# Patient Record
Sex: MALE | Race: WHITE | NOT HISPANIC OR LATINO | Employment: OTHER | URBAN - METROPOLITAN AREA
[De-identification: names, ages, dates, MRNs, and addresses within clinical notes are randomized per-mention and may not be internally consistent; named-entity substitution may affect disease eponyms.]

---

## 2017-12-19 PROCEDURE — 99285 EMERGENCY DEPT VISIT HI MDM: CPT

## 2017-12-19 RX ORDER — LANCETS
EACH MISCELLANEOUS
COMMUNITY
Start: 2017-10-23 | End: 2018-06-05

## 2017-12-19 RX ORDER — BUMETANIDE 1 MG/1
1 TABLET ORAL DAILY
COMMUNITY
End: 2017-12-24 | Stop reason: HOSPADM

## 2017-12-20 ENCOUNTER — APPOINTMENT (OUTPATIENT)
Dept: CT IMAGING | Facility: HOSPITAL | Age: 57
End: 2017-12-20

## 2017-12-20 ENCOUNTER — APPOINTMENT (OUTPATIENT)
Dept: MRI IMAGING | Facility: HOSPITAL | Age: 57
End: 2017-12-20
Attending: HOSPITALIST

## 2017-12-20 ENCOUNTER — HOSPITAL ENCOUNTER (INPATIENT)
Facility: HOSPITAL | Age: 57
LOS: 4 days | Discharge: HOME | End: 2017-12-24
Attending: EMERGENCY MEDICINE | Admitting: HOSPITALIST

## 2017-12-20 ENCOUNTER — APPOINTMENT (OUTPATIENT)
Dept: CARDIOLOGY | Facility: HOSPITAL | Age: 57
End: 2017-12-20
Attending: HOSPITALIST

## 2017-12-20 DIAGNOSIS — I63.9 ACUTE CVA (CEREBROVASCULAR ACCIDENT) (HCC): Primary | ICD-10-CM

## 2017-12-20 LAB
ALBUMIN SERPL-MCNC: 3.6 G/DL (ref 3.5–5.2)
ALBUMIN/GLOB SERPL: 0.9 G/DL
ALP SERPL-CCNC: 81 U/L (ref 39–117)
ALT SERPL W P-5'-P-CCNC: 10 U/L (ref 1–41)
ANION GAP SERPL CALCULATED.3IONS-SCNC: 8.9 MMOL/L
AST SERPL-CCNC: 19 U/L (ref 1–40)
BASOPHILS # BLD AUTO: 0.03 10*3/MM3 (ref 0–0.2)
BASOPHILS NFR BLD AUTO: 0.5 % (ref 0–1.5)
BH CV XLRA MEAS LEFT CCA RATIO VEL: 97.4 CM/SEC
BH CV XLRA MEAS LEFT DIST CCA EDV: 18.9 CM/SEC
BH CV XLRA MEAS LEFT DIST CCA PSV: 97.4 CM/SEC
BH CV XLRA MEAS LEFT DIST ICA EDV: -19.6 CM/SEC
BH CV XLRA MEAS LEFT DIST ICA PSV: -39.3 CM/SEC
BH CV XLRA MEAS LEFT ICA RATIO VEL: 91.1 CM/SEC
BH CV XLRA MEAS LEFT ICA/CCA RATIO: 0.94
BH CV XLRA MEAS LEFT MID ICA EDV: -19.3 CM/SEC
BH CV XLRA MEAS LEFT MID ICA PSV: -56.3 CM/SEC
BH CV XLRA MEAS LEFT PROX CCA EDV: 11.1 CM/SEC
BH CV XLRA MEAS LEFT PROX CCA PSV: 80.9 CM/SEC
BH CV XLRA MEAS LEFT PROX ECA EDV: -3.9 CM/SEC
BH CV XLRA MEAS LEFT PROX ECA PSV: -42.4 CM/SEC
BH CV XLRA MEAS LEFT PROX ICA EDV: 29.1 CM/SEC
BH CV XLRA MEAS LEFT PROX ICA PSV: 91.1 CM/SEC
BH CV XLRA MEAS LEFT PROX SCLA PSV: 149 CM/SEC
BH CV XLRA MEAS LEFT VERTEBRAL A EDV: -9 CM/SEC
BH CV XLRA MEAS LEFT VERTEBRAL A PSV: -39.3 CM/SEC
BH CV XLRA MEAS RIGHT CCA RATIO VEL: 70.7 CM/SEC
BH CV XLRA MEAS RIGHT DIST CCA EDV: 13.4 CM/SEC
BH CV XLRA MEAS RIGHT DIST CCA PSV: 70.7 CM/SEC
BH CV XLRA MEAS RIGHT DIST ICA EDV: -14.5 CM/SEC
BH CV XLRA MEAS RIGHT DIST ICA PSV: -42.8 CM/SEC
BH CV XLRA MEAS RIGHT ICA RATIO VEL: -73.3 CM/SEC
BH CV XLRA MEAS RIGHT ICA/CCA RATIO: -1
BH CV XLRA MEAS RIGHT MID ICA EDV: 14.1 CM/SEC
BH CV XLRA MEAS RIGHT MID ICA PSV: 58.5 CM/SEC
BH CV XLRA MEAS RIGHT PROX CCA EDV: 10.2 CM/SEC
BH CV XLRA MEAS RIGHT PROX CCA PSV: 62.9 CM/SEC
BH CV XLRA MEAS RIGHT PROX ECA EDV: -3.5 CM/SEC
BH CV XLRA MEAS RIGHT PROX ECA PSV: -62.1 CM/SEC
BH CV XLRA MEAS RIGHT PROX ICA EDV: -13.5 CM/SEC
BH CV XLRA MEAS RIGHT PROX ICA PSV: -73.3 CM/SEC
BH CV XLRA MEAS RIGHT PROX SCLA PSV: 180 CM/SEC
BH CV XLRA MEAS RIGHT VERTEBRAL A EDV: 11.4 CM/SEC
BH CV XLRA MEAS RIGHT VERTEBRAL A PSV: 40.1 CM/SEC
BILIRUB SERPL-MCNC: 0.3 MG/DL (ref 0.1–1.2)
BUN BLD-MCNC: 23 MG/DL (ref 6–20)
BUN/CREAT SERPL: 16.2 (ref 7–25)
CALCIUM SPEC-SCNC: 9.1 MG/DL (ref 8.6–10.5)
CHLORIDE SERPL-SCNC: 100 MMOL/L (ref 98–107)
CO2 SERPL-SCNC: 32.1 MMOL/L (ref 22–29)
CREAT BLD-MCNC: 1.42 MG/DL (ref 0.76–1.27)
DEPRECATED RDW RBC AUTO: 42.6 FL (ref 37–54)
EOSINOPHIL # BLD AUTO: 0.04 10*3/MM3 (ref 0–0.7)
EOSINOPHIL NFR BLD AUTO: 0.7 % (ref 0.3–6.2)
ERYTHROCYTE [DISTWIDTH] IN BLOOD BY AUTOMATED COUNT: 13 % (ref 11.5–14.5)
GFR SERPL CREATININE-BSD FRML MDRD: 52 ML/MIN/1.73
GLOBULIN UR ELPH-MCNC: 3.9 GM/DL
GLUCOSE BLD-MCNC: 388 MG/DL (ref 65–99)
GLUCOSE BLDC GLUCOMTR-MCNC: 235 MG/DL (ref 70–130)
GLUCOSE BLDC GLUCOMTR-MCNC: 245 MG/DL (ref 70–130)
HCT VFR BLD AUTO: 34.5 % (ref 40.4–52.2)
HGB BLD-MCNC: 11.2 G/DL (ref 13.7–17.6)
IMM GRANULOCYTES # BLD: 0 10*3/MM3 (ref 0–0.03)
IMM GRANULOCYTES NFR BLD: 0 % (ref 0–0.5)
INR PPP: 1.09 (ref 0.9–1.1)
LEFT ARM BP: NORMAL MMHG
LYMPHOCYTES # BLD AUTO: 1.52 10*3/MM3 (ref 0.9–4.8)
LYMPHOCYTES NFR BLD AUTO: 25.2 % (ref 19.6–45.3)
MCH RBC QN AUTO: 29.2 PG (ref 27–32.7)
MCHC RBC AUTO-ENTMCNC: 32.5 G/DL (ref 32.6–36.4)
MCV RBC AUTO: 90.1 FL (ref 79.8–96.2)
MONOCYTES # BLD AUTO: 0.29 10*3/MM3 (ref 0.2–1.2)
MONOCYTES NFR BLD AUTO: 4.8 % (ref 5–12)
NEUTROPHILS # BLD AUTO: 4.16 10*3/MM3 (ref 1.9–8.1)
NEUTROPHILS NFR BLD AUTO: 68.8 % (ref 42.7–76)
NRBC BLD MANUAL-RTO: 0 /100 WBC (ref 0–0)
PLATELET # BLD AUTO: 138 10*3/MM3 (ref 140–500)
PMV BLD AUTO: 12.1 FL (ref 6–12)
POTASSIUM BLD-SCNC: 4.2 MMOL/L (ref 3.5–5.2)
PROT SERPL-MCNC: 7.5 G/DL (ref 6–8.5)
PROTHROMBIN TIME: 13.7 SECONDS (ref 11.7–14.2)
RBC # BLD AUTO: 3.83 10*6/MM3 (ref 4.6–6)
RIGHT ARM BP: NORMAL MMHG
SODIUM BLD-SCNC: 141 MMOL/L (ref 136–145)
VIT B12 BLD-MCNC: 634 PG/ML (ref 211–946)
WBC NRBC COR # BLD: 6.04 10*3/MM3 (ref 4.5–10.7)

## 2017-12-20 PROCEDURE — 70498 CT ANGIOGRAPHY NECK: CPT

## 2017-12-20 PROCEDURE — 70553 MRI BRAIN STEM W/O & W/DYE: CPT

## 2017-12-20 PROCEDURE — 70450 CT HEAD/BRAIN W/O DYE: CPT

## 2017-12-20 PROCEDURE — 63710000001 INSULIN ASPART PER 5 UNITS: Performed by: HOSPITALIST

## 2017-12-20 PROCEDURE — 99223 1ST HOSP IP/OBS HIGH 75: CPT | Performed by: PSYCHIATRY & NEUROLOGY

## 2017-12-20 PROCEDURE — 82962 GLUCOSE BLOOD TEST: CPT

## 2017-12-20 PROCEDURE — 0 GADOBENATE DIMEGLUMINE 529 MG/ML SOLUTION: Performed by: HOSPITALIST

## 2017-12-20 PROCEDURE — A9577 INJ MULTIHANCE: HCPCS | Performed by: HOSPITALIST

## 2017-12-20 PROCEDURE — 85025 COMPLETE CBC W/AUTO DIFF WBC: CPT | Performed by: EMERGENCY MEDICINE

## 2017-12-20 PROCEDURE — 93306 TTE W/DOPPLER COMPLETE: CPT

## 2017-12-20 PROCEDURE — 85610 PROTHROMBIN TIME: CPT | Performed by: EMERGENCY MEDICINE

## 2017-12-20 PROCEDURE — 80053 COMPREHEN METABOLIC PANEL: CPT | Performed by: EMERGENCY MEDICINE

## 2017-12-20 PROCEDURE — 93880 EXTRACRANIAL BILAT STUDY: CPT

## 2017-12-20 PROCEDURE — 63710000001 INSULIN DETEMER PER 5 UNITS: Performed by: HOSPITALIST

## 2017-12-20 PROCEDURE — 70496 CT ANGIOGRAPHY HEAD: CPT

## 2017-12-20 PROCEDURE — 0 IOPAMIDOL 61 % SOLUTION: Performed by: HOSPITALIST

## 2017-12-20 PROCEDURE — 82607 VITAMIN B-12: CPT | Performed by: PSYCHIATRY & NEUROLOGY

## 2017-12-20 RX ORDER — ATORVASTATIN CALCIUM 20 MG/1
40 TABLET, FILM COATED ORAL NIGHTLY
Status: DISCONTINUED | OUTPATIENT
Start: 2017-12-20 | End: 2017-12-20

## 2017-12-20 RX ORDER — GABAPENTIN 300 MG/1
300 CAPSULE ORAL 3 TIMES DAILY
Status: DISCONTINUED | OUTPATIENT
Start: 2017-12-20 | End: 2017-12-24 | Stop reason: HOSPADM

## 2017-12-20 RX ORDER — OXYCODONE AND ACETAMINOPHEN 10; 325 MG/1; MG/1
1 TABLET ORAL EVERY 4 HOURS PRN
Status: DISCONTINUED | OUTPATIENT
Start: 2017-12-20 | End: 2017-12-20

## 2017-12-20 RX ORDER — DIPHENOXYLATE HYDROCHLORIDE AND ATROPINE SULFATE 2.5; .025 MG/1; MG/1
1 TABLET ORAL DAILY
Status: DISCONTINUED | OUTPATIENT
Start: 2017-12-20 | End: 2017-12-24 | Stop reason: HOSPADM

## 2017-12-20 RX ORDER — SODIUM CHLORIDE 0.9 % (FLUSH) 0.9 %
1-10 SYRINGE (ML) INJECTION AS NEEDED
Status: DISCONTINUED | OUTPATIENT
Start: 2017-12-20 | End: 2017-12-24 | Stop reason: HOSPADM

## 2017-12-20 RX ORDER — LISINOPRIL 20 MG/1
20 TABLET ORAL DAILY
Status: DISCONTINUED | OUTPATIENT
Start: 2017-12-20 | End: 2017-12-20

## 2017-12-20 RX ORDER — OXYCODONE AND ACETAMINOPHEN 10; 325 MG/1; MG/1
1 TABLET ORAL 4 TIMES DAILY
Status: DISCONTINUED | OUTPATIENT
Start: 2017-12-20 | End: 2017-12-20

## 2017-12-20 RX ORDER — ALPRAZOLAM 0.25 MG/1
1 TABLET ORAL 4 TIMES DAILY
Status: DISCONTINUED | OUTPATIENT
Start: 2017-12-20 | End: 2017-12-20

## 2017-12-20 RX ORDER — BUTALBITAL, ACETAMINOPHEN AND CAFFEINE 50; 325; 40 MG/1; MG/1; MG/1
2 TABLET ORAL EVERY 6 HOURS PRN
Status: DISCONTINUED | OUTPATIENT
Start: 2017-12-20 | End: 2017-12-24 | Stop reason: HOSPADM

## 2017-12-20 RX ORDER — ALPRAZOLAM 0.25 MG/1
0.25 TABLET ORAL 4 TIMES DAILY PRN
Status: DISCONTINUED | OUTPATIENT
Start: 2017-12-20 | End: 2017-12-24 | Stop reason: HOSPADM

## 2017-12-20 RX ORDER — TIZANIDINE 4 MG/1
4 TABLET ORAL EVERY 6 HOURS PRN
Status: DISCONTINUED | OUTPATIENT
Start: 2017-12-20 | End: 2017-12-24 | Stop reason: HOSPADM

## 2017-12-20 RX ORDER — ATORVASTATIN CALCIUM 80 MG/1
80 TABLET, FILM COATED ORAL NIGHTLY
Status: DISCONTINUED | OUTPATIENT
Start: 2017-12-20 | End: 2017-12-24 | Stop reason: HOSPADM

## 2017-12-20 RX ORDER — ASPIRIN 300 MG/1
300 SUPPOSITORY RECTAL DAILY
Status: DISCONTINUED | OUTPATIENT
Start: 2017-12-20 | End: 2017-12-22

## 2017-12-20 RX ORDER — ASPIRIN 81 MG/1
324 TABLET, CHEWABLE ORAL DAILY
Status: DISCONTINUED | OUTPATIENT
Start: 2017-12-20 | End: 2017-12-20

## 2017-12-20 RX ORDER — TIZANIDINE 4 MG/1
4 TABLET ORAL 4 TIMES DAILY
Status: DISCONTINUED | OUTPATIENT
Start: 2017-12-20 | End: 2017-12-20

## 2017-12-20 RX ORDER — SODIUM CHLORIDE 9 MG/ML
75 INJECTION, SOLUTION INTRAVENOUS CONTINUOUS
Status: DISCONTINUED | OUTPATIENT
Start: 2017-12-20 | End: 2017-12-22

## 2017-12-20 RX ORDER — ASPIRIN 81 MG/1
81 TABLET, CHEWABLE ORAL DAILY
Status: DISCONTINUED | OUTPATIENT
Start: 2017-12-20 | End: 2017-12-22

## 2017-12-20 RX ORDER — PANTOPRAZOLE SODIUM 40 MG/1
40 TABLET, DELAYED RELEASE ORAL EVERY MORNING
Status: DISCONTINUED | OUTPATIENT
Start: 2017-12-20 | End: 2017-12-21

## 2017-12-20 RX ORDER — LISINOPRIL 20 MG/1
20 TABLET ORAL EVERY 12 HOURS SCHEDULED
Status: DISCONTINUED | OUTPATIENT
Start: 2017-12-20 | End: 2017-12-24 | Stop reason: HOSPADM

## 2017-12-20 RX ADMIN — LISINOPRIL 20 MG: 20 TABLET ORAL at 23:07

## 2017-12-20 RX ADMIN — BUTALBITAL, ACETAMINOPHEN, AND CAFFEINE 2 TABLET: 50; 325; 40 TABLET ORAL at 16:12

## 2017-12-20 RX ADMIN — GABAPENTIN 300 MG: 300 CAPSULE ORAL at 23:06

## 2017-12-20 RX ADMIN — ASPIRIN 81 MG: 81 TABLET, CHEWABLE ORAL at 17:32

## 2017-12-20 RX ADMIN — BUTALBITAL, ACETAMINOPHEN, AND CAFFEINE 2 TABLET: 50; 325; 40 TABLET ORAL at 23:20

## 2017-12-20 RX ADMIN — INSULIN DETEMIR 10 UNITS: 100 INJECTION, SOLUTION SUBCUTANEOUS at 23:08

## 2017-12-20 RX ADMIN — SODIUM CHLORIDE 75 ML/HR: 9 INJECTION, SOLUTION INTRAVENOUS at 14:58

## 2017-12-20 RX ADMIN — ATORVASTATIN CALCIUM 80 MG: 80 TABLET, FILM COATED ORAL at 23:07

## 2017-12-20 RX ADMIN — GADOBENATE DIMEGLUMINE 16 ML: 529 INJECTION, SOLUTION INTRAVENOUS at 10:40

## 2017-12-20 RX ADMIN — GABAPENTIN 300 MG: 300 CAPSULE ORAL at 16:12

## 2017-12-20 RX ADMIN — INSULIN ASPART 3 UNITS: 100 INJECTION, SOLUTION INTRAVENOUS; SUBCUTANEOUS at 23:08

## 2017-12-20 RX ADMIN — INSULIN ASPART 5 UNITS: 100 INJECTION, SOLUTION INTRAVENOUS; SUBCUTANEOUS at 17:32

## 2017-12-20 RX ADMIN — IOPAMIDOL 95 ML: 612 INJECTION, SOLUTION INTRAVENOUS at 17:30

## 2017-12-21 LAB
ALBUMIN SERPL-MCNC: 3.1 G/DL (ref 3.5–5.2)
ALBUMIN/GLOB SERPL: 0.9 G/DL
ALP SERPL-CCNC: 69 U/L (ref 39–117)
ALT SERPL W P-5'-P-CCNC: 13 U/L (ref 1–41)
ANION GAP SERPL CALCULATED.3IONS-SCNC: 12 MMOL/L
AST SERPL-CCNC: 24 U/L (ref 1–40)
BASOPHILS # BLD AUTO: 0.02 10*3/MM3 (ref 0–0.2)
BASOPHILS NFR BLD AUTO: 0.4 % (ref 0–1.5)
BH CV ECHO MEAS - ACS: 1.9 CM
BH CV ECHO MEAS - AO MEAN PG (FULL): 1 MMHG
BH CV ECHO MEAS - AO MEAN PG: 3 MMHG
BH CV ECHO MEAS - AO ROOT AREA (BSA CORRECTED): 1.7
BH CV ECHO MEAS - AO ROOT AREA: 8.6 CM^2
BH CV ECHO MEAS - AO ROOT DIAM: 3.3 CM
BH CV ECHO MEAS - AO V2 MEAN: 80 CM/SEC
BH CV ECHO MEAS - AO V2 VTI: 23.7 CM
BH CV ECHO MEAS - AVA(I,A): 4.6 CM^2
BH CV ECHO MEAS - AVA(I,D): 4.6 CM^2
BH CV ECHO MEAS - BSA(HAYCOCK): 1.9 M^2
BH CV ECHO MEAS - BSA: 1.9 M^2
BH CV ECHO MEAS - BZI_BMI: 25.1 KILOGRAMS/M^2
BH CV ECHO MEAS - BZI_METRIC_HEIGHT: 175.3 CM
BH CV ECHO MEAS - BZI_METRIC_WEIGHT: 77.1 KG
BH CV ECHO MEAS - CONTRAST EF (2CH): 69.6 ML/M^2
BH CV ECHO MEAS - CONTRAST EF 4CH: 70.1 ML/M^2
BH CV ECHO MEAS - EDV(CUBED): 68.9 ML
BH CV ECHO MEAS - EDV(MOD-SP2): 69 ML
BH CV ECHO MEAS - EDV(MOD-SP4): 67 ML
BH CV ECHO MEAS - EDV(TEICH): 74.2 ML
BH CV ECHO MEAS - EF(CUBED): 84.6 %
BH CV ECHO MEAS - EF(MOD-SP2): 69.6 %
BH CV ECHO MEAS - EF(MOD-SP4): 70.1 %
BH CV ECHO MEAS - EF(TEICH): 78.2 %
BH CV ECHO MEAS - ESV(CUBED): 10.6 ML
BH CV ECHO MEAS - ESV(MOD-SP2): 21 ML
BH CV ECHO MEAS - ESV(MOD-SP4): 20 ML
BH CV ECHO MEAS - ESV(TEICH): 16.2 ML
BH CV ECHO MEAS - FS: 46.3 %
BH CV ECHO MEAS - IVS/LVPW: 1.1
BH CV ECHO MEAS - IVSD: 1.4 CM
BH CV ECHO MEAS - LA DIMENSION: 2.9 CM
BH CV ECHO MEAS - LA/AO: 0.88
BH CV ECHO MEAS - LAT PEAK E' VEL: 7.8 CM/SEC
BH CV ECHO MEAS - LV DIASTOLIC VOL/BSA (35-75): 34.8 ML/M^2
BH CV ECHO MEAS - LV MASS(C)D: 204.9 GRAMS
BH CV ECHO MEAS - LV MASS(C)DI: 106.3 GRAMS/M^2
BH CV ECHO MEAS - LV MEAN PG: 2 MMHG
BH CV ECHO MEAS - LV SYSTOLIC VOL/BSA (12-30): 10.4 ML/M^2
BH CV ECHO MEAS - LV V1 MEAN: 71.1 CM/SEC
BH CV ECHO MEAS - LV V1 VTI: 24 CM
BH CV ECHO MEAS - LVIDD: 4.1 CM
BH CV ECHO MEAS - LVIDS: 2.2 CM
BH CV ECHO MEAS - LVLD AP2: 7.6 CM
BH CV ECHO MEAS - LVLD AP4: 6.3 CM
BH CV ECHO MEAS - LVLS AP2: 5.6 CM
BH CV ECHO MEAS - LVLS AP4: 5.5 CM
BH CV ECHO MEAS - LVOT AREA (M): 4.5 CM^2
BH CV ECHO MEAS - LVOT AREA: 4.5 CM^2
BH CV ECHO MEAS - LVOT DIAM: 2.4 CM
BH CV ECHO MEAS - LVPWD: 1.3 CM
BH CV ECHO MEAS - MED PEAK E' VEL: 4.5 CM/SEC
BH CV ECHO MEAS - MR MAX PG: 83.5 MMHG
BH CV ECHO MEAS - MR MAX VEL: 457 CM/SEC
BH CV ECHO MEAS - MV A DUR: 0.15 SEC
BH CV ECHO MEAS - MV A MAX VEL: 91.3 CM/SEC
BH CV ECHO MEAS - MV DEC SLOPE: 420 CM/SEC^2
BH CV ECHO MEAS - MV DEC TIME: 0.23 SEC
BH CV ECHO MEAS - MV E MAX VEL: 83.9 CM/SEC
BH CV ECHO MEAS - MV E/A: 0.92
BH CV ECHO MEAS - MV MEAN PG: 2 MMHG
BH CV ECHO MEAS - MV P1/2T MAX VEL: 110 CM/SEC
BH CV ECHO MEAS - MV P1/2T: 76.7 MSEC
BH CV ECHO MEAS - MV V2 MEAN: 63.8 CM/SEC
BH CV ECHO MEAS - MV V2 VTI: 36.1 CM
BH CV ECHO MEAS - MVA P1/2T LCG: 2 CM^2
BH CV ECHO MEAS - MVA(P1/2T): 2.9 CM^2
BH CV ECHO MEAS - MVA(VTI): 3 CM^2
BH CV ECHO MEAS - PA ACC SLOPE: 1124 CM/SEC^2
BH CV ECHO MEAS - PA ACC TIME: 0.09 SEC
BH CV ECHO MEAS - PA MAX PG (FULL): 1.6 MMHG
BH CV ECHO MEAS - PA MAX PG: 3.9 MMHG
BH CV ECHO MEAS - PA PR(ACCEL): 40.8 MMHG
BH CV ECHO MEAS - PA V2 MAX: 99.1 CM/SEC
BH CV ECHO MEAS - PULM A REVS DUR: 0.16 SEC
BH CV ECHO MEAS - PULM A REVS VEL: 27.6 CM/SEC
BH CV ECHO MEAS - PULM DIAS VEL: 43.4 CM/SEC
BH CV ECHO MEAS - PULM S/D: 1.3
BH CV ECHO MEAS - PULM SYS VEL: 54.8 CM/SEC
BH CV ECHO MEAS - PVA(V,A): 2.7 CM^2
BH CV ECHO MEAS - PVA(V,D): 2.7 CM^2
BH CV ECHO MEAS - QP/QS: 0.51
BH CV ECHO MEAS - RAP SYSTOLE: 3 MMHG
BH CV ECHO MEAS - RV MAX PG: 2.3 MMHG
BH CV ECHO MEAS - RV MEAN PG: 1 MMHG
BH CV ECHO MEAS - RV V1 MAX: 75.9 CM/SEC
BH CV ECHO MEAS - RV V1 MEAN: 51.9 CM/SEC
BH CV ECHO MEAS - RV V1 VTI: 16.1 CM
BH CV ECHO MEAS - RVOT AREA: 3.5 CM^2
BH CV ECHO MEAS - RVOT DIAM: 2.1 CM
BH CV ECHO MEAS - RVSP: 22 MMHG
BH CV ECHO MEAS - SI(AO): 105.1 ML/M^2
BH CV ECHO MEAS - SI(CUBED): 30.2 ML/M^2
BH CV ECHO MEAS - SI(LVOT): 56.3 ML/M^2
BH CV ECHO MEAS - SI(MOD-SP2): 24.9 ML/M^2
BH CV ECHO MEAS - SI(MOD-SP4): 24.4 ML/M^2
BH CV ECHO MEAS - SI(TEICH): 30.1 ML/M^2
BH CV ECHO MEAS - SV(AO): 202.7 ML
BH CV ECHO MEAS - SV(CUBED): 58.3 ML
BH CV ECHO MEAS - SV(LVOT): 108.6 ML
BH CV ECHO MEAS - SV(MOD-SP2): 48 ML
BH CV ECHO MEAS - SV(MOD-SP4): 47 ML
BH CV ECHO MEAS - SV(RVOT): 55.8 ML
BH CV ECHO MEAS - SV(TEICH): 58 ML
BH CV ECHO MEAS - TAPSE (>1.6): 1.3 CM2
BH CV ECHO MEAS - TR MAX VEL: 217 CM/SEC
BH CV XLRA - RV BASE: 3.2 CM
BH CV XLRA - RV LENGTH: 5.6 CM
BH CV XLRA - RV MID: 2.1 CM
BH CV XLRA - TDI S': 9.1 CM/SEC
BILIRUB SERPL-MCNC: 0.2 MG/DL (ref 0.1–1.2)
BUN BLD-MCNC: 19 MG/DL (ref 6–20)
BUN/CREAT SERPL: 16.4 (ref 7–25)
CALCIUM SPEC-SCNC: 8.9 MG/DL (ref 8.6–10.5)
CHLORIDE SERPL-SCNC: 104 MMOL/L (ref 98–107)
CHOLEST SERPL-MCNC: 138 MG/DL (ref 0–200)
CO2 SERPL-SCNC: 26 MMOL/L (ref 22–29)
CREAT BLD-MCNC: 1.16 MG/DL (ref 0.76–1.27)
DEPRECATED RDW RBC AUTO: 42.6 FL (ref 37–54)
E/E' RATIO: 14.8
EOSINOPHIL # BLD AUTO: 0.11 10*3/MM3 (ref 0–0.7)
EOSINOPHIL NFR BLD AUTO: 2.1 % (ref 0.3–6.2)
ERYTHROCYTE [DISTWIDTH] IN BLOOD BY AUTOMATED COUNT: 13 % (ref 11.5–14.5)
GFR SERPL CREATININE-BSD FRML MDRD: 65 ML/MIN/1.73
GLOBULIN UR ELPH-MCNC: 3.4 GM/DL
GLUCOSE BLD-MCNC: 311 MG/DL (ref 65–99)
GLUCOSE BLDC GLUCOMTR-MCNC: 216 MG/DL (ref 70–130)
GLUCOSE BLDC GLUCOMTR-MCNC: 229 MG/DL (ref 70–130)
GLUCOSE BLDC GLUCOMTR-MCNC: 290 MG/DL (ref 70–130)
HBA1C MFR BLD: 10.8 % (ref 4.8–5.6)
HCT VFR BLD AUTO: 33.4 % (ref 40.4–52.2)
HDLC SERPL-MCNC: 36 MG/DL (ref 40–60)
HGB BLD-MCNC: 10.9 G/DL (ref 13.7–17.6)
IMM GRANULOCYTES # BLD: 0 10*3/MM3 (ref 0–0.03)
IMM GRANULOCYTES NFR BLD: 0 % (ref 0–0.5)
LDLC SERPL CALC-MCNC: 75 MG/DL (ref 0–100)
LDLC/HDLC SERPL: 2.09 {RATIO}
LEFT ATRIUM VOLUME INDEX: 27 ML/M2
LYMPHOCYTES # BLD AUTO: 1.78 10*3/MM3 (ref 0.9–4.8)
LYMPHOCYTES NFR BLD AUTO: 33.3 % (ref 19.6–45.3)
MAXIMAL PREDICTED HEART RATE: 165 BPM
MCH RBC QN AUTO: 29.5 PG (ref 27–32.7)
MCHC RBC AUTO-ENTMCNC: 32.6 G/DL (ref 32.6–36.4)
MCV RBC AUTO: 90.3 FL (ref 79.8–96.2)
MONOCYTES # BLD AUTO: 0.38 10*3/MM3 (ref 0.2–1.2)
MONOCYTES NFR BLD AUTO: 7.1 % (ref 5–12)
NEUTROPHILS # BLD AUTO: 3.06 10*3/MM3 (ref 1.9–8.1)
NEUTROPHILS NFR BLD AUTO: 57.1 % (ref 42.7–76)
NT-PROBNP SERPL-MCNC: 1863 PG/ML (ref 0–900)
PLATELET # BLD AUTO: 129 10*3/MM3 (ref 140–500)
PMV BLD AUTO: 11.5 FL (ref 6–12)
POTASSIUM BLD-SCNC: 4 MMOL/L (ref 3.5–5.2)
PROT SERPL-MCNC: 6.5 G/DL (ref 6–8.5)
RBC # BLD AUTO: 3.7 10*6/MM3 (ref 4.6–6)
SODIUM BLD-SCNC: 142 MMOL/L (ref 136–145)
STRESS TARGET HR: 140 BPM
TRIGL SERPL-MCNC: 133 MG/DL (ref 0–150)
TSH SERPL DL<=0.05 MIU/L-ACNC: 0.19 MIU/ML (ref 0.27–4.2)
VLDLC SERPL-MCNC: 26.6 MG/DL (ref 5–40)
WBC NRBC COR # BLD: 5.35 10*3/MM3 (ref 4.5–10.7)

## 2017-12-21 PROCEDURE — 84443 ASSAY THYROID STIM HORMONE: CPT | Performed by: HOSPITALIST

## 2017-12-21 PROCEDURE — G8980 MOBILITY D/C STATUS: HCPCS

## 2017-12-21 PROCEDURE — G8978 MOBILITY CURRENT STATUS: HCPCS

## 2017-12-21 PROCEDURE — 97165 OT EVAL LOW COMPLEX 30 MIN: CPT

## 2017-12-21 PROCEDURE — 80061 LIPID PANEL: CPT | Performed by: HOSPITALIST

## 2017-12-21 PROCEDURE — 99232 SBSQ HOSP IP/OBS MODERATE 35: CPT | Performed by: NURSE PRACTITIONER

## 2017-12-21 PROCEDURE — 82962 GLUCOSE BLOOD TEST: CPT

## 2017-12-21 PROCEDURE — 83036 HEMOGLOBIN GLYCOSYLATED A1C: CPT | Performed by: HOSPITALIST

## 2017-12-21 PROCEDURE — 97161 PT EVAL LOW COMPLEX 20 MIN: CPT

## 2017-12-21 PROCEDURE — G8979 MOBILITY GOAL STATUS: HCPCS

## 2017-12-21 PROCEDURE — 63710000001 INSULIN ASPART PER 5 UNITS: Performed by: HOSPITALIST

## 2017-12-21 PROCEDURE — 80053 COMPREHEN METABOLIC PANEL: CPT | Performed by: HOSPITALIST

## 2017-12-21 PROCEDURE — 83880 ASSAY OF NATRIURETIC PEPTIDE: CPT | Performed by: HOSPITALIST

## 2017-12-21 PROCEDURE — 92610 EVALUATE SWALLOWING FUNCTION: CPT

## 2017-12-21 PROCEDURE — 85025 COMPLETE CBC W/AUTO DIFF WBC: CPT | Performed by: HOSPITALIST

## 2017-12-21 PROCEDURE — 63710000001 INSULIN DETEMER PER 5 UNITS: Performed by: HOSPITALIST

## 2017-12-21 RX ORDER — OXYCODONE HYDROCHLORIDE AND ACETAMINOPHEN 5; 325 MG/1; MG/1
2 TABLET ORAL EVERY 6 HOURS PRN
Status: DISCONTINUED | OUTPATIENT
Start: 2017-12-21 | End: 2017-12-24 | Stop reason: HOSPADM

## 2017-12-21 RX ORDER — HYDRALAZINE HYDROCHLORIDE 10 MG/1
10 TABLET, FILM COATED ORAL EVERY 6 HOURS PRN
Status: DISCONTINUED | OUTPATIENT
Start: 2017-12-21 | End: 2017-12-24 | Stop reason: HOSPADM

## 2017-12-21 RX ORDER — FAMOTIDINE 20 MG/1
20 TABLET, FILM COATED ORAL 2 TIMES DAILY
Status: DISCONTINUED | OUTPATIENT
Start: 2017-12-21 | End: 2017-12-24 | Stop reason: HOSPADM

## 2017-12-21 RX ORDER — HYDRALAZINE HYDROCHLORIDE 20 MG/ML
10 INJECTION INTRAMUSCULAR; INTRAVENOUS EVERY 6 HOURS PRN
Status: DISCONTINUED | OUTPATIENT
Start: 2017-12-21 | End: 2017-12-24 | Stop reason: HOSPADM

## 2017-12-21 RX ADMIN — Medication 1 TABLET: at 08:49

## 2017-12-21 RX ADMIN — FAMOTIDINE 20 MG: 20 TABLET, FILM COATED ORAL at 20:56

## 2017-12-21 RX ADMIN — ASPIRIN 81 MG: 81 TABLET, CHEWABLE ORAL at 08:49

## 2017-12-21 RX ADMIN — GABAPENTIN 300 MG: 300 CAPSULE ORAL at 15:31

## 2017-12-21 RX ADMIN — INSULIN ASPART 3 UNITS: 100 INJECTION, SOLUTION INTRAVENOUS; SUBCUTANEOUS at 22:55

## 2017-12-21 RX ADMIN — GABAPENTIN 300 MG: 300 CAPSULE ORAL at 20:56

## 2017-12-21 RX ADMIN — INSULIN ASPART 3 UNITS: 100 INJECTION, SOLUTION INTRAVENOUS; SUBCUTANEOUS at 17:35

## 2017-12-21 RX ADMIN — OXYCODONE HYDROCHLORIDE AND ACETAMINOPHEN 2 TABLET: 5; 325 TABLET ORAL at 17:34

## 2017-12-21 RX ADMIN — ALPRAZOLAM 0.25 MG: 0.25 TABLET ORAL at 17:34

## 2017-12-21 RX ADMIN — ATORVASTATIN CALCIUM 80 MG: 80 TABLET, FILM COATED ORAL at 20:56

## 2017-12-21 RX ADMIN — GABAPENTIN 300 MG: 300 CAPSULE ORAL at 08:49

## 2017-12-21 RX ADMIN — ALPRAZOLAM 0.25 MG: 0.25 TABLET ORAL at 10:43

## 2017-12-21 RX ADMIN — LISINOPRIL 20 MG: 20 TABLET ORAL at 08:49

## 2017-12-21 RX ADMIN — INSULIN ASPART 5 UNITS: 100 INJECTION, SOLUTION INTRAVENOUS; SUBCUTANEOUS at 17:35

## 2017-12-21 RX ADMIN — INSULIN DETEMIR 10 UNITS: 100 INJECTION, SOLUTION SUBCUTANEOUS at 22:55

## 2017-12-21 RX ADMIN — PANTOPRAZOLE SODIUM 40 MG: 40 TABLET, DELAYED RELEASE ORAL at 08:49

## 2017-12-21 RX ADMIN — OXYCODONE HYDROCHLORIDE AND ACETAMINOPHEN 2 TABLET: 5; 325 TABLET ORAL at 22:55

## 2017-12-21 RX ADMIN — INSULIN ASPART 5 UNITS: 100 INJECTION, SOLUTION INTRAVENOUS; SUBCUTANEOUS at 08:50

## 2017-12-21 RX ADMIN — INSULIN ASPART 4 UNITS: 100 INJECTION, SOLUTION INTRAVENOUS; SUBCUTANEOUS at 08:49

## 2017-12-21 RX ADMIN — LISINOPRIL 20 MG: 20 TABLET ORAL at 20:56

## 2017-12-21 RX ADMIN — INSULIN ASPART 3 UNITS: 100 INJECTION, SOLUTION INTRAVENOUS; SUBCUTANEOUS at 12:10

## 2017-12-21 RX ADMIN — INSULIN ASPART 5 UNITS: 100 INJECTION, SOLUTION INTRAVENOUS; SUBCUTANEOUS at 12:09

## 2017-12-21 RX ADMIN — FAMOTIDINE 20 MG: 20 TABLET, FILM COATED ORAL at 12:09

## 2017-12-21 RX ADMIN — OXYCODONE HYDROCHLORIDE AND ACETAMINOPHEN 2 TABLET: 5; 325 TABLET ORAL at 10:43

## 2017-12-21 RX ADMIN — ALPRAZOLAM 0.25 MG: 0.25 TABLET ORAL at 04:25

## 2017-12-22 LAB
ANION GAP SERPL CALCULATED.3IONS-SCNC: 8.7 MMOL/L
BUN BLD-MCNC: 16 MG/DL (ref 6–20)
BUN/CREAT SERPL: 12.8 (ref 7–25)
CALCIUM SPEC-SCNC: 9.3 MG/DL (ref 8.6–10.5)
CHLORIDE SERPL-SCNC: 106 MMOL/L (ref 98–107)
CO2 SERPL-SCNC: 25.3 MMOL/L (ref 22–29)
CREAT BLD-MCNC: 1.25 MG/DL (ref 0.76–1.27)
DEPRECATED RDW RBC AUTO: 43.1 FL (ref 37–54)
ERYTHROCYTE [DISTWIDTH] IN BLOOD BY AUTOMATED COUNT: 13.2 % (ref 11.5–14.5)
GFR SERPL CREATININE-BSD FRML MDRD: 60 ML/MIN/1.73
GLUCOSE BLD-MCNC: 239 MG/DL (ref 65–99)
GLUCOSE BLDC GLUCOMTR-MCNC: 212 MG/DL (ref 70–130)
GLUCOSE BLDC GLUCOMTR-MCNC: 228 MG/DL (ref 70–130)
GLUCOSE BLDC GLUCOMTR-MCNC: 240 MG/DL (ref 70–130)
GLUCOSE BLDC GLUCOMTR-MCNC: 300 MG/DL (ref 70–130)
HCT VFR BLD AUTO: 35 % (ref 40.4–52.2)
HGB BLD-MCNC: 11.4 G/DL (ref 13.7–17.6)
MCH RBC QN AUTO: 29.3 PG (ref 27–32.7)
MCHC RBC AUTO-ENTMCNC: 32.6 G/DL (ref 32.6–36.4)
MCV RBC AUTO: 90 FL (ref 79.8–96.2)
PLATELET # BLD AUTO: 153 10*3/MM3 (ref 140–500)
PMV BLD AUTO: 11.7 FL (ref 6–12)
POTASSIUM BLD-SCNC: 4.4 MMOL/L (ref 3.5–5.2)
RBC # BLD AUTO: 3.89 10*6/MM3 (ref 4.6–6)
SODIUM BLD-SCNC: 140 MMOL/L (ref 136–145)
WBC NRBC COR # BLD: 7 10*3/MM3 (ref 4.5–10.7)

## 2017-12-22 PROCEDURE — 25010000002 HYDRALAZINE PER 20 MG: Performed by: INTERNAL MEDICINE

## 2017-12-22 PROCEDURE — 80048 BASIC METABOLIC PNL TOTAL CA: CPT | Performed by: INTERNAL MEDICINE

## 2017-12-22 PROCEDURE — 63710000001 INSULIN ASPART PER 5 UNITS: Performed by: HOSPITALIST

## 2017-12-22 PROCEDURE — 99231 SBSQ HOSP IP/OBS SF/LOW 25: CPT | Performed by: PSYCHIATRY & NEUROLOGY

## 2017-12-22 PROCEDURE — 63710000001 INSULIN DETEMER PER 5 UNITS: Performed by: INTERNAL MEDICINE

## 2017-12-22 PROCEDURE — 82962 GLUCOSE BLOOD TEST: CPT

## 2017-12-22 PROCEDURE — 85027 COMPLETE CBC AUTOMATED: CPT | Performed by: INTERNAL MEDICINE

## 2017-12-22 PROCEDURE — 63710000001 INSULIN ASPART PER 5 UNITS: Performed by: INTERNAL MEDICINE

## 2017-12-22 PROCEDURE — 97535 SELF CARE MNGMENT TRAINING: CPT

## 2017-12-22 RX ORDER — CARVEDILOL 12.5 MG/1
12.5 TABLET ORAL EVERY 12 HOURS SCHEDULED
Status: DISCONTINUED | OUTPATIENT
Start: 2017-12-22 | End: 2017-12-24 | Stop reason: HOSPADM

## 2017-12-22 RX ORDER — ONDANSETRON 2 MG/ML
4 INJECTION INTRAMUSCULAR; INTRAVENOUS EVERY 4 HOURS PRN
Status: DISCONTINUED | OUTPATIENT
Start: 2017-12-22 | End: 2017-12-24 | Stop reason: HOSPADM

## 2017-12-22 RX ORDER — ASPIRIN 325 MG
325 TABLET, DELAYED RELEASE (ENTERIC COATED) ORAL DAILY
Status: DISCONTINUED | OUTPATIENT
Start: 2017-12-22 | End: 2017-12-24 | Stop reason: HOSPADM

## 2017-12-22 RX ORDER — AMLODIPINE BESYLATE 5 MG/1
5 TABLET ORAL
Status: DISCONTINUED | OUTPATIENT
Start: 2017-12-22 | End: 2017-12-22

## 2017-12-22 RX ORDER — AMLODIPINE BESYLATE 10 MG/1
10 TABLET ORAL
Status: DISCONTINUED | OUTPATIENT
Start: 2017-12-23 | End: 2017-12-24 | Stop reason: HOSPADM

## 2017-12-22 RX ORDER — AMLODIPINE BESYLATE 5 MG/1
5 TABLET ORAL ONCE
Status: COMPLETED | OUTPATIENT
Start: 2017-12-22 | End: 2017-12-22

## 2017-12-22 RX ADMIN — CARVEDILOL 12.5 MG: 12.5 TABLET, FILM COATED ORAL at 22:38

## 2017-12-22 RX ADMIN — LISINOPRIL 20 MG: 20 TABLET ORAL at 08:55

## 2017-12-22 RX ADMIN — INSULIN ASPART 7 UNITS: 100 INJECTION, SOLUTION INTRAVENOUS; SUBCUTANEOUS at 17:24

## 2017-12-22 RX ADMIN — AMLODIPINE BESYLATE 5 MG: 5 TABLET ORAL at 15:16

## 2017-12-22 RX ADMIN — GABAPENTIN 300 MG: 300 CAPSULE ORAL at 15:16

## 2017-12-22 RX ADMIN — SODIUM CHLORIDE 75 ML/HR: 9 INJECTION, SOLUTION INTRAVENOUS at 07:56

## 2017-12-22 RX ADMIN — AMLODIPINE BESYLATE 5 MG: 5 TABLET ORAL at 08:55

## 2017-12-22 RX ADMIN — FAMOTIDINE 20 MG: 20 TABLET, FILM COATED ORAL at 22:39

## 2017-12-22 RX ADMIN — Medication 1 TABLET: at 08:56

## 2017-12-22 RX ADMIN — INSULIN ASPART 5 UNITS: 100 INJECTION, SOLUTION INTRAVENOUS; SUBCUTANEOUS at 12:13

## 2017-12-22 RX ADMIN — ATORVASTATIN CALCIUM 80 MG: 80 TABLET, FILM COATED ORAL at 22:39

## 2017-12-22 RX ADMIN — INSULIN DETEMIR 15 UNITS: 100 INJECTION, SOLUTION SUBCUTANEOUS at 22:39

## 2017-12-22 RX ADMIN — INSULIN ASPART 3 UNITS: 100 INJECTION, SOLUTION INTRAVENOUS; SUBCUTANEOUS at 22:46

## 2017-12-22 RX ADMIN — OXYCODONE HYDROCHLORIDE AND ACETAMINOPHEN 2 TABLET: 5; 325 TABLET ORAL at 18:11

## 2017-12-22 RX ADMIN — GABAPENTIN 300 MG: 300 CAPSULE ORAL at 08:56

## 2017-12-22 RX ADMIN — INSULIN ASPART 5 UNITS: 100 INJECTION, SOLUTION INTRAVENOUS; SUBCUTANEOUS at 08:57

## 2017-12-22 RX ADMIN — ALPRAZOLAM 0.25 MG: 0.25 TABLET ORAL at 08:56

## 2017-12-22 RX ADMIN — HYDRALAZINE HYDROCHLORIDE 10 MG: 20 INJECTION INTRAMUSCULAR; INTRAVENOUS at 08:04

## 2017-12-22 RX ADMIN — OXYCODONE HYDROCHLORIDE AND ACETAMINOPHEN 2 TABLET: 5; 325 TABLET ORAL at 08:56

## 2017-12-22 RX ADMIN — FAMOTIDINE 20 MG: 20 TABLET, FILM COATED ORAL at 08:56

## 2017-12-22 RX ADMIN — GABAPENTIN 300 MG: 300 CAPSULE ORAL at 22:48

## 2017-12-22 RX ADMIN — ASPIRIN 81 MG: 81 TABLET, CHEWABLE ORAL at 08:56

## 2017-12-22 RX ADMIN — LISINOPRIL 20 MG: 20 TABLET ORAL at 22:38

## 2017-12-22 RX ADMIN — INSULIN ASPART 3 UNITS: 100 INJECTION, SOLUTION INTRAVENOUS; SUBCUTANEOUS at 17:24

## 2017-12-23 ENCOUNTER — APPOINTMENT (OUTPATIENT)
Dept: ULTRASOUND IMAGING | Facility: HOSPITAL | Age: 57
End: 2017-12-23

## 2017-12-23 LAB
ANION GAP SERPL CALCULATED.3IONS-SCNC: 9.5 MMOL/L
BACTERIA UR QL AUTO: NORMAL /HPF
BILIRUB UR QL STRIP: NEGATIVE
BUN BLD-MCNC: 21 MG/DL (ref 6–20)
BUN/CREAT SERPL: 15.3 (ref 7–25)
CALCIUM SPEC-SCNC: 8.7 MG/DL (ref 8.6–10.5)
CHLORIDE SERPL-SCNC: 106 MMOL/L (ref 98–107)
CHLORIDE UR-SCNC: 30 MMOL/L
CK SERPL-CCNC: 59 U/L (ref 20–200)
CLARITY UR: CLEAR
CO2 SERPL-SCNC: 24.5 MMOL/L (ref 22–29)
COLOR UR: YELLOW
CREAT BLD-MCNC: 1.37 MG/DL (ref 0.76–1.27)
CREAT UR-MCNC: 16.3 MG/DL
DEPRECATED RDW RBC AUTO: 42.6 FL (ref 37–54)
ERYTHROCYTE [DISTWIDTH] IN BLOOD BY AUTOMATED COUNT: 12.9 % (ref 11.5–14.5)
GFR SERPL CREATININE-BSD FRML MDRD: 54 ML/MIN/1.73
GLUCOSE BLD-MCNC: 315 MG/DL (ref 65–99)
GLUCOSE BLDC GLUCOMTR-MCNC: 120 MG/DL (ref 70–130)
GLUCOSE BLDC GLUCOMTR-MCNC: 193 MG/DL (ref 70–130)
GLUCOSE BLDC GLUCOMTR-MCNC: 277 MG/DL (ref 70–130)
GLUCOSE BLDC GLUCOMTR-MCNC: 302 MG/DL (ref 70–130)
GLUCOSE UR STRIP-MCNC: ABNORMAL MG/DL
HCT VFR BLD AUTO: 29.7 % (ref 40.4–52.2)
HGB BLD-MCNC: 9.6 G/DL (ref 13.7–17.6)
HGB UR QL STRIP.AUTO: ABNORMAL
HYALINE CASTS UR QL AUTO: NORMAL /LPF
KETONES UR QL STRIP: NEGATIVE
LEUKOCYTE ESTERASE UR QL STRIP.AUTO: NEGATIVE
MCH RBC QN AUTO: 29.2 PG (ref 27–32.7)
MCHC RBC AUTO-ENTMCNC: 32.3 G/DL (ref 32.6–36.4)
MCV RBC AUTO: 90.3 FL (ref 79.8–96.2)
NITRITE UR QL STRIP: NEGATIVE
PH UR STRIP.AUTO: 7 [PH] (ref 5–8)
PLATELET # BLD AUTO: 137 10*3/MM3 (ref 140–500)
PMV BLD AUTO: 11.9 FL (ref 6–12)
POTASSIUM BLD-SCNC: 4.8 MMOL/L (ref 3.5–5.2)
PROT UR QL STRIP: ABNORMAL
PROT UR-MCNC: 110 MG/DL
RBC # BLD AUTO: 3.29 10*6/MM3 (ref 4.6–6)
RBC # UR: NORMAL /HPF
REF LAB TEST METHOD: NORMAL
SODIUM BLD-SCNC: 140 MMOL/L (ref 136–145)
SODIUM UR-SCNC: 39 MMOL/L
SP GR UR STRIP: <1.005 (ref 1–1.03)
SQUAMOUS #/AREA URNS HPF: NORMAL /HPF
UROBILINOGEN UR QL STRIP: ABNORMAL
WBC NRBC COR # BLD: 5.86 10*3/MM3 (ref 4.5–10.7)
WBC UR QL AUTO: NORMAL /HPF

## 2017-12-23 PROCEDURE — 84300 ASSAY OF URINE SODIUM: CPT | Performed by: INTERNAL MEDICINE

## 2017-12-23 PROCEDURE — 82962 GLUCOSE BLOOD TEST: CPT

## 2017-12-23 PROCEDURE — 81001 URINALYSIS AUTO W/SCOPE: CPT | Performed by: INTERNAL MEDICINE

## 2017-12-23 PROCEDURE — 85027 COMPLETE CBC AUTOMATED: CPT | Performed by: INTERNAL MEDICINE

## 2017-12-23 PROCEDURE — 76775 US EXAM ABDO BACK WALL LIM: CPT

## 2017-12-23 PROCEDURE — 63710000001 INSULIN ASPART PER 5 UNITS: Performed by: HOSPITALIST

## 2017-12-23 PROCEDURE — 63710000001 INSULIN ASPART PER 5 UNITS: Performed by: INTERNAL MEDICINE

## 2017-12-23 PROCEDURE — 82550 ASSAY OF CK (CPK): CPT | Performed by: INTERNAL MEDICINE

## 2017-12-23 PROCEDURE — 82570 ASSAY OF URINE CREATININE: CPT | Performed by: INTERNAL MEDICINE

## 2017-12-23 PROCEDURE — 80048 BASIC METABOLIC PNL TOTAL CA: CPT | Performed by: INTERNAL MEDICINE

## 2017-12-23 PROCEDURE — 82436 ASSAY OF URINE CHLORIDE: CPT | Performed by: INTERNAL MEDICINE

## 2017-12-23 PROCEDURE — 84156 ASSAY OF PROTEIN URINE: CPT | Performed by: INTERNAL MEDICINE

## 2017-12-23 RX ORDER — HYDRALAZINE HYDROCHLORIDE 25 MG/1
25 TABLET, FILM COATED ORAL EVERY 8 HOURS SCHEDULED
Status: DISCONTINUED | OUTPATIENT
Start: 2017-12-23 | End: 2017-12-24

## 2017-12-23 RX ADMIN — GABAPENTIN 300 MG: 300 CAPSULE ORAL at 12:01

## 2017-12-23 RX ADMIN — INSULIN ASPART 5 UNITS: 100 INJECTION, SOLUTION INTRAVENOUS; SUBCUTANEOUS at 09:50

## 2017-12-23 RX ADMIN — INSULIN DETEMIR 15 UNITS: 100 INJECTION, SOLUTION SUBCUTANEOUS at 21:15

## 2017-12-23 RX ADMIN — HYDRALAZINE HYDROCHLORIDE 25 MG: 25 TABLET, FILM COATED ORAL at 17:19

## 2017-12-23 RX ADMIN — GABAPENTIN 300 MG: 300 CAPSULE ORAL at 21:15

## 2017-12-23 RX ADMIN — INSULIN ASPART 4 UNITS: 100 INJECTION, SOLUTION INTRAVENOUS; SUBCUTANEOUS at 12:00

## 2017-12-23 RX ADMIN — LISINOPRIL 20 MG: 20 TABLET ORAL at 08:57

## 2017-12-23 RX ADMIN — FAMOTIDINE 20 MG: 20 TABLET, FILM COATED ORAL at 08:57

## 2017-12-23 RX ADMIN — OXYCODONE HYDROCHLORIDE AND ACETAMINOPHEN 2 TABLET: 5; 325 TABLET ORAL at 13:15

## 2017-12-23 RX ADMIN — CARVEDILOL 12.5 MG: 12.5 TABLET, FILM COATED ORAL at 21:15

## 2017-12-23 RX ADMIN — HYDRALAZINE HYDROCHLORIDE 25 MG: 25 TABLET, FILM COATED ORAL at 23:50

## 2017-12-23 RX ADMIN — ASPIRIN 325 MG: 325 TABLET, COATED ORAL at 08:57

## 2017-12-23 RX ADMIN — ATORVASTATIN CALCIUM 80 MG: 80 TABLET, FILM COATED ORAL at 21:15

## 2017-12-23 RX ADMIN — INSULIN ASPART 7 UNITS: 100 INJECTION, SOLUTION INTRAVENOUS; SUBCUTANEOUS at 17:17

## 2017-12-23 RX ADMIN — AMLODIPINE BESYLATE 10 MG: 10 TABLET ORAL at 08:57

## 2017-12-23 RX ADMIN — FAMOTIDINE 20 MG: 20 TABLET, FILM COATED ORAL at 21:15

## 2017-12-23 RX ADMIN — Medication 1 TABLET: at 08:57

## 2017-12-23 RX ADMIN — INSULIN ASPART 2 UNITS: 100 INJECTION, SOLUTION INTRAVENOUS; SUBCUTANEOUS at 21:23

## 2017-12-23 RX ADMIN — OXYCODONE HYDROCHLORIDE AND ACETAMINOPHEN 2 TABLET: 5; 325 TABLET ORAL at 21:15

## 2017-12-23 RX ADMIN — GABAPENTIN 300 MG: 300 CAPSULE ORAL at 17:20

## 2017-12-23 RX ADMIN — LISINOPRIL 20 MG: 20 TABLET ORAL at 21:15

## 2017-12-23 RX ADMIN — OXYCODONE HYDROCHLORIDE AND ACETAMINOPHEN 2 TABLET: 5; 325 TABLET ORAL at 00:09

## 2017-12-23 RX ADMIN — INSULIN ASPART 7 UNITS: 100 INJECTION, SOLUTION INTRAVENOUS; SUBCUTANEOUS at 09:50

## 2017-12-23 RX ADMIN — INSULIN ASPART 7 UNITS: 100 INJECTION, SOLUTION INTRAVENOUS; SUBCUTANEOUS at 12:00

## 2017-12-23 RX ADMIN — CARVEDILOL 12.5 MG: 12.5 TABLET, FILM COATED ORAL at 08:57

## 2017-12-24 ENCOUNTER — APPOINTMENT (OUTPATIENT)
Dept: CARDIOLOGY | Facility: HOSPITAL | Age: 57
End: 2017-12-24

## 2017-12-24 VITALS
RESPIRATION RATE: 18 BRPM | BODY MASS INDEX: 25.43 KG/M2 | WEIGHT: 167.77 LBS | HEART RATE: 65 BPM | HEIGHT: 68 IN | SYSTOLIC BLOOD PRESSURE: 141 MMHG | TEMPERATURE: 98.9 F | OXYGEN SATURATION: 98 % | DIASTOLIC BLOOD PRESSURE: 88 MMHG

## 2017-12-24 PROBLEM — R80.9 PROTEINURIA: Status: ACTIVE | Noted: 2017-12-24

## 2017-12-24 PROBLEM — N28.89 RENAL MASS: Status: ACTIVE | Noted: 2017-12-24

## 2017-12-24 LAB
ALBUMIN SERPL-MCNC: 3 G/DL (ref 3.5–5.2)
ANION GAP SERPL CALCULATED.3IONS-SCNC: 7.6 MMOL/L
BUN BLD-MCNC: 22 MG/DL (ref 6–20)
BUN/CREAT SERPL: 16.8 (ref 7–25)
CALCIUM SPEC-SCNC: 9.2 MG/DL (ref 8.6–10.5)
CHLORIDE SERPL-SCNC: 104 MMOL/L (ref 98–107)
CO2 SERPL-SCNC: 28.4 MMOL/L (ref 22–29)
CREAT BLD-MCNC: 1.31 MG/DL (ref 0.76–1.27)
DEPRECATED RDW RBC AUTO: 43.4 FL (ref 37–54)
ERYTHROCYTE [DISTWIDTH] IN BLOOD BY AUTOMATED COUNT: 13.4 % (ref 11.5–14.5)
GFR SERPL CREATININE-BSD FRML MDRD: 57 ML/MIN/1.73
GLUCOSE BLD-MCNC: 296 MG/DL (ref 65–99)
GLUCOSE BLDC GLUCOMTR-MCNC: 273 MG/DL (ref 70–130)
GLUCOSE BLDC GLUCOMTR-MCNC: 330 MG/DL (ref 70–130)
GLUCOSE BLDC GLUCOMTR-MCNC: 348 MG/DL (ref 70–130)
HCT VFR BLD AUTO: 33 % (ref 40.4–52.2)
HGB BLD-MCNC: 11 G/DL (ref 13.7–17.6)
MAGNESIUM SERPL-MCNC: 2.3 MG/DL (ref 1.6–2.6)
MCH RBC QN AUTO: 29.7 PG (ref 27–32.7)
MCHC RBC AUTO-ENTMCNC: 33.3 G/DL (ref 32.6–36.4)
MCV RBC AUTO: 89.2 FL (ref 79.8–96.2)
PHOSPHATE SERPL-MCNC: 3.2 MG/DL (ref 2.5–4.5)
PLATELET # BLD AUTO: 156 10*3/MM3 (ref 140–500)
PMV BLD AUTO: 12 FL (ref 6–12)
POTASSIUM BLD-SCNC: 4.8 MMOL/L (ref 3.5–5.2)
RBC # BLD AUTO: 3.7 10*6/MM3 (ref 4.6–6)
SODIUM BLD-SCNC: 140 MMOL/L (ref 136–145)
URATE SERPL-MCNC: 5.8 MG/DL (ref 3.4–7)
WBC NRBC COR # BLD: 7.03 10*3/MM3 (ref 4.5–10.7)

## 2017-12-24 PROCEDURE — 63710000001 INSULIN ASPART PER 5 UNITS: Performed by: HOSPITALIST

## 2017-12-24 PROCEDURE — 84550 ASSAY OF BLOOD/URIC ACID: CPT | Performed by: INTERNAL MEDICINE

## 2017-12-24 PROCEDURE — 83735 ASSAY OF MAGNESIUM: CPT | Performed by: INTERNAL MEDICINE

## 2017-12-24 PROCEDURE — 80069 RENAL FUNCTION PANEL: CPT | Performed by: INTERNAL MEDICINE

## 2017-12-24 PROCEDURE — 82962 GLUCOSE BLOOD TEST: CPT

## 2017-12-24 PROCEDURE — 85027 COMPLETE CBC AUTOMATED: CPT | Performed by: INTERNAL MEDICINE

## 2017-12-24 PROCEDURE — 63710000001 INSULIN ASPART PER 5 UNITS: Performed by: INTERNAL MEDICINE

## 2017-12-24 RX ORDER — CARVEDILOL 12.5 MG/1
12.5 TABLET ORAL EVERY 12 HOURS SCHEDULED
Qty: 60 TABLET | Refills: 0 | Status: SHIPPED | OUTPATIENT
Start: 2017-12-24 | End: 2018-06-08 | Stop reason: HOSPADM

## 2017-12-24 RX ORDER — HYDRALAZINE HYDROCHLORIDE 50 MG/1
50 TABLET, FILM COATED ORAL EVERY 8 HOURS SCHEDULED
Status: DISCONTINUED | OUTPATIENT
Start: 2017-12-24 | End: 2017-12-24 | Stop reason: HOSPADM

## 2017-12-24 RX ORDER — HYDRALAZINE HYDROCHLORIDE 50 MG/1
50 TABLET, FILM COATED ORAL EVERY 8 HOURS SCHEDULED
Qty: 90 TABLET | Refills: 0 | Status: SHIPPED | OUTPATIENT
Start: 2017-12-24 | End: 2018-06-08 | Stop reason: HOSPADM

## 2017-12-24 RX ORDER — LISINOPRIL 40 MG/1
20 TABLET ORAL 2 TIMES DAILY
Qty: 60 TABLET | Refills: 0 | Status: SHIPPED | OUTPATIENT
Start: 2017-12-24 | End: 2018-06-08 | Stop reason: HOSPADM

## 2017-12-24 RX ORDER — AMLODIPINE BESYLATE 10 MG/1
10 TABLET ORAL
Qty: 30 TABLET | Refills: 0 | Status: SHIPPED | OUTPATIENT
Start: 2017-12-25 | End: 2018-06-08 | Stop reason: HOSPADM

## 2017-12-24 RX ORDER — ATORVASTATIN CALCIUM 80 MG/1
80 TABLET, FILM COATED ORAL NIGHTLY
Qty: 30 TABLET | Refills: 0 | Status: SHIPPED | OUTPATIENT
Start: 2017-12-24 | End: 2018-03-05

## 2017-12-24 RX ADMIN — CARVEDILOL 12.5 MG: 12.5 TABLET, FILM COATED ORAL at 08:02

## 2017-12-24 RX ADMIN — INSULIN ASPART 7 UNITS: 100 INJECTION, SOLUTION INTRAVENOUS; SUBCUTANEOUS at 11:39

## 2017-12-24 RX ADMIN — HYDRALAZINE HYDROCHLORIDE 50 MG: 50 TABLET, FILM COATED ORAL at 14:01

## 2017-12-24 RX ADMIN — INSULIN ASPART 4 UNITS: 100 INJECTION, SOLUTION INTRAVENOUS; SUBCUTANEOUS at 11:39

## 2017-12-24 RX ADMIN — INSULIN ASPART 7 UNITS: 100 INJECTION, SOLUTION INTRAVENOUS; SUBCUTANEOUS at 07:59

## 2017-12-24 RX ADMIN — HYDRALAZINE HYDROCHLORIDE 25 MG: 25 TABLET, FILM COATED ORAL at 07:58

## 2017-12-24 RX ADMIN — INSULIN ASPART 5 UNITS: 100 INJECTION, SOLUTION INTRAVENOUS; SUBCUTANEOUS at 07:58

## 2017-12-24 RX ADMIN — ASPIRIN 325 MG: 325 TABLET, COATED ORAL at 08:02

## 2017-12-24 RX ADMIN — FAMOTIDINE 20 MG: 20 TABLET, FILM COATED ORAL at 08:02

## 2017-12-24 RX ADMIN — GABAPENTIN 300 MG: 300 CAPSULE ORAL at 08:02

## 2017-12-24 RX ADMIN — AMLODIPINE BESYLATE 10 MG: 10 TABLET ORAL at 08:02

## 2017-12-24 RX ADMIN — Medication 1 TABLET: at 08:02

## 2017-12-24 RX ADMIN — OXYCODONE HYDROCHLORIDE AND ACETAMINOPHEN 2 TABLET: 5; 325 TABLET ORAL at 08:02

## 2017-12-24 RX ADMIN — LISINOPRIL 20 MG: 20 TABLET ORAL at 08:02

## 2018-01-25 ENCOUNTER — TRANSCRIBE ORDERS (OUTPATIENT)
Dept: ADMINISTRATIVE | Facility: HOSPITAL | Age: 58
End: 2018-01-25

## 2018-01-25 DIAGNOSIS — N28.89 RENAL MASS: Primary | ICD-10-CM

## 2018-03-05 ENCOUNTER — CONSULT (OUTPATIENT)
Dept: ONCOLOGY | Facility: CLINIC | Age: 58
End: 2018-03-05

## 2018-03-05 ENCOUNTER — LAB (OUTPATIENT)
Dept: LAB | Facility: HOSPITAL | Age: 58
End: 2018-03-05

## 2018-03-05 VITALS
BODY MASS INDEX: 28.94 KG/M2 | DIASTOLIC BLOOD PRESSURE: 70 MMHG | HEART RATE: 64 BPM | TEMPERATURE: 97.8 F | RESPIRATION RATE: 16 BRPM | SYSTOLIC BLOOD PRESSURE: 120 MMHG | HEIGHT: 67 IN | WEIGHT: 184.4 LBS

## 2018-03-05 DIAGNOSIS — R94.6 ABNORMAL RESULTS OF THYROID FUNCTION STUDIES: ICD-10-CM

## 2018-03-05 DIAGNOSIS — N28.89 RENAL MASS: ICD-10-CM

## 2018-03-05 DIAGNOSIS — D63.1 ANEMIA IN STAGE 3 CHRONIC KIDNEY DISEASE (HCC): Primary | ICD-10-CM

## 2018-03-05 DIAGNOSIS — D69.6 THROMBOCYTOPENIA (HCC): ICD-10-CM

## 2018-03-05 DIAGNOSIS — N28.89 RENAL MASS: Primary | ICD-10-CM

## 2018-03-05 DIAGNOSIS — D61.818 PANCYTOPENIA, ACQUIRED (HCC): ICD-10-CM

## 2018-03-05 DIAGNOSIS — Z86.19 HISTORY OF HEPATITIS C VIRUS INFECTION: ICD-10-CM

## 2018-03-05 DIAGNOSIS — D70.8 OTHER NEUTROPENIA (HCC): ICD-10-CM

## 2018-03-05 DIAGNOSIS — R80.8 OTHER PROTEINURIA: ICD-10-CM

## 2018-03-05 DIAGNOSIS — N18.30 ANEMIA IN STAGE 3 CHRONIC KIDNEY DISEASE (HCC): Primary | ICD-10-CM

## 2018-03-05 PROBLEM — D72.819 LEUKOPENIA: Status: ACTIVE | Noted: 2018-03-05

## 2018-03-05 PROBLEM — N18.9 ANEMIA IN CHRONIC KIDNEY DISEASE: Status: ACTIVE | Noted: 2018-03-05

## 2018-03-05 LAB
BASOPHILS # BLD AUTO: 0.03 10*3/MM3 (ref 0–0.1)
BASOPHILS NFR BLD AUTO: 0.6 % (ref 0–1.1)
CRP SERPL-MCNC: 3.12 MG/DL (ref 0–0.5)
DEPRECATED RDW RBC AUTO: 48.1 FL (ref 37–49)
EOSINOPHIL # BLD AUTO: 0.21 10*3/MM3 (ref 0–0.36)
EOSINOPHIL NFR BLD AUTO: 4.5 % (ref 1–5)
ERYTHROCYTE [DISTWIDTH] IN BLOOD BY AUTOMATED COUNT: 14 % (ref 11.7–14.5)
ERYTHROCYTE [SEDIMENTATION RATE] IN BLOOD: 64 MM/HR (ref 0–20)
FOLATE SERPL-MCNC: 15.97 NG/ML (ref 4.78–24.2)
HCT VFR BLD AUTO: 25.9 % (ref 40–49)
HGB BLD-MCNC: 8.3 G/DL (ref 13.5–16.5)
HGB RETIC QN: 31 PG (ref 29.8–36.1)
IMM GRANULOCYTES # BLD: 0.02 10*3/MM3 (ref 0–0.03)
IMM GRANULOCYTES NFR BLD: 0.4 % (ref 0–0.5)
IMM RETICS NFR: 8.9 % (ref 3–15.8)
LYMPHOCYTES # BLD AUTO: 1.37 10*3/MM3 (ref 1–3.5)
LYMPHOCYTES NFR BLD AUTO: 29.5 % (ref 20–49)
MCH RBC QN AUTO: 29.9 PG (ref 27–33)
MCHC RBC AUTO-ENTMCNC: 32 G/DL (ref 32–35)
MCV RBC AUTO: 93.2 FL (ref 83–97)
MONOCYTES # BLD AUTO: 0.36 10*3/MM3 (ref 0.25–0.8)
MONOCYTES NFR BLD AUTO: 7.8 % (ref 4–12)
NEUTROPHILS # BLD AUTO: 2.65 10*3/MM3 (ref 1.5–7)
NEUTROPHILS NFR BLD AUTO: 57.2 % (ref 39–75)
NRBC BLD MANUAL-RTO: 0 /100 WBC (ref 0–0)
PLATELET # BLD AUTO: 80 10*3/MM3 (ref 150–375)
PLATELETS.RETICULATED NFR BLD AUTO: 10.6 % (ref 1.1–6.1)
PMV BLD AUTO: 12.9 FL (ref 8.9–12.1)
RBC # BLD AUTO: 2.78 10*6/MM3 (ref 4.3–5.5)
RETICS/RBC NFR AUTO: 1.82 % (ref 0.6–2)
T4 FREE SERPL-MCNC: 1.12 NG/DL (ref 0.93–1.7)
TSH SERPL DL<=0.05 MIU/L-ACNC: 4.15 MIU/ML (ref 0.27–4.2)
VIT B12 BLD-MCNC: 307 PG/ML (ref 211–946)
WBC NRBC COR # BLD: 4.64 10*3/MM3 (ref 4–10)

## 2018-03-05 PROCEDURE — 82746 ASSAY OF FOLIC ACID SERUM: CPT | Performed by: INTERNAL MEDICINE

## 2018-03-05 PROCEDURE — 82728 ASSAY OF FERRITIN: CPT | Performed by: INTERNAL MEDICINE

## 2018-03-05 PROCEDURE — 85025 COMPLETE CBC W/AUTO DIFF WBC: CPT | Performed by: INTERNAL MEDICINE

## 2018-03-05 PROCEDURE — 85652 RBC SED RATE AUTOMATED: CPT | Performed by: INTERNAL MEDICINE

## 2018-03-05 PROCEDURE — 85046 RETICYTE/HGB CONCENTRATE: CPT | Performed by: INTERNAL MEDICINE

## 2018-03-05 PROCEDURE — 83540 ASSAY OF IRON: CPT | Performed by: INTERNAL MEDICINE

## 2018-03-05 PROCEDURE — 85055 RETICULATED PLATELET ASSAY: CPT | Performed by: INTERNAL MEDICINE

## 2018-03-05 PROCEDURE — 84466 ASSAY OF TRANSFERRIN: CPT | Performed by: INTERNAL MEDICINE

## 2018-03-05 PROCEDURE — 86140 C-REACTIVE PROTEIN: CPT | Performed by: INTERNAL MEDICINE

## 2018-03-05 PROCEDURE — 99205 OFFICE O/P NEW HI 60 MIN: CPT | Performed by: INTERNAL MEDICINE

## 2018-03-05 PROCEDURE — 84443 ASSAY THYROID STIM HORMONE: CPT | Performed by: INTERNAL MEDICINE

## 2018-03-05 PROCEDURE — 82607 VITAMIN B-12: CPT | Performed by: INTERNAL MEDICINE

## 2018-03-05 PROCEDURE — 84439 ASSAY OF FREE THYROXINE: CPT | Performed by: INTERNAL MEDICINE

## 2018-03-05 PROCEDURE — 80053 COMPREHEN METABOLIC PANEL: CPT | Performed by: INTERNAL MEDICINE

## 2018-03-05 PROCEDURE — 36415 COLL VENOUS BLD VENIPUNCTURE: CPT | Performed by: INTERNAL MEDICINE

## 2018-03-05 NOTE — PROGRESS NOTES
Subjective     REASON FOR CONSULTATION: PANCYTOPENIA, CHRONIC KIDNEY DISEASE STAGE III, HISTORY OF TREATED HEPATITIS C.   Provide an opinion on any further workup or treatment                             REQUESTING PHYSICIAN:  DR LOGAN PEÑA III MD.    RECORDS OBTAINED:  Records of the patients history including those obtained from the referring provider were reviewed and summarized in detail.    HISTORY OF PRESENT ILLNESS:  The patient is a 57 y.o. year old male who is here for an opinion about the above issue.    History of Present Illness This patient is a 57-year-old white male who has been referred to us today because it has been noticed that in laboratory assessment recently done the patient has leukopenia, anemia and thrombocytopenia. The question was raised about the nature of this in the background of previous history of hepatitis C infection, status post definitive 3-month antiviral therapy by hepatologist. The patient completed this treatment in 10/2017. The family, the wife, son-in-law and daughter are with the patient today stating that since the stroke in 12/2017, the patient has lost ground very substantially. They find him disoriented in the house with tremendous degree of somnolence and significant decrease in appetite. He has not had any fevers, chills or bleeding but he has terrible situation with his vision with marked decrease in the vision, unable to read too much and barely able to watch the television. His appetite has been acceptable. The wife has been trying to help him to control diabetes in the best way possible. The patient also has difficulty swallowing with no obvious reason for this besides trauma that he had a long time ago in the esophagus. He has had occasional regurgitation. No heartburn and things choke on him time to time. This became worse since the stroke. He has not had any hematemesis. He has not had any obvious aspiration as far as they can tell. The patient denies  any cough or sputum production. He has not had any chest pain or palpitation. He had proper bowel activity and no obvious passage of blood in the stool. Urination is ongoing with no frequency, urgency or hematuria. No incontinence. He has profound pain in the lower extremities associated with peripheral neuropathy associated with diabetes. He has difficulty with his balance and he is walking with a cane. He has not had any falls. He has not had any episodes of hypoglycemia. He has tremendous degree of somnolence given the medications that he is receiving at this time and he remains like a sleepyhead most of the time in the house. He has not had any abnormal movements or seizure activity. He has had significant issues in regard to his balance as stated above since the stroke and significant decrease in his vision as well.     Still with all these issues, the patient tries to participate in the life of the family in the best way possible. The patient has been disabled since 2000.      Past Medical History:   Diagnosis Date   • Anxiety    • CAD (coronary artery disease)    • CHF (congestive heart failure)    • Chronic back pain    • COPD (chronic obstructive pulmonary disease)    • Coronary artery disease    • Depression    • Diabetes mellitus    • GERD (gastroesophageal reflux disease)    • Headache    • Hepatitis B 2013    from transfusion for CABG   • Hypertension    • Myocardial infarction    • Seizures    • Stroke         Past Surgical History:   Procedure Laterality Date   • BRAIN HEMATOMA EVACUATION     • CARDIAC SURGERY     • COLONOSCOPY     • CORONARY ARTERY BYPASS GRAFT  2013   • ROTATOR CUFF REPAIR Left    • VASECTOMY     • WOUND DEBRIDEMENT Right 06/10/2011    Excisional debridement of necrotizing fasciitis of the right groin extending along the right hemiscrotum, 5 x 2 x 2 cm in one wound and 7.5 x 2.5 x 2.5 cm of a second wound. This was sharp excisional debridement carried out to the muscle-Dr. Clark  [place]         Current Outpatient Prescriptions on File Prior to Visit   Medication Sig Dispense Refill   • ACCU-CHEK SOFTCLIX LANCETS lancets Test before each meal and at bedtime     • ALPRAZolam (XANAX) 1 MG tablet Take 1 mg by mouth 4 (four) times a day.     • amLODIPine (NORVASC) 10 MG tablet Take 1 tablet by mouth Daily. 30 tablet 0   • aspirin  MG EC tablet Take 1 tablet by mouth Daily. 30 tablet 0   • carvedilol (COREG) 12.5 MG tablet Take 1 tablet by mouth Every 12 (Twelve) Hours. 60 tablet 0   • cetirizine (ZyrTEC) 10 MG tablet Take 10 mg by mouth daily.     • glucose blood (ACCU-CHEK VIN PLUS) test strip TEST FOUR TIMES DAILY BEFORE MEALS AND AT NIGHT     • hydrALAZINE (APRESOLINE) 50 MG tablet Take 1 tablet by mouth Every 8 (Eight) Hours. 90 tablet 0   • Insulin Pen Needle 32G X 5 MM misc Inject 1 each under the skin.     • lisinopril (PRINIVIL,ZESTRIL) 40 MG tablet Take 0.5 tablets by mouth 2 (Two) Times a Day. 60 tablet 0   • MULTIPLE VITAMIN PO Take  by mouth.     • Omega-3 Fatty Acids (FISH OIL) 1000 MG capsule capsule Take  by mouth daily with breakfast.     • oxyCODONE-acetaminophen (PERCOCET)  MG per tablet Take 1 tablet by mouth 4 (four) times a day.     • tiZANidine (ZANAFLEX) 4 MG tablet Take 4 mg by mouth 4 (four) times a day.     • [DISCONTINUED] atorvastatin (LIPITOR) 80 MG tablet Take 1 tablet by mouth Every Night. 30 tablet 0   • [DISCONTINUED] insulin aspart (NovoLOG) 100 UNIT/ML injection Inject  under the skin 2 (two) times a day.     • [DISCONTINUED] insulin glargine (LANTUS) 100 UNIT/ML injection Inject 15 Units under the skin 2 (two) times a day. Combine with novolog.       No current facility-administered medications on file prior to visit.         ALLERGIES:    Allergies   Allergen Reactions   • Morphine And Related    • Fentanyl Other (See Comments)     ER said he was allergy   • Ibuprofen Nausea Only   • Latex Rash   • Penicillins Hives        Social History      Social History   • Marital status:      Spouse name: Samantha     Social History Main Topics   • Smoking status: Never Smoker   • Alcohol use No   • Drug use: No   • Sexual activity: Defer        Family History   Problem Relation Age of Onset   • Diabetes Mother    • Heart failure Mother    • Kidney failure Mother    • Heart failure Father    • Coronary artery disease Father    • Heart disease Father    • Diabetes Sister         Review of Systems   General: no fever, chills,PROFOUND fatigue,AND weight changes, AND lack of appetite.  Eyes: no epiphora, xerophthalmia,conjunctivitis, pain, glaucoma, blurred vision,OBVIOUSLY LEGALLY blind,NO secretion, photophobia, proptosis, diplopia.  Ears: no otorrhea, tinnitus, otorrhagia, deafness, pain, vertigo.  Nose: no rhinorrhea, epistaxis, alteration in perception of odors, sinuses pressure.  Mouth: SEVERE alteration in gums AND teeth, NO ulcers, no difficulty with mastication or deglut ion, no odynophagia.  Neck: no masses or pain, no thyroid alterations, no pain in muscles or arteries, no carotid odynia, no crepitation.  Respiratory: no cough, sputum production, dyspnea, trepopnea, pleuritic pain, hemoptysis.  Heart: no syncope, irregularity, palpitations, angina, orthopnea, paroxysmal nocturnal dyspnea.  Vascular Venous: no tenderness,edema, palpable cords, postphlebitic syndrome, skin changes or ulcerations.  Vascular Arterial: no distal ischemia, claudication, gangrene, neuropathic ischemic pain, skin ulcers, paleness or cyanosis.  GI: STATED dysphagia,NO odynophagia, SOME regurgitation, no heartburn,no indigestion,no nausea,no vomiting,no hematemesis ,no melena,no jaundice,no distention, no obstipation,no enterorrhagia,no proctalgia,no anal  lesions, nochanges in bowel habits.  : no frequency, hesitancy, hematuria, discharge, pain.  Musculoskeletal: no muscle or tendon pain or inflammation, joint pain,BILATERAL LE edema, SEVERE functional limitation,NO  "fasciculations, mass.  Neurologic: no headache, seizures, alterations on Craneal nerves, STATED motor AND senssory deficit, POOR coordination.  Skin: no rashes, pruritus or localized lesions.PROGRESSIVE PALENESS  Psychiatric: no anxiety, depression, agitation, delusions, proper insight.    Objective     Vitals:    03/05/18 1604   BP: 120/70   Pulse: 64   Resp: 16   Temp: 97.8 °F (36.6 °C)   Weight: 83.6 kg (184 lb 6.4 oz)   Height: 170 cm (66.93\")   PainSc: 8  Comment: lower back and left side kidney area     Current Status 3/5/2018   ECOG score 0   ECOG 2 PER DR RIBEIRO PT REQUIRING CARE BY WIFE AND FAMILY    Physical Exam    GENERAL:  Well-developed, well-nourished  Patient CHRONICALLY ILL  SKIN:  Warm, dry without rashes, purpura or petechiae.PALE  HEENT:  Pupils were equal and reactive to light and accomodation, conjunctivas non injected, no pterigion, normal extraocular movements, MARKED DECREASE BILATERAL visual acuity.   Mouth mucosa was moist, no exudates in oropharynx, ATROPY gum line, TERRIBLE PERIODONTAL DISEASE, MULTIPLE DECAY IN TEETH ROOTS, normal roof of the mouth and pillars, POOR papillations of the tongue.Ear canals were normal, as well tympanic membranes, normal hearing acuity.No pain in mastoid area or erythema.  NECK:  Supple with good range of motion; no thyromegaly or masses, no JVD or bruits, no cervical adenopathies.No carotid arteries pain, no carotid abnormal pulsation or arterial dance.  LYMPHATICS:  No cervical, supraclavicular, axillary, epitrochlear or inguinal adenopathy.  CHEST:  Normal excursion of both zaki thoraces, normal voice fremitus, no subcutaneous emphysema, normal axillas, no rashes or acanthosis nigricans. Lungs clear to percussion and auscultation, normal breath sounds bilaterally, no wheezing, crackles or ronchi, no stridor, no rubs.  CARDIAC AND VASCULAR: PMI not displaced,no thrills, normal rate and regular rhythm, without murmurs, rubs or S3 or S4 right or left sided " gallops. Normal femoral, popliteal, pedis, brachial and carotid pulses.SURGICAL SCAR HEALED  ABDOMEN:  Soft, nontender with no organomegaly or masses, no ascites, no collateral circulation,no distention,no Honorio sign, no abdominal pain, no inguinal hernias,REDUCTIBLE 3 CM umbilical hernia, no abdominal bruits. Normal bowel sounds.MAYBE ENLARGED SPLEEN BLCM  GENITAL: Not  Performed.  EXTREMITIES  AND SPINE:  No clubbing, cyanosis , 3 BILATERAL LE edema, no deformities or pain .No kyphosis, scoliosis, deformities or pain in spine, ribs or pelvic bone.  NEUROLOGICAL:  Patient was awake, alert, oriented to time, person and place,POOR gait and coordination,SENSORY NEUROPATHY IN BOTH FEET NO VIBRATORY SENSATION      RECENT LABS:  Hematology WBC   Date Value Ref Range Status   12/24/2017 7.03 4.50 - 10.70 10*3/mm3 Final     RBC   Date Value Ref Range Status   12/24/2017 3.70 (L) 4.60 - 6.00 10*6/mm3 Final     Hemoglobin   Date Value Ref Range Status   12/24/2017 11.0 (L) 13.7 - 17.6 g/dL Final     Hematocrit   Date Value Ref Range Status   12/24/2017 33.0 (L) 40.4 - 52.2 % Final     Platelets   Date Value Ref Range Status   12/24/2017 156 140 - 500 10*3/mm3 Final        Component      Latest Ref Rng & Units 12/22/2017 12/23/2017 12/24/2017 3/5/2018           6:26 AM  5:35 AM  6:22 AM  4:47 PM   WBC      4.00 - 10.00 10*3/mm3 7.00 5.86 7.03 4.64   RBC      4.30 - 5.50 10*6/mm3 3.89 (L) 3.29 (L) 3.70 (L) 2.78 (L)   Hemoglobin      13.5 - 16.5 g/dL 11.4 (L) 9.6 (L) 11.0 (L) 8.3 (L)   Hematocrit      40.0 - 49.0 % 35.0 (L) 29.7 (L) 33.0 (L) 25.9 (L)   MCV      83.0 - 97.0 fL 90.0 90.3 89.2 93.2   MCH      27.0 - 33.0 pg 29.3 29.2 29.7 29.9   MCHC      32.0 - 35.0 g/dL 32.6 32.3 (L) 33.3 32.0   RDW      11.7 - 14.5 % 13.2 12.9 13.4 14.0   RDW-SD      37.0 - 49.0 fl 43.1 42.6 43.4 48.1   MPV      8.9 - 12.1 fL 11.7 11.9 12.0 12.9 (H)   Platelets      150 - 375 10*3/mm3 153 137 (L) 156 80 (L)   Renal Function Panel   Order:  [id number]   Status:  Final result   Visible to patient:  No (Not Released)      Newer results are available. Click to view them now.            Ref Range & Units 2mo ago     Glucose 65 - 99 mg/dL 296 (H)   BUN 6 - 20 mg/dL 22 (H)   Creatinine 0.76 - 1.27 mg/dL 1.31 (H)   Sodium 136 - 145 mmol/L 140   Potassium 3.5 - 5.2 mmol/L 4.8   Chloride 98 - 107 mmol/L 104   CO2 22.0 - 29.0 mmol/L 28.4   Calcium 8.6 - 10.5 mg/dL 9.2   Albumin 3.50 - 5.20 g/dL 3.00 (L)   Phosphorus 2.5 - 4.5 mg/dL 3.2   Anion Gap mmol/L 7.6   BUN/Creatinine Ratio 7.0 - 25.0 16.8           BILATERAL RENAL SONOGRAPHY      HISTORY: 57-year-old male with a history of acute renal failure.      FINDINGS: Sonographic evaluation of the kidneys was performed. No prior  renal sonogram is available for comparison. The right kidney measures  11.7 x 7.3 x 5.2 cm and the left kidney measures 12.4 x 5.9 x 4.1 cm. At  the superior pole of the right kidney there is a 2.9 x 2.1 x 2.6 cm  echogenic lesion without certain detectable internal vascularity. The  mass is circumscribed. It is similar in echotexture to the adjacent  right renal cortex.      A 2.3 cm left renal cyst is noted. Bilateral ureteral jets are seen  within a normal-appearing urinary bladder.      IMPRESSION:  1. 2.9 cm lesion at the superior pole of the right kidney which may  represent a solid mass. Renal cell carcinoma cannot be completely  excluded. Correlation with a CT of the abdomen without and with contrast  is recommended.  2. 2.3 cm left renal cyst.      This report was finalized on 12/23/2017 7:46 PM by Dr. Sharath Abad MD.  STAT MRI OF THE BRAIN WITH AND WITHOUT CONTRAST 12/20/2017      CLINICAL HISTORY: Acute right posterior cerebral artery territory  infarct. The patient had severe headaches last night. History of  frequent falls.      TECHNIQUE: Axial T1, FLAIR, fat-suppressed T2, axial diffusion and  gradient echo T2, sagittal T1 and postcontrast axial fat-suppressed  T1  sagittal and coronal T1, thin cut 1.5 mm thick axial postcontrast  spoiled gradient echo T1-weighted images were obtained of the entire  head.      This is correlated to prior noncontrast head CT earlier this morning  12/20/2017 at 12:30 AM in the morning as well as prior noncontrast head  CT 1-1/2 year ago on 07/08/2016.      FINDINGS: There is a large complex area of signal abnormality most  compatible with hemorrhagic acute stroke that involves the mid and  posterior medial right temporal lobe, nearly the entire right occipital  lobe and extends into the posterior inferior right parietal lobe,  involves the entire right posterior cerebral artery territory, measures  up to 9.4 x 4.3 x 7.1 cm in maximal anterior posterior, mediolateral and  craniocaudal dimension. There is extensive precontrast T1 hyperintensity  compatible with methemoglobin deposition throughout the medial right  temporal occipital component and portion of the posterior inferior  medial right parietal component to the infarct. There are some  enhancement in portions of the infarct as well. This is felt to be an  acute to subacute right posterior cerebral artery territory infarct with  diffuse petechial hemorrhage throughout the infarct. There is mild mass  effect in the posterior body and occipital horn and trigone of the right  lateral ventricle. There is no midline shift. The remainder of the brain  parenchyma is normal in signal intensity. The remainder of the  ventricular system is normal in size. No extra-axial fluid collections  are identified. No additional diffusion-weighted abnormality is seen or  acute infarct identified. The paranasal sinuses, mastoid air cells and  middle ear cavities are clear. There is an absent flow void within the  right posterior cerebral artery, otherwise good flow voids are seen in  the cerebral vessels and in the dural venous sinuses.      IMPRESSION:      The findings are identical to head CT earlier this  morning 12/20/2017 at  12:30 AM. There is a large area of acute to subacute infarction  involving the mid and posterior medial right temporal lobe, nearly the  entire right occipital lobe extending into the posterior inferior right  parietal lobe measuring up to 9.4 x 4.3 x 7.1 cm in anterior posterior,  medial lateral and craniocaudal dimension throughout the entire right  posterior cerebral artery territory with extensive areas of  methemoglobin deposition, some hemosiderin deposition and enhancement  throughout the infarct compatible with extensive petechial hemorrhage.  There is mild mass effect on the posterior body, trigone and occipital  horn and posterior temporal horn of the right lateral ventricle and no  midline shift. The remainder of MRI of the head is normal. The results  were communicated to  as well as Dr. Karl Swift by telephone  12/20/2017 at 11:30 AM.      This report was finalized on 12/20/2017 3:34 PM by Dr. Lamin Pavon MD.      Assessment/Plan  1. This patient has developed significant leukopenia, anemia, thrombocytopenia, making this clinical picture to look like pancytopenia. Obviously, in the background of previous hepatitis C and maybe minimal palpable splenomegaly raises the question if the spleen is the nature of the pancytopenia. On the other hand, in this patient with so many comorbidities, I wonder if he has some sort of primary bone marrow disorder and this will obligate extensive assessment today. For that reason, I have advised the patient and the family to proceed with a comprehensive metabolic profile, LDH, sedimentation rate, serum protein electrophoresis and immunofixation, B12, folic acid level, ferritin. Also we will proceed with an erythropoietin level given the fact that the patient has stage 3 chronic kidney disease.     Obviously the other problems that the patient has is very severe complications of diabetes mellitus including retinal disease, blindness, recent  massive stroke as stated above in the MRI that has produced very significant disbalance in the patient, decrease of visual acuity and modification in his ability to function and personality changes. The other phenomenon that is very worrisome is the previous history of hepatitis C. He was treated with medication by Dr. Eden for this. The medicine was completed after 3 months in 10/2017 and obviously nobody knows what is the status of this condition at this time.     The patient is extremely edematous today and I do not know if this is a representation of chronic kidney disease or if this could be a representation of thyroid disease. The paleness that he has, the alopecia of the eyebrows and many symptoms that the family mentions to me could be reminiscent of thyroid disease and for this reason we need to do thyroid testing as well.     I am not going to proceed with any radiological assessment on this patient until we get to review the laboratory parameters when they return back in a week.     I mentioned all these facts to the patient’s daughter and patient’s wife in private because the patient was having his blood drawn at the time that this dictation took place and the conversation took place. It is very worrisome that things are finally catching up with the patient in regard to so many comorbidities, most of them related to diabetes mellitus. I would not be surprised if when he returns back in a week we need to pursue bone marrow testing.     I would pursue laboratory assessment as well in a week when he returns to follow up on his CBC.     It took me 1-1/2 hours to go through the details of the examination today, the clinical assessment, the laboratory assessment and the recompilation and summary of the data presented above. Most of the data has been obtained from [employer] and from the office of the patient’s nephrologist and primary physician.     The family and patient were willing to proceed.

## 2018-03-06 LAB
ALBUMIN SERPL-MCNC: 3.4 G/DL (ref 2.9–4.4)
ALBUMIN SERPL-MCNC: 3.5 G/DL (ref 3.5–5.2)
ALBUMIN/GLOB SERPL: 1.2 G/DL (ref 1.1–2.4)
ALBUMIN/GLOB SERPL: 1.3 {RATIO} (ref 0.7–1.7)
ALP SERPL-CCNC: 69 U/L (ref 38–116)
ALPHA1 GLOB FLD ELPH-MCNC: 0.2 G/DL (ref 0–0.4)
ALPHA2 GLOB SERPL ELPH-MCNC: 0.9 G/DL (ref 0.4–1)
ALT SERPL W P-5'-P-CCNC: 15 U/L (ref 0–41)
ANION GAP SERPL CALCULATED.3IONS-SCNC: 13.5 MMOL/L
AST SERPL-CCNC: 21 U/L (ref 0–40)
B-GLOBULIN SERPL ELPH-MCNC: 0.8 G/DL (ref 0.7–1.3)
BILIRUB SERPL-MCNC: 0.2 MG/DL (ref 0.1–1.2)
BUN BLD-MCNC: 46 MG/DL (ref 6–20)
BUN/CREAT SERPL: 21.6 (ref 7.3–30)
CALCIUM SPEC-SCNC: 9.3 MG/DL (ref 8.5–10.2)
CHLORIDE SERPL-SCNC: 107 MMOL/L (ref 98–107)
CO2 SERPL-SCNC: 26.5 MMOL/L (ref 22–29)
CREAT BLD-MCNC: 2.13 MG/DL (ref 0.7–1.3)
ETHNIC BACKGROUND STATED: 15.1 MIU/ML (ref 2.6–18.5)
FERRITIN SERPL-MCNC: 120.7 NG/ML (ref 30–400)
GAMMA GLOB SERPL ELPH-MCNC: 0.8 G/DL (ref 0.4–1.8)
GFR SERPL CREATININE-BSD FRML MDRD: 32 ML/MIN/1.73
GLOBULIN SER CALC-MCNC: 2.7 G/DL (ref 2.2–3.9)
GLOBULIN UR ELPH-MCNC: 2.9 GM/DL (ref 1.8–3.5)
GLUCOSE BLD-MCNC: 70 MG/DL (ref 74–124)
IGA SERPL-MCNC: 176 MG/DL (ref 90–386)
IGG SERPL-MCNC: 843 MG/DL (ref 700–1600)
IGM SERPL-MCNC: 110 MG/DL (ref 20–172)
INTERPRETATION SERPL IEP-IMP: ABNORMAL
IRON 24H UR-MRATE: 23 MCG/DL (ref 59–158)
IRON SATN MFR SERPL: 8 % (ref 14–48)
KAPPA LC SERPL-MCNC: 62.2 MG/L (ref 3.3–19.4)
KAPPA LC/LAMBDA SER: 1.3 {RATIO} (ref 0.26–1.65)
LAMBDA LC FREE SERPL-MCNC: 47.7 MG/L (ref 5.7–26.3)
Lab: ABNORMAL
M-SPIKE: ABNORMAL G/DL
POTASSIUM BLD-SCNC: 4.9 MMOL/L (ref 3.5–4.7)
PROT SERPL-MCNC: 6.1 G/DL (ref 6–8.5)
PROT SERPL-MCNC: 6.4 G/DL (ref 6.3–8)
SODIUM BLD-SCNC: 147 MMOL/L (ref 134–145)
TIBC SERPL-MCNC: 302 MCG/DL (ref 249–505)
TRANSFERRIN SERPL-MCNC: 216 MG/DL (ref 200–360)

## 2018-03-07 LAB — COPPER SERPL-MCNC: 114 UG/DL (ref 72–166)

## 2018-03-12 ENCOUNTER — LAB (OUTPATIENT)
Dept: LAB | Facility: HOSPITAL | Age: 58
End: 2018-03-12
Attending: INTERNAL MEDICINE

## 2018-03-12 ENCOUNTER — LAB (OUTPATIENT)
Dept: LAB | Facility: HOSPITAL | Age: 58
End: 2018-03-12

## 2018-03-12 ENCOUNTER — TRANSCRIBE ORDERS (OUTPATIENT)
Dept: ADMINISTRATIVE | Facility: HOSPITAL | Age: 58
End: 2018-03-12

## 2018-03-12 ENCOUNTER — HOSPITAL ENCOUNTER (OUTPATIENT)
Dept: CT IMAGING | Facility: HOSPITAL | Age: 58
Discharge: HOME | End: 2018-03-12
Attending: INTERNAL MEDICINE | Admitting: INTERNAL MEDICINE

## 2018-03-12 ENCOUNTER — DOCUMENTATION (OUTPATIENT)
Dept: ONCOLOGY | Facility: CLINIC | Age: 58
End: 2018-03-12

## 2018-03-12 ENCOUNTER — OFFICE VISIT (OUTPATIENT)
Dept: ONCOLOGY | Facility: CLINIC | Age: 58
End: 2018-03-12

## 2018-03-12 VITALS
RESPIRATION RATE: 16 BRPM | DIASTOLIC BLOOD PRESSURE: 68 MMHG | OXYGEN SATURATION: 97 % | BODY MASS INDEX: 28.38 KG/M2 | HEIGHT: 67 IN | WEIGHT: 180.8 LBS | TEMPERATURE: 98.3 F | SYSTOLIC BLOOD PRESSURE: 154 MMHG | HEART RATE: 64 BPM

## 2018-03-12 DIAGNOSIS — N18.30 CHRONIC KIDNEY DISEASE, STAGE III (MODERATE) (HCC): Primary | ICD-10-CM

## 2018-03-12 DIAGNOSIS — E11.29 TYPE II DIABETES MELLITUS WITH RENAL MANIFESTATIONS NOT AT GOAL (HCC): ICD-10-CM

## 2018-03-12 DIAGNOSIS — N28.89 RENAL MASS: ICD-10-CM

## 2018-03-12 DIAGNOSIS — N18.30 ANEMIA IN STAGE 3 CHRONIC KIDNEY DISEASE (HCC): Primary | ICD-10-CM

## 2018-03-12 DIAGNOSIS — N18.30 CHRONIC KIDNEY DISEASE, STAGE III (MODERATE) (HCC): ICD-10-CM

## 2018-03-12 DIAGNOSIS — D69.6 THROMBOCYTOPENIA (HCC): ICD-10-CM

## 2018-03-12 DIAGNOSIS — I12.9 HYPERTENSIVE NEPHROPATHY: ICD-10-CM

## 2018-03-12 DIAGNOSIS — D63.1 ANEMIA IN END-STAGE RENAL DISEASE (HCC): ICD-10-CM

## 2018-03-12 DIAGNOSIS — D61.818 PANCYTOPENIA, ACQUIRED (HCC): ICD-10-CM

## 2018-03-12 DIAGNOSIS — N28.89 URETERAL FISTULA: ICD-10-CM

## 2018-03-12 DIAGNOSIS — N18.6 ANEMIA IN END-STAGE RENAL DISEASE (HCC): ICD-10-CM

## 2018-03-12 DIAGNOSIS — D63.1 ANEMIA IN STAGE 3 CHRONIC KIDNEY DISEASE (HCC): Primary | ICD-10-CM

## 2018-03-12 DIAGNOSIS — D70.8 OTHER NEUTROPENIA (HCC): ICD-10-CM

## 2018-03-12 LAB
ALBUMIN SERPL-MCNC: 4 G/DL (ref 3.5–5.2)
ALBUMIN/GLOB SERPL: 1.1 G/DL
ALP SERPL-CCNC: 89 U/L (ref 39–117)
ALT SERPL W P-5'-P-CCNC: 14 U/L (ref 1–41)
ANION GAP SERPL CALCULATED.3IONS-SCNC: 12.6 MMOL/L
AST SERPL-CCNC: 26 U/L (ref 1–40)
BACTERIA UR QL AUTO: NORMAL /HPF
BASOPHILS # BLD AUTO: 0.03 10*3/MM3 (ref 0–0.1)
BASOPHILS NFR BLD AUTO: 0.5 % (ref 0–1.1)
BILIRUB SERPL-MCNC: 0.2 MG/DL (ref 0.1–1.2)
BILIRUB UR QL STRIP: NEGATIVE
BUN BLD-MCNC: 41 MG/DL (ref 6–20)
BUN/CREAT SERPL: 22 (ref 7–25)
CALCIUM SPEC-SCNC: 9.7 MG/DL (ref 8.6–10.5)
CHLORIDE SERPL-SCNC: 104 MMOL/L (ref 98–107)
CLARITY UR: CLEAR
CO2 SERPL-SCNC: 27.4 MMOL/L (ref 22–29)
COLOR UR: YELLOW
CREAT BLD-MCNC: 1.86 MG/DL (ref 0.76–1.27)
CREAT BLDA-MCNC: 1.8 MG/DL (ref 0.6–1.3)
CREAT UR-MCNC: 19.6 MG/DL
DEPRECATED RDW RBC AUTO: 47 FL (ref 37–49)
DEPRECATED RDW RBC AUTO: 49 FL (ref 37–54)
EOSINOPHIL # BLD AUTO: 0.23 10*3/MM3 (ref 0–0.36)
EOSINOPHIL NFR BLD AUTO: 4 % (ref 1–5)
ERYTHROCYTE [DISTWIDTH] IN BLOOD BY AUTOMATED COUNT: 13.6 % (ref 11.7–14.5)
ERYTHROCYTE [DISTWIDTH] IN BLOOD BY AUTOMATED COUNT: 14.2 % (ref 11.5–14.5)
GFR SERPL CREATININE-BSD FRML MDRD: 38 ML/MIN/1.73
GLOBULIN UR ELPH-MCNC: 3.6 GM/DL
GLUCOSE BLD-MCNC: 126 MG/DL (ref 65–99)
GLUCOSE UR STRIP-MCNC: NEGATIVE MG/DL
HCT VFR BLD AUTO: 30.8 % (ref 40–49)
HCT VFR BLD AUTO: 32.1 % (ref 40.4–52.2)
HGB BLD-MCNC: 9.7 G/DL (ref 13.5–16.5)
HGB BLD-MCNC: 9.9 G/DL (ref 13.7–17.6)
HGB UR QL STRIP.AUTO: NEGATIVE
HYALINE CASTS UR QL AUTO: NORMAL /LPF
IMM GRANULOCYTES # BLD: 0.01 10*3/MM3 (ref 0–0.03)
IMM GRANULOCYTES NFR BLD: 0.2 % (ref 0–0.5)
IRON 24H UR-MRATE: 79 MCG/DL (ref 59–158)
KETONES UR QL STRIP: NEGATIVE
LDH SERPL-CCNC: 228 U/L (ref 99–259)
LEUKOCYTE ESTERASE UR QL STRIP.AUTO: NEGATIVE
LYMPHOCYTES # BLD AUTO: 1.33 10*3/MM3 (ref 1–3.5)
LYMPHOCYTES NFR BLD AUTO: 22.9 % (ref 20–49)
MCH RBC QN AUTO: 29 PG (ref 27–32.7)
MCH RBC QN AUTO: 29.5 PG (ref 27–33)
MCHC RBC AUTO-ENTMCNC: 30.8 G/DL (ref 32.6–36.4)
MCHC RBC AUTO-ENTMCNC: 31.5 G/DL (ref 32–35)
MCV RBC AUTO: 93.6 FL (ref 83–97)
MCV RBC AUTO: 94.1 FL (ref 79.8–96.2)
MONOCYTES # BLD AUTO: 0.43 10*3/MM3 (ref 0.25–0.8)
MONOCYTES NFR BLD AUTO: 7.4 % (ref 4–12)
NEUTROPHILS # BLD AUTO: 3.77 10*3/MM3 (ref 1.5–7)
NEUTROPHILS NFR BLD AUTO: 65 % (ref 39–75)
NITRITE UR QL STRIP: NEGATIVE
NRBC BLD MANUAL-RTO: 0 /100 WBC (ref 0–0)
PH UR STRIP.AUTO: 7 [PH] (ref 5–8)
PLATELET # BLD AUTO: 121 10*3/MM3 (ref 150–375)
PLATELET # BLD AUTO: 134 10*3/MM3 (ref 140–500)
PMV BLD AUTO: 11.6 FL (ref 8.9–12.1)
PMV BLD AUTO: 11.8 FL (ref 6–12)
POTASSIUM BLD-SCNC: 5.1 MMOL/L (ref 3.5–5.2)
PROT SERPL-MCNC: 7.6 G/DL (ref 6–8.5)
PROT UR QL STRIP: ABNORMAL
PROT UR-MCNC: 130 MG/DL
RBC # BLD AUTO: 3.29 10*6/MM3 (ref 4.3–5.5)
RBC # BLD AUTO: 3.41 10*6/MM3 (ref 4.6–6)
RBC # UR: NORMAL /HPF
REF LAB TEST METHOD: NORMAL
SODIUM BLD-SCNC: 144 MMOL/L (ref 136–145)
SP GR UR STRIP: 1.01 (ref 1–1.03)
SQUAMOUS #/AREA URNS HPF: NORMAL /HPF
UROBILINOGEN UR QL STRIP: ABNORMAL
WBC NRBC COR # BLD: 5.75 10*3/MM3 (ref 4.5–10.7)
WBC NRBC COR # BLD: 5.8 10*3/MM3 (ref 4–10)
WBC UR QL AUTO: NORMAL /HPF

## 2018-03-12 PROCEDURE — 83615 LACTATE (LD) (LDH) ENZYME: CPT | Performed by: INTERNAL MEDICINE

## 2018-03-12 PROCEDURE — 85027 COMPLETE CBC AUTOMATED: CPT

## 2018-03-12 PROCEDURE — 74178 CT ABD&PLV WO CNTR FLWD CNTR: CPT

## 2018-03-12 PROCEDURE — 85025 COMPLETE CBC W/AUTO DIFF WBC: CPT

## 2018-03-12 PROCEDURE — 99215 OFFICE O/P EST HI 40 MIN: CPT | Performed by: INTERNAL MEDICINE

## 2018-03-12 PROCEDURE — 80053 COMPREHEN METABOLIC PANEL: CPT

## 2018-03-12 PROCEDURE — 0 IOPAMIDOL 61 % SOLUTION: Performed by: INTERNAL MEDICINE

## 2018-03-12 PROCEDURE — 82565 ASSAY OF CREATININE: CPT

## 2018-03-12 PROCEDURE — 82570 ASSAY OF URINE CREATININE: CPT

## 2018-03-12 PROCEDURE — 36415 COLL VENOUS BLD VENIPUNCTURE: CPT

## 2018-03-12 PROCEDURE — 83540 ASSAY OF IRON: CPT

## 2018-03-12 PROCEDURE — 81001 URINALYSIS AUTO W/SCOPE: CPT

## 2018-03-12 PROCEDURE — 84156 ASSAY OF PROTEIN URINE: CPT

## 2018-03-12 RX ORDER — PROMETHAZINE HYDROCHLORIDE 12.5 MG/1
12.5 TABLET ORAL EVERY 6 HOURS PRN
Refills: 1 | COMMUNITY
Start: 2018-03-01 | End: 2018-06-08 | Stop reason: HOSPADM

## 2018-03-12 RX ORDER — SIMVASTATIN 40 MG
40 TABLET ORAL NIGHTLY
COMMUNITY
End: 2020-07-16 | Stop reason: HOSPADM

## 2018-03-12 RX ORDER — ERYTHROMYCIN 5 MG/G
1 OINTMENT OPHTHALMIC NIGHTLY
COMMUNITY
Start: 2018-03-08 | End: 2019-03-05

## 2018-03-12 RX ORDER — INSULIN GLARGINE 100 [IU]/ML
INJECTION, SOLUTION SUBCUTANEOUS
Refills: 1 | COMMUNITY
Start: 2018-01-29 | End: 2018-06-05

## 2018-03-12 RX ORDER — BUMETANIDE 1 MG/1
TABLET ORAL AS NEEDED
Refills: 3 | COMMUNITY
Start: 2018-02-28 | End: 2018-06-08 | Stop reason: HOSPADM

## 2018-03-12 RX ORDER — INSULIN ASPART 100 [IU]/ML
INJECTION, SOLUTION INTRAVENOUS; SUBCUTANEOUS
Refills: 1 | COMMUNITY
Start: 2018-03-04 | End: 2018-06-05

## 2018-03-12 RX ORDER — ASPIRIN 81 MG/1
81 TABLET ORAL EVERY MORNING
Refills: 2 | Status: ON HOLD | COMMUNITY
Start: 2018-03-04 | End: 2019-10-21

## 2018-03-12 RX ADMIN — IOPAMIDOL 85 ML: 612 INJECTION, SOLUTION INTRAVENOUS at 11:45

## 2018-03-12 NOTE — PROGRESS NOTES
Pt requested financial assistance, as he only has Medicare coverage. LCSW gave him and his family the [facility] financial assistance application. They plan to return it at his next visit.

## 2018-03-14 PROBLEM — D50.8 IRON DEFICIENCY ANEMIA SECONDARY TO INADEQUATE DIETARY IRON INTAKE: Status: ACTIVE | Noted: 2018-03-14

## 2018-03-14 PROBLEM — T45.4X5A IRON AND ITS COMPOUNDS CAUSING ADVERSE EFFECT IN THERAPEUTIC USE: Status: ACTIVE | Noted: 2018-03-14

## 2018-03-14 PROBLEM — E53.8 B12 DEFICIENCY: Status: ACTIVE | Noted: 2018-03-14

## 2018-03-14 RX ORDER — DIPHENHYDRAMINE HCL 25 MG
25 CAPSULE ORAL ONCE
Status: CANCELLED | OUTPATIENT
Start: 2018-03-22

## 2018-03-14 RX ORDER — SODIUM CHLORIDE 9 MG/ML
250 INJECTION, SOLUTION INTRAVENOUS ONCE
Status: CANCELLED | OUTPATIENT
Start: 2018-03-16

## 2018-03-14 RX ORDER — SODIUM CHLORIDE 9 MG/ML
250 INJECTION, SOLUTION INTRAVENOUS ONCE
Status: CANCELLED | OUTPATIENT
Start: 2018-03-22

## 2018-03-14 RX ORDER — DIPHENHYDRAMINE HCL 25 MG
25 CAPSULE ORAL ONCE
Status: CANCELLED | OUTPATIENT
Start: 2018-03-16

## 2018-03-14 RX ORDER — CYANOCOBALAMIN 1000 UG/ML
1000 INJECTION, SOLUTION INTRAMUSCULAR; SUBCUTANEOUS
Status: CANCELLED | OUTPATIENT
Start: 2018-03-16

## 2018-03-14 RX ORDER — FAMOTIDINE 10 MG/ML
20 INJECTION, SOLUTION INTRAVENOUS ONCE
Status: CANCELLED | OUTPATIENT
Start: 2018-03-16

## 2018-03-16 ENCOUNTER — LAB (OUTPATIENT)
Dept: LAB | Facility: HOSPITAL | Age: 58
End: 2018-03-16
Attending: INTERNAL MEDICINE

## 2018-03-16 ENCOUNTER — INFUSION (OUTPATIENT)
Dept: ONCOLOGY | Facility: HOSPITAL | Age: 58
End: 2018-03-16

## 2018-03-16 ENCOUNTER — TRANSCRIBE ORDERS (OUTPATIENT)
Dept: ADMINISTRATIVE | Facility: HOSPITAL | Age: 58
End: 2018-03-16

## 2018-03-16 VITALS
BODY MASS INDEX: 27.06 KG/M2 | WEIGHT: 172.4 LBS | DIASTOLIC BLOOD PRESSURE: 81 MMHG | HEART RATE: 66 BPM | SYSTOLIC BLOOD PRESSURE: 151 MMHG

## 2018-03-16 DIAGNOSIS — N18.30 CHRONIC KIDNEY DISEASE, STAGE III (MODERATE) (HCC): ICD-10-CM

## 2018-03-16 DIAGNOSIS — N18.30 CHRONIC KIDNEY DISEASE, STAGE III (MODERATE) (HCC): Primary | ICD-10-CM

## 2018-03-16 DIAGNOSIS — IMO0001 IRON AND ITS COMPOUNDS CAUSING ADVERSE EFFECT IN THERAPEUTIC USE, SUBSEQUENT ENCOUNTER: ICD-10-CM

## 2018-03-16 DIAGNOSIS — E53.8 B12 DEFICIENCY: ICD-10-CM

## 2018-03-16 DIAGNOSIS — D50.8 IRON DEFICIENCY ANEMIA SECONDARY TO INADEQUATE DIETARY IRON INTAKE: Primary | ICD-10-CM

## 2018-03-16 LAB
ANION GAP SERPL CALCULATED.3IONS-SCNC: 14.2 MMOL/L
BUN BLD-MCNC: 52 MG/DL (ref 6–20)
BUN/CREAT SERPL: 25.5 (ref 7–25)
CALCIUM SPEC-SCNC: 10 MG/DL (ref 8.6–10.5)
CHLORIDE SERPL-SCNC: 98 MMOL/L (ref 98–107)
CO2 SERPL-SCNC: 24.8 MMOL/L (ref 22–29)
CREAT BLD-MCNC: 2.04 MG/DL (ref 0.76–1.27)
GFR SERPL CREATININE-BSD FRML MDRD: 34 ML/MIN/1.73
GLUCOSE BLD-MCNC: 205 MG/DL (ref 65–99)
POTASSIUM BLD-SCNC: 5.6 MMOL/L (ref 3.5–5.2)
SODIUM BLD-SCNC: 137 MMOL/L (ref 136–145)

## 2018-03-16 PROCEDURE — 25010000002 CYANOCOBALAMIN PER 1000 MCG: Performed by: INTERNAL MEDICINE

## 2018-03-16 PROCEDURE — 96372 THER/PROPH/DIAG INJ SC/IM: CPT

## 2018-03-16 PROCEDURE — 25010000002 FERUMOXYTOL 510 MG/17ML SOLUTION 510 MG VIAL: Performed by: INTERNAL MEDICINE

## 2018-03-16 PROCEDURE — 63710000001 DIPHENHYDRAMINE PER 50 MG: Performed by: INTERNAL MEDICINE

## 2018-03-16 PROCEDURE — 96374 THER/PROPH/DIAG INJ IV PUSH: CPT

## 2018-03-16 PROCEDURE — 36415 COLL VENOUS BLD VENIPUNCTURE: CPT

## 2018-03-16 PROCEDURE — 80048 BASIC METABOLIC PNL TOTAL CA: CPT

## 2018-03-16 PROCEDURE — 96375 TX/PRO/DX INJ NEW DRUG ADDON: CPT

## 2018-03-16 RX ORDER — SODIUM CHLORIDE 9 MG/ML
250 INJECTION, SOLUTION INTRAVENOUS ONCE
Status: COMPLETED | OUTPATIENT
Start: 2018-03-16 | End: 2018-03-16

## 2018-03-16 RX ORDER — DIPHENHYDRAMINE HCL 25 MG
25 CAPSULE ORAL ONCE
Status: COMPLETED | OUTPATIENT
Start: 2018-03-16 | End: 2018-03-16

## 2018-03-16 RX ORDER — CYANOCOBALAMIN 1000 UG/ML
1000 INJECTION, SOLUTION INTRAMUSCULAR; SUBCUTANEOUS
Status: DISCONTINUED | OUTPATIENT
Start: 2018-03-16 | End: 2018-03-16 | Stop reason: HOSPADM

## 2018-03-16 RX ORDER — FAMOTIDINE 10 MG/ML
20 INJECTION, SOLUTION INTRAVENOUS ONCE
Status: COMPLETED | OUTPATIENT
Start: 2018-03-16 | End: 2018-03-16

## 2018-03-16 RX ADMIN — FERUMOXYTOL 510 MG: 510 INJECTION INTRAVENOUS at 15:15

## 2018-03-16 RX ADMIN — CYANOCOBALAMIN 1000 MCG: 1000 INJECTION, SOLUTION INTRAMUSCULAR at 15:34

## 2018-03-16 RX ADMIN — SODIUM CHLORIDE 250 ML: 9 INJECTION, SOLUTION INTRAVENOUS at 14:38

## 2018-03-16 RX ADMIN — DIPHENHYDRAMINE HYDROCHLORIDE 25 MG: 25 CAPSULE ORAL at 14:37

## 2018-03-16 RX ADMIN — FAMOTIDINE 20 MG: 10 INJECTION, SOLUTION INTRAVENOUS at 14:38

## 2018-03-22 ENCOUNTER — INFUSION (OUTPATIENT)
Dept: ONCOLOGY | Facility: HOSPITAL | Age: 58
End: 2018-03-22

## 2018-03-22 ENCOUNTER — DOCUMENTATION (OUTPATIENT)
Dept: NUTRITION | Facility: HOSPITAL | Age: 58
End: 2018-03-22

## 2018-03-22 VITALS
SYSTOLIC BLOOD PRESSURE: 164 MMHG | BODY MASS INDEX: 26.68 KG/M2 | HEART RATE: 72 BPM | TEMPERATURE: 98.5 F | WEIGHT: 170 LBS | DIASTOLIC BLOOD PRESSURE: 96 MMHG

## 2018-03-22 DIAGNOSIS — D50.8 IRON DEFICIENCY ANEMIA SECONDARY TO INADEQUATE DIETARY IRON INTAKE: Primary | ICD-10-CM

## 2018-03-22 DIAGNOSIS — IMO0001 IRON AND ITS COMPOUNDS CAUSING ADVERSE EFFECT IN THERAPEUTIC USE, SUBSEQUENT ENCOUNTER: ICD-10-CM

## 2018-03-22 PROCEDURE — 25010000002 FERUMOXYTOL 510 MG/17ML SOLUTION 510 MG VIAL: Performed by: INTERNAL MEDICINE

## 2018-03-22 PROCEDURE — 96374 THER/PROPH/DIAG INJ IV PUSH: CPT

## 2018-03-22 PROCEDURE — 63710000001 DIPHENHYDRAMINE PER 50 MG: Performed by: INTERNAL MEDICINE

## 2018-03-22 RX ORDER — DIPHENHYDRAMINE HCL 25 MG
25 CAPSULE ORAL ONCE
Status: COMPLETED | OUTPATIENT
Start: 2018-03-22 | End: 2018-03-22

## 2018-03-22 RX ORDER — SODIUM CHLORIDE 9 MG/ML
250 INJECTION, SOLUTION INTRAVENOUS ONCE
Status: COMPLETED | OUTPATIENT
Start: 2018-03-22 | End: 2018-03-22

## 2018-03-22 RX ADMIN — SODIUM CHLORIDE 250 ML: 900 INJECTION, SOLUTION INTRAVENOUS at 13:00

## 2018-03-22 RX ADMIN — DIPHENHYDRAMINE HYDROCHLORIDE 25 MG: 25 CAPSULE ORAL at 13:04

## 2018-03-22 RX ADMIN — FERUMOXYTOL 510 MG: 510 INJECTION INTRAVENOUS at 13:27

## 2018-03-22 NOTE — PROGRESS NOTES
Oncology Nutrition Screening    Patient Name:  Angelo Brown  YOB: 1960  MRN: [id number]  Date:  03/22/18  Physician: Stefan    Type of Cancer Treatment:   New kidney mass, unsure of treatment plans at this time    Patient Active Problem List   Diagnosis   • Acute CVA (cerebrovascular accident)   • Renal mass   • Proteinuria   • Leukopenia   • Anemia in chronic kidney disease   • Thrombocytopenia   • Pancytopenia, acquired   • History of hepatitis C virus infection   • B12 deficiency   • Iron deficiency anemia secondary to inadequate dietary iron intake   • Iron and its compounds causing adverse effect in therapeutic use       Current Outpatient Prescriptions   Medication Sig Dispense Refill   • ACCU-CHEK SOFTCLIX LANCETS lancets Test before each meal and at bedtime     • ALPRAZolam (XANAX) 1 MG tablet Take 1 mg by mouth 4 (four) times a day.     • amLODIPine (NORVASC) 10 MG tablet Take 1 tablet by mouth Daily. 30 tablet 0   • aspirin 81 MG EC tablet Take  by mouth Daily.  2   • bumetanide (BUMEX) 1 MG tablet Take  by mouth Daily.  3   • carvedilol (COREG) 12.5 MG tablet Take 1 tablet by mouth Every 12 (Twelve) Hours. 60 tablet 0   • cetirizine (ZyrTEC) 10 MG tablet Take 10 mg by mouth daily.     • erythromycin (ROMYCIN) 5 MG/GM ophthalmic ointment      • glucose blood (ACCU-CHEK VIN PLUS) test strip TEST FOUR TIMES DAILY BEFORE MEALS AND AT NIGHT     • hydrALAZINE (APRESOLINE) 50 MG tablet Take 1 tablet by mouth Every 8 (Eight) Hours. 90 tablet 0   • LANTUS SOLOSTAR 100 UNIT/ML injection pen ADM 12 TO 20 UNI SC Q NIGHT  1   • lisinopril (PRINIVIL,ZESTRIL) 40 MG tablet Take 0.5 tablets by mouth 2 (Two) Times a Day. 60 tablet 0   • MULTIPLE VITAMIN PO Take  by mouth.     • NOVOLOG FLEXPEN 100 UNIT/ML solution pen-injector sc pen ADM 2 TO 15 UNI SC TID B MEALS  1   • Omega-3 Fatty Acids (FISH OIL) 1000 MG capsule capsule Take  by mouth daily with breakfast.     • oxyCODONE-acetaminophen (PERCOCET)  " MG per tablet Take 1 tablet by mouth 4 (four) times a day.     • promethazine (PHENERGAN) 12.5 MG tablet As Needed.  1   • simvastatin (ZOCOR) 40 MG tablet Take 40 mg by mouth Every Night.     • tiZANidine (ZANAFLEX) 4 MG tablet Take 4 mg by mouth 4 (four) times a day.       No current facility-administered medications for this visit.        Glycemic Risk:   IDDM    Weight:   Height: 66\"   Weight:180 lbs.    BMI:26.7  Weight has been stable    Oral Food Intake:  Special Diet Restrictions: DM diet, low salt  Compared to normal intake, current food intake is more than normal      Nutrition Symptoms:   Altered Apetite    Activity:   S/p massive CVA 12/17.  Uses cane.       reports that he has never smoked. He does not have any smokeless tobacco history on file. He reports that he does not drink alcohol or use drugs.      Notes:  [facility].  The patient is s/p massive CVA in December, has h/o hep C, CKD, pancytopenia, ascites and cirrhosis per abd Ct.  The patient's wife is requesting help figuring out what to buy at the grocery and ideas for meals and snacks. The patient is apparently hungry all the time and is eating larger amounts then usual causing increased blood sugars.  Recently their daughter has been doing the grocery shopping for them which has helped.  Emphasized with patient the need to try to eat on a schedule each day which will help with blood sugar control. Gave ideas for meals and snacks. Suggested that they consider using a sugar free instant breakfast as a snack in between meals, gave other examples.  The patient used to be quite a good cook and it is hard for him relinquishing this to family. Gave copies of a healthy grocery list and copy of a [id number] calorie diabetic diet plus copies for their daughter.  Also reviewed consistent carbohydrate diet with wife and patient.  Emphasized low sodium, regularly scheduled meals, and " including a protein food at meals and snacks, limit refined sugars and fried foods.      Electronically signed by:  Cara Jones, CARLOS  3:52 PM

## 2018-04-16 ENCOUNTER — LAB (OUTPATIENT)
Dept: LAB | Facility: HOSPITAL | Age: 58
End: 2018-04-16

## 2018-04-16 ENCOUNTER — OFFICE VISIT (OUTPATIENT)
Dept: ONCOLOGY | Facility: CLINIC | Age: 58
End: 2018-04-16

## 2018-04-16 ENCOUNTER — INFUSION (OUTPATIENT)
Dept: ONCOLOGY | Facility: HOSPITAL | Age: 58
End: 2018-04-16

## 2018-04-16 VITALS
HEART RATE: 66 BPM | RESPIRATION RATE: 12 BRPM | DIASTOLIC BLOOD PRESSURE: 60 MMHG | BODY MASS INDEX: 25.62 KG/M2 | HEIGHT: 67 IN | OXYGEN SATURATION: 95 % | TEMPERATURE: 98.3 F | SYSTOLIC BLOOD PRESSURE: 112 MMHG | WEIGHT: 163.2 LBS

## 2018-04-16 DIAGNOSIS — N18.30 ANEMIA IN STAGE 3 CHRONIC KIDNEY DISEASE (HCC): Primary | ICD-10-CM

## 2018-04-16 DIAGNOSIS — E53.8 B12 DEFICIENCY: Primary | ICD-10-CM

## 2018-04-16 DIAGNOSIS — D69.6 THROMBOCYTOPENIA (HCC): ICD-10-CM

## 2018-04-16 DIAGNOSIS — D63.1 ANEMIA IN STAGE 3 CHRONIC KIDNEY DISEASE (HCC): ICD-10-CM

## 2018-04-16 DIAGNOSIS — D50.8 IRON DEFICIENCY ANEMIA SECONDARY TO INADEQUATE DIETARY IRON INTAKE: ICD-10-CM

## 2018-04-16 DIAGNOSIS — D70.8 OTHER NEUTROPENIA (HCC): ICD-10-CM

## 2018-04-16 DIAGNOSIS — N18.30 ANEMIA IN STAGE 3 CHRONIC KIDNEY DISEASE (HCC): ICD-10-CM

## 2018-04-16 DIAGNOSIS — D61.818 PANCYTOPENIA, ACQUIRED (HCC): ICD-10-CM

## 2018-04-16 DIAGNOSIS — E53.8 B12 DEFICIENCY: ICD-10-CM

## 2018-04-16 DIAGNOSIS — N28.89 RENAL MASS: ICD-10-CM

## 2018-04-16 DIAGNOSIS — D63.1 ANEMIA IN STAGE 3 CHRONIC KIDNEY DISEASE (HCC): Primary | ICD-10-CM

## 2018-04-16 LAB
BASOPHILS # BLD AUTO: 0.05 10*3/MM3 (ref 0–0.1)
BASOPHILS NFR BLD AUTO: 0.6 % (ref 0–1.1)
DEPRECATED RDW RBC AUTO: 48 FL (ref 37–49)
EOSINOPHIL # BLD AUTO: 0.37 10*3/MM3 (ref 0–0.36)
EOSINOPHIL NFR BLD AUTO: 4.2 % (ref 1–5)
ERYTHROCYTE [DISTWIDTH] IN BLOOD BY AUTOMATED COUNT: 14 % (ref 11.7–14.5)
FERRITIN SERPL-MCNC: 406.5 NG/ML (ref 30–400)
HCT VFR BLD AUTO: 39.2 % (ref 40–49)
HGB BLD-MCNC: 12.1 G/DL (ref 13.5–16.5)
HGB RETIC QN: 34.7 PG (ref 29.8–36.1)
IMM GRANULOCYTES # BLD: 0.03 10*3/MM3 (ref 0–0.03)
IMM GRANULOCYTES NFR BLD: 0.3 % (ref 0–0.5)
IMM RETICS NFR: 9.7 % (ref 3–15.8)
IRON 24H UR-MRATE: 140 MCG/DL (ref 59–158)
IRON SATN MFR SERPL: 46 % (ref 14–48)
LYMPHOCYTES # BLD AUTO: 2.08 10*3/MM3 (ref 1–3.5)
LYMPHOCYTES NFR BLD AUTO: 23.4 % (ref 20–49)
MCH RBC QN AUTO: 28.9 PG (ref 27–33)
MCHC RBC AUTO-ENTMCNC: 30.9 G/DL (ref 32–35)
MCV RBC AUTO: 93.8 FL (ref 83–97)
MONOCYTES # BLD AUTO: 0.6 10*3/MM3 (ref 0.25–0.8)
MONOCYTES NFR BLD AUTO: 6.8 % (ref 4–12)
NEUTROPHILS # BLD AUTO: 5.75 10*3/MM3 (ref 1.5–7)
NEUTROPHILS NFR BLD AUTO: 64.7 % (ref 39–75)
NRBC BLD MANUAL-RTO: 0 /100 WBC (ref 0–0)
PLATELET # BLD AUTO: 129 10*3/MM3 (ref 150–375)
PMV BLD AUTO: 11.6 FL (ref 8.9–12.1)
RBC # BLD AUTO: 4.18 10*6/MM3 (ref 4.3–5.5)
RETICS/RBC NFR AUTO: 2.27 % (ref 0.6–2)
TIBC SERPL-MCNC: 304 MCG/DL (ref 249–505)
TRANSFERRIN SERPL-MCNC: 217 MG/DL (ref 200–360)
VIT B12 BLD-MCNC: 1535 PG/ML (ref 211–946)
WBC NRBC COR # BLD: 8.88 10*3/MM3 (ref 4–10)

## 2018-04-16 PROCEDURE — 96372 THER/PROPH/DIAG INJ SC/IM: CPT | Performed by: INTERNAL MEDICINE

## 2018-04-16 PROCEDURE — 82728 ASSAY OF FERRITIN: CPT

## 2018-04-16 PROCEDURE — 82607 VITAMIN B-12: CPT | Performed by: INTERNAL MEDICINE

## 2018-04-16 PROCEDURE — 99214 OFFICE O/P EST MOD 30 MIN: CPT | Performed by: INTERNAL MEDICINE

## 2018-04-16 PROCEDURE — 84466 ASSAY OF TRANSFERRIN: CPT

## 2018-04-16 PROCEDURE — 83540 ASSAY OF IRON: CPT

## 2018-04-16 PROCEDURE — 25010000002 CYANOCOBALAMIN PER 1000 MCG: Performed by: INTERNAL MEDICINE

## 2018-04-16 PROCEDURE — 85046 RETICYTE/HGB CONCENTRATE: CPT | Performed by: INTERNAL MEDICINE

## 2018-04-16 PROCEDURE — 85025 COMPLETE CBC W/AUTO DIFF WBC: CPT | Performed by: INTERNAL MEDICINE

## 2018-04-16 PROCEDURE — 36415 COLL VENOUS BLD VENIPUNCTURE: CPT | Performed by: INTERNAL MEDICINE

## 2018-04-16 RX ORDER — CYANOCOBALAMIN 1000 UG/ML
1000 INJECTION, SOLUTION INTRAMUSCULAR; SUBCUTANEOUS
Status: DISCONTINUED | OUTPATIENT
Start: 2018-04-16 | End: 2018-04-16 | Stop reason: HOSPADM

## 2018-04-16 RX ADMIN — CYANOCOBALAMIN 1000 MCG: 1000 INJECTION, SOLUTION INTRAMUSCULAR at 14:50

## 2018-04-16 NOTE — PROGRESS NOTES
Subjective     REASON FOR FOLLOW UP: PANCYTOPENIA, CHRONIC KIDNEY DISEASE STAGE III, HISTORY OF TREATED HEPATITIS C.     History of Present Illness This patient is a 57-year-old white male who has been referred to us today because it has been noticed that in laboratory assessment recently done the patient has leukopenia, anemia and thrombocytopenia. The question was raised about the nature of this in the background of previous history of hepatitis C infection, status post definitive 3-month antiviral therapy by hepatologist. The patient completed this treatment in 10/2017. The family, the wife, son-in-law and daughter are with the patient today stating that since the stroke in 12/2017, the patient has lost ground very substantially. They find him disoriented in the house with tremendous degree of somnolence and significant decrease in appetite. He has not had any fevers, chills or bleeding but he has terrible situation with his vision with marked decrease in the vision, unable to read too much and barely able to watch the television. His appetite has been acceptable. The wife has been trying to help him to control diabetes in the best way possible. The patient also has difficulty swallowing with no obvious reason for this besides trauma that he had a long time ago in the esophagus. He has had occasional regurgitation. No heartburn and things choke on him time to time. This became worse since the stroke. He has not had any hematemesis. He has not had any obvious aspiration as far as they can tell. The patient denies any cough or sputum production. He has not had any chest pain or palpitation. He had proper bowel activity and no obvious passage of blood in the stool. Urination is ongoing with no frequency, urgency or hematuria. No incontinence. He has profound pain in the lower extremities associated with peripheral neuropathy associated with diabetes. He has difficulty with his balance and he is walking with a  cane. He has not had any falls. He has not had any episodes of hypoglycemia. He has tremendous degree of somnolence given the medications that he is receiving at this time and he remains like a sleepyhead most of the time in the house. He has not had any abnormal movements or seizure activity. He has had significant issues in regard to his balance as stated above since the stroke and significant decrease in his vision as well.     Still with all these issues, the patient tries to participate in the life of the family in the best way possible. The patient has been disabled since 2000.  The patient was brought back to the office in company of family members in order to review issues about his hematological workup. On this occasion, the patient was more participative. He was less sunk down by the pain medication that he was taking the day of the 1st visit. This day the need for this patient to have formal education and teaching about diabetes care and management given the fact that he eats excessively and his wife is not able to keep up calorie production all day long and insulin control all day long was discussed also.        Past Medical History:   Diagnosis Date   • Anemia    • Anxiety    • CAD (coronary artery disease)    • CHF (congestive heart failure)    • Chronic back pain    • Chronic kidney disease    • COPD (chronic obstructive pulmonary disease)    • Coronary artery disease    • Depression    • Diabetes mellitus    • GERD (gastroesophageal reflux disease)    • Headache    • Hepatitis B 2013    from transfusion for CABG   • Hypertension    • Jaundice    • Myocardial infarction    • Seizures    • Stroke 12/2017        Past Surgical History:   Procedure Laterality Date   • BRAIN HEMATOMA EVACUATION     • CARDIAC SURGERY     • COLONOSCOPY     • CORONARY ARTERY BYPASS GRAFT  2013    Triple   • ROTATOR CUFF REPAIR Left    • VASECTOMY  1985   • WOUND DEBRIDEMENT Right 06/10/2011    Excisional debridement of  necrotizing fasciitis of the right groin extending along the right hemiscrotum, 5 x 2 x 2 cm in one wound and 7.5 x 2.5 x 2.5 cm of a second wound. This was sharp excisional debridement carried out to the muscle-Dr. Eduardo Cross         Current Outpatient Prescriptions on File Prior to Visit   Medication Sig Dispense Refill   • ACCU-CHEK SOFTCLIX LANCETS lancets Test before each meal and at bedtime     • ALPRAZolam (XANAX) 1 MG tablet Take 1 mg by mouth 4 (four) times a day.     • amLODIPine (NORVASC) 10 MG tablet Take 1 tablet by mouth Daily. 30 tablet 0   • aspirin 81 MG EC tablet Take  by mouth Daily.  2   • bumetanide (BUMEX) 1 MG tablet Take  by mouth Daily.  3   • carvedilol (COREG) 12.5 MG tablet Take 1 tablet by mouth Every 12 (Twelve) Hours. 60 tablet 0   • cetirizine (ZyrTEC) 10 MG tablet Take 10 mg by mouth daily.     • erythromycin (ROMYCIN) 5 MG/GM ophthalmic ointment      • glucose blood (ACCU-CHEK VIN PLUS) test strip TEST FOUR TIMES DAILY BEFORE MEALS AND AT NIGHT     • hydrALAZINE (APRESOLINE) 50 MG tablet Take 1 tablet by mouth Every 8 (Eight) Hours. 90 tablet 0   • LANTUS SOLOSTAR 100 UNIT/ML injection pen ADM 12 TO 20 UNI SC Q NIGHT  1   • lisinopril (PRINIVIL,ZESTRIL) 40 MG tablet Take 0.5 tablets by mouth 2 (Two) Times a Day. (Patient taking differently: Take 20 mg by mouth Daily.) 60 tablet 0   • MULTIPLE VITAMIN PO Take  by mouth.     • NOVOLOG FLEXPEN 100 UNIT/ML solution pen-injector sc pen ADM 2 TO 15 UNI SC TID B MEALS  1   • Omega-3 Fatty Acids (FISH OIL) 1000 MG capsule capsule Take  by mouth daily with breakfast.     • oxyCODONE-acetaminophen (PERCOCET)  MG per tablet Take 1 tablet by mouth 4 (four) times a day.     • promethazine (PHENERGAN) 12.5 MG tablet As Needed.  1   • simvastatin (ZOCOR) 40 MG tablet Take 40 mg by mouth Every Night.     • tiZANidine (ZANAFLEX) 4 MG tablet Take 4 mg by mouth 4 (four) times a day.       No current facility-administered medications on file  prior to visit.         ALLERGIES:    Allergies   Allergen Reactions   • Morphine And Related    • Fentanyl Other (See Comments)     ER said he was allergy   • Ibuprofen Nausea Only   • Latex Rash   • Penicillins Hives        Social History     Social History   • Marital status:      Spouse name: Samantha   • Years of education: High school     Occupational History   •  Unemployed     Social History Main Topics   • Smoking status: Never Smoker   • Alcohol use No   • Drug use: No   • Sexual activity: Defer     Other Topics Concern   • Not on file        Family History   Problem Relation Age of Onset   • Diabetes Mother    • Heart failure Mother    • Kidney failure Mother    • Anemia Mother    • Heart disease Mother    • Hypertension Mother    • Heart failure Father    • Coronary artery disease Father    • Heart disease Father    • Hypertension Father    • Diabetes Father    • Diabetes Sister    • Cervical cancer Sister    • Leukemia Sister    • Diabetes Brother    • Cervical cancer Daughter    • Ovarian cancer Sister         Review of Systems   General: no fever, chills,LESSER fatigue,NO  weight changes, or lack of appetite.  Eyes: no epiphora, xerophthalmia,conjunctivitis, pain, glaucoma, blurred vision, ALMOST LEGALLYblind, NO secretion, photophobia, proptosis, diplopia.  Ears: no otorrhea, tinnitus, otorrhagia, deafness, pain, vertigo.  Nose: no rhinorrhea, epistaxis, alteration in perception of odors, sinuses pressure.  Mouth: no alteration in gums or teeth,  ulcers, no difficulty with mastication or deglut ion, no odynophagia.  Neck: no masses or pain, no thyroid alterations, no pain in muscles or arteries, no carotid odynia, no crepitation.  Respiratory: no cough, sputum production, dyspnea, trepopnea, pleuritic pain, hemoptysis.  Heart: no syncope, irregularity, palpitations, angina, orthopnea, paroxysmal nocturnal dyspnea.  Vascular Venous: no tenderness,edema, palpable cords, postphlebitic syndrome, skin  "changes or ulcerations.  Vascular Arterial: no distal ischemia, claudication, gangrene, neuropathic ischemic pain, skin ulcers, paleness or cyanosis.  GI: no dysphagia, odynophagia, no regurgitation, no heartburn,no indigestion,no nausea,no vomiting,no hematemesis ,no melena,no jaundice,no distention, no obstipation,no enterorrhagia,no proctalgia,no anal  lesions, nochanges in bowel habits.  : no frequency, hesitancy, hematuria, discharge, pain.  Musculoskeletal: no muscle or tendon pain or inflammation, joint pain, edema, functional limitation, fasciculations, mass.  Neurologic: no headache, seizures, alterations on Craneal nerves, UNCHANGED motor or senssory deficit, POOR coordination, no alteration in memory, orientation, calculation,writting, verbal or written language.  Skin: no rashes, pruritus or localized lesions.  Psychiatric: no anxiety, depression, agitation, delusions, proper insight.  Objective     Vitals:    04/16/18 1359   BP: 112/60   Pulse: 66   Resp: 12   Temp: 98.3 °F (36.8 °C)   SpO2: 95%  Comment: at rest   Weight: 74 kg (163 lb 3.2 oz)   Height: 170 cm (66.93\")   PainSc: 0-No pain     Current Status 4/16/2018   ECOG score 0     Physical Exam    GENERAL:  Well-developed, well-nourished  Patient  in no acute distress.   SKIN:  Warm, dry without rashes, purpura or petechiae.  HEENT:  Pupils were equal and reactive to light and accomodation, conjunctivas non injected, no pterigion, normal extraocular movements, normal visual acuity.   Mouth mucosa was moist, no exudates in oropharynx, normal gum line, normal roof of the mouth and pillars, normal papillations of the tongue.Ear canals were normal, as well tympanic membranes, normal hearing acuity.No pain in mastoid area or erythema.  NECK:  Supple with good range of motion; no thyromegaly or masses, no JVD or bruits, no cervical adenopathies.No carotid arteries pain, no carotid abnormal pulsation or arterial dance.  LYMPHATICS:  No cervical, " supraclavicular, axillary, epitrochlear or inguinal adenopathy.  CHEST:  Normal excursion of both zaki thoraces, normal voice fremitus, no subcutaneous emphysema, normal axillas, no rashes or acanthosis nigricans. Lungs clear to percussion and auscultation, normal breath sounds bilaterally, no wheezing, crackles or ronchi, no stridor, no rubs.  CARDIAC AND VASCULAR: PMI not displaced,no thrills, normal rate and regular rhythm, without murmurs, rubs or S3 or S4 right or left sided gallops. Normal femoral, popliteal, pedis, brachial and carotid pulses.  ABDOMEN:  Soft, nontender with no organomegaly or masses, no ascites, no collateral circulation,no distention,no Wedron sign, no abdominal pain, no inguinal hernias,no umbilical hernias, no abdominal bruits. Normal bowel sounds.  GENITAL: Not  Performed.  EXTREMITIES  AND SPINE:  No clubbing, cyanosis or edema, no deformities or pain .No kyphosis, scoliosis, deformities or pain in spine, ribs or pelvic bone.  NEUROLOGICAL:  Patient was awake, alert, oriented to time, person and place      RECENT LABS:CT ABDOMEN PELVIS W WO CONTRAST-     CLINICAL HISTORY: Follow-up renal mass seen on ultrasound.     TECHNIQUE: Spiral CT images were obtained through the kidneys prior to  injection of IV contrast and were reconstructed in 2 mm thick slices.  Subsequently, spiral CT images were obtained through the abdomen and  pelvis with oral and IV contrast.     Radiation dose reduction techniques were utilized, including automated  exposure control and exposure modulation based on body size.     COMPARISON: Renal ultrasound dated 12/23/2017.     FINDINGS: The images confirm the presence of an approximately 2.4 x 2.0  cm diameter solid heterogeneously enhancing mass within the upper pole  of the right kidney. A small renal cell carcinoma is suspected. No solid  masses are identified. There is lobulation of both kidneys which is a  variant of normal. A 2.4 cm diameter exophytic simple  renal cyst is  present in the lower pole of the left kidney. No radiopaque calculi are  present within either kidney or proximal ureter. There is no  hydronephrosis or hydroureter. There is lobulation of the liver borders  consistent with cirrhosis. No focal liver masses are identified. There  is minimal ascites adjacent to the liver. The spleen is borderline  enlarged. Measures 13.5 cm in greatest transverse diameter. The pancreas  and adrenal glands appear normal. A tiny gallstone is noted in the lumen  of the gallbladder. There is no bile duct dilatation. The stomach and  small and large bowel are unremarkable. There is a small periumbilical  fluid collection measuring approximately 3.5 x 3.7 cm in diameter that  is likely a tiny chronic hernia. The stomach and small and large bowel  are unremarkable. At L4-L5 there is moderate posterior disc bulging  that, along with facet joint arthropathy produces moderate narrowing of  the dural sac.     IMPRESSION:  1. Approximately 2.4 x 2.0 cm diameter solid enhancing mass in the upper  pole of the right kidney consistent with a small renal cell carcinoma. A  similar sized small simple cyst is present in the lower pole of the left  kidney.     2. Cirrhotic liver morphology and borderline splenomegaly.     3. Cholelithiasis.     4. Minimal ascites adjacent to the liver.     5. Small periumbilical fluid collection likely due to a chronic ventral  hernia.     6. Moderate posterior disc bulging and facet joint arthropathy at L4-L5  producing moderate narrowing of the spinal canal.     This report was finalized on 3/13/2018 1:40 PM by Dr. Franco Grimm MD.       Hematology WBC   Date Value Ref Range Status   04/16/2018 8.88 4.00 - 10.00 10*3/mm3 Final     RBC   Date Value Ref Range Status   04/16/2018 4.18 (L) 4.30 - 5.50 10*6/mm3 Final     Hemoglobin   Date Value Ref Range Status   04/16/2018 12.1 (L) 13.5 - 16.5 g/dL Final     Hematocrit   Date Value Ref Range Status    04/16/2018 39.2 (L) 40.0 - 49.0 % Final     Platelets   Date Value Ref Range Status   04/16/2018 129 (L) 150 - 375 10*3/mm3 Final              Assessment/Plan  1. 1. This patient has had leukopenia and thrombocytopenia. Most likely this is related to splenomegaly and this is on the basis of cirrhotic liver morphology on the CT scan documented above that I personally reviewed in the system at [facility]. I agree with the radiologist’s interpretation. Very likely the fatty liver infiltration is the cause of this erratic morphology in the background of chronic diabetes mellitus. It is very likely that he has MUIR associated with chronic liver disease. He also has hepatitis C and this is another conducive phenomenon to chronic liver disease.   2. The patient has had anemia that is related to iron deficiency and B12 deficiency. The patient was given supplements of this in the form of IM B12 and IV iron and since then the hemoglobin has had a dramatic rebound. Today the hemoglobin is 12.1. That is very different to how it was 6 weeks ago when it was 9.3. Obviously, the patient has great color, has great level of energy and he feels actually extremely well.   3. The patient has cirrhosis of the liver very likely again through hepatitis C and fatty liver infiltration. Eventually this will require assessment by Gastroenterology.   4. The patient has a right kidney tumor that eventually will require to be addressed in the future. My advice for them is to wait for another 4-6 weeks to allow him further recovery of all his other issues and hopefully and eventually, he will be referred back to the urologist to do a nephron-sparing surgery to remove that part of the right kidney.   5. The rest of comorbidities include diabetes and end-organ damage complications remain about the same.   6. The patient is now learning that he needs to modify his diet in a positive way. He is eating less than 4000 calories a day  like he was eating before that he was asking his wife just to cook for him 24 hours a day.     I am glad to see all these positive changes on him but in my opinion, he is not ready to jump into the ring of proceeding with right kidney surgery. Obviously, before that we will need to do reassessment of coagulation proteins, PT, PTT, and so forth and be sure that he will be able to handle surgery from the point of view of chronic liver disease.     I discussed all these facts with the patient and his wife. They were ready to proceed. I suggested for them to remain on the sublingual B12 supplementation and his multivitamin.     I will review him back in 6 weeks with a CBC, ferritin, iron, TIBC.

## 2018-05-21 ENCOUNTER — TRANSCRIBE ORDERS (OUTPATIENT)
Dept: ADMINISTRATIVE | Facility: HOSPITAL | Age: 58
End: 2018-05-21

## 2018-05-21 ENCOUNTER — LAB (OUTPATIENT)
Dept: LAB | Facility: HOSPITAL | Age: 58
End: 2018-05-21
Attending: INTERNAL MEDICINE

## 2018-05-21 DIAGNOSIS — N18.30 CHRONIC KIDNEY DISEASE, STAGE III (MODERATE) (HCC): Primary | ICD-10-CM

## 2018-05-21 DIAGNOSIS — N18.30 CHRONIC KIDNEY DISEASE, STAGE III (MODERATE) (HCC): ICD-10-CM

## 2018-05-21 LAB
ANION GAP SERPL CALCULATED.3IONS-SCNC: 11.4 MMOL/L
BUN BLD-MCNC: 39 MG/DL (ref 6–20)
BUN/CREAT SERPL: 26.7 (ref 7–25)
CALCIUM SPEC-SCNC: 9.2 MG/DL (ref 8.6–10.5)
CHLORIDE SERPL-SCNC: 109 MMOL/L (ref 98–107)
CO2 SERPL-SCNC: 19.6 MMOL/L (ref 22–29)
CREAT BLD-MCNC: 1.46 MG/DL (ref 0.76–1.27)
GFR SERPL CREATININE-BSD FRML MDRD: 50 ML/MIN/1.73
GLUCOSE BLD-MCNC: 146 MG/DL (ref 65–99)
POTASSIUM BLD-SCNC: 4.4 MMOL/L (ref 3.5–5.2)
SODIUM BLD-SCNC: 140 MMOL/L (ref 136–145)

## 2018-05-21 PROCEDURE — 80048 BASIC METABOLIC PNL TOTAL CA: CPT

## 2018-05-21 PROCEDURE — 36415 COLL VENOUS BLD VENIPUNCTURE: CPT

## 2018-05-25 ENCOUNTER — APPOINTMENT (OUTPATIENT)
Dept: LAB | Facility: HOSPITAL | Age: 58
End: 2018-05-25

## 2018-05-30 ENCOUNTER — OFFICE VISIT (OUTPATIENT)
Dept: ONCOLOGY | Facility: CLINIC | Age: 58
End: 2018-05-30

## 2018-05-30 ENCOUNTER — LAB (OUTPATIENT)
Dept: LAB | Facility: HOSPITAL | Age: 58
End: 2018-05-30

## 2018-05-30 VITALS
BODY MASS INDEX: 28.72 KG/M2 | OXYGEN SATURATION: 98 % | TEMPERATURE: 98.9 F | WEIGHT: 183 LBS | HEIGHT: 67 IN | DIASTOLIC BLOOD PRESSURE: 78 MMHG | HEART RATE: 62 BPM | SYSTOLIC BLOOD PRESSURE: 142 MMHG

## 2018-05-30 DIAGNOSIS — D50.8 IRON DEFICIENCY ANEMIA SECONDARY TO INADEQUATE DIETARY IRON INTAKE: ICD-10-CM

## 2018-05-30 DIAGNOSIS — D61.818 PANCYTOPENIA, ACQUIRED (HCC): ICD-10-CM

## 2018-05-30 DIAGNOSIS — D70.8 OTHER NEUTROPENIA (HCC): ICD-10-CM

## 2018-05-30 DIAGNOSIS — D63.1 ANEMIA IN STAGE 3 CHRONIC KIDNEY DISEASE (HCC): ICD-10-CM

## 2018-05-30 DIAGNOSIS — Z86.19 HISTORY OF HEPATITIS C VIRUS INFECTION: ICD-10-CM

## 2018-05-30 DIAGNOSIS — E53.8 B12 DEFICIENCY: ICD-10-CM

## 2018-05-30 DIAGNOSIS — D69.6 THROMBOCYTOPENIA (HCC): ICD-10-CM

## 2018-05-30 DIAGNOSIS — D69.6 THROMBOCYTOPENIA (HCC): Primary | ICD-10-CM

## 2018-05-30 DIAGNOSIS — N18.30 ANEMIA IN STAGE 3 CHRONIC KIDNEY DISEASE (HCC): ICD-10-CM

## 2018-05-30 DIAGNOSIS — N28.89 RENAL MASS: ICD-10-CM

## 2018-05-30 LAB
BASOPHILS # BLD AUTO: 0.03 10*3/MM3 (ref 0–0.1)
BASOPHILS NFR BLD AUTO: 0.5 % (ref 0–1.1)
DEPRECATED RDW RBC AUTO: 47.9 FL (ref 37–49)
EOSINOPHIL # BLD AUTO: 0.26 10*3/MM3 (ref 0–0.36)
EOSINOPHIL NFR BLD AUTO: 4.2 % (ref 1–5)
ERYTHROCYTE [DISTWIDTH] IN BLOOD BY AUTOMATED COUNT: 14.5 % (ref 11.7–14.5)
HCT VFR BLD AUTO: 31.3 % (ref 40–49)
HGB BLD-MCNC: 10.4 G/DL (ref 13.5–16.5)
HGB RETIC QN: 33.4 PG (ref 29.8–36.1)
IMM GRANULOCYTES # BLD: 0.02 10*3/MM3 (ref 0–0.03)
IMM GRANULOCYTES NFR BLD: 0.3 % (ref 0–0.5)
IMM RETICS NFR: 10.6 % (ref 3–15.8)
LYMPHOCYTES # BLD AUTO: 1.3 10*3/MM3 (ref 1–3.5)
LYMPHOCYTES NFR BLD AUTO: 20.9 % (ref 20–49)
MCH RBC QN AUTO: 30.1 PG (ref 27–33)
MCHC RBC AUTO-ENTMCNC: 33.2 G/DL (ref 32–35)
MCV RBC AUTO: 90.5 FL (ref 83–97)
MONOCYTES # BLD AUTO: 0.41 10*3/MM3 (ref 0.25–0.8)
MONOCYTES NFR BLD AUTO: 6.6 % (ref 4–12)
NEUTROPHILS # BLD AUTO: 4.21 10*3/MM3 (ref 1.5–7)
NEUTROPHILS NFR BLD AUTO: 67.5 % (ref 39–75)
NRBC BLD MANUAL-RTO: 0 /100 WBC (ref 0–0)
PLATELET # BLD AUTO: 101 10*3/MM3 (ref 150–375)
PMV BLD AUTO: 11.7 FL (ref 8.9–12.1)
RBC # BLD AUTO: 3.46 10*6/MM3 (ref 4.3–5.5)
RETICS/RBC NFR AUTO: 2.85 % (ref 0.6–2)
VIT B12 BLD-MCNC: 1170 PG/ML (ref 211–946)
WBC NRBC COR # BLD: 6.23 10*3/MM3 (ref 4–10)

## 2018-05-30 PROCEDURE — 82607 VITAMIN B-12: CPT | Performed by: INTERNAL MEDICINE

## 2018-05-30 PROCEDURE — 84466 ASSAY OF TRANSFERRIN: CPT | Performed by: INTERNAL MEDICINE

## 2018-05-30 PROCEDURE — 36415 COLL VENOUS BLD VENIPUNCTURE: CPT | Performed by: INTERNAL MEDICINE

## 2018-05-30 PROCEDURE — 83540 ASSAY OF IRON: CPT | Performed by: INTERNAL MEDICINE

## 2018-05-30 PROCEDURE — 99214 OFFICE O/P EST MOD 30 MIN: CPT | Performed by: INTERNAL MEDICINE

## 2018-05-30 PROCEDURE — 82728 ASSAY OF FERRITIN: CPT | Performed by: INTERNAL MEDICINE

## 2018-05-30 PROCEDURE — 85046 RETICYTE/HGB CONCENTRATE: CPT | Performed by: INTERNAL MEDICINE

## 2018-05-30 PROCEDURE — 85025 COMPLETE CBC W/AUTO DIFF WBC: CPT

## 2018-05-30 RX ORDER — GABAPENTIN 300 MG/1
300 CAPSULE ORAL 3 TIMES DAILY
COMMUNITY
End: 2020-02-17

## 2018-05-30 NOTE — PROGRESS NOTES
Subjective     REASON FOR FOLLOW UP: PANCYTOPENIA, CHRONIC KIDNEY DISEASE STAGE III, HISTORY OF TREATED HEPATITIS C.     History of Present Illness This patient is a 57-year-old white male who has been referred to us today because it has been noticed that in laboratory assessment recently done the patient has leukopenia, anemia and thrombocytopenia. The question was raised about the nature of this in the background of previous history of hepatitis C infection, status post definitive 3-month antiviral therapy by hepatologist. The patient completed this treatment in 10/2017. The family, the wife, son-in-law and daughter are with the patient today stating that since the stroke in 12/2017, the patient has lost ground very substantially. They find him disoriented in the house with tremendous degree of somnolence and significant decrease in appetite. He has not had any fevers, chills or bleeding but he has terrible situation with his vision with marked decrease in the vision, unable to read too much and barely able to watch the television. His appetite has been acceptable. The wife has been trying to help him to control diabetes in the best way possible. The patient also has difficulty swallowing with no obvious reason for this besides trauma that he had a long time ago in the esophagus. He has had occasional regurgitation. No heartburn and things choke on him time to time. This became worse since the stroke. He has not had any hematemesis. He has not had any obvious aspiration as far as they can tell. The patient denies any cough or sputum production. He has not had any chest pain or palpitation. He had proper bowel activity and no obvious passage of blood in the stool. Urination is ongoing with no frequency, urgency or hematuria. No incontinence. He has profound pain in the lower extremities associated with peripheral neuropathy associated with diabetes. He has difficulty with his balance and he is walking with a  cane. He has not had any falls. He has not had any episodes of hypoglycemia. He has tremendous degree of somnolence given the medications that he is receiving at this time and he remains like a sleepyhead most of the time in the house. He has not had any abnormal movements or seizure activity. He has had significant issues in regard to his balance as stated above since the stroke and significant decrease in his vision as well.     Still with all these issues, the patient tries to participate in the life of the family in the best way possible. The patient has been disabled since 2000.  The patient was brought back to the office in company of family members in order to review issues about his hematological workup. On this occasion, the patient was more participative. He was less sunk down by the pain medication that he was taking the day of the 1st visit. This day the need for this patient to have formal education and teaching about diabetes care and management given the fact that he eats excessively and his wife is not able to keep up calorie production all day long and insulin control all day long was discussed also.    The patient returned to the office on 05/30/2018 along with his wife stating that he continues working and controlling his diabetes even though he is extremely hardheaded in regard to the timing of his insulin dosing. His wife discussed these facts with him. The patient denies any significant in crescendo fatigue. His appetite is terrific. He is still eating junk food abundantly and plenty of calories. Most of the sugar controls are in the 250 category when he checks them. He has not had any recent infections. Hernán Charlton MD will proceed eventually with the removal of the upper pole of the right kidney where there is a located tumor that is probably a representation of a kidney cancer.     The patient denies any bowel alteration at this time. No heartburn, no indigestion, no nausea, no  vomiting. He has no difficulty with urination.        Past Medical History:   Diagnosis Date   • Anemia    • Anxiety    • CAD (coronary artery disease)    • CHF (congestive heart failure)    • Chronic back pain    • Chronic kidney disease    • COPD (chronic obstructive pulmonary disease)    • Coronary artery disease    • Depression    • Diabetes mellitus    • GERD (gastroesophageal reflux disease)    • Headache    • Hepatitis B 2013    from transfusion for CABG   • Hypertension    • Jaundice    • Myocardial infarction    • Seizures    • Stroke 12/2017        Past Surgical History:   Procedure Laterality Date   • BRAIN HEMATOMA EVACUATION     • CARDIAC SURGERY     • COLONOSCOPY     • CORONARY ARTERY BYPASS GRAFT  2013    Triple   • ROTATOR CUFF REPAIR Left    • VASECTOMY  1985   • WOUND DEBRIDEMENT Right 06/10/2011    Excisional debridement of necrotizing fasciitis of the right groin extending along the right hemiscrotum, 5 x 2 x 2 cm in one wound and 7.5 x 2.5 x 2.5 cm of a second wound. This was sharp excisional debridement carried out to the muscle-Dr. Eduardo Cross         Current Outpatient Prescriptions on File Prior to Visit   Medication Sig Dispense Refill   • ACCU-CHEK SOFTCLIX LANCETS lancets Test before each meal and at bedtime     • ALPRAZolam (XANAX) 1 MG tablet Take 1 mg by mouth 4 (four) times a day.     • amLODIPine (NORVASC) 10 MG tablet Take 1 tablet by mouth Daily. 30 tablet 0   • aspirin 81 MG EC tablet Take  by mouth Daily.  2   • bumetanide (BUMEX) 1 MG tablet Take  by mouth As Needed.  3   • carvedilol (COREG) 12.5 MG tablet Take 1 tablet by mouth Every 12 (Twelve) Hours. 60 tablet 0   • cetirizine (ZyrTEC) 10 MG tablet Take 10 mg by mouth daily.     • erythromycin (ROMYCIN) 5 MG/GM ophthalmic ointment      • glucose blood (ACCU-CHEK VIN PLUS) test strip TEST FOUR TIMES DAILY BEFORE MEALS AND AT NIGHT     • hydrALAZINE (APRESOLINE) 50 MG tablet Take 1 tablet by mouth Every 8 (Eight) Hours. 90  tablet 0   • LANTUS SOLOSTAR 100 UNIT/ML injection pen ADM 12 TO 20 UNI SC Q NIGHT  1   • lisinopril (PRINIVIL,ZESTRIL) 40 MG tablet Take 0.5 tablets by mouth 2 (Two) Times a Day. (Patient taking differently: Take 20 mg by mouth Daily.) 60 tablet 0   • MULTIPLE VITAMIN PO Take  by mouth.     • NOVOLOG FLEXPEN 100 UNIT/ML solution pen-injector sc pen ADM 2 TO 15 UNI SC TID B MEALS  1   • Omega-3 Fatty Acids (FISH OIL) 1000 MG capsule capsule Take  by mouth daily with breakfast.     • oxyCODONE-acetaminophen (PERCOCET)  MG per tablet Take 1 tablet by mouth 4 (four) times a day.     • promethazine (PHENERGAN) 12.5 MG tablet As Needed.  1   • simvastatin (ZOCOR) 40 MG tablet Take 40 mg by mouth Every Night.     • tiZANidine (ZANAFLEX) 4 MG tablet Take 4 mg by mouth 4 (four) times a day.       No current facility-administered medications on file prior to visit.         ALLERGIES:    Allergies   Allergen Reactions   • Morphine And Related    • Fentanyl Other (See Comments)     ER said he was allergy   • Ibuprofen Nausea Only   • Latex Rash   • Penicillins Hives        Social History     Social History   • Marital status:      Spouse name: Samantha   • Years of education: High school     Occupational History   •  Unemployed     Social History Main Topics   • Smoking status: Never Smoker   • Alcohol use No   • Drug use: No   • Sexual activity: Defer     Other Topics Concern   • Not on file        Family History   Problem Relation Age of Onset   • Diabetes Mother    • Heart failure Mother    • Kidney failure Mother    • Anemia Mother    • Heart disease Mother    • Hypertension Mother    • Heart failure Father    • Coronary artery disease Father    • Heart disease Father    • Hypertension Father    • Diabetes Father    • Diabetes Sister    • Cervical cancer Sister    • Leukemia Sister    • Diabetes Brother    • Cervical cancer Daughter    • Ovarian cancer Sister         Review of Systems   General: no fever, chills,  "fatigue, weight changes, or lack of appetite.  Eyes: no epiphora, xerophthalmia,conjunctivitis, pain, glaucoma, blurred vision, blindness, secretion, photophobia, proptosis, diplopia.  Ears: no otorrhea, tinnitus, otorrhagia, deafness, pain, vertigo.  Nose: no rhinorrhea, epistaxis, alteration in perception of odors, sinuses pressure.  Mouth: no alteration in gums or teeth,  ulcers, no difficulty with mastication or deglut ion, no odynophagia.  Neck: no masses or pain, no thyroid alterations, no pain in muscles or arteries, no carotid odynia, no crepitation.  Respiratory: no cough, sputum production, dyspnea, trepopnea, pleuritic pain, hemoptysis.  Heart: no syncope, irregularity, palpitations, angina, orthopnea, paroxysmal nocturnal dyspnea.  Vascular Venous: no tenderness,edema, palpable cords, postphlebitic syndrome, skin changes or ulcerations.  Vascular Arterial: no distal ischemia, claudication, gangrene, neuropathic ischemic pain, skin ulcers, paleness or cyanosis.  GI: no dysphagia, odynophagia, no regurgitation, no heartburn,no indigestion,no nausea,no vomiting,no hematemesis ,no melena,no jaundice,no distention, no obstipation,no enterorrhagia,no proctalgia,no anal  lesions, nochanges in bowel habits.  : no frequency, hesitancy, hematuria, discharge, pain.  Musculoskeletal: no muscle or tendon pain or inflammation, joint pain, edema, functional limitation, fasciculations, mass.  Neurologic: no headache, seizures, alterations on Craneal nerves, no motor deficit, normal coordination, no alteration in memory, orientation, calculation,writting, verbal or written language.  Skin: no rashes, pruritus or localized lesions.  Psychiatric: no anxiety, depression, agitation, delusions, proper insight.  Objective     Vitals:    05/30/18 1621   BP: 142/78   Pulse: 62   Temp: 98.9 °F (37.2 °C)   SpO2: 98%   Weight: 83 kg (183 lb)   Height: 170 cm (66.93\")   PainSc: 6  Comment: back pain     Current Status 5/30/2018 "   ECOG score 0     Physical Exam    GENERAL:  Well-developed, well-nourished  Patient  in no acute distress.   SKIN:  Warm, dry without rashes, purpura or petechiae.  HEENT:  Pupils were equal and reactive to light and accomodation, conjunctivas non injected, no pterigion, normal extraocular movements, normal visual acuity.   Mouth mucosa was moist, no exudates in oropharynx, advance periodontal disease in gum line, normal roof of the mouth and pillars, normal papillations of the tongue  NECK:  Supple with good range of motion; no thyromegaly or masses, no JVD or bruits, no cervical adenopathies.No carotid arteries pain, no carotid abnormal pulsation or arterial dance.  LYMPHATICS:  No cervical, supraclavicular, axillary, epitrochlear or inguinal adenopathy.  CHEST:  Normal excursion of both zaki thoraces, normal voice fremitus, no subcutaneous emphysema, normal axillas, no rashes or acanthosis nigricans. Lungs clear to percussion and auscultation, normal breath sounds bilaterally, no wheezing, crackles or ronchi, no stridor, no rubs.  CARDIAC AND VASCULAR normal rate and regular rhythm, without murmurs, rubs or S3 or S4 right or left sided gallops. Normal femoral, popliteal, pedis, brachial and carotid pulses.  ABDOMEN:  Soft, nontender with no organomegaly or masses, no ascites, no collateral circulation,no distention,no Honorio sign, no abdominal pain, no inguinal hernias,no changes umbilical hernia, no abdominal bruits. Normal bowel sounds.  GENITAL: Not  Performed.  EXTREMITIES  AND SPINE:  No clubbing, cyanosis or edema, no deformities or pain .No kyphosis, scoliosis, deformities or pain in spine, ribs or pelvic bone.  NEUROLOGICAL:  Patient was awake, alert, oriented to time, person and place neuropathy in feet      RECENT LABS:  Component      Latest Ref Rng & Units 4/16/2018 5/30/2018           1:18 PM  3:46 PM   WBC      4.00 - 10.00 10*3/mm3 8.88 6.23   RBC      4.30 - 5.50 10*6/mm3 4.18 (L) 3.46 (L)    Hemoglobin      13.5 - 16.5 g/dL 12.1 (L) 10.4 (L)   Hematocrit      40.0 - 49.0 % 39.2 (L) 31.3 (L)   MCV      83.0 - 97.0 fL 93.8 90.5   MCH      27.0 - 33.0 pg 28.9 30.1   MCHC      32.0 - 35.0 g/dL 30.9 (L) 33.2   RDW      11.7 - 14.5 % 14.0 14.5   RDW-SD      37.0 - 49.0 fl 48.0 47.9   MPV      8.9 - 12.1 fL 11.6 11.7   Platelets      150 - 375 10*3/mm3 129 (L) 101 (L)   Neutrophil %      39.0 - 75.0 % 64.7 67.5   Lymphocyte %      20.0 - 49.0 % 23.4 20.9   Monocyte %      4.0 - 12.0 % 6.8 6.6   Eosinophil %      1.0 - 5.0 % 4.2 4.2   Basophil %      0.0 - 1.1 % 0.6 0.5             Assessment/Plan  1. This patient has had multifactorial anemia. This corresponds to iron deficiency and B12 deficiency that have been corrected in the past. On the other hand, his hemoglobin is starting to drift down and raises the question if the patient is running out of iron again or running out of B12 vitamin. Also could be indicator of progressive kidney disease associated with chronic kidney disease related to diabetic nephropathy. For this reason, I have asked the patient to go back to the lab to repeat a B12 level, ferritin, iron, TIBC and reticulated hemoglobin as well as CMP.   2. 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 Charlton.   3. The patient has very significant periodontal disease. Associated with this, very erratic control of his diabetes, eating a lot of junk food, and wrong timing for the insulin shots, a major argument that he had between him and his wife today in this regard. I do not believe that this will be controllable. The patient's wife states that he is extremely hardheaded. Things are done in his way and no other way around.     This last situation is difficult to control from my point of view.     RECOMMENDATIONS:  I have sent him back to the lab to do a CMP, ferritin, iron, TIBC, B12 level. This will be discussed with the patient  and wife on the telephone once the report becomes available. Other than that no other intervention. I made a tentative appointment for him to come back in 3 months and review all the numbers.     Otherwise, the patient will be called at home with the report of his labs as stated above.

## 2018-05-31 LAB
FERRITIN SERPL-MCNC: 263.7 NG/ML (ref 30–400)
IRON 24H UR-MRATE: 83 MCG/DL (ref 59–158)
IRON SATN MFR SERPL: 29 % (ref 14–48)
TIBC SERPL-MCNC: 283 MCG/DL (ref 249–505)
TRANSFERRIN SERPL-MCNC: 202 MG/DL (ref 200–360)

## 2018-06-01 ENCOUNTER — TELEPHONE (OUTPATIENT)
Dept: ONCOLOGY | Facility: HOSPITAL | Age: 58
End: 2018-06-01

## 2018-06-05 ENCOUNTER — HOSPITAL ENCOUNTER (INPATIENT)
Facility: HOSPITAL | Age: 58
LOS: 2 days | Discharge: HOME | End: 2018-06-08
Attending: EMERGENCY MEDICINE | Admitting: HOSPITALIST

## 2018-06-05 DIAGNOSIS — E87.5 HYPERKALEMIA: Primary | ICD-10-CM

## 2018-06-05 DIAGNOSIS — N28.9 RENAL INSUFFICIENCY: ICD-10-CM

## 2018-06-05 LAB
ANION GAP SERPL CALCULATED.3IONS-SCNC: 11.8 MMOL/L
BUN BLD-MCNC: 44 MG/DL (ref 6–20)
BUN/CREAT SERPL: 19 (ref 7–25)
CALCIUM SPEC-SCNC: 9.2 MG/DL (ref 8.6–10.5)
CHLORIDE SERPL-SCNC: 109 MMOL/L (ref 98–107)
CO2 SERPL-SCNC: 23.2 MMOL/L (ref 22–29)
CREAT BLD-MCNC: 2.31 MG/DL (ref 0.76–1.27)
GFR SERPL CREATININE-BSD FRML MDRD: 30 ML/MIN/1.73
GLUCOSE BLD-MCNC: 136 MG/DL (ref 65–99)
POTASSIUM BLD-SCNC: 6.2 MMOL/L (ref 3.5–5.2)
SODIUM BLD-SCNC: 144 MMOL/L (ref 136–145)

## 2018-06-05 PROCEDURE — 36415 COLL VENOUS BLD VENIPUNCTURE: CPT

## 2018-06-05 PROCEDURE — 63710000001 INSULIN REGULAR HUMAN PER 5 UNITS: Performed by: EMERGENCY MEDICINE

## 2018-06-05 PROCEDURE — G0378 HOSPITAL OBSERVATION PER HR: HCPCS

## 2018-06-05 PROCEDURE — 93010 ELECTROCARDIOGRAM REPORT: CPT | Performed by: INTERNAL MEDICINE

## 2018-06-05 PROCEDURE — 93005 ELECTROCARDIOGRAM TRACING: CPT

## 2018-06-05 PROCEDURE — 80048 BASIC METABOLIC PNL TOTAL CA: CPT | Performed by: EMERGENCY MEDICINE

## 2018-06-05 PROCEDURE — 99284 EMERGENCY DEPT VISIT MOD MDM: CPT

## 2018-06-05 PROCEDURE — 93005 ELECTROCARDIOGRAM TRACING: CPT | Performed by: EMERGENCY MEDICINE

## 2018-06-05 RX ORDER — NITROGLYCERIN 0.4 MG/1
0.4 TABLET SUBLINGUAL
Status: DISCONTINUED | OUTPATIENT
Start: 2018-06-05 | End: 2018-06-06

## 2018-06-05 RX ORDER — PROMETHAZINE HYDROCHLORIDE 12.5 MG/1
12.5 TABLET ORAL
Status: DISCONTINUED | OUTPATIENT
Start: 2018-06-05 | End: 2018-06-08

## 2018-06-05 RX ORDER — NICOTINE POLACRILEX 4 MG
15 LOZENGE BUCCAL
Status: DISCONTINUED | OUTPATIENT
Start: 2018-06-05 | End: 2018-06-06

## 2018-06-05 RX ORDER — CARVEDILOL 12.5 MG/1
12.5 TABLET ORAL EVERY 12 HOURS SCHEDULED
Status: DISCONTINUED | OUTPATIENT
Start: 2018-06-06 | End: 2018-06-06

## 2018-06-05 RX ORDER — CHOLECALCIFEROL (VITAMIN D3) 125 MCG
5000 CAPSULE ORAL EVERY MORNING
COMMUNITY
End: 2019-03-27 | Stop reason: HOSPADM

## 2018-06-05 RX ORDER — ALPRAZOLAM 0.5 MG/1
1 TABLET ORAL 4 TIMES DAILY PRN
Status: DISCONTINUED | OUTPATIENT
Start: 2018-06-05 | End: 2018-06-08

## 2018-06-05 RX ORDER — ATORVASTATIN CALCIUM 20 MG/1
20 TABLET, FILM COATED ORAL DAILY
Status: DISCONTINUED | OUTPATIENT
Start: 2018-06-06 | End: 2018-06-06

## 2018-06-05 RX ORDER — DEXTROSE MONOHYDRATE 25 G/50ML
25 INJECTION, SOLUTION INTRAVENOUS
Status: DISCONTINUED | OUTPATIENT
Start: 2018-06-05 | End: 2018-06-06

## 2018-06-05 RX ORDER — GABAPENTIN 300 MG/1
300 CAPSULE ORAL 3 TIMES DAILY
Status: DISCONTINUED | OUTPATIENT
Start: 2018-06-06 | End: 2018-06-08 | Stop reason: HOSPADM

## 2018-06-05 RX ORDER — OXYCODONE AND ACETAMINOPHEN 10; 325 MG/1; MG/1
1 TABLET ORAL 4 TIMES DAILY PRN
Status: DISCONTINUED | OUTPATIENT
Start: 2018-06-05 | End: 2018-06-08

## 2018-06-05 RX ORDER — DEXTROSE MONOHYDRATE 25 G/50ML
25 INJECTION, SOLUTION INTRAVENOUS ONCE
Status: COMPLETED | OUTPATIENT
Start: 2018-06-05 | End: 2018-06-05

## 2018-06-05 RX ORDER — ERYTHROMYCIN 5 MG/G
OINTMENT OPHTHALMIC NIGHTLY
Status: DISCONTINUED | OUTPATIENT
Start: 2018-06-06 | End: 2018-06-08 | Stop reason: HOSPADM

## 2018-06-05 RX ORDER — SODIUM CHLORIDE 450 MG/100ML
125 INJECTION, SOLUTION INTRAVENOUS CONTINUOUS
Status: DISCONTINUED | OUTPATIENT
Start: 2018-06-06 | End: 2018-06-07

## 2018-06-05 RX ORDER — HYDRALAZINE HYDROCHLORIDE 50 MG/1
50 TABLET, FILM COATED ORAL EVERY 8 HOURS SCHEDULED
Status: DISCONTINUED | OUTPATIENT
Start: 2018-06-06 | End: 2018-06-06

## 2018-06-05 RX ORDER — TIZANIDINE 4 MG/1
4 TABLET ORAL EVERY 8 HOURS PRN
Status: DISCONTINUED | OUTPATIENT
Start: 2018-06-05 | End: 2018-06-08

## 2018-06-05 RX ADMIN — DEXTROSE MONOHYDRATE 25 G: 25 INJECTION, SOLUTION INTRAVENOUS at 21:38

## 2018-06-05 RX ADMIN — SODIUM CHLORIDE 1000 ML: 9 INJECTION, SOLUTION INTRAVENOUS at 20:35

## 2018-06-05 RX ADMIN — HUMAN INSULIN 5 UNITS: 100 INJECTION, SOLUTION SUBCUTANEOUS at 21:38

## 2018-06-05 RX ADMIN — SODIUM CHLORIDE 125 ML/HR: 4.5 INJECTION, SOLUTION INTRAVENOUS at 23:45

## 2018-06-05 NOTE — ED PROVIDER NOTES
" EMERGENCY DEPARTMENT ENCOUNTER    Room Number:  12/12  Date seen:  6/5/2018  Time seen: 7:18 PM  PCP: Yadiel Baxter III, MD   GI: Dr. Ta   Historian: Patient, Family      HPI:  Chief Complaint: Abnormal Blood Work  Context: Angelo Brown is a 57 y.o. male with h/o hepatitis for which pt is followed by a GI. Pt states that he was in his GI's office yesterday for a routine f/u. While there, pt had blood work drawn. Earlier today, pt received a phone call from his GI's office informing him that pt's potassium level was elevated at about 10. Therefore, pt was referred to the ER for further evaluation. Pt reports that he is asymptomatic with this - pt denies vomiting, diarrhea, abdominal pain, chest pain, dyspnea, and palpitations. There are no other complaints at this time.     Location: N/A  Radiation: N/A  Quality: \"elevated potassium level\"  Intensity/Severity: N/A  Duration: Unknown  Onset quality: Unknown  Timing: Unknown  Progression: Unknown  Aggravating Factors: Nothing  Alleviating Factors: Nothing  Previous Episodes: None  Treatment before arrival: None  Associated Symptoms: None        MEDICAL RECORD REVIEW    Pt has h/o pancytopenia for which pt is followed by Dr. Aviles, hematologist. Pt's creatinine was 1.46 and K+ was 4.4 about 2 weeks ago.            PAST MEDICAL HISTORY  Active Ambulatory Problems     Diagnosis Date Noted   • Acute CVA (cerebrovascular accident) 12/20/2017   • Renal mass 12/24/2017   • Proteinuria 12/24/2017   • Leukopenia 03/05/2018   • Anemia in chronic kidney disease 03/05/2018   • Thrombocytopenia 03/05/2018   • Pancytopenia, acquired 03/05/2018   • History of hepatitis C virus infection 03/05/2018   • B12 deficiency 03/14/2018   • Iron deficiency anemia secondary to inadequate dietary iron intake 03/14/2018   • Iron and its compounds causing adverse effect in therapeutic use 03/14/2018     Resolved Ambulatory Problems     Diagnosis Date Noted   • No Resolved " Ambulatory Problems     Past Medical History:   Diagnosis Date   • Anemia    • Anxiety    • CAD (coronary artery disease)    • CHF (congestive heart failure)    • Chronic back pain    • Chronic kidney disease    • COPD (chronic obstructive pulmonary disease)    • Coronary artery disease    • Depression    • Diabetes mellitus    • GERD (gastroesophageal reflux disease)    • Headache    • Hepatitis B 2013   • Hypertension    • Jaundice    • Myocardial infarction    • Seizures    • Stroke 12/2017         PAST SURGICAL HISTORY  Past Surgical History:   Procedure Laterality Date   • BRAIN HEMATOMA EVACUATION     • CARDIAC SURGERY     • CATARACT EXTRACTION, BILATERAL     • COLONOSCOPY     • CORONARY ARTERY BYPASS GRAFT  2013    Triple   • ROTATOR CUFF REPAIR Left    • VASECTOMY  1985   • WOUND DEBRIDEMENT Right 06/10/2011    Excisional debridement of necrotizing fasciitis of the right groin extending along the right hemiscrotum, 5 x 2 x 2 cm in one wound and 7.5 x 2.5 x 2.5 cm of a second wound. This was sharp excisional debridement carried out to the muscle-Dr. Eduardo Cross          FAMILY HISTORY  Family History   Problem Relation Age of Onset   • Diabetes Mother    • Heart failure Mother    • Kidney failure Mother    • Anemia Mother    • Heart disease Mother    • Hypertension Mother    • Heart failure Father    • Coronary artery disease Father    • Heart disease Father    • Hypertension Father    • Diabetes Father    • Diabetes Sister    • Cervical cancer Sister    • Leukemia Sister    • Diabetes Brother    • Cervical cancer Daughter    • Ovarian cancer Sister          SOCIAL HISTORY  Social History     Social History   • Marital status:      Spouse name: Samantha   • Number of children: N/A   • Years of education: High school     Occupational History   •  Unemployed     Social History Main Topics   • Smoking status: Never Smoker   • Smokeless tobacco: Not on file   • Alcohol use No   • Drug use: No   • Sexual  activity: Defer     Other Topics Concern   • Not on file     Social History Narrative   • No narrative on file         ALLERGIES  Morphine and related; Fentanyl; Ibuprofen; Latex; and Penicillins        REVIEW OF SYSTEMS  Review of Systems   Constitutional: Negative for chills.   HENT: Negative for congestion, rhinorrhea and sore throat.    Eyes: Negative for pain.   Respiratory: Negative for cough and shortness of breath.    Cardiovascular: Negative for chest pain, palpitations and leg swelling.   Gastrointestinal: Negative for abdominal pain, diarrhea and vomiting.   Genitourinary: Negative for difficulty urinating, dysuria, flank pain and frequency.   Musculoskeletal: Negative for myalgias, neck pain and neck stiffness.   Skin: Negative for rash.   Neurological: Negative for dizziness, speech difficulty, weakness, light-headedness, numbness and headaches.   Psychiatric/Behavioral: Negative.    All other systems reviewed and are negative.           PHYSICAL EXAM  ED Triage Vitals   Temp Heart Rate Resp BP SpO2   06/05/18 1851 06/05/18 1851 06/05/18 1851 06/05/18 1920 06/05/18 1851   97.5 °F (36.4 °C) 62 18 151/80 97 %      Temp src Heart Rate Source Patient Position BP Location FiO2 (%)   06/05/18 1851 06/05/18 1851 06/05/18 2222 06/05/18 2222 --   Tympanic Monitor Lying Left arm        Physical Exam   Constitutional: He is oriented to person, place, and time. No distress.   HENT:   Head: Normocephalic.   Mouth/Throat: Mucous membranes are normal.   Eyes: EOM are normal. Pupils are equal, round, and reactive to light.   Neck: Normal range of motion. Neck supple.   Cardiovascular: Normal rate, regular rhythm and normal heart sounds.    Pulmonary/Chest: Effort normal and breath sounds normal. No respiratory distress. He has no decreased breath sounds. He has no wheezes. He has no rhonchi. He has no rales.   Abdominal: Soft. There is no tenderness. There is no rebound and no guarding.   Musculoskeletal: Normal range  of motion.   Neurological: He is alert and oriented to person, place, and time. He has normal sensation.   Skin: Skin is warm and dry.   Psychiatric: Mood and affect normal.   Nursing note and vitals reviewed.          LAB RESULTS  Recent Results (from the past 24 hour(s))   Basic Metabolic Panel    Collection Time: 06/05/18  7:32 PM   Result Value Ref Range    Glucose 136 (H) 65 - 99 mg/dL    BUN 44 (H) 6 - 20 mg/dL    Creatinine 2.31 (H) 0.76 - 1.27 mg/dL    Sodium 144 136 - 145 mmol/L    Potassium 6.2 (C) 3.5 - 5.2 mmol/L    Chloride 109 (H) 98 - 107 mmol/L    CO2 23.2 22.0 - 29.0 mmol/L    Calcium 9.2 8.6 - 10.5 mg/dL    eGFR Non African Amer 30 (L) >60 mL/min/1.73    BUN/Creatinine Ratio 19.0 7.0 - 25.0    Anion Gap 11.8 mmol/L       Ordered the above labs and reviewed the results.            PROCEDURES  Procedures        EKG:           EKG time: 20:03  Rhythm/Rate: Sinus bradycardia rate 59  P waves and MI: Normal P waves  QRS, axis: Normal QRS   ST and T waves: T wave inversions in III, aVF     Interpreted Contemporaneously by me, independently viewed  No old EKG is available for comparison           PROGRESS AND CONSULTS  ED Course as of Jun 05 2258 Tue Jun 05, 2018 2108 9:08 PM  Patient with hyperkalemia and worsening renal insufficiency.  Discussed with Dr. Murrell and will admit.  Given fluids and insulin and glucose.  Discussed with Dr. Swift who will admit.  [SL]      ED Course User Index  [SL] Jm Martinez MD       7:23 PM:  Blood work and EKG ordered for further evaluation.    8:20 PM:  Rechecked pt. Informed pt that his K+ is 6.2 in the ER. Will discuss further course of care with the nephrologist. Pt agrees with plan.     8:23 PM:  Placed call to the nephrologist to discuss further course of care. Will order IV fluids to treat for hyperkalemia.     8:32 PM:  Discussed the case with Dr. Murrell, nephrologist. He would like pt admitted to the hospitalist for further management of pt's  hyperkalemia -> pt will be informed regarding his need for admission. Decision time to admit: Now.     8:36 PM:  Placed call to [facility] for admission.     8:40 PM:  Discussed case with Dr. Swift, hospitalist. He will admit pt to a telemetry bed.     9:08 PM:  Ordered D50W and insulin to treat for hyperkalemia.          MEDICAL DECISION MAKING      MDM  Number of Diagnoses or Management Options  Hyperkalemia:   Renal insufficiency:      Amount and/or Complexity of Data Reviewed  Clinical lab tests: ordered and reviewed (K+ is 6.2. Creatinine is 2.31.)  Decide to obtain previous medical records or to obtain history from someone other than the patient: yes  Discuss the patient with other providers: yes (Case d/w Dr. Swift, hospitalist, who will admit pt to a telemetry bed. Case d/w Dr. Murrell, nephrologist.)    Patient Progress  Patient progress: stable             DIAGNOSIS  Final diagnoses:   Hyperkalemia   Renal insufficiency         DISPOSITION  Pt admitted to telemetry.    ADMISSION    Discussed treatment plan and reason for admission with pt/family and admitting physician.  Pt/family voiced understanding of the plan for admission for further testing/treatment as needed.               Latest Documented Vital Signs:  As of 9:10 PM  BP- 138/81 HR- 60 Temp- 97.5 °F (36.4 °C) (Tympanic) O2 sat- 97%        --  Documentation assistance provided by bernadine Mccloud for Dr. Juan MD.  Information recorded by the scrhal was done at my direction and has been verified and validated by me.             Damon Mccloud  06/05/18 1842       Jm Martinez MD  06/05/18 [id number]

## 2018-06-06 LAB
ANION GAP SERPL CALCULATED.3IONS-SCNC: 12.1 MMOL/L
BACTERIA UR QL AUTO: NORMAL /HPF
BASOPHILS # BLD AUTO: 0.02 10*3/MM3 (ref 0–0.2)
BASOPHILS NFR BLD AUTO: 0.5 % (ref 0–1.5)
BILIRUB UR QL STRIP: NEGATIVE
BUN BLD-MCNC: 43 MG/DL (ref 6–20)
BUN/CREAT SERPL: 22.6 (ref 7–25)
CALCIUM SPEC-SCNC: 8.5 MG/DL (ref 8.6–10.5)
CHLORIDE SERPL-SCNC: 110 MMOL/L (ref 98–107)
CHLORIDE UR-SCNC: 81 MMOL/L
CLARITY UR: CLEAR
CO2 SERPL-SCNC: 20.9 MMOL/L (ref 22–29)
COLOR UR: YELLOW
CREAT BLD-MCNC: 1.9 MG/DL (ref 0.76–1.27)
CREAT UR-MCNC: 79.7 MG/DL
DEPRECATED RDW RBC AUTO: 49.8 FL (ref 37–54)
EOSINOPHIL # BLD AUTO: 0.23 10*3/MM3 (ref 0–0.7)
EOSINOPHIL NFR BLD AUTO: 5.3 % (ref 0.3–6.2)
EOSINOPHIL SPEC QL MICRO: 0 % EOS/100 CELLS (ref 0–0)
ERYTHROCYTE [DISTWIDTH] IN BLOOD BY AUTOMATED COUNT: 14.4 % (ref 11.5–14.5)
GFR SERPL CREATININE-BSD FRML MDRD: 37 ML/MIN/1.73
GLUCOSE BLD-MCNC: 218 MG/DL (ref 65–99)
GLUCOSE BLDC GLUCOMTR-MCNC: 137 MG/DL (ref 70–130)
GLUCOSE BLDC GLUCOMTR-MCNC: 147 MG/DL (ref 70–130)
GLUCOSE BLDC GLUCOMTR-MCNC: 162 MG/DL (ref 70–130)
GLUCOSE BLDC GLUCOMTR-MCNC: 216 MG/DL (ref 70–130)
GLUCOSE BLDC GLUCOMTR-MCNC: 238 MG/DL (ref 70–130)
GLUCOSE BLDC GLUCOMTR-MCNC: 276 MG/DL (ref 70–130)
GLUCOSE UR STRIP-MCNC: ABNORMAL MG/DL
HCT VFR BLD AUTO: 30 % (ref 40.4–52.2)
HGB BLD-MCNC: 9.5 G/DL (ref 13.7–17.6)
HGB UR QL STRIP.AUTO: NEGATIVE
HYALINE CASTS UR QL AUTO: NORMAL /LPF
IMM GRANULOCYTES # BLD: 0 10*3/MM3 (ref 0–0.03)
IMM GRANULOCYTES NFR BLD: 0 % (ref 0–0.5)
KETONES UR QL STRIP: NEGATIVE
LEUKOCYTE ESTERASE UR QL STRIP.AUTO: NEGATIVE
LYMPHOCYTES # BLD AUTO: 1.35 10*3/MM3 (ref 0.9–4.8)
LYMPHOCYTES NFR BLD AUTO: 31 % (ref 19.6–45.3)
MCH RBC QN AUTO: 30.2 PG (ref 27–32.7)
MCHC RBC AUTO-ENTMCNC: 31.7 G/DL (ref 32.6–36.4)
MCV RBC AUTO: 95.2 FL (ref 79.8–96.2)
MONOCYTES # BLD AUTO: 0.44 10*3/MM3 (ref 0.2–1.2)
MONOCYTES NFR BLD AUTO: 10.1 % (ref 5–12)
NEUTROPHILS # BLD AUTO: 2.31 10*3/MM3 (ref 1.9–8.1)
NEUTROPHILS NFR BLD AUTO: 53.1 % (ref 42.7–76)
NITRITE UR QL STRIP: NEGATIVE
PH UR STRIP.AUTO: <=5 [PH] (ref 5–8)
PLATELET # BLD AUTO: 80 10*3/MM3 (ref 140–500)
PMV BLD AUTO: 11.7 FL (ref 6–12)
POTASSIUM BLD-SCNC: 5.4 MMOL/L (ref 3.5–5.2)
POTASSIUM BLD-SCNC: 5.6 MMOL/L (ref 3.5–5.2)
PROT UR QL STRIP: ABNORMAL
PROT UR-MCNC: 306 MG/DL
RBC # BLD AUTO: 3.15 10*6/MM3 (ref 4.6–6)
RBC # UR: NORMAL /HPF
REF LAB TEST METHOD: NORMAL
SODIUM BLD-SCNC: 143 MMOL/L (ref 136–145)
SODIUM UR-SCNC: 104 MMOL/L
SP GR UR STRIP: 1.02 (ref 1–1.03)
SQUAMOUS #/AREA URNS HPF: NORMAL /HPF
UROBILINOGEN UR QL STRIP: ABNORMAL
WBC NRBC COR # BLD: 4.35 10*3/MM3 (ref 4.5–10.7)
WBC UR QL AUTO: NORMAL /HPF

## 2018-06-06 PROCEDURE — 82436 ASSAY OF URINE CHLORIDE: CPT | Performed by: INTERNAL MEDICINE

## 2018-06-06 PROCEDURE — 81001 URINALYSIS AUTO W/SCOPE: CPT | Performed by: INTERNAL MEDICINE

## 2018-06-06 PROCEDURE — 63710000001 INSULIN ASPART PER 5 UNITS: Performed by: HOSPITALIST

## 2018-06-06 PROCEDURE — 84156 ASSAY OF PROTEIN URINE: CPT | Performed by: INTERNAL MEDICINE

## 2018-06-06 PROCEDURE — 82570 ASSAY OF URINE CREATININE: CPT | Performed by: INTERNAL MEDICINE

## 2018-06-06 PROCEDURE — 82962 GLUCOSE BLOOD TEST: CPT

## 2018-06-06 PROCEDURE — 85025 COMPLETE CBC W/AUTO DIFF WBC: CPT | Performed by: HOSPITALIST

## 2018-06-06 PROCEDURE — 87205 SMEAR GRAM STAIN: CPT | Performed by: INTERNAL MEDICINE

## 2018-06-06 PROCEDURE — G0378 HOSPITAL OBSERVATION PER HR: HCPCS

## 2018-06-06 PROCEDURE — 80048 BASIC METABOLIC PNL TOTAL CA: CPT | Performed by: HOSPITALIST

## 2018-06-06 PROCEDURE — 84300 ASSAY OF URINE SODIUM: CPT | Performed by: INTERNAL MEDICINE

## 2018-06-06 PROCEDURE — 94760 N-INVAS EAR/PLS OXIMETRY 1: CPT

## 2018-06-06 PROCEDURE — 84132 ASSAY OF SERUM POTASSIUM: CPT | Performed by: INTERNAL MEDICINE

## 2018-06-06 PROCEDURE — 63710000001 INSULIN DETEMER PER 5 UNITS: Performed by: HOSPITALIST

## 2018-06-06 RX ORDER — ATORVASTATIN CALCIUM 10 MG/1
10 TABLET, FILM COATED ORAL DAILY
Status: DISCONTINUED | OUTPATIENT
Start: 2018-06-07 | End: 2018-06-08 | Stop reason: HOSPADM

## 2018-06-06 RX ORDER — PANTOPRAZOLE SODIUM 40 MG/1
40 TABLET, DELAYED RELEASE ORAL
Status: DISCONTINUED | OUTPATIENT
Start: 2018-06-07 | End: 2018-06-08 | Stop reason: HOSPADM

## 2018-06-06 RX ORDER — CARVEDILOL 6.25 MG/1
6.25 TABLET ORAL EVERY 12 HOURS SCHEDULED
Status: DISCONTINUED | OUTPATIENT
Start: 2018-06-06 | End: 2018-06-07

## 2018-06-06 RX ORDER — ASPIRIN 81 MG/1
81 TABLET, CHEWABLE ORAL DAILY
Status: DISCONTINUED | OUTPATIENT
Start: 2018-06-06 | End: 2018-06-08 | Stop reason: HOSPADM

## 2018-06-06 RX ORDER — DEXTROSE MONOHYDRATE 25 G/50ML
25 INJECTION, SOLUTION INTRAVENOUS ONCE
Status: DISCONTINUED | OUTPATIENT
Start: 2018-06-06 | End: 2018-06-06

## 2018-06-06 RX ADMIN — INSULIN ASPART 3 UNITS: 100 INJECTION, SOLUTION INTRAVENOUS; SUBCUTANEOUS at 17:39

## 2018-06-06 RX ADMIN — HYDRALAZINE HYDROCHLORIDE 50 MG: 50 TABLET, FILM COATED ORAL at 01:51

## 2018-06-06 RX ADMIN — CARVEDILOL 12.5 MG: 12.5 TABLET, FILM COATED ORAL at 08:35

## 2018-06-06 RX ADMIN — INSULIN DETEMIR 15 UNITS: 100 INJECTION, SOLUTION SUBCUTANEOUS at 23:10

## 2018-06-06 RX ADMIN — HYDRALAZINE HYDROCHLORIDE 75 MG: 50 TABLET, FILM COATED ORAL at 23:08

## 2018-06-06 RX ADMIN — INSULIN ASPART 3 UNITS: 100 INJECTION, SOLUTION INTRAVENOUS; SUBCUTANEOUS at 12:13

## 2018-06-06 RX ADMIN — GABAPENTIN 300 MG: 300 CAPSULE ORAL at 01:51

## 2018-06-06 RX ADMIN — HYDRALAZINE HYDROCHLORIDE 75 MG: 50 TABLET, FILM COATED ORAL at 16:05

## 2018-06-06 RX ADMIN — GABAPENTIN 300 MG: 300 CAPSULE ORAL at 23:10

## 2018-06-06 RX ADMIN — GABAPENTIN 300 MG: 300 CAPSULE ORAL at 15:58

## 2018-06-06 RX ADMIN — CARVEDILOL 6.25 MG: 6.25 TABLET, FILM COATED ORAL at 23:08

## 2018-06-06 RX ADMIN — OXYCODONE HYDROCHLORIDE AND ACETAMINOPHEN 1 TABLET: 10; 325 TABLET ORAL at 08:35

## 2018-06-06 RX ADMIN — SODIUM CHLORIDE 75 ML/HR: 4.5 INJECTION, SOLUTION INTRAVENOUS at 08:23

## 2018-06-06 RX ADMIN — CARVEDILOL 12.5 MG: 12.5 TABLET, FILM COATED ORAL at 01:51

## 2018-06-06 RX ADMIN — HYDRALAZINE HYDROCHLORIDE 50 MG: 50 TABLET, FILM COATED ORAL at 12:18

## 2018-06-06 RX ADMIN — ASPIRIN 81 MG: 81 TABLET, CHEWABLE ORAL at 15:58

## 2018-06-06 RX ADMIN — TIZANIDINE 4 MG: 4 TABLET ORAL at 01:51

## 2018-06-06 RX ADMIN — GABAPENTIN 300 MG: 300 CAPSULE ORAL at 08:35

## 2018-06-06 RX ADMIN — SODIUM CHLORIDE 125 ML/HR: 4.5 INJECTION, SOLUTION INTRAVENOUS at 20:19

## 2018-06-06 RX ADMIN — ERYTHROMYCIN: 5 OINTMENT OPHTHALMIC at 23:13

## 2018-06-06 RX ADMIN — ATORVASTATIN CALCIUM 20 MG: 20 TABLET, FILM COATED ORAL at 08:35

## 2018-06-06 RX ADMIN — INSULIN DETEMIR 10 UNITS: 100 INJECTION, SOLUTION SUBCUTANEOUS at 01:54

## 2018-06-06 RX ADMIN — OXYCODONE HYDROCHLORIDE AND ACETAMINOPHEN 1 TABLET: 10; 325 TABLET ORAL at 01:51

## 2018-06-06 RX ADMIN — ALPRAZOLAM 1 MG: 0.5 TABLET ORAL at 01:54

## 2018-06-06 NOTE — ED NOTES
Attempted to call report; RN unavailable.  Informed ED staff will call back in five minutes.     Dori Baptiste RN  06/05/18 2524

## 2018-06-06 NOTE — PLAN OF CARE
Problem: Patient Care Overview  Goal: Plan of Care Review  Outcome: Ongoing (interventions implemented as appropriate)   06/06/18 0452   Coping/Psychosocial   Plan of Care Reviewed With patient   Plan of Care Review   Progress no change   OTHER   Outcome Summary Pt was admitted for hyperkalemia. Admission orders recieved. VSS. Will continue to monitor       Problem: Fall Risk (Adult)  Goal: Identify Related Risk Factors and Signs and Symptoms  Outcome: Ongoing (interventions implemented as appropriate)   06/06/18 0452   Fall Risk (Adult)   Related Risk Factors (Fall Risk) environment unfamiliar;slippery/uneven surfaces;homeostatic imbalance   Signs and Symptoms (Fall Risk) presence of risk factors     Goal: Absence of Fall  Outcome: Ongoing (interventions implemented as appropriate)   06/06/18 0452   Fall Risk (Adult)   Absence of Fall making progress toward outcome

## 2018-06-06 NOTE — PROGRESS NOTES
Discharge Planning Assessment  [facility]     Patient Name: Angelo Brown  MRN: [id number]  Today's Date: 6/6/2018    Admit Date: 6/5/2018          Discharge Needs Assessment     Row Name 06/06/18 1040       Living Environment    Lives With spouse    Current Living Arrangements home/apartment/condo    Primary Care Provided by self;spouse/significant other    Provides Primary Care For no one    Family Caregiver if Needed spouse    Family Caregiver Names Wife Samantha declines Home Health  She cares for her     Able to Return to Prior Arrangements yes       Resource/Environmental Concerns    Resource/Environmental Concerns none       Transition Planning    Patient/Family Anticipates Transition to home with family    Transportation Anticipated family or friend will provide       Discharge Needs Assessment    Concerns to be Addressed no discharge needs identified    Equipment Currently Used at Home cane, straight;glucometer    Anticipated Changes Related to Illness none    Equipment Needed After Discharge none            Discharge Plan     Row Name 06/06/18 1049       Plan    Plan Comments IMM 06/06   CCP met with patient and daughter at bedside. CCP role explained and discharge planning discussed.  Face sheet verified.  Pt's wife Samantha [phone] is his emergency contact.  Patient's PCP is Dr Yadiel Baxter III.  Pt lives in a house with his wife.    He uses a straight cane to ambulate.  He is independent with ADL's.   Pt has no history of home health.  Patient has not had any SNF days.  Patient uses [facility] pharmacy for narcotics and benzodiazepines, and [facility] on [address] for his other medications.  Patient has transportation home.  Plan is Home with no needs.  Pt and wife declines Home Health  CCP following.    Row Name 06/06/18 1042       Plan    Plan Home     Patient/Family in Agreement with Plan yes        Destination     No service coordination in this encounter.      Durable Medical Equipment      No service coordination in this encounter.      Dialysis/Infusion     No service coordination in this encounter.      Home Medical Care     No service coordination in this encounter.      Social Care     No service coordination in this encounter.                Demographic Summary    No documentation.           Functional Status    No documentation.           Psychosocial    No documentation.           Abuse/Neglect    No documentation.           Legal    No documentation.           Substance Abuse    No documentation.           Patient Forms    No documentation.         Sonya Galeas RN

## 2018-06-06 NOTE — CONSULTS
[facility]                                                                                             Nephrology Initial Consult Note    Patient Identification:  Name: Angelo Brown MRN: [id number]  Age: 57 y.o. : 1960  Sex: male  Date:2018    Requesting Physician: As per consult order.  Reason for Consultation: Acute renal failure, hyperkalemia  Information from:patient/ family/ chart      History of Present Illness: This is a 57 y.o. year old male  who is well-known to me from the office setting for stage III chronic kidney disease , recent baseline creatinine around 1.8 though it was as low as 1.46 on May 21, he was recently seen by me in the office was doing well and no changes were made at that time.  He had some routine outpatient labs drawn yesterday and showed hyperkalemia and he was sent to the emergency department for further evaluation.  Potassium was initially reported to be greater than 10 but 6.2 in the ER, 5.6 today and he was started on IV fluids overnight.  He denies any new medications and states he's been eating and drinking well, no change in his diet, no recent foods that are high in potassium.  He does take lisinopril for hypertension.  He also takes Bumex as needed and averages several pills per week.  No recent IV contrast, hypotension.  He denies any fevers or chills, cough or congestion, shortness of breath or chest pain.  He denies any physical complaints this morning.     The following medical history and medications personally reviewed by me:    Problem List:   Patient Active Problem List    Diagnosis   • Hyperkalemia [E87.5]   • B12 deficiency [E53.8]   • Iron deficiency anemia secondary to inadequate dietary iron intake [D50.8]   • Iron and its compounds causing adverse effect in therapeutic use [T45.4X5A]   • Leukopenia [D72.819]   • Anemia in chronic kidney  disease [N18.9, D63.1]   • Thrombocytopenia [D69.6]   • Pancytopenia, acquired [D61.818]   • History of hepatitis C virus infection [Z86.19]   • Renal mass [N28.89]   • Proteinuria [R80.9]   • Acute CVA (cerebrovascular accident) [I63.9]       Past Medical History:  Past Medical History:   Diagnosis Date   • Anemia    • Anxiety    • CAD (coronary artery disease)    • CHF (congestive heart failure)    • Chronic back pain    • Chronic kidney disease    • COPD (chronic obstructive pulmonary disease)    • Coronary artery disease    • Depression    • Diabetes mellitus    • GERD (gastroesophageal reflux disease)    • Headache    • Hepatitis B 2013    from transfusion for CABG   • Hypertension    • Jaundice    • Myocardial infarction     5-6 years ago per pt   • Seizures     PT REPORTS NO SEIZURES   • Stroke 12/2017       Past Surgical History:  Past Surgical History:   Procedure Laterality Date   • BRAIN HEMATOMA EVACUATION     • CARDIAC SURGERY     • CATARACT EXTRACTION, BILATERAL     • COLONOSCOPY     • CORONARY ARTERY BYPASS GRAFT  2013    Triple   • ROTATOR CUFF REPAIR Left    • VASECTOMY  1985   • WOUND DEBRIDEMENT Right 06/10/2011    Excisional debridement of necrotizing fasciitis of the right groin extending along the right hemiscrotum, 5 x 2 x 2 cm in one wound and 7.5 x 2.5 x 2.5 cm of a second wound. This was sharp excisional debridement carried out to the muscle-Dr. dEuardo Cross         Home Meds:   Prescriptions Prior to Admission   Medication Sig Dispense Refill Last Dose   • ALPRAZolam (XANAX) 1 MG tablet Take 1 mg by mouth 4 (Four) Times a Day As Needed.   Taking   • amLODIPine (NORVASC) 10 MG tablet Take 1 tablet by mouth Daily. 30 tablet 0 Taking   • carvedilol (COREG) 12.5 MG tablet Take 1 tablet by mouth Every 12 (Twelve) Hours. 60 tablet 0 Taking   • cetirizine (ZyrTEC) 10 MG tablet Take 10 mg by mouth daily.   Taking   • erythromycin (ROMYCIN) 5 MG/GM ophthalmic ointment Administer 1 application to  both eyes Every Night.   Taking   • gabapentin (NEURONTIN) 300 MG capsule Take 300 mg by mouth 3 (Three) Times a Day As Needed.      • hydrALAZINE (APRESOLINE) 50 MG tablet Take 1 tablet by mouth Every 8 (Eight) Hours. (Patient taking differently: Take 50 mg by mouth 2 (Two) Times a Day.) 90 tablet 0 Taking   • insulin aspart (novoLOG FLEXPEN) 100 UNIT/ML solution pen-injector sc pen Inject 2-15 Units under the skin 3 (Three) Times a Day With Meals. Per sliding scale      • Insulin Glargine (LANTUS SOLOSTAR) 100 UNIT/ML injection pen Inject 10 Units under the skin Daily.      • lisinopril (PRINIVIL,ZESTRIL) 40 MG tablet Take 0.5 tablets by mouth 2 (Two) Times a Day. (Patient taking differently: Take 20 mg by mouth Daily.) 60 tablet 0 Taking   • Omega-3 Fatty Acids (FISH OIL) 1000 MG capsule capsule Take 1,000 mg by mouth Daily With Breakfast.   Taking   • oxyCODONE-acetaminophen (PERCOCET)  MG per tablet Take 1 tablet by mouth 4 (Four) Times a Day As Needed.   Taking   • promethazine (PHENERGAN) 12.5 MG tablet Take 12.5 mg by mouth Every 6 (Six) Hours As Needed.  1 Taking   • simvastatin (ZOCOR) 40 MG tablet Take 40 mg by mouth Every Night.   Taking   • tiZANidine (ZANAFLEX) 4 MG tablet Take 4 mg by mouth 3 (Three) Times a Day.   Taking   • vitamin B-12 (CYANOCOBALAMIN) 500 MCG tablet Take 500 mcg by mouth Daily.      • aspirin 81 MG EC tablet Take 81 mg by mouth Daily. Stopped for surgery  2 Taking   • bumetanide (BUMEX) 1 MG tablet Take  by mouth As Needed.  3 Taking       Current Meds:   Current Facility-Administered Medications   Medication Dose Route Frequency Provider Last Rate Last Dose   • ALPRAZolam (XANAX) tablet 1 mg  1 mg Oral 4x Daily PRN Karl Swift MD   1 mg at 06/06/18 0154   • atorvastatin (LIPITOR) tablet 20 mg  20 mg Oral Daily Karl Swift MD       • carvedilol (COREG) tablet 12.5 mg  12.5 mg Oral Q12H Karl Swift MD   12.5 mg at 06/06/18 0151   • dextrose (D50W) solution 25 g  25 g  Intravenous Q15 Min PRN Karl Swift MD       • dextrose (GLUTOSE) oral gel 15 g  15 g Oral Q15 Min PRN Karl Swift MD       • erythromycin (ROMYCIN) ophthalmic ointment   Both Eyes Nightly Karl Swift MD       • gabapentin (NEURONTIN) capsule 300 mg  300 mg Oral TID Karl Swift MD   300 mg at 06/06/18 0151   • glucagon (human recombinant) (GLUCAGEN DIAGNOSTIC) injection 1 mg  1 mg Subcutaneous PRN Karl Swift MD       • hydrALAZINE (APRESOLINE) tablet 50 mg  50 mg Oral Q8H Karl Swift MD   50 mg at 06/06/18 0151   • insulin aspart (novoLOG) injection 0-7 Units  0-7 Units Subcutaneous 4x Daily With Meals & Nightly Karl Swift MD       • insulin detemir (LEVEMIR) injection 10 Units  10 Units Subcutaneous Nightly Karl Swift MD   10 Units at 06/06/18 0154   • nitroglycerin (NITROSTAT) SL tablet 0.4 mg  0.4 mg Sublingual Q5 Min PRN Karl Swift MD       • oxyCODONE-acetaminophen (PERCOCET)  MG per tablet 1 tablet  1 tablet Oral 4x Daily PRN Karl Swift MD   1 tablet at 06/06/18 0151   • promethazine (PHENERGAN) tablet 12.5 mg  12.5 mg Oral 5x Daily PRN Karl Swift MD       • sodium chloride 0.45 % infusion  125 mL/hr Intravenous Continuous Karl Swift  mL/hr at 06/06/18 0652 125 mL/hr at 06/06/18 0652   • tiZANidine (ZANAFLEX) tablet 4 mg  4 mg Oral Q8H PRN Karl Swift MD   4 mg at 06/06/18 0151       Allergies:  Allergies   Allergen Reactions   • Morphine And Related Delirium   • Fentanyl Other (See Comments)     ER said he was allergy   • Ibuprofen Nausea Only   • Latex Rash   • Penicillins Hives       Social History:   Social History     Social History   • Marital status:      Spouse name: Samantha   • Years of education: High school     Occupational History   •  Unemployed     Social History Main Topics   • Smoking status: Never Smoker   • Alcohol use No   • Drug use: No   • Sexual activity: Defer     Other Topics Concern   • Not on file        Family History:  Family History   Problem  "Relation Age of Onset   • Diabetes Mother    • Heart failure Mother    • Kidney failure Mother    • Anemia Mother    • Heart disease Mother    • Hypertension Mother    • Heart failure Father    • Coronary artery disease Father    • Heart disease Father    • Hypertension Father    • Diabetes Father    • Diabetes Sister    • Cervical cancer Sister    • Leukemia Sister    • Diabetes Brother    • Cervical cancer Daughter    • Ovarian cancer Sister         Review of Systems: as per HPI, in addition:    General:       no weakness / fatigue,                       No fevers / chills                       no weight loss  HEENT:       no dysphagia / odynophagia  Neck:           normal range of motion, no swelling  Respiratory: no cough / congestion                      No shortness of air                       No wheezing  CV:              No chest pain                       No palpitations  Abdomen/GI: no nausea / vomiting                      No diarrhea / constipation                      No abdominal pain  :             no dysuria / urinary frequency                       No urgency, normal output  Endocrine:   no polyuria / polydipsia,                      No heat or cold intolerance  Skin:           no rashes or skin breakdown   Vascular:   No edema                     No claudication  Psych:        no depression/ anxiety  Neuro:        no focal weakness, no seizures  Musculoskeletal: no joint pain or deformities      Physical Exam:  Vitals:   Temp (24hrs), Av.6 °F (36.4 °C), Min:97.4 °F (36.3 °C), Max:98 °F (36.7 °C)    /77 (BP Location: Left arm, Patient Position: Lying)   Pulse 61   Temp 97.5 °F (36.4 °C) (Oral)   Resp 18   Ht 175.3 cm (69\")   Wt 82.9 kg (182 lb 11.2 oz)   SpO2 97%   BMI 26.98 kg/m²   Intake/Output:     Intake/Output Summary (Last 24 hours) at 18 0743  Last data filed at 18 0739   Gross per 24 hour   Intake              240 ml   Output              725 ml   Net          "    -485 ml        Wt Readings from Last 1 Encounters:   06/06/18 0535 82.9 kg (182 lb 11.2 oz)   06/05/18 2222 83.3 kg (183 lb 11.2 oz)   06/05/18 1851 81.6 kg (180 lb)       Exam:    General Appearance:  Awake, alert, oriented x3, no acute distress  Well-appearing   Head and Face:  Normocephalic, atraumatic, mucus membranes moist, oropharynx clear   Eyes:  No icterus, pupils equal round and reactive to light, extraocular movements intact    ENMT: Moist mucosa, tongue symmetric    Neck: Supple  no jugular venous distention  no thyromegaly   Pulmonary:  Respiratory effort: Normal  Auscultation of lungs: Clear bilaterally  No wheezes  No rhonchi  Good air movement, good expansion   Chest wall:  No tenderness or deformity   Cardiovascular:  Auscultation of the heart: Normal rhythm, no murmurs  No edema of extremities    Abdomen:  Abdomen: soft, non-tender, normal bowel sounds all four quadrants, no masses   Liver and spleen: no hepatosplenomegaly   Musculoskeletal: Digits and nails: normal  Normal range of motion  No joint swelling or gross deformities    Skin: Skin inspection: color normal, no visible rashes or lesions  Skin palpation: texture, turgor normal, no palpable lesions   Lymphatic:  no cervical lymphadenopathy    Psychiatric: Judgement and insight: normal  Orientation to person place and time: normal  Mood and affect: normal       DATA:  Radiology and Labs:  The following labs independently reviewed by me  Old records independently reviewed showing Stage III chronic kidney disease, recent baseline creatinine 1.5-1.8  The following radiologic studies independently viewed by me, findings his last renal ultrasound was in 2017, showed a solid mass which is currently being worked up by urology  Interval notes, chart personally reviewed by me.   New problems include acute renal failure, hyperkalemia  Discussed with patient and his family at bedside  Platelet count 80,000, previously 100 120    Risk/ complexity of  medical care/ medical decision making  High given his hyperkalemia, need for the hospitalization and the close monitoring          Labs:   Recent Results (from the past 24 hour(s))   Basic Metabolic Panel    Collection Time: 06/05/18  7:32 PM   Result Value Ref Range    Glucose 136 (H) 65 - 99 mg/dL    BUN 44 (H) 6 - 20 mg/dL    Creatinine 2.31 (H) 0.76 - 1.27 mg/dL    Sodium 144 136 - 145 mmol/L    Potassium 6.2 (C) 3.5 - 5.2 mmol/L    Chloride 109 (H) 98 - 107 mmol/L    CO2 23.2 22.0 - 29.0 mmol/L    Calcium 9.2 8.6 - 10.5 mg/dL    eGFR Non African Amer 30 (L) >60 mL/min/1.73    BUN/Creatinine Ratio 19.0 7.0 - 25.0    Anion Gap 11.8 mmol/L   POC Glucose Once    Collection Time: 06/06/18 12:51 AM   Result Value Ref Range    Glucose 276 (H) 70 - 130 mg/dL   Basic Metabolic Panel    Collection Time: 06/06/18  3:47 AM   Result Value Ref Range    Glucose 218 (H) 65 - 99 mg/dL    BUN 43 (H) 6 - 20 mg/dL    Creatinine 1.90 (H) 0.76 - 1.27 mg/dL    Sodium 143 136 - 145 mmol/L    Potassium 5.6 (H) 3.5 - 5.2 mmol/L    Chloride 110 (H) 98 - 107 mmol/L    CO2 20.9 (L) 22.0 - 29.0 mmol/L    Calcium 8.5 (L) 8.6 - 10.5 mg/dL    eGFR Non African Amer 37 (L) >60 mL/min/1.73    BUN/Creatinine Ratio 22.6 7.0 - 25.0    Anion Gap 12.1 mmol/L   CBC Auto Differential    Collection Time: 06/06/18  3:47 AM   Result Value Ref Range    WBC 4.35 (L) 4.50 - 10.70 10*3/mm3    RBC 3.15 (L) 4.60 - 6.00 10*6/mm3    Hemoglobin 9.5 (L) 13.7 - 17.6 g/dL    Hematocrit 30.0 (L) 40.4 - 52.2 %    MCV 95.2 79.8 - 96.2 fL    MCH 30.2 27.0 - 32.7 pg    MCHC 31.7 (L) 32.6 - 36.4 g/dL    RDW 14.4 11.5 - 14.5 %    RDW-SD 49.8 37.0 - 54.0 fl    MPV 11.7 6.0 - 12.0 fL    Platelets 80 (L) 140 - 500 10*3/mm3    Neutrophil % 53.1 42.7 - 76.0 %    Lymphocyte % 31.0 19.6 - 45.3 %    Monocyte % 10.1 5.0 - 12.0 %    Eosinophil % 5.3 0.3 - 6.2 %    Basophil % 0.5 0.0 - 1.5 %    Immature Grans % 0.0 0.0 - 0.5 %    Neutrophils, Absolute 2.31 1.90 - 8.10 10*3/mm3     Lymphocytes, Absolute 1.35 0.90 - 4.80 10*3/mm3    Monocytes, Absolute 0.44 0.20 - 1.20 10*3/mm3    Eosinophils, Absolute 0.23 0.00 - 0.70 10*3/mm3    Basophils, Absolute 0.02 0.00 - 0.20 10*3/mm3    Immature Grans, Absolute 0.00 0.00 - 0.03 10*3/mm3       Radiology:  Imaging Results (last 24 hours)     ** No results found for the last 24 hours. 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  Chronic thrombocytopenia  Chronic diastolic congestive heart failure        PLAN:   Continue gentle IV fluids  Decrease IV fluid rate given his underlying CHF  Hold Bumex and lisinopril  Low potassium diet  Recheck potassium this afternoon  Outpatient urology workup underway, eventually needs nephrectomy for a renal mass suspicious for renal cell carcinoma  Check urine electrolytes  No need for renal ultrasound as renal function is improving with conservative measures    Continue to monitor electrolytes and volume closely, avoid IV contrast and nephrotoxic medications   Medications on hold: Lisinopril and Bumex      I appreciate the opportunity to participate in this patient's care.  Please call with any questions or concerns.       Yohan Murrell M.D  [facility]  Office phone number: [phone]  Answering service phone number: [phone]        6/6/2018        Dictation via Dragon dictation software

## 2018-06-06 NOTE — PROGRESS NOTES
Clinical Pharmacy Services: Medication History    Angelo Brown is a 57 y.o. male presenting to [facility] for   Chief Complaint   Patient presents with   • Abnormal Lab     per Dr. Ta's office pt has lab work drawn yesterday. Got a call from office telling pt his K was 10.0 and to come to ED for further eval.        He  has a past medical history of Anemia; Anxiety; CAD (coronary artery disease); CHF (congestive heart failure); Chronic back pain; Chronic kidney disease; COPD (chronic obstructive pulmonary disease); Coronary artery disease; Depression; Diabetes mellitus; GERD (gastroesophageal reflux disease); Headache; Hepatitis B (2013); Hypertension; Jaundice; Myocardial infarction; Seizures; and Stroke (12/2017).    Allergies as of 06/05/2018 - Reviewed 06/05/2018   Allergen Reaction Noted   • Morphine and related Delirium 07/08/2016   • Fentanyl Other (See Comments) 06/29/2015   • Ibuprofen Nausea Only 06/29/2015   • Latex Rash 03/09/2017   • Penicillins Hives 06/29/2015       Medication information was obtained from: Family member  Pharmacy and Phone Number: [facility] [phone]    Prior to Admission Medications     Prescriptions Last Dose Informant Patient Reported? Taking?    ALPRAZolam (XANAX) 1 MG tablet  Self Yes Yes    Take 1 mg by mouth 4 (Four) Times a Day As Needed.    amLODIPine (NORVASC) 10 MG tablet  Self No Yes    Take 1 tablet by mouth Daily.    carvedilol (COREG) 12.5 MG tablet  Self No Yes    Take 1 tablet by mouth Every 12 (Twelve) Hours.    cetirizine (ZyrTEC) 10 MG tablet  Self Yes Yes    Take 10 mg by mouth daily.    erythromycin (ROMYCIN) 5 MG/GM ophthalmic ointment  Self Yes Yes    Administer 1 application to both eyes Every Night.    gabapentin (NEURONTIN) 300 MG capsule  Self Yes Yes    Take 300 mg by mouth 3 (Three) Times a Day As Needed.    hydrALAZINE (APRESOLINE) 50 MG tablet  Self No Yes    Take 1 tablet by mouth Every 8 (Eight) Hours.    Patient taking  differently:  Take 50 mg by mouth 2 (Two) Times a Day.    insulin aspart (novoLOG FLEXPEN) 100 UNIT/ML solution pen-injector sc pen  Self Yes Yes    Inject 2-15 Units under the skin 3 (Three) Times a Day With Meals. Per sliding scale    Insulin Glargine (LANTUS SOLOSTAR) 100 UNIT/ML injection pen  Self Yes Yes    Inject 10 Units under the skin Daily.    lisinopril (PRINIVIL,ZESTRIL) 40 MG tablet  Self No Yes    Take 0.5 tablets by mouth 2 (Two) Times a Day.    Patient taking differently:  Take 20 mg by mouth Daily.    Omega-3 Fatty Acids (FISH OIL) 1000 MG capsule capsule  Self Yes Yes    Take 1,000 mg by mouth Daily With Breakfast.    oxyCODONE-acetaminophen (PERCOCET)  MG per tablet  Self Yes Yes    Take 1 tablet by mouth 4 (Four) Times a Day As Needed.    promethazine (PHENERGAN) 12.5 MG tablet  Self Yes Yes    Take 12.5 mg by mouth Every 6 (Six) Hours As Needed.    simvastatin (ZOCOR) 40 MG tablet  Self Yes Yes    Take 40 mg by mouth Every Night.    tiZANidine (ZANAFLEX) 4 MG tablet  Self Yes Yes    Take 4 mg by mouth 3 (Three) Times a Day.    vitamin B-12 (CYANOCOBALAMIN) 500 MCG tablet  Self Yes Yes    Take 500 mcg by mouth Daily.    aspirin 81 MG EC tablet  Self Yes No    Take 81 mg by mouth Daily. Stopped for surgery    bumetanide (BUMEX) 1 MG tablet  Self Yes No    Take  by mouth As Needed.            Medication notes: Aspirin on hold for surgery. Patient stopped Bumex, hasn't had swelling.    This medication list is complete to the best of my knowledge as of 6/5/2018    Please call if questions.    Juana Bennett, Medication History Technician  6/5/2018 9:32 PM

## 2018-06-06 NOTE — H&P
History and physical    Primary care physician  Dr. GÓMEZ    Chief complaint  Abnormal labs  Generalized weakness  Nausea    History of present illness  57-year-old white male with history of COPD coronary artery disease congestive heart failure diabetes hypertension hyperlipidemia hepatitis B anxiety depression presented to [facility] emergency room with abnormal labs.  Patient found to have severe hyperkalemia admitted for management.  Patient stated that he has been eating a lot of bananas.  Patient complain of nausea and generalized weakness denies any headache dizziness chest pain shortness of breath abdominal pain vomiting diarrhea.  Patient treated for hypokalemia and admitted for management    PAST MEDICAL HISTORY    • Anemia     • Anxiety     • CAD (coronary artery disease)     • CHF (congestive heart failure)     • Chronic back pain     • Chronic kidney disease     • COPD (chronic obstructive pulmonary disease)     • Coronary artery disease     • Depression     • Diabetes mellitus     • GERD (gastroesophageal reflux disease)     • Headache     • Hepatitis B 2013   • Hypertension     • Jaundice     • Myocardial infarction     • Seizures     • Stroke 12/2017      PAST SURGICAL HISTORY  Surgical History         Past Surgical History:   Procedure Laterality Date   • BRAIN HEMATOMA EVACUATION       • CARDIAC SURGERY       • CATARACT EXTRACTION, BILATERAL       • COLONOSCOPY       • CORONARY ARTERY BYPASS GRAFT   2013     Triple   • ROTATOR CUFF REPAIR Left     • VASECTOMY   1985   • WOUND DEBRIDEMENT Right 06/10/2011     Excisional debridement of necrotizing fasciitis of the right groin extending along the right hemiscrotum, 5 x 2 x 2 cm in one wound and 7.5 x 2.5 x 2.5 cm of a second wound. This was sharp excisional debridement carried out to the muscle-Dr. Eduardo Cross          FAMILY HISTORY        Family History   Problem Relation Age of Onset   • Diabetes Mother     • Heart failure Mother     •  "Kidney failure Mother     • Anemia Mother     • Heart disease Mother     • Hypertension Mother     • Heart failure Father     • Coronary artery disease Father     • Heart disease Father     • Hypertension Father     • Diabetes Father     • Diabetes Sister     • Cervical cancer Sister     • Leukemia Sister     • Diabetes Brother     • Cervical cancer Daughter     • Ovarian cancer Sister        SOCIAL HISTORY  Social History   Social History            Social History   • Marital status:        Spouse name: Samantha   • Number of children: N/A   • Years of education: High school           Occupational History   •   Unemployed           Social History Main Topics   • Smoking status: Never Smoker   • Smokeless tobacco: Not on file   • Alcohol use No   • Drug use: No   • Sexual activity: Defer           Other Topics Concern   • Not on file          Social History Narrative   • No narrative on file         ALLERGIES  Morphine and related; Fentanyl; Ibuprofen; Latex; and Penicillins    Home medications reviewed     REVIEW OF SYSTEMS  Review of Systems   Constitutional: Negative for chills.   HENT: Negative for congestion, rhinorrhea and sore throat.    Eyes: Negative for pain.   Respiratory: Negative for cough and shortness of breath.    Cardiovascular: Negative for chest pain, palpitations and leg swelling.   Gastrointestinal: Negative for abdominal pain, diarrhea and vomiting.   Genitourinary: Negative for difficulty urinating, dysuria, flank pain and frequency.   Musculoskeletal: Negative for myalgias, neck pain and neck stiffness.   Skin: Negative for rash.   Neurological: Negative for dizziness, speech difficulty, weakness, light-headedness, numbness and headaches.   Psychiatric/Behavioral: Negative.    All other systems reviewed and are negative.     PHYSICAL EXAM  Blood pressure 171/93, pulse 53, temperature 97.5 °F (36.4 °C), temperature source Oral, resp. rate 18, height 175.3 cm (69\"), weight 82.9 kg (182 lb " 11.2 oz), SpO2 97 %.    Constitutional: He is oriented to person, place, and time. No distress.   Head: Normocephalic.   Mouth/Throat: Mucous membranes are normal.   Eyes: EOM are normal. Pupils are equal, round, and reactive to light.   Neck: Normal range of motion. Neck supple.   Cardiovascular: Normal rate, regular rhythm and normal heart sounds.    Pulmonary/Chest: Effort normal and breath sounds normal. No respiratory distress. He has no decreased breath sounds. He has no wheezes. He has no rhonchi. He has no rales.   Abdominal: Soft. There is no tenderness. There is no rebound and no guarding.   Musculoskeletal: Normal range of motion.   Neurological: He is alert and oriented to person, place, and time. He has normal sensation.   Skin: Skin is warm and dry.   Psychiatric: Mood and affect normal.      LAB RESULTS  Lab Results (last 24 hours)     Procedure Component Value Units Date/Time    Protein, Urine, Random - [[id number]] Collected:  06/06/18 1109    Specimen:  Urine Updated:  06/06/18 1223     Total Protein, Urine 306.0 mg/dL     Narrative:       Reference intervals for random urine have not been established.  Clinical usage is dependent upon physician's interpretation in combination with other laboratory tests.     Chloride, Urine, Random - [[id number]] Collected:  06/06/18 1109    Specimen:  Urine Updated:  06/06/18 1219     Chloride, Urine 81 mmol/L     Narrative:       Reference intervals for random urine have not been established.  Clinical usage is dependent upon physician's interpretation in combination with other laboratory tests.     Creatinine, Urine, Random - [[id number]] Collected:  06/06/18 1109    Specimen:  Urine Updated:  06/06/18 1219     Creatinine, Urine 79.7 mg/dL     Narrative:       Reference intervals for random urine have not been established.  Clinical usage is dependent upon physician's interpretation in combination with other laboratory tests.     Sodium, Urine, Random -  [[id number]] Collected:  06/06/18 1109    Specimen:  Urine Updated:  06/06/18 1219     Sodium, Urine 104 mmol/L     Narrative:       Reference intervals for random urine have not been established.  Clinical usage is dependent upon physician's interpretation in combination with other laboratory tests.     Urinalysis, Microscopic Only - Urine, Clean Catch [[id number]] Collected:  06/06/18 1109    Specimen:  Urine from Urine, Clean Catch Updated:  06/06/18 1219     RBC, UA 0-2 /HPF      WBC, UA 0-2 /HPF      Bacteria, UA None Seen /HPF      Squamous Epithelial Cells, UA 0-2 /HPF      Hyaline Casts, UA 0-2 /LPF      Methodology Automated Microscopy    Urinalysis With / Microscopic If Indicated (No Culture) - [[id number]]  (Abnormal) Collected:  06/06/18 1109    Specimen:  Urine Updated:  06/06/18 1208     Color, UA Yellow     Appearance, UA Clear     pH, UA <=5.0     Specific Gravity, UA 1.019     Glucose,  mg/dL (1+) (A)     Ketones, UA Negative     Bilirubin, UA Negative     Blood, UA Negative     Protein, UA >=300 mg/dL (3+) (A)     Leuk Esterase, UA Negative     Nitrite, UA Negative     Urobilinogen, UA 0.2 E.U./dL    Eosinophil Smear - Urine, Urine, Clean Catch [[id number]] Collected:  06/06/18 1108    Specimen:  Urine from Urine, Clean Catch Updated:  06/06/18 1118    POC Glucose Once [[id number]]  (Abnormal) Collected:  06/06/18 1103    Specimen:  Blood Updated:  06/06/18 1104     Glucose 216 (H) mg/dL     Narrative:       Meter: [id number] : [id number] Malathi KIM    POC Glucose Once [[id number]]  (Abnormal) Collected:  06/06/18 0817    Specimen:  Blood Updated:  06/06/18 0819     Glucose 147 (H) mg/dL     Narrative:       Meter: [id number] : [id number] Earnest Rosales RN    Basic Metabolic Panel [[id number]]  (Abnormal) Collected:  06/06/18 0347    Specimen:  Blood Updated:  06/06/18 0446     Glucose 218 (H) mg/dL      BUN 43 (H) mg/dL      Creatinine 1.90 (H) mg/dL      Sodium 143 mmol/L       Potassium 5.6 (H) mmol/L      Chloride 110 (H) mmol/L      CO2 20.9 (L) mmol/L      Calcium 8.5 (L) mg/dL      eGFR Non African Amer 37 (L) mL/min/1.73      BUN/Creatinine Ratio 22.6     Anion Gap 12.1 mmol/L     Narrative:       GFR Normal >60  Chronic Kidney Disease <60  Kidney Failure <15    CBC & Differential [[id number]] Collected:  06/06/18 0347    Specimen:  Blood Updated:  06/06/18 0443    Narrative:       The following orders were created for panel order CBC & Differential.  Procedure                               Abnormality         Status                     ---------                               -----------         ------                     CBC Auto Differential[[id number]]        Abnormal            Final result                 Please view results for these tests on the individual orders.    CBC Auto Differential [[id number]]  (Abnormal) Collected:  06/06/18 0347    Specimen:  Blood Updated:  06/06/18 0443     WBC 4.35 (L) 10*3/mm3      RBC 3.15 (L) 10*6/mm3      Hemoglobin 9.5 (L) g/dL      Hematocrit 30.0 (L) %      MCV 95.2 fL      MCH 30.2 pg      MCHC 31.7 (L) g/dL      RDW 14.4 %      RDW-SD 49.8 fl      MPV 11.7 fL      Platelets 80 (L) 10*3/mm3      Neutrophil % 53.1 %      Lymphocyte % 31.0 %      Monocyte % 10.1 %      Eosinophil % 5.3 %      Basophil % 0.5 %      Immature Grans % 0.0 %      Neutrophils, Absolute 2.31 10*3/mm3      Lymphocytes, Absolute 1.35 10*3/mm3      Monocytes, Absolute 0.44 10*3/mm3      Eosinophils, Absolute 0.23 10*3/mm3      Basophils, Absolute 0.02 10*3/mm3      Immature Grans, Absolute 0.00 10*3/mm3     POC Glucose Once [[id number]]  (Abnormal) Collected:  06/06/18 0051    Specimen:  Blood Updated:  06/06/18 0055     Glucose 276 (H) mg/dL     Narrative:       Meter: [id number] : [id number] Alpa KIM    Basic Metabolic Panel [[id number]]  (Abnormal) Collected:  06/05/18 1932    Specimen:  Blood from Arm, Right Updated:  06/05/18 2008     Glucose 136 (H)  mg/dL      BUN 44 (H) mg/dL      Creatinine 2.31 (H) mg/dL      Sodium 144 mmol/L      Potassium 6.2 (C) mmol/L      Chloride 109 (H) mmol/L      CO2 23.2 mmol/L      Calcium 9.2 mg/dL      eGFR Non African Amer 30 (L) mL/min/1.73      BUN/Creatinine Ratio 19.0     Anion Gap 11.8 mmol/L     Narrative:       GFR Normal >60  Chronic Kidney Disease <60  Kidney Failure <15        Imaging Results (last 24 hours)     ** No results found for the last 24 hours. **        EKG:                             Rhythm/Rate: Sinus bradycardia rate 59  P waves and WA: Normal P waves  QRS, axis: Normal QRS   ST and T waves: T wave inversions in III, aVF       Current Facility-Administered Medications:   •  ALPRAZolam (XANAX) tablet 1 mg, 1 mg, Oral, 4x Daily PRN, Karl Swift MD, 1 mg at 06/06/18 0154  •  aspirin chewable tablet 81 mg, 81 mg, Oral, Daily, Karl Swift MD  •  [START ON 6/7/2018] atorvastatin (LIPITOR) tablet 10 mg, 10 mg, Oral, Daily, Karl Swift MD  •  carvedilol (COREG) tablet 6.25 mg, 6.25 mg, Oral, Q12H, Karl Swift MD  •  erythromycin (ROMYCIN) ophthalmic ointment, , Both Eyes, Nightly, Karl Swift MD  •  gabapentin (NEURONTIN) capsule 300 mg, 300 mg, Oral, TID, Karl Swift MD, 300 mg at 06/06/18 0835  •  hydrALAZINE (APRESOLINE) tablet 75 mg, 75 mg, Oral, Q8H, Karl Swift MD  •  insulin aspart (novoLOG) injection 0-7 Units, 0-7 Units, Subcutaneous, 4x Daily With Meals & Nightly, Karl Swift MD, 3 Units at 06/06/18 1213  •  insulin detemir (LEVEMIR) injection 10 Units, 10 Units, Subcutaneous, Nightly, Karl Swift MD, 10 Units at 06/06/18 0154  •  oxyCODONE-acetaminophen (PERCOCET)  MG per tablet 1 tablet, 1 tablet, Oral, 4x Daily PRN, Karl Swift MD, 1 tablet at 06/06/18 0835  •  [START ON 6/7/2018] pantoprazole (PROTONIX) EC tablet 40 mg, 40 mg, Oral, Q AM, Karl Swift MD  •  promethazine (PHENERGAN) tablet 12.5 mg, 12.5 mg, Oral, 5x Daily PRN, Karl Swift MD  •  sodium chloride 0.45 % infusion,  75 mL/hr, Intravenous, Continuous, Yohan Murrell MD, Last Rate: 75 mL/hr at 06/06/18 0823, 75 mL/hr at 06/06/18 0823  •  tiZANidine (ZANAFLEX) tablet 4 mg, 4 mg, Oral, Q8H PRN, Ariel Swift MD, 4 mg at 06/06/18 0151     ASSESSMENT  Severe hyperkalemia  Acute kidney injury  Chronic kidney disease stage III  Chronic anemia  Diabetes mellitus  Hypertension  Hyperlipidemia  Gastroesophageal reflux disease  Hepatitis B  Anxiety disorder  Depression    PLAN  Admit  Protocol for severe hyperkalemia  IV fluid  Stop all potassium supplements  Nephrology consult  Adjust home medications  Stress ulcer DVT prophylaxis  Accu-Chek with sliding scale insulin  Follow closely further recommendation according to hospital course    ARIEL SWIFT MD

## 2018-06-07 ENCOUNTER — APPOINTMENT (OUTPATIENT)
Dept: CARDIOLOGY | Facility: HOSPITAL | Age: 58
End: 2018-06-07
Attending: HOSPITALIST

## 2018-06-07 LAB
ALBUMIN SERPL-MCNC: 3.4 G/DL (ref 3.5–5.2)
ALBUMIN/GLOB SERPL: 1 G/DL
ALP SERPL-CCNC: 82 U/L (ref 39–117)
ALT SERPL W P-5'-P-CCNC: 24 U/L (ref 1–41)
ANION GAP SERPL CALCULATED.3IONS-SCNC: 11.8 MMOL/L
AORTIC ARCH: 2.6 CM
ASCENDING AORTA: 2.7 CM
AST SERPL-CCNC: 22 U/L (ref 1–40)
BASOPHILS # BLD AUTO: 0.02 10*3/MM3 (ref 0–0.2)
BASOPHILS NFR BLD AUTO: 0.4 % (ref 0–1.5)
BH CV ECHO MEAS - ACS: 1.7 CM
BH CV ECHO MEAS - AO MAX PG (FULL): 1.3 MMHG
BH CV ECHO MEAS - AO MAX PG: 6.4 MMHG
BH CV ECHO MEAS - AO MEAN PG (FULL): 2 MMHG
BH CV ECHO MEAS - AO MEAN PG: 4 MMHG
BH CV ECHO MEAS - AO ROOT AREA (BSA CORRECTED): 1.7
BH CV ECHO MEAS - AO ROOT AREA: 9.1 CM^2
BH CV ECHO MEAS - AO ROOT DIAM: 3.4 CM
BH CV ECHO MEAS - AO V2 MAX: 126 CM/SEC
BH CV ECHO MEAS - AO V2 MEAN: 91.4 CM/SEC
BH CV ECHO MEAS - AO V2 VTI: 32 CM
BH CV ECHO MEAS - ASC AORTA: 2.7 CM
BH CV ECHO MEAS - AVA(I,A): 2.5 CM^2
BH CV ECHO MEAS - AVA(I,D): 2.5 CM^2
BH CV ECHO MEAS - AVA(V,A): 3.1 CM^2
BH CV ECHO MEAS - AVA(V,D): 3.1 CM^2
BH CV ECHO MEAS - BSA(HAYCOCK): 2 M^2
BH CV ECHO MEAS - BSA: 2 M^2
BH CV ECHO MEAS - BZI_BMI: 26.9 KILOGRAMS/M^2
BH CV ECHO MEAS - BZI_METRIC_HEIGHT: 175.3 CM
BH CV ECHO MEAS - BZI_METRIC_WEIGHT: 82.6 KG
BH CV ECHO MEAS - CONTRAST EF (2CH): 61.4 ML/M^2
BH CV ECHO MEAS - CONTRAST EF 4CH: 60.2 ML/M^2
BH CV ECHO MEAS - EDV(CUBED): 131.1 ML
BH CV ECHO MEAS - EDV(MOD-SP2): 88 ML
BH CV ECHO MEAS - EDV(MOD-SP4): 83 ML
BH CV ECHO MEAS - EDV(TEICH): 122.7 ML
BH CV ECHO MEAS - EF(CUBED): 74.8 %
BH CV ECHO MEAS - EF(MOD-BP): 60 %
BH CV ECHO MEAS - EF(MOD-SP2): 61.4 %
BH CV ECHO MEAS - EF(MOD-SP4): 60.2 %
BH CV ECHO MEAS - EF(TEICH): 66.4 %
BH CV ECHO MEAS - ESV(CUBED): 33.1 ML
BH CV ECHO MEAS - ESV(MOD-SP2): 34 ML
BH CV ECHO MEAS - ESV(MOD-SP4): 33 ML
BH CV ECHO MEAS - ESV(TEICH): 41.3 ML
BH CV ECHO MEAS - FS: 36.8 %
BH CV ECHO MEAS - IVS/LVPW: 0.87
BH CV ECHO MEAS - IVSD: 1.1 CM
BH CV ECHO MEAS - LAT PEAK E' VEL: 9 CM/SEC
BH CV ECHO MEAS - LV DIASTOLIC VOL/BSA (35-75): 41.8 ML/M^2
BH CV ECHO MEAS - LV MASS(C)D: 243.9 GRAMS
BH CV ECHO MEAS - LV MASS(C)DI: 122.9 GRAMS/M^2
BH CV ECHO MEAS - LV MAX PG: 5 MMHG
BH CV ECHO MEAS - LV MEAN PG: 2 MMHG
BH CV ECHO MEAS - LV SYSTOLIC VOL/BSA (12-30): 16.6 ML/M^2
BH CV ECHO MEAS - LV V1 MAX: 112 CM/SEC
BH CV ECHO MEAS - LV V1 MEAN: 62.4 CM/SEC
BH CV ECHO MEAS - LV V1 VTI: 23.3 CM
BH CV ECHO MEAS - LVIDD: 5.1 CM
BH CV ECHO MEAS - LVIDS: 3.2 CM
BH CV ECHO MEAS - LVLD AP2: 6.9 CM
BH CV ECHO MEAS - LVLD AP4: 6.7 CM
BH CV ECHO MEAS - LVLS AP2: 5.9 CM
BH CV ECHO MEAS - LVLS AP4: 6 CM
BH CV ECHO MEAS - LVOT AREA (M): 3.5 CM^2
BH CV ECHO MEAS - LVOT AREA: 3.5 CM^2
BH CV ECHO MEAS - LVOT DIAM: 2.1 CM
BH CV ECHO MEAS - LVPWD: 1.3 CM
BH CV ECHO MEAS - MED PEAK E' VEL: 6 CM/SEC
BH CV ECHO MEAS - MV A DUR: 0.11 SEC
BH CV ECHO MEAS - MV A MAX VEL: 86.3 CM/SEC
BH CV ECHO MEAS - MV DEC SLOPE: 399 CM/SEC^2
BH CV ECHO MEAS - MV DEC TIME: 0.19 SEC
BH CV ECHO MEAS - MV E MAX VEL: 111 CM/SEC
BH CV ECHO MEAS - MV E/A: 1.3
BH CV ECHO MEAS - MV MAX PG: 6.9 MMHG
BH CV ECHO MEAS - MV MEAN PG: 3 MMHG
BH CV ECHO MEAS - MV P1/2T MAX VEL: 124 CM/SEC
BH CV ECHO MEAS - MV P1/2T: 91 MSEC
BH CV ECHO MEAS - MV V2 MAX: 131 CM/SEC
BH CV ECHO MEAS - MV V2 MEAN: 83.9 CM/SEC
BH CV ECHO MEAS - MV V2 VTI: 42.4 CM
BH CV ECHO MEAS - MVA P1/2T LCG: 1.8 CM^2
BH CV ECHO MEAS - MVA(P1/2T): 2.4 CM^2
BH CV ECHO MEAS - MVA(VTI): 1.9 CM^2
BH CV ECHO MEAS - PA ACC TIME: 0.11 SEC
BH CV ECHO MEAS - PA MAX PG (FULL): 2.7 MMHG
BH CV ECHO MEAS - PA MAX PG: 5.9 MMHG
BH CV ECHO MEAS - PA PR(ACCEL): 28.2 MMHG
BH CV ECHO MEAS - PA V2 MAX: 121 CM/SEC
BH CV ECHO MEAS - PULM A REVS DUR: 0.11 SEC
BH CV ECHO MEAS - PULM A REVS VEL: 35.8 CM/SEC
BH CV ECHO MEAS - PULM DIAS VEL: 52.4 CM/SEC
BH CV ECHO MEAS - PULM S/D: 1.5
BH CV ECHO MEAS - PULM SYS VEL: 76.1 CM/SEC
BH CV ECHO MEAS - PVA(V,A): 2.1 CM^2
BH CV ECHO MEAS - PVA(V,D): 2.1 CM^2
BH CV ECHO MEAS - QP/QS: 0.67
BH CV ECHO MEAS - RAP SYSTOLE: 3 MMHG
BH CV ECHO MEAS - RV MAX PG: 3.1 MMHG
BH CV ECHO MEAS - RV MEAN PG: 2 MMHG
BH CV ECHO MEAS - RV V1 MAX: 88.7 CM/SEC
BH CV ECHO MEAS - RV V1 MEAN: 59.5 CM/SEC
BH CV ECHO MEAS - RV V1 VTI: 19 CM
BH CV ECHO MEAS - RVOT AREA: 2.8 CM^2
BH CV ECHO MEAS - RVOT DIAM: 1.9 CM
BH CV ECHO MEAS - RVSP: 32 MMHG
BH CV ECHO MEAS - SI(AO): 146.4 ML/M^2
BH CV ECHO MEAS - SI(CUBED): 49.4 ML/M^2
BH CV ECHO MEAS - SI(LVOT): 40.7 ML/M^2
BH CV ECHO MEAS - SI(MOD-SP2): 27.2 ML/M^2
BH CV ECHO MEAS - SI(MOD-SP4): 25.2 ML/M^2
BH CV ECHO MEAS - SI(TEICH): 41 ML/M^2
BH CV ECHO MEAS - SUP REN AO DIAM: 2 CM
BH CV ECHO MEAS - SV(AO): 290.5 ML
BH CV ECHO MEAS - SV(CUBED): 98 ML
BH CV ECHO MEAS - SV(LVOT): 80.7 ML
BH CV ECHO MEAS - SV(MOD-SP2): 54 ML
BH CV ECHO MEAS - SV(MOD-SP4): 50 ML
BH CV ECHO MEAS - SV(RVOT): 53.9 ML
BH CV ECHO MEAS - SV(TEICH): 81.4 ML
BH CV ECHO MEAS - TAPSE (>1.6): 2.04 CM2
BH CV ECHO MEAS - TR MAX VEL: 267 CM/SEC
BH CV ECHO MEASUREMENTS AVERAGE E/E' RATIO: 14.8
BH CV VAS BP RIGHT ARM: NORMAL MMHG
BH CV XLRA - RV BASE: 3.4 CM
BH CV XLRA - TDI S': 14 CM/SEC
BILIRUB SERPL-MCNC: 0.2 MG/DL (ref 0.1–1.2)
BUN BLD-MCNC: 39 MG/DL (ref 6–20)
BUN/CREAT SERPL: 26.7 (ref 7–25)
CALCIUM SPEC-SCNC: 9.5 MG/DL (ref 8.6–10.5)
CHLORIDE SERPL-SCNC: 110 MMOL/L (ref 98–107)
CHOLEST SERPL-MCNC: 165 MG/DL (ref 0–200)
CO2 SERPL-SCNC: 20.2 MMOL/L (ref 22–29)
CREAT BLD-MCNC: 1.46 MG/DL (ref 0.76–1.27)
DEPRECATED RDW RBC AUTO: 48.9 FL (ref 37–54)
EOSINOPHIL # BLD AUTO: 0.22 10*3/MM3 (ref 0–0.7)
EOSINOPHIL NFR BLD AUTO: 4.7 % (ref 0.3–6.2)
ERYTHROCYTE [DISTWIDTH] IN BLOOD BY AUTOMATED COUNT: 14.3 % (ref 11.5–14.5)
GFR SERPL CREATININE-BSD FRML MDRD: 50 ML/MIN/1.73
GLOBULIN UR ELPH-MCNC: 3.5 GM/DL
GLUCOSE BLD-MCNC: 139 MG/DL (ref 65–99)
GLUCOSE BLDC GLUCOMTR-MCNC: 124 MG/DL (ref 70–130)
GLUCOSE BLDC GLUCOMTR-MCNC: 214 MG/DL (ref 70–130)
GLUCOSE BLDC GLUCOMTR-MCNC: 238 MG/DL (ref 70–130)
GLUCOSE BLDC GLUCOMTR-MCNC: 271 MG/DL (ref 70–130)
HBA1C MFR BLD: 6.6 % (ref 4.8–5.6)
HCT VFR BLD AUTO: 35.7 % (ref 40.4–52.2)
HDLC SERPL-MCNC: 61 MG/DL (ref 40–60)
HGB BLD-MCNC: 11.2 G/DL (ref 13.7–17.6)
IMM GRANULOCYTES # BLD: 0 10*3/MM3 (ref 0–0.03)
IMM GRANULOCYTES NFR BLD: 0 % (ref 0–0.5)
LDLC SERPL CALC-MCNC: 86 MG/DL (ref 0–100)
LDLC/HDLC SERPL: 1.42 {RATIO}
LEFT ATRIUM VOLUME INDEX: 25 ML/M2
LYMPHOCYTES # BLD AUTO: 1.27 10*3/MM3 (ref 0.9–4.8)
LYMPHOCYTES NFR BLD AUTO: 26.9 % (ref 19.6–45.3)
MAXIMAL PREDICTED HEART RATE: 165 BPM
MCH RBC QN AUTO: 29.6 PG (ref 27–32.7)
MCHC RBC AUTO-ENTMCNC: 31.4 G/DL (ref 32.6–36.4)
MCV RBC AUTO: 94.4 FL (ref 79.8–96.2)
MONOCYTES # BLD AUTO: 0.27 10*3/MM3 (ref 0.2–1.2)
MONOCYTES NFR BLD AUTO: 5.7 % (ref 5–12)
NEUTROPHILS # BLD AUTO: 2.94 10*3/MM3 (ref 1.9–8.1)
NEUTROPHILS NFR BLD AUTO: 62.3 % (ref 42.7–76)
NT-PROBNP SERPL-MCNC: 781.5 PG/ML (ref 5–900)
PHOSPHATE SERPL-MCNC: 3.9 MG/DL (ref 2.5–4.5)
PLATELET # BLD AUTO: 101 10*3/MM3 (ref 140–500)
PMV BLD AUTO: 11.9 FL (ref 6–12)
POTASSIUM BLD-SCNC: 5.1 MMOL/L (ref 3.5–5.2)
PROT SERPL-MCNC: 6.9 G/DL (ref 6–8.5)
RBC # BLD AUTO: 3.78 10*6/MM3 (ref 4.6–6)
SODIUM BLD-SCNC: 142 MMOL/L (ref 136–145)
STRESS TARGET HR: 140 BPM
TRIGL SERPL-MCNC: 88 MG/DL (ref 0–150)
TSH SERPL DL<=0.05 MIU/L-ACNC: 1.3 MIU/ML (ref 0.27–4.2)
VLDLC SERPL-MCNC: 17.6 MG/DL (ref 5–40)
WBC NRBC COR # BLD: 4.72 10*3/MM3 (ref 4.5–10.7)

## 2018-06-07 PROCEDURE — 84443 ASSAY THYROID STIM HORMONE: CPT | Performed by: HOSPITALIST

## 2018-06-07 PROCEDURE — 80061 LIPID PANEL: CPT | Performed by: HOSPITALIST

## 2018-06-07 PROCEDURE — 83880 ASSAY OF NATRIURETIC PEPTIDE: CPT | Performed by: HOSPITALIST

## 2018-06-07 PROCEDURE — 84100 ASSAY OF PHOSPHORUS: CPT | Performed by: INTERNAL MEDICINE

## 2018-06-07 PROCEDURE — 93306 TTE W/DOPPLER COMPLETE: CPT

## 2018-06-07 PROCEDURE — 82962 GLUCOSE BLOOD TEST: CPT

## 2018-06-07 PROCEDURE — 63710000001 INSULIN ASPART PER 5 UNITS: Performed by: HOSPITALIST

## 2018-06-07 PROCEDURE — 83036 HEMOGLOBIN GLYCOSYLATED A1C: CPT | Performed by: HOSPITALIST

## 2018-06-07 PROCEDURE — 85025 COMPLETE CBC W/AUTO DIFF WBC: CPT | Performed by: HOSPITALIST

## 2018-06-07 PROCEDURE — 80053 COMPREHEN METABOLIC PANEL: CPT | Performed by: HOSPITALIST

## 2018-06-07 RX ORDER — SODIUM CHLORIDE 9 MG/ML
125 INJECTION, SOLUTION INTRAVENOUS CONTINUOUS
Status: DISCONTINUED | OUTPATIENT
Start: 2018-06-07 | End: 2018-06-07

## 2018-06-07 RX ORDER — CARVEDILOL 3.12 MG/1
3.12 TABLET ORAL EVERY 12 HOURS SCHEDULED
Status: DISCONTINUED | OUTPATIENT
Start: 2018-06-07 | End: 2018-06-08 | Stop reason: HOSPADM

## 2018-06-07 RX ORDER — HYDRALAZINE HYDROCHLORIDE 50 MG/1
100 TABLET, FILM COATED ORAL EVERY 8 HOURS SCHEDULED
Status: DISCONTINUED | OUTPATIENT
Start: 2018-06-07 | End: 2018-06-08 | Stop reason: HOSPADM

## 2018-06-07 RX ADMIN — HYDRALAZINE HYDROCHLORIDE 100 MG: 50 TABLET, FILM COATED ORAL at 23:59

## 2018-06-07 RX ADMIN — ERYTHROMYCIN: 5 OINTMENT OPHTHALMIC at 20:38

## 2018-06-07 RX ADMIN — HYDRALAZINE HYDROCHLORIDE 100 MG: 50 TABLET, FILM COATED ORAL at 13:50

## 2018-06-07 RX ADMIN — GABAPENTIN 300 MG: 300 CAPSULE ORAL at 16:19

## 2018-06-07 RX ADMIN — ATORVASTATIN CALCIUM 10 MG: 10 TABLET, FILM COATED ORAL at 11:05

## 2018-06-07 RX ADMIN — PANTOPRAZOLE SODIUM 40 MG: 40 TABLET, DELAYED RELEASE ORAL at 06:17

## 2018-06-07 RX ADMIN — ASPIRIN 81 MG: 81 TABLET, CHEWABLE ORAL at 11:05

## 2018-06-07 RX ADMIN — TIZANIDINE 4 MG: 4 TABLET ORAL at 20:42

## 2018-06-07 RX ADMIN — SODIUM CHLORIDE 125 ML/HR: 4.5 INJECTION, SOLUTION INTRAVENOUS at 11:12

## 2018-06-07 RX ADMIN — ALPRAZOLAM 1 MG: 0.5 TABLET ORAL at 20:38

## 2018-06-07 RX ADMIN — HYDRALAZINE HYDROCHLORIDE 75 MG: 50 TABLET, FILM COATED ORAL at 06:17

## 2018-06-07 RX ADMIN — OXYCODONE HYDROCHLORIDE AND ACETAMINOPHEN 1 TABLET: 10; 325 TABLET ORAL at 20:42

## 2018-06-07 RX ADMIN — OXYCODONE HYDROCHLORIDE AND ACETAMINOPHEN 1 TABLET: 10; 325 TABLET ORAL at 23:47

## 2018-06-07 RX ADMIN — INSULIN ASPART 4 UNITS: 100 INJECTION, SOLUTION INTRAVENOUS; SUBCUTANEOUS at 13:50

## 2018-06-07 RX ADMIN — GABAPENTIN 300 MG: 300 CAPSULE ORAL at 11:05

## 2018-06-07 RX ADMIN — INSULIN ASPART 3 UNITS: 100 INJECTION, SOLUTION INTRAVENOUS; SUBCUTANEOUS at 23:52

## 2018-06-07 RX ADMIN — ALPRAZOLAM 1 MG: 0.5 TABLET ORAL at 11:53

## 2018-06-07 RX ADMIN — SODIUM CHLORIDE 125 ML/HR: 9 INJECTION, SOLUTION INTRAVENOUS at 14:35

## 2018-06-07 RX ADMIN — GABAPENTIN 300 MG: 300 CAPSULE ORAL at 20:38

## 2018-06-07 RX ADMIN — SODIUM CHLORIDE 125 ML/HR: 9 INJECTION, SOLUTION INTRAVENOUS at 15:40

## 2018-06-07 RX ADMIN — CARVEDILOL 6.25 MG: 6.25 TABLET, FILM COATED ORAL at 11:05

## 2018-06-07 RX ADMIN — CARVEDILOL 3.12 MG: 3.12 TABLET, FILM COATED ORAL at 20:38

## 2018-06-07 NOTE — PLAN OF CARE
Problem: Patient Care Overview  Goal: Plan of Care Review  Outcome: Ongoing (interventions implemented as appropriate)    Goal: Discharge Needs Assessment  Outcome: Ongoing (interventions implemented as appropriate)      Problem: Fall Risk (Adult)  Goal: Identify Related Risk Factors and Signs and Symptoms  Outcome: Ongoing (interventions implemented as appropriate)    Goal: Absence of Fall  Outcome: Ongoing (interventions implemented as appropriate)

## 2018-06-07 NOTE — PROGRESS NOTES
"Daily progress note    Chief complaint  Doing much better  No new complaints  Wants to go home    History of present illness  57-year-old white male with history of COPD coronary artery disease congestive heart failure diabetes hypertension hyperlipidemia hepatitis B anxiety depression presented to [facility] emergency room with abnormal labs.  Patient found to have severe hyperkalemia admitted for management.  Patient stated that he has been eating a lot of bananas.  Patient complain of nausea and generalized weakness denies any headache dizziness chest pain shortness of breath abdominal pain vomiting diarrhea.  Patient treated for hypokalemia and admitted for management     REVIEW OF SYSTEMS  Review of Systems   Constitutional: Negative for chills.   HENT: Negative for congestion, rhinorrhea and sore throat.    Eyes: Negative for pain.   Respiratory: Negative for cough and shortness of breath.    Cardiovascular: Negative for chest pain, palpitations and leg swelling.   Gastrointestinal: Negative for abdominal pain, diarrhea and vomiting.   Genitourinary: Negative for difficulty urinating, dysuria, flank pain and frequency.   Musculoskeletal: Negative for myalgias, neck pain and neck stiffness.   Skin: Negative for rash.   Neurological: Negative for dizziness, speech difficulty, weakness, light-headedness, numbness and headaches.   Psychiatric/Behavioral: Negative.    All other systems reviewed and are negative.     PHYSICAL EXAM  Blood pressure 166/72, pulse 64, temperature 97.7 °F (36.5 °C), temperature source Oral, resp. rate 20, height 175.3 cm (69\"), weight 82.6 kg (182 lb), SpO2 96 %.    Constitutional: He is oriented to person, place, and time. No distress.   Head: Normocephalic.   Mouth/Throat: Mucous membranes are normal.   Eyes: EOM are normal. Pupils are equal, round, and reactive to light.   Neck: Normal range of motion. Neck supple.   Cardiovascular: Normal rate, regular rhythm and normal " heart sounds.    Pulmonary/Chest: Effort normal and breath sounds normal. No respiratory distress. He has no decreased breath sounds. He has no wheezes. He has no rhonchi. He has no rales.   Abdominal: Soft. There is no tenderness. There is no rebound and no guarding.   Musculoskeletal: Normal range of motion.   Neurological: He is alert and oriented to person, place, and time. He has normal sensation.   Skin: Skin is warm and dry.   Psychiatric: Mood and affect normal.      LAB RESULTS  Lab Results (last 24 hours)     Procedure Component Value Units Date/Time    POC Glucose Once [[id number]]  (Abnormal) Collected:  06/07/18 1253    Specimen:  Blood Updated:  06/07/18 1254     Glucose 271 (H) mg/dL     Narrative:       Meter: [id number] : [id number] Blade Anastasia JULIO    BNP [[id number]]  (Normal) Collected:  06/07/18 0541    Specimen:  Blood Updated:  06/07/18 0714     proBNP 781.5 pg/mL     Narrative:       Among patients with dyspnea, NT-proBNP is highly sensitive for the detection of acute congestive heart failure. In addition NT-proBNP of <300 pg/ml effectively rules out acute congestive heart failure with 99% negative predictive value.    TSH [[id number]]  (Normal) Collected:  06/07/18 0541    Specimen:  Blood Updated:  06/07/18 0714     TSH 1.300 mIU/mL     Comprehensive Metabolic Panel [[id number]]  (Abnormal) Collected:  06/07/18 0541    Specimen:  Blood Updated:  06/07/18 0704     Glucose 139 (H) mg/dL      BUN 39 (H) mg/dL      Creatinine 1.46 (H) mg/dL      Sodium 142 mmol/L      Potassium 5.1 mmol/L      Chloride 110 (H) mmol/L      CO2 20.2 (L) mmol/L      Calcium 9.5 mg/dL      Total Protein 6.9 g/dL      Albumin 3.40 (L) g/dL      ALT (SGPT) 24 U/L      AST (SGOT) 22 U/L      Alkaline Phosphatase 82 U/L      Total Bilirubin 0.2 mg/dL      eGFR Non African Amer 50 (L) mL/min/1.73      Globulin 3.5 gm/dL      A/G Ratio 1.0 g/dL      BUN/Creatinine Ratio 26.7 (H)     Anion Gap 11.8 mmol/L     Lipid Panel  [[id number]]  (Abnormal) Collected:  06/07/18 0541    Specimen:  Blood Updated:  06/07/18 0704     Total Cholesterol 165 mg/dL      Triglycerides 88 mg/dL      HDL Cholesterol 61 (H) mg/dL      LDL Cholesterol  86 mg/dL      VLDL Cholesterol 17.6 mg/dL      LDL/HDL Ratio 1.42    Narrative:       Cholesterol Reference Ranges  (U.S. Department of Health and Human Services ATP III Classifications)    Desirable          <200 mg/dL  Borderline High    200-239 mg/dL  High Risk          >240 mg/dL      Triglyceride Reference Ranges  (U.S. Department of Health and Human Services ATP III Classifications)    Normal           <150 mg/dL  Borderline High  150-199 mg/dL  High             200-499 mg/dL  Very High        >500 mg/dL    HDL Reference Ranges  (U.S. Department of Health and Human Services ATP III Classifcations)    Low     <40 mg/dl (major risk factor for CHD)  High    >60 mg/dl ('negative' risk factor for CHD)        LDL Reference Ranges  (U.S. Department of Health and Human Services ATP III Classifcations)    Optimal          <100 mg/dL  Near Optimal     100-129 mg/dL  Borderline High  130-159 mg/dL  High             160-189 mg/dL  Very High        >189 mg/dL    Phosphorus [[id number]]  (Normal) Collected:  06/07/18 0541    Specimen:  Blood Updated:  06/07/18 0704     Phosphorus 3.9 mg/dL     CBC & Differential [[id number]] Collected:  06/07/18 0541    Specimen:  Blood Updated:  06/07/18 0650    Narrative:       The following orders were created for panel order CBC & Differential.  Procedure                               Abnormality         Status                     ---------                               -----------         ------                     CBC Auto Differential[[id number]]        Abnormal            Final result                 Please view results for these tests on the individual orders.    CBC Auto Differential [[id number]]  (Abnormal) Collected:  06/07/18 0541    Specimen:  Blood Updated:  06/07/18 0650      WBC 4.72 10*3/mm3      RBC 3.78 (L) 10*6/mm3      Hemoglobin 11.2 (L) g/dL      Hematocrit 35.7 (L) %      MCV 94.4 fL      MCH 29.6 pg      MCHC 31.4 (L) g/dL      RDW 14.3 %      RDW-SD 48.9 fl      MPV 11.9 fL      Platelets 101 (L) 10*3/mm3      Neutrophil % 62.3 %      Lymphocyte % 26.9 %      Monocyte % 5.7 %      Eosinophil % 4.7 %      Basophil % 0.4 %      Immature Grans % 0.0 %      Neutrophils, Absolute 2.94 10*3/mm3      Lymphocytes, Absolute 1.27 10*3/mm3      Monocytes, Absolute 0.27 10*3/mm3      Eosinophils, Absolute 0.22 10*3/mm3      Basophils, Absolute 0.02 10*3/mm3      Immature Grans, Absolute 0.00 10*3/mm3     Hemoglobin A1c [[id number]]  (Abnormal) Collected:  06/07/18 0541    Specimen:  Blood Updated:  06/07/18 0650     Hemoglobin A1C 6.60 (H) %     Narrative:       Hemoglobin A1C Ranges:    Increased Risk for Diabetes  5.7% to 6.4%  Diabetes                     >= 6.5%  Diabetic Goal                < 7.0%    POC Glucose Once [[id number]]  (Normal) Collected:  06/07/18 0623    Specimen:  Blood Updated:  06/07/18 0632     Glucose 124 mg/dL     Narrative:       Meter: [id number] : [id number] Umu Bello NA    POC Glucose Once [[id number]]  (Abnormal) Collected:  06/06/18 2255    Specimen:  Blood Updated:  06/06/18 2256     Glucose 137 (H) mg/dL     Narrative:       Meter: [id number] : [id number] [employer] NA    POC Glucose Once [[id number]]  (Abnormal) Collected:  06/06/18 2009    Specimen:  Blood Updated:  06/06/18 2010     Glucose 162 (H) mg/dL     Narrative:       Meter: [id number] : [id number] [employer] NA    POC Glucose Once [[id number]]  (Abnormal) Collected:  06/06/18 1612    Specimen:  Blood Updated:  06/06/18 1614     Glucose 238 (H) mg/dL     Narrative:       Meter: [id number] : [id number] Malathi KIM    Potassium [[id number]]  (Abnormal) Collected:  06/06/18 1519    Specimen:  Blood Updated:  06/06/18 1551     Potassium 5.4 (H) mmol/L          Imaging Results (last 24 hours)     ** No results found for the last 24 hours. **        EKG:                             Rhythm/Rate: Sinus bradycardia rate 59  P waves and NE: Normal P waves  QRS, axis: Normal QRS   ST and T waves: T wave inversions in III, aVF       Current Facility-Administered Medications:   •  ALPRAZolam (XANAX) tablet 1 mg, 1 mg, Oral, 4x Daily PRN, Karl Swift MD, 1 mg at 06/07/18 1153  •  aspirin chewable tablet 81 mg, 81 mg, Oral, Daily, Karl Swift MD, 81 mg at 06/07/18 1105  •  atorvastatin (LIPITOR) tablet 10 mg, 10 mg, Oral, Daily, Karl Swift MD, 10 mg at 06/07/18 1105  •  carvedilol (COREG) tablet 6.25 mg, 6.25 mg, Oral, Q12H, Karl Swift MD, 6.25 mg at 06/07/18 1105  •  erythromycin (ROMYCIN) ophthalmic ointment, , Both Eyes, Nightly, Karl Siwft MD  •  gabapentin (NEURONTIN) capsule 300 mg, 300 mg, Oral, TID, Karl Swift MD, 300 mg at 06/07/18 1105  •  hydrALAZINE (APRESOLINE) tablet 75 mg, 75 mg, Oral, Q8H, Karl Swift MD, 75 mg at 06/07/18 0617  •  insulin aspart (novoLOG) injection 0-7 Units, 0-7 Units, Subcutaneous, 4x Daily With Meals & Nightly, Karl Swift MD, 3 Units at 06/06/18 1739  •  insulin detemir (LEVEMIR) injection 15 Units, 15 Units, Subcutaneous, Nightly, Karl Swift MD, 15 Units at 06/06/18 2310  •  oxyCODONE-acetaminophen (PERCOCET)  MG per tablet 1 tablet, 1 tablet, Oral, 4x Daily PRN, Karl Swift MD, 1 tablet at 06/06/18 0835  •  pantoprazole (PROTONIX) EC tablet 40 mg, 40 mg, Oral, Q AM, Karl Swift MD, 40 mg at 06/07/18 0617  •  promethazine (PHENERGAN) tablet 12.5 mg, 12.5 mg, Oral, 5x Daily PRN, Karl Swift MD  •  sodium chloride 0.45 % infusion, 125 mL/hr, Intravenous, Continuous, Karl Swift MD, Last Rate: 125 mL/hr at 06/07/18 1112, 125 mL/hr at 06/07/18 1112  •  tiZANidine (ZANAFLEX) tablet 4 mg, 4 mg, Oral, Q8H PRN, Karl Swift MD, 4 mg at 06/06/18 0151     ASSESSMENT  Severe hyperkalemia  Acute kidney injury  Chronic kidney  disease stage III  Chronic anemia  Diabetes mellitus  Hypertension  Hyperlipidemia  Gastroesophageal reflux disease  Hepatitis B  Anxiety disorder  Depression    PLAN  CPM  IV fluid  Stop all potassium supplements  Nephrology consult appreciated  Adjust home medications  Check 2-D echo  Stress ulcer DVT prophylaxis  Accu-Chek with sliding scale insulin  Follow closely further recommendation according to hospital course    ARIEL MCGOVERN MD

## 2018-06-07 NOTE — PROGRESS NOTES
"   LOS: 1 day   Patient Care Team:  Yadiel Baxter III, MD as PCP - General (Family Medicine)  Jamie Aviles MD as Consulting Physician (Hematology and Oncology)    Chief Complaint/ Reason for encounter: Acute renal failure, hyperkalemia  Chief Complaint   Patient presents with   • Abnormal Lab     per Dr. Ta's office pt has lab work drawn yesterday. Got a call from office telling pt his K was 10.0 and to come to ED for further eval.          Subjective     Medical history reviewed:  History of Present Illness    Subjective:  Symptoms:  (No new complaints today  He feels well, good appetite).          History taken from: Patient and chart    Objective     Vital Signs  Temp:  [97.7 °F (36.5 °C)-98.3 °F (36.8 °C)] 97.7 °F (36.5 °C)  Heart Rate:  [56-64] 64  Resp:  [18-20] 18  BP: (157-166)/(72-80) 160/78       Wt Readings from Last 1 Encounters:   06/07/18 0751 82.6 kg (182 lb)   06/06/18 0535 82.9 kg (182 lb 11.2 oz)   06/05/18 2222 83.3 kg (183 lb 11.2 oz)   06/05/18 1851 81.6 kg (180 lb)       Objective:  General Appearance:  Comfortable, well-appearing, in no acute distress and not in pain.    Vital signs: (most recent): Blood pressure 160/78, pulse 64, temperature 97.7 °F (36.5 °C), temperature source Oral, resp. rate 18, height 175.3 cm (69\"), weight 82.6 kg (182 lb), SpO2 96 %.  Vital signs are normal.  No fever.    Output: Producing urine.    HEENT: Normal HEENT exam.    Lungs:  Normal effort and normal respiratory rate.  Breath sounds clear to auscultation.  He is not in respiratory distress.  No rales, wheezes or rhonchi.    Heart: Normal rate.  Regular rhythm.  S1 normal.  No murmur.   Abdomen: Abdomen is soft and non-distended.  Bowel sounds are normal.   There is no abdominal tenderness.  There is no epigastric area or suprapubic area tenderness.  There is no rebound tenderness.   There is no mass.   Extremities: Normal range of motion.  There is no deformity or dependent edema.    Pulses: " Distal pulses are intact.    Neurological: Patient is alert and oriented to person, place and time.    Skin:  Warm and dry.  No rash or cyanosis.             Results Review:    Intake/Output:     Intake/Output Summary (Last 24 hours) at 06/07/18 1726  Last data filed at 06/07/18 1540   Gross per 24 hour   Intake           2487.5 ml   Output             1675 ml   Net            812.5 ml         DATA:  Radiology and Labs:  The following labs independently reviewed by me. Additional labs ordered for tomorrow a.m.  Interval notes, chart personally reviewed by me.   Old records independently reviewed showing Chronic kidney disease, baseline creatinine around 1.4  The following radiologic studies independently viewed by me, findings previous CT abdomen pelvis showing an enhancing renal mass  New problems include hyperkalemia and renal failure  Discussed with the patient himself      Risk/ complexity of medical care/ medical decision making moderate      Labs:   Recent Results (from the past 24 hour(s))   POC Glucose Once    Collection Time: 06/06/18  8:09 PM   Result Value Ref Range    Glucose 162 (H) 70 - 130 mg/dL   POC Glucose Once    Collection Time: 06/06/18 10:55 PM   Result Value Ref Range    Glucose 137 (H) 70 - 130 mg/dL   Comprehensive Metabolic Panel    Collection Time: 06/07/18  5:41 AM   Result Value Ref Range    Glucose 139 (H) 65 - 99 mg/dL    BUN 39 (H) 6 - 20 mg/dL    Creatinine 1.46 (H) 0.76 - 1.27 mg/dL    Sodium 142 136 - 145 mmol/L    Potassium 5.1 3.5 - 5.2 mmol/L    Chloride 110 (H) 98 - 107 mmol/L    CO2 20.2 (L) 22.0 - 29.0 mmol/L    Calcium 9.5 8.6 - 10.5 mg/dL    Total Protein 6.9 6.0 - 8.5 g/dL    Albumin 3.40 (L) 3.50 - 5.20 g/dL    ALT (SGPT) 24 1 - 41 U/L    AST (SGOT) 22 1 - 40 U/L    Alkaline Phosphatase 82 39 - 117 U/L    Total Bilirubin 0.2 0.1 - 1.2 mg/dL    eGFR Non African Amer 50 (L) >60 mL/min/1.73    Globulin 3.5 gm/dL    A/G Ratio 1.0 g/dL    BUN/Creatinine Ratio 26.7 (H) 7.0 -  25.0    Anion Gap 11.8 mmol/L   BNP    Collection Time: 06/07/18  5:41 AM   Result Value Ref Range    proBNP 781.5 5.0 - 900.0 pg/mL   TSH    Collection Time: 06/07/18  5:41 AM   Result Value Ref Range    TSH 1.300 0.270 - 4.200 mIU/mL   Lipid Panel    Collection Time: 06/07/18  5:41 AM   Result Value Ref Range    Total Cholesterol 165 0 - 200 mg/dL    Triglycerides 88 0 - 150 mg/dL    HDL Cholesterol 61 (H) 40 - 60 mg/dL    LDL Cholesterol  86 0 - 100 mg/dL    VLDL Cholesterol 17.6 5 - 40 mg/dL    LDL/HDL Ratio 1.42    Hemoglobin A1c    Collection Time: 06/07/18  5:41 AM   Result Value Ref Range    Hemoglobin A1C 6.60 (H) 4.80 - 5.60 %   CBC Auto Differential    Collection Time: 06/07/18  5:41 AM   Result Value Ref Range    WBC 4.72 4.50 - 10.70 10*3/mm3    RBC 3.78 (L) 4.60 - 6.00 10*6/mm3    Hemoglobin 11.2 (L) 13.7 - 17.6 g/dL    Hematocrit 35.7 (L) 40.4 - 52.2 %    MCV 94.4 79.8 - 96.2 fL    MCH 29.6 27.0 - 32.7 pg    MCHC 31.4 (L) 32.6 - 36.4 g/dL    RDW 14.3 11.5 - 14.5 %    RDW-SD 48.9 37.0 - 54.0 fl    MPV 11.9 6.0 - 12.0 fL    Platelets 101 (L) 140 - 500 10*3/mm3    Neutrophil % 62.3 42.7 - 76.0 %    Lymphocyte % 26.9 19.6 - 45.3 %    Monocyte % 5.7 5.0 - 12.0 %    Eosinophil % 4.7 0.3 - 6.2 %    Basophil % 0.4 0.0 - 1.5 %    Immature Grans % 0.0 0.0 - 0.5 %    Neutrophils, Absolute 2.94 1.90 - 8.10 10*3/mm3    Lymphocytes, Absolute 1.27 0.90 - 4.80 10*3/mm3    Monocytes, Absolute 0.27 0.20 - 1.20 10*3/mm3    Eosinophils, Absolute 0.22 0.00 - 0.70 10*3/mm3    Basophils, Absolute 0.02 0.00 - 0.20 10*3/mm3    Immature Grans, Absolute 0.00 0.00 - 0.03 10*3/mm3   Phosphorus    Collection Time: 06/07/18  5:41 AM   Result Value Ref Range    Phosphorus 3.9 2.5 - 4.5 mg/dL   POC Glucose Once    Collection Time: 06/07/18  6:23 AM   Result Value Ref Range    Glucose 124 70 - 130 mg/dL   Adult Transthoracic Echo Complete W/ Cont if Necessary Per Protocol    Collection Time: 06/07/18  7:51 AM   Result Value Ref Range     BSA 2.0 m^2    IVSd 1.1 cm    LVIDd 5.1 cm    LVIDs 3.2 cm    LVPWd 1.3 cm    IVS/LVPW 0.87     FS 36.8 %    EDV(Teich) 122.7 ml    ESV(Teich) 41.3 ml    EF(Teich) 66.4 %    EDV(cubed) 131.1 ml    ESV(cubed) 33.1 ml    EF(cubed) 74.8 %    LV mass(C)d 243.9 grams    LV mass(C)dI 122.9 grams/m^2    SV(Teich) 81.4 ml    SI(Teich) 41.0 ml/m^2    SV(cubed) 98.0 ml    SI(cubed) 49.4 ml/m^2    Ao root diam 3.4 cm    Ao root area 9.1 cm^2    ACS 1.7 cm    asc Aorta Diam 2.7 cm    LVOT diam 2.1 cm    LVOT area 3.5 cm^2    LVOT area(traced) 3.5 cm^2    RVOT diam 1.9 cm    RVOT area 2.8 cm^2    LVLd ap4 6.7 cm    EDV(MOD-sp4) 83.0 ml    LVLs ap4 6.0 cm    ESV(MOD-sp4) 33.0 ml    EF(MOD-sp4) 60.2 %    LVLd ap2 6.9 cm    EDV(MOD-sp2) 88.0 ml    LVLs ap2 5.9 cm    ESV(MOD-sp2) 34.0 ml    EF(MOD-sp2) 61.4 %    SV(MOD-sp4) 50.0 ml    SI(MOD-sp4) 25.2 ml/m^2    SV(MOD-sp2) 54.0 ml    SI(MOD-sp2) 27.2 ml/m^2    Ao root area (BSA corrected) 1.7     Ao root area (BSA corrected) 61.4 ml/m^2    CONTRAST EF 4CH 60.2 ml/m^2    LV Diastolic corrected for BSA 41.8 ml/m^2    LV Systolic corrected for BSA 16.6 ml/m^2    MV A dur 0.11 sec    MV E max lázaro 111.0 cm/sec    MV A max lázaro 86.3 cm/sec    MV E/A 1.3     MV V2 max 131.0 cm/sec    MV max PG 6.9 mmHg    MV V2 mean 83.9 cm/sec    MV mean PG 3.0 mmHg    MV V2 VTI 42.4 cm    MVA(VTI) 1.9 cm^2    MV P1/2t max lázaro 124.0 cm/sec    MV P1/2t 91.0 msec    MVA(P1/2t) 2.4 cm^2    MV dec slope 399.0 cm/sec^2    MV dec time 0.19 sec    Ao pk lázaro 126.0 cm/sec    Ao max PG 6.4 mmHg    Ao max PG (full) 1.3 mmHg    Ao V2 mean 91.4 cm/sec    Ao mean PG 4.0 mmHg    Ao mean PG (full) 2.0 mmHg    Ao V2 VTI 32.0 cm    AMI(I,A) 2.5 cm^2    AMI(I,D) 2.5 cm^2    AMI(V,A) 3.1 cm^2    AMI(V,D) 3.1 cm^2    LV V1 max PG 5.0 mmHg    LV V1 mean PG 2.0 mmHg    LV V1 max 112.0 cm/sec    LV V1 mean 62.4 cm/sec    LV V1 VTI 23.3 cm    SV(Ao) 290.5 ml    SI(Ao) 146.4 ml/m^2    SV(LVOT) 80.7 ml    SV(RVOT) 53.9 ml     SI(LVOT) 40.7 ml/m^2    PA V2 max 121.0 cm/sec    PA max PG 5.9 mmHg    PA max PG (full) 2.7 mmHg     CV ECHO KRISTIN - PVA(V,A) 2.1 cm^2     CV ECHO KRISTIN - PVA(V,D) 2.1 cm^2    PA acc time 0.11 sec    RV V1 max PG 3.1 mmHg    RV V1 mean PG 2.0 mmHg    RV V1 max 88.7 cm/sec    RV V1 mean 59.5 cm/sec    RV V1 VTI 19.0 cm    TR max donato 267.0 cm/sec    PA pr(Accel) 28.2 mmHg    Pulm Sys Donato 76.1 cm/sec    Pulm Contreras Donato 52.4 cm/sec    Pulm S/D 1.5     Qp/Qs 0.67     Pulm A Revs Dur 0.11 sec    Pulm A Revs Donato 35.8 cm/sec    MVA P1/2T LCG 1.8 cm^2     CV ECHO KRISTIN - BZI_BMI 26.9 kilograms/m^2     CV ECHO KRISTIN - BSA(HAYCOCK) 2.0 m^2     CV ECHO KRISTIN - BZI_METRIC_WEIGHT 82.6 kg     CV ECHO KRISTIN - BZI_METRIC_HEIGHT 175.3 cm    Target HR (85%) 140 bpm    Max. Pred. HR (100%) 165 bpm     CV VAS BP RIGHT /72 mmHg    TDI S' 14.00 cm/sec    RV Base 3.40 cm    Ascending aorta 2.70 cm    Aortic arch 2.60 cm    LA Volume Index 25.0 mL/m2    Avg E/e' ratio 14.80     EF(MOD-bp) 60 %    Lat Peak E' Donato 9.0 cm/sec    Med Peak E' Donato 6.00 cm/sec    Abdo Ao Diam 2.00 cm    TAPSE (>1.6) 2.04 cm2    RVSP(TR) 32 mmHg    RAP systole 3 mmHg   POC Glucose Once    Collection Time: 06/07/18 12:53 PM   Result Value Ref Range    Glucose 271 (H) 70 - 130 mg/dL       Radiology:  Imaging Results (last 24 hours)     ** No results found for the last 24 hours. **             Medications have been reviewed:  Current Facility-Administered Medications   Medication Dose Route Frequency Provider Last Rate Last Dose   • ALPRAZolam (XANAX) tablet 1 mg  1 mg Oral 4x Daily PRN Karl Ahmed, MD   1 mg at 06/07/18 1153   • aspirin chewable tablet 81 mg  81 mg Oral Daily Karl Swift MD   81 mg at 06/07/18 1105   • atorvastatin (LIPITOR) tablet 10 mg  10 mg Oral Daily Karl Swift MD   10 mg at 06/07/18 1105   • carvedilol (COREG) tablet 3.125 mg  3.125 mg Oral Q12H Karl Swift MD       • erythromycin (ROMYCIN) ophthalmic ointment   Both Eyes  Nightly Karl Swift MD       • gabapentin (NEURONTIN) capsule 300 mg  300 mg Oral TID Karl Swift MD   300 mg at 06/07/18 1619   • hydrALAZINE (APRESOLINE) tablet 100 mg  100 mg Oral Q8H Karl Swift MD   100 mg at 06/07/18 1350   • insulin aspart (novoLOG) injection 0-7 Units  0-7 Units Subcutaneous 4x Daily With Meals & Nightly Karl Swift MD   4 Units at 06/07/18 1350   • insulin detemir (LEVEMIR) injection 20 Units  20 Units Subcutaneous Nightly Karl Swift MD       • oxyCODONE-acetaminophen (PERCOCET)  MG per tablet 1 tablet  1 tablet Oral 4x Daily PRN Karl Swift MD   1 tablet at 06/06/18 0835   • pantoprazole (PROTONIX) EC tablet 40 mg  40 mg Oral Q AM Karl Swift MD   40 mg at 06/07/18 0617   • promethazine (PHENERGAN) tablet 12.5 mg  12.5 mg Oral 5x Daily PRN Karl Swift MD       • tiZANidine (ZANAFLEX) tablet 4 mg  4 mg Oral Q8H PRN Karl Swift MD   4 mg at 06/06/18 0151       ASSESSMENT:  acute renal failure on chronic kidney disease, improving  Stage III chronic kidney disease  Renal mass, eventual need for nephrectomy, follows with urology  Anemia of chronic kidney disease  Hyperkalemia, improved  Cirrhosis from hepatitis C  Chronic thrombocytopenia  Chronic diastolic congestive heart failure   Proteinuria, from diabetic renal disease        PLAN:   Renal function improved, nearing baseline  Discontinue IV fluids  Continue to hold lisinopril at discharge  Continue low Potassium diet  Could potentially restart Bumex at discharge, will reassess in the morning  Eventual nephrectomy for likely renal cell carcinoma, per urology later this month  Urine studies unremarkable other than proteinuria (from diabetic renal disease)  Possible discharge tomorrow if labs continue to improve       Yohan Murrell MD   [facility]   Office phone number: [phone]  Answering service phone number: [phone]    06/07/18  5:26 PM      Dictation performed using Dragon dictation  software

## 2018-06-07 NOTE — PLAN OF CARE
Problem: Patient Care Overview  Goal: Plan of Care Review  Outcome: Ongoing (interventions implemented as appropriate)   06/06/18 2033   Coping/Psychosocial   Plan of Care Reviewed With patient;spouse   Plan of Care Review   Progress improving       Problem: Fall Risk (Adult)  Goal: Absence of Fall  Outcome: Ongoing (interventions implemented as appropriate)

## 2018-06-07 NOTE — CONSULTS
"Adult Nutrition  Assessment/PES    Patient Name:  Angelo Brown  YOB: 1960  MRN: [id number]  Admit Date:  6/5/2018    Assessment Date:  6/7/2018    Nutrition education completed. Provided nutrition therapy regarding low potassium diet education.  Gave education material for reference.           Reason for Assessment     Row Name 06/07/18 1124          Reason for Assessment    Reason For Assessment nurse/nurse practitioner consult     Diagnosis --   Primary Problem:  Hyperkalemia     Identified At Risk by Screening Criteria need for education               Anthropometrics     Row Name 06/07/18 1124 06/07/18 0751       Anthropometrics    Height  -- 175.3 cm (69\")    Weight  -- 82.6 kg (182 lb)       Ideal Body Weight (IBW)    Ideal Body Weight (IBW) (kg)  -- 73.69    % Ideal Body Weight  -- 112.04       Body Mass Index (BMI)    BMI (kg/m2)  -- 26.93    BMI Assessment BMI 25-29.9: overweight  --       IBW Adjustment, Para/Tetraplegia    5% Adjustment, Para (IBW)  -- 70.01    10% Adjustment, Para (IBW)  -- 66.32    10% Adjustment, Tetra (IBW)  -- 66.32    15% Adjustment, Tetra (IBW)  -- 62.64            Labs/Tests/Procedures/Meds     Row Name 06/07/18 1124          Labs/Procedures/Meds    Lab Results Reviewed reviewed, pertinent        Diagnostic Tests/Procedures    Diagnostic Test/Procedure Reviewed reviewed, pertinent        Medications    Pertinent Medications Reviewed reviewed, pertinent     Pertinent Medications Comments insulin, PPI, NaCl             Physical Findings     Row Name 06/07/18 1124          Physical Findings    Overall Physical Appearance --   B=20             Estimated/Assessed Needs     Row Name 06/07/18 0751          Calculation Measurements    Height 175.3 cm (69\")             Nutrition Prescription Ordered     Row Name 06/07/18 1125          Nutrition Prescription PO    Common Modifiers Cardiac;Low Potassium             Evaluation of Received Nutrient/Fluid Intake     Row Name " "06/07/18 0751          Calculation Measurements    Height 175.3 cm (69\")             Evaluation of Prescribed Nutrient/Fluid Intake     Row Name 06/07/18 0751          Calculation Measurements    Height 175.3 cm (69\")           Problem/Interventions:        Problem 1     Row Name 06/07/18 1125          Nutrition Diagnoses Problem 1    Problem 1 Limited Adherence to Diet Modifications     Etiology (related to) MNT for Treatment/Condition     Signs/Symptoms (evidenced by) Demonstrated Information Deficit;Reported  Information Deficit;Biochemical     Specific Labs Noted K+                     Intervention Goal     Row Name 06/07/18 1125          Intervention Goal    General Maintain nutrition;Meet nutritional needs for age/condition     PO Tolerate PO     Weight Maintain weight             Nutrition Intervention     Row Name 06/07/18 1125          Nutrition Intervention    RD/Tech Action Interview for preference;Follow Tx progress;Care plan reviewd               Education/Evaluation     Row Name 06/07/18 1125          Education    Education Provided education regarding     Education Topics Potassium        Monitor/Evaluation    Monitor Per protocol     Education Follow-up Reinforce PRN         Electronically signed by:  Jeannie Pope RD  06/07/18 11:25 AM  "

## 2018-06-08 VITALS
RESPIRATION RATE: 16 BRPM | HEART RATE: 59 BPM | OXYGEN SATURATION: 96 % | HEIGHT: 69 IN | BODY MASS INDEX: 26.66 KG/M2 | WEIGHT: 180 LBS | TEMPERATURE: 98.5 F | SYSTOLIC BLOOD PRESSURE: 150 MMHG | DIASTOLIC BLOOD PRESSURE: 74 MMHG

## 2018-06-08 LAB
ALBUMIN SERPL-MCNC: 3.5 G/DL (ref 3.5–5.2)
ANION GAP SERPL CALCULATED.3IONS-SCNC: 12 MMOL/L
BUN BLD-MCNC: 37 MG/DL (ref 6–20)
BUN/CREAT SERPL: 20.7 (ref 7–25)
CALCIUM SPEC-SCNC: 9.4 MG/DL (ref 8.6–10.5)
CHLORIDE SERPL-SCNC: 111 MMOL/L (ref 98–107)
CO2 SERPL-SCNC: 21 MMOL/L (ref 22–29)
CREAT BLD-MCNC: 1.79 MG/DL (ref 0.76–1.27)
GFR SERPL CREATININE-BSD FRML MDRD: 40 ML/MIN/1.73
GLUCOSE BLD-MCNC: 121 MG/DL (ref 65–99)
GLUCOSE BLDC GLUCOMTR-MCNC: 120 MG/DL (ref 70–130)
GLUCOSE BLDC GLUCOMTR-MCNC: 201 MG/DL (ref 70–130)
NT-PROBNP SERPL-MCNC: 1154 PG/ML (ref 5–900)
PHOSPHATE SERPL-MCNC: 5.1 MG/DL (ref 2.5–4.5)
POTASSIUM BLD-SCNC: 4.6 MMOL/L (ref 3.5–5.2)
SODIUM BLD-SCNC: 144 MMOL/L (ref 136–145)

## 2018-06-08 PROCEDURE — 63710000001 PROMETHAZINE PER 12.5 MG: Performed by: HOSPITALIST

## 2018-06-08 PROCEDURE — 83880 ASSAY OF NATRIURETIC PEPTIDE: CPT | Performed by: HOSPITALIST

## 2018-06-08 PROCEDURE — 82962 GLUCOSE BLOOD TEST: CPT

## 2018-06-08 PROCEDURE — 80069 RENAL FUNCTION PANEL: CPT | Performed by: INTERNAL MEDICINE

## 2018-06-08 RX ORDER — CARVEDILOL 3.12 MG/1
3.12 TABLET ORAL EVERY 12 HOURS SCHEDULED
Qty: 60 TABLET | Refills: 0 | Status: SHIPPED | OUTPATIENT
Start: 2018-06-08 | End: 2022-12-26

## 2018-06-08 RX ORDER — AMLODIPINE BESYLATE 5 MG/1
5 TABLET ORAL
Status: DISCONTINUED | OUTPATIENT
Start: 2018-06-08 | End: 2018-06-08 | Stop reason: HOSPADM

## 2018-06-08 RX ORDER — AMLODIPINE BESYLATE 5 MG/1
5 TABLET ORAL
Qty: 30 TABLET | Refills: 0 | Status: SHIPPED | OUTPATIENT
Start: 2018-06-08 | End: 2018-07-08

## 2018-06-08 RX ORDER — HYDRALAZINE HYDROCHLORIDE 100 MG/1
100 TABLET, FILM COATED ORAL EVERY 8 HOURS SCHEDULED
Qty: 90 TABLET | Refills: 0 | OUTPATIENT
Start: 2018-06-08 | End: 2022-08-21 | Stop reason: HOSPADM

## 2018-06-08 RX ORDER — PANTOPRAZOLE SODIUM 40 MG/1
40 TABLET, DELAYED RELEASE ORAL DAILY
Qty: 30 TABLET | Refills: 0 | Status: SHIPPED | OUTPATIENT
Start: 2018-06-08 | End: 2018-07-08

## 2018-06-08 RX ADMIN — PROMETHAZINE HYDROCHLORIDE 12.5 MG: 12.5 TABLET ORAL at 08:42

## 2018-06-08 RX ADMIN — CARVEDILOL 3.12 MG: 3.12 TABLET, FILM COATED ORAL at 08:42

## 2018-06-08 RX ADMIN — HYDRALAZINE HYDROCHLORIDE 100 MG: 50 TABLET, FILM COATED ORAL at 06:11

## 2018-06-08 RX ADMIN — AMLODIPINE BESYLATE 5 MG: 5 TABLET ORAL at 13:09

## 2018-06-08 RX ADMIN — ASPIRIN 81 MG: 81 TABLET, CHEWABLE ORAL at 08:42

## 2018-06-08 RX ADMIN — HYDRALAZINE HYDROCHLORIDE 100 MG: 50 TABLET, FILM COATED ORAL at 13:09

## 2018-06-08 RX ADMIN — GABAPENTIN 300 MG: 300 CAPSULE ORAL at 08:42

## 2018-06-08 RX ADMIN — OXYCODONE HYDROCHLORIDE AND ACETAMINOPHEN 1 TABLET: 10; 325 TABLET ORAL at 06:12

## 2018-06-08 RX ADMIN — PANTOPRAZOLE SODIUM 40 MG: 40 TABLET, DELAYED RELEASE ORAL at 06:12

## 2018-06-08 NOTE — PROGRESS NOTES
"   LOS: 2 days   Patient Care Team:  Yadiel Baxter III, MD as PCP - General (Family Medicine)  Jamie Aviles MD as Consulting Physician (Hematology and Oncology)    Chief Complaint/ Reason for encounter: Acute renal failure, hyperkalemia  Chief Complaint   Patient presents with   • Abnormal Lab     per Dr. Ta's office pt has lab work drawn yesterday. Got a call from office telling pt his K was 10.0 and to come to ED for further eval.          Subjective     Medical history reviewed:  Abnormal Lab   Pertinent negatives include no chest pain, fever or weakness.       Subjective:  Symptoms:  Improved.  No shortness of breath, chest pain or weakness.  (No new complaints today  He feels well, good appetite).    Diet:  Adequate intake.    Activity level: Returning to normal.    Pain:  He reports no pain.          History taken from: Patient and chart    Objective     Vital Signs  Temp:  [98.5 °F (36.9 °C)] 98.5 °F (36.9 °C)  Heart Rate:  [59-64] 59  Resp:  [16-18] 16  BP: (145-160)/(78) 148/78       Wt Readings from Last 1 Encounters:   06/08/18 0648 81.6 kg (180 lb)   06/07/18 0751 82.6 kg (182 lb)   06/06/18 0535 82.9 kg (182 lb 11.2 oz)   06/05/18 2222 83.3 kg (183 lb 11.2 oz)   06/05/18 1851 81.6 kg (180 lb)       Objective:  General Appearance:  Comfortable, well-appearing, in no acute distress and not in pain.    Vital signs: (most recent): Blood pressure 148/78, pulse 59, temperature 98.5 °F (36.9 °C), temperature source Oral, resp. rate 16, height 175.3 cm (69\"), weight 81.6 kg (180 lb), SpO2 96 %.  Vital signs are normal.  No fever.    Output: Producing urine.    HEENT: Normal HEENT exam.    Lungs:  Normal effort and normal respiratory rate.  Breath sounds clear to auscultation.  He is not in respiratory distress.  No rales, wheezes or rhonchi.    Heart: Normal rate.  Regular rhythm.  S1 normal.  No murmur.   Abdomen: Abdomen is soft and non-distended.  Bowel sounds are normal.   There is no " abdominal tenderness.  There is no epigastric area or suprapubic area tenderness.  There is no rebound tenderness.   There is no mass.   Extremities: Normal range of motion.  There is dependent edema.  There is no deformity.  (Trace bilateral lower extremity edema)  Pulses: Distal pulses are intact.    Neurological: Patient is alert and oriented to person, place and time.    Skin:  Warm and dry.  No rash or cyanosis.             Results Review:    Intake/Output:     Intake/Output Summary (Last 24 hours) at 06/08/18 1146  Last data filed at 06/07/18 1540   Gross per 24 hour   Intake           1487.5 ml   Output                0 ml   Net           1487.5 ml         DATA:  Radiology and Labs:  The following labs independently reviewed by me. Additional labs ordered for tomorrow a.m.  Interval notes, chart personally reviewed by me.   Old records independently reviewed showing Chronic kidney disease, baseline creatinine around 1.4  The following radiologic studies independently viewed by me, findings previous CT abdomen pelvis showing an enhancing renal mass  New problems include Uncontrolled hypertension  Discussed with the patient himself      Risk/ complexity of medical care/ medical decision making moderate      Labs:   Recent Results (from the past 24 hour(s))   POC Glucose Once    Collection Time: 06/07/18 12:53 PM   Result Value Ref Range    Glucose 271 (H) 70 - 130 mg/dL   POC Glucose Once    Collection Time: 06/07/18  6:21 PM   Result Value Ref Range    Glucose 214 (H) 70 - 130 mg/dL   POC Glucose Once    Collection Time: 06/07/18  8:30 PM   Result Value Ref Range    Glucose 238 (H) 70 - 130 mg/dL   BNP    Collection Time: 06/08/18  5:39 AM   Result Value Ref Range    proBNP 1,154.0 (H) 5.0 - 900.0 pg/mL   POC Glucose Once    Collection Time: 06/08/18  6:20 AM   Result Value Ref Range    Glucose 120 70 - 130 mg/dL   Renal Function Panel    Collection Time: 06/08/18  8:45 AM   Result Value Ref Range    Glucose  121 (H) 65 - 99 mg/dL    BUN 37 (H) 6 - 20 mg/dL    Creatinine 1.79 (H) 0.76 - 1.27 mg/dL    Sodium 144 136 - 145 mmol/L    Potassium 4.6 3.5 - 5.2 mmol/L    Chloride 111 (H) 98 - 107 mmol/L    CO2 21.0 (L) 22.0 - 29.0 mmol/L    Calcium 9.4 8.6 - 10.5 mg/dL    Albumin 3.50 3.50 - 5.20 g/dL    Phosphorus 5.1 (H) 2.5 - 4.5 mg/dL    Anion Gap 12.0 mmol/L    BUN/Creatinine Ratio 20.7 7.0 - 25.0    eGFR Non African Amer 40 (L) >60 mL/min/1.73   POC Glucose Once    Collection Time: 06/08/18 11:17 AM   Result Value Ref Range    Glucose 201 (H) 70 - 130 mg/dL       Radiology:  Imaging Results (last 24 hours)     ** No results found for the last 24 hours. **             Medications have been reviewed:  Current Facility-Administered Medications   Medication Dose Route Frequency Provider Last Rate Last Dose   • ALPRAZolam (XANAX) tablet 1 mg  1 mg Oral 4x Daily PRN Karl Swift MD   1 mg at 06/07/18 2038   • amLODIPine (NORVASC) tablet 5 mg  5 mg Oral Q24H Yohan Murrell MD       • aspirin chewable tablet 81 mg  81 mg Oral Daily Karl Swift MD   81 mg at 06/08/18 0842   • atorvastatin (LIPITOR) tablet 10 mg  10 mg Oral Daily Karl Swift MD   10 mg at 06/07/18 1105   • carvedilol (COREG) tablet 3.125 mg  3.125 mg Oral Q12H Karl Swift MD   3.125 mg at 06/08/18 0842   • erythromycin (ROMYCIN) ophthalmic ointment   Both Eyes Nightly Karl Swift MD       • gabapentin (NEURONTIN) capsule 300 mg  300 mg Oral TID Karl Swift MD   300 mg at 06/08/18 0842   • hydrALAZINE (APRESOLINE) tablet 100 mg  100 mg Oral Q8H Karl Swift MD   100 mg at 06/08/18 0611   • insulin aspart (novoLOG) injection 0-7 Units  0-7 Units Subcutaneous 4x Daily With Meals & Nightly Karl Swift MD   3 Units at 06/07/18 6542   • insulin detemir (LEVEMIR) injection 20 Units  20 Units Subcutaneous Nightly Karl Swift MD   20 Units at 06/07/18 5746   • oxyCODONE-acetaminophen (PERCOCET)  MG per tablet 1 tablet  1 tablet Oral 4x Daily PRN Karl  MD Jeniffer   1 tablet at 06/08/18 0612   • pantoprazole (PROTONIX) EC tablet 40 mg  40 mg Oral Q AM Karl Swift MD   40 mg at 06/08/18 0612   • promethazine (PHENERGAN) tablet 12.5 mg  12.5 mg Oral 5x Daily PRN Karl Swift MD   12.5 mg at 06/08/18 0842   • tiZANidine (ZANAFLEX) tablet 4 mg  4 mg Oral Q8H PRN Karl Swift MD   4 mg at 06/07/18 2042       ASSESSMENT:  acute renal failure on chronic kidney disease, Was at baseline yesterday a bit worse today  Stage III chronic kidney disease  Renal mass, eventual need for nephrectomy, follows with urology, scheduled for next month  Anemia of chronic kidney disease  Hyperkalemia, improved  Cirrhosis from hepatitis C  Chronic thrombocytopenia  Chronic diastolic congestive heart failure, euvolemic on exam   Proteinuria, from diabetic renal disease   New, uncontrolled hypertension     PLAN:   Renal function a touch worse today  Hyperkalemia has improved  Continue to hold lisinopril at discharge  Continue low Potassium diet  Hold diuretics for now since his renal function is a bit worse today    Eventual partial nephrectomy for likely renal cell carcinoma, per urology next month  Urine studies unremarkable other than proteinuria (from diabetic renal disease)  Stable for discharge today from a renal standpoint  Recheck a BMP next week  Follow-up with me after his nephrectomy next month       Yohan Murrell MD   [facility]   Office phone number: [phone]  Answering service phone number: [phone]    06/08/18  11:46 AM      Dictation performed using Dragon dictation software

## 2018-06-08 NOTE — PROGRESS NOTES
Continued Stay Note  [facility]     Patient Name: Angelo Brown  MRN: [id number]  Today's Date: 6/8/2018    Admit Date: 6/5/2018          Discharge Plan     Row Name 06/08/18 0940       Plan    Plan Home     Patient/Family in Agreement with Plan yes    Plan Comments CCP met with patient at bedside. Patient confirmed plan is to return home. Patient does not feel like home health is needed. Patient has been ambulating around the halls and room. Patient has a cane at home he uses as needed. Patient has transportaiton home. CCP will follow for any needs to arise. Desire [facility]               Discharge Codes    No documentation.           [facility] [place]

## 2018-06-08 NOTE — DISCHARGE SUMMARY
Discharge summary    Date of admission 6/5/2018  Date of discharge 6/8/2018    Final diagnosis  Severe hyperkalemia  Acute kidney injury  Chronic kidney disease stage III  Chronic anemia  Diabetes mellitus  Hypertension  Hyperlipidemia  Gastroesophageal reflux disease  Hepatitis B  Anxiety disorder  Depression    Discharge medications    Current Facility-Administered Medications:   •  amLODIPine (NORVASC) tablet 5 mg, 5 mg, Oral, Q24H, Yohan Murrell MD  •  aspirin chewable tablet 81 mg, 81 mg, Oral, Daily, Karl Swift MD, 81 mg at 06/08/18 0842  •  atorvastatin (LIPITOR) tablet 10 mg, 10 mg, Oral, Daily, Karl Swift MD, 10 mg at 06/07/18 1105  •  carvedilol (COREG) tablet 3.125 mg, 3.125 mg, Oral, Q12H, Karl Swift MD, 3.125 mg at 06/08/18 0842  •  erythromycin (ROMYCIN) ophthalmic ointment, , Both Eyes, Nightly, Karl Swift MD  •  gabapentin (NEURONTIN) capsule 300 mg, 300 mg, Oral, TID, Karl Swift MD, 300 mg at 06/08/18 0842  •  hydrALAZINE (APRESOLINE) tablet 100 mg, 100 mg, Oral, Q8H, Karl Swift MD, 100 mg at 06/08/18 0611  •  insulin detemir (LEVEMIR) injection 20 Units, 20 Units, Subcutaneous, Nightly, Karl Swift MD, 20 Units at 06/07/18 2353  •  pantoprazole (PROTONIX) EC tablet 40 mg, 40 mg, Oral, Q AM, Karl Swift MD, 40 mg at 06/08/18 0612     Consult obtained  Nephrology  Nutrition    Procedures  None    Hospital course  57-year-old white male with history of COPD coronary artery disease congestive heart failure hypertension hyperlipidemia hepatitis B anxiety depression also have partial nephrectomy in the past probably renal cell carcinoma admitted through emergency room with nausea and generalized weakness and abnormal labs.  Patient workup revealed severe hyperkalemia and acute kidney injury admitted for management.  Patient admitted his hyperkalemia corrected his acute kidney injury resolved he is followed by nephrology his kidney function is at the baseline his medication adjusted  he was taking ACE inhibitor which is stopped his blood pressure stabilized with hydralazine and Norvasc.  Time of discharge his potassium 4.6 BUN 37 creatinine 1.79.  His blood sugar is 120.  Patient wants to go home.    Discharge diet regular    Activity as tolerated    Medication as above    Follow-up with primary doctor in 1 week and follow with nephrology per the instruction and take medication as directed    ARIEL MCGOVERN MD

## 2018-06-08 NOTE — PROGRESS NOTES
"Daily progress note    Chief complaint  Doing better  No new complaints    History of present illness  57-year-old white male with history of COPD coronary artery disease congestive heart failure diabetes hypertension hyperlipidemia hepatitis B anxiety depression presented to [facility], [place], operated by [facility] emergency room with abnormal labs.  Patient found to have severe hyperkalemia admitted for management.  Patient stated that he has been eating a lot of bananas.  Patient complain of nausea and generalized weakness denies any headache dizziness chest pain shortness of breath abdominal pain vomiting diarrhea.  Patient treated for hypokalemia and admitted for management     REVIEW OF SYSTEMS  Review of Systems   Constitutional: Negative for chills.   HENT: Negative for congestion, rhinorrhea and sore throat.    Eyes: Negative for pain.   Respiratory: Negative for cough and shortness of breath.    Cardiovascular: Negative for chest pain, palpitations and leg swelling.   Gastrointestinal: Negative for abdominal pain, diarrhea and vomiting.   Genitourinary: Negative for difficulty urinating, dysuria, flank pain and frequency.   Musculoskeletal: Negative for myalgias, neck pain and neck stiffness.   Skin: Negative for rash.   Neurological: Negative for dizziness, speech difficulty, weakness, light-headedness, numbness and headaches.   Psychiatric/Behavioral: Negative.    All other systems reviewed and are negative.     PHYSICAL EXAM  Blood pressure 148/78, pulse 59, temperature 98.5 °F (36.9 °C), temperature source Oral, resp. rate 16, height 175.3 cm (69\"), weight 81.6 kg (180 lb), SpO2 96 %.    Constitutional: He is oriented to person, place, and time. No distress.   Head: Normocephalic.   Mouth/Throat: Mucous membranes are normal.   Eyes: EOM are normal. Pupils are equal, round, and reactive to light.   Neck: Normal range of motion. Neck supple.   Cardiovascular: Normal rate, regular rhythm and normal heart sounds.  "   Pulmonary/Chest: Effort normal and breath sounds normal. No respiratory distress. He has no decreased breath sounds. He has no wheezes. He has no rhonchi. He has no rales.   Abdominal: Soft. There is no tenderness. There is no rebound and no guarding.   Musculoskeletal: Normal range of motion.   Neurological: He is alert and oriented to person, place, and time. He has normal sensation.   Skin: Skin is warm and dry.   Psychiatric: Mood and affect normal.      LAB RESULTS  Lab Results (last 24 hours)     Procedure Component Value Units Date/Time    POC Glucose Once [[id number]]  (Abnormal) Collected:  06/08/18 1117    Specimen:  Blood Updated:  06/08/18 1119     Glucose 201 (H) mg/dL     Narrative:       Meter: [id number] : [id number] Michael KIM    Renal Function Panel [[id number]]  (Abnormal) Collected:  06/08/18 0845    Specimen:  Blood Updated:  06/08/18 1008     Glucose 121 (H) mg/dL      BUN 37 (H) mg/dL      Creatinine 1.79 (H) mg/dL      Sodium 144 mmol/L      Potassium 4.6 mmol/L      Chloride 111 (H) mmol/L      CO2 21.0 (L) mmol/L      Calcium 9.4 mg/dL      Albumin 3.50 g/dL      Phosphorus 5.1 (H) mg/dL      Anion Gap 12.0 mmol/L      BUN/Creatinine Ratio 20.7     eGFR Non African Amer 40 (L) mL/min/1.73     BNP [[id number]]  (Abnormal) Collected:  06/08/18 0539    Specimen:  Blood Updated:  06/08/18 0721     proBNP 1,154.0 (H) pg/mL     Narrative:       Among patients with dyspnea, NT-proBNP is highly sensitive for the detection of acute congestive heart failure. In addition NT-proBNP of <300 pg/ml effectively rules out acute congestive heart failure with 99% negative predictive value.    POC Glucose Once [[id number]]  (Normal) Collected:  06/08/18 0620    Specimen:  Blood Updated:  06/08/18 0622     Glucose 120 mg/dL     Narrative:       Meter: [id number] : [id number] Princess KIM    POC Glucose Once [[id number]]  (Abnormal) Collected:  06/07/18 2030    Specimen:  Blood Updated:  06/07/18  2032     Glucose 238 (H) mg/dL     Narrative:       Meter: [id number] : [id number] Princess Gale JULIO    POC Glucose Once [[id number]]  (Abnormal) Collected:  06/07/18 1821    Specimen:  Blood Updated:  06/07/18 1823     Glucose 214 (H) mg/dL     Narrative:       Meter: [id number] : [id number] Gregory HERNANDEZ RN    POC Glucose Once [[id number]]  (Abnormal) Collected:  06/07/18 1253    Specimen:  Blood Updated:  06/07/18 1254     Glucose 271 (H) mg/dL     Narrative:       Meter: [id number] : [id number] Blade KIM        Imaging Results (last 24 hours)     ** No results found for the last 24 hours. **        EKG:                             Rhythm/Rate: Sinus bradycardia rate 59  P waves and AR: Normal P waves  QRS, axis: Normal QRS   ST and T waves: T wave inversions in III, aVF       Current Facility-Administered Medications:   •  ALPRAZolam (XANAX) tablet 1 mg, 1 mg, Oral, 4x Daily PRN, Karl Swift MD, 1 mg at 06/07/18 2038  •  amLODIPine (NORVASC) tablet 5 mg, 5 mg, Oral, Q24H, Yohan Murrell MD  •  aspirin chewable tablet 81 mg, 81 mg, Oral, Daily, Karl Swift MD, 81 mg at 06/08/18 0842  •  atorvastatin (LIPITOR) tablet 10 mg, 10 mg, Oral, Daily, Karl Swift MD, 10 mg at 06/07/18 1105  •  carvedilol (COREG) tablet 3.125 mg, 3.125 mg, Oral, Q12H, Karl Swift MD, 3.125 mg at 06/08/18 0842  •  erythromycin (ROMYCIN) ophthalmic ointment, , Both Eyes, Nightly, Karl Swift MD  •  gabapentin (NEURONTIN) capsule 300 mg, 300 mg, Oral, TID, Karl Swift MD, 300 mg at 06/08/18 0842  •  hydrALAZINE (APRESOLINE) tablet 100 mg, 100 mg, Oral, Q8H, Karl Swift MD, 100 mg at 06/08/18 0611  •  insulin aspart (novoLOG) injection 0-7 Units, 0-7 Units, Subcutaneous, 4x Daily With Meals & Nightly, Karl Swift MD, 3 Units at 06/07/18 2352  •  insulin detemir (LEVEMIR) injection 20 Units, 20 Units, Subcutaneous, Nightly, Karl Swift MD, 20 Units at 06/07/18 2359  •  oxyCODONE-acetaminophen (PERCOCET)  MG  per tablet 1 tablet, 1 tablet, Oral, 4x Daily PRN, Ariel Swift MD, 1 tablet at 06/08/18 0612  •  pantoprazole (PROTONIX) EC tablet 40 mg, 40 mg, Oral, Q AM, Ariel Swift MD, 40 mg at 06/08/18 0612  •  promethazine (PHENERGAN) tablet 12.5 mg, 12.5 mg, Oral, 5x Daily PRN, Ariel Swift MD, 12.5 mg at 06/08/18 0842  •  tiZANidine (ZANAFLEX) tablet 4 mg, 4 mg, Oral, Q8H PRN, Ariel Swift MD, 4 mg at 06/07/18 2042     ASSESSMENT  Severe hyperkalemia  Acute kidney injury  Chronic kidney disease stage III  Chronic anemia  Diabetes mellitus  Hypertension  Hyperlipidemia  Gastroesophageal reflux disease  Hepatitis B  Anxiety disorder  Depression    PLAN  Discharge home  Discharge summary dictated    ARIEL SWIFT MD

## 2018-06-08 NOTE — PLAN OF CARE
Problem: Patient Care Overview  Goal: Plan of Care Review  Outcome: Ongoing (interventions implemented as appropriate)   06/08/18 0526   Coping/Psychosocial   Plan of Care Reviewed With patient   Plan of Care Review   Progress no change   OTHER   Outcome Summary Pt doing a lot better tonight. Not as irritated or uncooperative. B/P elevated. Po medications given. No other issues currently. Will continue to monitor       Problem: Fall Risk (Adult)  Goal: Identify Related Risk Factors and Signs and Symptoms  Outcome: Ongoing (interventions implemented as appropriate)   06/08/18 0526   Fall Risk (Adult)   Related Risk Factors (Fall Risk) environment unfamiliar;gait/mobility problems;history of falls   Signs and Symptoms (Fall Risk) presence of risk factors     Goal: Absence of Fall  Outcome: Ongoing (interventions implemented as appropriate)   06/08/18 0526   Fall Risk (Adult)   Absence of Fall making progress toward outcome       Problem: Pain, Chronic (Adult)  Goal: Identify Related Risk Factors and Signs and Symptoms  Outcome: Ongoing (interventions implemented as appropriate)   06/08/18 0526   Pain, Chronic (Adult)   Related Risk Factors (Chronic Pain) physical disability;disease process   Signs and Symptoms (Chronic Pain) verbalization of pain descriptors     Goal: Acceptable Pain/Comfort Level and Functional Ability  Outcome: Ongoing (interventions implemented as appropriate)   06/08/18 0526   Pain, Chronic (Adult)   Acceptable Pain/Comfort Level and Functional Ability making progress toward outcome

## 2018-06-08 NOTE — PLAN OF CARE
Problem: Patient Care Overview  Goal: Plan of Care Review  Outcome: Ongoing (interventions implemented as appropriate)   06/07/18 2050   Coping/Psychosocial   Plan of Care Reviewed With patient   Plan of Care Review   Progress improving   OTHER   Outcome Summary VSS, no complaints. Fluids stopped. Safety maintained, continue to monitor.        Problem: Fall Risk (Adult)  Goal: Absence of Fall  Outcome: Ongoing (interventions implemented as appropriate)

## 2018-07-31 ENCOUNTER — LAB (OUTPATIENT)
Dept: LAB | Facility: HOSPITAL | Age: 58
End: 2018-07-31

## 2018-07-31 ENCOUNTER — OFFICE VISIT (OUTPATIENT)
Dept: ONCOLOGY | Facility: CLINIC | Age: 58
End: 2018-07-31

## 2018-07-31 VITALS
OXYGEN SATURATION: 97 % | TEMPERATURE: 98.1 F | HEIGHT: 67 IN | WEIGHT: 187.8 LBS | RESPIRATION RATE: 16 BRPM | BODY MASS INDEX: 29.47 KG/M2 | SYSTOLIC BLOOD PRESSURE: 140 MMHG | HEART RATE: 64 BPM | DIASTOLIC BLOOD PRESSURE: 74 MMHG

## 2018-07-31 DIAGNOSIS — E53.8 B12 DEFICIENCY: ICD-10-CM

## 2018-07-31 DIAGNOSIS — C64.1 CANCER OF KIDNEY PARENCHYMA, RIGHT (HCC): ICD-10-CM

## 2018-07-31 DIAGNOSIS — IMO0001 IRON AND ITS COMPOUNDS CAUSING ADVERSE EFFECT IN THERAPEUTIC USE, SUBSEQUENT ENCOUNTER: ICD-10-CM

## 2018-07-31 DIAGNOSIS — D70.8 OTHER NEUTROPENIA (HCC): ICD-10-CM

## 2018-07-31 DIAGNOSIS — D50.8 IRON DEFICIENCY ANEMIA SECONDARY TO INADEQUATE DIETARY IRON INTAKE: ICD-10-CM

## 2018-07-31 DIAGNOSIS — N18.30 ANEMIA IN STAGE 3 CHRONIC KIDNEY DISEASE (HCC): Primary | ICD-10-CM

## 2018-07-31 DIAGNOSIS — D63.1 ANEMIA IN STAGE 3 CHRONIC KIDNEY DISEASE (HCC): ICD-10-CM

## 2018-07-31 DIAGNOSIS — D61.818 PANCYTOPENIA, ACQUIRED (HCC): ICD-10-CM

## 2018-07-31 DIAGNOSIS — D63.1 ANEMIA IN STAGE 3 CHRONIC KIDNEY DISEASE (HCC): Primary | ICD-10-CM

## 2018-07-31 DIAGNOSIS — Z86.19 HISTORY OF HEPATITIS C VIRUS INFECTION: ICD-10-CM

## 2018-07-31 DIAGNOSIS — D69.6 THROMBOCYTOPENIA (HCC): ICD-10-CM

## 2018-07-31 DIAGNOSIS — N18.30 ANEMIA IN STAGE 3 CHRONIC KIDNEY DISEASE (HCC): ICD-10-CM

## 2018-07-31 LAB
BASOPHILS # BLD AUTO: 0.04 10*3/MM3 (ref 0–0.1)
BASOPHILS NFR BLD AUTO: 0.8 % (ref 0–1.1)
DEPRECATED RDW RBC AUTO: 48.5 FL (ref 37–49)
EOSINOPHIL # BLD AUTO: 0.25 10*3/MM3 (ref 0–0.36)
EOSINOPHIL NFR BLD AUTO: 4.9 % (ref 1–5)
ERYTHROCYTE [DISTWIDTH] IN BLOOD BY AUTOMATED COUNT: 13.6 % (ref 11.7–14.5)
FERRITIN SERPL-MCNC: 194.4 NG/ML (ref 30–400)
HCT VFR BLD AUTO: 31.9 % (ref 40–49)
HGB BLD-MCNC: 10 G/DL (ref 13.5–16.5)
HGB RETIC QN: 33.4 PG (ref 29.8–36.1)
IMM GRANULOCYTES # BLD: 0.02 10*3/MM3 (ref 0–0.03)
IMM GRANULOCYTES NFR BLD: 0.4 % (ref 0–0.5)
IMM RETICS NFR: 13.4 % (ref 3–15.8)
IRON 24H UR-MRATE: 51 MCG/DL (ref 59–158)
IRON SATN MFR SERPL: 18 % (ref 14–48)
LYMPHOCYTES # BLD AUTO: 1.31 10*3/MM3 (ref 1–3.5)
LYMPHOCYTES NFR BLD AUTO: 25.7 % (ref 20–49)
MCH RBC QN AUTO: 30.4 PG (ref 27–33)
MCHC RBC AUTO-ENTMCNC: 31.3 G/DL (ref 32–35)
MCV RBC AUTO: 97 FL (ref 83–97)
MONOCYTES # BLD AUTO: 0.35 10*3/MM3 (ref 0.25–0.8)
MONOCYTES NFR BLD AUTO: 6.9 % (ref 4–12)
NEUTROPHILS # BLD AUTO: 3.12 10*3/MM3 (ref 1.5–7)
NEUTROPHILS NFR BLD AUTO: 61.3 % (ref 39–75)
NRBC BLD MANUAL-RTO: 0 /100 WBC (ref 0–0)
PLATELET # BLD AUTO: 115 10*3/MM3 (ref 150–375)
PMV BLD AUTO: 11.2 FL (ref 8.9–12.1)
RBC # BLD AUTO: 3.29 10*6/MM3 (ref 4.3–5.5)
RETICS/RBC NFR AUTO: 3.33 % (ref 0.6–2)
TIBC SERPL-MCNC: 288 MCG/DL (ref 249–505)
TRANSFERRIN SERPL-MCNC: 206 MG/DL (ref 200–360)
WBC NRBC COR # BLD: 5.09 10*3/MM3 (ref 4–10)

## 2018-07-31 PROCEDURE — 99214 OFFICE O/P EST MOD 30 MIN: CPT | Performed by: INTERNAL MEDICINE

## 2018-07-31 PROCEDURE — 85025 COMPLETE CBC W/AUTO DIFF WBC: CPT | Performed by: INTERNAL MEDICINE

## 2018-07-31 PROCEDURE — 82728 ASSAY OF FERRITIN: CPT | Performed by: INTERNAL MEDICINE

## 2018-07-31 PROCEDURE — 85046 RETICYTE/HGB CONCENTRATE: CPT | Performed by: INTERNAL MEDICINE

## 2018-07-31 PROCEDURE — 36415 COLL VENOUS BLD VENIPUNCTURE: CPT | Performed by: INTERNAL MEDICINE

## 2018-07-31 PROCEDURE — 84466 ASSAY OF TRANSFERRIN: CPT | Performed by: INTERNAL MEDICINE

## 2018-07-31 PROCEDURE — 83540 ASSAY OF IRON: CPT | Performed by: INTERNAL MEDICINE

## 2018-07-31 RX ORDER — PANTOPRAZOLE SODIUM 40 MG/1
40 GRANULE, DELAYED RELEASE ORAL
COMMUNITY
End: 2019-03-27 | Stop reason: HOSPADM

## 2018-07-31 RX ORDER — BUMETANIDE 1 MG/1
TABLET ORAL
COMMUNITY
Start: 2018-03-29 | End: 2019-03-27 | Stop reason: HOSPADM

## 2018-07-31 RX ORDER — LANCETS 28 GAUGE
EACH MISCELLANEOUS
COMMUNITY
Start: 2018-07-02 | End: 2019-07-26

## 2018-07-31 RX ORDER — CARVEDILOL 3.12 MG/1
3.12 TABLET ORAL 2 TIMES DAILY
COMMUNITY
End: 2022-12-29 | Stop reason: HOSPADM

## 2018-07-31 RX ORDER — HYDRALAZINE HYDROCHLORIDE 100 MG/1
100 TABLET, FILM COATED ORAL 2 TIMES DAILY
Status: ON HOLD | COMMUNITY
End: 2022-08-19 | Stop reason: ALTCHOICE

## 2018-07-31 RX ORDER — AMLODIPINE BESYLATE 5 MG/1
5 TABLET ORAL
COMMUNITY
Start: 2018-07-08 | End: 2018-08-07

## 2018-08-01 ENCOUNTER — TELEPHONE (OUTPATIENT)
Dept: ONCOLOGY | Facility: HOSPITAL | Age: 58
End: 2018-08-01

## 2018-08-01 NOTE — TELEPHONE ENCOUNTER
----- Message from Jamie Aviles MD sent at 8/1/2018  1:25 PM EDT -----  Call him or wife iron level is fine no need for infusions

## 2018-10-23 ENCOUNTER — LAB (OUTPATIENT)
Dept: LAB | Facility: HOSPITAL | Age: 58
End: 2018-10-23

## 2018-10-23 ENCOUNTER — OFFICE VISIT (OUTPATIENT)
Dept: ONCOLOGY | Facility: CLINIC | Age: 58
End: 2018-10-23

## 2018-10-23 ENCOUNTER — DOCUMENTATION (OUTPATIENT)
Dept: ONCOLOGY | Facility: CLINIC | Age: 58
End: 2018-10-23

## 2018-10-23 VITALS
BODY MASS INDEX: 28.79 KG/M2 | RESPIRATION RATE: 12 BRPM | WEIGHT: 183.4 LBS | TEMPERATURE: 98 F | HEIGHT: 67 IN | OXYGEN SATURATION: 98 % | DIASTOLIC BLOOD PRESSURE: 86 MMHG | HEART RATE: 67 BPM | SYSTOLIC BLOOD PRESSURE: 156 MMHG

## 2018-10-23 DIAGNOSIS — D61.818 PANCYTOPENIA, ACQUIRED (HCC): ICD-10-CM

## 2018-10-23 DIAGNOSIS — D63.1 ANEMIA IN STAGE 3 CHRONIC KIDNEY DISEASE (HCC): ICD-10-CM

## 2018-10-23 DIAGNOSIS — D70.8 OTHER NEUTROPENIA (HCC): ICD-10-CM

## 2018-10-23 DIAGNOSIS — D69.6 THROMBOCYTOPENIA (HCC): ICD-10-CM

## 2018-10-23 DIAGNOSIS — E53.8 B12 DEFICIENCY: ICD-10-CM

## 2018-10-23 DIAGNOSIS — D63.1 ANEMIA IN STAGE 3 CHRONIC KIDNEY DISEASE (HCC): Primary | ICD-10-CM

## 2018-10-23 DIAGNOSIS — C64.1 CANCER OF KIDNEY PARENCHYMA, RIGHT (HCC): ICD-10-CM

## 2018-10-23 DIAGNOSIS — D50.8 IRON DEFICIENCY ANEMIA SECONDARY TO INADEQUATE DIETARY IRON INTAKE: ICD-10-CM

## 2018-10-23 DIAGNOSIS — N18.30 ANEMIA IN STAGE 3 CHRONIC KIDNEY DISEASE (HCC): ICD-10-CM

## 2018-10-23 DIAGNOSIS — N18.30 ANEMIA IN STAGE 3 CHRONIC KIDNEY DISEASE (HCC): Primary | ICD-10-CM

## 2018-10-23 PROBLEM — D72.819 LEUKOPENIA: Status: RESOLVED | Noted: 2018-03-05 | Resolved: 2018-10-23

## 2018-10-23 LAB
ALBUMIN SERPL-MCNC: 3.6 G/DL (ref 3.5–5.2)
ALBUMIN/GLOB SERPL: 1.4 G/DL (ref 1.1–2.4)
ALP SERPL-CCNC: 68 U/L (ref 38–116)
ALT SERPL W P-5'-P-CCNC: 10 U/L (ref 0–41)
ANION GAP SERPL CALCULATED.3IONS-SCNC: 11.9 MMOL/L
AST SERPL-CCNC: 16 U/L (ref 0–40)
BASOPHILS # BLD AUTO: 0.03 10*3/MM3 (ref 0–0.1)
BASOPHILS NFR BLD AUTO: 0.5 % (ref 0–1.1)
BILIRUB SERPL-MCNC: 0.3 MG/DL (ref 0.1–1.2)
BUN BLD-MCNC: 32 MG/DL (ref 6–20)
BUN/CREAT SERPL: 16 (ref 7.3–30)
CALCIUM SPEC-SCNC: 9 MG/DL (ref 8.5–10.2)
CHLORIDE SERPL-SCNC: 103 MMOL/L (ref 98–107)
CO2 SERPL-SCNC: 26.1 MMOL/L (ref 22–29)
CREAT BLD-MCNC: 2 MG/DL (ref 0.7–1.3)
DEPRECATED RDW RBC AUTO: 46.1 FL (ref 37–49)
EOSINOPHIL # BLD AUTO: 0.32 10*3/MM3 (ref 0–0.36)
EOSINOPHIL NFR BLD AUTO: 5.8 % (ref 1–5)
ERYTHROCYTE [DISTWIDTH] IN BLOOD BY AUTOMATED COUNT: 13.7 % (ref 11.7–14.5)
FERRITIN SERPL-MCNC: 159.1 NG/ML (ref 30–400)
GFR SERPL CREATININE-BSD FRML MDRD: 35 ML/MIN/1.73
GLOBULIN UR ELPH-MCNC: 2.6 GM/DL (ref 1.8–3.5)
GLUCOSE BLD-MCNC: 310 MG/DL (ref 74–124)
HCT VFR BLD AUTO: 33.7 % (ref 40–49)
HGB BLD-MCNC: 11 G/DL (ref 13.5–16.5)
HGB RETIC QN: 33.6 PG (ref 29.8–36.1)
IMM GRANULOCYTES # BLD: 0.01 10*3/MM3 (ref 0–0.03)
IMM GRANULOCYTES NFR BLD: 0.2 % (ref 0–0.5)
IMM RETICS NFR: 5.7 % (ref 3–15.8)
IRON 24H UR-MRATE: 61 MCG/DL (ref 59–158)
IRON SATN MFR SERPL: 21 % (ref 14–48)
LYMPHOCYTES # BLD AUTO: 1.28 10*3/MM3 (ref 1–3.5)
LYMPHOCYTES NFR BLD AUTO: 23 % (ref 20–49)
MCH RBC QN AUTO: 29.9 PG (ref 27–33)
MCHC RBC AUTO-ENTMCNC: 32.6 G/DL (ref 32–35)
MCV RBC AUTO: 91.6 FL (ref 83–97)
MONOCYTES # BLD AUTO: 0.42 10*3/MM3 (ref 0.25–0.8)
MONOCYTES NFR BLD AUTO: 7.6 % (ref 4–12)
NEUTROPHILS # BLD AUTO: 3.5 10*3/MM3 (ref 1.5–7)
NEUTROPHILS NFR BLD AUTO: 62.9 % (ref 39–75)
NRBC BLD MANUAL-RTO: 0 /100 WBC (ref 0–0)
PLATELET # BLD AUTO: 103 10*3/MM3 (ref 150–375)
PMV BLD AUTO: 11.4 FL (ref 8.9–12.1)
POTASSIUM BLD-SCNC: 4.6 MMOL/L (ref 3.5–4.7)
PROT SERPL-MCNC: 6.2 G/DL (ref 6.3–8)
RBC # BLD AUTO: 3.68 10*6/MM3 (ref 4.3–5.5)
RETICS/RBC NFR AUTO: 2.31 % (ref 0.6–2)
SODIUM BLD-SCNC: 141 MMOL/L (ref 134–145)
TIBC SERPL-MCNC: 287 MCG/DL (ref 249–505)
TRANSFERRIN SERPL-MCNC: 205 MG/DL (ref 200–360)
VIT B12 BLD-MCNC: 577 PG/ML (ref 211–946)
WBC NRBC COR # BLD: 5.56 10*3/MM3 (ref 4–10)

## 2018-10-23 PROCEDURE — 82728 ASSAY OF FERRITIN: CPT | Performed by: INTERNAL MEDICINE

## 2018-10-23 PROCEDURE — 36415 COLL VENOUS BLD VENIPUNCTURE: CPT | Performed by: INTERNAL MEDICINE

## 2018-10-23 PROCEDURE — 85025 COMPLETE CBC W/AUTO DIFF WBC: CPT | Performed by: INTERNAL MEDICINE

## 2018-10-23 PROCEDURE — 84466 ASSAY OF TRANSFERRIN: CPT | Performed by: INTERNAL MEDICINE

## 2018-10-23 PROCEDURE — 85046 RETICYTE/HGB CONCENTRATE: CPT | Performed by: INTERNAL MEDICINE

## 2018-10-23 PROCEDURE — 83540 ASSAY OF IRON: CPT | Performed by: INTERNAL MEDICINE

## 2018-10-23 PROCEDURE — 80053 COMPREHEN METABOLIC PANEL: CPT | Performed by: INTERNAL MEDICINE

## 2018-10-23 PROCEDURE — 99214 OFFICE O/P EST MOD 30 MIN: CPT | Performed by: INTERNAL MEDICINE

## 2018-10-23 PROCEDURE — 82607 VITAMIN B-12: CPT | Performed by: INTERNAL MEDICINE

## 2018-10-23 RX ORDER — LISINOPRIL 10 MG/1
TABLET ORAL
COMMUNITY
End: 2019-03-27 | Stop reason: HOSPADM

## 2018-10-23 RX ORDER — AMLODIPINE BESYLATE 5 MG/1
5 TABLET ORAL EVERY MORNING
COMMUNITY
Start: 2018-07-08 | End: 2019-10-21

## 2018-10-23 RX ORDER — INSULIN GLARGINE 100 [IU]/ML
10 INJECTION, SOLUTION SUBCUTANEOUS NIGHTLY
COMMUNITY
Start: 2018-09-06 | End: 2019-03-27 | Stop reason: HOSPADM

## 2018-10-23 NOTE — PROGRESS NOTES
[facility] financial assistance application given to the patient and his wife. They are very overwhelmed with medical bills, and both live on disability. They have bills from the hospital and our office. LCSW reviewed how to complete the form, and what documentation they need to provide. Pt's wife was tearful, and said she has been so worried.

## 2018-10-23 NOTE — PROGRESS NOTES
Subjective     REASON FOR FOLLOW UP: PANCYTOPENIA, CHRONIC KIDNEY DISEASE STAGE III, HISTORY OF TREATED HEPATITIS C.     History of Present Illness This patient is a 57-year-old white male who has been referred to us today because it has been noticed that in laboratory assessment recently done the patient has leukopenia, anemia and thrombocytopenia. The question was raised about the nature of this in the background of previous history of hepatitis C infection, status post definitive 3-month antiviral therapy by hepatologist. The patient completed this treatment in 10/2017. The family, the wife, son-in-law and daughter are with the patient today stating that since the stroke in 12/2017, the patient has lost ground very substantially. They find him disoriented in the house with tremendous degree of somnolence and significant decrease in appetite. He has not had any fevers, chills or bleeding but he has terrible situation with his vision with marked decrease in the vision, unable to read too much and barely able to watch the television. His appetite has been acceptable. The wife has been trying to help him to control diabetes in the best way possible. The patient also has difficulty swallowing with no obvious reason for this besides trauma that he had a long time ago in the esophagus. He has had occasional regurgitation. No heartburn and things choke on him time to time. This became worse since the stroke. He has not had any hematemesis. He has not had any obvious aspiration as far as they can tell. The patient denies any cough or sputum production. He has not had any chest pain or palpitation. He had proper bowel activity and no obvious passage of blood in the stool. Urination is ongoing with no frequency, urgency or hematuria. No incontinence. He has profound pain in the lower extremities associated with peripheral neuropathy associated with diabetes. He has difficulty with his balance and he is walking with a  cane. He has not had any falls. He has not had any episodes of hypoglycemia. He has tremendous degree of somnolence given the medications that he is receiving at this time and he remains like a sleepyhead most of the time in the house. He has not had any abnormal movements or seizure activity. He has had significant issues in regard to his balance as stated above since the stroke and significant decrease in his vision as well.     Still with all these issues, the patient tries to participate in the life of the family in the best way possible. The patient has been disabled since 2000.  The patient was brought back to the office in company of family members in order to review issues about his hematological workup. On this occasion, the patient was more participative. He was less sunk down by the pain medication that he was taking the day of the 1st visit. This day the need for this patient to have formal education and teaching about diabetes care and management given the fact that he eats excessively and his wife is not able to keep up calorie production all day long and insulin control all day long was discussed also.    The patient returned to the office on 05/30/2018 along with his wife stating that he continues working and controlling his diabetes even though he is extremely hardheaded in regard to the timing of his insulin dosing. His wife discussed these facts with him. The patient denies any significant in crescendo fatigue. His appetite is terrific. He is still eating junk food abundantly and plenty of calories. Most of the sugar controls are in the 250 category when he checks them. He has not had any recent infections. Hernán Charlton MD will proceed eventually with the removal of the upper pole of the right kidney where there is a located tumor that is probably a representation of a kidney cancer.     The patient denies any bowel alteration at this time. No heartburn, no indigestion, no nausea, no  vomiting. He has no difficulty with urination.    The patient returned to the office on 07/31/2018 in company of his wife stating the he has gone through his partial nephrectomy on the right side at [facility] several weeks ago and he had no complications from this. He has not heard anything from the surgeon in regard to the pathological diagnosis. He has no hematuria. His urination is ongoing and he has no swelling in his lower extremities. As usual he continues eating anything that he wants with no discrimination in regard to foods. His glucose control as usual is crazy and they do not even want to talk about it. He has a tremendous appetite, normal bowel activity, normal urination. No new infections. No bleeding or any other new issues.         Past Medical History:   Diagnosis Date   • Anemia    • Anxiety    • CAD (coronary artery disease)    • CHF (congestive heart failure) (CMS/HCC)    • Chronic back pain    • Chronic kidney disease    • COPD (chronic obstructive pulmonary disease) (CMS/HCC)    • Coronary artery disease    • Depression    • Diabetes mellitus (CMS/HCC)    • GERD (gastroesophageal reflux disease)    • Headache    • Hepatitis B 2013    from transfusion for CABG   • Hypertension    • Jaundice    • Myocardial infarction (CMS/HCC)     5-6 years ago per pt   • Seizures (CMS/HCC)     PT REPORTS NO SEIZURES   • Stroke (CMS/HCC) 12/2017        Past Surgical History:   Procedure Laterality Date   • BRAIN HEMATOMA EVACUATION     • CARDIAC SURGERY     • CATARACT EXTRACTION, BILATERAL     • COLONOSCOPY     • CORONARY ARTERY BYPASS GRAFT  2013    Triple   • ROTATOR CUFF REPAIR Left    • VASECTOMY  1985   • WOUND DEBRIDEMENT Right 06/10/2011    Excisional debridement of necrotizing fasciitis of the right groin extending along the right hemiscrotum, 5 x 2 x 2 cm in one wound and 7.5 x 2.5 x 2.5 cm of a second wound. This was sharp excisional debridement carried out to the muscle-  Eduardo Cross         Current Outpatient Prescriptions on File Prior to Visit   Medication Sig Dispense Refill   • ALPRAZolam (XANAX) 1 MG tablet Take 1 mg by mouth 4 (Four) Times a Day As Needed.     • bumetanide (BUMEX) 1 MG tablet as needed     • carvedilol (COREG) 3.125 MG tablet Take 3.125 mg by mouth.     • erythromycin (ROMYCIN) 5 MG/GM ophthalmic ointment Administer 1 application to both eyes Every Night.     • gabapentin (NEURONTIN) 300 MG capsule Take 300 mg by mouth 3 (Three) Times a Day As Needed.     • hydrALAZINE (APRESOLINE) 100 MG tablet Take 100 mg by mouth.     • insulin aspart (novoLOG FLEXPEN) 100 UNIT/ML solution pen-injector sc pen Inject 2-15 Units under the skin 3 (Three) Times a Day With Meals. Per sliding scale     • Insulin Glargine (LANTUS SOLOSTAR) 100 UNIT/ML injection pen Inject 10 Units under the skin Daily.     • Lancets (FREESTYLE) lancets Test 4 times per day     • Omega-3 Fatty Acids (FISH OIL) 1000 MG capsule capsule Take 1,000 mg by mouth Daily With Breakfast.     • oxyCODONE-acetaminophen (PERCOCET)  MG per tablet Take 1 tablet by mouth 4 (Four) Times a Day As Needed.     • pantoprazole (PROTONIX) 40 MG pack packet Take 40 mg by mouth.     • simvastatin (ZOCOR) 40 MG tablet Take 40 mg by mouth Every Night.     • tiZANidine (ZANAFLEX) 4 MG tablet Take 4 mg by mouth 3 (Three) Times a Day.     • vitamin B-12 (CYANOCOBALAMIN) 500 MCG tablet Take 500 mcg by mouth Daily.     • aspirin 81 MG EC tablet Take 81 mg by mouth Daily. Stopped for surgery  2     No current facility-administered medications on file prior to visit.         ALLERGIES:    Allergies   Allergen Reactions   • Morphine And Related Delirium   • Fentanyl Other (See Comments)     ER said he was allergy   • Ibuprofen Nausea Only   • Latex Rash   • Penicillins Hives        Social History     Social History   • Marital status:      Spouse name: Samantha   • Years of education: High school     Occupational History    •  Unemployed     Social History Main Topics   • Smoking status: Never Smoker   • Alcohol use No   • Drug use: No   • Sexual activity: Defer     Other Topics Concern   • Not on file        Family History   Problem Relation Age of Onset   • Diabetes Mother    • Heart failure Mother    • Kidney failure Mother    • Anemia Mother    • Heart disease Mother    • Hypertension Mother    • Heart failure Father    • Coronary artery disease Father    • Heart disease Father    • Hypertension Father    • Diabetes Father    • Diabetes Sister    • Cervical cancer Sister    • Leukemia Sister    • Diabetes Brother    • Cervical cancer Daughter    • Ovarian cancer Sister         Review of Systems     General: no fever, no chills, no fatigue,no weight changes, no lack of appetite.  Eyes: no epiphora, xerophthalmia,conjunctivitis, pain, glaucoma, blurred vision, blindness, secretion, photophobia, proptosis, diplopia.  Ears: no otorrhea, tinnitus, otorrhagia, deafness, pain, vertigo.  Nose: no rhinorrhea, no epistaxis, no alteration in perception of odors, no sinuses pressure.  Mouth: no alteration in gums or teeth,  No ulcers, no difficulty with mastication or deglut ion, no odynophagia.  Neck: no masses or pain, no thyroid alterations, no pain in muscles or arteries, no carotid odynia, no crepitation.  Respiratory: no cough, no sputum production,no dyspnea,no trepopnea, no pleuritic pain,no hemoptysis.  Heart: no syncope, no irregularity, no palpitations, no angina,no orthopnea,no paroxysmal nocturnal dyspnea.  Vascular Venous: no tenderness,no edema,no palpable cords,no postphlebitic syndrome, no skin changes no ulcerations.  Vascular Arterial: no distal ischemia, noclaudication, no gangrene, no neuropathic ischemic pain, no skin ulcers, no paleness no cyanosis.  GI: no dysphagia, no odynophagia, no regurgitation, no heartburn,no indigestion,no nausea,no vomiting,no hematemesis ,no melena,no jaundice,no distention, no  "obstipation,no enterorrhagia,no proctalgia,no anal  lesions, no changes in bowel habits.  : no frequency, no hesitancy, no hematuria, no discharge,no  pain.  Musculoskeletal: no muscle or tendon pain or inflammation,no  joint pain, no edema, no functional limitation,no fasciculations, no mass.  Neurologic: no headache, no seizures, noalterations on Craneal nerves, no motor deficit, no sensory deficit, normal coordination, no alteration in memory,normal orientation, calculation,normal writting, verbal and written language.  Skin: no rashes,no pruritus no localized lesions.  Psychiatric: no anxiety, no depression,no agitation, no delusions, proper insight.      Objective     Vitals:    10/23/18 1253   BP: 156/86   Pulse: 67   Resp: 12   Temp: 98 °F (36.7 °C)   TempSrc: Oral   SpO2: 98%   Weight: 83.2 kg (183 lb 6.4 oz)   Height: 170 cm (66.93\")   PainSc: 0-No pain     Current Status 10/23/2018   ECOG score 0     Physical Exam    GENERAL:  Well-developed, well-nourished  Patient  in no acute distress.   SKIN:  Warm, dry without rashes, purpura or petechiae.  HEENT:  Pupils were equal and reactive to light and accomodation, conjunctivas non injected, no pterigion, normal extraocular movements, normal visual acuity.   Mouth mucosa was moist, no exudates in oropharynx, normal gum line, normal roof of the mouth and pillars, normal papillations of the tongue.  NECK:  Supple with good range of motion; no thyromegaly or masses, no JVD or bruits, no cervical adenopathies.No carotid arteries pain, no carotid abnormal pulsation or arterial dance.  LYMPHATICS:  No cervical, supraclavicular, axillary, epitrochlear or inguinal adenopathy.  CHEST:  Normal excursion of both zaki thoraces, normal voice fremitus, no subcutaneous emphysema, normal axillas, no rashes or acanthosis nigricans. Lungs clear to percussion and auscultation, normal breath sounds bilaterally, no wheezing, crackles or ronchi, no stridor, no rubs.  CARDIAC AND " VASCULAR: normal rate and regular rhythm, without murmurs, rubs or S3 or S4 right or left sided gallops. Normal femoral, popliteal, pedis, brachial and carotid pulses.  ABDOMEN:  Soft, nontender with no organomegaly or masses, no ascites, no collateral circulation,no distention,no Honorio sign, no abdominal pain, no inguinal hernias,no umbilical hernias, no abdominal bruits. Normal bowel sounds.SURGICAL SITE LAPAROSCOPIC R PARTIAL NEPHRECTOMY NORMAL  GENITAL: Not  Performed.  EXTREMITIES  AND SPINE:  No clubbing, cyanosis or edema, no deformities or pain .No kyphosis, scoliosis, deformities or pain in spine, ribs or pelvic bone.  NEUROLOGICAL:  Patient was awake, alert, oriented to time, person and place          RECENT LABS:  Component      Latest Ref Rng & Units 5/30/2018 6/6/2018 6/7/2018 7/31/2018           3:46 PM  3:47 AM  5:41 AM 11:35 AM   WBC      4.00 - 10.00 10*3/mm3 6.23 4.35 (L) 4.72 5.09   RBC      4.30 - 5.50 10*6/mm3 3.46 (L) 3.15 (L) 3.78 (L) 3.29 (L)   Hemoglobin      13.5 - 16.5 g/dL 10.4 (L) 9.5 (L) 11.2 (L) 10.0 (L)   Hematocrit      40.0 - 49.0 % 31.3 (L) 30.0 (L) 35.7 (L) 31.9 (L)   MCV      83.0 - 97.0 fL 90.5 95.2 94.4 97.0   MCH      27.0 - 33.0 pg 30.1 30.2 29.6 30.4   MCHC      32.0 - 35.0 g/dL 33.2 31.7 (L) 31.4 (L) 31.3 (L)   RDW      11.7 - 14.5 % 14.5 14.4 14.3 13.6   RDW-SD      37.0 - 49.0 fl 47.9 49.8 48.9 48.5   MPV      8.9 - 12.1 fL 11.7 11.7 11.9 11.2   Platelets      150 - 375 10*3/mm3 101 (L) 80 (L) 101 (L) 115 (L)   Neutrophil %      39.0 - 75.0 % 67.5 53.1 62.3 61.3   Lymphocyte %      20.0 - 49.0 % 20.9 31.0 26.9 25.7   Monocyte %      4.0 - 12.0 % 6.6 10.1 5.7 6.9   Eosinophil %      1.0 - 5.0 % 4.2 5.3 4.7 4.9       Assessment/Plan  1. This patient has had multifactorial anemia. This corresponds to iron deficiency and B12 deficiency that have been corrected in the past. 1. Part of the component of his anemia is also related to chronic kidney disease stage III. Today his  hemoglobin is stable. We are pending to review the other laboratory parameters to see if the patient needs to have further infusion with IV iron or B12 supplementation. He continues on B12 supplementation 3 times a week sublingually.  2. I have gone through the records from [facility] in regard to the pathological diagnosis of his kidney cancer. The tumor was 2.2 cm in diameter, was stage I disease and no lymph nodes were removed. He had a partial nephrectomy done with negative margins. Obviously under these circumstances he probably has stage I clear cell carcinoma with clear margins that does not require any other intervention besides followup periodically. I am sure that the urologist will be taking care of this.   3. As usual the patient has tremendous indiscipline to take care of himself and especially care of his diabetes. I doubt that this will ever be changing knowing that he has had such a hard time taking care of diabetes for so long.     RECOMMENDATIONS: From my point of view of his anemia, he will be called at home with the report of the laboratory testing done today. Otherwise I want to see him back in 3 months with a CBC, CMP, B12, ferritin, iron, TIBC in 3 months from now.     Other than that I have no other requirements and am sure that his urologist will followup on him periodically.

## 2018-10-23 NOTE — PROGRESS NOTES
Subjective     REASON FOR FOLLOW UP: PANCYTOPENIA, CHRONIC KIDNEY DISEASE STAGE III, HISTORY OF TREATED HEPATITIS C.     History of Present Illness This patient returns today to the office in company of his wife stating that he has not been good in regard to control of diabetes with sugars all over the place.  This is not surprising. He is not following any specific diet at this time.  He does not have any recent infections and continues having no issues in regard to his weight. His bowel function and urination remain ongoing and he has no fluid accumulation in his lower extremities. He does not have any new cardiovascular or respiratory issues.  He has not had any clinical bleeding.    ·       Past Medical History:   Diagnosis Date   • Anemia    • Anxiety    • CAD (coronary artery disease)    • CHF (congestive heart failure) (CMS/HCC)    • Chronic back pain    • Chronic kidney disease    • COPD (chronic obstructive pulmonary disease) (CMS/HCC)    • Coronary artery disease    • Depression    • Diabetes mellitus (CMS/HCC)    • GERD (gastroesophageal reflux disease)    • Headache    • Hepatitis B 2013    from transfusion for CABG   • Hypertension    • Jaundice    • Myocardial infarction (CMS/HCC)     5-6 years ago per pt   • Seizures (CMS/HCC)     PT REPORTS NO SEIZURES   • Stroke (CMS/HCC) 12/2017        Past Surgical History:   Procedure Laterality Date   • BRAIN HEMATOMA EVACUATION     • CARDIAC SURGERY     • CATARACT EXTRACTION, BILATERAL     • COLONOSCOPY     • CORONARY ARTERY BYPASS GRAFT  2013    Triple   • ROTATOR CUFF REPAIR Left    • VASECTOMY  1985   • WOUND DEBRIDEMENT Right 06/10/2011    Excisional debridement of necrotizing fasciitis of the right groin extending along the right hemiscrotum, 5 x 2 x 2 cm in one wound and 7.5 x 2.5 x 2.5 cm of a second wound. This was sharp excisional debridement carried out to the muscle-Dr. Eduardo Cross         Current Outpatient Prescriptions on File Prior to Visit    Medication Sig Dispense Refill   • ALPRAZolam (XANAX) 1 MG tablet Take 1 mg by mouth 4 (Four) Times a Day As Needed.     • bumetanide (BUMEX) 1 MG tablet as needed     • carvedilol (COREG) 3.125 MG tablet Take 3.125 mg by mouth.     • erythromycin (ROMYCIN) 5 MG/GM ophthalmic ointment Administer 1 application to both eyes Every Night.     • gabapentin (NEURONTIN) 300 MG capsule Take 300 mg by mouth 3 (Three) Times a Day As Needed.     • hydrALAZINE (APRESOLINE) 100 MG tablet Take 100 mg by mouth.     • insulin aspart (novoLOG FLEXPEN) 100 UNIT/ML solution pen-injector sc pen Inject 2-15 Units under the skin 3 (Three) Times a Day With Meals. Per sliding scale     • Insulin Glargine (LANTUS SOLOSTAR) 100 UNIT/ML injection pen Inject 10 Units under the skin Daily.     • Lancets (FREESTYLE) lancets Test 4 times per day     • Omega-3 Fatty Acids (FISH OIL) 1000 MG capsule capsule Take 1,000 mg by mouth Daily With Breakfast.     • oxyCODONE-acetaminophen (PERCOCET)  MG per tablet Take 1 tablet by mouth 4 (Four) Times a Day As Needed.     • pantoprazole (PROTONIX) 40 MG pack packet Take 40 mg by mouth.     • simvastatin (ZOCOR) 40 MG tablet Take 40 mg by mouth Every Night.     • tiZANidine (ZANAFLEX) 4 MG tablet Take 4 mg by mouth 3 (Three) Times a Day.     • vitamin B-12 (CYANOCOBALAMIN) 500 MCG tablet Take 500 mcg by mouth Daily.     • aspirin 81 MG EC tablet Take 81 mg by mouth Daily. Stopped for surgery  2     No current facility-administered medications on file prior to visit.         ALLERGIES:    Allergies   Allergen Reactions   • Morphine And Related Delirium   • Fentanyl Other (See Comments)     ER said he was allergy   • Ibuprofen Nausea Only   • Latex Rash   • Penicillins Hives        Social History     Social History   • Marital status:      Spouse name: Samantha   • Years of education: High school     Occupational History   •  Unemployed     Social History Main Topics   • Smoking status: Never  Smoker   • Alcohol use No   • Drug use: No   • Sexual activity: Defer     Other Topics Concern   • Not on file        Family History   Problem Relation Age of Onset   • Diabetes Mother    • Heart failure Mother    • Kidney failure Mother    • Anemia Mother    • Heart disease Mother    • Hypertension Mother    • Heart failure Father    • Coronary artery disease Father    • Heart disease Father    • Hypertension Father    • Diabetes Father    • Diabetes Sister    • Cervical cancer Sister    • Leukemia Sister    • Diabetes Brother    • Cervical cancer Daughter    • Ovarian cancer Sister         Review of Systems   General: no fever, chills, fatigue, weight changes, or lack of appetite.  Eyes: no epiphora, xerophthalmia,conjunctivitis, pain, glaucoma, blurred vision, blindness, secretion, photophobia, proptosis, diplopia.  Ears: no otorrhea, tinnitus, otorrhagia, deafness, pain, vertigo.  Nose: no rhinorrhea, epistaxis, alteration in perception of odors, sinuses pressure.  Mouth: no alteration in gums or teeth,  ulcers, no difficulty with mastication or deglut ion, no odynophagia.  Neck: no masses or pain, no thyroid alterations, no pain in muscles or arteries, no carotid odynia, no crepitation.  Respiratory: no cough, sputum production, dyspnea, trepopnea, pleuritic pain, hemoptysis.  Heart: no syncope, irregularity, palpitations, angina, orthopnea, paroxysmal nocturnal dyspnea.  Vascular Venous: no tenderness,edema, palpable cords, postphlebitic syndrome, skin changes or ulcerations.  Vascular Arterial: no distal ischemia, claudication, gangrene, neuropathic ischemic pain, skin ulcers, paleness or cyanosis.  GI: no dysphagia, odynophagia, no regurgitation, no heartburn,no indigestion,no nausea,no vomiting,no hematemesis ,no melena,no jaundice,no distention, no obstipation,no enterorrhagia,no proctalgia,no anal  lesions, nochanges in bowel habits.  : no frequency, hesitancy, hematuria, discharge,  "pain.  Musculoskeletal: no muscle or tendon pain or inflammation, joint pain, edema, functional limitation, fasciculations, mass.  Neurologic: no headache, seizures, alterations on Craneal nerves, no motor or senssory deficit, normal coordination, no alteration in memory, orientation, calculation,writting, verbal or written language.  Skin: no rashes, pruritus or localized lesions.  Psychiatric: no anxiety, depression, agitation, delusions, proper insight.    Objective     Vitals:    10/23/18 1253   BP: 156/86   Pulse: 67   Resp: 12   Temp: 98 °F (36.7 °C)   TempSrc: Oral   SpO2: 98%   Weight: 83.2 kg (183 lb 6.4 oz)   Height: 170 cm (66.93\")   PainSc: 0-No pain     Current Status 10/23/2018   ECOG score 0     Physical Exam        GENERAL:  Well-developed, well-nourished  Patient  in no acute distress.   SKIN:  Warm, dry ,NO rashes,NO purpura ,NO petechiae.  HEENT:  Pupils were equal and reactive to light and accomodation, conjunctivas non injected, no pterigion, normal extraocular movements, normal visual acuity.   Mouth mucosa was moist, no exudates in oropharynx, decay in gum line, normal roof of the mouth and pillars, normal papillations of the tongue.  NECK:  Supple with good range of motion; no thyromegaly or masses, no JVD or bruits, no cervical adenopathies.No carotid arteries pain, no carotid abnormal pulsation , NO arterial dance.  LYMPHATICS:  No cervical, NO supraclavicular, NO axillary,NO epitrochlear , NO inguinal adenopathy.  CHEST:  Normal excursion of both zaki thoraces, normal voice fremitus, no subcutaneous emphysema, normal axillas, no rashes or acanthosis nigricans. Lungs clear to percussion and auscultation, normal breath sounds bilaterally, no wheezing,NO crackles NO ronchi, NO stridor, NO rubs.  CARDIAC AND VASCULAR:  normal rate and regular rhythm, without murmurs,NO rubs NO S3 NO S4 right or left . Normal femoral, popliteal, pedis, brachial and carotid pulses.  ABDOMEN:  Soft, nontender " with no organomegaly or masses, no ascites, no collateral circulation,no distention,no Honorio sign, no abdominal pain, no inguinal hernias,3 cm umbilical hernia, no abdominal bruits. Normal bowel sounds.  GENITAL: Not  Performed.  EXTREMITIES  AND SPINE:  No clubbing, cyanosis or edema, no deformities or pain .No kyphosis, scoliosis, deformities or pain in spine, ribs or pelvic bone.  NEUROLOGICAL:  Patient was awake, alert, oriented to time, person and place.          RECENT LABS:  Component      Latest Ref Rng & Units 7/31/2018 10/23/2018          11:35 AM 12:30 PM   WBC      4.00 - 10.00 10*3/mm3 5.09 5.56   RBC      4.30 - 5.50 10*6/mm3 3.29 (L) 3.68 (L)   Hemoglobin      13.5 - 16.5 g/dL 10.0 (L) 11.0 (L)   Hematocrit      40.0 - 49.0 % 31.9 (L) 33.7 (L)   MCV      83.0 - 97.0 fL 97.0 91.6   MCH      27.0 - 33.0 pg 30.4 29.9   MCHC      32.0 - 35.0 g/dL 31.3 (L) 32.6   RDW      11.7 - 14.5 % 13.6 13.7   RDW-SD      37.0 - 49.0 fl 48.5 46.1   MPV      8.9 - 12.1 fL 11.2 11.4   Platelets      150 - 375 10*3/mm3 115 (L) 103 (L)   Neutrophil %      39.0 - 75.0 % 61.3 62.9   Lymphocyte %      20.0 - 49.0 % 25.7 23.0   Monocyte %      4.0 - 12.0 % 6.9 7.6   Eosinophil %      1.0 - 5.0 % 4.9 5.8 (H)   Basophil %      0.0 - 1.1 % 0.8 0.5   Immature Grans %      0.0 - 0.5 % 0.4 0.2   Neutrophils, Absolute      1.50 - 7.00 10*3/mm3 3.12 3.50   Lymphocytes, Absolute      1.00 - 3.50 10*3/mm3 1.31 1.28   Monocytes, Absolute      0.25 - 0.80 10*3/mm3 0.35 0.42           Assessment/Plan  1. This patient has had multifactorial anemia. This corresponds to iron deficiency and B12 deficiency that have been corrected in the past. 1. Part of the component of his anemia is also related to chronic kidney disease stage III. Today his hemoglobin is stable. We are pending to review the other laboratory parameters to see if the patient needs to have further infusion with IV iron or B12 supplementation. He continues on B12  supplementation 3 times a week sublingually.  2. I have gone through the records from [facility] in regard to the pathological diagnosis of his kidney cancer. The tumor was 2.2 cm in diameter, was stage I disease and no lymph nodes were removed. He had a partial nephrectomy done with negative margins. Obviously under these circumstances he probably has stage I clear cell carcinoma with clear margins that does not require any other intervention besides followup periodically. I am sure that the urologist will be taking care of this.   3. As usual the patient has tremendous indiscipline to take care of himself and especially care of his diabetes. I doubt that this will ever be changing knowing that he has had such a hard time taking care of diabetes for so long.     RECOMMENDATIONS:I advised the patient and wife today that from my point of view his hemoglobin of 11 is very acceptable.  His white count and platelet count remain stable and his white count differential remains stable.  He has not had any clinical bleeding or recent infections. He has total indiscipline to control diabetes.  His primary physician at [facility] has had a hard time chasing these numbers down because the patient is reluctant to tell anybody what the numbers are.  I think this patient would be the idea candidate to do FreeStyle Tr[facility] to check blood sugars and then make a proper decision about how to manage this under the present circumstances. I asked his wife to call his primary physician in this regard.  Other than that, I will review him back in 4 months with a CBC.  I doubt that the patient will require any iron infusions anytime soon.

## 2019-03-01 ENCOUNTER — LAB (OUTPATIENT)
Dept: LAB | Facility: HOSPITAL | Age: 59
End: 2019-03-01

## 2019-03-01 ENCOUNTER — OFFICE VISIT (OUTPATIENT)
Dept: ONCOLOGY | Facility: CLINIC | Age: 59
End: 2019-03-01

## 2019-03-01 VITALS
RESPIRATION RATE: 16 BRPM | BODY MASS INDEX: 29.11 KG/M2 | SYSTOLIC BLOOD PRESSURE: 146 MMHG | DIASTOLIC BLOOD PRESSURE: 66 MMHG | OXYGEN SATURATION: 96 % | HEART RATE: 64 BPM | TEMPERATURE: 98.2 F | HEIGHT: 67 IN | WEIGHT: 185.5 LBS

## 2019-03-01 DIAGNOSIS — D69.6 THROMBOCYTOPENIA (HCC): ICD-10-CM

## 2019-03-01 DIAGNOSIS — C64.1 CANCER OF KIDNEY PARENCHYMA, RIGHT (HCC): ICD-10-CM

## 2019-03-01 DIAGNOSIS — D50.8 IRON DEFICIENCY ANEMIA SECONDARY TO INADEQUATE DIETARY IRON INTAKE: ICD-10-CM

## 2019-03-01 DIAGNOSIS — K42.0 UMBILICAL HERNIA, INCARCERATED: ICD-10-CM

## 2019-03-01 DIAGNOSIS — D63.1 ANEMIA IN STAGE 3 CHRONIC KIDNEY DISEASE (HCC): ICD-10-CM

## 2019-03-01 DIAGNOSIS — E53.8 B12 DEFICIENCY: Primary | ICD-10-CM

## 2019-03-01 DIAGNOSIS — E53.8 B12 DEFICIENCY: ICD-10-CM

## 2019-03-01 DIAGNOSIS — D61.818 PANCYTOPENIA, ACQUIRED (HCC): ICD-10-CM

## 2019-03-01 DIAGNOSIS — N18.30 ANEMIA IN STAGE 3 CHRONIC KIDNEY DISEASE (HCC): ICD-10-CM

## 2019-03-01 LAB
ALBUMIN SERPL-MCNC: 3.2 G/DL (ref 3.5–5.2)
ALBUMIN/GLOB SERPL: 1.1 G/DL (ref 1.1–2.4)
ALP SERPL-CCNC: 72 U/L (ref 38–116)
ALT SERPL W P-5'-P-CCNC: 8 U/L (ref 0–41)
ANION GAP SERPL CALCULATED.3IONS-SCNC: 12.3 MMOL/L
AST SERPL-CCNC: 16 U/L (ref 0–40)
BASOPHILS # BLD AUTO: 0.04 10*3/MM3 (ref 0–0.2)
BASOPHILS NFR BLD AUTO: 0.6 % (ref 0–1.5)
BILIRUB SERPL-MCNC: <0.2 MG/DL (ref 0.1–1.2)
BUN BLD-MCNC: 29 MG/DL (ref 6–20)
BUN/CREAT SERPL: 14.9 (ref 7.3–30)
CALCIUM SPEC-SCNC: 8.8 MG/DL (ref 8.5–10.2)
CHLORIDE SERPL-SCNC: 108 MMOL/L (ref 98–107)
CO2 SERPL-SCNC: 25.7 MMOL/L (ref 22–29)
CREAT BLD-MCNC: 1.95 MG/DL (ref 0.7–1.3)
DEPRECATED RDW RBC AUTO: 46 FL (ref 37–54)
EOSINOPHIL # BLD AUTO: 0.22 10*3/MM3 (ref 0–0.4)
EOSINOPHIL NFR BLD AUTO: 3.3 % (ref 0.3–6.2)
ERYTHROCYTE [DISTWIDTH] IN BLOOD BY AUTOMATED COUNT: 13.5 % (ref 12.3–15.4)
FERRITIN SERPL-MCNC: 210.6 NG/ML (ref 30–400)
FOLATE SERPL-MCNC: 5.38 NG/ML (ref 4.78–24.2)
GFR SERPL CREATININE-BSD FRML MDRD: 36 ML/MIN/1.73
GLOBULIN UR ELPH-MCNC: 2.9 GM/DL (ref 1.8–3.5)
GLUCOSE BLD-MCNC: 197 MG/DL (ref 74–124)
HCT VFR BLD AUTO: 30.5 % (ref 37.5–51)
HGB BLD-MCNC: 9.8 G/DL (ref 13–17.7)
HGB RETIC QN AUTO: 34.5 PG (ref 29.8–36.1)
IMM GRANULOCYTES # BLD AUTO: 0.02 10*3/MM3 (ref 0–0.05)
IMM GRANULOCYTES NFR BLD AUTO: 0.3 % (ref 0–0.5)
IMM RETICS NFR: 10.5 % (ref 3–15.8)
IRON 24H UR-MRATE: 71 MCG/DL (ref 59–158)
IRON SATN MFR SERPL: 28 % (ref 14–48)
LDH SERPL-CCNC: 200 U/L (ref 99–259)
LYMPHOCYTES # BLD AUTO: 1.31 10*3/MM3 (ref 0.7–3.1)
LYMPHOCYTES NFR BLD AUTO: 19.6 % (ref 19.6–45.3)
MCH RBC QN AUTO: 29.8 PG (ref 26.6–33)
MCHC RBC AUTO-ENTMCNC: 32.1 G/DL (ref 31.5–35.7)
MCV RBC AUTO: 92.7 FL (ref 79–97)
MONOCYTES # BLD AUTO: 0.39 10*3/MM3 (ref 0.1–0.9)
MONOCYTES NFR BLD AUTO: 5.8 % (ref 5–12)
NEUTROPHILS # BLD AUTO: 4.72 10*3/MM3 (ref 1.4–7)
NEUTROPHILS NFR BLD AUTO: 70.4 % (ref 42.7–76)
NRBC BLD AUTO-RTO: 0.3 /100 WBC (ref 0–0)
PLATELET # BLD AUTO: 120 10*3/MM3 (ref 140–450)
PMV BLD AUTO: 12.3 FL (ref 6–12)
POTASSIUM BLD-SCNC: 4.3 MMOL/L (ref 3.5–4.7)
PROT SERPL-MCNC: 6.1 G/DL (ref 6.3–8)
RBC # BLD AUTO: 3.29 10*6/MM3 (ref 4.14–5.8)
RETICS/RBC NFR AUTO: 2.35 % (ref 0.5–1.5)
SODIUM BLD-SCNC: 146 MMOL/L (ref 134–145)
TIBC SERPL-MCNC: 253 MCG/DL (ref 249–505)
TRANSFERRIN SERPL-MCNC: 181 MG/DL (ref 200–360)
VIT B12 BLD-MCNC: 472 PG/ML (ref 211–946)
WBC NRBC COR # BLD: 6.7 10*3/MM3 (ref 3.4–10.8)

## 2019-03-01 PROCEDURE — 85025 COMPLETE CBC W/AUTO DIFF WBC: CPT | Performed by: INTERNAL MEDICINE

## 2019-03-01 PROCEDURE — 85046 RETICYTE/HGB CONCENTRATE: CPT | Performed by: INTERNAL MEDICINE

## 2019-03-01 PROCEDURE — 99214 OFFICE O/P EST MOD 30 MIN: CPT | Performed by: INTERNAL MEDICINE

## 2019-03-01 PROCEDURE — 80053 COMPREHEN METABOLIC PANEL: CPT | Performed by: INTERNAL MEDICINE

## 2019-03-01 PROCEDURE — 84466 ASSAY OF TRANSFERRIN: CPT | Performed by: INTERNAL MEDICINE

## 2019-03-01 PROCEDURE — 83540 ASSAY OF IRON: CPT | Performed by: INTERNAL MEDICINE

## 2019-03-01 PROCEDURE — 83615 LACTATE (LD) (LDH) ENZYME: CPT | Performed by: INTERNAL MEDICINE

## 2019-03-01 PROCEDURE — 36415 COLL VENOUS BLD VENIPUNCTURE: CPT | Performed by: INTERNAL MEDICINE

## 2019-03-01 PROCEDURE — 82746 ASSAY OF FOLIC ACID SERUM: CPT | Performed by: INTERNAL MEDICINE

## 2019-03-01 PROCEDURE — 36416 COLLJ CAPILLARY BLOOD SPEC: CPT | Performed by: INTERNAL MEDICINE

## 2019-03-01 PROCEDURE — 82728 ASSAY OF FERRITIN: CPT | Performed by: INTERNAL MEDICINE

## 2019-03-01 PROCEDURE — 82607 VITAMIN B-12: CPT | Performed by: INTERNAL MEDICINE

## 2019-03-01 NOTE — PROGRESS NOTES
Subjective     REASON FOR FOLLOW UP: PANCYTOPENIA, CHRONIC KIDNEY DISEASE STAGE III, HISTORY OF TREATED HEPATITIS C.     History of Present Illness This patient returns today to the office for followup in company of his wife stating that for the past 2-3 weeks he has been having persistent pain in the umbilicus where he has a hernia.  The area has become somewhat purple and comes and goes intermittently with protrusion and extrusion.  He has some abdominal pain, but no nausea, vomiting, diarrhea or constipation. He has not had any fevers or chills.  He is eating very well.  As usual, diabetes control is unknown.  He is not doing glucometer readings.  He has proper urination.  He missed his appointment to be seen by his nephrologist yesterday.  He has a significant degree of fatigue.  He has not had any recent infections.            Past Medical History:   Diagnosis Date   • Anemia    • Anxiety    • CAD (coronary artery disease)    • CHF (congestive heart failure) (CMS/HCC)    • Chronic back pain    • Chronic kidney disease    • COPD (chronic obstructive pulmonary disease) (CMS/HCC)    • Coronary artery disease    • Depression    • Diabetes mellitus (CMS/HCC)    • GERD (gastroesophageal reflux disease)    • Headache    • Hepatitis B 2013    from transfusion for CABG   • Hypertension    • Jaundice    • Myocardial infarction (CMS/HCC)     5-6 years ago per pt   • Seizures (CMS/HCC)     PT REPORTS NO SEIZURES   • Stroke (CMS/HCC) 12/2017        Past Surgical History:   Procedure Laterality Date   • BRAIN HEMATOMA EVACUATION     • CARDIAC SURGERY     • CATARACT EXTRACTION, BILATERAL     • COLONOSCOPY     • CORONARY ARTERY BYPASS GRAFT  2013    Triple   • ROTATOR CUFF REPAIR Left    • VASECTOMY  1985   • WOUND DEBRIDEMENT Right 06/10/2011    Excisional debridement of necrotizing fasciitis of the right groin extending along the right hemiscrotum, 5 x 2 x 2 cm in one wound and 7.5 x 2.5 x 2.5 cm of a second wound. This  was sharp excisional debridement carried out to the muscle-Dr. Eduardo Cross         Current Outpatient Medications on File Prior to Visit   Medication Sig Dispense Refill   • ALPRAZolam (XANAX) 1 MG tablet Take 1 mg by mouth 4 (Four) Times a Day As Needed.     • amLODIPine (NORVASC) 5 MG tablet Take 5 mg by mouth.     • carvedilol (COREG) 3.125 MG tablet Take 3.125 mg by mouth.     • erythromycin (ROMYCIN) 5 MG/GM ophthalmic ointment Administer 1 application to both eyes Every Night.     • gabapentin (NEURONTIN) 300 MG capsule Take 300 mg by mouth As Needed.     • hydrALAZINE (APRESOLINE) 100 MG tablet Take 100 mg by mouth.     • insulin aspart (novoLOG FLEXPEN) 100 UNIT/ML solution pen-injector sc pen Inject 2-15 Units under the skin 3 (Three) Times a Day With Meals. Per sliding scale     • Insulin Glargine (LANTUS SOLOSTAR) 100 UNIT/ML injection pen Inject 10 Units under the skin Daily.     • Lancets (FREESTYLE) lancets Test 4 times per day     • lisinopril (PRINIVIL,ZESTRIL) 10 MG tablet lisinopril 10 mg tablet   Take 1 tablet every day by oral route.     • Omega-3 Fatty Acids (FISH OIL) 1000 MG capsule capsule Take 1,000 mg by mouth Daily With Breakfast.     • oxyCODONE-acetaminophen (PERCOCET)  MG per tablet Take 1 tablet by mouth 4 (Four) Times a Day As Needed.     • pantoprazole (PROTONIX) 40 MG pack packet Take 40 mg by mouth.     • simvastatin (ZOCOR) 40 MG tablet Take 40 mg by mouth Every Night.     • tiZANidine (ZANAFLEX) 4 MG tablet Take 4 mg by mouth 3 (Three) Times a Day.     • vitamin B-12 (CYANOCOBALAMIN) 500 MCG tablet Take 500 mcg by mouth Daily.     • aspirin 81 MG EC tablet Take 81 mg by mouth Daily. Stopped for surgery  2   • bumetanide (BUMEX) 1 MG tablet as needed     • insulin aspart (NOVOLOG FLEXPEN) 100 UNIT/ML solution pen-injector sc pen Inject 10-15 Units under the skin into the appropriate area as directed.     • insulin glargine (LANTUS) 100 UNIT/ML injection Inject 10 Units under  the skin into the appropriate area as directed.       No current facility-administered medications on file prior to visit.         ALLERGIES:    Allergies   Allergen Reactions   • Morphine And Related Delirium   • Fentanyl Other (See Comments)     ER said he was allergy   • Ibuprofen Nausea Only   • Latex Rash   • Penicillins Hives        Social History     Socioeconomic History   • Marital status:      Spouse name: Samantha   • Number of children: Not on file   • Years of education: High school   • Highest education level: Not on file   Occupational History     Employer: UNEMPLOYED   Tobacco Use   • Smoking status: Never Smoker   Substance and Sexual Activity   • Alcohol use: No   • Drug use: No   • Sexual activity: Defer        Family History   Problem Relation Age of Onset   • Diabetes Mother    • Heart failure Mother    • Kidney failure Mother    • Anemia Mother    • Heart disease Mother    • Hypertension Mother    • Heart failure Father    • Coronary artery disease Father    • Heart disease Father    • Hypertension Father    • Diabetes Father    • Diabetes Sister    • Cervical cancer Sister    • Leukemia Sister    • Diabetes Brother    • Cervical cancer Daughter    • Ovarian cancer Sister         Review of Systems       General: no fever, no chills, stated fatigue,no weight changes, no lack of appetite.  Eyes: no epiphora, xerophthalmia,conjunctivitis, pain, glaucoma, blurred vision, blindness, secretion, photophobia, proptosis, diplopia.  Ears: no otorrhea, tinnitus, otorrhagia, deafness, pain, vertigo.  Nose: no rhinorrhea, no epistaxis, no alteration in perception of odors, no sinuses pressure.  Mouth: no alteration in gums or teeth,  No ulcers, no difficulty with mastication or deglut ion, no odynophagia.  Neck: no masses or pain, no thyroid alterations, no pain in muscles or arteries, no carotid odynia, no crepitation.  Respiratory: no cough, no sputum production,no dyspnea,no trepopnea, no pleuritic  "pain,no hemoptysis.  Heart: no syncope, no irregularity, no palpitations, no angina,no orthopnea,no paroxysmal nocturnal dyspnea.  Vascular Venous: no tenderness,no edema,no palpable cords,no postphlebitic syndrome, no skin changes no ulcerations.  Vascular Arterial: no distal ischemia, noclaudication, no gangrene, no neuropathic ischemic pain, no skin ulcers, no paleness no cyanosis.  GI: no dysphagia, no odynophagia, no regurgitation, no heartburn,no indigestion,no nausea,no vomiting,no hematemesis ,no melena,no jaundice,no distention, no obstipation,no enterorrhagia,no proctalgia,no anal  lesions, no changes in bowel habits.  : no frequency, no hesitancy, no hematuria, no discharge,no  pain.  Musculoskeletal: no muscle or tendon pain or inflammation,no  joint pain, no edema, no functional limitation,no fasciculations, no mass.  Neurologic: no headache, no seizures, noalterations on Craneal nerves, no motor deficit, no sensory deficit, normal coordination, no alteration in memory,normal orientation, calculation,normal writting, verbal and written language.  Skin: no rashes,no pruritus no localized lesions.  Psychiatric: no anxiety, no depression,no agitation, no delusions, proper insight.    Objective     Vitals:    03/01/19 1306   BP: 146/66   Pulse: 64   Resp: 16   Temp: 98.2 °F (36.8 °C)   SpO2: 96%   Weight: 84.1 kg (185 lb 8 oz)   Height: 170 cm (66.93\")   PainSc:   7   PainLoc: Back  Comment: back and abdomen     Current Status 3/1/2019   ECOG score 0     Physical Exam            GENERAL:  Well-developed, well-nourished  Patient  in no acute distress.   SKIN:  Warm, dry ,NO rashes,NO purpura ,NO petechiae.  HEENT:  Pupils were equal and reactive to light and accomodation, conjunctivas non injected, no pterigion, normal extraocular movements, normal visual acuity.   Mouth mucosa was moist, no exudates in oropharynx, normal gum line, normal roof of the mouth and pillars, normal papillations of the " tongue.  NECK:  Supple with good range of motion; no thyromegaly or masses, no JVD or bruits, no cervical adenopathies.No carotid arteries pain, no carotid abnormal pulsation , NO arterial dance.  LYMPHATICS:  No cervical, NO supraclavicular, NO axillary,NO epitrochlear , NO inguinal adenopathy.  CHEST:  Normal excursion of both zaki thoraces, normal voice fremitus, no subcutaneous emphysema, normal axillas, no rashes or acanthosis nigricans. Lungs clear to percussion and auscultation, normal breath sounds bilaterally, no wheezing,NO crackles NO ronchi, NO stridor, NO rubs.  CARDIAC AND VASCULAR:  normal rate and regular rhythm, without murmurs,NO rubs NO S3 NO S4 right or left . Normal femoral, popliteal, pedis, brachial and carotid pulses.  ABDOMEN:  Soft, nontender with no organomegaly or masses, no ascites, no collateral circulation,no distention,no Charlotte sign, no abdominal pain, no inguinal hernias,purple color  umbilical hernia reductible minimal pain no rebound, no abdominal bruits. Normal bowel sounds.  GENITAL: Not  Performed.  EXTREMITIES  AND SPINE:  No clubbing, cyanosis or edema, no deformities or pain .No kyphosis, scoliosis, deformities or pain in spine, ribs or pelvic bone.  NEUROLOGICAL:  Patient was awake, alert, oriented to time, person and place.        RECENT LABS:CBC Auto Differential   Order: [id number] - Part of Panel Order [id number]   Status:  Final result   Visible to patient:  No (Not Released)   Dx:  Iron deficiency anemia secondary to i...    Ref Range & Units 12:46   WBC 3.40 - 10.80 10*3/mm3 6.70    RBC 4.14 - 5.80 10*6/mm3 3.29 Abnormally low     Hemoglobin 13.0 - 17.7 g/dL 9.8 Abnormally low     Hematocrit 37.5 - 51.0 % 30.5 Abnormally low     MCV 79.0 - 97.0 fL 92.7    MCH 26.6 - 33.0 pg 29.8    MCHC 31.5 - 35.7 g/dL 32.1    RDW 12.3 - 15.4 % 13.5    RDW-SD 37.0 - 54.0 fl 46.0    MPV 6.0 - 12.0 fL 12.3 Abnormally high     Platelets 140 - 450 10*3/mm3 120 Abnormally low      Neutrophil % 42.7 - 76.0 % 70.4    Lymphocyte % 19.6 - 45.3 % 19.6    Monocyte % 5.0 - 12.0 % 5.8    Eosinophil % 0.3 - 6.2 % 3.3    Basophil % 0.0 - 1.5 % 0.6    Immature Grans % 0.0 - 0.5 % 0.3    Neutrophils, Absolute 1.40 - 7.00 10*3/mm3 4.72                    Assessment/Plan  1. This patient has history of multifactorial anemia, constellation of B12 deficiency that has been corrected, iron deficiency that has been corrected and anemia of chronic kidney disease. He never has received Procrit. Today the hemoglobin is dropping. The patient is having fatigue. He has no obvious gastrointestinal blood loss or recent infections and this needs to be further investigated with taking another B12, folate, ferritin, iron, TIBC and chemistry profile today.  2. The patient has had chronic thrombocytopenia probably related to multiorgan dysfunction associated with diabetes, component of hepatitis C in the past with splenomegaly. He has not had any significant bleeding. His platelet count remains stable at this time.  3. The patient has an incarcerated umbilical hernia. He needs to be seen as soon as possible by general surgery inconsideration of definitive correction of this. The patient has been having almost on daily basis nocturnal problems with this with protrusion, extrusion, discomfort, discoloration, pain, cramping and so forth and I do not think it is going to be long until we end up with gangrene and some other complications that this patient does not need to have.  4. History of partial nephrectomy for stage I renal cell carcinoma with negative lymph nodes because there were none of them removed. Obviously the patient will require observation for this.  5. Diabetes care is very poor. The patient is not doing glucometer reading. His glucometer is not functional and obviously the patient and wife are bickering back and forth in regard the care of this with no definitive solution. This always has been the case.  Obviously he has missed his nephrologists appointment yesterday because of car trouble and this makes things more complicated.     RECOMMENDATIONS:   1. As stated above I sent him back to the lab, remeasured chemistry and so forth, informed him and his wife of the labs and decide what to do based on the numbers  2. Referral for general surgery assessment as soon as possible for his umbilical hernia.  3. Return to see us back in 6 weeks with a CBC and a CMP.

## 2019-03-05 ENCOUNTER — OFFICE VISIT (OUTPATIENT)
Dept: SURGERY | Facility: CLINIC | Age: 59
End: 2019-03-05

## 2019-03-05 ENCOUNTER — TELEPHONE (OUTPATIENT)
Dept: ONCOLOGY | Facility: CLINIC | Age: 59
End: 2019-03-05

## 2019-03-05 VITALS — WEIGHT: 190.4 LBS | HEIGHT: 69 IN | BODY MASS INDEX: 28.2 KG/M2 | OXYGEN SATURATION: 99 % | HEART RATE: 69 BPM

## 2019-03-05 DIAGNOSIS — K42.9 UMBILICAL HERNIA WITHOUT OBSTRUCTION AND WITHOUT GANGRENE: Primary | ICD-10-CM

## 2019-03-05 PROBLEM — N18.30 CHRONIC KIDNEY DISEASE, STAGE III (MODERATE) (HCC): Status: ACTIVE | Noted: 2019-03-05

## 2019-03-05 PROBLEM — N18.30 ANEMIA OF CHRONIC RENAL FAILURE, STAGE 3 (MODERATE) (HCC): Status: ACTIVE | Noted: 2019-03-05

## 2019-03-05 PROBLEM — D63.1 ANEMIA OF CHRONIC RENAL FAILURE, STAGE 3 (MODERATE) (HCC): Status: ACTIVE | Noted: 2019-03-05

## 2019-03-05 PROCEDURE — 99203 OFFICE O/P NEW LOW 30 MIN: CPT | Performed by: SURGERY

## 2019-03-05 NOTE — TELEPHONE ENCOUNTER
----- Message from Eugenia Joe RN sent at 3/5/2019 11:10 AM EST -----  See Dr Aviles note below  Thank you!    ----- Message -----  From: Jamie Aviles MD  Sent: 3/4/2019   5:00 PM  To: Eugenia Joe RN, Cora Johansen RN    YOHANA HIM TO START WEEKLY PROCRIT THIS WEEK, NP VISIT IN 4,8, WEEKS,WEEKLY CBC

## 2019-03-06 NOTE — PROGRESS NOTES
Subjective   Angelo Brown is a 58 y.o. male who presents to the office in surgical consultation from Yadiel Baxter III, MD and Jamie Aviles MD for umbilical hernia.    History of Present Illness     The patient has a long-standing umbilical hernia that has recently gotten larger and increasingly symptomatic.  He has a bulge with any activity that is strenuous or with coughing.  He has noticed discoloration on the skin at the umbilical hernia.  He has not had any change in his bowel or bladder function.    The patient has multiple medical problems that are reasonably well managed.  He has chronic thrombocytopenia felt to be related to multiorgan dysfunction including hepatitis C virus and splenomegaly.  He does not have problems with bleeding.  His platelet count is consistently over 100,000.    Review of Systems   Constitutional: Negative for activity change, appetite change, fatigue and fever.   HENT: Negative for trouble swallowing and voice change.    Respiratory: Negative for chest tightness and shortness of breath.    Cardiovascular: Negative for chest pain and palpitations.   Gastrointestinal: Negative for abdominal pain, blood in stool, constipation, diarrhea, nausea and vomiting.   Endocrine: Negative for cold intolerance and heat intolerance.   Genitourinary: Negative for dysuria and flank pain.   Neurological: Negative for dizziness and light-headedness.   Hematological: Negative for adenopathy. Does not bruise/bleed easily.   Psychiatric/Behavioral: Negative for agitation and confusion.     Past Medical History:   Diagnosis Date   • Anemia    • Anxiety    • CAD (coronary artery disease)    • CHF (congestive heart failure) (CMS/[facility])    • Chronic back pain    • Chronic kidney disease    • COPD (chronic obstructive pulmonary disease) (CMS/[facility])    • Coronary artery disease    • Depression    • Diabetes mellitus (CMS/[facility])    • GERD (gastroesophageal reflux disease)    • Headache    • Hepatitis  B 2013    from transfusion for CABG   • Hypertension    • Jaundice    • Myocardial infarction (CMS/HCC)     5-6 years ago per pt   • Seizures (CMS/HCC)     PT REPORTS NO SEIZURES   • Stroke (CMS/HCC) 12/2017     Past Surgical History:   Procedure Laterality Date   • BRAIN HEMATOMA EVACUATION     • CARDIAC SURGERY     • CATARACT EXTRACTION, BILATERAL     • COLONOSCOPY     • CORONARY ARTERY BYPASS GRAFT  2013    Triple   • ROTATOR CUFF REPAIR Left    • VASECTOMY  1985   • WOUND DEBRIDEMENT Right 06/10/2011    Excisional debridement of necrotizing fasciitis of the right groin extending along the right hemiscrotum, 5 x 2 x 2 cm in one wound and 7.5 x 2.5 x 2.5 cm of a second wound. This was sharp excisional debridement carried out to the muscle-Dr. Eduardo Cross      Family History   Problem Relation Age of Onset   • Diabetes Mother    • Heart failure Mother    • Kidney failure Mother    • Anemia Mother    • Heart disease Mother    • Hypertension Mother    • Heart failure Father    • Coronary artery disease Father    • Heart disease Father    • Hypertension Father    • Diabetes Father    • Diabetes Sister    • Cervical cancer Sister    • Leukemia Sister    • Diabetes Brother    • Cervical cancer Daughter    • Ovarian cancer Sister      Social History     Socioeconomic History   • Marital status:      Spouse name: Samantha   • Number of children: Not on file   • Years of education: High school   • Highest education level: Not on file   Social Needs   • Financial resource strain: Not on file   • Food insecurity - worry: Not on file   • Food insecurity - inability: Not on file   • Transportation needs - medical: Not on file   • Transportation needs - non-medical: Not on file   Occupational History   • Occupation: retired     Employer: UNEMPLOYED   Tobacco Use   • Smoking status: Never Smoker   Substance and Sexual Activity   • Alcohol use: No   • Drug use: No   • Sexual activity: Defer   Other Topics Concern   • Not on  file   Social History Narrative   • Not on file       Objective   Physical Exam   Constitutional: He is oriented to person, place, and time. He appears well-developed and well-nourished.  Non-toxic appearance.   HENT:   Head: Normocephalic and atraumatic.   Eyes: EOM are normal. No scleral icterus.   Neck: Normal range of motion. Neck supple.   Pulmonary/Chest: Effort normal. No respiratory distress.   Abdominal: Soft. Normal appearance. There is no tenderness. A hernia (There is a moderately sized reducible umbilical hernia with a defect large enough to permit small bowel) is present. Hernia confirmed positive in the ventral area.   Neurological: He is alert and oriented to person, place, and time.   Skin: Skin is warm and dry.   Psychiatric: He has a normal mood and affect. His behavior is normal. Judgment and thought content normal.       Assessment/Plan       The encounter diagnosis was Umbilical hernia without obstruction and without gangrene.    The patient has a symptomatic umbilical hernia and is at risk of developing incarcerated small bowel.  He has been scheduled for an umbilical hernia repair.  The patient understands the indications, alternatives, risks, and benefits of the procedure and wishes to proceed.

## 2019-03-08 ENCOUNTER — INFUSION (OUTPATIENT)
Dept: ONCOLOGY | Facility: HOSPITAL | Age: 59
End: 2019-03-08

## 2019-03-08 ENCOUNTER — LAB (OUTPATIENT)
Dept: LAB | Facility: HOSPITAL | Age: 59
End: 2019-03-08

## 2019-03-08 DIAGNOSIS — D61.818 PANCYTOPENIA, ACQUIRED (HCC): Primary | ICD-10-CM

## 2019-03-08 LAB
BASOPHILS # BLD AUTO: 0.08 10*3/MM3 (ref 0–0.2)
BASOPHILS NFR BLD AUTO: 1 % (ref 0–1.5)
DEPRECATED RDW RBC AUTO: 43.8 FL (ref 37–54)
EOSINOPHIL # BLD AUTO: 0.3 10*3/MM3 (ref 0–0.4)
EOSINOPHIL NFR BLD AUTO: 3.8 % (ref 0.3–6.2)
ERYTHROCYTE [DISTWIDTH] IN BLOOD BY AUTOMATED COUNT: 13.3 % (ref 12.3–15.4)
HCT VFR BLD AUTO: 29.8 % (ref 37.5–51)
HGB BLD-MCNC: 10 G/DL (ref 13–17.7)
IMM GRANULOCYTES # BLD AUTO: 0.08 10*3/MM3 (ref 0–0.05)
IMM GRANULOCYTES NFR BLD AUTO: 1 % (ref 0–0.5)
LYMPHOCYTES # BLD AUTO: 2.1 10*3/MM3 (ref 0.7–3.1)
LYMPHOCYTES NFR BLD AUTO: 26.9 % (ref 19.6–45.3)
MCH RBC QN AUTO: 30 PG (ref 26.6–33)
MCHC RBC AUTO-ENTMCNC: 33.6 G/DL (ref 31.5–35.7)
MCV RBC AUTO: 89.5 FL (ref 79–97)
MONOCYTES # BLD AUTO: 0.42 10*3/MM3 (ref 0.1–0.9)
MONOCYTES NFR BLD AUTO: 5.4 % (ref 5–12)
NEUTROPHILS # BLD AUTO: 4.82 10*3/MM3 (ref 1.4–7)
NEUTROPHILS NFR BLD AUTO: 61.9 % (ref 42.7–76)
NRBC BLD AUTO-RTO: 0.4 /100 WBC (ref 0–0)
PLATELET # BLD AUTO: 120 10*3/MM3 (ref 140–450)
PMV BLD AUTO: 12.7 FL (ref 6–12)
RBC # BLD AUTO: 3.33 10*6/MM3 (ref 4.14–5.8)
WBC NRBC COR # BLD: 7.8 10*3/MM3 (ref 3.4–10.8)

## 2019-03-08 PROCEDURE — 85025 COMPLETE CBC W/AUTO DIFF WBC: CPT | Performed by: INTERNAL MEDICINE

## 2019-03-08 PROCEDURE — 36415 COLL VENOUS BLD VENIPUNCTURE: CPT | Performed by: INTERNAL MEDICINE

## 2019-03-08 NOTE — PROGRESS NOTES
Hgb today is 10.0.  No procrit today per guidelines.   Patient without questions or concerns at this time.   Given copy of labs.

## 2019-03-15 ENCOUNTER — INFUSION (OUTPATIENT)
Dept: ONCOLOGY | Facility: HOSPITAL | Age: 59
End: 2019-03-15

## 2019-03-15 ENCOUNTER — APPOINTMENT (OUTPATIENT)
Dept: LAB | Facility: HOSPITAL | Age: 59
End: 2019-03-15

## 2019-03-15 DIAGNOSIS — D61.818 PANCYTOPENIA, ACQUIRED (HCC): Primary | ICD-10-CM

## 2019-03-15 LAB
BASOPHILS # BLD AUTO: 0.04 10*3/MM3 (ref 0–0.2)
BASOPHILS NFR BLD AUTO: 0.6 % (ref 0–1.5)
DEPRECATED RDW RBC AUTO: 45.1 FL (ref 37–54)
EOSINOPHIL # BLD AUTO: 0.35 10*3/MM3 (ref 0–0.4)
EOSINOPHIL NFR BLD AUTO: 5.4 % (ref 0.3–6.2)
ERYTHROCYTE [DISTWIDTH] IN BLOOD BY AUTOMATED COUNT: 13.6 % (ref 12.3–15.4)
HCT VFR BLD AUTO: 30.5 % (ref 37.5–51)
HGB BLD-MCNC: 10.1 G/DL (ref 13–17.7)
IMM GRANULOCYTES # BLD AUTO: 0.05 10*3/MM3 (ref 0–0.05)
IMM GRANULOCYTES NFR BLD AUTO: 0.8 % (ref 0–0.5)
LYMPHOCYTES # BLD AUTO: 1.38 10*3/MM3 (ref 0.7–3.1)
LYMPHOCYTES NFR BLD AUTO: 21.3 % (ref 19.6–45.3)
MCH RBC QN AUTO: 30 PG (ref 26.6–33)
MCHC RBC AUTO-ENTMCNC: 33.1 G/DL (ref 31.5–35.7)
MCV RBC AUTO: 90.5 FL (ref 79–97)
MONOCYTES # BLD AUTO: 0.4 10*3/MM3 (ref 0.1–0.9)
MONOCYTES NFR BLD AUTO: 6.2 % (ref 5–12)
NEUTROPHILS # BLD AUTO: 4.25 10*3/MM3 (ref 1.4–7)
NEUTROPHILS NFR BLD AUTO: 65.7 % (ref 42.7–76)
NRBC BLD AUTO-RTO: 0 /100 WBC (ref 0–0)
PLATELET # BLD AUTO: 117 10*3/MM3 (ref 140–450)
PMV BLD AUTO: 12.1 FL (ref 6–12)
RBC # BLD AUTO: 3.37 10*6/MM3 (ref 4.14–5.8)
WBC NRBC COR # BLD: 6.47 10*3/MM3 (ref 3.4–10.8)

## 2019-03-15 PROCEDURE — 85025 COMPLETE CBC W/AUTO DIFF WBC: CPT | Performed by: INTERNAL MEDICINE

## 2019-03-15 PROCEDURE — 36416 COLLJ CAPILLARY BLOOD SPEC: CPT | Performed by: INTERNAL MEDICINE

## 2019-03-19 ENCOUNTER — APPOINTMENT (OUTPATIENT)
Dept: PREADMISSION TESTING | Facility: HOSPITAL | Age: 59
End: 2019-03-19

## 2019-03-19 VITALS
SYSTOLIC BLOOD PRESSURE: 168 MMHG | HEIGHT: 69 IN | RESPIRATION RATE: 20 BRPM | WEIGHT: 176 LBS | OXYGEN SATURATION: 97 % | HEART RATE: 61 BPM | TEMPERATURE: 97.2 F | BODY MASS INDEX: 26.07 KG/M2 | DIASTOLIC BLOOD PRESSURE: 89 MMHG

## 2019-03-19 PROCEDURE — 93005 ELECTROCARDIOGRAM TRACING: CPT

## 2019-03-19 PROCEDURE — 93010 ELECTROCARDIOGRAM REPORT: CPT | Performed by: INTERNAL MEDICINE

## 2019-03-19 RX ORDER — BUMETANIDE 1 MG/1
1 TABLET ORAL AS NEEDED
COMMUNITY
End: 2020-07-16 | Stop reason: HOSPADM

## 2019-03-19 RX ORDER — ERGOCALCIFEROL 1.25 MG/1
50000 CAPSULE ORAL WEEKLY
COMMUNITY
End: 2020-07-14

## 2019-03-19 RX ORDER — LISINOPRIL 10 MG/1
10 TABLET ORAL EVERY MORNING
COMMUNITY
End: 2020-07-16 | Stop reason: HOSPADM

## 2019-03-19 NOTE — DISCHARGE INSTRUCTIONS
Take the following medications the morning of surgery with a small sip of water:  AMLODIPINE, CARVEDILOL, PROTONIX    BE HERE AT 8:00 DAY OF SURGERY    General Instructions:  • Do not eat solid food after midnight the night before surgery.  • You may drink clear liquids day of surgery but must stop at least one hour before your hospital arrival time.  • It is beneficial for you to have a clear drink that contains carbohydrates the day of surgery.  We suggest a 12 to 20 ounce bottle of Gatorade or Powerade for non-diabetic patients or a 12 to 20 ounce bottle of G2 or Powerade Zero for diabetic patients. (Pediatric patients, are not advised to drink a 12 to 20 ounce carbohydrate drink)    Clear liquids are liquids you can see through.  Nothing red in color.     Plain water                               Sports drinks  Sodas                                   Gelatin (Jell-O)  Fruit juices without pulp such as white grape juice and apple juice  Popsicles that contain no fruit or yogurt  Tea or coffee (no cream or milk added)  Gatorade / Powerade  G2 / Powerade Zero    • Infants may have breast milk up to four hours before surgery.  • Infants drinking formula may drink formula up to six hours before surgery.   • Patients who avoid smoking, chewing tobacco and alcohol for 4 weeks prior to surgery have a reduced risk of post-operative complications.  Quit smoking as many days before surgery as you can.  • Do not smoke, use chewing tobacco or drink alcohol the day of surgery.   • If applicable bring your C-PAP/ BI-PAP machine.  • Bring any papers given to you in the doctor’s office.  • Wear clean comfortable clothes and socks.  • Do not wear contact lenses or make-up.  Bring a case for your glasses.   • Bring crutches or walker if applicable.  • Remove all piercings.  Leave jewelry and any other valuables at home.  • Hair extensions with metal clips must be removed prior to surgery.  • The Pre-Admission Testing nurse will  instruct you to bring medications if unable to obtain an accurate list in Pre-Admission Testing.        If you were given a blood bank ID arm band remember to bring it with you the day of surgery.    Preventing a Surgical Site Infection:  • For 2 to 3 days before surgery, avoid shaving with a razor because the razor can irritate skin and make it easier to develop an infection.    • Any areas of open skin can increase the risk of a post-operative wound infection by allowing bacteria to enter and travel throughout the body.  Notify your surgeon if you have any skin wounds / rashes even if it is not near the expected surgical site.  The area will need assessed to determine if surgery should be delayed until it is healed.  • The night prior to surgery sleep in a clean bed with clean clothing.  Do not allow pets to sleep with you.  • Shower on the morning of surgery using a fresh bar of anti-bacterial soap (such as Dial) and clean washcloth.  Dry with a clean towel and dress in clean clothing.  • Ask your surgeon if you will be receiving antibiotics prior to surgery.  • Make sure you, your family, and all healthcare providers clean their hands with soap and water or an alcohol based hand  before caring for you or your wound.    Day of surgery:  Upon arrival, a Pre-op nurse and Anesthesiologist will review your health history, obtain vital signs, and answer questions you may have.  The only belongings needed at this time will be your home medications and if applicable your C-PAP/BI-PAP machine.  If you are staying overnight your family can leave the rest of your belongings in the car and bring them to your room later.  A Pre-op nurse will start an IV and you may receive medication in preparation for surgery, including something to help you relax.  Your family will be able to see you in the Pre-op area.  While you are in surgery your family should notify the waiting room  if they leave the waiting room  area and provide a contact phone number.    Please be aware that surgery does come with discomfort.  We want to make every effort to control your discomfort so please discuss any uncontrolled symptoms with your nurse.   Your doctor will most likely have prescribed pain medications.      If you are going home after surgery you will receive individualized written care instructions before being discharged.  A responsible adult must drive you to and from the hospital on the day of your surgery and stay with you for 24 hours.    If you are staying overnight following surgery, you will be transported to your hospital room following the recovery period.  [facility] has all private rooms.    You have received a list of surgical assistants for your reference.  If you have any questions please call Pre-Admission Testing at [phone].  Deductibles and co-payments are collected on the day of service. Please be prepared to pay the required co-pay, deductible or deposit on the day of service as defined by your plan.

## 2019-03-22 ENCOUNTER — INFUSION (OUTPATIENT)
Dept: ONCOLOGY | Facility: HOSPITAL | Age: 59
End: 2019-03-22

## 2019-03-22 ENCOUNTER — LAB (OUTPATIENT)
Dept: LAB | Facility: HOSPITAL | Age: 59
End: 2019-03-22

## 2019-03-22 DIAGNOSIS — C64.1 CANCER OF KIDNEY PARENCHYMA, RIGHT (HCC): Primary | ICD-10-CM

## 2019-03-22 LAB
BASOPHILS # BLD AUTO: 0.08 10*3/MM3 (ref 0–0.2)
BASOPHILS NFR BLD AUTO: 0.7 % (ref 0–1.5)
DEPRECATED RDW RBC AUTO: 45.3 FL (ref 37–54)
EOSINOPHIL # BLD AUTO: 0.39 10*3/MM3 (ref 0–0.4)
EOSINOPHIL NFR BLD AUTO: 3.6 % (ref 0.3–6.2)
ERYTHROCYTE [DISTWIDTH] IN BLOOD BY AUTOMATED COUNT: 13.6 % (ref 12.3–15.4)
HCT VFR BLD AUTO: 32.6 % (ref 37.5–51)
HGB BLD-MCNC: 10.7 G/DL (ref 13–17.7)
IMM GRANULOCYTES # BLD AUTO: 0.04 10*3/MM3 (ref 0–0.05)
IMM GRANULOCYTES NFR BLD AUTO: 0.4 % (ref 0–0.5)
LYMPHOCYTES # BLD AUTO: 2.85 10*3/MM3 (ref 0.7–3.1)
LYMPHOCYTES NFR BLD AUTO: 26 % (ref 19.6–45.3)
MCH RBC QN AUTO: 29.9 PG (ref 26.6–33)
MCHC RBC AUTO-ENTMCNC: 32.8 G/DL (ref 31.5–35.7)
MCV RBC AUTO: 91.1 FL (ref 79–97)
MONOCYTES # BLD AUTO: 0.72 10*3/MM3 (ref 0.1–0.9)
MONOCYTES NFR BLD AUTO: 6.6 % (ref 5–12)
NEUTROPHILS # BLD AUTO: 6.9 10*3/MM3 (ref 1.4–7)
NEUTROPHILS NFR BLD AUTO: 62.7 % (ref 42.7–76)
NRBC BLD AUTO-RTO: 0.2 /100 WBC (ref 0–0)
PLATELET # BLD AUTO: 139 10*3/MM3 (ref 140–450)
PMV BLD AUTO: 11.7 FL (ref 6–12)
RBC # BLD AUTO: 3.58 10*6/MM3 (ref 4.14–5.8)
WBC NRBC COR # BLD: 10.98 10*3/MM3 (ref 3.4–10.8)

## 2019-03-22 PROCEDURE — 85025 COMPLETE CBC W/AUTO DIFF WBC: CPT | Performed by: INTERNAL MEDICINE

## 2019-03-22 PROCEDURE — 36415 COLL VENOUS BLD VENIPUNCTURE: CPT | Performed by: INTERNAL MEDICINE

## 2019-03-26 ENCOUNTER — ANESTHESIA EVENT (OUTPATIENT)
Dept: PERIOP | Facility: HOSPITAL | Age: 59
End: 2019-03-26

## 2019-03-27 ENCOUNTER — HOSPITAL ENCOUNTER (OUTPATIENT)
Facility: HOSPITAL | Age: 59
Setting detail: HOSPITAL OUTPATIENT SURGERY
Discharge: HOME | End: 2019-03-27
Attending: SURGERY | Admitting: SURGERY

## 2019-03-27 ENCOUNTER — ANESTHESIA (OUTPATIENT)
Dept: PERIOP | Facility: HOSPITAL | Age: 59
End: 2019-03-27

## 2019-03-27 VITALS
SYSTOLIC BLOOD PRESSURE: 179 MMHG | WEIGHT: 184.38 LBS | OXYGEN SATURATION: 97 % | TEMPERATURE: 97.7 F | HEART RATE: 56 BPM | DIASTOLIC BLOOD PRESSURE: 91 MMHG | HEIGHT: 69 IN | RESPIRATION RATE: 18 BRPM | BODY MASS INDEX: 27.31 KG/M2

## 2019-03-27 LAB — GLUCOSE BLDC GLUCOMTR-MCNC: 184 MG/DL (ref 70–130)

## 2019-03-27 PROCEDURE — 49585 PR REPAIR UMBILICAL HERN,5+Y/O,REDUC: CPT | Performed by: SURGERY

## 2019-03-27 PROCEDURE — 25010000002 PROPOFOL 10 MG/ML EMULSION: Performed by: NURSE ANESTHETIST, CERTIFIED REGISTERED

## 2019-03-27 PROCEDURE — C1781 MESH (IMPLANTABLE): HCPCS | Performed by: SURGERY

## 2019-03-27 PROCEDURE — 25010000002 ONDANSETRON PER 1 MG: Performed by: NURSE ANESTHETIST, CERTIFIED REGISTERED

## 2019-03-27 PROCEDURE — 25010000002 MIDAZOLAM PER 1 MG: Performed by: ANESTHESIOLOGY

## 2019-03-27 PROCEDURE — 25010000002 NEOSTIGMINE PER 0.5 MG: Performed by: NURSE ANESTHETIST, CERTIFIED REGISTERED

## 2019-03-27 PROCEDURE — 82962 GLUCOSE BLOOD TEST: CPT

## 2019-03-27 DEVICE — VENTRALEX ST HERNIA PATCH
Type: IMPLANTABLE DEVICE | Site: UMBILICAL | Status: FUNCTIONAL
Brand: VENTRALEX ST HERNIA PATCH

## 2019-03-27 RX ORDER — DIPHENHYDRAMINE HYDROCHLORIDE 50 MG/ML
12.5 INJECTION INTRAMUSCULAR; INTRAVENOUS
Status: DISCONTINUED | OUTPATIENT
Start: 2019-03-27 | End: 2019-03-27 | Stop reason: HOSPADM

## 2019-03-27 RX ORDER — LIDOCAINE HYDROCHLORIDE 20 MG/ML
INJECTION, SOLUTION INFILTRATION; PERINEURAL AS NEEDED
Status: DISCONTINUED | OUTPATIENT
Start: 2019-03-27 | End: 2019-03-27 | Stop reason: SURG

## 2019-03-27 RX ORDER — HYDROCODONE BITARTRATE AND ACETAMINOPHEN 7.5; 325 MG/1; MG/1
1 TABLET ORAL ONCE AS NEEDED
Status: DISCONTINUED | OUTPATIENT
Start: 2019-03-27 | End: 2019-03-27 | Stop reason: HOSPADM

## 2019-03-27 RX ORDER — MIDAZOLAM HYDROCHLORIDE 1 MG/ML
1 INJECTION INTRAMUSCULAR; INTRAVENOUS
Status: DISCONTINUED | OUTPATIENT
Start: 2019-03-27 | End: 2019-03-27 | Stop reason: HOSPADM

## 2019-03-27 RX ORDER — PROMETHAZINE HYDROCHLORIDE 25 MG/ML
12.5 INJECTION, SOLUTION INTRAMUSCULAR; INTRAVENOUS ONCE AS NEEDED
Status: DISCONTINUED | OUTPATIENT
Start: 2019-03-27 | End: 2019-03-27 | Stop reason: HOSPADM

## 2019-03-27 RX ORDER — PROMETHAZINE HYDROCHLORIDE 25 MG/1
25 TABLET ORAL ONCE AS NEEDED
Status: DISCONTINUED | OUTPATIENT
Start: 2019-03-27 | End: 2019-03-27 | Stop reason: HOSPADM

## 2019-03-27 RX ORDER — ACETAMINOPHEN 325 MG/1
650 TABLET ORAL ONCE AS NEEDED
Status: DISCONTINUED | OUTPATIENT
Start: 2019-03-27 | End: 2019-03-27 | Stop reason: HOSPADM

## 2019-03-27 RX ORDER — FAMOTIDINE 10 MG/ML
20 INJECTION, SOLUTION INTRAVENOUS ONCE
Status: COMPLETED | OUTPATIENT
Start: 2019-03-27 | End: 2019-03-27

## 2019-03-27 RX ORDER — OXYCODONE AND ACETAMINOPHEN 7.5; 325 MG/1; MG/1
1 TABLET ORAL ONCE AS NEEDED
Status: COMPLETED | OUTPATIENT
Start: 2019-03-27 | End: 2019-03-27

## 2019-03-27 RX ORDER — HYDRALAZINE HYDROCHLORIDE 20 MG/ML
5 INJECTION INTRAMUSCULAR; INTRAVENOUS
Status: DISCONTINUED | OUTPATIENT
Start: 2019-03-27 | End: 2019-03-27 | Stop reason: HOSPADM

## 2019-03-27 RX ORDER — FLUMAZENIL 0.1 MG/ML
0.2 INJECTION INTRAVENOUS AS NEEDED
Status: DISCONTINUED | OUTPATIENT
Start: 2019-03-27 | End: 2019-03-27 | Stop reason: HOSPADM

## 2019-03-27 RX ORDER — LABETALOL HYDROCHLORIDE 5 MG/ML
5 INJECTION, SOLUTION INTRAVENOUS
Status: DISCONTINUED | OUTPATIENT
Start: 2019-03-27 | End: 2019-03-27 | Stop reason: HOSPADM

## 2019-03-27 RX ORDER — DIPHENHYDRAMINE HCL 25 MG
25 CAPSULE ORAL
Status: DISCONTINUED | OUTPATIENT
Start: 2019-03-27 | End: 2019-03-27 | Stop reason: HOSPADM

## 2019-03-27 RX ORDER — PANTOPRAZOLE SODIUM 40 MG/1
40 TABLET, DELAYED RELEASE ORAL DAILY
COMMUNITY

## 2019-03-27 RX ORDER — PROMETHAZINE HYDROCHLORIDE 25 MG/1
25 SUPPOSITORY RECTAL ONCE AS NEEDED
Status: DISCONTINUED | OUTPATIENT
Start: 2019-03-27 | End: 2019-03-27 | Stop reason: HOSPADM

## 2019-03-27 RX ORDER — LIDOCAINE HYDROCHLORIDE 10 MG/ML
0.5 INJECTION, SOLUTION EPIDURAL; INFILTRATION; INTRACAUDAL; PERINEURAL ONCE AS NEEDED
Status: DISCONTINUED | OUTPATIENT
Start: 2019-03-27 | End: 2019-03-27 | Stop reason: HOSPADM

## 2019-03-27 RX ORDER — EPHEDRINE SULFATE 50 MG/ML
5 INJECTION, SOLUTION INTRAVENOUS ONCE AS NEEDED
Status: DISCONTINUED | OUTPATIENT
Start: 2019-03-27 | End: 2019-03-27 | Stop reason: HOSPADM

## 2019-03-27 RX ORDER — SODIUM CHLORIDE, SODIUM LACTATE, POTASSIUM CHLORIDE, CALCIUM CHLORIDE 600; 310; 30; 20 MG/100ML; MG/100ML; MG/100ML; MG/100ML
9 INJECTION, SOLUTION INTRAVENOUS CONTINUOUS
Status: DISCONTINUED | OUTPATIENT
Start: 2019-03-27 | End: 2019-03-27 | Stop reason: HOSPADM

## 2019-03-27 RX ORDER — NALOXONE HCL 0.4 MG/ML
0.2 VIAL (ML) INJECTION AS NEEDED
Status: DISCONTINUED | OUTPATIENT
Start: 2019-03-27 | End: 2019-03-27 | Stop reason: HOSPADM

## 2019-03-27 RX ORDER — ROCURONIUM BROMIDE 10 MG/ML
INJECTION, SOLUTION INTRAVENOUS AS NEEDED
Status: DISCONTINUED | OUTPATIENT
Start: 2019-03-27 | End: 2019-03-27 | Stop reason: SURG

## 2019-03-27 RX ORDER — MIDAZOLAM HYDROCHLORIDE 1 MG/ML
2 INJECTION INTRAMUSCULAR; INTRAVENOUS
Status: DISCONTINUED | OUTPATIENT
Start: 2019-03-27 | End: 2019-03-27 | Stop reason: HOSPADM

## 2019-03-27 RX ORDER — ONDANSETRON 2 MG/ML
INJECTION INTRAMUSCULAR; INTRAVENOUS AS NEEDED
Status: DISCONTINUED | OUTPATIENT
Start: 2019-03-27 | End: 2019-03-27 | Stop reason: SURG

## 2019-03-27 RX ORDER — BUPIVACAINE HYDROCHLORIDE AND EPINEPHRINE 5; 5 MG/ML; UG/ML
INJECTION, SOLUTION PERINEURAL AS NEEDED
Status: DISCONTINUED | OUTPATIENT
Start: 2019-03-27 | End: 2019-03-27 | Stop reason: HOSPADM

## 2019-03-27 RX ORDER — SODIUM CHLORIDE 0.9 % (FLUSH) 0.9 %
1-10 SYRINGE (ML) INJECTION AS NEEDED
Status: DISCONTINUED | OUTPATIENT
Start: 2019-03-27 | End: 2019-03-27 | Stop reason: HOSPADM

## 2019-03-27 RX ORDER — PROPOFOL 10 MG/ML
VIAL (ML) INTRAVENOUS AS NEEDED
Status: DISCONTINUED | OUTPATIENT
Start: 2019-03-27 | End: 2019-03-27 | Stop reason: SURG

## 2019-03-27 RX ORDER — GLYCOPYRROLATE 0.2 MG/ML
INJECTION INTRAMUSCULAR; INTRAVENOUS AS NEEDED
Status: DISCONTINUED | OUTPATIENT
Start: 2019-03-27 | End: 2019-03-27 | Stop reason: SURG

## 2019-03-27 RX ORDER — ONDANSETRON 2 MG/ML
4 INJECTION INTRAMUSCULAR; INTRAVENOUS ONCE AS NEEDED
Status: DISCONTINUED | OUTPATIENT
Start: 2019-03-27 | End: 2019-03-27 | Stop reason: HOSPADM

## 2019-03-27 RX ADMIN — GLYCOPYRROLATE 0.2 MG: 0.2 INJECTION INTRAMUSCULAR; INTRAVENOUS at 12:24

## 2019-03-27 RX ADMIN — PROPOFOL 200 MG: 10 INJECTION, EMULSION INTRAVENOUS at 11:44

## 2019-03-27 RX ADMIN — LIDOCAINE HYDROCHLORIDE 100 MG: 20 INJECTION, SOLUTION INFILTRATION; PERINEURAL at 11:44

## 2019-03-27 RX ADMIN — OXYCODONE HYDROCHLORIDE AND ACETAMINOPHEN 1 TABLET: 7.5; 325 TABLET ORAL at 13:22

## 2019-03-27 RX ADMIN — NEOSTIGMINE METHYLSULFATE 1.5 MG: 1 INJECTION INTRAMUSCULAR; INTRAVENOUS; SUBCUTANEOUS at 12:24

## 2019-03-27 RX ADMIN — ROCURONIUM BROMIDE 30 MG: 10 INJECTION INTRAVENOUS at 11:44

## 2019-03-27 RX ADMIN — SODIUM CHLORIDE, POTASSIUM CHLORIDE, SODIUM LACTATE AND CALCIUM CHLORIDE 9 ML/HR: 600; 310; 30; 20 INJECTION, SOLUTION INTRAVENOUS at 11:26

## 2019-03-27 RX ADMIN — MIDAZOLAM 1 MG: 1 INJECTION INTRAMUSCULAR; INTRAVENOUS at 11:26

## 2019-03-27 RX ADMIN — FAMOTIDINE 20 MG: 10 INJECTION INTRAVENOUS at 11:26

## 2019-03-27 RX ADMIN — ONDANSETRON 4 MG: 2 INJECTION INTRAMUSCULAR; INTRAVENOUS at 12:23

## 2019-03-27 NOTE — ANESTHESIA POSTPROCEDURE EVALUATION
Patient: Angelo Brown    Procedure Summary     Date:  03/27/19 Room / Location:  [facility] OR 09 / [facility] MAIN OR    Anesthesia Start:  1134 Anesthesia Stop:  1245    Procedure:  UMBILICAL HERNIA REPAIR (N/A Abdomen) Diagnosis:       Umbilical hernia without obstruction and without gangrene      (Umbilical hernia without obstruction and without gangrene [K42.9])    Surgeon:  Harshad Cotto Jr., MD Provider:  Ama Mercado MD    Anesthesia Type:  general ASA Status:  4          Anesthesia Type: general  Last vitals  BP   (!) 190/94 (03/27/19 1351)   Temp   36.5 °C (97.7 °F) (03/27/19 1330)   Pulse   59 (03/27/19 1351)   Resp   16 (03/27/19 1351)     SpO2   97 % (03/27/19 1351)     Post Anesthesia Care and Evaluation    Patient location during evaluation: PACU  Patient participation: complete - patient participated  Level of consciousness: awake and alert  Pain management: adequate  Airway patency: patent  Anesthetic complications: No anesthetic complications    Cardiovascular status: acceptable  Respiratory status: acceptable  Hydration status: acceptable    Comments: --------------------            03/27/19               1351     --------------------   BP:     (!) 190/94   Pulse:      59       Resp:       16       Temp:                SpO2:      97%      --------------------       English

## 2019-03-27 NOTE — ANESTHESIA PREPROCEDURE EVALUATION
Anesthesia Evaluation     Patient summary reviewed and Nursing notes reviewed   NPO Solid Status: > 8 hours  NPO Liquid Status: > 2 hours           Airway   Mallampati: II  Dental - normal exam   (+) poor dentition    Comment: Extremely poor dentition with multiple broken and rotted teeth. Risk of dental injury discussed with patient    Pulmonary - normal exam   Cardiovascular - normal exam    ECG reviewed    (+) hypertension, past MI , CAD, CABG >6 Months, CHF,       Neuro/Psych  (+) CVA, headaches, psychiatric history,     GI/Hepatic/Renal/Endo    (+)  GERD,  hepatitis, liver disease, diabetes mellitus,     Musculoskeletal     Abdominal    Substance History      OB/GYN          Other   (+) arthritis   history of cancer                  Anesthesia Plan    ASA 4     general

## 2019-03-27 NOTE — ANESTHESIA PROCEDURE NOTES
Airway  Urgency: elective    Airway not difficult    General Information and Staff    Patient location during procedure: OR  Anesthesiologist: Ama Mercado MD  CRNA: Doug Don CRNA    Indications and Patient Condition  Indications for airway management: airway protection    Preoxygenated: yes  MILS maintained throughout  Mask difficulty assessment: 1 - vent by mask    Final Airway Details  Final airway type: endotracheal airway      Successful airway: ETT  Cuffed: yes   Successful intubation technique: direct laryngoscopy  Endotracheal tube insertion site: oral  Blade: Sal  Blade size: 3  ETT size (mm): 7.0  Cormack-Lehane Classification: grade I - full view of glottis  Placement verified by: chest auscultation and capnometry   Measured from: lips  ETT to lips (cm): 24  Number of attempts at approach: 1    Additional Comments  Pre O2, IV induction, easy mask, DVL x1, LTA used,  cords noted, EBBSH, +etCO2, secured in place, atraumatic, very poor dental hygiene, teeth intact as preop.

## 2019-03-29 ENCOUNTER — APPOINTMENT (OUTPATIENT)
Dept: ONCOLOGY | Facility: CLINIC | Age: 59
End: 2019-03-29

## 2019-03-29 ENCOUNTER — APPOINTMENT (OUTPATIENT)
Dept: ONCOLOGY | Facility: HOSPITAL | Age: 59
End: 2019-03-29

## 2019-03-29 ENCOUNTER — APPOINTMENT (OUTPATIENT)
Dept: LAB | Facility: HOSPITAL | Age: 59
End: 2019-03-29

## 2019-04-05 ENCOUNTER — INFUSION (OUTPATIENT)
Dept: ONCOLOGY | Facility: HOSPITAL | Age: 59
End: 2019-04-05

## 2019-04-05 ENCOUNTER — LAB (OUTPATIENT)
Dept: LAB | Facility: HOSPITAL | Age: 59
End: 2019-04-05

## 2019-04-05 VITALS — BODY MASS INDEX: 26.29 KG/M2 | WEIGHT: 178 LBS

## 2019-04-05 DIAGNOSIS — D50.8 IRON DEFICIENCY ANEMIA SECONDARY TO INADEQUATE DIETARY IRON INTAKE: ICD-10-CM

## 2019-04-05 DIAGNOSIS — D63.1 ANEMIA IN STAGE 3 CHRONIC KIDNEY DISEASE (HCC): Primary | ICD-10-CM

## 2019-04-05 DIAGNOSIS — IMO0001 IRON AND ITS COMPOUNDS CAUSING ADVERSE EFFECT IN THERAPEUTIC USE, SUBSEQUENT ENCOUNTER: ICD-10-CM

## 2019-04-05 DIAGNOSIS — N18.30 ANEMIA IN STAGE 3 CHRONIC KIDNEY DISEASE (HCC): Primary | ICD-10-CM

## 2019-04-05 DIAGNOSIS — N18.30 ANEMIA OF CHRONIC RENAL FAILURE, STAGE 3 (MODERATE) (HCC): Primary | ICD-10-CM

## 2019-04-05 DIAGNOSIS — N18.30 CHRONIC KIDNEY DISEASE, STAGE III (MODERATE) (HCC): ICD-10-CM

## 2019-04-05 DIAGNOSIS — D63.1 ANEMIA OF CHRONIC RENAL FAILURE, STAGE 3 (MODERATE) (HCC): Primary | ICD-10-CM

## 2019-04-05 LAB
BASOPHILS # BLD AUTO: 0.05 10*3/MM3 (ref 0–0.2)
BASOPHILS NFR BLD AUTO: 0.9 % (ref 0–1.5)
DEPRECATED RDW RBC AUTO: 43.8 FL (ref 37–54)
EOSINOPHIL # BLD AUTO: 0.23 10*3/MM3 (ref 0–0.4)
EOSINOPHIL NFR BLD AUTO: 4.3 % (ref 0.3–6.2)
ERYTHROCYTE [DISTWIDTH] IN BLOOD BY AUTOMATED COUNT: 13.1 % (ref 12.3–15.4)
HCT VFR BLD AUTO: 30.4 % (ref 37.5–51)
HGB BLD-MCNC: 9.9 G/DL (ref 13–17.7)
IMM GRANULOCYTES # BLD AUTO: 0.05 10*3/MM3 (ref 0–0.05)
IMM GRANULOCYTES NFR BLD AUTO: 0.9 % (ref 0–0.5)
LYMPHOCYTES # BLD AUTO: 1.45 10*3/MM3 (ref 0.7–3.1)
LYMPHOCYTES NFR BLD AUTO: 27.4 % (ref 19.6–45.3)
MCH RBC QN AUTO: 29.9 PG (ref 26.6–33)
MCHC RBC AUTO-ENTMCNC: 32.6 G/DL (ref 31.5–35.7)
MCV RBC AUTO: 91.8 FL (ref 79–97)
MONOCYTES # BLD AUTO: 0.35 10*3/MM3 (ref 0.1–0.9)
MONOCYTES NFR BLD AUTO: 6.6 % (ref 5–12)
NEUTROPHILS # BLD AUTO: 3.16 10*3/MM3 (ref 1.4–7)
NEUTROPHILS NFR BLD AUTO: 59.9 % (ref 42.7–76)
NRBC BLD AUTO-RTO: 0.4 /100 WBC (ref 0–0)
PLATELET # BLD AUTO: 119 10*3/MM3 (ref 140–450)
PMV BLD AUTO: 11 FL (ref 6–12)
RBC # BLD AUTO: 3.31 10*6/MM3 (ref 4.14–5.8)
WBC NRBC COR # BLD: 5.29 10*3/MM3 (ref 3.4–10.8)

## 2019-04-05 PROCEDURE — 85025 COMPLETE CBC W/AUTO DIFF WBC: CPT | Performed by: INTERNAL MEDICINE

## 2019-04-05 PROCEDURE — 36415 COLL VENOUS BLD VENIPUNCTURE: CPT | Performed by: INTERNAL MEDICINE

## 2019-04-05 PROCEDURE — 96372 THER/PROPH/DIAG INJ SC/IM: CPT

## 2019-04-05 PROCEDURE — 63510000001 EPOETIN ALFA PER 1000 UNITS: Performed by: INTERNAL MEDICINE

## 2019-04-05 RX ADMIN — ERYTHROPOIETIN 20000 UNITS: 20000 INJECTION, SOLUTION INTRAVENOUS; SUBCUTANEOUS at 15:06

## 2019-04-09 ENCOUNTER — OFFICE VISIT (OUTPATIENT)
Dept: SURGERY | Facility: CLINIC | Age: 59
End: 2019-04-09

## 2019-04-09 VITALS — OXYGEN SATURATION: 98 % | HEART RATE: 77 BPM | WEIGHT: 180.4 LBS | BODY MASS INDEX: 26.64 KG/M2

## 2019-04-09 DIAGNOSIS — Z48.89 POSTOPERATIVE VISIT: Primary | ICD-10-CM

## 2019-04-09 PROCEDURE — 99024 POSTOP FOLLOW-UP VISIT: CPT | Performed by: SURGERY

## 2019-04-09 NOTE — PROGRESS NOTES
Subjective   Angelo Brown is a 58 y.o. male who returns to the office after undergoing an umbilical hernia repair with mesh on 3/27/2019.     History of Present Illness     The patient is recovering well with no significant postop symptoms.  He is having no abdominal pain.  He has a good appetite and normal bowel function.  His energy level is good.      Review of Systems   Constitutional: Negative for activity change, appetite change, fatigue and fever.   Respiratory: Negative for chest tightness and shortness of breath.    Cardiovascular: Negative for chest pain and palpitations.   Gastrointestinal: Negative for abdominal pain, constipation, diarrhea and nausea.   Skin: Negative for rash and wound.   Psychiatric/Behavioral: Negative for agitation and confusion.       Objective   Physical Exam   Constitutional:  Non-toxic appearance. He does not appear ill. No distress.   Pulmonary/Chest: Effort normal. No respiratory distress.   Abdominal: Soft. Normal appearance. There is no tenderness.   Neurological: He is alert.   Skin:   Incision: intact with no evidence of infection.   Psychiatric: He has a normal mood and affect. His behavior is normal.       Assessment/Plan   The encounter diagnosis was Postoperative visit.    The patient is recovering well from his umbilical hernia repair with mesh.  He was advised to avoid heavy lifting for another 1 month.  He will follow-up on an as-needed basis.

## 2019-04-12 ENCOUNTER — LAB (OUTPATIENT)
Dept: LAB | Facility: HOSPITAL | Age: 59
End: 2019-04-12

## 2019-04-12 ENCOUNTER — OFFICE VISIT (OUTPATIENT)
Dept: ONCOLOGY | Facility: CLINIC | Age: 59
End: 2019-04-12

## 2019-04-12 ENCOUNTER — APPOINTMENT (OUTPATIENT)
Dept: ONCOLOGY | Facility: HOSPITAL | Age: 59
End: 2019-04-12

## 2019-04-12 VITALS
TEMPERATURE: 97.9 F | DIASTOLIC BLOOD PRESSURE: 77 MMHG | RESPIRATION RATE: 16 BRPM | HEART RATE: 65 BPM | BODY MASS INDEX: 28 KG/M2 | SYSTOLIC BLOOD PRESSURE: 172 MMHG | HEIGHT: 67 IN | WEIGHT: 178.4 LBS | OXYGEN SATURATION: 95 %

## 2019-04-12 DIAGNOSIS — D69.6 THROMBOCYTOPENIA (HCC): ICD-10-CM

## 2019-04-12 DIAGNOSIS — N18.30 ANEMIA IN STAGE 3 CHRONIC KIDNEY DISEASE (HCC): Primary | ICD-10-CM

## 2019-04-12 DIAGNOSIS — E53.8 B12 DEFICIENCY: ICD-10-CM

## 2019-04-12 DIAGNOSIS — C64.1 CANCER OF KIDNEY PARENCHYMA, RIGHT (HCC): ICD-10-CM

## 2019-04-12 DIAGNOSIS — D63.1 ANEMIA IN STAGE 3 CHRONIC KIDNEY DISEASE (HCC): Primary | ICD-10-CM

## 2019-04-12 DIAGNOSIS — D50.8 IRON DEFICIENCY ANEMIA SECONDARY TO INADEQUATE DIETARY IRON INTAKE: ICD-10-CM

## 2019-04-12 DIAGNOSIS — N18.30 CHRONIC KIDNEY DISEASE, STAGE III (MODERATE) (HCC): ICD-10-CM

## 2019-04-12 DIAGNOSIS — K42.9 UMBILICAL HERNIA WITHOUT OBSTRUCTION AND WITHOUT GANGRENE: ICD-10-CM

## 2019-04-12 PROBLEM — N28.89 RENAL MASS: Status: RESOLVED | Noted: 2017-12-24 | Resolved: 2019-04-12

## 2019-04-12 LAB
ALBUMIN SERPL-MCNC: 3.5 G/DL (ref 3.5–5.2)
ALBUMIN/GLOB SERPL: 1.2 G/DL (ref 1.1–2.4)
ALP SERPL-CCNC: 68 U/L (ref 38–116)
ALT SERPL W P-5'-P-CCNC: 8 U/L (ref 0–41)
ANION GAP SERPL CALCULATED.3IONS-SCNC: 12.6 MMOL/L
AST SERPL-CCNC: 22 U/L (ref 0–40)
BASOPHILS # BLD AUTO: 0.05 10*3/MM3 (ref 0–0.2)
BASOPHILS NFR BLD AUTO: 0.7 % (ref 0–1.5)
BILIRUB SERPL-MCNC: 0.3 MG/DL (ref 0.2–1.2)
BUN BLD-MCNC: 29 MG/DL (ref 6–20)
BUN/CREAT SERPL: 13.8 (ref 7.3–30)
CALCIUM SPEC-SCNC: 8.9 MG/DL (ref 8.5–10.2)
CHLORIDE SERPL-SCNC: 102 MMOL/L (ref 98–107)
CO2 SERPL-SCNC: 26.4 MMOL/L (ref 22–29)
CREAT BLD-MCNC: 2.1 MG/DL (ref 0.7–1.3)
DEPRECATED RDW RBC AUTO: 45.2 FL (ref 37–54)
EOSINOPHIL # BLD AUTO: 0.28 10*3/MM3 (ref 0–0.4)
EOSINOPHIL NFR BLD AUTO: 4 % (ref 0.3–6.2)
ERYTHROCYTE [DISTWIDTH] IN BLOOD BY AUTOMATED COUNT: 13.6 % (ref 12.3–15.4)
GFR SERPL CREATININE-BSD FRML MDRD: 33 ML/MIN/1.73
GLOBULIN UR ELPH-MCNC: 3 GM/DL (ref 1.8–3.5)
GLUCOSE BLD-MCNC: 239 MG/DL (ref 74–124)
HCT VFR BLD AUTO: 33 % (ref 37.5–51)
HGB BLD-MCNC: 10.8 G/DL (ref 13–17.7)
IMM GRANULOCYTES # BLD AUTO: 0.03 10*3/MM3 (ref 0–0.05)
IMM GRANULOCYTES NFR BLD AUTO: 0.4 % (ref 0–0.5)
LYMPHOCYTES # BLD AUTO: 1.53 10*3/MM3 (ref 0.7–3.1)
LYMPHOCYTES NFR BLD AUTO: 22 % (ref 19.6–45.3)
MCH RBC QN AUTO: 30.2 PG (ref 26.6–33)
MCHC RBC AUTO-ENTMCNC: 32.7 G/DL (ref 31.5–35.7)
MCV RBC AUTO: 92.2 FL (ref 79–97)
MONOCYTES # BLD AUTO: 0.45 10*3/MM3 (ref 0.1–0.9)
MONOCYTES NFR BLD AUTO: 6.5 % (ref 5–12)
NEUTROPHILS # BLD AUTO: 4.62 10*3/MM3 (ref 1.4–7)
NEUTROPHILS NFR BLD AUTO: 66.4 % (ref 42.7–76)
NRBC BLD AUTO-RTO: 0 /100 WBC (ref 0–0)
PLATELET # BLD AUTO: 157 10*3/MM3 (ref 140–450)
PMV BLD AUTO: 10.5 FL (ref 6–12)
POTASSIUM BLD-SCNC: 4 MMOL/L (ref 3.5–4.7)
PROT SERPL-MCNC: 6.5 G/DL (ref 6.3–8)
RBC # BLD AUTO: 3.58 10*6/MM3 (ref 4.14–5.8)
SODIUM BLD-SCNC: 141 MMOL/L (ref 134–145)
WBC NRBC COR # BLD: 6.96 10*3/MM3 (ref 3.4–10.8)

## 2019-04-12 PROCEDURE — 36415 COLL VENOUS BLD VENIPUNCTURE: CPT | Performed by: INTERNAL MEDICINE

## 2019-04-12 PROCEDURE — 99214 OFFICE O/P EST MOD 30 MIN: CPT | Performed by: INTERNAL MEDICINE

## 2019-04-12 PROCEDURE — 85025 COMPLETE CBC W/AUTO DIFF WBC: CPT | Performed by: INTERNAL MEDICINE

## 2019-04-12 PROCEDURE — 80053 COMPREHEN METABOLIC PANEL: CPT | Performed by: INTERNAL MEDICINE

## 2019-04-12 RX ORDER — INSULIN GLARGINE 100 [IU]/ML
10 INJECTION, SOLUTION SUBCUTANEOUS NIGHTLY
Refills: 0 | COMMUNITY
Start: 2019-03-18 | End: 2021-12-03 | Stop reason: HOSPADM

## 2019-04-12 NOTE — PROGRESS NOTES
Subjective     REASON FOR FOLLOW UP: PANCYTOPENIA, CHRONIC KIDNEY DISEASE STAGE III, HISTORY OF TREATED HEPATITIS C.     History of Present Illness   This patient returns today to the office in company of his wife after he has had his umbilical hernia corrected by General Surgery successfully. He is actually feeling fantastic from the point of view of pain that he was experiencing given the fact that this was incarcerated. The patient is back to normal bowel activity. He has no nausea or vomiting. As usual almost impossible to follow up on his cheating about his sugar control and diabetes and diet. He also is undergoing Procrit therapy for his anemia associated with chronic renal disease. His hemoglobin is up to 10.8 and he feels substantially better. He has normal bowel activity. Urine volume is appropriate. He has minimal if any swelling in his lower extremities. He has not had any infections.            Past Medical History:   Diagnosis Date   • Anemia    • Anxiety    • Arthritis     HANDS   • CAD (coronary artery disease)    • Cancer (CMS/HCC)     KIDNEY 7/2018 SX   • CHF (congestive heart failure) (CMS/HCC)    • Chronic back pain    • Chronic kidney disease    • Coronary artery disease    • Depression    • Diabetes mellitus (CMS/HCC)     TYPE 2   • Eyes sensitive to light, bilateral    • GERD (gastroesophageal reflux disease)    • Headache    • Hepatitis C     after blood tranfusion/treated   • History of MRSA infection    • History of transfusion     2013 CABG   • Hypertension    • Jaundice    • Myocardial infarction (CMS/HCC)     5-6 years ago per pt   • Stroke (CMS/HCC) 12/2017    left sided weakness/        Past Surgical History:   Procedure Laterality Date   • BRAIN HEMATOMA EVACUATION     • CARDIAC SURGERY     • CATARACT EXTRACTION, BILATERAL     • COLONOSCOPY     • CORONARY ARTERY BYPASS GRAFT  2013    Triple   • LAPAROSCOPIC PARTIAL NEPHRECTOMY Right    • ROTATOR CUFF REPAIR Left    • UMBILICAL HERNIA  REPAIR N/A 3/27/2019    Procedure: UMBILICAL HERNIA REPAIR;  Surgeon: Harshad Cotto Jr., MD;  Location: [facility] OR;  Service: General   • VASECTOMY  1985   • WOUND DEBRIDEMENT Right 06/10/2011    Excisional debridement of necrotizing fasciitis of the right groin extending along the right hemiscrotum, 5 x 2 x 2 cm in one wound and 7.5 x 2.5 x 2.5 cm of a second wound. This was sharp excisional debridement carried out to the muscle-Dr. Eduardo Cross         Current Outpatient Medications on File Prior to Visit   Medication Sig Dispense Refill   • ALPRAZolam (XANAX) 1 MG tablet Take 1 mg by mouth 4 (Four) Times a Day As Needed.     • amLODIPine (NORVASC) 5 MG tablet Take 5 mg by mouth Every Morning.     • bumetanide (BUMEX) 1 MG tablet Take 1 mg by mouth As Needed.     • carvedilol (COREG) 3.125 MG tablet Take 3.125 mg by mouth 2 (Two) Times a Day With Meals.     • Cyanocobalamin (VITAMIN B-12 PO) Take 5,000 mcg by mouth Daily.     • gabapentin (NEURONTIN) 300 MG capsule Take 300 mg by mouth As Needed.     • hydrALAZINE (APRESOLINE) 100 MG tablet Take 100 mg by mouth 3 (Three) Times a Day. Pt takes 2x daily at home-will talk with cardiologist in May per pt     • insulin aspart (novoLOG FLEXPEN) 100 UNIT/ML solution pen-injector sc pen Inject 2-15 Units under the skin 3 (Three) Times a Day With Meals. Per sliding scale     • Insulin Glargine (BASAGLAR KWIKPEN) 100 UNIT/ML injection pen INJECT 10 UNITS SC AT NIGHT  0   • Insulin Glargine (LANTUS SOLOSTAR) 100 UNIT/ML injection pen Inject 10 Units under the skin into the appropriate area as directed Every Night.     • lisinopril (PRINIVIL,ZESTRIL) 10 MG tablet Take 10 mg by mouth Every Morning.     • Omega-3 Fatty Acids (FISH OIL) 1000 MG capsule capsule Take 1,000 mg by mouth Daily With Breakfast. STOPPED 3/19/19     • oxyCODONE-acetaminophen (PERCOCET)  MG per tablet Take 1 tablet by mouth Every 6 (Six) Hours As Needed.     • pantoprazole (PROTONIX) 40 MG EC  tablet Take 40 mg by mouth Daily.     • simvastatin (ZOCOR) 40 MG tablet Take 40 mg by mouth Every Night.     • tiZANidine (ZANAFLEX) 4 MG tablet Take 4 mg by mouth Every 8 (Eight) Hours As Needed.     • vitamin D (ERGOCALCIFEROL) 43251 units capsule capsule Take 50,000 Units by mouth 1 (One) Time Per Week. SATURDAY     • aspirin 81 MG EC tablet Take 81 mg by mouth Every Morning. Stopped for surgery  2   • Lancets (FREESTYLE) lancets Test 4 times per day       No current facility-administered medications on file prior to visit.         ALLERGIES:    Allergies   Allergen Reactions   • Morphine And Related Delirium   • Fentanyl Other (See Comments)     ER said he was allergy   • Ibuprofen Nausea Only   • Latex Rash   • Penicillins Hives        Social History     Socioeconomic History   • Marital status:      Spouse name: Samantha   • Number of children: Not on file   • Years of education: High school   • Highest education level: Not on file   Occupational History   • Occupation: retired     Employer: UNEMPLOYED   Tobacco Use   • Smoking status: Never Smoker   • Smokeless tobacco: Never Used   Substance and Sexual Activity   • Alcohol use: No   • Drug use: No   • Sexual activity: Defer        Family History   Problem Relation Age of Onset   • Diabetes Mother    • Heart failure Mother    • Kidney failure Mother    • Anemia Mother    • Heart disease Mother    • Hypertension Mother    • Heart failure Father    • Coronary artery disease Father    • Heart disease Father    • Hypertension Father    • Diabetes Father    • Diabetes Sister    • Cervical cancer Sister    • Leukemia Sister    • Diabetes Brother    • Cervical cancer Daughter    • Ovarian cancer Sister    • Malig Hyperthermia Neg Hx         Review of Systems         General: no fever, no chills, no fatigue,no weight changes, no lack of appetite.  Eyes: no epiphora, xerophthalmia,conjunctivitis, pain, glaucoma, blurred vision, blindness, secretion, photophobia,  "proptosis, diplopia.  Ears: no otorrhea, tinnitus, otorrhagia, deafness, pain, vertigo.  Nose: no rhinorrhea, no epistaxis, no alteration in perception of odors, no sinuses pressure.  Mouth: no alteration in gums or teeth,  No ulcers, no difficulty with mastication or deglut ion, no odynophagia.  Neck: no masses or pain, no thyroid alterations, no pain in muscles or arteries, no carotid odynia, no crepitation.  Respiratory: no cough, no sputum production,no dyspnea,no trepopnea, no pleuritic pain,no hemoptysis.  Heart: no syncope, no irregularity, no palpitations, no angina,no orthopnea,no paroxysmal nocturnal dyspnea.  Vascular Venous: no tenderness,no edema,no palpable cords,no postphlebitic syndrome, no skin changes no ulcerations.  Vascular Arterial: no distal ischemia, noclaudication, no gangrene, no neuropathic ischemic pain, no skin ulcers, no paleness no cyanosis.  GI: no dysphagia, no odynophagia, no regurgitation, no heartburn,no indigestion,no nausea,no vomiting,no hematemesis ,no melena,no jaundice,no distention, no obstipation,no enterorrhagia,no proctalgia,no anal  lesions, no changes in bowel habits.  : no frequency, no hesitancy, no hematuria, no discharge,no  pain.  Musculoskeletal: no muscle or tendon pain or inflammation,no  joint pain, no edema, no functional limitation,no fasciculations, no mass.  Neurologic: no headache, no seizures, noalterations on Craneal nerves, no motor deficit, no sensory deficit, normal coordination, no alteration in memory,normal orientation, calculation,normal writting, verbal and written language.  Skin: no rashes,no pruritus no localized lesions.  Psychiatric: no anxiety, no depression,no agitation, no delusions, proper insight.  .    Objective     Vitals:    04/12/19 1544   BP: 172/77   Pulse: 65   Resp: 16   Temp: 97.9 °F (36.6 °C)   TempSrc: Oral   SpO2: 95%   Weight: 80.9 kg (178 lb 6.4 oz)   Height: 170 cm (66.93\")   PainSc: 7  Comment: lower back pain "     Current Status 4/12/2019   ECOG score 0     Physical Exam      GENERAL:  Well-developed, well-nourished  Patient  in no acute distress.   SKIN:  Warm, dry ,NO rashes,NO purpura ,NO petechiae.  HEENT:  Pupils were equal and reactive to light and accomodation, conjunctivas non injected, no pterigion, normal extraocular movements, normal visual acuity.   Mouth mucosa was moist, no exudates in oropharynx, abnormal gum line, normal roof of the mouth and pillars, normal papillations of the tongue.  NECK:  Supple with good range of motion; no thyromegaly or masses, no JVD or bruits, no cervical adenopathies.No carotid arteries pain, no carotid abnormal pulsation , NO arterial dance.  LYMPHATICS:  No cervical, NO supraclavicular, NO axillary,NO epitrochlear , NO inguinal adenopathy.  CHEST:  Normal excursion of both zaki thoraces, normal voice fremitus, no subcutaneous emphysema, normal axillas, no rashes or acanthosis nigricans. Lungs clear to percussion and auscultation, normal breath sounds bilaterally, no wheezing,NO crackles NO ronchi, NO stridor, NO rubs.  CARDIAC AND VASCULAR:  normal rate and regular rhythm, without murmurs,NO rubs NO S3 NO S4 right or left . Normal femoral, popliteal, pedis, brachial and carotid pulses.  ABDOMEN:  Soft, nontender with no organomegaly or masses, no ascites, no collateral circulation,no distention,no Honorio sign, no abdominal pain, no inguinal hernias,corrected umbilical hernia, no abdominal bruits. Normal bowel sounds.  GENITAL: Not  Performed.  EXTREMITIES  AND SPINE:  No clubbing, cyanosis or edema, no deformities or pain .No kyphosis, scoliosis, deformities or pain in spine, ribs or pelvic bone.  NEUROLOGICAL:  Patient was awake, alert, oriented to time, person and place.                  RECENT LABS:Differential   Order: [id number] - Part of Panel Order [id number]   Status:  Final result   Visible to patient:  No (Not Released)   Dx:  Iron deficiency anemia secondary to i...     Ref Range & Units 15:14   WBC 3.40 - 10.80 10*3/mm3 6.96    RBC 4.14 - 5.80 10*6/mm3 3.58 Abnormally low     Hemoglobin 13.0 - 17.7 g/dL 10.8 Abnormally low     Hematocrit 37.5 - 51.0 % 33.0 Abnormally low     MCV 79.0 - 97.0 fL 92.2    MCH 26.6 - 33.0 pg 30.2    MCHC 31.5 - 35.7 g/dL 32.7    RDW 12.3 - 15.4 % 13.6    RDW-SD 37.0 - 54.0 fl 45.2    MPV 6.0 - 12.0 fL 10.5    Platelets 140 - 450 10*3/mm3 157    Neutrophil % 42.7 - 76.0 % 66.4    Lymphocyte % 19.6 - 45.3 % 22.0    Monocyte % 5.0 - 12.0 % 6.5    Eosinophil % 0.3 - 6.2 % 4.0    Basophil % 0.0 - 1.5 % 0.7    Immature Grans % 0.0 - 0.5 % 0.4    Neutrophils, Absolute 1.40 - 7.00 10*3/mm3 4.62    Lymphocytes, Absolute 0.70 - 3.10 10*3/mm3 1.53    Monocytes, Absolute 0.10 - 0.90 10*3/mm3 0.45    Eosinophils, Absolute 0.00 - 0.40 10*3/mm3 0.28    Basophils, Absolute 0.00 - 0.20 10*3/mm3 0.05    Immature Grans, Absolute 0.00 - 0.05 10*3/mm3 0.03    nRBC 0.0 - 0.0 /100 WBC 0.0    Resulting Agency   [facility]         Specimen Collected: 04/12/19 15:14 Last Resulted: 04/12/19 15:23        Lab Flowsheet      Order Details      View Encounter      Lab and Collection Details      Routing      Result History                       Assessment/Plan  1. This patient has history of multifactorial anemia, constellation of B12 deficiency that has been corrected, iron deficiency that has been corrected and anemia of chronic kidney disease. He never has received Procrit.  The patient has continued his Procrit therapy and actually today, his hemoglobin has reached 10.8. Obviously, he will not receive any Procrit. Actually, he feels better in regard to his ability to function when the hemoglobin is higher.   2. Previous thrombocytopenia. The patient has had correction of this spontaneously. Why this is happening is not clear to me but I would like to see the number that we see today.   3. Corrected umbilical hernia. He looks terrific in this anatomical site. He has no  abdominal pain and actually surprising enough, he has healed well after surgery.   4. Poor dentition. No way to correct this problem. At this time, there is no ongoing infection.   5. History of kidney cancer status post nephrectomy. The patient eventually will require radiological assessment in this regard.     RECOMMENDATIONS:  I have advised him to return for his Procrit injections, CBC and nurse visit every 3 weeks. Otherwise, he will require a CBC, CMP in 3 months alone with ferritin, iron, TIBC at that point.     I encouraged him to try to control his diet in the best way. His wife blinks her eyes and rolls her eyes and tells me this is impossible. I understood this clearly. This has been the history of his life, impossible to control diabetes.     I am glad that his hernia got corrected in an easy and safe way and he is doing fine from this point of view.

## 2019-04-19 ENCOUNTER — APPOINTMENT (OUTPATIENT)
Dept: LAB | Facility: HOSPITAL | Age: 59
End: 2019-04-19

## 2019-04-19 ENCOUNTER — APPOINTMENT (OUTPATIENT)
Dept: ONCOLOGY | Facility: HOSPITAL | Age: 59
End: 2019-04-19

## 2019-05-02 ENCOUNTER — INFUSION (OUTPATIENT)
Dept: ONCOLOGY | Facility: HOSPITAL | Age: 59
End: 2019-05-02

## 2019-05-02 ENCOUNTER — LAB (OUTPATIENT)
Dept: LAB | Facility: HOSPITAL | Age: 59
End: 2019-05-02

## 2019-05-02 DIAGNOSIS — D69.6 THROMBOCYTOPENIA (HCC): ICD-10-CM

## 2019-05-02 DIAGNOSIS — C64.1 CANCER OF KIDNEY PARENCHYMA, RIGHT (HCC): ICD-10-CM

## 2019-05-02 DIAGNOSIS — K42.9 UMBILICAL HERNIA WITHOUT OBSTRUCTION AND WITHOUT GANGRENE: ICD-10-CM

## 2019-05-02 DIAGNOSIS — E53.8 B12 DEFICIENCY: ICD-10-CM

## 2019-05-02 DIAGNOSIS — D63.1 ANEMIA IN STAGE 3 CHRONIC KIDNEY DISEASE (HCC): ICD-10-CM

## 2019-05-02 DIAGNOSIS — N18.30 ANEMIA IN STAGE 3 CHRONIC KIDNEY DISEASE (HCC): ICD-10-CM

## 2019-05-02 DIAGNOSIS — N18.30 CHRONIC KIDNEY DISEASE, STAGE III (MODERATE) (HCC): ICD-10-CM

## 2019-05-02 LAB
BASOPHILS # BLD AUTO: 0.06 10*3/MM3 (ref 0–0.2)
BASOPHILS NFR BLD AUTO: 0.6 % (ref 0–1.5)
DEPRECATED RDW RBC AUTO: 44.1 FL (ref 37–54)
EOSINOPHIL # BLD AUTO: 0.21 10*3/MM3 (ref 0–0.4)
EOSINOPHIL NFR BLD AUTO: 2.2 % (ref 0.3–6.2)
ERYTHROCYTE [DISTWIDTH] IN BLOOD BY AUTOMATED COUNT: 13.7 % (ref 12.3–15.4)
HCT VFR BLD AUTO: 36.3 % (ref 37.5–51)
HGB BLD-MCNC: 12 G/DL (ref 13–17.7)
IMM GRANULOCYTES # BLD AUTO: 0.03 10*3/MM3 (ref 0–0.05)
IMM GRANULOCYTES NFR BLD AUTO: 0.3 % (ref 0–0.5)
LYMPHOCYTES # BLD AUTO: 2.13 10*3/MM3 (ref 0.7–3.1)
LYMPHOCYTES NFR BLD AUTO: 22.7 % (ref 19.6–45.3)
MCH RBC QN AUTO: 29.4 PG (ref 26.6–33)
MCHC RBC AUTO-ENTMCNC: 33.1 G/DL (ref 31.5–35.7)
MCV RBC AUTO: 89 FL (ref 79–97)
MONOCYTES # BLD AUTO: 0.66 10*3/MM3 (ref 0.1–0.9)
MONOCYTES NFR BLD AUTO: 7 % (ref 5–12)
NEUTROPHILS # BLD AUTO: 6.28 10*3/MM3 (ref 1.7–7)
NEUTROPHILS NFR BLD AUTO: 67.2 % (ref 42.7–76)
NRBC BLD AUTO-RTO: 0 /100 WBC (ref 0–0.2)
PLATELET # BLD AUTO: 166 10*3/MM3 (ref 140–450)
PMV BLD AUTO: 11.1 FL (ref 6–12)
RBC # BLD AUTO: 4.08 10*6/MM3 (ref 4.14–5.8)
WBC NRBC COR # BLD: 9.37 10*3/MM3 (ref 3.4–10.8)

## 2019-05-02 PROCEDURE — 85025 COMPLETE CBC W/AUTO DIFF WBC: CPT | Performed by: INTERNAL MEDICINE

## 2019-05-02 PROCEDURE — 36416 COLLJ CAPILLARY BLOOD SPEC: CPT | Performed by: INTERNAL MEDICINE

## 2019-05-02 NOTE — PROGRESS NOTES
Reviewed CBC with patient.  Counts are stable.   Hgb today is 12.0.  No procrit today per guidelines.   Patient without questions or concerns at this time.

## 2019-05-24 ENCOUNTER — APPOINTMENT (OUTPATIENT)
Dept: ONCOLOGY | Facility: HOSPITAL | Age: 59
End: 2019-05-24

## 2019-05-24 ENCOUNTER — APPOINTMENT (OUTPATIENT)
Dept: LAB | Facility: HOSPITAL | Age: 59
End: 2019-05-24

## 2019-06-14 ENCOUNTER — LAB (OUTPATIENT)
Dept: LAB | Facility: HOSPITAL | Age: 59
End: 2019-06-14

## 2019-06-14 ENCOUNTER — INFUSION (OUTPATIENT)
Dept: ONCOLOGY | Facility: HOSPITAL | Age: 59
End: 2019-06-14

## 2019-06-14 DIAGNOSIS — E53.8 B12 DEFICIENCY: ICD-10-CM

## 2019-06-14 DIAGNOSIS — D63.1 ANEMIA IN STAGE 3 CHRONIC KIDNEY DISEASE (HCC): ICD-10-CM

## 2019-06-14 DIAGNOSIS — D69.6 THROMBOCYTOPENIA (HCC): ICD-10-CM

## 2019-06-14 DIAGNOSIS — N18.30 CHRONIC KIDNEY DISEASE, STAGE III (MODERATE) (HCC): ICD-10-CM

## 2019-06-14 DIAGNOSIS — C64.1 CANCER OF KIDNEY PARENCHYMA, RIGHT (HCC): ICD-10-CM

## 2019-06-14 DIAGNOSIS — K42.9 UMBILICAL HERNIA WITHOUT OBSTRUCTION AND WITHOUT GANGRENE: ICD-10-CM

## 2019-06-14 DIAGNOSIS — N18.30 ANEMIA IN STAGE 3 CHRONIC KIDNEY DISEASE (HCC): ICD-10-CM

## 2019-06-14 DIAGNOSIS — E53.8 B12 DEFICIENCY: Primary | ICD-10-CM

## 2019-06-14 LAB
BASOPHILS # BLD AUTO: 0.03 10*3/MM3 (ref 0–0.2)
BASOPHILS NFR BLD AUTO: 0.6 % (ref 0–1.5)
DEPRECATED RDW RBC AUTO: 45 FL (ref 37–54)
EOSINOPHIL # BLD AUTO: 0.2 10*3/MM3 (ref 0–0.4)
EOSINOPHIL NFR BLD AUTO: 3.9 % (ref 0.3–6.2)
ERYTHROCYTE [DISTWIDTH] IN BLOOD BY AUTOMATED COUNT: 13.7 % (ref 12.3–15.4)
FERRITIN SERPL-MCNC: 169.6 NG/ML (ref 30–400)
HCT VFR BLD AUTO: 31.5 % (ref 37.5–51)
HGB BLD-MCNC: 10.2 G/DL (ref 13–17.7)
HGB RETIC QN AUTO: 34.3 PG (ref 29.8–36.1)
IMM GRANULOCYTES # BLD AUTO: 0.01 10*3/MM3 (ref 0–0.05)
IMM GRANULOCYTES NFR BLD AUTO: 0.2 % (ref 0–0.5)
IMM RETICS NFR: 10.7 % (ref 3–15.8)
IRON 24H UR-MRATE: 55 MCG/DL (ref 59–158)
IRON SATN MFR SERPL: 23 % (ref 14–48)
LYMPHOCYTES # BLD AUTO: 1.16 10*3/MM3 (ref 0.7–3.1)
LYMPHOCYTES NFR BLD AUTO: 22.5 % (ref 19.6–45.3)
MCH RBC QN AUTO: 29.2 PG (ref 26.6–33)
MCHC RBC AUTO-ENTMCNC: 32.4 G/DL (ref 31.5–35.7)
MCV RBC AUTO: 90.3 FL (ref 79–97)
MONOCYTES # BLD AUTO: 0.3 10*3/MM3 (ref 0.1–0.9)
MONOCYTES NFR BLD AUTO: 5.8 % (ref 5–12)
NEUTROPHILS # BLD AUTO: 3.46 10*3/MM3 (ref 1.7–7)
NEUTROPHILS NFR BLD AUTO: 67 % (ref 42.7–76)
NRBC BLD AUTO-RTO: 0 /100 WBC (ref 0–0.2)
PLATELET # BLD AUTO: 102 10*3/MM3 (ref 140–450)
PMV BLD AUTO: 11.6 FL (ref 6–12)
RBC # BLD AUTO: 3.49 10*6/MM3 (ref 4.14–5.8)
RETICS/RBC NFR AUTO: 1.56 % (ref 0.7–1.9)
TIBC SERPL-MCNC: 237 MCG/DL (ref 249–505)
TRANSFERRIN SERPL-MCNC: 169 MG/DL (ref 200–360)
WBC NRBC COR # BLD: 5.16 10*3/MM3 (ref 3.4–10.8)

## 2019-06-14 PROCEDURE — 96372 THER/PROPH/DIAG INJ SC/IM: CPT | Performed by: INTERNAL MEDICINE

## 2019-06-14 PROCEDURE — 83540 ASSAY OF IRON: CPT

## 2019-06-14 PROCEDURE — 36415 COLL VENOUS BLD VENIPUNCTURE: CPT

## 2019-06-14 PROCEDURE — 25010000002 CYANOCOBALAMIN PER 1000 MCG: Performed by: INTERNAL MEDICINE

## 2019-06-14 PROCEDURE — 84466 ASSAY OF TRANSFERRIN: CPT

## 2019-06-14 PROCEDURE — 85025 COMPLETE CBC W/AUTO DIFF WBC: CPT

## 2019-06-14 PROCEDURE — 82728 ASSAY OF FERRITIN: CPT

## 2019-06-14 PROCEDURE — 85046 RETICYTE/HGB CONCENTRATE: CPT

## 2019-06-14 RX ORDER — CYANOCOBALAMIN 1000 UG/ML
1000 INJECTION, SOLUTION INTRAMUSCULAR; SUBCUTANEOUS
Status: DISCONTINUED | OUTPATIENT
Start: 2019-06-14 | End: 2019-06-14 | Stop reason: HOSPADM

## 2019-06-14 RX ADMIN — CYANOCOBALAMIN 1000 MCG: 1000 INJECTION, SOLUTION INTRAMUSCULAR at 14:36

## 2019-06-14 NOTE — PROGRESS NOTES
Hgb today is 10.2.  No procrit today per guidelines.   Patient without questions or concerns at this time.   Given copy of labs.   Patient received B 12 injection today.

## 2019-07-05 ENCOUNTER — LAB (OUTPATIENT)
Dept: LAB | Facility: HOSPITAL | Age: 59
End: 2019-07-05

## 2019-07-05 ENCOUNTER — INFUSION (OUTPATIENT)
Dept: ONCOLOGY | Facility: HOSPITAL | Age: 59
End: 2019-07-05

## 2019-07-05 ENCOUNTER — TELEPHONE (OUTPATIENT)
Dept: ONCOLOGY | Facility: HOSPITAL | Age: 59
End: 2019-07-05

## 2019-07-05 DIAGNOSIS — D69.6 THROMBOCYTOPENIA (HCC): ICD-10-CM

## 2019-07-05 DIAGNOSIS — D63.1 ANEMIA IN STAGE 3 CHRONIC KIDNEY DISEASE (HCC): ICD-10-CM

## 2019-07-05 DIAGNOSIS — K42.9 UMBILICAL HERNIA WITHOUT OBSTRUCTION AND WITHOUT GANGRENE: ICD-10-CM

## 2019-07-05 DIAGNOSIS — E53.8 B12 DEFICIENCY: ICD-10-CM

## 2019-07-05 DIAGNOSIS — N18.30 ANEMIA IN STAGE 3 CHRONIC KIDNEY DISEASE (HCC): ICD-10-CM

## 2019-07-05 DIAGNOSIS — N18.30 CHRONIC KIDNEY DISEASE, STAGE III (MODERATE) (HCC): ICD-10-CM

## 2019-07-05 DIAGNOSIS — C64.1 CANCER OF KIDNEY PARENCHYMA, RIGHT (HCC): ICD-10-CM

## 2019-07-05 LAB
ALBUMIN SERPL-MCNC: 3.5 G/DL (ref 3.5–5.2)
ALBUMIN/GLOB SERPL: 1.2 G/DL (ref 1.1–2.4)
ALP SERPL-CCNC: 71 U/L (ref 38–116)
ALT SERPL W P-5'-P-CCNC: 12 U/L (ref 0–41)
ANION GAP SERPL CALCULATED.3IONS-SCNC: 11.6 MMOL/L (ref 5–15)
AST SERPL-CCNC: 18 U/L (ref 0–40)
BASOPHILS # BLD AUTO: 0.05 10*3/MM3 (ref 0–0.2)
BASOPHILS NFR BLD AUTO: 0.7 % (ref 0–1.5)
BILIRUB SERPL-MCNC: 0.2 MG/DL (ref 0.2–1.2)
BUN BLD-MCNC: 31 MG/DL (ref 6–20)
BUN/CREAT SERPL: 11.6 (ref 7.3–30)
CALCIUM SPEC-SCNC: 9.1 MG/DL (ref 8.5–10.2)
CHLORIDE SERPL-SCNC: 108 MMOL/L (ref 98–107)
CO2 SERPL-SCNC: 27.4 MMOL/L (ref 22–29)
CREAT BLD-MCNC: 2.67 MG/DL (ref 0.7–1.3)
DEPRECATED RDW RBC AUTO: 46.9 FL (ref 37–54)
EOSINOPHIL # BLD AUTO: 0.34 10*3/MM3 (ref 0–0.4)
EOSINOPHIL NFR BLD AUTO: 4.6 % (ref 0.3–6.2)
ERYTHROCYTE [DISTWIDTH] IN BLOOD BY AUTOMATED COUNT: 14.1 % (ref 12.3–15.4)
GFR SERPL CREATININE-BSD FRML MDRD: 25 ML/MIN/1.73
GLOBULIN UR ELPH-MCNC: 3 GM/DL (ref 1.8–3.5)
GLUCOSE BLD-MCNC: 74 MG/DL (ref 74–124)
HCT VFR BLD AUTO: 32.1 % (ref 37.5–51)
HGB BLD-MCNC: 10.4 G/DL (ref 13–17.7)
IMM GRANULOCYTES # BLD AUTO: 0.02 10*3/MM3 (ref 0–0.05)
IMM GRANULOCYTES NFR BLD AUTO: 0.3 % (ref 0–0.5)
LYMPHOCYTES # BLD AUTO: 1.48 10*3/MM3 (ref 0.7–3.1)
LYMPHOCYTES NFR BLD AUTO: 20 % (ref 19.6–45.3)
MCH RBC QN AUTO: 29.4 PG (ref 26.6–33)
MCHC RBC AUTO-ENTMCNC: 32.4 G/DL (ref 31.5–35.7)
MCV RBC AUTO: 90.7 FL (ref 79–97)
MONOCYTES # BLD AUTO: 0.58 10*3/MM3 (ref 0.1–0.9)
MONOCYTES NFR BLD AUTO: 7.8 % (ref 5–12)
NEUTROPHILS # BLD AUTO: 4.93 10*3/MM3 (ref 1.7–7)
NEUTROPHILS NFR BLD AUTO: 66.6 % (ref 42.7–76)
NRBC BLD AUTO-RTO: 0 /100 WBC (ref 0–0.2)
PLATELET # BLD AUTO: 104 10*3/MM3 (ref 140–450)
PMV BLD AUTO: 10.7 FL (ref 6–12)
POTASSIUM BLD-SCNC: 3.8 MMOL/L (ref 3.5–4.7)
PROT SERPL-MCNC: 6.5 G/DL (ref 6.3–8)
RBC # BLD AUTO: 3.54 10*6/MM3 (ref 4.14–5.8)
SODIUM BLD-SCNC: 147 MMOL/L (ref 134–145)
WBC NRBC COR # BLD: 7.4 10*3/MM3 (ref 3.4–10.8)

## 2019-07-05 PROCEDURE — 36415 COLL VENOUS BLD VENIPUNCTURE: CPT | Performed by: INTERNAL MEDICINE

## 2019-07-05 PROCEDURE — 85025 COMPLETE CBC W/AUTO DIFF WBC: CPT | Performed by: INTERNAL MEDICINE

## 2019-07-05 PROCEDURE — 80053 COMPREHEN METABOLIC PANEL: CPT | Performed by: INTERNAL MEDICINE

## 2019-07-05 NOTE — PROGRESS NOTES
Hgb today is 10.4.  No procrit today per guidelines.   Patient without questions or concerns at this time.   Given copy of labs.     Cr of 2.69 reviewed with Tamar SEWELL, and call Dr Murrell's office with results and see if there is anything we need to change.   Message sent to triage to place call to Dr Murrell's office with results.

## 2019-07-05 NOTE — TELEPHONE ENCOUNTER
----- Message from Saadia Canas RN sent at 7/5/2019  3:34 PM EDT -----  Please call Dr Murrell's office and let them know that patients cr today was 2.96 and advise.    Pt's creat 2.67 today. Called Dr. Murrell's office and spoke with Roamna. Informed her of the creat level as well routed the labs to his office.

## 2019-07-26 ENCOUNTER — OFFICE VISIT (OUTPATIENT)
Dept: ONCOLOGY | Facility: CLINIC | Age: 59
End: 2019-07-26

## 2019-07-26 ENCOUNTER — LAB (OUTPATIENT)
Dept: LAB | Facility: HOSPITAL | Age: 59
End: 2019-07-26

## 2019-07-26 ENCOUNTER — APPOINTMENT (OUTPATIENT)
Dept: ONCOLOGY | Facility: HOSPITAL | Age: 59
End: 2019-07-26

## 2019-07-26 VITALS
OXYGEN SATURATION: 97 % | DIASTOLIC BLOOD PRESSURE: 94 MMHG | BODY MASS INDEX: 28.58 KG/M2 | SYSTOLIC BLOOD PRESSURE: 220 MMHG | TEMPERATURE: 97.8 F | WEIGHT: 182.1 LBS | HEART RATE: 60 BPM | RESPIRATION RATE: 14 BRPM | HEIGHT: 67 IN

## 2019-07-26 DIAGNOSIS — C64.1 CANCER OF KIDNEY PARENCHYMA, RIGHT (HCC): ICD-10-CM

## 2019-07-26 DIAGNOSIS — N18.30 CHRONIC KIDNEY DISEASE, STAGE III (MODERATE) (HCC): ICD-10-CM

## 2019-07-26 DIAGNOSIS — N18.30 ANEMIA OF CHRONIC RENAL FAILURE, STAGE 3 (MODERATE) (HCC): ICD-10-CM

## 2019-07-26 DIAGNOSIS — D69.6 THROMBOCYTOPENIA (HCC): ICD-10-CM

## 2019-07-26 DIAGNOSIS — N18.30 ANEMIA IN STAGE 3 CHRONIC KIDNEY DISEASE (HCC): ICD-10-CM

## 2019-07-26 DIAGNOSIS — E53.8 B12 DEFICIENCY: ICD-10-CM

## 2019-07-26 DIAGNOSIS — K42.9 UMBILICAL HERNIA WITHOUT OBSTRUCTION AND WITHOUT GANGRENE: ICD-10-CM

## 2019-07-26 DIAGNOSIS — D63.1 ANEMIA IN STAGE 3 CHRONIC KIDNEY DISEASE (HCC): ICD-10-CM

## 2019-07-26 DIAGNOSIS — D50.8 IRON DEFICIENCY ANEMIA SECONDARY TO INADEQUATE DIETARY IRON INTAKE: Primary | ICD-10-CM

## 2019-07-26 DIAGNOSIS — D63.1 ANEMIA OF CHRONIC RENAL FAILURE, STAGE 3 (MODERATE) (HCC): ICD-10-CM

## 2019-07-26 DIAGNOSIS — D61.818 PANCYTOPENIA, ACQUIRED (HCC): ICD-10-CM

## 2019-07-26 LAB
BASOPHILS # BLD AUTO: 0.07 10*3/MM3 (ref 0–0.2)
BASOPHILS NFR BLD AUTO: 1 % (ref 0–1.5)
DEPRECATED RDW RBC AUTO: 50.8 FL (ref 37–54)
EOSINOPHIL # BLD AUTO: 0.28 10*3/MM3 (ref 0–0.4)
EOSINOPHIL NFR BLD AUTO: 4.1 % (ref 0.3–6.2)
ERYTHROCYTE [DISTWIDTH] IN BLOOD BY AUTOMATED COUNT: 14.6 % (ref 12.3–15.4)
HCT VFR BLD AUTO: 35 % (ref 37.5–51)
HGB BLD-MCNC: 10.8 G/DL (ref 13–17.7)
IMM GRANULOCYTES # BLD AUTO: 0.03 10*3/MM3 (ref 0–0.05)
IMM GRANULOCYTES NFR BLD AUTO: 0.4 % (ref 0–0.5)
LYMPHOCYTES # BLD AUTO: 1.7 10*3/MM3 (ref 0.7–3.1)
LYMPHOCYTES NFR BLD AUTO: 25.2 % (ref 19.6–45.3)
MCH RBC QN AUTO: 29.5 PG (ref 26.6–33)
MCHC RBC AUTO-ENTMCNC: 30.9 G/DL (ref 31.5–35.7)
MCV RBC AUTO: 95.6 FL (ref 79–97)
MONOCYTES # BLD AUTO: 0.47 10*3/MM3 (ref 0.1–0.9)
MONOCYTES NFR BLD AUTO: 7 % (ref 5–12)
NEUTROPHILS # BLD AUTO: 4.2 10*3/MM3 (ref 1.7–7)
NEUTROPHILS NFR BLD AUTO: 62.3 % (ref 42.7–76)
NRBC BLD AUTO-RTO: 0 /100 WBC (ref 0–0.2)
PLATELET # BLD AUTO: 103 10*3/MM3 (ref 140–450)
PMV BLD AUTO: 11.9 FL (ref 6–12)
RBC # BLD AUTO: 3.66 10*6/MM3 (ref 4.14–5.8)
WBC NRBC COR # BLD: 6.75 10*3/MM3 (ref 3.4–10.8)

## 2019-07-26 PROCEDURE — 85025 COMPLETE CBC W/AUTO DIFF WBC: CPT | Performed by: INTERNAL MEDICINE

## 2019-07-26 PROCEDURE — 99214 OFFICE O/P EST MOD 30 MIN: CPT | Performed by: INTERNAL MEDICINE

## 2019-07-26 PROCEDURE — 36415 COLL VENOUS BLD VENIPUNCTURE: CPT | Performed by: INTERNAL MEDICINE

## 2019-07-26 NOTE — PROGRESS NOTES
Subjective     REASON FOR FOLLOW UP: PANCYTOPENIA, CHRONIC KIDNEY DISEASE STAGE III, HISTORY OF TREATED HEPATITIS C.     History of Present Illness This patient returns today to the office for followup in company of his wife. They are today in a fight because the patient is no longer listening to the wife’s advice in regard to medical care. This has been the story of my assessment with them since the original consultation anyway. The patient continues eating in his own way. He no longer has a primary care physician. His creatinine has risen. The patient has not had any fluid accumulation. He has no obvious infections and he has had a stroke not too long ago. Nobody is looking into the overall picture and overall management. Even his Cardiologist has assumed part of the care because Primary Care is no longer available to him.                Past Medical History:   Diagnosis Date   • Anemia    • Anxiety    • Arthritis     HANDS   • CAD (coronary artery disease)    • Cancer (CMS/HCC)     KIDNEY 7/2018 SX   • CHF (congestive heart failure) (CMS/HCC)    • Chronic back pain    • Chronic kidney disease    • Coronary artery disease    • Depression    • Diabetes mellitus (CMS/HCC)     TYPE 2   • Eyes sensitive to light, bilateral    • GERD (gastroesophageal reflux disease)    • Headache    • Hepatitis C     after blood tranfusion/treated   • History of MRSA infection    • History of transfusion     2013 CABG   • Hypertension    • Jaundice    • Myocardial infarction (CMS/HCC)     5-6 years ago per pt   • Stroke (CMS/HCC) 12/2017    left sided weakness/        Past Surgical History:   Procedure Laterality Date   • BRAIN HEMATOMA EVACUATION     • CARDIAC SURGERY     • CATARACT EXTRACTION, BILATERAL     • COLONOSCOPY     • CORONARY ARTERY BYPASS GRAFT  2013    Triple   • LAPAROSCOPIC PARTIAL NEPHRECTOMY Right    • ROTATOR CUFF REPAIR Left    • UMBILICAL HERNIA REPAIR N/A 3/27/2019    Procedure: UMBILICAL HERNIA REPAIR;   Surgeon: Harshad Cotto Jr., MD;  Location: [facility] OR;  Service: General   • VASECTOMY  1985   • WOUND DEBRIDEMENT Right 06/10/2011    Excisional debridement of necrotizing fasciitis of the right groin extending along the right hemiscrotum, 5 x 2 x 2 cm in one wound and 7.5 x 2.5 x 2.5 cm of a second wound. This was sharp excisional debridement carried out to the muscle-Dr. Eduardo Cross         Current Outpatient Medications on File Prior to Visit   Medication Sig Dispense Refill   • ALPRAZolam (XANAX) 1 MG tablet Take 1 mg by mouth 4 (Four) Times a Day As Needed.     • bumetanide (BUMEX) 1 MG tablet Take 1 mg by mouth As Needed.     • carvedilol (COREG) 3.125 MG tablet Take 3.125 mg by mouth 2 (Two) Times a Day With Meals.     • Cyanocobalamin (VITAMIN B-12 PO) Take 5,000 mcg by mouth Daily.     • diclofenac (VOLTAREN) 1 % gel gel by Intratympanic route.     • gabapentin (NEURONTIN) 300 MG capsule Take 300 mg by mouth As Needed.     • hydrALAZINE (APRESOLINE) 100 MG tablet Take 100 mg by mouth 3 (Three) Times a Day. Pt takes 2x daily at home-will talk with cardiologist in May per pt     • insulin aspart (novoLOG FLEXPEN) 100 UNIT/ML solution pen-injector sc pen Inject 2-15 Units under the skin 3 (Three) Times a Day With Meals. Per sliding scale     • Insulin Glargine (BASAGLAR KWIKPEN) 100 UNIT/ML injection pen INJECT 10 UNITS SC AT NIGHT  0   • Insulin Glargine (LANTUS SOLOSTAR) 100 UNIT/ML injection pen Inject 15-50 Units under the skin into the appropriate area as directed Daily.     • lisinopril (PRINIVIL,ZESTRIL) 10 MG tablet Take 10 mg by mouth Every Morning.     • MELATONIN PO Take  by mouth.     • Omega-3 Fatty Acids (FISH OIL) 1000 MG capsule capsule Take 1,000 mg by mouth Daily With Breakfast. STOPPED 3/19/19     • oxyCODONE-acetaminophen (PERCOCET)  MG per tablet Take 1 tablet by mouth Every 6 (Six) Hours As Needed.     • pantoprazole (PROTONIX) 40 MG EC tablet Take 40 mg by mouth Daily.     •  simvastatin (ZOCOR) 40 MG tablet Take 40 mg by mouth Every Night.     • tiZANidine (ZANAFLEX) 4 MG tablet Take 4 mg by mouth Every 8 (Eight) Hours As Needed.     • vitamin D (ERGOCALCIFEROL) 91886 units capsule capsule Take 50,000 Units by mouth 1 (One) Time Per Week. SATURDAY     • amLODIPine (NORVASC) 5 MG tablet Take 5 mg by mouth Every Morning.     • aspirin 81 MG EC tablet Take 81 mg by mouth Every Morning. Stopped for surgery  2   • Insulin Glargine (LANTUS SOLOSTAR) 100 UNIT/ML injection pen Inject 10 Units under the skin into the appropriate area as directed Every Night.     • [DISCONTINUED] Lancets (FREESTYLE) lancets Test 4 times per day       No current facility-administered medications on file prior to visit.         ALLERGIES:    Allergies   Allergen Reactions   • Morphine And Related Delirium   • Fentanyl Other (See Comments)     ER said he was allergy   • Ibuprofen Nausea Only   • Latex Rash   • Penicillins Hives        Social History     Socioeconomic History   • Marital status:      Spouse name: Samantha   • Number of children: Not on file   • Years of education: High school   • Highest education level: Not on file   Occupational History   • Occupation: retired     Employer: UNEMPLOYED   Tobacco Use   • Smoking status: Never Smoker   • Smokeless tobacco: Never Used   Substance and Sexual Activity   • Alcohol use: No   • Drug use: No   • Sexual activity: Defer        Family History   Problem Relation Age of Onset   • Diabetes Mother    • Heart failure Mother    • Kidney failure Mother    • Anemia Mother    • Heart disease Mother    • Hypertension Mother    • Heart failure Father    • Coronary artery disease Father    • Heart disease Father    • Hypertension Father    • Diabetes Father    • Diabetes Sister    • Cervical cancer Sister    • Leukemia Sister    • Diabetes Brother    • Cervical cancer Daughter    • Ovarian cancer Sister    • Malig Hyperthermia Neg Hx         Review of Systems        General: no fever, no chills,  fatigue,no weight changes, no lack of appetite.  Eyes: no epiphora, xerophthalmia,conjunctivitis, pain, glaucoma, blurred vision, blindness, secretion, photophobia, proptosis, diplopia.  Ears: no otorrhea, tinnitus, otorrhagia, deafness, pain, vertigo.  Nose: no rhinorrhea, no epistaxis, no alteration in perception of odors, no sinuses pressure.  Mouth: no alteration in gums or teeth,  No ulcers, no difficulty with mastication or deglut ion, no odynophagia.  Neck: no masses or pain, no thyroid alterations, no pain in muscles or arteries, no carotid odynia, no crepitation.  Respiratory: no cough, no sputum production,no dyspnea,no trepopnea, no pleuritic pain,no hemoptysis.  Heart: no syncope, no irregularity, no palpitations, no angina,no orthopnea,no paroxysmal nocturnal dyspnea.  Vascular Venous: no tenderness,no edema,no palpable cords,no postphlebitic syndrome, no skin changes no ulcerations.  Vascular Arterial: no distal ischemia, noclaudication, no gangrene, no neuropathic ischemic pain, no skin ulcers, no paleness no cyanosis.  GI: no dysphagia, no odynophagia, no regurgitation, no heartburn,no indigestion,no nausea,no vomiting,no hematemesis ,no melena,no jaundice,no distention, no obstipation,no enterorrhagia,no proctalgia,no anal  lesions, no changes in bowel habits.  : no frequency, no hesitancy, no hematuria, no discharge,no  pain.  Musculoskeletal: no muscle or tendon pain or inflammation,no  joint pain, no edema, no functional limitation,no fasciculations, no mass.  Neurologic: no headache, no seizures, noalterations on Craneal nerves, no motor deficit, no sensory deficit, normal coordination, no alteration in memory,normal orientation, calculation,normal writting, verbal and written language.REPETITIOUS LENGUAJE  Skin: no rashes,no pruritus no localized lesions.  Psychiatric: no anxiety, no depression,no agitation, no delusions, proper insight.\      Objective  "    Vitals:    07/26/19 1507   BP: (!) 220/94   Pulse: 60   Resp: 14   Temp: 97.8 °F (36.6 °C)   TempSrc: Oral   SpO2: 97%   Weight: 82.6 kg (182 lb 1.6 oz)   Height: 170 cm (66.93\")     Current Status 7/26/2019   ECOG score 0     Physical Exam    GENERAL:  Well-developed, well-nourished  Patient  in no acute distress.   SKIN:  Warm, dry ,NO rashes,NO purpura ,NO petechiae.  HEENT:  Pupils were equal and reactive to light and accomodation, conjunctivas non injected, no pterigion, normal extraocular movements, ABnormal visual acuity.   Mouth mucosa was moist, no exudates in oropharynx, ABnormal gum line, normal roof of the mouth and pillars, normal papillations of the tongue.  NECK:  Supple with good range of motion; no thyromegaly or masses, no JVD or bruits, no cervical adenopathies.No carotid arteries pain, no carotid abnormal pulsation , NO arterial dance.  LYMPHATICS:  No cervical, NO supraclavicular, NO axillary,NO epitrochlear , NO inguinal adenopathy.  CHEST:  Normal excursion of both zaki thoraces, normal voice fremitus, no subcutaneous emphysema, normal axillas, no rashes or acanthosis nigricans. Lungs clear to percussion and auscultation, normal breath sounds bilaterally, no wheezing,NO crackles NO ronchi, NO stridor, NO rubs.  CARDIAC AND VASCULAR:  normal rate and regular rhythm, without murmurs,NO rubs NO S3 NO S4 right or left . Normal femoral, popliteal, pedis, brachial and carotid pulses.  ABDOMEN:  Soft, nontender with no organomegaly or masses, no ascites, no collateral circulation,no distention,no Villalba sign, no abdominal pain, no inguinal hernias,no umbilical hernia, no abdominal bruits. Normal bowel sounds.  GENITAL: Not  Performed.  EXTREMITIES  AND SPINE:  No clubbing, cyanosis or edema, no deformities or pain .No kyphosis, scoliosis, deformities or pain in spine, ribs or pelvic bone.  NEUROLOGICAL:  Patient was awake, alert, oriented to time, person and place.                          RECENT " LABS:  Component      Latest Ref Rng & Units 3/1/2019 3/18/2019 3/27/2019 4/12/2019                Glucose      74 - 124 mg/dL 197 (H) 216 (A) 184 (H) 239 (H)   BUN      6 - 20 mg/dL 29 (H)   29 (H)   Creatinine      0.70 - 1.30 mg/dL 1.95 (HH)   2.10 (HH)   Sodium      134 - 145 mmol/L 146 (H)   141   Potassium      3.5 - 4.7 mmol/L 4.3   4.0   Chloride      98 - 107 mmol/L 108 (H)   102   CO2      22.0 - 29.0 mmol/L 25.7   26.4   Calcium      8.5 - 10.2 mg/dL 8.8   8.9   Total Protein      6.3 - 8.0 g/dL 6.1 (L)   6.5   Albumin      3.50 - 5.20 g/dL 3.20 (L)   3.50   ALT (SGPT)      0 - 41 U/L 8   8   AST (SGOT)      0 - 40 U/L 16   22   Alkaline Phosphatase      38 - 116 U/L 72   68   Total Bilirubin      0.2 - 1.2 mg/dL <0.2   0.3   eGFR Non African Am      >60 mL/min/1.73 36 (L)   33 (L)   Globulin      1.8 - 3.5 gm/dL 2.9   3.0     Component      Latest Ref Rng & Units 7/5/2019             Glucose      74 - 124 mg/dL 74   BUN      6 - 20 mg/dL 31 (H)   Creatinine      0.70 - 1.30 mg/dL 2.67 (HH)   Sodium      134 - 145 mmol/L 147 (H)   Potassium      3.5 - 4.7 mmol/L 3.8   Chloride      98 - 107 mmol/L 108 (H)   CO2      22.0 - 29.0 mmol/L 27.4   Calcium      8.5 - 10.2 mg/dL 9.1   Total Protein      6.3 - 8.0 g/dL 6.5   Albumin      3.50 - 5.20 g/dL 3.50   ALT (SGPT)      0 - 41 U/L 12   AST (SGOT)      0 - 40 U/L 18   Alkaline Phosphatase      38 - 116 U/L 71   Total Bilirubin      0.2 - 1.2 mg/dL 0.2   eGFR Non African Am      >60 mL/min/1.73 25 (L)   Globulin      1.8 - 3.5 gm/dL 3.0       CBC Auto Differential   Order: [id number] - Part of Panel Order [id number]   Status:  Final result   Visible to patient:  No (Not Released)   Dx:  Chronic kidney disease, stage III (mo...    Ref Range & Units 14:30   WBC 3.40 - 10.80 10*3/mm3 6.75    RBC 4.14 - 5.80 10*6/mm3 3.66 Abnormally low     Hemoglobin 13.0 - 17.7 g/dL 10.8 Abnormally low     Hematocrit 37.5 - 51.0 % 35.0 Abnormally low     MCV 79.0 - 97.0 fL 95.6     MCH 26.6 - 33.0 pg 29.5    MCHC 31.5 - 35.7 g/dL 30.9 Abnormally low     RDW 12.3 - 15.4 % 14.6    RDW-SD 37.0 - 54.0 fl 50.8    MPV 6.0 - 12.0 fL 11.9    Platelets 140 - 450 10*3/mm3 103 Abnormally low     Neutrophil % 42.7 - 76.0 % 62.3    Lymphocyte % 19.6 - 45.3 % 25.2    Monocyte % 5.0 - 12.0 % 7.0    Eosinophil % 0.3 - 6.2 % 4.1    Basophil % 0.0 - 1.5 % 1.0    Immature Grans % 0.0 - 0.5 % 0.4    Neutrophils, Absolute 1.70 - 7.00 10*3/mm3 4.20    Lymphocytes, Absolute 0.70 - 3.10 10*3/mm3 1.70    Monocytes, Absolute 0.10 - 0.90 10*3/mm3                    Assessment/Plan  1. This patient has history of multifactorial anemia, constellation of B12 deficiency that has been corrected, iron deficiency that has been corrected and anemia of chronic kidney disease.   Today the hemoglobin of the patient is 10.8. His white count is normal. His platelet count is as usual on the low side. He has not had any clinical bleeding. Obviously today, he has no need for Procrit and he will return to the office every 3 weeks for Procrit administration if hemoglobin is less than 10. We are planning to repeat a ferritin, iron profile in 9 weeks.   2. The patient no longer has a primary care doctor. He has multiple comorbidities. We are going to ask [facility] to provide an Internal Medicine Physician for him.   3. The patient’s creatinine has risen. The more recent is 2.67. Yohan Murrell MD is his Nephrologist. We will make a referral for him to be seen.     I do not believe that the patient’s diabetes control is appropriate. The vascular event in the central nervous system, visual acuity in the right eye, progressive renal insufficiency, all these are manifestations of vascular complications associated with diabetes. It is not going to be long until another event is going to happen. I discussed this with the patient. He is more willing now to listen to his wife even though that has been a history of their life,  each one of them in the corner of the [place].

## 2019-08-16 ENCOUNTER — LAB (OUTPATIENT)
Dept: LAB | Facility: HOSPITAL | Age: 59
End: 2019-08-16

## 2019-08-16 ENCOUNTER — INFUSION (OUTPATIENT)
Dept: ONCOLOGY | Facility: HOSPITAL | Age: 59
End: 2019-08-16

## 2019-08-16 DIAGNOSIS — E53.8 B12 DEFICIENCY: Primary | ICD-10-CM

## 2019-08-16 DIAGNOSIS — E53.8 B12 DEFICIENCY: ICD-10-CM

## 2019-08-16 DIAGNOSIS — D50.8 IRON DEFICIENCY ANEMIA SECONDARY TO INADEQUATE DIETARY IRON INTAKE: ICD-10-CM

## 2019-08-16 DIAGNOSIS — N18.30 ANEMIA OF CHRONIC RENAL FAILURE, STAGE 3 (MODERATE) (HCC): ICD-10-CM

## 2019-08-16 DIAGNOSIS — D63.1 ANEMIA OF CHRONIC RENAL FAILURE, STAGE 3 (MODERATE) (HCC): ICD-10-CM

## 2019-08-16 DIAGNOSIS — D61.818 PANCYTOPENIA, ACQUIRED (HCC): ICD-10-CM

## 2019-08-16 DIAGNOSIS — D69.6 THROMBOCYTOPENIA (HCC): ICD-10-CM

## 2019-08-16 LAB
BASOPHILS # BLD AUTO: 0.05 10*3/MM3 (ref 0–0.2)
BASOPHILS NFR BLD AUTO: 0.6 % (ref 0–1.5)
DEPRECATED RDW RBC AUTO: 47.8 FL (ref 37–54)
EOSINOPHIL # BLD AUTO: 0.26 10*3/MM3 (ref 0–0.4)
EOSINOPHIL NFR BLD AUTO: 3.3 % (ref 0.3–6.2)
ERYTHROCYTE [DISTWIDTH] IN BLOOD BY AUTOMATED COUNT: 13.7 % (ref 12.3–15.4)
HCT VFR BLD AUTO: 34.8 % (ref 37.5–51)
HGB BLD-MCNC: 11 G/DL (ref 13–17.7)
IMM GRANULOCYTES # BLD AUTO: 0.02 10*3/MM3 (ref 0–0.05)
IMM GRANULOCYTES NFR BLD AUTO: 0.3 % (ref 0–0.5)
LYMPHOCYTES # BLD AUTO: 2.38 10*3/MM3 (ref 0.7–3.1)
LYMPHOCYTES NFR BLD AUTO: 30.4 % (ref 19.6–45.3)
MCH RBC QN AUTO: 30 PG (ref 26.6–33)
MCHC RBC AUTO-ENTMCNC: 31.6 G/DL (ref 31.5–35.7)
MCV RBC AUTO: 94.8 FL (ref 79–97)
MONOCYTES # BLD AUTO: 0.55 10*3/MM3 (ref 0.1–0.9)
MONOCYTES NFR BLD AUTO: 7 % (ref 5–12)
NEUTROPHILS # BLD AUTO: 4.56 10*3/MM3 (ref 1.7–7)
NEUTROPHILS NFR BLD AUTO: 58.4 % (ref 42.7–76)
NRBC BLD AUTO-RTO: 0 /100 WBC (ref 0–0.2)
PLATELET # BLD AUTO: 95 10*3/MM3 (ref 140–450)
PMV BLD AUTO: 11.4 FL (ref 6–12)
RBC # BLD AUTO: 3.67 10*6/MM3 (ref 4.14–5.8)
WBC NRBC COR # BLD: 7.82 10*3/MM3 (ref 3.4–10.8)

## 2019-08-16 PROCEDURE — 36415 COLL VENOUS BLD VENIPUNCTURE: CPT

## 2019-08-16 PROCEDURE — 25010000002 CYANOCOBALAMIN PER 1000 MCG: Performed by: INTERNAL MEDICINE

## 2019-08-16 PROCEDURE — 96372 THER/PROPH/DIAG INJ SC/IM: CPT

## 2019-08-16 PROCEDURE — 85025 COMPLETE CBC W/AUTO DIFF WBC: CPT

## 2019-08-16 RX ORDER — CYANOCOBALAMIN 1000 UG/ML
1000 INJECTION, SOLUTION INTRAMUSCULAR; SUBCUTANEOUS
Status: DISCONTINUED | OUTPATIENT
Start: 2019-08-16 | End: 2019-08-16 | Stop reason: HOSPADM

## 2019-08-16 RX ADMIN — CYANOCOBALAMIN 1000 MCG: 1000 INJECTION, SOLUTION INTRAMUSCULAR at 13:27

## 2019-09-06 ENCOUNTER — APPOINTMENT (OUTPATIENT)
Dept: ONCOLOGY | Facility: HOSPITAL | Age: 59
End: 2019-09-06

## 2019-09-06 ENCOUNTER — APPOINTMENT (OUTPATIENT)
Dept: LAB | Facility: HOSPITAL | Age: 59
End: 2019-09-06

## 2019-09-27 ENCOUNTER — APPOINTMENT (OUTPATIENT)
Dept: LAB | Facility: HOSPITAL | Age: 59
End: 2019-09-27

## 2019-09-27 ENCOUNTER — APPOINTMENT (OUTPATIENT)
Dept: ONCOLOGY | Facility: HOSPITAL | Age: 59
End: 2019-09-27

## 2019-10-18 ENCOUNTER — APPOINTMENT (OUTPATIENT)
Dept: ONCOLOGY | Facility: HOSPITAL | Age: 59
End: 2019-10-18

## 2019-10-18 ENCOUNTER — APPOINTMENT (OUTPATIENT)
Dept: LAB | Facility: HOSPITAL | Age: 59
End: 2019-10-18

## 2019-10-18 ENCOUNTER — APPOINTMENT (OUTPATIENT)
Dept: ONCOLOGY | Facility: CLINIC | Age: 59
End: 2019-10-18

## 2019-10-21 ENCOUNTER — OFFICE VISIT (OUTPATIENT)
Dept: ONCOLOGY | Facility: CLINIC | Age: 59
End: 2019-10-21

## 2019-10-21 ENCOUNTER — APPOINTMENT (OUTPATIENT)
Dept: ONCOLOGY | Facility: HOSPITAL | Age: 59
End: 2019-10-21

## 2019-10-21 ENCOUNTER — APPOINTMENT (OUTPATIENT)
Dept: CT IMAGING | Facility: HOSPITAL | Age: 59
End: 2019-10-21

## 2019-10-21 ENCOUNTER — LAB (OUTPATIENT)
Dept: LAB | Facility: HOSPITAL | Age: 59
End: 2019-10-21

## 2019-10-21 ENCOUNTER — HOSPITAL ENCOUNTER (INPATIENT)
Facility: HOSPITAL | Age: 59
LOS: 1 days | Discharge: LEFT BEFORE DISCHARGE | End: 2019-10-22
Attending: EMERGENCY MEDICINE | Admitting: INTERNAL MEDICINE

## 2019-10-21 VITALS
OXYGEN SATURATION: 90 % | HEART RATE: 59 BPM | BODY MASS INDEX: 28.58 KG/M2 | RESPIRATION RATE: 18 BRPM | TEMPERATURE: 98.7 F | SYSTOLIC BLOOD PRESSURE: 70 MMHG | DIASTOLIC BLOOD PRESSURE: 45 MMHG | HEIGHT: 67 IN

## 2019-10-21 DIAGNOSIS — N17.9 ACUTE RENAL FAILURE SUPERIMPOSED ON CHRONIC KIDNEY DISEASE, UNSPECIFIED CKD STAGE, UNSPECIFIED ACUTE RENAL FAILURE TYPE (HCC): Primary | ICD-10-CM

## 2019-10-21 DIAGNOSIS — N18.30 ANEMIA OF CHRONIC RENAL FAILURE, STAGE 3 (MODERATE) (HCC): ICD-10-CM

## 2019-10-21 DIAGNOSIS — D69.6 THROMBOCYTOPENIA (HCC): ICD-10-CM

## 2019-10-21 DIAGNOSIS — D50.8 IRON DEFICIENCY ANEMIA SECONDARY TO INADEQUATE DIETARY IRON INTAKE: Primary | ICD-10-CM

## 2019-10-21 DIAGNOSIS — D61.818 PANCYTOPENIA, ACQUIRED (HCC): ICD-10-CM

## 2019-10-21 DIAGNOSIS — D50.8 IRON DEFICIENCY ANEMIA SECONDARY TO INADEQUATE DIETARY IRON INTAKE: ICD-10-CM

## 2019-10-21 DIAGNOSIS — D63.1 ANEMIA OF CHRONIC RENAL FAILURE, STAGE 3 (MODERATE) (HCC): ICD-10-CM

## 2019-10-21 DIAGNOSIS — G92.8 TOXIC METABOLIC ENCEPHALOPATHY: ICD-10-CM

## 2019-10-21 DIAGNOSIS — E87.5 HYPERKALEMIA: Primary | ICD-10-CM

## 2019-10-21 DIAGNOSIS — N18.9 ACUTE RENAL FAILURE SUPERIMPOSED ON CHRONIC KIDNEY DISEASE, UNSPECIFIED CKD STAGE, UNSPECIFIED ACUTE RENAL FAILURE TYPE (HCC): Primary | ICD-10-CM

## 2019-10-21 DIAGNOSIS — E87.5 HYPERKALEMIA: ICD-10-CM

## 2019-10-21 DIAGNOSIS — E53.8 B12 DEFICIENCY: ICD-10-CM

## 2019-10-21 LAB
ALBUMIN SERPL-MCNC: 3.8 G/DL (ref 3.5–5.2)
ALBUMIN SERPL-MCNC: 3.9 G/DL (ref 3.5–5.2)
ALBUMIN/GLOB SERPL: 1.1 G/DL
ALBUMIN/GLOB SERPL: 1.2 G/DL (ref 1.1–2.4)
ALP SERPL-CCNC: 69 U/L (ref 38–116)
ALP SERPL-CCNC: 69 U/L (ref 39–117)
ALT SERPL W P-5'-P-CCNC: 27 U/L (ref 1–41)
ALT SERPL W P-5'-P-CCNC: 30 U/L (ref 0–41)
AMMONIA BLD-SCNC: 41 UMOL/L (ref 16–60)
AMPHET+METHAMPHET UR QL: NEGATIVE
ANION GAP SERPL CALCULATED.3IONS-SCNC: 10 MMOL/L (ref 5–15)
ANION GAP SERPL CALCULATED.3IONS-SCNC: 11.5 MMOL/L (ref 5–15)
ANION GAP SERPL CALCULATED.3IONS-SCNC: 15.7 MMOL/L (ref 5–15)
ARTERIAL PATENCY WRIST A: ABNORMAL
AST SERPL-CCNC: 71 U/L (ref 1–40)
AST SERPL-CCNC: 76 U/L (ref 0–40)
ATMOSPHERIC PRESS: 741.6 MMHG
BARBITURATES UR QL SCN: NEGATIVE
BASE EXCESS BLDA CALC-SCNC: -0.5 MMOL/L (ref 0–2)
BASOPHILS # BLD AUTO: 0.04 10*3/MM3 (ref 0–0.2)
BASOPHILS # BLD AUTO: 0.04 10*3/MM3 (ref 0–0.2)
BASOPHILS NFR BLD AUTO: 0.5 % (ref 0–1.5)
BASOPHILS NFR BLD AUTO: 0.5 % (ref 0–1.5)
BDY SITE: ABNORMAL
BENZODIAZ UR QL SCN: POSITIVE
BILIRUB SERPL-MCNC: 0.3 MG/DL (ref 0.2–1.2)
BILIRUB SERPL-MCNC: 0.3 MG/DL (ref 0.2–1.2)
BILIRUB UR QL STRIP: NEGATIVE
BUN BLD-MCNC: 56 MG/DL (ref 6–20)
BUN BLD-MCNC: 63 MG/DL (ref 6–20)
BUN BLD-MCNC: 64 MG/DL (ref 6–20)
BUN/CREAT SERPL: 12.9 (ref 7.3–30)
BUN/CREAT SERPL: 13 (ref 7–25)
BUN/CREAT SERPL: 13.8 (ref 7–25)
CALCIUM SPEC-SCNC: 8.8 MG/DL (ref 8.6–10.5)
CALCIUM SPEC-SCNC: 9 MG/DL (ref 8.6–10.5)
CALCIUM SPEC-SCNC: 9.3 MG/DL (ref 8.5–10.2)
CANNABINOIDS SERPL QL: POSITIVE
CHLORIDE SERPL-SCNC: 107 MMOL/L (ref 98–107)
CHLORIDE SERPL-SCNC: 108 MMOL/L (ref 98–107)
CHLORIDE SERPL-SCNC: 111 MMOL/L (ref 98–107)
CLARITY UR: CLEAR
CO2 SERPL-SCNC: 24 MMOL/L (ref 22–29)
CO2 SERPL-SCNC: 24.3 MMOL/L (ref 22–29)
CO2 SERPL-SCNC: 24.5 MMOL/L (ref 22–29)
COCAINE UR QL: NEGATIVE
COLOR UR: YELLOW
CREAT BLD-MCNC: 4.06 MG/DL (ref 0.76–1.27)
CREAT BLD-MCNC: 4.89 MG/DL (ref 0.7–1.3)
CREAT BLD-MCNC: 4.92 MG/DL (ref 0.76–1.27)
DEPRECATED RDW RBC AUTO: 45 FL (ref 37–54)
DEPRECATED RDW RBC AUTO: 48.2 FL (ref 37–54)
EOSINOPHIL # BLD AUTO: 0.23 10*3/MM3 (ref 0–0.4)
EOSINOPHIL # BLD AUTO: 0.29 10*3/MM3 (ref 0–0.4)
EOSINOPHIL NFR BLD AUTO: 3 % (ref 0.3–6.2)
EOSINOPHIL NFR BLD AUTO: 3.6 % (ref 0.3–6.2)
ERYTHROCYTE [DISTWIDTH] IN BLOOD BY AUTOMATED COUNT: 13.4 % (ref 12.3–15.4)
ERYTHROCYTE [DISTWIDTH] IN BLOOD BY AUTOMATED COUNT: 13.4 % (ref 12.3–15.4)
ETHANOL BLD-MCNC: <10 MG/DL (ref 0–10)
ETHANOL UR QL: <0.01 %
FERRITIN SERPL-MCNC: 205.9 NG/ML (ref 30–400)
GAS FLOW AIRWAY: 2 LPM
GFR SERPL CREATININE-BSD FRML MDRD: 12 ML/MIN/1.73
GFR SERPL CREATININE-BSD FRML MDRD: 12 ML/MIN/1.73
GFR SERPL CREATININE-BSD FRML MDRD: 15 ML/MIN/1.73
GFR SERPL CREATININE-BSD FRML MDRD: ABNORMAL ML/MIN/{1.73_M2}
GFR SERPL CREATININE-BSD FRML MDRD: ABNORMAL ML/MIN/{1.73_M2}
GLOBULIN UR ELPH-MCNC: 3.2 GM/DL (ref 1.8–3.5)
GLOBULIN UR ELPH-MCNC: 3.5 GM/DL
GLUCOSE BLD-MCNC: 101 MG/DL (ref 65–99)
GLUCOSE BLD-MCNC: 165 MG/DL (ref 74–124)
GLUCOSE BLD-MCNC: 188 MG/DL (ref 65–99)
GLUCOSE BLDC GLUCOMTR-MCNC: 105 MG/DL (ref 70–130)
GLUCOSE BLDC GLUCOMTR-MCNC: 113 MG/DL (ref 70–130)
GLUCOSE BLDC GLUCOMTR-MCNC: 56 MG/DL (ref 70–130)
GLUCOSE BLDC GLUCOMTR-MCNC: 64 MG/DL (ref 70–130)
GLUCOSE UR STRIP-MCNC: NEGATIVE MG/DL
HCO3 BLDA-SCNC: 25.3 MMOL/L (ref 22–28)
HCT VFR BLD AUTO: 30.9 % (ref 37.5–51)
HCT VFR BLD AUTO: 33.4 % (ref 37.5–51)
HGB BLD-MCNC: 10 G/DL (ref 13–17.7)
HGB BLD-MCNC: 10.1 G/DL (ref 13–17.7)
HGB UR QL STRIP.AUTO: ABNORMAL
IMM GRANULOCYTES # BLD AUTO: 0.03 10*3/MM3 (ref 0–0.05)
IMM GRANULOCYTES # BLD AUTO: 0.03 10*3/MM3 (ref 0–0.05)
IMM GRANULOCYTES NFR BLD AUTO: 0.4 % (ref 0–0.5)
IMM GRANULOCYTES NFR BLD AUTO: 0.4 % (ref 0–0.5)
IRON 24H UR-MRATE: 59 MCG/DL (ref 59–158)
IRON SATN MFR SERPL: 23 % (ref 14–48)
KETONES UR QL STRIP: NEGATIVE
LEUKOCYTE ESTERASE UR QL STRIP.AUTO: NEGATIVE
LYMPHOCYTES # BLD AUTO: 1.49 10*3/MM3 (ref 0.7–3.1)
LYMPHOCYTES # BLD AUTO: 1.6 10*3/MM3 (ref 0.7–3.1)
LYMPHOCYTES NFR BLD AUTO: 18.3 % (ref 19.6–45.3)
LYMPHOCYTES NFR BLD AUTO: 20.6 % (ref 19.6–45.3)
MCH RBC QN AUTO: 29.4 PG (ref 26.6–33)
MCH RBC QN AUTO: 29.9 PG (ref 26.6–33)
MCHC RBC AUTO-ENTMCNC: 30.2 G/DL (ref 31.5–35.7)
MCHC RBC AUTO-ENTMCNC: 32.4 G/DL (ref 31.5–35.7)
MCV RBC AUTO: 92.5 FL (ref 79–97)
MCV RBC AUTO: 97.4 FL (ref 79–97)
METHADONE UR QL SCN: NEGATIVE
MODALITY: ABNORMAL
MONOCYTES # BLD AUTO: 0.52 10*3/MM3 (ref 0.1–0.9)
MONOCYTES # BLD AUTO: 0.57 10*3/MM3 (ref 0.1–0.9)
MONOCYTES NFR BLD AUTO: 6.4 % (ref 5–12)
MONOCYTES NFR BLD AUTO: 7.3 % (ref 5–12)
NEUTROPHILS # BLD AUTO: 5.31 10*3/MM3 (ref 1.7–7)
NEUTROPHILS # BLD AUTO: 5.76 10*3/MM3 (ref 1.7–7)
NEUTROPHILS NFR BLD AUTO: 68.2 % (ref 42.7–76)
NEUTROPHILS NFR BLD AUTO: 70.8 % (ref 42.7–76)
NITRITE UR QL STRIP: NEGATIVE
NRBC BLD AUTO-RTO: 0 /100 WBC (ref 0–0.2)
NRBC BLD AUTO-RTO: 0 /100 WBC (ref 0–0.2)
OPIATES UR QL: NEGATIVE
OXYCODONE UR QL SCN: POSITIVE
PCO2 BLDA: 45.8 MM HG (ref 35–45)
PH BLDA: 7.35 PH UNITS (ref 7.35–7.45)
PH UR STRIP.AUTO: 6 [PH] (ref 5–8)
PHOSPHATE SERPL-MCNC: 5.9 MG/DL (ref 2.5–4.5)
PLATELET # BLD AUTO: 116 10*3/MM3 (ref 140–450)
PLATELET # BLD AUTO: 118 10*3/MM3 (ref 140–450)
PMV BLD AUTO: 11.2 FL (ref 6–12)
PMV BLD AUTO: 11.7 FL (ref 6–12)
PO2 BLDA: 102 MM HG (ref 80–100)
POTASSIUM BLD-SCNC: 4.7 MMOL/L (ref 3.5–5.2)
POTASSIUM BLD-SCNC: 5.8 MMOL/L (ref 3.5–4.7)
POTASSIUM BLD-SCNC: 6.6 MMOL/L (ref 3.5–5.2)
PROT SERPL-MCNC: 7.1 G/DL (ref 6.3–8)
PROT SERPL-MCNC: 7.3 G/DL (ref 6–8.5)
PROT UR QL STRIP: ABNORMAL
RBC # BLD AUTO: 3.34 10*6/MM3 (ref 4.14–5.8)
RBC # BLD AUTO: 3.43 10*6/MM3 (ref 4.14–5.8)
SAO2 % BLDCOA: 97.5 % (ref 92–99)
SET MECH RESP RATE: 26
SODIUM BLD-SCNC: 144 MMOL/L (ref 136–145)
SODIUM BLD-SCNC: 145 MMOL/L (ref 136–145)
SODIUM BLD-SCNC: 147 MMOL/L (ref 134–145)
SP GR UR STRIP: 1.01 (ref 1–1.03)
TIBC SERPL-MCNC: 255 MCG/DL (ref 249–505)
TRANSFERRIN SERPL-MCNC: 182 MG/DL (ref 200–360)
UROBILINOGEN UR QL STRIP: ABNORMAL
WBC NRBC COR # BLD: 7.78 10*3/MM3 (ref 3.4–10.8)
WBC NRBC COR # BLD: 8.13 10*3/MM3 (ref 3.4–10.8)

## 2019-10-21 PROCEDURE — 25010000002 HYDRALAZINE PER 20 MG: Performed by: INTERNAL MEDICINE

## 2019-10-21 PROCEDURE — 25010000002 HEPARIN (PORCINE) PER 1000 UNITS: Performed by: INTERNAL MEDICINE

## 2019-10-21 PROCEDURE — 85025 COMPLETE CBC W/AUTO DIFF WBC: CPT

## 2019-10-21 PROCEDURE — 80307 DRUG TEST PRSMV CHEM ANLYZR: CPT | Performed by: EMERGENCY MEDICINE

## 2019-10-21 PROCEDURE — 80053 COMPREHEN METABOLIC PANEL: CPT

## 2019-10-21 PROCEDURE — 25010000002 CALCIUM GLUCONATE PER 10 ML: Performed by: EMERGENCY MEDICINE

## 2019-10-21 PROCEDURE — 36600 WITHDRAWAL OF ARTERIAL BLOOD: CPT

## 2019-10-21 PROCEDURE — 82140 ASSAY OF AMMONIA: CPT | Performed by: EMERGENCY MEDICINE

## 2019-10-21 PROCEDURE — 82962 GLUCOSE BLOOD TEST: CPT

## 2019-10-21 PROCEDURE — 84100 ASSAY OF PHOSPHORUS: CPT | Performed by: EMERGENCY MEDICINE

## 2019-10-21 PROCEDURE — 93005 ELECTROCARDIOGRAM TRACING: CPT | Performed by: EMERGENCY MEDICINE

## 2019-10-21 PROCEDURE — 25010000002 NALOXONE PER 1 MG: Performed by: EMERGENCY MEDICINE

## 2019-10-21 PROCEDURE — 81003 URINALYSIS AUTO W/O SCOPE: CPT | Performed by: INTERNAL MEDICINE

## 2019-10-21 PROCEDURE — 80053 COMPREHEN METABOLIC PANEL: CPT | Performed by: EMERGENCY MEDICINE

## 2019-10-21 PROCEDURE — 70450 CT HEAD/BRAIN W/O DYE: CPT

## 2019-10-21 PROCEDURE — 36415 COLL VENOUS BLD VENIPUNCTURE: CPT

## 2019-10-21 PROCEDURE — G0463 HOSPITAL OUTPT CLINIC VISIT: HCPCS | Performed by: NURSE PRACTITIONER

## 2019-10-21 PROCEDURE — 83540 ASSAY OF IRON: CPT

## 2019-10-21 PROCEDURE — 93010 ELECTROCARDIOGRAM REPORT: CPT | Performed by: INTERNAL MEDICINE

## 2019-10-21 PROCEDURE — 82728 ASSAY OF FERRITIN: CPT

## 2019-10-21 PROCEDURE — 63710000001 INSULIN REGULAR HUMAN PER 5 UNITS: Performed by: EMERGENCY MEDICINE

## 2019-10-21 PROCEDURE — 84466 ASSAY OF TRANSFERRIN: CPT

## 2019-10-21 PROCEDURE — 99285 EMERGENCY DEPT VISIT HI MDM: CPT

## 2019-10-21 PROCEDURE — 85025 COMPLETE CBC W/AUTO DIFF WBC: CPT | Performed by: EMERGENCY MEDICINE

## 2019-10-21 PROCEDURE — 82803 BLOOD GASES ANY COMBINATION: CPT

## 2019-10-21 PROCEDURE — 99213 OFFICE O/P EST LOW 20 MIN: CPT | Performed by: NURSE PRACTITIONER

## 2019-10-21 RX ORDER — LABETALOL HYDROCHLORIDE 5 MG/ML
20 INJECTION, SOLUTION INTRAVENOUS
Status: DISCONTINUED | OUTPATIENT
Start: 2019-10-21 | End: 2019-10-22 | Stop reason: HOSPADM

## 2019-10-21 RX ORDER — DEXTROSE MONOHYDRATE 25 G/50ML
25 INJECTION, SOLUTION INTRAVENOUS
Status: DISCONTINUED | OUTPATIENT
Start: 2019-10-21 | End: 2019-10-22 | Stop reason: HOSPADM

## 2019-10-21 RX ORDER — SODIUM CHLORIDE 0.9 % (FLUSH) 0.9 %
10 SYRINGE (ML) INJECTION AS NEEDED
Status: DISCONTINUED | OUTPATIENT
Start: 2019-10-21 | End: 2019-10-22 | Stop reason: HOSPADM

## 2019-10-21 RX ORDER — IPRATROPIUM BROMIDE AND ALBUTEROL SULFATE 2.5; .5 MG/3ML; MG/3ML
3 SOLUTION RESPIRATORY (INHALATION) EVERY 6 HOURS PRN
Status: DISCONTINUED | OUTPATIENT
Start: 2019-10-21 | End: 2019-10-22 | Stop reason: HOSPADM

## 2019-10-21 RX ORDER — HEPARIN SODIUM 5000 [USP'U]/ML
5000 INJECTION, SOLUTION INTRAVENOUS; SUBCUTANEOUS EVERY 8 HOURS SCHEDULED
Status: DISCONTINUED | OUTPATIENT
Start: 2019-10-21 | End: 2019-10-22 | Stop reason: HOSPADM

## 2019-10-21 RX ORDER — SODIUM CHLORIDE 0.9 % (FLUSH) 0.9 %
10 SYRINGE (ML) INJECTION EVERY 12 HOURS SCHEDULED
Status: DISCONTINUED | OUTPATIENT
Start: 2019-10-21 | End: 2019-10-22 | Stop reason: HOSPADM

## 2019-10-21 RX ORDER — DEXTROSE MONOHYDRATE 25 G/50ML
25 INJECTION, SOLUTION INTRAVENOUS ONCE
Status: COMPLETED | OUTPATIENT
Start: 2019-10-21 | End: 2019-10-21

## 2019-10-21 RX ORDER — NALOXONE HCL 0.4 MG/ML
0.4 VIAL (ML) INJECTION ONCE
Status: COMPLETED | OUTPATIENT
Start: 2019-10-21 | End: 2019-10-21

## 2019-10-21 RX ORDER — SENNA AND DOCUSATE SODIUM 50; 8.6 MG/1; MG/1
2 TABLET, FILM COATED ORAL NIGHTLY PRN
Status: DISCONTINUED | OUTPATIENT
Start: 2019-10-21 | End: 2019-10-22 | Stop reason: HOSPADM

## 2019-10-21 RX ORDER — ONDANSETRON 2 MG/ML
4 INJECTION INTRAMUSCULAR; INTRAVENOUS EVERY 6 HOURS PRN
Status: DISCONTINUED | OUTPATIENT
Start: 2019-10-21 | End: 2019-10-22 | Stop reason: HOSPADM

## 2019-10-21 RX ORDER — SODIUM CHLORIDE 9 MG/ML
125 INJECTION, SOLUTION INTRAVENOUS CONTINUOUS
Status: DISCONTINUED | OUTPATIENT
Start: 2019-10-21 | End: 2019-10-22

## 2019-10-21 RX ORDER — HYDRALAZINE HYDROCHLORIDE 20 MG/ML
10 INJECTION INTRAMUSCULAR; INTRAVENOUS EVERY 4 HOURS PRN
Status: DISCONTINUED | OUTPATIENT
Start: 2019-10-21 | End: 2019-10-22 | Stop reason: HOSPADM

## 2019-10-21 RX ORDER — NICOTINE POLACRILEX 4 MG
15 LOZENGE BUCCAL
Status: DISCONTINUED | OUTPATIENT
Start: 2019-10-21 | End: 2019-10-22 | Stop reason: HOSPADM

## 2019-10-21 RX ORDER — SODIUM POLYSTYRENE SULFONATE 15 G/60ML
30 SUSPENSION ORAL; RECTAL ONCE
Status: COMPLETED | OUTPATIENT
Start: 2019-10-21 | End: 2019-10-21

## 2019-10-21 RX ADMIN — NALOXONE HYDROCHLORIDE 0.4 MG: 0.4 INJECTION, SOLUTION INTRAMUSCULAR; INTRAVENOUS; SUBCUTANEOUS at 16:27

## 2019-10-21 RX ADMIN — SODIUM CHLORIDE 125 ML/HR: 9 INJECTION, SOLUTION INTRAVENOUS at 17:15

## 2019-10-21 RX ADMIN — SODIUM CHLORIDE 125 ML/HR: 9 INJECTION, SOLUTION INTRAVENOUS at 19:12

## 2019-10-21 RX ADMIN — HYDRALAZINE HYDROCHLORIDE 10 MG: 20 INJECTION INTRAMUSCULAR; INTRAVENOUS at 22:18

## 2019-10-21 RX ADMIN — SODIUM POLYSTYRENE SULFONATE 30 G: 15 SUSPENSION ORAL; RECTAL at 18:37

## 2019-10-21 RX ADMIN — SODIUM CHLORIDE 500 ML: 9 INJECTION, SOLUTION INTRAVENOUS at 17:11

## 2019-10-21 RX ADMIN — NALOXONE HYDROCHLORIDE 0.4 MG: 0.4 INJECTION, SOLUTION INTRAMUSCULAR; INTRAVENOUS; SUBCUTANEOUS at 15:59

## 2019-10-21 RX ADMIN — INSULIN HUMAN 10 UNITS: 100 INJECTION, SOLUTION PARENTERAL at 17:11

## 2019-10-21 RX ADMIN — CALCIUM GLUCONATE 1 G: 98 INJECTION, SOLUTION INTRAVENOUS at 17:14

## 2019-10-21 RX ADMIN — HEPARIN SODIUM 5000 UNITS: 5000 INJECTION INTRAVENOUS; SUBCUTANEOUS at 22:17

## 2019-10-21 RX ADMIN — DEXTROSE MONOHYDRATE 25 G: 25 INJECTION, SOLUTION INTRAVENOUS at 17:07

## 2019-10-21 RX ADMIN — SODIUM CHLORIDE, PRESERVATIVE FREE 10 ML: 5 INJECTION INTRAVENOUS at 20:44

## 2019-10-21 NOTE — ED TRIAGE NOTES
"Pt to ED from CBC office. Call ahead. CBC reports pt is hypotensive 70/45, potassium high 5.8, and cr of 4. Recently treated for anemia. Pt appears lethargic at triage desk. Unable to verbalkize social for this RN. Wife with pt.    Wife reports yesterday pt was not lethargic. Wife reports \"he never really woke up today\". Pt to ED room.  "

## 2019-10-21 NOTE — ED PROVIDER NOTES
EMERGENCY DEPARTMENT ENCOUNTER    Room Number:  N327/1  Date of encounter:  10/21/2019  PCP: Yadiel Baxter III, MD  Historian: Pt and wife      HPI:  Chief Complaint: AMS  A complete HPI/ROS/PMH/PSH/SH/FH are unobtainable due to: pt condition    Context: Angelo Brown is a 59 y.o. male who presents to the ED c/o AMS since earlier today.  Per wife, pt was up all night last night and is very sleepy and lethargic today.  He was seen at [facility] office earlier today and sent to ED for low BP and high potassium.  Pt is on Gabapentin, Percocet, Bumex, Xanax, and Neurontin.  Wife does not think he has taken Xanax today and states that he recently had his Gabapentin increased.  Wife denies recent falls.       PAST MEDICAL HISTORY  Active Ambulatory Problems     Diagnosis Date Noted   • Acute CVA (cerebrovascular accident) (CMS/[facility]) 12/20/2017   • Proteinuria 12/24/2017   • Anemia in chronic kidney disease 03/05/2018   • Thrombocytopenia (CMS/[facility]) 03/05/2018   • Pancytopenia, acquired (CMS/[facility]) 03/05/2018   • History of hepatitis C virus infection 03/05/2018   • B12 deficiency 03/14/2018   • Iron deficiency anemia secondary to inadequate dietary iron intake 03/14/2018   • Iron and its compounds causing adverse effect in therapeutic use 03/14/2018   • Hyperkalemia 06/05/2018   • Cancer of kidney parenchyma, right (CMS/[facility]) 07/31/2018   • Umbilical hernia without obstruction and without gangrene 03/05/2019   • Anemia of chronic renal failure, stage 3 (moderate) (CMS/[facility]) 03/05/2019   • Chronic kidney disease, stage III (moderate) (CMS/[facility]) 03/05/2019     Resolved Ambulatory Problems     Diagnosis Date Noted   • Renal mass 12/24/2017   • Leukopenia 03/05/2018     Past Medical History:   Diagnosis Date   • Anemia    • Anxiety    • Arthritis    • CAD (coronary artery disease)    • Cancer (CMS/[facility])    • CHF (congestive heart failure) (CMS/[facility])    • Chronic back pain    • Chronic kidney disease    • Coronary artery disease     • Depression    • Diabetes mellitus (CMS/HCC)    • Eyes sensitive to light, bilateral    • GERD (gastroesophageal reflux disease)    • Headache    • Hepatitis C    • History of MRSA infection    • History of transfusion    • Hypertension    • Jaundice    • Myocardial infarction (CMS/HCC)    • Stroke (CMS/HCC) 12/2017         PAST SURGICAL HISTORY  Past Surgical History:   Procedure Laterality Date   • BRAIN HEMATOMA EVACUATION     • CARDIAC SURGERY     • CATARACT EXTRACTION, BILATERAL     • COLONOSCOPY     • CORONARY ARTERY BYPASS GRAFT  2013    Triple   • LAPAROSCOPIC PARTIAL NEPHRECTOMY Right    • ROTATOR CUFF REPAIR Left    • UMBILICAL HERNIA REPAIR N/A 3/27/2019    Procedure: UMBILICAL HERNIA REPAIR;  Surgeon: Harshad Cotto Jr., MD;  Location: [facility] OR;  Service: General   • VASECTOMY  1985   • WOUND DEBRIDEMENT Right 06/10/2011    Excisional debridement of necrotizing fasciitis of the right groin extending along the right hemiscrotum, 5 x 2 x 2 cm in one wound and 7.5 x 2.5 x 2.5 cm of a second wound. This was sharp excisional debridement carried out to the muscle-Dr. Eduardo Cross          FAMILY HISTORY  Family History   Problem Relation Age of Onset   • Diabetes Mother    • Heart failure Mother    • Kidney failure Mother    • Anemia Mother    • Heart disease Mother    • Hypertension Mother    • Heart failure Father    • Coronary artery disease Father    • Heart disease Father    • Hypertension Father    • Diabetes Father    • Diabetes Sister    • Cervical cancer Sister    • Leukemia Sister    • Diabetes Brother    • Cervical cancer Daughter    • Ovarian cancer Sister    • Malig Hyperthermia Neg Hx          SOCIAL HISTORY  Social History     Socioeconomic History   • Marital status:      Spouse name: Samantha   • Number of children: Not on file   • Years of education: High school   • Highest education level: Not on file   Occupational History   • Occupation: retired     Employer: UNEMPLOYED    Tobacco Use   • Smoking status: Never Smoker   • Smokeless tobacco: Never Used   Substance and Sexual Activity   • Alcohol use: No   • Drug use: No   • Sexual activity: Defer         ALLERGIES  Morphine and related; Fentanyl; Ibuprofen; Latex; and Penicillins        REVIEW OF SYSTEMS  Review of Systems   Reason unable to perform ROS: Pt condition.   Psychiatric/Behavioral: Positive for sleep disturbance.        Positive AMS      All systems reviewed and negative except for those discussed in HPI.       PHYSICAL EXAM    I have reviewed the triage vital signs and nursing notes.    ED Triage Vitals [10/21/19 1541]   Temp Heart Rate Resp BP SpO2   -- 59 18 -- 92 %      Temp src Heart Rate Source Patient Position BP Location FiO2 (%)   -- -- -- -- --       Physical Exam   Constitutional: Pt. is oriented to person, place, and time and well-developed, well-nourished, and in no distress. No distress.   HENT: Normocephalic and atraumatic. Pupils are pinpoint. Oropharynx moist/nonerythematous.  Neck: Normal range of motion. Neck supple. No JVD present. No tracheal deviation present. No thyromegaly present.   Cardiovascular: Bradycardic rate, regular rhythm and normal heart sounds. Exam reveals no gallop and no friction rub.   No murmur heard.  Pulmonary/Chest: Effort normal and breath sounds normal. No stridor. No respiratory distress. No wheezes, no rales.   Abdominal: Soft. Bowel sounds are normal. No distension. There is no tenderness. There is no rebound and no guarding.   Musculoskeletal: Normal range of motion. Mild pedal edema, tenderness or deformity.   Neurological:  Cranial nerves II through XII are grossly intact.  The pupils are pinpoint and minimally reactive.  He requires repeated stimulation to stay awake and participate.  His strength and sensation is intact bilaterally.  His speech is slurred.  Skin: Skin is warm and dry. No rash noted. Pt. is not diaphoretic. No erythema.   Psychiatric: Mood, affect and  judgment normal.   Nursing note and vitals reviewed.        LAB RESULTS  Recent Results (from the past 24 hour(s))   CBC Auto Differential    Collection Time: 10/21/19  2:08 PM   Result Value Ref Range    WBC 8.13 3.40 - 10.80 10*3/mm3    RBC 3.43 (L) 4.14 - 5.80 10*6/mm3    Hemoglobin 10.1 (L) 13.0 - 17.7 g/dL    Hematocrit 33.4 (L) 37.5 - 51.0 %    MCV 97.4 (H) 79.0 - 97.0 fL    MCH 29.4 26.6 - 33.0 pg    MCHC 30.2 (L) 31.5 - 35.7 g/dL    RDW 13.4 12.3 - 15.4 %    RDW-SD 48.2 37.0 - 54.0 fl    MPV 11.7 6.0 - 12.0 fL    Platelets 118 (L) 140 - 450 10*3/mm3    Neutrophil % 70.8 42.7 - 76.0 %    Lymphocyte % 18.3 (L) 19.6 - 45.3 %    Monocyte % 6.4 5.0 - 12.0 %    Eosinophil % 3.6 0.3 - 6.2 %    Basophil % 0.5 0.0 - 1.5 %    Immature Grans % 0.4 0.0 - 0.5 %    Neutrophils, Absolute 5.76 1.70 - 7.00 10*3/mm3    Lymphocytes, Absolute 1.49 0.70 - 3.10 10*3/mm3    Monocytes, Absolute 0.52 0.10 - 0.90 10*3/mm3    Eosinophils, Absolute 0.29 0.00 - 0.40 10*3/mm3    Basophils, Absolute 0.04 0.00 - 0.20 10*3/mm3    Immature Grans, Absolute 0.03 0.00 - 0.05 10*3/mm3    nRBC 0.0 0.0 - 0.2 /100 WBC   Comprehensive Metabolic Panel    Collection Time: 10/21/19  2:11 PM   Result Value Ref Range    Glucose 165 (H) 74 - 124 mg/dL    BUN 63 (H) 6 - 20 mg/dL    Creatinine 4.89 (C) 0.70 - 1.30 mg/dL    Sodium 147 (H) 134 - 145 mmol/L    Potassium 5.8 (C) 3.5 - 4.7 mmol/L    Chloride 107 98 - 107 mmol/L    CO2 24.3 22.0 - 29.0 mmol/L    Calcium 9.3 8.5 - 10.2 mg/dL    Total Protein 7.1 6.3 - 8.0 g/dL    Albumin 3.90 3.50 - 5.20 g/dL    ALT (SGPT) 30 0 - 41 U/L    AST (SGOT) 76 (C) 0 - 40 U/L    Alkaline Phosphatase 69 38 - 116 U/L    Total Bilirubin 0.3 0.2 - 1.2 mg/dL    eGFR Non African Amer 12 (L) >60 mL/min/1.73    eGFR  African Amer      Globulin 3.2 1.8 - 3.5 gm/dL    A/G Ratio 1.2 1.1 - 2.4 g/dL    BUN/Creatinine Ratio 12.9 7.3 - 30.0    Anion Gap 15.7 (H) 5.0 - 15.0 mmol/L   Ferritin    Collection Time: 10/21/19  2:11 PM   Result  Value Ref Range    Ferritin 205.90 30.00 - 400.00 ng/mL   Iron Profile    Collection Time: 10/21/19  2:11 PM   Result Value Ref Range    Iron 59 59 - 158 mcg/dL    Iron Saturation 23 14 - 48 %    Transferrin 182 (L) 200 - 360 mg/dL    TIBC 255 249 - 505 mcg/dL   Comprehensive Metabolic Panel    Collection Time: 10/21/19  3:56 PM   Result Value Ref Range    Glucose 188 (H) 65 - 99 mg/dL    BUN 64 (H) 6 - 20 mg/dL    Creatinine 4.92 (H) 0.76 - 1.27 mg/dL    Sodium 144 136 - 145 mmol/L    Potassium 6.6 (C) 3.5 - 5.2 mmol/L    Chloride 108 (H) 98 - 107 mmol/L    CO2 24.5 22.0 - 29.0 mmol/L    Calcium 9.0 8.6 - 10.5 mg/dL    Total Protein 7.3 6.0 - 8.5 g/dL    Albumin 3.80 3.50 - 5.20 g/dL    ALT (SGPT) 27 1 - 41 U/L    AST (SGOT) 71 (H) 1 - 40 U/L    Alkaline Phosphatase 69 39 - 117 U/L    Total Bilirubin 0.3 0.2 - 1.2 mg/dL    eGFR Non African Amer 12 (L) >60 mL/min/1.73    eGFR  African Amer      Globulin 3.5 gm/dL    A/G Ratio 1.1 g/dL    BUN/Creatinine Ratio 13.0 7.0 - 25.0    Anion Gap 11.5 5.0 - 15.0 mmol/L   Ethanol    Collection Time: 10/21/19  3:56 PM   Result Value Ref Range    Ethanol <10 0 - 10 mg/dL    Ethanol % <0.010 %   CBC Auto Differential    Collection Time: 10/21/19  3:56 PM   Result Value Ref Range    WBC 7.78 3.40 - 10.80 10*3/mm3    RBC 3.34 (L) 4.14 - 5.80 10*6/mm3    Hemoglobin 10.0 (L) 13.0 - 17.7 g/dL    Hematocrit 30.9 (L) 37.5 - 51.0 %    MCV 92.5 79.0 - 97.0 fL    MCH 29.9 26.6 - 33.0 pg    MCHC 32.4 31.5 - 35.7 g/dL    RDW 13.4 12.3 - 15.4 %    RDW-SD 45.0 37.0 - 54.0 fl    MPV 11.2 6.0 - 12.0 fL    Platelets 116 (L) 140 - 450 10*3/mm3    Neutrophil % 68.2 42.7 - 76.0 %    Lymphocyte % 20.6 19.6 - 45.3 %    Monocyte % 7.3 5.0 - 12.0 %    Eosinophil % 3.0 0.3 - 6.2 %    Basophil % 0.5 0.0 - 1.5 %    Immature Grans % 0.4 0.0 - 0.5 %    Neutrophils, Absolute 5.31 1.70 - 7.00 10*3/mm3    Lymphocytes, Absolute 1.60 0.70 - 3.10 10*3/mm3    Monocytes, Absolute 0.57 0.10 - 0.90 10*3/mm3     Eosinophils, Absolute 0.23 0.00 - 0.40 10*3/mm3    Basophils, Absolute 0.04 0.00 - 0.20 10*3/mm3    Immature Grans, Absolute 0.03 0.00 - 0.05 10*3/mm3    nRBC 0.0 0.0 - 0.2 /100 WBC   Phosphorus    Collection Time: 10/21/19  3:56 PM   Result Value Ref Range    Phosphorus 5.9 (H) 2.5 - 4.5 mg/dL   Ammonia    Collection Time: 10/21/19  4:28 PM   Result Value Ref Range    Ammonia 41 16 - 60 umol/L   Blood Gas, Arterial    Collection Time: 10/21/19  4:41 PM   Result Value Ref Range    Site Arterial: left brachial     Harvey's Test N/A     pH, Arterial 7.351 7.350 - 7.450 pH units    pCO2, Arterial 45.8 (H) 35.0 - 45.0 mm Hg    pO2, Arterial 102.0 (H) 80.0 - 100.0 mm Hg    HCO3, Arterial 25.3 22.0 - 28.0 mmol/L    Base Excess, Arterial -0.5 (L) 0.0 - 2.0 mmol/L    O2 Saturation Calculated 97.5 92.0 - 99.0 %    Barometric Pressure for Blood Gas 741.6 mmHg    Modality Cannula     Flow Rate 2 lpm    Set [facility] Resp Rate 26    POC Glucose Once    Collection Time: 10/21/19  6:48 PM   Result Value Ref Range    Glucose 56 (L) 70 - 130 mg/dL   POC Glucose Once    Collection Time: 10/21/19  7:11 PM   Result Value Ref Range    Glucose 64 (L) 70 - 130 mg/dL   POC Glucose Once    Collection Time: 10/21/19  7:41 PM   Result Value Ref Range    Glucose 105 70 - 130 mg/dL   Basic Metabolic Panel    Collection Time: 10/21/19  8:36 PM   Result Value Ref Range    Glucose 101 (H) 65 - 99 mg/dL    BUN 56 (H) 6 - 20 mg/dL    Creatinine 4.06 (H) 0.76 - 1.27 mg/dL    Sodium 145 136 - 145 mmol/L    Potassium 4.7 3.5 - 5.2 mmol/L    Chloride 111 (H) 98 - 107 mmol/L    CO2 24.0 22.0 - 29.0 mmol/L    Calcium 8.8 8.6 - 10.5 mg/dL    eGFR Non African Amer 15 (L) >60 mL/min/1.73    BUN/Creatinine Ratio 13.8 7.0 - 25.0    Anion Gap 10.0 5.0 - 15.0 mmol/L   POC Glucose Once    Collection Time: 10/21/19  8:42 PM   Result Value Ref Range    Glucose 113 70 - 130 mg/dL   Urine Drug Screen - Urine, Clean Catch    Collection Time: 10/21/19  9:08 PM   Result  Value Ref Range    Amphet/Methamphet, Screen Negative Negative    Barbiturates Screen, Urine Negative Negative    Benzodiazepine Screen, Urine Positive (A) Negative    Cocaine Screen, Urine Negative Negative    Opiate Screen Negative Negative    THC, Screen, Urine Positive (A) Negative    Methadone Screen, Urine Negative Negative    Oxycodone Screen, Urine Positive (A) Negative   Urinalysis without microscopic (no culture) -    Collection Time: 10/21/19  9:10 PM   Result Value Ref Range    Color, UA Yellow Yellow, Straw    Appearance, UA Clear Clear    pH, UA 6.0 5.0 - 8.0    Specific Gravity, UA 1.015 1.005 - 1.030    Glucose, UA Negative Negative    Ketones, UA Negative Negative    Bilirubin, UA Negative Negative    Blood, UA Trace (A) Negative    Protein, UA >=300 mg/dL (3+) (A) Negative    Leuk Esterase, UA Negative Negative    Nitrite, UA Negative Negative    Urobilinogen, UA 0.2 E.U./dL 0.2 - 1.0 E.U./dL       Ordered the above labs and independently reviewed the results.        RADIOLOGY  Ct Head Without Contrast    Result Date: 10/21/2019  CT HEAD WO CONTRAST-  INDICATIONS: Altered mental status  TECHNIQUE: Radiation dose reduction techniques were utilized, including automated exposure control and exposure modulation based on body size. Noncontrast head CT  COMPARISON: 12/20/2017  FINDINGS:    No acute intracranial hemorrhage, midline shift or mass effect. No acute territorial infarct is identified. Region of encephalomalacia/old infarct changes involving right occipital and temporal lobe.  Arterial calcifications are seen at the base of the brain.  Ex vacuo dilatation of the posterior horn of the right lateral ventricle. Ventricles, cisterns, cerebral sulci are otherwise unremarkable for patient age.  The visualized paranasal sinuses, orbits, mastoid air cells are unremarkable.           No acute intracranial hemorrhage or hydrocephalus. Chronic changes. If there is further clinical concern, MRI could be  considered for further evaluation.  This report was finalized on 10/21/2019 5:10 PM by Dr. Yadiel Espinal M.D.        I ordered the above noted radiological studies. Reviewed by me and discussed with radiologist.  See dictation for official radiology interpretation.      PROCEDURES    Critical Care  Performed by: Winston Cantor MD  Authorized by: Winston Cantor MD     Critical care provider statement:     Critical care time (minutes):  30    Critical care was necessary to treat or prevent imminent or life-threatening deterioration of the following conditions:  Cardiac failure, circulatory failure, CNS failure or compromise, dehydration, metabolic crisis and renal failure    Critical care was time spent personally by me on the following activities:  Discussions with consultants, evaluation of patient's response to treatment, examination of patient, obtaining history from patient or surrogate, ordering and performing treatments and interventions, ordering and review of laboratory studies, ordering and review of radiographic studies, pulse oximetry, re-evaluation of patient's condition and review of old charts          MEDICATIONS GIVEN IN ER    Medications   sodium chloride 0.9 % flush 10 mL (not administered)   sodium chloride 0.9 % infusion (125 mL/hr Intravenous New Bag 10/21/19 1912)   sodium chloride 0.9 % flush 10 mL (10 mL Intravenous Given 10/21/19 2044)   sodium chloride 0.9 % flush 10 mL (not administered)   labetalol (NORMODYNE,TRANDATE) injection 20 mg (not administered)   ipratropium-albuterol (DUO-NEB) nebulizer solution 3 mL (not administered)   sennosides-docusate sodium (SENOKOT-S) 8.6-50 MG tablet 2 tablet (not administered)   magnesium hydroxide (MILK OF MAGNESIA) suspension 2400 mg/10mL 10 mL (not administered)   ondansetron (ZOFRAN) injection 4 mg (not administered)   heparin (porcine) 5000 UNIT/ML injection 5,000 Units (not administered)   hydrALAZINE (APRESOLINE) injection 10 mg (not  administered)   dextrose (GLUTOSE) oral gel 15 g (not administered)   dextrose (D50W) 25 g/ 50mL Intravenous Solution 25 g (not administered)   glucagon (human recombinant) (GLUCAGEN DIAGNOSTIC) injection 1 mg (not administered)   naloxone (NARCAN) injection 0.4 mg (0.4 mg Intravenous Given 10/21/19 1559)   naloxone (NARCAN) injection 0.4 mg (0.4 mg Intravenous Given 10/21/19 1627)   sodium chloride 0.9 % bolus 500 mL (0 mL Intravenous Stopped 10/21/19 1741)   calcium gluconate 1 g/100 mL (10 mg/mL) NS IVPB (VTB) (0 g Intravenous Stopped 10/21/19 1814)   dextrose (D50W) 25 g/ 50mL Intravenous Solution 25 g (25 g Intravenous Given 10/21/19 1707)   insulin regular (humuLIN R,novoLIN R) injection 10 Units (10 Units Intravenous Given 10/21/19 1711)   sodium polystyrene (KAYEXALATE) 15 GM/60ML suspension 30 g (30 g Oral Given 10/21/19 1837)         PROGRESS, DATA ANALYSIS, CONSULTS, AND MEDICAL DECISION MAKING    Any/all labs have been independently reviewed by me.  Any/all radiology studies have been reviewed by me and discussed with radiologist dictating the report.   EKG's independently viewed and interpreted by me.  Discussion below represents my analysis of pertinent findings related to patient's condition, differential diagnosis, treatment plan and final disposition.    1718: Received call from Dr. Grant, (Pulmonary) and discussed pt's case.  Dr. Grant will admit the pt.        ED Course as of Oct 21 1804   Mon Oct 21, 2019   1552 Neuro: Cranial nerves II through XII are grossly intact.  The pupils are pinpoint and minimally reactive.  He requires repeated stimulation to stay awake and participate.  His strength and sensation is intact bilaterally.  His speech is slurred.    [WC]   1553 His neuro exam is non-focal, his pupils are pinpoint both of which point to overmedication.  We will try a small dose of Narcan and reassess.  He is not a TPA candidate at this point time his symptoms are likely not due to  stroke.  [WC]   1649 EKG performed at 1600 and interpreted by me shows normal sinus rhythm with a heart rate of 55.  The AL intervals, QRS and ST segments are all normal.  There is no significant change compared to 3/19/2019.  [WC]   1720 He is still rather lethargic, but he awakens to loud verbal stimuli.  He is hemodynamically stable.  He will be admitted to the ICU with renal consult.  [WC]   1802 D/W Dr. Tan (nephrology) - will see in consultation.  [WC]      ED Course User Index  [WC] Winston Cantor MD       AS OF 10:04 PM VITALS:    BP - 151/74  HR - 70  TEMP - 97.3 °F (36.3 °C) (Oral)  02 SATS - 97%        DIAGNOSIS  Final diagnoses:   Acute renal failure superimposed on chronic kidney disease, unspecified CKD stage, unspecified acute renal failure type (CMS/HCC)   Hyperkalemia   Toxic metabolic encephalopathy         DISPOSITION  ADMISSION    Discussed treatment plan and reason for admission with pt/family and admitting physician.  Pt/family voiced understanding of the plan for admission for further testing/treatment as needed.           --  Documentation assistance provided by bernadine Denise for Dr. Devaughn MD.  Information recorded by the scribe was done at my direction and has been verified and validated by me.     Ted Denise  10/21/19 1722       Ted Denise  10/21/19 1805       Winston Cantor MD  10/21/19 [id number]

## 2019-10-21 NOTE — ED NOTES
Administered .4mg narcan IV. Pt became more alert, stated he was at the hospital. Wife at bedside     Rock Foreman RN  10/21/19 [id number]

## 2019-10-21 NOTE — ED NOTES
Disregard Care handoff to Elen BERG, meant for another patient.     Angelo Rodriguez, RN  10/21/19 0011

## 2019-10-21 NOTE — PROGRESS NOTES
Subjective .     REASONS FOR FOLLOWUP:  Multifactorial Anemia    HISTORY OF PRESENT ILLNESS:  The patient is a 59 y.o. year old male who is here for follow-up with the above-mentioned history.    History of Present Illness   Patient is here today for routine follow up on his anemia. He is extremely lethargic and difficult to arouse.  Medical assistants reported that they were unable to have patient stand or obtain a blood pressure.     Patient is accompanied by his significant other who is helping with the history.  She reports that the patient will intermittently have episodes like this.  He was apparently up until 5:00 this morning installing a new floor.  Upon further questioning, she does state he recently had his gabapentin increased to 300 mg 3 times daily, which was done last week.  Since neurologist increase his gabapentin due to worsening neuropathy.  Otherwise, he had been doing fairly well.     Past Medical History:   Diagnosis Date   • Anemia    • Anxiety    • Arthritis     HANDS   • CAD (coronary artery disease)    • Cancer (CMS/HCC)     KIDNEY 7/2018 SX   • CHF (congestive heart failure) (CMS/HCC)    • Chronic back pain    • Chronic kidney disease    • Coronary artery disease    • Depression    • Diabetes mellitus (CMS/HCC)     TYPE 2   • Eyes sensitive to light, bilateral    • GERD (gastroesophageal reflux disease)    • Headache    • Hepatitis C     after blood tranfusion/treated   • History of MRSA infection    • History of transfusion     2013 CABG   • Hypertension    • Jaundice    • Myocardial infarction (CMS/HCC)     5-6 years ago per pt   • Stroke (CMS/HCC) 12/2017    left sided weakness/       ONCOLOGIC HISTORY:  (History from previous dates can be found in the separate document.)    Current Outpatient Medications on File Prior to Visit   Medication Sig Dispense Refill   • ALPRAZolam (XANAX) 1 MG tablet Take 1 mg by mouth 4 (Four) Times a Day As Needed.     • aspirin 81 MG EC tablet Take 81 mg  by mouth Every Morning. Stopped for surgery  2   • bumetanide (BUMEX) 1 MG tablet Take 1 mg by mouth As Needed.     • carvedilol (COREG) 3.125 MG tablet Take 3.125 mg by mouth 2 (Two) Times a Day With Meals.     • Cyanocobalamin (VITAMIN B-12 PO) Take 5,000 mcg by mouth Daily.     • diclofenac (VOLTAREN) 1 % gel gel by Intratympanic route.     • gabapentin (NEURONTIN) 300 MG capsule Take 300 mg by mouth 3 (Three) Times a Day.     • hydrALAZINE (APRESOLINE) 100 MG tablet Take 100 mg by mouth 3 (Three) Times a Day. Pt takes 2x daily at home-will talk with cardiologist in May per pt     • insulin aspart (novoLOG FLEXPEN) 100 UNIT/ML solution pen-injector sc pen Inject 2-15 Units under the skin 3 (Three) Times a Day With Meals. Per sliding scale     • Insulin Glargine (BASAGLAR KWIKPEN) 100 UNIT/ML injection pen INJECT 10 UNITS SC AT NIGHT  0   • Insulin Glargine (LANTUS SOLOSTAR) 100 UNIT/ML injection pen Inject 10 Units under the skin into the appropriate area as directed Every Night.     • lisinopril (PRINIVIL,ZESTRIL) 10 MG tablet Take 10 mg by mouth Every Morning.     • MELATONIN PO Take  by mouth.     • Omega-3 Fatty Acids (FISH OIL) 1000 MG capsule capsule Take 1,000 mg by mouth Daily With Breakfast. STOPPED 3/19/19     • oxyCODONE-acetaminophen (PERCOCET)  MG per tablet Take 1 tablet by mouth Every 6 (Six) Hours As Needed.     • pantoprazole (PROTONIX) 40 MG EC tablet Take 40 mg by mouth Daily.     • simvastatin (ZOCOR) 40 MG tablet Take 40 mg by mouth Every Night.     • tiZANidine (ZANAFLEX) 4 MG tablet Take 4 mg by mouth Every 8 (Eight) Hours As Needed.     • vitamin D (ERGOCALCIFEROL) 27344 units capsule capsule Take 50,000 Units by mouth 1 (One) Time Per Week. SATURDAY     • [DISCONTINUED] amLODIPine (NORVASC) 5 MG tablet Take 5 mg by mouth Every Morning.       No current facility-administered medications on file prior to visit.        ALLERGIES:     Allergies   Allergen Reactions   • Morphine And  "Related Delirium   • Fentanyl Other (See Comments)     ER said he was allergy   • Ibuprofen Nausea Only   • Latex Rash   • Penicillins Hives       Social History     Socioeconomic History   • Marital status:      Spouse name: Samantha   • Number of children: Not on file   • Years of education: High school   • Highest education level: Not on file   Occupational History   • Occupation: retired     Employer: UNEMPLOYED   Tobacco Use   • Smoking status: Never Smoker   • Smokeless tobacco: Never Used   Substance and Sexual Activity   • Alcohol use: No   • Drug use: No   • Sexual activity: Defer         Cancer-related family history includes Cervical cancer in his daughter and sister; Ovarian cancer in his sister.     Review of Systems   Unable to perform ROS: Mental status change       Objective      Vitals:    10/21/19 1500 10/21/19 1543   BP:  (!) 70/45   Pulse: 59    Resp: 18    Temp: 98.7 °F (37.1 °C)    SpO2: 90%    Weight: Comment: unable to get wt    Height: 170 cm (66.93\")      Current Status 10/21/2019   ECOG score 2       Physical Exam   Constitutional: He appears lethargic. No distress.   Seated in wheel chair   HENT:   Head: Normocephalic and atraumatic.   Eyes: Pupils are equal, round, and reactive to light.   Neck: Normal range of motion.   Cardiovascular: Normal rate, regular rhythm and normal heart sounds.   No murmur heard.  Pulmonary/Chest: Breath sounds normal. No respiratory distress. He has no wheezes. He has no rhonchi. He has no rales.   Abdominal: Normal appearance. There is no hepatosplenomegaly.   Musculoskeletal: Normal range of motion. He exhibits no edema.   Neurological: He appears lethargic.   obtunded   Skin: Skin is warm and dry. No rash noted.   Vitals reviewed.        RECENT LABS:  Hematology WBC   Date Value Ref Range Status   10/21/2019 8.13 3.40 - 10.80 10*3/mm3 Final   07/06/2018 5.92 4.5 - 11.0 10*3/uL Final     RBC   Date Value Ref Range Status   10/21/2019 3.43 (L) 4.14 - 5.80 " 10*6/mm3 Final   07/06/2018 4.05 (L) 4.5 - 5.9 10*6/uL Final     Hemoglobin   Date Value Ref Range Status   10/21/2019 10.1 (L) 13.0 - 17.7 g/dL Final   07/07/2018 9.8 (L) 13.5 - 17.5 g/dL Final     Hematocrit   Date Value Ref Range Status   10/21/2019 33.4 (L) 37.5 - 51.0 % Final   07/07/2018 30.2 (L) 41.0 - 53.0 % Final     Platelets   Date Value Ref Range Status   10/21/2019 118 (L) 140 - 450 10*3/mm3 Final   07/06/2018 117 (L) 140 - 440 10*3/uL Final        Assessment/Plan     1. Multifactorial anemia: anemia of chronic kidney disease, iron deficiency and B12 deficiency.  Patient has received Procrit in the past for his anemia due to chronic kidney disease.  Today hemoglobin 10.1, no Procrit is indicated.    2.  Chronic kidney disease: Patient's creatinine today is 4.89, BUN 63, and potassium level 5.84.  Patient is extremely lethargic and difficult to arouse and hypotensive.  I have discussed with Dr. Aviles.  We are sending to the patient to [facility] emergency room for further evaluation.    3.  Thrombocytopenia: Chronic.  Platelet count is 118,000 today.    4.  History of kidney cancer: Patient had laparoscopic right partial nephrectomy at [facility]        Patient will be transported to [facility] emergency room for further evaluation    Cc:  Yadiel Baxter*

## 2019-10-21 NOTE — H&P
[facility]    Patient Care Team:  Yadiel Baxter III, MD as PCP - General (Family Medicine)  Jamie Aviles MD as PCP - Claims Attributed  Jamie Aviles MD as Consulting Physician (Hematology and Oncology)  Yadiel Baxter III, MD as Referring Physician (Family Medicine)  Evelina Varela MD as Consulting Physician (Cardiology)    CC: Increased confusion, hypotension, worsening renal failure    HPI: Patient is a pleasant 59-year-old male with a past medical history significant for renal cell carcinoma status post right partial nephrectomy, chronic kidney disease who sees Dr. Quevedo as an outpatient and usually follows Dr. Fitzgerald for evaluation of anemia which is thought to be multifactorial and treated with Procrit who was evaluated for a routine appointment in the clinic and was noted to be increasingly drowsy and minimally awake.  He was also noted to be hypotensive.  Lab work done prior to the appointment showed worsening renal failure and hyperkalemia.  Patient was transferred to the ER.  Wife states that his Neurontin dose was recently increased due to concern for worsening neuropathy.  Patient was evaluated in the ER and was thought to be encephalopathic and Narcan was given with partial response.  Repeat labs confirmed hyperkalemia and worsening renal failure and hence we are asked to admit the patient to ICU given severe life-threatening hyperkalemia.  He was given medical management for it.  No concerning EKG changes reported.  Not much history available due to patient's encephalopathy.  No recent illnesses reported.    I have reviewed the last discharge summaries as well as the outpatient notes from the patients other consultants available in the [facility] system as well previous notes from our group and summarized above    Objective     I have discussed the case with ED physician     Records Reviewed  Old medical records.  Nursing notes.  Previous  radiology studies.    Review of Systems:  Unable to be obtained    Past Medical History:   Diagnosis Date   • Anemia    • Anxiety    • Arthritis     HANDS   • CAD (coronary artery disease)    • Cancer (CMS/HCC)     KIDNEY 7/2018 SX   • CHF (congestive heart failure) (CMS/HCC)    • Chronic back pain    • Chronic kidney disease    • Coronary artery disease    • Depression    • Diabetes mellitus (CMS/HCC)     TYPE 2   • Eyes sensitive to light, bilateral    • GERD (gastroesophageal reflux disease)    • Headache    • Hepatitis C     after blood tranfusion/treated   • History of MRSA infection    • History of transfusion     2013 CABG   • Hypertension    • Jaundice    • Myocardial infarction (CMS/HCC)     5-6 years ago per pt   • Stroke (CMS/HCC) 12/2017    left sided weakness/       Past Surgical History:   Procedure Laterality Date   • BRAIN HEMATOMA EVACUATION     • CARDIAC SURGERY     • CATARACT EXTRACTION, BILATERAL     • COLONOSCOPY     • CORONARY ARTERY BYPASS GRAFT  2013    Triple   • LAPAROSCOPIC PARTIAL NEPHRECTOMY Right    • ROTATOR CUFF REPAIR Left    • UMBILICAL HERNIA REPAIR N/A 3/27/2019    Procedure: UMBILICAL HERNIA REPAIR;  Surgeon: Harshad Cotto Jr., MD;  Location: [facility];  Service: General   • VASECTOMY  1985   • WOUND DEBRIDEMENT Right 06/10/2011    Excisional debridement of necrotizing fasciitis of the right groin extending along the right hemiscrotum, 5 x 2 x 2 cm in one wound and 7.5 x 2.5 x 2.5 cm of a second wound. This was sharp excisional debridement carried out to the muscle-Dr. Eduardo Cross        Prior to Admission Medications     Prescriptions Last Dose Informant Patient Reported? Taking?    ALPRAZolam (XANAX) 1 MG tablet  Self Yes No    Take 1 mg by mouth 4 (Four) Times a Day As Needed.    aspirin 81 MG EC tablet  Self Yes No    Take 81 mg by mouth Every Morning. Stopped for surgery    bumetanide (BUMEX) 1 MG tablet   Yes No    Take 1 mg by mouth As Needed.    carvedilol (COREG)  3.125 MG tablet   Yes No    Take 3.125 mg by mouth 2 (Two) Times a Day With Meals.    Cyanocobalamin (VITAMIN B-12 PO)   Yes No    Take 5,000 mcg by mouth Daily.    diclofenac (VOLTAREN) 1 % gel gel   Yes No    by Intratympanic route.    gabapentin (NEURONTIN) 300 MG capsule  Self Yes No    Take 300 mg by mouth 3 (Three) Times a Day.    hydrALAZINE (APRESOLINE) 100 MG tablet   Yes No    Take 100 mg by mouth 3 (Three) Times a Day. Pt takes 2x daily at home-will talk with cardiologist in May per pt    insulin aspart (novoLOG FLEXPEN) 100 UNIT/ML solution pen-injector sc pen  Self Yes No    Inject 2-15 Units under the skin 3 (Three) Times a Day With Meals. Per sliding scale    Insulin Glargine (BASAGLAR KWIKPEN) 100 UNIT/ML injection pen   Yes No    INJECT 10 UNITS SC AT NIGHT    Insulin Glargine (LANTUS SOLOSTAR) 100 UNIT/ML injection pen  Self Yes No    Inject 10 Units under the skin into the appropriate area as directed Every Night.    lisinopril (PRINIVIL,ZESTRIL) 10 MG tablet   Yes No    Take 10 mg by mouth Every Morning.    MELATONIN PO   Yes No    Take  by mouth.    Omega-3 Fatty Acids (FISH OIL) 1000 MG capsule capsule  Self Yes No    Take 1,000 mg by mouth Daily With Breakfast. STOPPED 3/19/19    oxyCODONE-acetaminophen (PERCOCET)  MG per tablet  Self Yes No    Take 1 tablet by mouth Every 6 (Six) Hours As Needed.    pantoprazole (PROTONIX) 40 MG EC tablet   Yes No    Take 40 mg by mouth Daily.    simvastatin (ZOCOR) 40 MG tablet  Self Yes No    Take 40 mg by mouth Every Night.    tiZANidine (ZANAFLEX) 4 MG tablet  Self Yes No    Take 4 mg by mouth Every 8 (Eight) Hours As Needed.    vitamin D (ERGOCALCIFEROL) 73621 units capsule capsule   Yes No    Take 50,000 Units by mouth 1 (One) Time Per Week. SATURDAY          Morphine and related; Fentanyl; Ibuprofen; Latex; and Penicillins    Family History   Problem Relation Age of Onset   • Diabetes Mother    • Heart failure Mother    • Kidney failure Mother     • Anemia Mother    • Heart disease Mother    • Hypertension Mother    • Heart failure Father    • Coronary artery disease Father    • Heart disease Father    • Hypertension Father    • Diabetes Father    • Diabetes Sister    • Cervical cancer Sister    • Leukemia Sister    • Diabetes Brother    • Cervical cancer Daughter    • Ovarian cancer Sister    • Malig Hyperthermia Neg Hx        Social History     Socioeconomic History   • Marital status:      Spouse name: Samantha   • Number of children: Not on file   • Years of education: High school   • Highest education level: Not on file   Occupational History   • Occupation: retired     Employer: UNEMPLOYED   Tobacco Use   • Smoking status: Never Smoker   • Smokeless tobacco: Never Used   Substance and Sexual Activity   • Alcohol use: No   • Drug use: No   • Sexual activity: Defer       Physical Exam    Temp:  [96.6 °F (35.9 °C)-98.7 °F (37.1 °C)] 96.6 °F (35.9 °C)  Heart Rate:  [53-60] 60  Resp:  [18] 18  BP: ()/(45-76) 138/76    CONSTITUTIONAL:   · Well developed  · Well nourished  · Awake but drowsy and answers questions intermittently.  No focal neurological deficits noted    EYES:    · CARLEEN                                                                        · Conjunctiva normal  · Sclera non-icteric.    HENT:   · Atraumatic, normocephalic  · External ears and nose normal  · Mallampati 4, Moist mucous membranes     NECK:  · Thyroid not enlarged  · Trachea midline    RESPIRATORY:    · + Accessory Muscle Use  · Normal breath sounds heard bilaterally  · No rales. No wheezing  · No dullness    CARDIOVASCULAR:    · RRR  · No murmur  · Lower extremity edema: Trace    GI:   · Abdomen soft , non-distended, non-tender  · No palpable hepatospleenomegaly    MUSCULOSKELETAL:  · Grossly normal muscle strength in all extremities  · No tenderness, no obvious deformities  · No clubbing or cyanosis     SKIN:    · No visible rashes  · No palpable  nodules    PSYCHIATRIC:  · Memory intact   · Mood is unable to obtain    LABS and IMAGING DATA:    Lab Results (last 24 hours)     Procedure Component Value Units Date/Time    Phosphorus [[id number]] Collected:  10/21/19 1556    Specimen:  Blood Updated:  10/21/19 1734    Ammonia [[id number]]  (Normal) Collected:  10/21/19 1628    Specimen:  Blood from Arm, Right Updated:  10/21/19 1705     Ammonia 41 umol/L     Comprehensive Metabolic Panel [[id number]]  (Abnormal) Collected:  10/21/19 1556    Specimen:  Blood Updated:  10/21/19 1653     Glucose 188 mg/dL      BUN 64 mg/dL      Creatinine 4.92 mg/dL      Sodium 144 mmol/L      Potassium 6.6 mmol/L      Chloride 108 mmol/L      CO2 24.5 mmol/L      Calcium 9.0 mg/dL      Total Protein 7.3 g/dL      Albumin 3.80 g/dL      ALT (SGPT) 27 U/L      AST (SGOT) 71 U/L      Alkaline Phosphatase 69 U/L      Total Bilirubin 0.3 mg/dL      eGFR Non African Amer 12 mL/min/1.73      Comment: <15 Indicative of kidney failure.        eGFR   Amer --     Comment: <15 Indicative of kidney failure.        Globulin 3.5 gm/dL      A/G Ratio 1.1 g/dL      BUN/Creatinine Ratio 13.0     Anion Gap 11.5 mmol/L     Narrative:       GFR Normal >60  Chronic Kidney Disease <60  Kidney Failure <15    Blood Gas, Arterial [[id number]]  (Abnormal) Collected:  10/21/19 1641    Specimen:  Arterial Blood Updated:  10/21/19 1645     Site Arterial: left brachial     Harvey's Test N/A     pH, Arterial 7.351 pH units      pCO2, Arterial 45.8 mm Hg      pO2, Arterial 102.0 mm Hg      HCO3, Arterial 25.3 mmol/L      Base Excess, Arterial -0.5 mmol/L      O2 Saturation Calculated 97.5 %      Barometric Pressure for Blood Gas 741.6 mmHg      Modality Cannula     Flow Rate 2 lpm      Set Mech Resp Rate 26    Ethanol [[id number]] Collected:  10/21/19 1556    Specimen:  Blood Updated:  10/21/19 1639     Ethanol <10 mg/dL      Ethanol % <0.010 %     CBC & Differential [[id number]] Collected:  10/21/19 1556     Specimen:  Blood Updated:  10/21/19 1623    Narrative:       The following orders were created for panel order CBC & Differential.  Procedure                               Abnormality         Status                     ---------                               -----------         ------                     CBC Auto Differential[[id number]]        Abnormal            Final result                 Please view results for these tests on the individual orders.    CBC Auto Differential [[id number]]  (Abnormal) Collected:  10/21/19 1556    Specimen:  Blood Updated:  10/21/19 1623     WBC 7.78 10*3/mm3      RBC 3.34 10*6/mm3      Hemoglobin 10.0 g/dL      Hematocrit 30.9 %      MCV 92.5 fL      MCH 29.9 pg      MCHC 32.4 g/dL      RDW 13.4 %      RDW-SD 45.0 fl      MPV 11.2 fL      Platelets 116 10*3/mm3      Neutrophil % 68.2 %      Lymphocyte % 20.6 %      Monocyte % 7.3 %      Eosinophil % 3.0 %      Basophil % 0.5 %      Immature Grans % 0.4 %      Neutrophils, Absolute 5.31 10*3/mm3      Lymphocytes, Absolute 1.60 10*3/mm3      Monocytes, Absolute 0.57 10*3/mm3      Eosinophils, Absolute 0.23 10*3/mm3      Basophils, Absolute 0.04 10*3/mm3      Immature Grans, Absolute 0.03 10*3/mm3      nRBC 0.0 /100 WBC         Lab Results   Component Value Date    CKTOTAL 59 12/23/2017                 Results from last 7 days   Lab Units 10/21/19  1641   PH, ARTERIAL pH units 7.351   PO2 ART mm Hg 102.0*   PCO2, ARTERIAL mm Hg 45.8*   HCO3 ART mmol/L 25.3        I have personally reviewed the chest imaging from the last 3 days, agree with assessment of the radiologist and summarized it below    Assessment     ASSESSMENT:  Acute toxic and metabolic encephalopathy  Suspected gabapentin toxicity  Acute life-threatening hyperkalemia  Acute hypotension  Acute on chronic kidney disease  Right renal cell carcinoma status post partial nephrectomy  CAD   History of CVA with left-sided weakness  Diastolic dysfunction  Chronic hepatitis  C  Chronic anemia    PLAN:  Patient with worsening encephalopathy in the setting of worsening renal failure and recently increased Neurontin in the setting of renal dysfunction.  Noted to be hypotensive as well.  Agree with aggressive volume resuscitation  Severe hyperkalemia corrected medically at this point.  We will consult nephrology given known kidney disease and prior nephrectomy status.  Supportive care for gabapentin toxicity  We will repeat BMP in a few hours.  No current concern for sepsis or infection but difficult to ascertain given lack of clear history from the patient.  We will admit the patient to ICU and closely monitor.  Low threshold to start hemodialysis and if indicated per nephrology.  Full Code   DVT ppx     CC - 40 mins     I have discussed my plan with the patient, available family and nursing staff.     Ru Grant MD  10/21/2019  5:40 PM

## 2019-10-21 NOTE — NURSING NOTE
Pt arrived to ccu, awake, alert, wife at bedside. Dr. ROBINS seeing patient. Orders obtained. Treating BS of 56...pt drinking apple juice. Will recheck

## 2019-10-22 VITALS
TEMPERATURE: 98.1 F | DIASTOLIC BLOOD PRESSURE: 73 MMHG | WEIGHT: 173.06 LBS | SYSTOLIC BLOOD PRESSURE: 171 MMHG | BODY MASS INDEX: 26.23 KG/M2 | RESPIRATION RATE: 16 BRPM | HEART RATE: 83 BPM | OXYGEN SATURATION: 95 % | HEIGHT: 68 IN

## 2019-10-22 PROBLEM — G92.8 TOXIC METABOLIC ENCEPHALOPATHY: Status: ACTIVE | Noted: 2019-10-22

## 2019-10-22 PROBLEM — IMO0002 SOLITARY KIDNEY: Status: ACTIVE | Noted: 2019-10-22

## 2019-10-22 LAB
ANION GAP SERPL CALCULATED.3IONS-SCNC: 13.3 MMOL/L (ref 5–15)
ANISOCYTOSIS BLD QL: ABNORMAL
BUN BLD-MCNC: 49 MG/DL (ref 6–20)
BUN/CREAT SERPL: 14.9 (ref 7–25)
CALCIUM SPEC-SCNC: 8.6 MG/DL (ref 8.6–10.5)
CHLORIDE SERPL-SCNC: 110 MMOL/L (ref 98–107)
CO2 SERPL-SCNC: 21.7 MMOL/L (ref 22–29)
CREAT BLD-MCNC: 3.29 MG/DL (ref 0.76–1.27)
DEPRECATED RDW RBC AUTO: 43.8 FL (ref 37–54)
ERYTHROCYTE [DISTWIDTH] IN BLOOD BY AUTOMATED COUNT: 13.1 % (ref 12.3–15.4)
GFR SERPL CREATININE-BSD FRML MDRD: 19 ML/MIN/1.73
GLUCOSE BLD-MCNC: 149 MG/DL (ref 65–99)
GLUCOSE BLDC GLUCOMTR-MCNC: 138 MG/DL (ref 70–130)
GLUCOSE BLDC GLUCOMTR-MCNC: 180 MG/DL (ref 70–130)
GLUCOSE BLDC GLUCOMTR-MCNC: 186 MG/DL (ref 70–130)
HCT VFR BLD AUTO: 29.5 % (ref 37.5–51)
HGB BLD-MCNC: 9.5 G/DL (ref 13–17.7)
INR PPP: 1.12 (ref 0.9–1.1)
LYMPHOCYTES # BLD MANUAL: 0.4 10*3/MM3 (ref 0.7–3.1)
LYMPHOCYTES NFR BLD MANUAL: 4 % (ref 19.6–45.3)
LYMPHOCYTES NFR BLD MANUAL: 4 % (ref 5–12)
MAGNESIUM SERPL-MCNC: 2.1 MG/DL (ref 1.6–2.6)
MCH RBC QN AUTO: 29.5 PG (ref 26.6–33)
MCHC RBC AUTO-ENTMCNC: 32.2 G/DL (ref 31.5–35.7)
MCV RBC AUTO: 91.6 FL (ref 79–97)
MONOCYTES # BLD AUTO: 0.4 10*3/MM3 (ref 0.1–0.9)
NEUTROPHILS # BLD AUTO: 9.24 10*3/MM3 (ref 1.7–7)
NEUTROPHILS NFR BLD MANUAL: 92 % (ref 42.7–76)
PHOSPHATE SERPL-MCNC: 3.8 MG/DL (ref 2.5–4.5)
PLAT MORPH BLD: NORMAL
PLATELET # BLD AUTO: 101 10*3/MM3 (ref 140–450)
PMV BLD AUTO: 11.6 FL (ref 6–12)
POTASSIUM BLD-SCNC: 4.8 MMOL/L (ref 3.5–5.2)
PROTHROMBIN TIME: 14.1 SECONDS (ref 11.7–14.2)
RBC # BLD AUTO: 3.22 10*6/MM3 (ref 4.14–5.8)
SODIUM BLD-SCNC: 145 MMOL/L (ref 136–145)
WBC MORPH BLD: NORMAL
WBC NRBC COR # BLD: 10.04 10*3/MM3 (ref 3.4–10.8)

## 2019-10-22 PROCEDURE — 85007 BL SMEAR W/DIFF WBC COUNT: CPT | Performed by: INTERNAL MEDICINE

## 2019-10-22 PROCEDURE — 25010000002 HEPARIN (PORCINE) PER 1000 UNITS: Performed by: INTERNAL MEDICINE

## 2019-10-22 PROCEDURE — 85610 PROTHROMBIN TIME: CPT | Performed by: INTERNAL MEDICINE

## 2019-10-22 PROCEDURE — 83735 ASSAY OF MAGNESIUM: CPT | Performed by: INTERNAL MEDICINE

## 2019-10-22 PROCEDURE — 84100 ASSAY OF PHOSPHORUS: CPT | Performed by: INTERNAL MEDICINE

## 2019-10-22 PROCEDURE — 82962 GLUCOSE BLOOD TEST: CPT

## 2019-10-22 PROCEDURE — 80048 BASIC METABOLIC PNL TOTAL CA: CPT | Performed by: INTERNAL MEDICINE

## 2019-10-22 PROCEDURE — 25010000002 HYDRALAZINE PER 20 MG: Performed by: INTERNAL MEDICINE

## 2019-10-22 PROCEDURE — 85025 COMPLETE CBC W/AUTO DIFF WBC: CPT | Performed by: INTERNAL MEDICINE

## 2019-10-22 RX ORDER — ALPRAZOLAM 0.5 MG/1
1 TABLET ORAL 4 TIMES DAILY PRN
Status: DISCONTINUED | OUTPATIENT
Start: 2019-10-22 | End: 2019-10-22 | Stop reason: HOSPADM

## 2019-10-22 RX ORDER — INSULIN GLARGINE 100 [IU]/ML
10 INJECTION, SOLUTION SUBCUTANEOUS NIGHTLY
Status: DISCONTINUED | OUTPATIENT
Start: 2019-10-22 | End: 2019-10-22 | Stop reason: HOSPADM

## 2019-10-22 RX ORDER — ATORVASTATIN CALCIUM 20 MG/1
20 TABLET, FILM COATED ORAL DAILY
Status: DISCONTINUED | OUTPATIENT
Start: 2019-10-22 | End: 2019-10-22 | Stop reason: HOSPADM

## 2019-10-22 RX ORDER — PANTOPRAZOLE SODIUM 40 MG/1
40 TABLET, DELAYED RELEASE ORAL DAILY
Status: DISCONTINUED | OUTPATIENT
Start: 2019-10-22 | End: 2019-10-22 | Stop reason: HOSPADM

## 2019-10-22 RX ORDER — HYDRALAZINE HYDROCHLORIDE 50 MG/1
100 TABLET, FILM COATED ORAL 3 TIMES DAILY
Status: DISCONTINUED | OUTPATIENT
Start: 2019-10-22 | End: 2019-10-22 | Stop reason: HOSPADM

## 2019-10-22 RX ORDER — CARVEDILOL 3.12 MG/1
3.12 TABLET ORAL 2 TIMES DAILY WITH MEALS
Status: DISCONTINUED | OUTPATIENT
Start: 2019-10-22 | End: 2019-10-22 | Stop reason: HOSPADM

## 2019-10-22 RX ADMIN — HEPARIN SODIUM 5000 UNITS: 5000 INJECTION INTRAVENOUS; SUBCUTANEOUS at 07:49

## 2019-10-22 RX ADMIN — HYDRALAZINE HYDROCHLORIDE 10 MG: 20 INJECTION INTRAMUSCULAR; INTRAVENOUS at 02:27

## 2019-10-22 RX ADMIN — SODIUM CHLORIDE 125 ML/HR: 9 INJECTION, SOLUTION INTRAVENOUS at 02:28

## 2019-10-22 NOTE — ACP (ADVANCE CARE PLANNING)
Visited Pt. Provided education on Advance directives. Pt completed the form. Notary will come to notarize it.      Advance directives.

## 2019-10-22 NOTE — NURSING NOTE
"RN entered room at 1120, was told by staff that patient had become verbally aggressive in room. Pt was standing out of bed, closest to bathroom, with IV tubing and monitor cords tightly pulling across bed. Pt's wife was laying in recliner closest to the door. Rosamaria Parekh RN and Abiodun Carlson standing beside patient. Patient is yelling for no one to touch him and that he must go to the bathroom. This RN attempted to deescalate pt and disconnected IV. Pt yelling \"I need to get to the bathroom\", and when I asked if he wanted to go he said \"You're not going to watch me\" and \"I don't have to go anymore\". Pt's wife intermittently hollering \"Abram calm down\" and then stands to opposite side of bed. Pt's wife and pt stating they're leaving the hospital, and RN advised they would be leaving against medical advise and educated on the risks of leaving. Dr. Zhang entered room and stated pt is able to leave AMA. Pt currently oriented to name, location, and situation, when asked date he stated, \"It's time for me to get the fuck out of here\" At this time, security is present outside of room. Pt appears to become more agitated with staff present, so nursing staff and wife stepped into hallway while patient got dressed. Upon this RN reentering room with wife, the pt was dressed sitting on the side of the bed and had ripped out his right wrist IV. Pt signed papers without any assistance and left via security in a wheelchair.     Message left with Dr. Murrell's office to notify of patient leaving.  "

## 2019-10-22 NOTE — PROGRESS NOTES
Case Management Discharge Note         Destination      No service has been selected for the patient.      Durable Medical Equipment      No service has been selected for the patient.      Dialysis/Infusion      No service has been selected for the patient.      Home Medical Care      No service has been selected for the patient.      Therapy      No service has been selected for the patient.      Community Resources      No service has been selected for the patient.             Final Discharge Disposition Code: 07 - left AMA

## 2019-10-22 NOTE — PLAN OF CARE
Problem: Fall Risk (Adult)  Goal: Identify Related Risk Factors and Signs and Symptoms  Outcome: Ongoing (interventions implemented as appropriate)    Goal: Absence of Fall  Outcome: Ongoing (interventions implemented as appropriate)      Problem: Patient Care Overview  Goal: Plan of Care Review  Outcome: Ongoing (interventions implemented as appropriate)   10/22/19 0040   Coping/Psychosocial   Plan of Care Reviewed With patient   Plan of Care Review   Progress improving   OTHER   Outcome Summary Pt K and SCr improved and trending down. Still extremely lethargic. Oriented, but  Hypoglycemic upon arrival to CCU. Treated and now hyperglycemic.  DM HX. Urine tox came back positive for opiates, benzo, and THC. VSS. HTN. PRN Hydralazine given. Afebrile. NSR w/ PACs at times. 2LO2.      Goal: Individualization and Mutuality  Outcome: Ongoing (interventions implemented as appropriate)    Goal: Discharge Needs Assessment  Outcome: Ongoing (interventions implemented as appropriate)    Goal: Interprofessional Rounds/Family Conf  Outcome: Ongoing (interventions implemented as appropriate)      Problem: Pain, Chronic (Adult)  Goal: Identify Related Risk Factors and Signs and Symptoms  Outcome: Ongoing (interventions implemented as appropriate)    Goal: Acceptable Pain/Comfort Level and Functional Ability  Outcome: Ongoing (interventions implemented as appropriate)

## 2019-10-22 NOTE — PROGRESS NOTES
[facility] INPATIENT PROGRESS NOTE         [facility] CORONARY CARE    10/22/2019      PATIENT IDENTIFICATION:  Name: Angelo Brown ADMIT: 10/21/2019   : 1960  PCP: Yadiel Baxter III, MD    MRN: [id number] LOS: 1 days   AGE/SEX: 59 y.o. male  ROOM: [facility]                     LOS 1    Reason for visit: Follow-up altered mental status with metabolic encephalopathy      SUBJECTIVE:      More alert this morning.  Denies productive cough, chest pain or nausea and vomiting.    Objective   OBJECTIVE:    Vital Sign Min/Max for last 24 hours  Temp  Min: 96.6 °F (35.9 °C)  Max: 98.7 °F (37.1 °C)   BP  Min: 70/45  Max: 194/88   Pulse  Min: 53  Max: 87   Resp  Min: 16  Max: 18   SpO2  Min: 90 %  Max: 99 %   No Data Recorded   Weight  Min: 78.5 kg (173 lb 1 oz)  Max: 79.9 kg (176 lb 1.6 oz)                         Body mass index is 26.31 kg/m².    Intake/Output Summary (Last 24 hours) at 10/22/2019 0936  Last data filed at 10/22/2019 0751  Gross per 24 hour   Intake 1712 ml   Output 1775 ml   Net -63 ml         Exam:  GEN:  No distress, appears stated age  EYES:   PERRL, anicteric sclera  ENT:    External ears/nose normal, OP clear  NECK:  No adenopathy, midline trachea  LUNGS: Normal chest on inspection, palpation and auscultation  CV:  Normal S1S2, without murmur  ABD:  Non tender, non distended, no hepatosplenomegaly, +BS  EXT:  No edema, cyanosis or clubbing    Scheduled meds:    heparin (porcine) 5,000 Units Subcutaneous Q8H   influenza vaccine 0.5 mL Intramuscular Once   sodium chloride 10 mL Intravenous Q12H     IV meds:                        sodium chloride 125 mL/hr Last Rate: 125 mL/hr (10/22/19 0228)     Data Review:  Results from last 7 days   Lab Units 10/22/19  0826 10/21/19  2036 10/21/19  1556 10/21/19  1411   SODIUM mmol/L 145 145 144 147*   POTASSIUM mmol/L 4.8 4.7 6.6* 5.8*   CHLORIDE mmol/L 110* 111* 108* 107   CO2 mmol/L 21.7* 24.0 24.5 24.3   BUN mg/dL 49* 56* 64* 63*    CREATININE mg/dL 3.29* 4.06* 4.92* 4.89*   GLUCOSE mg/dL 149* 101* 188* 165*   CALCIUM mg/dL 8.6 8.8 9.0 9.3         Estimated Creatinine Clearance: 27.5 mL/min (A) (by C-G formula based on SCr of 3.29 mg/dL (H)).  Results from last 7 days   Lab Units 10/22/19  0826 10/21/19  1556 10/21/19  1408   WBC 10*3/mm3 10.04 7.78 8.13   HEMOGLOBIN g/dL 9.5* 10.0* 10.1*   PLATELETS 10*3/mm3 101* 116* 118*     Results from last 7 days   Lab Units 10/22/19  0826   INR  1.12*     Results from last 7 days   Lab Units 10/21/19  1556 10/21/19  1411   ALT (SGPT) U/L 27 30   AST (SGOT) U/L 71* 76*     Results from last 7 days   Lab Units 10/21/19  1641   PH, ARTERIAL pH units 7.351   PO2 ART mm Hg 102.0*   PCO2, ARTERIAL mm Hg 45.8*   HCO3 ART mmol/L 25.3             CT head 10/21/2019 reviewed: No acute intracranial process.  Old infarction noted right occipital and temporal lobes with encephalomalacia.        )Assessment   ASSESSMENT:      Active Hospital Problems    Diagnosis  POA   • Acute renal failure superimposed on chronic kidney disease (CMS/HCC) [N17.9, N18.9]  Yes      Resolved Hospital Problems   No resolved problems to display.     Acute toxic and metabolic encephalopathy  gabapentin toxicity  Acute life-threatening hyperkalemia  Acute hypotension  Acute on chronic kidney disease  Right renal cell carcinoma status post partial nephrectomy  CAD   History of CVA with left-sided weakness  Diastolic dysfunction  Chronic hepatitis C  Chronic anemia  Uncontrolled hypertension    PLAN:  Monitoring closely in the intensive care unit.  Holding Neurontin and other sedating medications due to metabolic encephalopathy.  Continue IV fluid resuscitation.  Correction of electrolytes.  Control blood pressure.  Resume most home blood pressure medications.  Nephrology input pending.  DVT prophylaxis.      Discussed with multidisciplinary ICU team on rounds this morning.    CCT: 35 min    Isaias Zhang MD  Pulmonary and Critical Care  [facility], [facility]  10/22/2019    9:36 AM

## 2019-10-22 NOTE — CONSULTS
[facility]                                                                                             Nephrology Initial Consult Note    Patient Identification:  Name: Angelo Brown MRN: [id number]  Age: 59 y.o. : 1960  Sex: male  Date:10/22/2019    Requesting Physician: As per consult order.  Reason for Consultation: Chronic kidney disease/acute renal failure/hyperkalemia  Information from:patient/ family/ chart      History of Present Illness: This is a 59 y.o. year old male with stage IV chronic kidney disease, well-known to me from the outpatient setting.  Has stage IV chronic kidney disease secondary to diabetes, hypertension and prior nephrectomy for a renal mass.  He came to the ER last night with increasing somnolence, hypotension and lab work showing worsening renal failure with hyperkalemia, this was attributed to a recent increase in his Neurontin dose.  That medication has been held and his mental status is significantly better, back to baseline today.  He denies any fevers or chills, cough or congestion shortness of breath or chest pain.  Good oral intake with no nausea or vomiting.  No edema, good urine output, no dysuria.  He denies any acute complaints at this time      The following medical history and medications personally reviewed by me:    Problem List:   Patient Active Problem List    Diagnosis   • Acute renal failure superimposed on chronic kidney disease (CMS/HCC) [N17.9, N18.9]   • Umbilical hernia without obstruction and without gangrene [K42.9]   • Anemia of chronic renal failure, stage 3 (moderate) (CMS/HCC) [N18.3, D63.1]   • Chronic kidney disease, stage III (moderate) (CMS/HCC) [N18.3]   • Cancer of kidney parenchyma, right (CMS/HCC) [C64.1]   • Hyperkalemia [E87.5]   • B12 deficiency [E53.8]   • Iron deficiency anemia secondary to inadequate dietary iron intake  [D50.8]   • Iron and its compounds causing adverse effect in therapeutic use [T45.4X5A]   • Anemia in chronic kidney disease [N18.9, D63.1]   • Thrombocytopenia (CMS/HCC) [D69.6]   • Pancytopenia, acquired (CMS/HCC) [D61.818]   • History of hepatitis C virus infection [Z86.19]   • Proteinuria [R80.9]   • Acute CVA (cerebrovascular accident) (CMS/HCC) [I63.9]       Past Medical History:  Past Medical History:   Diagnosis Date   • Anemia    • Anxiety    • Arthritis     HANDS   • CAD (coronary artery disease)    • Cancer (CMS/HCC)     KIDNEY 7/2018 SX   • CHF (congestive heart failure) (CMS/HCC)    • Chronic back pain    • Chronic kidney disease    • Coronary artery disease    • Depression    • Diabetes mellitus (CMS/HCC)     TYPE 2   • Eyes sensitive to light, bilateral    • GERD (gastroesophageal reflux disease)    • Headache    • Hepatitis C     after blood tranfusion/treated   • History of MRSA infection    • History of transfusion     2013 CABG   • Hypertension    • Jaundice    • Myocardial infarction (CMS/HCC)     5-6 years ago per pt   • Stroke (CMS/HCC) 12/2017    left sided weakness/       Past Surgical History:  Past Surgical History:   Procedure Laterality Date   • BRAIN HEMATOMA EVACUATION     • CARDIAC SURGERY     • CATARACT EXTRACTION, BILATERAL     • COLONOSCOPY     • CORONARY ARTERY BYPASS GRAFT  2013    Triple   • LAPAROSCOPIC PARTIAL NEPHRECTOMY Right    • ROTATOR CUFF REPAIR Left    • UMBILICAL HERNIA REPAIR N/A 3/27/2019    Procedure: UMBILICAL HERNIA REPAIR;  Surgeon: Harshad Cotto Jr., MD;  Location: [facility];  Service: General   • VASECTOMY  1985   • WOUND DEBRIDEMENT Right 06/10/2011    Excisional debridement of necrotizing fasciitis of the right groin extending along the right hemiscrotum, 5 x 2 x 2 cm in one wound and 7.5 x 2.5 x 2.5 cm of a second wound. This was sharp excisional debridement carried out to the muscle-Dr. Eduardo Cross         Home Meds:   Medications Prior to Admission    Medication Sig Dispense Refill Last Dose   • ALPRAZolam (XANAX) 1 MG tablet Take 1 mg by mouth 4 (Four) Times a Day As Needed.   10/20/2019 at Unknown time   • bumetanide (BUMEX) 1 MG tablet Take 1 mg by mouth As Needed.   Past Week at Unknown time   • carvedilol (COREG) 3.125 MG tablet Take 3.125 mg by mouth 2 (Two) Times a Day With Meals.   10/20/2019 at Unknown time   • Cyanocobalamin (VITAMIN B-12 PO) Take 5,000 mcg by mouth Daily.   10/21/2019 at Unknown time   • diclofenac (VOLTAREN) 1 % gel gel by Intratympanic route.   10/20/2019 at Unknown time   • gabapentin (NEURONTIN) 300 MG capsule Take 300 mg by mouth 3 (Three) Times a Day.   10/21/2019 at Unknown time   • hydrALAZINE (APRESOLINE) 100 MG tablet Take 100 mg by mouth 3 (Three) Times a Day. Pt takes 2x daily at home-will talk with cardiologist in May per pt   10/20/2019 at Unknown time   • insulin aspart (novoLOG FLEXPEN) 100 UNIT/ML solution pen-injector sc pen Inject 2-15 Units under the skin 3 (Three) Times a Day With Meals. Per sliding scale   10/20/2019 at Unknown time   • Insulin Glargine (BASAGLAR KWIKPEN) 100 UNIT/ML injection pen INJECT 10 UNITS SC AT NIGHT  0 10/20/2019 at Unknown time   • lisinopril (PRINIVIL,ZESTRIL) 10 MG tablet Take 10 mg by mouth Every Morning.   10/20/2019 at Unknown time   • MELATONIN PO Take  by mouth.   10/20/2019 at Unknown time   • Omega-3 Fatty Acids (FISH OIL) 1000 MG capsule capsule Take 1,000 mg by mouth Daily With Breakfast. STOPPED 3/19/19   10/20/2019 at Unknown time   • oxyCODONE-acetaminophen (PERCOCET)  MG per tablet Take 1 tablet by mouth Every 6 (Six) Hours As Needed.   10/20/2019 at Unknown time   • pantoprazole (PROTONIX) 40 MG EC tablet Take 40 mg by mouth Daily.   10/20/2019 at Unknown time   • simvastatin (ZOCOR) 40 MG tablet Take 40 mg by mouth Every Night.   10/20/2019 at Unknown time   • tiZANidine (ZANAFLEX) 4 MG tablet Take 4 mg by mouth Every 8 (Eight) Hours As Needed.   10/20/2019 at  Unknown time   • vitamin D (ERGOCALCIFEROL) 60299 units capsule capsule Take 50,000 Units by mouth 1 (One) Time Per Week. SATURDAY   Past Week at Unknown time   • Insulin Glargine (LANTUS SOLOSTAR) 100 UNIT/ML injection pen Inject 10 Units under the skin into the appropriate area as directed Every Night.   Taking       Current Meds:   Current Facility-Administered Medications   Medication Dose Route Frequency Provider Last Rate Last Dose   • ALPRAZolam (XANAX) tablet 1 mg  1 mg Oral 4x Daily PRN Isaias Zhang MD       • atorvastatin (LIPITOR) tablet 20 mg  20 mg Oral Daily Isaias Zhang MD       • carvedilol (COREG) tablet 3.125 mg  3.125 mg Oral BID With Meals Isaias Zhang MD       • dextrose (D50W) 25 g/ 50mL Intravenous Solution 25 g  25 g Intravenous Q15 Min PRN Ru Grant MD       • dextrose (GLUTOSE) oral gel 15 g  15 g Oral Q15 Min PRN Ru Grant MD       • glucagon (human recombinant) (GLUCAGEN DIAGNOSTIC) injection 1 mg  1 mg Subcutaneous Q15 Min PRN Ru Grant MD       • heparin (porcine) 5000 UNIT/ML injection 5,000 Units  5,000 Units Subcutaneous Q8H Ru Grant MD   5,000 Units at 10/22/19 0749   • hydrALAZINE (APRESOLINE) injection 10 mg  10 mg Intravenous Q4H PRN Ru Grant MD   10 mg at 10/22/19 0227   • hydrALAZINE (APRESOLINE) tablet 100 mg  100 mg Oral TID Isaias Zhang MD       • influenza vac split quad (FLUZONE,FLUARIX,AFLURIA) injection 0.5 mL  0.5 mL Intramuscular Once Ru Grant MD       • insulin glargine (LANTUS) injection 10 Units  10 Units Subcutaneous Nightly Isaias Zhang MD       • ipratropium-albuterol (DUO-NEB) nebulizer solution 3 mL  3 mL Nebulization Q6H PRN uR Grant MD       • labetalol (NORMODYNE,TRANDATE) injection 20 mg  20 mg Intravenous Q10 Min PRN Ru Grant MD       • magnesium hydroxide (MILK OF MAGNESIA) suspension 2400  "mg/10mL 10 mL  10 mL Oral Daily PRN Ru Grant MD       • ondansetron (ZOFRAN) injection 4 mg  4 mg Intravenous Q6H PRN Ru Grant MD       • pantoprazole (PROTONIX) EC tablet 40 mg  40 mg Oral Daily Isaias Zhang MD       • sennosides-docusate sodium (SENOKOT-S) 8.6-50 MG tablet 2 tablet  2 tablet Oral Nightly PRN Ru Grant MD       • sodium chloride 0.9 % flush 10 mL  10 mL Intravenous PRN Winston Cantor MD       • sodium chloride 0.9 % flush 10 mL  10 mL Intravenous Q12H Ru Grant MD   10 mL at 10/21/19 2044   • sodium chloride 0.9 % flush 10 mL  10 mL Intravenous PRN Ru Grant MD       • sodium chloride 0.9 % infusion  125 mL/hr Intravenous Continuous Winston Cantor MD   Stopped at 10/22/19 1120       Allergies:  Allergies   Allergen Reactions   • Morphine And Related Delirium   • Fentanyl Other (See Comments)     ER said he was allergy   • Ibuprofen Nausea Only   • Latex Rash   • Penicillins Hives       Social History:   Social History     Socioeconomic History   • Marital status:      Spouse name: Samantha   • Number of children: Not on file   • Years of education: High school   • Highest education level: Not on file   Occupational History   • Occupation: retired     Employer: UNEMPLOYED   Tobacco Use   • Smoking status: Never Smoker   • Smokeless tobacco: Never Used   Substance and Sexual Activity   • Alcohol use: No     Comment: \"no more than a half a pint of coconut rum\"   • Drug use: No   • Sexual activity: Defer        Family History:  Family History   Problem Relation Age of Onset   • Diabetes Mother    • Heart failure Mother    • Kidney failure Mother    • Anemia Mother    • Heart disease Mother    • Hypertension Mother    • Heart failure Father    • Coronary artery disease Father    • Heart disease Father    • Hypertension Father    • Diabetes Father    • Diabetes Sister    • Cervical cancer Sister    • Leukemia " "Sister    • Diabetes Brother    • Cervical cancer Daughter    • Ovarian cancer Sister    • Malig Hyperthermia Neg Hx         Review of Systems: as per HPI, in addition:    General:      Improved weakness / fatigue,                       No fevers / chills                       no weight loss  HEENT:       no dysphagia / odynophagia  Neck:           normal range of motion, no swelling  Respiratory: no cough / congestion                      No shortness of air                       No wheezing  CV:              No chest pain                       No palpitations  Abdomen/GI: no nausea / vomiting                      No diarrhea / constipation                      No abdominal pain  :             no dysuria / urinary frequency                       No urgency, normal output  Endocrine:   no polyuria / polydipsia,                      No heat or cold intolerance  Skin:           no rashes or skin breakdown   Vascular:   No edema                     No claudication  Psych:        no depression/ anxiety  Neuro:        Resolving mental status changes, no focal deficits  Musculoskeletal: no joint pain or deformities      Physical Exam:  Vitals:   Temp (24hrs), Av.7 °F (36.5 °C), Min:96.6 °F (35.9 °C), Max:98.7 °F (37.1 °C)    /73   Pulse 83   Temp 98.1 °F (36.7 °C) (Oral)   Resp 16   Ht 172.7 cm (68\")   Wt 78.5 kg (173 lb 1 oz)   SpO2 95%   BMI 26.31 kg/m²   Intake/Output:     Intake/Output Summary (Last 24 hours) at 10/22/2019 1205  Last data filed at 10/22/2019 1025  Gross per 24 hour   Intake 1952 ml   Output 1775 ml   Net 177 ml        Wt Readings from Last 1 Encounters:   10/21/19 2100 78.5 kg (173 lb 1 oz)   10/21/19 1550 79.9 kg (176 lb 1.6 oz)       Exam:    General Appearance:  Awake, alert, oriented x3, no acute distress  Chronically ill-appearing   Head and Face:  Normocephalic, atraumatic, mucus membranes moist, oropharynx clear   Eyes:  No icterus, pupils equal round and reactive to light, " extraocular movements intact    ENMT: Moist mucosa, tongue symmetric    Neck: Supple  no jugular venous distention  no thyromegaly   Pulmonary:  Respiratory effort: Normal  Auscultation of lungs: Clear bilaterally  No wheezes  No rhonchi  Good air movement, good expansion   Chest wall:  No tenderness or deformity   Cardiovascular:  Auscultation of the heart: Normal rhythm, no murmurs  Trace edema of extremities    Abdomen:  Abdomen: soft, non-tender, normal bowel sounds all four quadrants, no masses   Liver and spleen: no hepatosplenomegaly   Musculoskeletal: Digits and nails: normal  Normal range of motion  No joint swelling or gross deformities    Skin: Skin inspection: color normal, no visible rashes or lesions  Skin palpation: texture, turgor normal, no palpable lesions   Lymphatic:  no cervical lymphadenopathy    Psychiatric: Judgement and insight: normal  Orientation to person place and time: normal  Mood and affect: normal       DATA:  Radiology and Labs:  The following labs independently reviewed by me, additional AM labs ordered  Old records independently reviewed showing stage IV chronic kidney disease, baseline creatinine mid twos  The following radiologic studies independently viewed by me, findings CT head was negative  Interval notes, chart personally reviewed by me.    I have reviewed and summation of old records   New problems include hyperkalemia    Risk/ complexity of medical care/ medical decision making:  High:  Chronic illness with severe exacerbation or progression, severe hyperkalemia requiring ICU monitoring      Labs:   Recent Results (from the past 24 hour(s))   CBC Auto Differential    Collection Time: 10/21/19  2:08 PM   Result Value Ref Range    WBC 8.13 3.40 - 10.80 10*3/mm3    RBC 3.43 (L) 4.14 - 5.80 10*6/mm3    Hemoglobin 10.1 (L) 13.0 - 17.7 g/dL    Hematocrit 33.4 (L) 37.5 - 51.0 %    MCV 97.4 (H) 79.0 - 97.0 fL    MCH 29.4 26.6 - 33.0 pg    MCHC 30.2 (L) 31.5 - 35.7 g/dL    RDW  13.4 12.3 - 15.4 %    RDW-SD 48.2 37.0 - 54.0 fl    MPV 11.7 6.0 - 12.0 fL    Platelets 118 (L) 140 - 450 10*3/mm3    Neutrophil % 70.8 42.7 - 76.0 %    Lymphocyte % 18.3 (L) 19.6 - 45.3 %    Monocyte % 6.4 5.0 - 12.0 %    Eosinophil % 3.6 0.3 - 6.2 %    Basophil % 0.5 0.0 - 1.5 %    Immature Grans % 0.4 0.0 - 0.5 %    Neutrophils, Absolute 5.76 1.70 - 7.00 10*3/mm3    Lymphocytes, Absolute 1.49 0.70 - 3.10 10*3/mm3    Monocytes, Absolute 0.52 0.10 - 0.90 10*3/mm3    Eosinophils, Absolute 0.29 0.00 - 0.40 10*3/mm3    Basophils, Absolute 0.04 0.00 - 0.20 10*3/mm3    Immature Grans, Absolute 0.03 0.00 - 0.05 10*3/mm3    nRBC 0.0 0.0 - 0.2 /100 WBC   Comprehensive Metabolic Panel    Collection Time: 10/21/19  2:11 PM   Result Value Ref Range    Glucose 165 (H) 74 - 124 mg/dL    BUN 63 (H) 6 - 20 mg/dL    Creatinine 4.89 (C) 0.70 - 1.30 mg/dL    Sodium 147 (H) 134 - 145 mmol/L    Potassium 5.8 (C) 3.5 - 4.7 mmol/L    Chloride 107 98 - 107 mmol/L    CO2 24.3 22.0 - 29.0 mmol/L    Calcium 9.3 8.5 - 10.2 mg/dL    Total Protein 7.1 6.3 - 8.0 g/dL    Albumin 3.90 3.50 - 5.20 g/dL    ALT (SGPT) 30 0 - 41 U/L    AST (SGOT) 76 (C) 0 - 40 U/L    Alkaline Phosphatase 69 38 - 116 U/L    Total Bilirubin 0.3 0.2 - 1.2 mg/dL    eGFR Non African Amer 12 (L) >60 mL/min/1.73    eGFR  African Amer      Globulin 3.2 1.8 - 3.5 gm/dL    A/G Ratio 1.2 1.1 - 2.4 g/dL    BUN/Creatinine Ratio 12.9 7.3 - 30.0    Anion Gap 15.7 (H) 5.0 - 15.0 mmol/L   Ferritin    Collection Time: 10/21/19  2:11 PM   Result Value Ref Range    Ferritin 205.90 30.00 - 400.00 ng/mL   Iron Profile    Collection Time: 10/21/19  2:11 PM   Result Value Ref Range    Iron 59 59 - 158 mcg/dL    Iron Saturation 23 14 - 48 %    Transferrin 182 (L) 200 - 360 mg/dL    TIBC 255 249 - 505 mcg/dL   Comprehensive Metabolic Panel    Collection Time: 10/21/19  3:56 PM   Result Value Ref Range    Glucose 188 (H) 65 - 99 mg/dL    BUN 64 (H) 6 - 20 mg/dL    Creatinine 4.92 (H) 0.76 -  1.27 mg/dL    Sodium 144 136 - 145 mmol/L    Potassium 6.6 (C) 3.5 - 5.2 mmol/L    Chloride 108 (H) 98 - 107 mmol/L    CO2 24.5 22.0 - 29.0 mmol/L    Calcium 9.0 8.6 - 10.5 mg/dL    Total Protein 7.3 6.0 - 8.5 g/dL    Albumin 3.80 3.50 - 5.20 g/dL    ALT (SGPT) 27 1 - 41 U/L    AST (SGOT) 71 (H) 1 - 40 U/L    Alkaline Phosphatase 69 39 - 117 U/L    Total Bilirubin 0.3 0.2 - 1.2 mg/dL    eGFR Non African Amer 12 (L) >60 mL/min/1.73    eGFR  African Amer      Globulin 3.5 gm/dL    A/G Ratio 1.1 g/dL    BUN/Creatinine Ratio 13.0 7.0 - 25.0    Anion Gap 11.5 5.0 - 15.0 mmol/L   Ethanol    Collection Time: 10/21/19  3:56 PM   Result Value Ref Range    Ethanol <10 0 - 10 mg/dL    Ethanol % <0.010 %   CBC Auto Differential    Collection Time: 10/21/19  3:56 PM   Result Value Ref Range    WBC 7.78 3.40 - 10.80 10*3/mm3    RBC 3.34 (L) 4.14 - 5.80 10*6/mm3    Hemoglobin 10.0 (L) 13.0 - 17.7 g/dL    Hematocrit 30.9 (L) 37.5 - 51.0 %    MCV 92.5 79.0 - 97.0 fL    MCH 29.9 26.6 - 33.0 pg    MCHC 32.4 31.5 - 35.7 g/dL    RDW 13.4 12.3 - 15.4 %    RDW-SD 45.0 37.0 - 54.0 fl    MPV 11.2 6.0 - 12.0 fL    Platelets 116 (L) 140 - 450 10*3/mm3    Neutrophil % 68.2 42.7 - 76.0 %    Lymphocyte % 20.6 19.6 - 45.3 %    Monocyte % 7.3 5.0 - 12.0 %    Eosinophil % 3.0 0.3 - 6.2 %    Basophil % 0.5 0.0 - 1.5 %    Immature Grans % 0.4 0.0 - 0.5 %    Neutrophils, Absolute 5.31 1.70 - 7.00 10*3/mm3    Lymphocytes, Absolute 1.60 0.70 - 3.10 10*3/mm3    Monocytes, Absolute 0.57 0.10 - 0.90 10*3/mm3    Eosinophils, Absolute 0.23 0.00 - 0.40 10*3/mm3    Basophils, Absolute 0.04 0.00 - 0.20 10*3/mm3    Immature Grans, Absolute 0.03 0.00 - 0.05 10*3/mm3    nRBC 0.0 0.0 - 0.2 /100 WBC   Phosphorus    Collection Time: 10/21/19  3:56 PM   Result Value Ref Range    Phosphorus 5.9 (H) 2.5 - 4.5 mg/dL   Ammonia    Collection Time: 10/21/19  4:28 PM   Result Value Ref Range    Ammonia 41 16 - 60 umol/L   Blood Gas, Arterial    Collection Time: 10/21/19   4:41 PM   Result Value Ref Range    Site Arterial: left brachial     Harvey's Test N/A     pH, Arterial 7.351 7.350 - 7.450 pH units    pCO2, Arterial 45.8 (H) 35.0 - 45.0 mm Hg    pO2, Arterial 102.0 (H) 80.0 - 100.0 mm Hg    HCO3, Arterial 25.3 22.0 - 28.0 mmol/L    Base Excess, Arterial -0.5 (L) 0.0 - 2.0 mmol/L    O2 Saturation Calculated 97.5 92.0 - 99.0 %    Barometric Pressure for Blood Gas 741.6 mmHg    Modality Cannula     Flow Rate 2 lpm    Set [facility] Resp Rate 26    POC Glucose Once    Collection Time: 10/21/19  6:48 PM   Result Value Ref Range    Glucose 56 (L) 70 - 130 mg/dL   POC Glucose Once    Collection Time: 10/21/19  7:11 PM   Result Value Ref Range    Glucose 64 (L) 70 - 130 mg/dL   POC Glucose Once    Collection Time: 10/21/19  7:41 PM   Result Value Ref Range    Glucose 105 70 - 130 mg/dL   Basic Metabolic Panel    Collection Time: 10/21/19  8:36 PM   Result Value Ref Range    Glucose 101 (H) 65 - 99 mg/dL    BUN 56 (H) 6 - 20 mg/dL    Creatinine 4.06 (H) 0.76 - 1.27 mg/dL    Sodium 145 136 - 145 mmol/L    Potassium 4.7 3.5 - 5.2 mmol/L    Chloride 111 (H) 98 - 107 mmol/L    CO2 24.0 22.0 - 29.0 mmol/L    Calcium 8.8 8.6 - 10.5 mg/dL    eGFR Non African Amer 15 (L) >60 mL/min/1.73    BUN/Creatinine Ratio 13.8 7.0 - 25.0    Anion Gap 10.0 5.0 - 15.0 mmol/L   POC Glucose Once    Collection Time: 10/21/19  8:42 PM   Result Value Ref Range    Glucose 113 70 - 130 mg/dL   Urine Drug Screen - Urine, Clean Catch    Collection Time: 10/21/19  9:08 PM   Result Value Ref Range    Amphet/Methamphet, Screen Negative Negative    Barbiturates Screen, Urine Negative Negative    Benzodiazepine Screen, Urine Positive (A) Negative    Cocaine Screen, Urine Negative Negative    Opiate Screen Negative Negative    THC, Screen, Urine Positive (A) Negative    Methadone Screen, Urine Negative Negative    Oxycodone Screen, Urine Positive (A) Negative   Urinalysis without microscopic (no culture) -    Collection Time:  10/21/19  9:10 PM   Result Value Ref Range    Color, UA Yellow Yellow, Straw    Appearance, UA Clear Clear    pH, UA 6.0 5.0 - 8.0    Specific Gravity, UA 1.015 1.005 - 1.030    Glucose, UA Negative Negative    Ketones, UA Negative Negative    Bilirubin, UA Negative Negative    Blood, UA Trace (A) Negative    Protein, UA >=300 mg/dL (3+) (A) Negative    Leuk Esterase, UA Negative Negative    Nitrite, UA Negative Negative    Urobilinogen, UA 0.2 E.U./dL 0.2 - 1.0 E.U./dL   POC Glucose Once    Collection Time: 10/22/19 12:23 AM   Result Value Ref Range    Glucose 180 (H) 70 - 130 mg/dL   POC Glucose Once    Collection Time: 10/22/19  4:03 AM   Result Value Ref Range    Glucose 186 (H) 70 - 130 mg/dL   POC Glucose Once    Collection Time: 10/22/19  7:57 AM   Result Value Ref Range    Glucose 138 (H) 70 - 130 mg/dL   Basic Metabolic Panel    Collection Time: 10/22/19  8:26 AM   Result Value Ref Range    Glucose 149 (H) 65 - 99 mg/dL    BUN 49 (H) 6 - 20 mg/dL    Creatinine 3.29 (H) 0.76 - 1.27 mg/dL    Sodium 145 136 - 145 mmol/L    Potassium 4.8 3.5 - 5.2 mmol/L    Chloride 110 (H) 98 - 107 mmol/L    CO2 21.7 (L) 22.0 - 29.0 mmol/L    Calcium 8.6 8.6 - 10.5 mg/dL    eGFR Non African Amer 19 (L) >60 mL/min/1.73    BUN/Creatinine Ratio 14.9 7.0 - 25.0    Anion Gap 13.3 5.0 - 15.0 mmol/L   Magnesium    Collection Time: 10/22/19  8:26 AM   Result Value Ref Range    Magnesium 2.1 1.6 - 2.6 mg/dL   Phosphorus    Collection Time: 10/22/19  8:26 AM   Result Value Ref Range    Phosphorus 3.8 2.5 - 4.5 mg/dL   Protime-INR    Collection Time: 10/22/19  8:26 AM   Result Value Ref Range    Protime 14.1 11.7 - 14.2 Seconds    INR 1.12 (H) 0.90 - 1.10   CBC Auto Differential    Collection Time: 10/22/19  8:26 AM   Result Value Ref Range    WBC 10.04 3.40 - 10.80 10*3/mm3    RBC 3.22 (L) 4.14 - 5.80 10*6/mm3    Hemoglobin 9.5 (L) 13.0 - 17.7 g/dL    Hematocrit 29.5 (L) 37.5 - 51.0 %    MCV 91.6 79.0 - 97.0 fL    MCH 29.5 26.6 - 33.0  pg    MCHC 32.2 31.5 - 35.7 g/dL    RDW 13.1 12.3 - 15.4 %    RDW-SD 43.8 37.0 - 54.0 fl    MPV 11.6 6.0 - 12.0 fL    Platelets 101 (L) 140 - 450 10*3/mm3   Manual Differential    Collection Time: 10/22/19  8:26 AM   Result Value Ref Range    Neutrophil % 92.0 (H) 42.7 - 76.0 %    Lymphocyte % 4.0 (L) 19.6 - 45.3 %    Monocyte % 4.0 (L) 5.0 - 12.0 %    Neutrophils Absolute 9.24 (H) 1.70 - 7.00 10*3/mm3    Lymphocytes Absolute 0.40 (L) 0.70 - 3.10 10*3/mm3    Monocytes Absolute 0.40 0.10 - 0.90 10*3/mm3    Anisocytosis Mod/2+ None Seen    WBC Morphology Normal Normal    Platelet Morphology Normal Normal       Radiology:  Imaging Results (last 24 hours)     Procedure Component Value Units Date/Time    CT Head Without Contrast [[id number]] Collected:  10/21/19 1708     Updated:  10/21/19 1713    Narrative:       CT HEAD WO CONTRAST-     INDICATIONS: Altered mental status     TECHNIQUE: Radiation dose reduction techniques were utilized, including  automated exposure control and exposure modulation based on body size.  Noncontrast head CT     COMPARISON: 12/20/2017     FINDINGS:           No acute intracranial hemorrhage, midline shift or mass effect. No acute  territorial infarct is identified. Region of encephalomalacia/old  infarct changes involving right occipital and temporal lobe.     Arterial calcifications are seen at the base of the brain.     Ex vacuo dilatation of the posterior horn of the right lateral  ventricle. Ventricles, cisterns, cerebral sulci are otherwise  unremarkable for patient age.     The visualized paranasal sinuses, orbits, mastoid air cells are  unremarkable.                   Impression:          No acute intracranial hemorrhage or hydrocephalus. Chronic changes. If  there is further clinical concern, MRI could be considered for further  evaluation.     This report was finalized on 10/21/2019 5:10 PM by Dr. Yadiel Espinal M.D.                  ASSESSMENT:   Acute renal failure, secondary  to hypotension and prerenal, improved  Stage IV chronic kidney disease  Solitary kidney  Toxic metabolic encephalopathy from Neurontin  Severe hyperkalemia, improved with hydration  Hypotension  Hepatitis C  Anemia of chronic kidney disease  Renal cell carcinoma status post nephrectomy        PLAN:   Renal function continues to improve, nearing baseline  Hypotension resolved, now hypertensive  Will discontinue IV fluids and encourage oral fluid intake  Potassium improved, recheck in a.m.  Continue current antihypertensive regimen    Continue to monitor electrolytes and volume closely, avoid IV contrast and nephrotoxic medications   I appreciate the opportunity to participate in this patient's care.  Please call with any questions or concerns.       Yohan Murrell M.D  [facility]  Office phone number: [phone]  Answering service phone number: [phone]        10/22/2019        Dictation via Dragon dictation software

## 2019-10-22 NOTE — PROGRESS NOTES
Discharge Planning Assessment  [facility]     Patient Name: Angelo Brown  MRN: [id number]  Today's Date: 10/22/2019    Admit Date: 10/21/2019    Discharge Needs Assessment     Row Name 10/22/19 1249       Living Environment    Lives With  spouse    Current Living Arrangements  home/apartment/condo    Primary Care Provided by  self    Provides Primary Care For  no one    Family Caregiver if Needed  spouse    Able to Return to Prior Arrangements  yes       Resource/Environmental Concerns    Resource/Environmental Concerns  none       Transition Planning    Patient/Family Anticipates Transition to  home with family    Patient/Family Anticipated Services at Transition      Transportation Anticipated  family or friend will provide       Discharge Needs Assessment    Concerns to be Addressed  no discharge needs identified    Equipment Currently Used at Home  cane, straight;glucometer    Anticipated Changes Related to Illness  none    Equipment Needed After Discharge  none        Discharge Plan     Row Name 10/22/19 7677       Plan    Plan  Home    Plan Comments  IMM noted.  CCP spoke to patient at bedside to discuss discharge planning.  CCP role explained.  Face sheet verified.  His primary care provider is Dr. Yadiel Baxter.  Pt lives in a house with his wife.  His wife Samantha , [phone]  is his emergency contact.  He uses a cane  to ambulate.   He is independent with ADL's.  Pt has no history of HH. He has not been to  rehab.   He obtains his medications from the [facility] on on [address].  Plan is Home with no needs . CCP following    Final Discharge Disposition Code  07 - left AMA        Destination      No service coordination in this encounter.      Durable Medical Equipment      No service coordination in this encounter.      Dialysis/Infusion      No service coordination in this encounter.      Home Medical Care      No service coordination in this encounter.      Therapy      No  service coordination in this encounter.      Community Resources      No service coordination in this encounter.          Demographic Summary    No documentation.       Functional Status    No documentation.       Psychosocial    No documentation.       Abuse/Neglect    No documentation.       Legal    No documentation.       Substance Abuse    No documentation.       Patient Forms    No documentation.           Sonya Galeas RN

## 2019-11-14 ENCOUNTER — TELEPHONE (OUTPATIENT)
Dept: ONCOLOGY | Facility: HOSPITAL | Age: 59
End: 2019-11-14

## 2019-11-14 NOTE — TELEPHONE ENCOUNTER
PT'S WIFE CALLING. PT LEFT HOSP AMA 3 WKS AGO. HASN'T BEEN SEEN BY ANYONE SINCE. WIFE CALLING NOW TO GET PT SET UP W/ APPTS. PER DR. RIBEIRO PT CAN SEE NP NEXT WK SOMETIME W/ CBC AND POSS EPO. SEE MD IN FEB. APPT DESK MESSAGED TO SET UP. WIFE V/U.

## 2019-11-14 NOTE — TELEPHONE ENCOUNTER
----- Message from Saadia Cross sent at 11/14/2019  2:49 PM EST -----  Contact: [phone]  Pt's wife says he needs to get an appointment

## 2019-11-19 ENCOUNTER — APPOINTMENT (OUTPATIENT)
Dept: ONCOLOGY | Facility: HOSPITAL | Age: 59
End: 2019-11-19

## 2019-11-19 ENCOUNTER — OFFICE VISIT (OUTPATIENT)
Dept: ONCOLOGY | Facility: CLINIC | Age: 59
End: 2019-11-19

## 2019-11-19 ENCOUNTER — LAB (OUTPATIENT)
Dept: LAB | Facility: HOSPITAL | Age: 59
End: 2019-11-19

## 2019-11-19 VITALS
WEIGHT: 181.2 LBS | TEMPERATURE: 97.6 F | OXYGEN SATURATION: 97 % | HEIGHT: 68 IN | RESPIRATION RATE: 18 BRPM | DIASTOLIC BLOOD PRESSURE: 83 MMHG | SYSTOLIC BLOOD PRESSURE: 174 MMHG | HEART RATE: 61 BPM | BODY MASS INDEX: 27.46 KG/M2

## 2019-11-19 DIAGNOSIS — N18.30 ANEMIA OF CHRONIC RENAL FAILURE, STAGE 3 (MODERATE) (HCC): Primary | ICD-10-CM

## 2019-11-19 DIAGNOSIS — D63.1 ANEMIA OF CHRONIC RENAL FAILURE, STAGE 3 (MODERATE) (HCC): Primary | ICD-10-CM

## 2019-11-19 DIAGNOSIS — D69.6 THROMBOCYTOPENIA (HCC): ICD-10-CM

## 2019-11-19 DIAGNOSIS — N18.30 ANEMIA IN STAGE 3 CHRONIC KIDNEY DISEASE (HCC): Primary | ICD-10-CM

## 2019-11-19 DIAGNOSIS — D63.1 ANEMIA IN STAGE 3 CHRONIC KIDNEY DISEASE (HCC): Primary | ICD-10-CM

## 2019-11-19 LAB
BASOPHILS # BLD AUTO: 0.04 10*3/MM3 (ref 0–0.2)
BASOPHILS NFR BLD AUTO: 0.6 % (ref 0–1.5)
DEPRECATED RDW RBC AUTO: 45.7 FL (ref 37–54)
EOSINOPHIL # BLD AUTO: 0.31 10*3/MM3 (ref 0–0.4)
EOSINOPHIL NFR BLD AUTO: 4.8 % (ref 0.3–6.2)
ERYTHROCYTE [DISTWIDTH] IN BLOOD BY AUTOMATED COUNT: 13.2 % (ref 12.3–15.4)
HCT VFR BLD AUTO: 33.4 % (ref 37.5–51)
HGB BLD-MCNC: 10.7 G/DL (ref 13–17.7)
IMM GRANULOCYTES # BLD AUTO: 0.04 10*3/MM3 (ref 0–0.05)
IMM GRANULOCYTES NFR BLD AUTO: 0.6 % (ref 0–0.5)
LYMPHOCYTES # BLD AUTO: 1.39 10*3/MM3 (ref 0.7–3.1)
LYMPHOCYTES NFR BLD AUTO: 21.7 % (ref 19.6–45.3)
MCH RBC QN AUTO: 29.9 PG (ref 26.6–33)
MCHC RBC AUTO-ENTMCNC: 32 G/DL (ref 31.5–35.7)
MCV RBC AUTO: 93.3 FL (ref 79–97)
MONOCYTES # BLD AUTO: 0.39 10*3/MM3 (ref 0.1–0.9)
MONOCYTES NFR BLD AUTO: 6.1 % (ref 5–12)
NEUTROPHILS # BLD AUTO: 4.24 10*3/MM3 (ref 1.7–7)
NEUTROPHILS NFR BLD AUTO: 66.2 % (ref 42.7–76)
NRBC BLD AUTO-RTO: 0.3 /100 WBC (ref 0–0.2)
PLATELET # BLD AUTO: 98 10*3/MM3 (ref 140–450)
PMV BLD AUTO: 11.7 FL (ref 6–12)
RBC # BLD AUTO: 3.58 10*6/MM3 (ref 4.14–5.8)
WBC NRBC COR # BLD: 6.41 10*3/MM3 (ref 3.4–10.8)

## 2019-11-19 PROCEDURE — 85025 COMPLETE CBC W/AUTO DIFF WBC: CPT

## 2019-11-19 PROCEDURE — 36415 COLL VENOUS BLD VENIPUNCTURE: CPT

## 2019-11-19 PROCEDURE — G0463 HOSPITAL OUTPT CLINIC VISIT: HCPCS | Performed by: NURSE PRACTITIONER

## 2019-11-19 PROCEDURE — 99213 OFFICE O/P EST LOW 20 MIN: CPT | Performed by: NURSE PRACTITIONER

## 2019-11-19 RX ORDER — PROMETHAZINE HYDROCHLORIDE 12.5 MG/1
TABLET ORAL
COMMUNITY
Start: 2018-05-24 | End: 2020-07-16 | Stop reason: HOSPADM

## 2019-11-19 NOTE — PROGRESS NOTES
Subjective .     REASONS FOR FOLLOWUP:  Multifactorial Anemia    HISTORY OF PRESENT ILLNESS:  The patient is a 59 y.o. year old male who is here for follow-up with the above-mentioned history.    History of Present Illness   Mr. Brown returns today for follow-up accompanied by his wife.  When he was seen 10/21/2019 the patient was extremely lethargic and difficult to arouse.  So much so that the MAs could not even get a set of vital signs on him.  He was transferred to the emergency department where he was admitted and found to have gabapentin toxicity.  Blood sugar also upon admission was 52 reportedly.    Unfortunately the patient left AMA on 10/22/2019 which is documented extensively in the EMR.    In talking with him now, he states he is completely off gabapentin.  He has a call into Dr. Quevedo, his nephrologist with plans to see him hopefully in the next month.    We reviewed from our standpoint that his hemoglobin is very stable, 10.7 today.  He has not required Procrit since April of this year.  Similarly his platelet count remained stable at 98,000.  The patient denies any bleeding difficulty.    They deny other concerns at this time.    Past Medical History:   Diagnosis Date   • Anemia    • Anxiety    • Arthritis     HANDS   • CAD (coronary artery disease)    • Cancer (CMS/HCC)     KIDNEY 7/2018 SX   • CHF (congestive heart failure) (CMS/HCC)    • Chronic back pain    • Chronic kidney disease    • Coronary artery disease    • Depression    • Diabetes mellitus (CMS/HCC)     TYPE 2   • Eyes sensitive to light, bilateral    • GERD (gastroesophageal reflux disease)    • Headache    • Hepatitis C     after blood tranfusion/treated   • History of MRSA infection    • History of transfusion     2013 CABG   • Hypertension    • Jaundice    • Myocardial infarction (CMS/HCC)     5-6 years ago per pt   • Stroke (CMS/HCC) 12/2017    left sided weakness/       ONCOLOGIC HISTORY:  (History from previous dates can be found  in the separate document.)    Current Outpatient Medications on File Prior to Visit   Medication Sig Dispense Refill   • ALPRAZolam (XANAX) 1 MG tablet Take 1 mg by mouth 4 (Four) Times a Day As Needed.     • bumetanide (BUMEX) 1 MG tablet Take 1 mg by mouth As Needed.     • carvedilol (COREG) 3.125 MG tablet Take 3.125 mg by mouth 2 (Two) Times a Day With Meals.     • Cyanocobalamin (VITAMIN B-12 PO) Take 5,000 mcg by mouth Daily.     • diclofenac (VOLTAREN) 1 % gel gel by Intratympanic route.     • gabapentin (NEURONTIN) 300 MG capsule Take 300 mg by mouth 3 (Three) Times a Day.     • hydrALAZINE (APRESOLINE) 100 MG tablet Take 100 mg by mouth 3 (Three) Times a Day. Pt takes 2x daily at home-will talk with cardiologist in May per pt     • insulin aspart (novoLOG FLEXPEN) 100 UNIT/ML solution pen-injector sc pen Inject 2-15 Units under the skin 3 (Three) Times a Day With Meals. Per sliding scale     • Insulin Glargine (BASAGLAR KWIKPEN) 100 UNIT/ML injection pen INJECT 10 UNITS SC AT NIGHT  0   • Insulin Glargine (LANTUS SOLOSTAR) 100 UNIT/ML injection pen Inject 10 Units under the skin into the appropriate area as directed Every Night.     • lisinopril (PRINIVIL,ZESTRIL) 10 MG tablet Take 10 mg by mouth Every Morning.     • MELATONIN PO Take  by mouth.     • Omega-3 Fatty Acids (FISH OIL) 1000 MG capsule capsule Take 1,000 mg by mouth Daily With Breakfast. STOPPED 3/19/19     • oxyCODONE-acetaminophen (PERCOCET)  MG per tablet Take 1 tablet by mouth Every 6 (Six) Hours As Needed.     • pantoprazole (PROTONIX) 40 MG EC tablet Take 40 mg by mouth Daily.     • promethazine (PHENERGAN) 12.5 MG tablet Take  by mouth.     • simvastatin (ZOCOR) 40 MG tablet Take 40 mg by mouth Every Night.     • tiZANidine (ZANAFLEX) 4 MG tablet Take 4 mg by mouth Every 8 (Eight) Hours As Needed.     • vitamin D (ERGOCALCIFEROL) 84947 units capsule capsule Take 50,000 Units by mouth 1 (One) Time Per Week. SATURDAY       No current  "facility-administered medications on file prior to visit.        ALLERGIES:     Allergies   Allergen Reactions   • Morphine And Related Delirium   • Fentanyl Other (See Comments)     ER said he was allergy   • Ibuprofen Nausea Only   • Latex Rash   • Penicillins Hives       Social History     Socioeconomic History   • Marital status:      Spouse name: Samantha   • Number of children: Not on file   • Years of education: High school   • Highest education level: Not on file   Occupational History   • Occupation: retired     Employer: UNEMPLOYED   Tobacco Use   • Smoking status: Never Smoker   • Smokeless tobacco: Never Used   Substance and Sexual Activity   • Alcohol use: No     Comment: \"no more than a half a pint of coconut rum\"   • Drug use: No   • Sexual activity: Defer         Cancer-related family history includes Cervical cancer in his daughter and sister; Ovarian cancer in his sister.     Review of Systems   Constitutional: Positive for fatigue.   HENT: Negative.    Eyes: Negative.    Respiratory: Negative.    Cardiovascular: Negative.    Gastrointestinal: Negative.    Genitourinary: Negative.    Musculoskeletal: Negative.    Skin: Negative.    Neurological: Negative.    Hematological: Negative.    Psychiatric/Behavioral: Negative.        Objective      Vitals:    11/19/19 1459   BP: 174/83   Pulse: 61   Resp: 18   Temp: 97.6 °F (36.4 °C)   TempSrc: Oral   SpO2: 97%   Weight: 82.2 kg (181 lb 3.2 oz)   Height: 172.7 cm (67.99\")   PainSc:   8   PainLoc: Back     Current Status 11/19/2019   ECOG score 0       Physical Exam   Constitutional: He is oriented to person, place, and time. Vital signs are normal. He appears well-developed and well-nourished. No distress.   HENT:   Head: Normocephalic and atraumatic.   Mouth/Throat: Abnormal dentition.   Eyes: Pupils are equal, round, and reactive to light.   Neck: Normal range of motion.   Cardiovascular: Normal rate, regular rhythm and normal heart sounds.   No " murmur heard.  Pulmonary/Chest: Breath sounds normal. No respiratory distress. He has no wheezes. He has no rhonchi. He has no rales.   Abdominal: Normal appearance. There is no hepatosplenomegaly.   Musculoskeletal: Normal range of motion. He exhibits no edema.   Neurological: He is alert and oriented to person, place, and time.   Skin: Skin is warm and dry. No rash noted.   Vitals reviewed.        RECENT LABS:  Results from last 7 days   Lab Units 11/19/19  1441   WBC 10*3/mm3 6.41   NEUTROS ABS 10*3/mm3 4.24   HEMOGLOBIN g/dL 10.7*   HEMATOCRIT % 33.4*   PLATELETS 10*3/mm3 98*               Assessment/Plan     1. Multifactorial anemia: anemia of chronic kidney disease, iron deficiency and B12 deficiency.  Patient has received Procrit in the past for his anemia due to chronic kidney disease.  However his hemoglobin has been overall very stable for the last several months.  He has not required Procrit since April of this year.  Today's hemoglobin is 10.7 he will not need Procrit.  As we are approaching the holidays I feel it is reasonable to see him back in the new year without any need for monthly visits in between.      2.  Chronic kidney disease: Patient follows with Dr. Quevedo, nephrology.  He has a call to Dr. Quevedo to get an appointment in the next month.      3.  Thrombocytopenia: Chronic.  Platelet count is 98,000 today.  Denies any bleeding.    4.  History of kidney cancer: Patient had laparoscopic right partial nephrectomy at [facility].    PLAN:  1.  No Procrit today.  2.  She is scheduled to return early 2020 for follow-up with Dr. Aviles and Stephen if hemoglobin less than 10 g/dL.  3.  Patient will follow-up with nephrology as noted.  4.  Wife encouraged to call should he have any bleeding difficulty or worsening fatigue or short of breath.        Cc:  Yadiel Baxter*

## 2020-02-17 ENCOUNTER — APPOINTMENT (OUTPATIENT)
Dept: ONCOLOGY | Facility: HOSPITAL | Age: 60
End: 2020-02-17

## 2020-02-17 ENCOUNTER — LAB (OUTPATIENT)
Dept: LAB | Facility: HOSPITAL | Age: 60
End: 2020-02-17

## 2020-02-17 ENCOUNTER — OFFICE VISIT (OUTPATIENT)
Dept: ONCOLOGY | Facility: CLINIC | Age: 60
End: 2020-02-17

## 2020-02-17 VITALS
TEMPERATURE: 98.3 F | WEIGHT: 178.1 LBS | OXYGEN SATURATION: 96 % | HEIGHT: 67 IN | HEART RATE: 61 BPM | RESPIRATION RATE: 16 BRPM | BODY MASS INDEX: 27.95 KG/M2 | SYSTOLIC BLOOD PRESSURE: 219 MMHG | DIASTOLIC BLOOD PRESSURE: 96 MMHG

## 2020-02-17 DIAGNOSIS — D50.8 IRON DEFICIENCY ANEMIA SECONDARY TO INADEQUATE DIETARY IRON INTAKE: ICD-10-CM

## 2020-02-17 DIAGNOSIS — D69.6 THROMBOCYTOPENIA (HCC): ICD-10-CM

## 2020-02-17 DIAGNOSIS — N18.30 ANEMIA OF CHRONIC RENAL FAILURE, STAGE 3 (MODERATE) (HCC): ICD-10-CM

## 2020-02-17 DIAGNOSIS — D63.1 ANEMIA OF CHRONIC RENAL FAILURE, STAGE 3 (MODERATE) (HCC): ICD-10-CM

## 2020-02-17 DIAGNOSIS — D63.1 ANEMIA OF CHRONIC RENAL FAILURE, STAGE 3 (MODERATE) (HCC): Primary | ICD-10-CM

## 2020-02-17 DIAGNOSIS — N18.30 CHRONIC KIDNEY DISEASE, STAGE III (MODERATE) (HCC): ICD-10-CM

## 2020-02-17 DIAGNOSIS — N18.30 ANEMIA OF CHRONIC RENAL FAILURE, STAGE 3 (MODERATE) (HCC): Primary | ICD-10-CM

## 2020-02-17 DIAGNOSIS — C64.1 CANCER OF KIDNEY PARENCHYMA, RIGHT (HCC): Primary | ICD-10-CM

## 2020-02-17 LAB
BASOPHILS # BLD AUTO: 0.03 10*3/MM3 (ref 0–0.2)
BASOPHILS NFR BLD AUTO: 0.5 % (ref 0–1.5)
DEPRECATED RDW RBC AUTO: 44.6 FL (ref 37–54)
EOSINOPHIL # BLD AUTO: 0.32 10*3/MM3 (ref 0–0.4)
EOSINOPHIL NFR BLD AUTO: 5.5 % (ref 0.3–6.2)
ERYTHROCYTE [DISTWIDTH] IN BLOOD BY AUTOMATED COUNT: 13.5 % (ref 12.3–15.4)
HCT VFR BLD AUTO: 31 % (ref 37.5–51)
HGB BLD-MCNC: 10.2 G/DL (ref 13–17.7)
IMM GRANULOCYTES # BLD AUTO: 0.03 10*3/MM3 (ref 0–0.05)
IMM GRANULOCYTES NFR BLD AUTO: 0.5 % (ref 0–0.5)
LYMPHOCYTES # BLD AUTO: 1.44 10*3/MM3 (ref 0.7–3.1)
LYMPHOCYTES NFR BLD AUTO: 24.6 % (ref 19.6–45.3)
MCH RBC QN AUTO: 29.8 PG (ref 26.6–33)
MCHC RBC AUTO-ENTMCNC: 32.9 G/DL (ref 31.5–35.7)
MCV RBC AUTO: 90.6 FL (ref 79–97)
MONOCYTES # BLD AUTO: 0.45 10*3/MM3 (ref 0.1–0.9)
MONOCYTES NFR BLD AUTO: 7.7 % (ref 5–12)
NEUTROPHILS # BLD AUTO: 3.59 10*3/MM3 (ref 1.7–7)
NEUTROPHILS NFR BLD AUTO: 61.2 % (ref 42.7–76)
NRBC BLD AUTO-RTO: 0 /100 WBC (ref 0–0.2)
PLATELET # BLD AUTO: 111 10*3/MM3 (ref 140–450)
PMV BLD AUTO: 11.9 FL (ref 6–12)
RBC # BLD AUTO: 3.42 10*6/MM3 (ref 4.14–5.8)
WBC NRBC COR # BLD: 5.86 10*3/MM3 (ref 3.4–10.8)

## 2020-02-17 PROCEDURE — 36415 COLL VENOUS BLD VENIPUNCTURE: CPT

## 2020-02-17 PROCEDURE — 99214 OFFICE O/P EST MOD 30 MIN: CPT | Performed by: INTERNAL MEDICINE

## 2020-02-17 PROCEDURE — 85025 COMPLETE CBC W/AUTO DIFF WBC: CPT

## 2020-02-17 RX ORDER — NITROGLYCERIN 0.4 MG/1
TABLET SUBLINGUAL
COMMUNITY
End: 2020-07-16 | Stop reason: HOSPADM

## 2020-02-17 RX ORDER — NALOXONE HYDROCHLORIDE 4 MG/.1ML
SPRAY NASAL
COMMUNITY
End: 2020-07-16 | Stop reason: HOSPADM

## 2020-02-17 RX ORDER — ERYTHROMYCIN 5 MG/G
OINTMENT OPHTHALMIC
COMMUNITY
End: 2020-07-16 | Stop reason: HOSPADM

## 2020-02-17 NOTE — PROGRESS NOTES
Subjective .     REASONS FOR FOLLOWUP:  Multifactorial Anemia    HISTORY OF PRESENT ILLNESS:  The patient is a 59 y.o. year old male who is here for follow-up with the above-mentioned history.      This patient returns today to the office for followup in company of his wife. He states that he has been very reluctant to take hydralazine 3 times a day as instructed by Dr. Murrell not because the pills make him sick, it is because he does not care. His wife insists into this and they went into a big process of fighting about this statement. In any event the patient feels relatively well, he has a very good appetite, he ate a meatloaf of almost 7 pounds of meat last week along with chicken and dumplings. He has no control of diabetes whatsoever. His weight remains relatively stable. Bowel function is appropriate, urination is appropriate, no clinical bleeding, no recent infections. He remains functional.                 Past Medical History:   Diagnosis Date   • Anemia    • Anxiety    • Arthritis     HANDS   • CAD (coronary artery disease)    • Cancer (CMS/HCC)     KIDNEY 7/2018 SX   • CHF (congestive heart failure) (CMS/HCC)    • Chronic back pain    • Chronic kidney disease    • Coronary artery disease    • Depression    • Diabetes mellitus (CMS/HCC)     TYPE 2   • Eyes sensitive to light, bilateral    • GERD (gastroesophageal reflux disease)    • Headache    • Hepatitis C     after blood tranfusion/treated   • History of MRSA infection    • History of transfusion     2013 CABG   • Hypertension    • Jaundice    • Myocardial infarction (CMS/HCC)     5-6 years ago per pt   • Stroke (CMS/HCC) 12/2017    left sided weakness/       ONCOLOGIC HISTORY:  (History from previous dates can be found in the separate document.)    Current Outpatient Medications on File Prior to Visit   Medication Sig Dispense Refill   • ALPRAZolam (XANAX) 1 MG tablet Take 1 mg by mouth 4 (Four) Times a Day As Needed.     • bumetanide (BUMEX) 1 MG  tablet Take 1 mg by mouth As Needed.     • carvedilol (COREG) 3.125 MG tablet Take 3.125 mg by mouth 2 (Two) Times a Day With Meals.     • Cyanocobalamin (VITAMIN B-12 PO) Take 5,000 mcg by mouth Daily.     • diclofenac (VOLTAREN) 1 % gel gel by Intratympanic route.     • erythromycin (ROMYCIN) 5 MG/GM ophthalmic ointment erythromycin 5 mg/gram (0.5 %) eye ointment     • hydrALAZINE (APRESOLINE) 100 MG tablet Take 100 mg by mouth 3 (Three) Times a Day. Pt takes 2x daily at home-will talk with cardiologist in May per pt     • insulin aspart (novoLOG FLEXPEN) 100 UNIT/ML solution pen-injector sc pen Inject 2-15 Units under the skin 3 (Three) Times a Day With Meals. Per sliding scale     • Insulin Glargine (BASAGLAR KWIKPEN) 100 UNIT/ML injection pen INJECT 10 UNITS SC AT NIGHT  0   • Insulin Glargine (LANTUS SOLOSTAR) 100 UNIT/ML injection pen Inject 10 Units under the skin into the appropriate area as directed Every Night.     • lisinopril (PRINIVIL,ZESTRIL) 10 MG tablet Take 10 mg by mouth Every Morning.     • MELATONIN PO Take  by mouth.     • naloxone (NARCAN) 4 MG/0.1ML nasal spray Narcan 4 mg/actuation nasal spray     • nitroglycerin (NITROSTAT) 0.4 MG SL tablet nitroglycerin 0.4 mg sublingual tablet     • Omega-3 Fatty Acids (FISH OIL) 1000 MG capsule capsule Take 1,000 mg by mouth Daily With Breakfast. STOPPED 3/19/19     • oxyCODONE-acetaminophen (PERCOCET)  MG per tablet Take 1 tablet by mouth Every 6 (Six) Hours As Needed.     • pantoprazole (PROTONIX) 40 MG EC tablet Take 40 mg by mouth Daily.     • promethazine (PHENERGAN) 12.5 MG tablet Take  by mouth.     • simvastatin (ZOCOR) 40 MG tablet Take 40 mg by mouth Every Night.     • tiZANidine (ZANAFLEX) 4 MG tablet Take 4 mg by mouth Every 8 (Eight) Hours As Needed.     • vitamin D (ERGOCALCIFEROL) 84672 units capsule capsule Take 50,000 Units by mouth 1 (One) Time Per Week. SATURDAY     • silver sulfadiazine (SSD) 1 % cream SSD 1 % topical cream    "  • [DISCONTINUED] gabapentin (NEURONTIN) 300 MG capsule Take 300 mg by mouth 3 (Three) Times a Day.       No current facility-administered medications on file prior to visit.        ALLERGIES:     Allergies   Allergen Reactions   • Gabapentin Unknown - High Severity     Pt unresponsive   • Morphine And Related Delirium   • Fentanyl Unknown - High Severity     ER said he was allergy   • Ibuprofen Nausea Only   • Latex Rash   • Penicillins Hives       Social History     Socioeconomic History   • Marital status:      Spouse name: Samantha   • Number of children: Not on file   • Years of education: High school   • Highest education level: Not on file   Occupational History   • Occupation: retired     Employer: UNEMPLOYED   Tobacco Use   • Smoking status: Never Smoker   • Smokeless tobacco: Never Used   Substance and Sexual Activity   • Alcohol use: No     Comment: \"no more than a half a pint of coconut rum\"   • Drug use: No   • Sexual activity: Defer       Review of Systems:      General: no fever, no chills,  fatigue,no weight changes, no lack of appetite.  Eyes: no epiphora, xerophthalmia,conjunctivitis, pain, glaucoma, blurred vision, blindness, secretion, photophobia, proptosis, diplopia.  Ears: no otorrhea, tinnitus, otorrhagia, deafness, pain, vertigo.  Nose: no rhinorrhea, no epistaxis, no alteration in perception of odors, no sinuses pressure.  Mouth: no alteration in gums or teeth,  No ulcers, no difficulty with mastication or deglut ion, no odynophagia.  Neck: no masses or pain, no thyroid alterations, no pain in muscles or arteries, no carotid odynia, no crepitation.  Respiratory: no cough, no sputum production,no dyspnea,no trepopnea, no pleuritic pain,no hemoptysis.  Heart: no syncope, no irregularity, no palpitations, no angina,no orthopnea,no paroxysmal nocturnal dyspnea.  Vascular Venous: no tenderness,no edema,no palpable cords,no postphlebitic syndrome, no skin changes no ulcerations.  Vascular " "Arterial: no distal ischemia, noclaudication, no gangrene, no neuropathic ischemic pain, no skin ulcers, no paleness no cyanosis.  GI: no dysphagia, no odynophagia, no regurgitation, no heartburn,no indigestion,no nausea,no vomiting,no hematemesis ,no melena,no jaundice,no distention, no obstipation,no enterorrhagia,no proctalgia,no anal  lesions, no changes in bowel habits.  : no frequency, no hesitancy, no hematuria, no discharge,no  pain.  Musculoskeletal: no muscle or tendon pain or inflammation,no  joint pain, no edema, no functional limitation,no fasciculations, no mass.  Neurologic: no headache, no seizures, noalterations on Craneal nerves, no motor deficit, no sensory deficit, normal coordination, no alteration in memory,normal orientation, calculation,normal writting, verbal and written language.  Skin: no rashes,no pruritus no localized lesions.  Psychiatric: no anxiety, no depression,no agitation, no delusions, proper insight.        Cancer-related family history includes Cervical cancer in his daughter and sister; Ovarian cancer in his sister.       Objective      Vitals:    02/17/20 1352   BP: (!) 219/96   Pulse: 61   Resp: 16   Temp: 98.3 °F (36.8 °C)   TempSrc: Oral   SpO2: 96%   Weight: 80.8 kg (178 lb 1.6 oz)   Height: 170.5 cm (67.13\")  Comment: new height   PainSc: 0-No pain     Current Status 2/17/2020   ECOG score 0         Physical Exam:  GENERAL:  Well-developed, well-nourished  Patient  in no acute distress.   SKIN:  Warm, dry ,NO rashes,NO purpura ,NO petechiae.  HEENT:  Pupils were equal and reactive to light and accomodation, conjunctivas non injected, no pterigion, normal extraocular movements, normal visual acuity.   Mouth mucosa was moist, no exudates in oropharynx, abnormal gum line decay teeth, normal roof of the mouth and pillars, normal papillations of the tongue.  NECK:  Supple with good range of motion; no thyromegaly or masses, no JVD or bruits, no cervical adenopathies.No " carotid arteries pain, no carotid abnormal pulsation , NO arterial dance.  LYMPHATICS:  No cervical, NO supraclavicular, NO axillary,NO epitrochlear , NO inguinal adenopathy.  CHEST:  Normal excursion of both zaki thoraces, normal voice fremitus, no subcutaneous emphysema, normal axillas, no rashes or acanthosis nigricans. Lungs clear to percussion and auscultation, normal breath sounds bilaterally, no wheezing,NO crackles NO ronchi, NO stridor, NO rubs.  CARDIAC AND VASCULAR:  normal rate and regular rhythm, without murmurs,NO rubs NO S3 NO S4 right or left . Normal femoral, popliteal, pedis, brachial and carotid pulses.  ABDOMEN:  Soft, nontender with no organomegaly or masses, no ascites, no collateral circulation,no distention,no [place] sign, no abdominal pain, no inguinal hernias,no umbilical hernia, no abdominal bruits. Normal bowel sounds.  GENITAL: Not  Performed.  EXTREMITIES  AND SPINE:  No clubbing, cyanosis or edema, no deformities or pain .No kyphosis, scoliosis, deformities or pain in spine, ribs or pelvic bone.  NEUROLOGICAL:  Patient was awake, alert, oriented to time, person and place.                    RECENT LABS:  Results from last 7 days   Lab Units 02/17/20  1259   WBC 10*3/mm3 5.86   NEUTROS ABS 10*3/mm3 3.59   HEMOGLOBIN g/dL 10.2*   HEMATOCRIT % 31.0*   PLATELETS 10*3/mm3 111*               Assessment/Plan     Multifactorial anemia: anemia of chronic kidney disease, iron deficiency and B12 deficiency.  Patient has received Procrit in the past for his anemia due to chronic kidney disease.  However his hemoglobin has been overall very stable for the last several months.  He has not required Procrit since April of this year.  Today's hemoglobin is 10.2 he will not need Procrit.       The patient was further reviewed on 02/17/2020. From the point of view of his anemia and thrombocytopenia the numbers do look relatively stable and there is no need for any Procrit at this time. How the patient  has sustained these numbers is hard for me to believe, I have no answer. In any event he is relatively stable from this point of view.    My concern is now directed into his high blood pressure. His systolic is too high, the patient has been reluctant to take his 3rd dose of hydralazine as per documented by Yohan Murrell MD. I insisted into this need and I insisted he needs to make an appointment to followup with Dr. Murrell at least in the next couple of weeks. The patient states that from now on he will take his hydralazine 3 times a day.    Obviously eating the food that he is eating and having no control of diabetes is another story that is not correctable and that has been the history of his life. That is a lost cause from my point of view.     I discussed all of these facts with the patient and wife.     I will review him back in 6 months, he will come every 6 weeks with a CBC, Procrit injection and nurse check.

## 2020-05-07 ENCOUNTER — TELEPHONE (OUTPATIENT)
Dept: ONCOLOGY | Facility: CLINIC | Age: 60
End: 2020-05-07

## 2020-05-07 DIAGNOSIS — I63.9 CEREBROVASCULAR ACCIDENT (CVA), UNSPECIFIED MECHANISM (HCC): Primary | ICD-10-CM

## 2020-05-07 NOTE — TELEPHONE ENCOUNTER
Samantha Ivonbehzad(spouse) wants to speak to someone on our clinical team about the blockage on the MRI Brain done at [facility]). Her callback is [phone]

## 2020-05-08 NOTE — TELEPHONE ENCOUNTER
"Pt's wife calling to see about an order for a neurology consult. Pt had an MRI of the brain last month ordered by his cardiologist. Pt and wife were told pt has an \"active stroke\" and cardiologist told them pt needed to see his neurologist. [place] neurologist [place] out of the country and pt isn't able to be seen in that office until October. Wife called to see if Dr. Aviles would make a referral to a Confucianist neurologist. D/w Dr. Aviles, okay given to place referral. Referral placed, message sent to scheduling and pt's wife aware and v/u.  "

## 2020-05-11 ENCOUNTER — INFUSION (OUTPATIENT)
Dept: ONCOLOGY | Facility: HOSPITAL | Age: 60
End: 2020-05-11

## 2020-05-11 ENCOUNTER — LAB (OUTPATIENT)
Dept: LAB | Facility: HOSPITAL | Age: 60
End: 2020-05-11

## 2020-05-11 DIAGNOSIS — D50.8 IRON DEFICIENCY ANEMIA SECONDARY TO INADEQUATE DIETARY IRON INTAKE: ICD-10-CM

## 2020-05-11 DIAGNOSIS — N18.30 CHRONIC KIDNEY DISEASE, STAGE III (MODERATE) (HCC): ICD-10-CM

## 2020-05-11 DIAGNOSIS — D69.6 THROMBOCYTOPENIA (HCC): ICD-10-CM

## 2020-05-11 DIAGNOSIS — C64.1 CANCER OF KIDNEY PARENCHYMA, RIGHT (HCC): ICD-10-CM

## 2020-05-11 DIAGNOSIS — N18.30 ANEMIA OF CHRONIC RENAL FAILURE, STAGE 3 (MODERATE) (HCC): ICD-10-CM

## 2020-05-11 DIAGNOSIS — D63.1 ANEMIA OF CHRONIC RENAL FAILURE, STAGE 3 (MODERATE) (HCC): ICD-10-CM

## 2020-05-11 LAB
BASOPHILS # BLD AUTO: 0.07 10*3/MM3 (ref 0–0.2)
BASOPHILS NFR BLD AUTO: 0.8 % (ref 0–1.5)
DEPRECATED RDW RBC AUTO: 44.9 FL (ref 37–54)
EOSINOPHIL # BLD AUTO: 0.38 10*3/MM3 (ref 0–0.4)
EOSINOPHIL NFR BLD AUTO: 4.2 % (ref 0.3–6.2)
ERYTHROCYTE [DISTWIDTH] IN BLOOD BY AUTOMATED COUNT: 13.6 % (ref 12.3–15.4)
HCT VFR BLD AUTO: 33.7 % (ref 37.5–51)
HGB BLD-MCNC: 11.3 G/DL (ref 13–17.7)
IMM GRANULOCYTES # BLD AUTO: 0.06 10*3/MM3 (ref 0–0.05)
IMM GRANULOCYTES NFR BLD AUTO: 0.7 % (ref 0–0.5)
LYMPHOCYTES # BLD AUTO: 1.94 10*3/MM3 (ref 0.7–3.1)
LYMPHOCYTES NFR BLD AUTO: 21.5 % (ref 19.6–45.3)
MCH RBC QN AUTO: 30.2 PG (ref 26.6–33)
MCHC RBC AUTO-ENTMCNC: 33.5 G/DL (ref 31.5–35.7)
MCV RBC AUTO: 90.1 FL (ref 79–97)
MONOCYTES # BLD AUTO: 0.52 10*3/MM3 (ref 0.1–0.9)
MONOCYTES NFR BLD AUTO: 5.8 % (ref 5–12)
NEUTROPHILS # BLD AUTO: 6.04 10*3/MM3 (ref 1.7–7)
NEUTROPHILS NFR BLD AUTO: 67 % (ref 42.7–76)
NRBC BLD AUTO-RTO: 0 /100 WBC (ref 0–0.2)
PLATELET # BLD AUTO: 105 10*3/MM3 (ref 140–450)
PMV BLD AUTO: 12.1 FL (ref 6–12)
RBC # BLD AUTO: 3.74 10*6/MM3 (ref 4.14–5.8)
WBC NRBC COR # BLD: 9.01 10*3/MM3 (ref 3.4–10.8)

## 2020-05-11 PROCEDURE — 36415 COLL VENOUS BLD VENIPUNCTURE: CPT

## 2020-05-11 PROCEDURE — 85025 COMPLETE CBC W/AUTO DIFF WBC: CPT

## 2020-05-11 PROCEDURE — G0463 HOSPITAL OUTPT CLINIC VISIT: HCPCS

## 2020-05-11 NOTE — PROGRESS NOTES
Hgb today is 11.3.  No procrit today per guidelines.   Patient without questions or concerns at this time.  Spoke to his S.O. About his labs.   Given copy of labs.

## 2020-06-22 ENCOUNTER — LAB (OUTPATIENT)
Dept: LAB | Facility: HOSPITAL | Age: 60
End: 2020-06-22

## 2020-06-22 ENCOUNTER — APPOINTMENT (OUTPATIENT)
Dept: ONCOLOGY | Facility: HOSPITAL | Age: 60
End: 2020-06-22

## 2020-06-22 DIAGNOSIS — D63.1 ANEMIA OF CHRONIC RENAL FAILURE, STAGE 3 (MODERATE) (HCC): ICD-10-CM

## 2020-06-22 DIAGNOSIS — N18.30 ANEMIA OF CHRONIC RENAL FAILURE, STAGE 3 (MODERATE) (HCC): ICD-10-CM

## 2020-06-22 DIAGNOSIS — N18.30 ANEMIA IN STAGE 3 CHRONIC KIDNEY DISEASE (HCC): Primary | ICD-10-CM

## 2020-06-22 DIAGNOSIS — D63.1 ANEMIA IN STAGE 3 CHRONIC KIDNEY DISEASE (HCC): Primary | ICD-10-CM

## 2020-06-22 DIAGNOSIS — D50.8 IRON DEFICIENCY ANEMIA SECONDARY TO INADEQUATE DIETARY IRON INTAKE: ICD-10-CM

## 2020-06-22 DIAGNOSIS — D69.6 THROMBOCYTOPENIA (HCC): ICD-10-CM

## 2020-06-22 DIAGNOSIS — C64.1 CANCER OF KIDNEY PARENCHYMA, RIGHT (HCC): ICD-10-CM

## 2020-06-22 DIAGNOSIS — D50.8 IRON DEFICIENCY ANEMIA SECONDARY TO INADEQUATE DIETARY IRON INTAKE: Primary | ICD-10-CM

## 2020-06-22 DIAGNOSIS — N18.30 CHRONIC KIDNEY DISEASE, STAGE III (MODERATE) (HCC): ICD-10-CM

## 2020-06-22 LAB
BASOPHILS # BLD AUTO: 0.04 10*3/MM3 (ref 0–0.2)
BASOPHILS NFR BLD AUTO: 0.7 % (ref 0–1.5)
DEPRECATED RDW RBC AUTO: 45.6 FL (ref 37–54)
EOSINOPHIL # BLD AUTO: 0.26 10*3/MM3 (ref 0–0.4)
EOSINOPHIL NFR BLD AUTO: 4.5 % (ref 0.3–6.2)
ERYTHROCYTE [DISTWIDTH] IN BLOOD BY AUTOMATED COUNT: 13 % (ref 12.3–15.4)
FERRITIN SERPL-MCNC: 172.9 NG/ML (ref 30–400)
HCT VFR BLD AUTO: 33.4 % (ref 37.5–51)
HGB BLD-MCNC: 10.6 G/DL (ref 13–17.7)
IMM GRANULOCYTES # BLD AUTO: 0.01 10*3/MM3 (ref 0–0.05)
IMM GRANULOCYTES NFR BLD AUTO: 0.2 % (ref 0–0.5)
IRON 24H UR-MRATE: 54 MCG/DL (ref 59–158)
IRON SATN MFR SERPL: 21 % (ref 14–48)
LYMPHOCYTES # BLD AUTO: 1.2 10*3/MM3 (ref 0.7–3.1)
LYMPHOCYTES NFR BLD AUTO: 20.7 % (ref 19.6–45.3)
MCH RBC QN AUTO: 30.5 PG (ref 26.6–33)
MCHC RBC AUTO-ENTMCNC: 31.7 G/DL (ref 31.5–35.7)
MCV RBC AUTO: 96 FL (ref 79–97)
MONOCYTES # BLD AUTO: 0.37 10*3/MM3 (ref 0.1–0.9)
MONOCYTES NFR BLD AUTO: 6.4 % (ref 5–12)
NEUTROPHILS # BLD AUTO: 3.92 10*3/MM3 (ref 1.7–7)
NEUTROPHILS NFR BLD AUTO: 67.5 % (ref 42.7–76)
NRBC BLD AUTO-RTO: 0 /100 WBC (ref 0–0.2)
PLATELET # BLD AUTO: 100 10*3/MM3 (ref 140–450)
PMV BLD AUTO: 10.9 FL (ref 6–12)
RBC # BLD AUTO: 3.48 10*6/MM3 (ref 4.14–5.8)
TIBC SERPL-MCNC: 260 MCG/DL (ref 249–505)
TRANSFERRIN SERPL-MCNC: 186 MG/DL (ref 200–360)
WBC NRBC COR # BLD: 5.8 10*3/MM3 (ref 3.4–10.8)

## 2020-06-22 PROCEDURE — 83540 ASSAY OF IRON: CPT

## 2020-06-22 PROCEDURE — 82728 ASSAY OF FERRITIN: CPT

## 2020-06-22 PROCEDURE — 85025 COMPLETE CBC W/AUTO DIFF WBC: CPT

## 2020-06-22 PROCEDURE — 84466 ASSAY OF TRANSFERRIN: CPT

## 2020-06-22 PROCEDURE — 36415 COLL VENOUS BLD VENIPUNCTURE: CPT

## 2020-06-22 NOTE — PROGRESS NOTES
Hgb today is 10.6.  Patient did not wait for results, attempted to call number listed, no answer.  Procrit not needed today per guidelines.

## 2020-07-14 ENCOUNTER — APPOINTMENT (OUTPATIENT)
Dept: CT IMAGING | Facility: HOSPITAL | Age: 60
End: 2020-07-14

## 2020-07-14 ENCOUNTER — HOSPITAL ENCOUNTER (OUTPATIENT)
Facility: HOSPITAL | Age: 60
Setting detail: OBSERVATION
Discharge: HOME | End: 2020-07-16
Attending: EMERGENCY MEDICINE | Admitting: HOSPITALIST

## 2020-07-14 DIAGNOSIS — G93.41 ACUTE METABOLIC ENCEPHALOPATHY: Primary | ICD-10-CM

## 2020-07-14 DIAGNOSIS — D64.9 CHRONIC ANEMIA: ICD-10-CM

## 2020-07-14 DIAGNOSIS — R73.9 HYPERGLYCEMIA: ICD-10-CM

## 2020-07-14 DIAGNOSIS — Z79.899 POLYPHARMACY: ICD-10-CM

## 2020-07-14 DIAGNOSIS — N18.9 CHRONIC RENAL IMPAIRMENT, UNSPECIFIED CKD STAGE: ICD-10-CM

## 2020-07-14 LAB
ALBUMIN SERPL-MCNC: 3.9 G/DL (ref 3.5–5.2)
ALBUMIN/GLOB SERPL: 1.3 G/DL
ALP SERPL-CCNC: 52 U/L (ref 39–117)
ALT SERPL W P-5'-P-CCNC: 10 U/L (ref 1–41)
AMMONIA BLD-SCNC: 27 UMOL/L (ref 16–60)
AMPHET+METHAMPHET UR QL: NEGATIVE
ANION GAP SERPL CALCULATED.3IONS-SCNC: 10.5 MMOL/L (ref 5–15)
APAP SERPL-MCNC: <5 MCG/ML (ref 10–30)
AST SERPL-CCNC: 13 U/L (ref 1–40)
BACTERIA UR QL AUTO: ABNORMAL /HPF
BARBITURATES UR QL SCN: NEGATIVE
BASOPHILS # BLD AUTO: 0.03 10*3/MM3 (ref 0–0.2)
BASOPHILS NFR BLD AUTO: 0.6 % (ref 0–1.5)
BENZODIAZ UR QL SCN: POSITIVE
BILIRUB SERPL-MCNC: 0.4 MG/DL (ref 0–1.2)
BILIRUB UR QL STRIP: NEGATIVE
BUN SERPL-MCNC: 40 MG/DL (ref 6–20)
BUN/CREAT SERPL: 10.8 (ref 7–25)
CALCIUM SPEC-SCNC: 9.2 MG/DL (ref 8.6–10.5)
CANNABINOIDS SERPL QL: POSITIVE
CHLORIDE SERPL-SCNC: 106 MMOL/L (ref 98–107)
CLARITY UR: ABNORMAL
CO2 SERPL-SCNC: 23.5 MMOL/L (ref 22–29)
COCAINE UR QL: NEGATIVE
COLOR UR: YELLOW
CREAT SERPL-MCNC: 3.71 MG/DL (ref 0.76–1.27)
DEPRECATED RDW RBC AUTO: 42.1 FL (ref 37–54)
EOSINOPHIL # BLD AUTO: 0.21 10*3/MM3 (ref 0–0.4)
EOSINOPHIL NFR BLD AUTO: 4.5 % (ref 0.3–6.2)
ERYTHROCYTE [DISTWIDTH] IN BLOOD BY AUTOMATED COUNT: 12.8 % (ref 12.3–15.4)
ETHANOL BLD-MCNC: <10 MG/DL (ref 0–10)
ETHANOL UR QL: <0.01 %
GFR SERPL CREATININE-BSD FRML MDRD: 17 ML/MIN/1.73
GLOBULIN UR ELPH-MCNC: 3.1 GM/DL
GLUCOSE BLDC GLUCOMTR-MCNC: 240 MG/DL (ref 70–130)
GLUCOSE SERPL-MCNC: 239 MG/DL (ref 65–99)
GLUCOSE UR STRIP-MCNC: ABNORMAL MG/DL
HCT VFR BLD AUTO: 32.8 % (ref 37.5–51)
HGB BLD-MCNC: 10.8 G/DL (ref 13–17.7)
HGB UR QL STRIP.AUTO: NEGATIVE
HYALINE CASTS UR QL AUTO: ABNORMAL /LPF
IMM GRANULOCYTES # BLD AUTO: 0.01 10*3/MM3 (ref 0–0.05)
IMM GRANULOCYTES NFR BLD AUTO: 0.2 % (ref 0–0.5)
KETONES UR QL STRIP: NEGATIVE
LEUKOCYTE ESTERASE UR QL STRIP.AUTO: NEGATIVE
LIPASE SERPL-CCNC: 30 U/L (ref 13–60)
LYMPHOCYTES # BLD AUTO: 0.78 10*3/MM3 (ref 0.7–3.1)
LYMPHOCYTES NFR BLD AUTO: 16.8 % (ref 19.6–45.3)
MCH RBC QN AUTO: 30 PG (ref 26.6–33)
MCHC RBC AUTO-ENTMCNC: 32.9 G/DL (ref 31.5–35.7)
MCV RBC AUTO: 91.1 FL (ref 79–97)
METHADONE UR QL SCN: NEGATIVE
MONOCYTES # BLD AUTO: 0.3 10*3/MM3 (ref 0.1–0.9)
MONOCYTES NFR BLD AUTO: 6.5 % (ref 5–12)
NEUTROPHILS NFR BLD AUTO: 3.3 10*3/MM3 (ref 1.7–7)
NEUTROPHILS NFR BLD AUTO: 71.4 % (ref 42.7–76)
NITRITE UR QL STRIP: NEGATIVE
NRBC BLD AUTO-RTO: 0 /100 WBC (ref 0–0.2)
OPIATES UR QL: NEGATIVE
OXYCODONE UR QL SCN: POSITIVE
PH UR STRIP.AUTO: <=5 [PH] (ref 5–8)
PLATELET # BLD AUTO: 84 10*3/MM3 (ref 140–450)
PMV BLD AUTO: 11.9 FL (ref 6–12)
POTASSIUM SERPL-SCNC: 4.5 MMOL/L (ref 3.5–5.2)
PROT SERPL-MCNC: 7 G/DL (ref 6–8.5)
PROT UR QL STRIP: ABNORMAL
RBC # BLD AUTO: 3.6 10*6/MM3 (ref 4.14–5.8)
RBC # UR: ABNORMAL /HPF
REF LAB TEST METHOD: ABNORMAL
SALICYLATES SERPL-MCNC: <0.3 MG/DL
SODIUM SERPL-SCNC: 140 MMOL/L (ref 136–145)
SP GR UR STRIP: 1.02 (ref 1–1.03)
SQUAMOUS #/AREA URNS HPF: ABNORMAL /HPF
TROPONIN T SERPL-MCNC: 0.02 NG/ML (ref 0–0.03)
UROBILINOGEN UR QL STRIP: ABNORMAL
WBC # BLD AUTO: 4.63 10*3/MM3 (ref 3.4–10.8)
WBC UR QL AUTO: ABNORMAL /HPF

## 2020-07-14 PROCEDURE — 80307 DRUG TEST PRSMV CHEM ANLYZR: CPT | Performed by: EMERGENCY MEDICINE

## 2020-07-14 PROCEDURE — 83690 ASSAY OF LIPASE: CPT | Performed by: EMERGENCY MEDICINE

## 2020-07-14 PROCEDURE — 81001 URINALYSIS AUTO W/SCOPE: CPT | Performed by: EMERGENCY MEDICINE

## 2020-07-14 PROCEDURE — G0378 HOSPITAL OBSERVATION PER HR: HCPCS

## 2020-07-14 PROCEDURE — 93010 ELECTROCARDIOGRAM REPORT: CPT | Performed by: INTERNAL MEDICINE

## 2020-07-14 PROCEDURE — 70450 CT HEAD/BRAIN W/O DYE: CPT

## 2020-07-14 PROCEDURE — 82962 GLUCOSE BLOOD TEST: CPT

## 2020-07-14 PROCEDURE — 93005 ELECTROCARDIOGRAM TRACING: CPT | Performed by: EMERGENCY MEDICINE

## 2020-07-14 PROCEDURE — 84484 ASSAY OF TROPONIN QUANT: CPT | Performed by: EMERGENCY MEDICINE

## 2020-07-14 PROCEDURE — 85025 COMPLETE CBC W/AUTO DIFF WBC: CPT | Performed by: EMERGENCY MEDICINE

## 2020-07-14 PROCEDURE — 82140 ASSAY OF AMMONIA: CPT | Performed by: EMERGENCY MEDICINE

## 2020-07-14 PROCEDURE — 80053 COMPREHEN METABOLIC PANEL: CPT | Performed by: EMERGENCY MEDICINE

## 2020-07-14 PROCEDURE — 99285 EMERGENCY DEPT VISIT HI MDM: CPT

## 2020-07-14 RX ORDER — ASPIRIN 81 MG/1
324 TABLET, CHEWABLE ORAL ONCE
Status: COMPLETED | OUTPATIENT
Start: 2020-07-14 | End: 2020-07-14

## 2020-07-14 RX ORDER — SODIUM CHLORIDE 0.9 % (FLUSH) 0.9 %
10 SYRINGE (ML) INJECTION AS NEEDED
Status: DISCONTINUED | OUTPATIENT
Start: 2020-07-14 | End: 2020-07-16 | Stop reason: HOSPADM

## 2020-07-14 RX ADMIN — ASPIRIN 324 MG: 81 TABLET, CHEWABLE ORAL at 22:59

## 2020-07-14 NOTE — ED TRIAGE NOTES
Pt to ER via [facility] from home. Pt's wife states pt has had confusion, slurred speech, and right sided facial droop that started at 1300 today. Pt's symptoms have resolved now. Pt is A&Ox4 at triage. Pt's only complaint is lower right side back pain that is chronic     Pt hx of HTN    Patient in mask. This RN in appropriate PPE - including mask, goggles, and gloves during all of patient care.

## 2020-07-15 LAB
GLUCOSE BLDC GLUCOMTR-MCNC: 189 MG/DL (ref 70–130)
GLUCOSE BLDC GLUCOMTR-MCNC: 210 MG/DL (ref 70–130)
GLUCOSE BLDC GLUCOMTR-MCNC: 324 MG/DL (ref 70–130)
GLUCOSE BLDC GLUCOMTR-MCNC: 354 MG/DL (ref 70–130)

## 2020-07-15 PROCEDURE — 99204 OFFICE O/P NEW MOD 45 MIN: CPT | Performed by: PSYCHIATRY & NEUROLOGY

## 2020-07-15 PROCEDURE — 82962 GLUCOSE BLOOD TEST: CPT

## 2020-07-15 PROCEDURE — 63710000001 INSULIN LISPRO (HUMAN) PER 5 UNITS: Performed by: HOSPITALIST

## 2020-07-15 PROCEDURE — G0378 HOSPITAL OBSERVATION PER HR: HCPCS

## 2020-07-15 PROCEDURE — 63710000001 INSULIN GLARGINE PER 5 UNITS: Performed by: HOSPITALIST

## 2020-07-15 RX ORDER — TIZANIDINE 4 MG/1
4 TABLET ORAL EVERY 8 HOURS PRN
Status: DISCONTINUED | OUTPATIENT
Start: 2020-07-15 | End: 2020-07-16

## 2020-07-15 RX ORDER — ASPIRIN 81 MG/1
324 TABLET, CHEWABLE ORAL DAILY
Status: DISCONTINUED | OUTPATIENT
Start: 2020-07-15 | End: 2020-07-16 | Stop reason: HOSPADM

## 2020-07-15 RX ORDER — HYDRALAZINE HYDROCHLORIDE 50 MG/1
100 TABLET, FILM COATED ORAL 3 TIMES DAILY
Status: DISCONTINUED | OUTPATIENT
Start: 2020-07-15 | End: 2020-07-16 | Stop reason: HOSPADM

## 2020-07-15 RX ORDER — ALPRAZOLAM 0.5 MG/1
1 TABLET ORAL 4 TIMES DAILY PRN
Status: DISCONTINUED | OUTPATIENT
Start: 2020-07-15 | End: 2020-07-16

## 2020-07-15 RX ORDER — UREA 10 %
3 LOTION (ML) TOPICAL NIGHTLY PRN
Status: DISCONTINUED | OUTPATIENT
Start: 2020-07-15 | End: 2020-07-16

## 2020-07-15 RX ORDER — LISINOPRIL 10 MG/1
10 TABLET ORAL EVERY MORNING
Status: DISCONTINUED | OUTPATIENT
Start: 2020-07-16 | End: 2020-07-15

## 2020-07-15 RX ORDER — PANTOPRAZOLE SODIUM 40 MG/1
40 TABLET, DELAYED RELEASE ORAL DAILY
Status: DISCONTINUED | OUTPATIENT
Start: 2020-07-15 | End: 2020-07-15

## 2020-07-15 RX ORDER — CHOLECALCIFEROL (VITAMIN D3) 125 MCG
1000 CAPSULE ORAL DAILY
Status: DISCONTINUED | OUTPATIENT
Start: 2020-07-15 | End: 2020-07-16

## 2020-07-15 RX ORDER — FAMOTIDINE 20 MG/1
20 TABLET, FILM COATED ORAL 2 TIMES DAILY
Status: DISCONTINUED | OUTPATIENT
Start: 2020-07-15 | End: 2020-07-16

## 2020-07-15 RX ORDER — INSULIN GLARGINE 100 [IU]/ML
15 INJECTION, SOLUTION SUBCUTANEOUS NIGHTLY
Status: DISCONTINUED | OUTPATIENT
Start: 2020-07-15 | End: 2020-07-16 | Stop reason: HOSPADM

## 2020-07-15 RX ORDER — LISINOPRIL 10 MG/1
10 TABLET ORAL EVERY 12 HOURS SCHEDULED
Status: DISCONTINUED | OUTPATIENT
Start: 2020-07-15 | End: 2020-07-16

## 2020-07-15 RX ORDER — PANTOPRAZOLE SODIUM 40 MG/1
40 TABLET, DELAYED RELEASE ORAL
Status: DISCONTINUED | OUTPATIENT
Start: 2020-07-15 | End: 2020-07-15

## 2020-07-15 RX ORDER — ATORVASTATIN CALCIUM 80 MG/1
80 TABLET, FILM COATED ORAL NIGHTLY
Status: DISCONTINUED | OUTPATIENT
Start: 2020-07-15 | End: 2020-07-16

## 2020-07-15 RX ORDER — CARVEDILOL 3.12 MG/1
3.12 TABLET ORAL 2 TIMES DAILY WITH MEALS
Status: DISCONTINUED | OUTPATIENT
Start: 2020-07-15 | End: 2020-07-16 | Stop reason: HOSPADM

## 2020-07-15 RX ORDER — ATORVASTATIN CALCIUM 20 MG/1
10 TABLET, FILM COATED ORAL DAILY
Status: DISCONTINUED | OUTPATIENT
Start: 2020-07-15 | End: 2020-07-15

## 2020-07-15 RX ADMIN — Medication 3 MG: at 21:13

## 2020-07-15 RX ADMIN — ALPRAZOLAM 1 MG: 0.5 TABLET ORAL at 21:13

## 2020-07-15 RX ADMIN — FAMOTIDINE 20 MG: 20 TABLET, FILM COATED ORAL at 21:13

## 2020-07-15 RX ADMIN — CARVEDILOL 3.12 MG: 3.12 TABLET, FILM COATED ORAL at 17:46

## 2020-07-15 RX ADMIN — ATORVASTATIN CALCIUM 80 MG: 80 TABLET, FILM COATED ORAL at 21:13

## 2020-07-15 RX ADMIN — INSULIN LISPRO 5 UNITS: 100 INJECTION, SOLUTION INTRAVENOUS; SUBCUTANEOUS at 17:47

## 2020-07-15 RX ADMIN — HYDRALAZINE HYDROCHLORIDE 100 MG: 50 TABLET, FILM COATED ORAL at 16:30

## 2020-07-15 RX ADMIN — INSULIN LISPRO 6 UNITS: 100 INJECTION, SOLUTION INTRAVENOUS; SUBCUTANEOUS at 17:47

## 2020-07-15 RX ADMIN — INSULIN GLARGINE 15 UNITS: 100 INJECTION, SOLUTION SUBCUTANEOUS at 21:13

## 2020-07-15 RX ADMIN — ASPIRIN 324 MG: 81 TABLET, CHEWABLE ORAL at 15:34

## 2020-07-15 RX ADMIN — Medication 1000 MCG: at 16:30

## 2020-07-15 RX ADMIN — PANTOPRAZOLE SODIUM 40 MG: 40 TABLET, DELAYED RELEASE ORAL at 09:27

## 2020-07-15 RX ADMIN — TIZANIDINE 4 MG: 4 TABLET ORAL at 17:47

## 2020-07-15 RX ADMIN — LISINOPRIL 10 MG: 10 TABLET ORAL at 21:13

## 2020-07-15 RX ADMIN — HYDRALAZINE HYDROCHLORIDE 100 MG: 50 TABLET, FILM COATED ORAL at 21:13

## 2020-07-15 NOTE — ED NOTES
Attempted to call report x 1, nurse unavailable. Will call back in 10 minutes.     Leticia Steinberg RN  07/14/20 2251

## 2020-07-15 NOTE — PROGRESS NOTES
Case Management Discharge Note           Provided Post Acute Provider List?: N/A  N/A Provider List Comment: No needs identified.     Destination      No service has been selected for the patient.      Durable Medical Equipment      No service has been selected for the patient.      Dialysis/Infusion      No service has been selected for the patient.      Home Medical Care      No service has been selected for the patient.      Therapy      No service has been selected for the patient.      Community Resources      No service has been selected for the patient.

## 2020-07-15 NOTE — CONSULTS
Patient Identification:  NAME:  Angelo Brown  Age:  59 y.o.   Sex:  male   :  1960   MRN:  [id number]       Chief complaint: He does not have one/ reason for consult confusion episode    History of present illness: This patient is a 59-year-old right-handed white male with a history of chronic back pain chronic anxiety diabetes headaches who has been on Xanax 1 mg 4 times daily Percocets 10 mg 3 times daily or 4 times daily along with Zanaflex and other medicines and according to his wife the pharmacy change the color and size of the pills and he got some of the pills mixed up subsequent to this he had some lethargy and confusion a little slurred speech location was not pertinent he did not have any true paresthesias paralysis but since the slurred speech reminded them of when he had a stroke in the past that is why she came to the hospital modifying factors we have held some of these medicines and overnight he is dramatically better in fact his wife states he is at his baseline now.  Locations not pertinent duration is been less than 24 hours context the patient who does take a lot of different medicines for chronic anxiety and pain  I performed an independent eyeball review the CAT scan and it is normal    Past medical history:  Past Medical History:   Diagnosis Date   • Anemia    • Anxiety    • Arthritis     HANDS   • CAD (coronary artery disease)    • Cancer (CMS/HCC)     KIDNEY 2018 SX   • CHF (congestive heart failure) (CMS/HCC)    • Chronic back pain    • Chronic kidney disease    • Coronary artery disease    • Depression    • Diabetes mellitus (CMS/HCC)     TYPE 2   • Eyes sensitive to light, bilateral    • GERD (gastroesophageal reflux disease)    • Headache    • Hepatitis C     after blood tranfusion/treated   • History of MRSA infection    • History of transfusion     2013 CABG   • Hypertension    • Jaundice    • Myocardial infarction (CMS/HCC)     5-6 years ago per pt   • Stroke (CMS/HCC)  12/2017    left sided weakness/       Past surgical history:  Past Surgical History:   Procedure Laterality Date   • BRAIN HEMATOMA EVACUATION     • CARDIAC SURGERY     • CATARACT EXTRACTION, BILATERAL     • COLONOSCOPY     • CORONARY ARTERY BYPASS GRAFT  2013    Triple   • LAPAROSCOPIC PARTIAL NEPHRECTOMY Right    • ROTATOR CUFF REPAIR Left    • UMBILICAL HERNIA REPAIR N/A 3/27/2019    Procedure: UMBILICAL HERNIA REPAIR;  Surgeon: Harshad Cotto Jr., MD;  Location: [facility];  Service: General   • VASECTOMY  1985   • WOUND DEBRIDEMENT Right 06/10/2011    Excisional debridement of necrotizing fasciitis of the right groin extending along the right hemiscrotum, 5 x 2 x 2 cm in one wound and 7.5 x 2.5 x 2.5 cm of a second wound. This was sharp excisional debridement carried out to the muscle-Dr. Eduardo Cross        Allergies:  Gabapentin; Morphine and related; Fentanyl; Ibuprofen; Latex; and Penicillins    Home medications:  Medications Prior to Admission   Medication Sig Dispense Refill Last Dose   • ALPRAZolam (XANAX) 1 MG tablet Take 1 mg by mouth 4 (Four) Times a Day As Needed.   7/14/2020 at Unknown time   • carvedilol (COREG) 3.125 MG tablet Take 3.125 mg by mouth 2 (Two) Times a Day With Meals.   7/14/2020 at Unknown time   • Cyanocobalamin (VITAMIN B-12 PO) Take 5,000 mcg by mouth Daily.   7/14/2020 at Unknown time   • diclofenac (VOLTAREN) 1 % gel gel by Intratympanic route.   7/14/2020 at Unknown time   • hydrALAZINE (APRESOLINE) 100 MG tablet Take 100 mg by mouth 3 (Three) Times a Day. Pt takes 2x daily at home-will talk with cardiologist in May per pt   Past Week at Unknown time   • insulin aspart (novoLOG FLEXPEN) 100 UNIT/ML solution pen-injector sc pen Inject 2-15 Units under the skin 3 (Three) Times a Day With Meals. Per sliding scale   Past Week at Unknown time   • Insulin Glargine (BASAGLAR KWIKPEN) 100 UNIT/ML injection pen INJECT 10 UNITS SC AT NIGHT  0 Past Week at Unknown time   • Insulin  Glargine (LANTUS SOLOSTAR) 100 UNIT/ML injection pen Inject 10 Units under the skin into the appropriate area as directed Every Night.   Past Week at Unknown time   • lisinopril (PRINIVIL,ZESTRIL) 10 MG tablet Take 10 mg by mouth Every Morning.   7/14/2020 at Unknown time   • MELATONIN PO Take  by mouth.   7/13/2020 at Unknown time   • oxyCODONE-acetaminophen (PERCOCET)  MG per tablet Take 1 tablet by mouth Every 6 (Six) Hours As Needed.   7/14/2020 at Unknown time   • pantoprazole (PROTONIX) 40 MG EC tablet Take 40 mg by mouth Daily.   7/14/2020 at Unknown time   • simvastatin (ZOCOR) 40 MG tablet Take 40 mg by mouth Every Night.   7/13/2020 at Unknown time   • tiZANidine (ZANAFLEX) 4 MG tablet Take 4 mg by mouth Every 8 (Eight) Hours As Needed.   7/14/2020 at Unknown time   • bumetanide (BUMEX) 1 MG tablet Take 1 mg by mouth As Needed.   More than a month at Unknown time   • erythromycin (ROMYCIN) 5 MG/GM ophthalmic ointment erythromycin 5 mg/gram (0.5 %) eye ointment   Taking   • naloxone (NARCAN) 4 MG/0.1ML nasal spray Narcan 4 mg/actuation nasal spray   Unknown at Unknown time   • nitroglycerin (NITROSTAT) 0.4 MG SL tablet nitroglycerin 0.4 mg sublingual tablet   Unknown at Unknown time   • Omega-3 Fatty Acids (FISH OIL) 1000 MG capsule capsule Take 1,000 mg by mouth Daily With Breakfast. STOPPED 3/19/19   Unknown at Unknown time   • promethazine (PHENERGAN) 12.5 MG tablet Take  by mouth.   Unknown at Unknown time        Hospital medications:    aspirin 324 mg Oral Daily   atorvastatin 80 mg Oral Nightly   carvedilol 3.125 mg Oral BID With Meals   famotidine 20 mg Oral BID   hydrALAZINE 100 mg Oral TID   Insulin Glargine 15 Units Subcutaneous Nightly   insulin lispro 0-7 Units Subcutaneous TID AC   insulin lispro 5 Units Subcutaneous TID With Meals   lisinopril 10 mg Oral Q12H   vitamin B-12 1,000 mcg Oral Daily        •  ALPRAZolam  •  melatonin  •  [COMPLETED] Insert peripheral IV **AND** sodium  "chloride  •  [COMPLETED] Insert peripheral IV **AND** sodium chloride  •  tiZANidine    Family history:  Family History   Problem Relation Age of Onset   • Diabetes Mother    • Heart failure Mother    • Kidney failure Mother    • Anemia Mother    • Heart disease Mother    • Hypertension Mother    • Heart failure Father    • Coronary artery disease Father    • Heart disease Father    • Hypertension Father    • Diabetes Father    • Diabetes Sister    • Cervical cancer Sister    • Leukemia Sister    • Diabetes Brother    • Cervical cancer Daughter    • Ovarian cancer Sister    • Malig Hyperthermia Neg Hx        Social history:  Social History     Tobacco Use   • Smoking status: Never Smoker   • Smokeless tobacco: Never Used   Substance Use Topics   • Alcohol use: No     Comment: \"no more than a half a pint of coconut rum\"   • Drug use: No       Review of systems:    His wife notes that he gets anxious he has had some intermittent confusion and memory problems for a while she thinks that is been since the stroke.  Otherwise she is confident he is here now because he got his medicines mixed up or because the pharmacy changed the size and color of the pills.  She cannot give any further review of systems he cannot but I have reviewed the chart fully he has had a history of stroke but is never had seizures    Objective:  Vitals Ranges:   Temp:  [97.4 °F (36.3 °C)-101.7 °F (38.7 °C)] 99 °F (37.2 °C)  Heart Rate:  [58-69] 61  Resp:  [16-18] 16  BP: (115-191)/(69-90) 190/70      Physical Exam: Patient is awake alert oriented x3 he is edentulous and looks older than his stated age fund of knowledge poor attention span concentration fair recent remote memory good language function normal elderly in no distress pupils 2 constricting to one 1/2 unable to visualize disc retinas visual fields full extraocular movements full without nystagmus nasolabial folds palate tongue symmetrical normal hearing facial sensation head turning " shoulder shrug motor not the greatest effort 5- out of 5 x4 no atrophy fasciculations rigidity or bradykinesia reflexes absent toes downgoing bilaterally sensation normal light touch face both arms and both legs coordination normal in the upper extremities he is able to ambulate without ataxia heart is regular without murmur neck supple without bruits extremities no clubbing cyanosis edema visual acuity normal at 3 feet    Results review:   I reviewed the patient's new clinical results.    Data review:  Lab Results (last 24 hours)     Procedure Component Value Units Date/Time    POC Glucose Once [[id number]]  (Abnormal) Collected:  07/15/20 1105    Specimen:  Blood Updated:  07/15/20 1111     Glucose 324 mg/dL     POC Glucose Once [[id number]]  (Abnormal) Collected:  07/15/20 0744    Specimen:  Blood Updated:  07/15/20 0745     Glucose 189 mg/dL     Urine Drug Screen - Urine, Clean Catch [[id number]]  (Abnormal) Collected:  07/14/20 2109    Specimen:  Urine, Clean Catch Updated:  07/14/20 2203     Amphet/Methamphet, Screen Negative     Barbiturates Screen, Urine Negative     Benzodiazepine Screen, Urine Positive     Cocaine Screen, Urine Negative     Opiate Screen Negative     THC, Screen, Urine Positive     Methadone Screen, Urine Negative     Oxycodone Screen, Urine Positive    Narrative:       Negative Thresholds For Drugs Screened:     Amphetamines               500 ng/ml   Barbiturates               200 ng/ml   Benzodiazepines            100 ng/ml   Cocaine                    300 ng/ml   Methadone                  300 ng/ml   Opiates                    300 ng/ml   Oxycodone                  100 ng/ml   THC                        50 ng/ml    The Normal Value for all drugs tested is negative. This report includes final unconfirmed screening results to be used for medical treatment purposes only. Unconfirmed results must not be used for non-medical purposes such as employment or legal testing. Clinical consideration  should be applied to any drug of abuse test, particulary when unconfirmed results are used.    Urinalysis, Microscopic Only - Urine, Clean Catch [[id number]]  (Abnormal) Collected:  07/14/20 2109    Specimen:  Urine, Clean Catch Updated:  07/14/20 2149     RBC, UA 3-5 /HPF      WBC, UA 13-20 /HPF      Bacteria, UA None Seen /HPF      Squamous Epithelial Cells, UA 3-6 /HPF      Hyaline Casts, UA 31-50 /LPF      Methodology Manual Light Microscopy    Urinalysis With Microscopic If Indicated (No Culture) - Urine, Clean Catch [[id number]]  (Abnormal) Collected:  07/14/20 2109    Specimen:  Urine, Clean Catch Updated:  07/14/20 2133     Color, UA Yellow     Appearance, UA Cloudy     pH, UA <=5.0     Specific Gravity, UA 1.025     Glucose,  mg/dL (1+)     Ketones, UA Negative     Bilirubin, UA Negative     Blood, UA Negative     Protein, UA >=300 mg/dL (3+)     Leuk Esterase, UA Negative     Nitrite, UA Negative     Urobilinogen, UA 1.0 E.U./dL    Comprehensive Metabolic Panel [[id number]]  (Abnormal) Collected:  07/14/20 2028    Specimen:  Blood Updated:  07/14/20 2104     Glucose 239 mg/dL      BUN 40 mg/dL      Creatinine 3.71 mg/dL      Sodium 140 mmol/L      Potassium 4.5 mmol/L      Chloride 106 mmol/L      CO2 23.5 mmol/L      Calcium 9.2 mg/dL      Total Protein 7.0 g/dL      Albumin 3.90 g/dL      ALT (SGPT) 10 U/L      AST (SGOT) 13 U/L      Alkaline Phosphatase 52 U/L      Total Bilirubin 0.4 mg/dL      eGFR Non African Amer 17 mL/min/1.73      Globulin 3.1 gm/dL      A/G Ratio 1.3 g/dL      BUN/Creatinine Ratio 10.8     Anion Gap 10.5 mmol/L     Narrative:       GFR Normal >60  Chronic Kidney Disease <60  Kidney Failure <15      Lipase [[id number]]  (Normal) Collected:  07/14/20 2028    Specimen:  Blood Updated:  07/14/20 2104     Lipase 30 U/L     Ethanol [[id number]] Collected:  07/14/20 2028    Specimen:  Blood Updated:  07/14/20 2104     Ethanol <10 mg/dL      Ethanol % <0.010 %     Acetaminophen Level  [[id number]]  (Abnormal) Collected:  07/14/20 2028    Specimen:  Blood Updated:  07/14/20 2104     Acetaminophen <5.0 mcg/mL     Salicylate Level [[id number]]  (Normal) Collected:  07/14/20 2028    Specimen:  Blood Updated:  07/14/20 2104     Salicylate <0.3 mg/dL     Narrative:       Therapeutic range for Salicylates:  3.0 - 10.0 mg/dL for antipyretic/analgesic conditions  15.0 - 30.0 mg/dL for anti-inflammatory conditions    Troponin [[id number]]  (Normal) Collected:  07/14/20 2028    Specimen:  Blood Updated:  07/14/20 2101     Troponin T 0.020 ng/mL     Narrative:       Troponin T Reference Range:  <= 0.03 ng/mL-   Negative for AMI  >0.03 ng/mL-     Abnormal for myocardial necrosis.  Clinicians would have to utilize clinical acumen, EKG, Troponin and serial changes to determine if it is an Acute Myocardial Infarction or myocardial injury due to an underlying chronic condition.       Results may be falsely decreased if patient taking Biotin.      Ammonia [[id number]]  (Normal) Collected:  07/14/20 2028    Specimen:  Blood Updated:  07/14/20 2101     Ammonia 27 umol/L      Comment: Specimen hemolyzed.  Results may be affected.       CBC & Differential [[id number]] Collected:  07/14/20 2028    Specimen:  Blood Updated:  07/14/20 2049    Narrative:       The following orders were created for panel order CBC & Differential.  Procedure                               Abnormality         Status                     ---------                               -----------         ------                     CBC Auto Differential[[id number]]        Abnormal            Final result                 Please view results for these tests on the individual orders.    CBC Auto Differential [[id number]]  (Abnormal) Collected:  07/14/20 2028    Specimen:  Blood Updated:  07/14/20 2049     WBC 4.63 10*3/mm3      RBC 3.60 10*6/mm3      Hemoglobin 10.8 g/dL      Hematocrit 32.8 %      MCV 91.1 fL      MCH 30.0 pg      MCHC 32.9 g/dL      RDW 12.8 %       RDW-SD 42.1 fl      MPV 11.9 fL      Platelets 84 10*3/mm3      Neutrophil % 71.4 %      Lymphocyte % 16.8 %      Monocyte % 6.5 %      Eosinophil % 4.5 %      Basophil % 0.6 %      Immature Grans % 0.2 %      Neutrophils, Absolute 3.30 10*3/mm3      Lymphocytes, Absolute 0.78 10*3/mm3      Monocytes, Absolute 0.30 10*3/mm3      Eosinophils, Absolute 0.21 10*3/mm3      Basophils, Absolute 0.03 10*3/mm3      Immature Grans, Absolute 0.01 10*3/mm3      nRBC 0.0 /100 WBC     POC Glucose Once [[id number]]  (Abnormal) Collected:  07/14/20 2014    Specimen:  Blood Updated:  07/14/20 2032     Glucose 240 mg/dL            Imaging:  Imaging Results (Last 24 Hours)     Procedure Component Value Units Date/Time    CT Head Without Contrast [[id number]] Collected:  07/14/20 2047     Updated:  07/14/20 2053    Narrative:       CT HEAD WO CONTRAST-     INDICATIONS: Altered mental status     TECHNIQUE: Radiation dose reduction techniques were utilized, including  automated exposure control and exposure modulation based on body size.  Noncontrast head CT     COMPARISON: 10/21/2019     FINDINGS:           No acute intracranial hemorrhage, midline shift or mass effect. No acute  territorial infarct is identified. Chronic encephalomalacia/old infarct  change at the right occipital and temporal lobes.     Mild periventricular hypodensities suggest chronic small vessel ischemic  change in a patient this age.     Arterial calcifications are seen at the base of the brain.     Ventricles, cisterns, cerebral sulci are unremarkable for patient age.     The visualized paranasal sinuses, orbits, mastoid air cells are  unremarkable.                   Impression:          No acute intracranial hemorrhage or hydrocephalus. Chronic changes of  the brain. If there is further clinical concern, MRI could be considered  for further evaluation.     This report was finalized on 7/14/2020 8:50 PM by Dr. Yadiel Espinal M.D.                Assessment  and Plan:       Acute metabolic encephalopathy  I am confident that this metabolic encephalopathy which has now resolved, with secondary to his medications.  According to his wife she thinks he got a few of them mixed up because the pharmacy change the color and size of the pills etc..  I looked at the CT it shows no acute stroke and his wife thinks he is absolutely back to normal at this time I do not see evidence that he had a seizure stroke or episode of transient global amnesia.  Metabolic encephalopathy is better and I will follow-up PRN reconsult thanks      Rios Severino MD  07/15/20  14:36

## 2020-07-15 NOTE — PROGRESS NOTES
"Adult Nutrition  Assessment/PES    Patient Name:  Angelo Brown  YOB: 1960  MRN: [id number]  Admit Date:  7/14/2020    Assessment Date:  7/15/2020    Comments:  Nutri. Screen: MST 3, wt index notes 178# on 2/17 w/CBW of 166#. Currently on regular diet. Wife reports pt now at baseline. No recommendations at present, will continue to follow.     Reason for Assessment     Row Name 07/15/20 1519          Reason for Assessment    Reason For Assessment  identified at risk by screening criteria     Diagnosis  metabolic state;neurologic conditions Acute Metabolic Encephalpathy secondary to polypharmacy with HTN, HLD, DM2, anxiety/depression, CKD3, GERD, Hep C, and CVA.     Identified At Risk by Screening Criteria  MST SCORE 2+;unintentional loss of 10 lbs or more in the past 2 mos;reduced oral intake over the last month         Nutrition/Diet History     Row Name 07/15/20 1526          Nutrition/Diet History    Typical Food/Fluid Intake  MST 3, wt index notes 178# on 2/17 w/CBW of 166#. Currently on regular diet d/t dx. Wife reports pt now at baseline.      Factors Affecting Nutritional Intake  impaired cognitive status/motor control;pain         Anthropometrics     Row Name 07/15/20 1535          Anthropometrics    Height  175.3 cm (69\")        Ideal Body Weight (IBW)    Ideal Body Weight (IBW) (kg)  73.69         Labs/Tests/Procedures/Meds     Row Name 07/15/20 1532          Labs/Procedures/Meds    Lab Results Reviewed  reviewed     Lab Results Comments  Glu 189-324, BUN 40, Cr 3.71, GFR 17, Hgb 10.8/Hct 32.8, PLT 84        Diagnostic Tests/Procedures    Diagnostic Test/Procedure Reviewed  reviewed        Medications    Pertinent Medications Reviewed  reviewed, pertinent     Pertinent Medications Comments  Pepcid, lantus, humalog, Vit b12         Physical Findings     Row Name 07/15/20 1534          Physical Findings    Skin  other (see comments) +1 BLE edema, B=17         Estimated/Assessed Needs     " "Row Name 07/15/20 1535          Calculation Measurements    Weight Used For Calculations  75.4 kg (166 lb 3.6 oz)     Height  175.3 cm (69\")        Estimated/Assessed Needs    Additional Documentation  Fluid Requirements (Group);Protein Requirements (Group);KCAL/KG (Group)        KCAL/KG    KCAL/KG  25 Kcal/Kg (kcal);30 Kcal/Kg (kcal)     25 Kcal/Kg (kcal)  1885     30 Kcal/Kg (kcal)  2262        Protein Requirements    Weight Used For Protein Calculations  75.4 kg (166 lb 3.6 oz)     Est Protein Requirement Amount (gms/kg)  1.3 gm protein     Estimated Protein Requirements (gms/day)  98.02        Fluid Requirements    Estimated Fluid Requirements (mL/day)  1885     Estimated Fluid Requirement Method  RDA Method     RDA Method (mL)  1885         Nutrition Prescription Ordered     Row Name 07/15/20 1536          Nutrition Prescription PO    Current PO Diet  Regular     Fluid Consistency  Thin         Problem/Interventions:  Problem 1     Row Name 07/15/20 1536          Nutrition Diagnoses Problem 1    Problem 1  Inadequate Intake/Infusion     Inadequate Intake Type  Oral     Etiology (related to)  Medical Diagnosis     Neurological  Metabolic encephalopathy;CVA     Signs/Symptoms (evidenced by)  Report of Mnimal PO Intake;Unintended Weight Change     Unintended Weight Change  Loss     Number of Pounds Lost  12#     Weight loss time period  5-6 months           Intervention Goal     Row Name 07/15/20 1534          Intervention Goal    General  Maintain nutrition;Improved nutrition related lab(s);Meet nutritional needs for age/condition;Disease management/therapy     PO  Increase intake;Meet estimated needs     Weight  Maintain weight         Nutrition Intervention     Row Name 07/15/20 1530          Nutrition Intervention    RD/Tech Action  Care plan reviewd;Follow Tx progress;Encourage intake           Education/Evaluation     Row Name 07/15/20 1530          Education    Education  Will Instruct as appropriate        " Monitor/Evaluation    Monitor  Per protocol;PO intake;Pertinent labs;Weight;Symptoms     Education Follow-up  Reinforce PRN           Electronically signed by:  Mila Puentes MS,RD,LD  07/15/20 15:40

## 2020-07-15 NOTE — ED PROVIDER NOTES
EMERGENCY DEPARTMENT ENCOUNTER    Room Number:  33/33  Date of encounter:  7/14/2020  PCP: Yadiel Baxter III, MD  Historian: Patient/spouse      HPI:  Chief Complaint: Altered mental status, slurred speech  A complete HPI/ROS/PMH/PSH/SH/FH are unobtainable due to: Patient has difficulty completing sentences and thoughts  Context: Angelo Brown is a 59 y.o. male with remote history of alcohol abuse, CVA, chronic renal insufficiency, and hepatic disease who presents to the ED for further evaluation of ongoing confusion and intermittent slurred speech.  Patient noted he was in his usual state of health when he went to bed at midnight last night.  She laid sleep until 1300 today because he complained of some dental discomfort yesterday.  She reports that since waking he has had confusion but been able to walk around the house though with somewhat unstable gait.  He intermittently slurs his speech.  She has not identified one side of his body weaker than the other or any facial droop.  Patient denies any recent alcohol use.  Patient is prescribed and does intermittently use alprazolam and oxycodone as well as other muscle relaxers for chronic pain.  Of note, the patient did vomit several times when he first awakened.  He denies any nausea at this time as well as headache, chest pain, shortness breath, abdominal pain.      MEDICAL HISTORY REVIEW  Admit date: 12/20/2017  Discharge date and time:12/24/17     Discharged Condition: good     Discharge Diagnoses:Active Problems:    Acute CVA (cerebrovascular accident)    Renal mass    Proteinuria    PAST MEDICAL HISTORY  Active Ambulatory Problems     Diagnosis Date Noted   • Acute CVA (cerebrovascular accident) (CMS/[facility]) 12/20/2017   • Proteinuria 12/24/2017   • Anemia in chronic kidney disease 03/05/2018   • Thrombocytopenia (CMS/HCC) 03/05/2018   • Pancytopenia, acquired (CMS/[facility]) 03/05/2018   • History of hepatitis C virus infection 03/05/2018   • B12  deficiency 03/14/2018   • Iron deficiency anemia secondary to inadequate dietary iron intake 03/14/2018   • Iron and its compounds causing adverse effect in therapeutic use 03/14/2018   • Hyperkalemia 06/05/2018   • Cancer of kidney parenchyma, right (CMS/HCC) 07/31/2018   • Umbilical hernia without obstruction and without gangrene 03/05/2019   • Anemia of chronic renal failure, stage 3 (moderate) (CMS/HCC) 03/05/2019   • Chronic kidney disease, stage III (moderate) (CMS/HCC) 03/05/2019   • Acute renal failure superimposed on chronic kidney disease (CMS/HCC) 10/21/2019   • Toxic metabolic encephalopathy 10/22/2019   • Solitary kidney 10/22/2019     Resolved Ambulatory Problems     Diagnosis Date Noted   • Renal mass 12/24/2017   • Leukopenia 03/05/2018     Past Medical History:   Diagnosis Date   • Anemia    • Anxiety    • Arthritis    • CAD (coronary artery disease)    • Cancer (CMS/HCC)    • CHF (congestive heart failure) (CMS/HCC)    • Chronic back pain    • Chronic kidney disease    • Coronary artery disease    • Depression    • Diabetes mellitus (CMS/HCC)    • Eyes sensitive to light, bilateral    • GERD (gastroesophageal reflux disease)    • Headache    • Hepatitis C    • History of MRSA infection    • History of transfusion    • Hypertension    • Jaundice    • Myocardial infarction (CMS/HCC)    • Stroke (CMS/HCC) 12/2017         PAST SURGICAL HISTORY  Past Surgical History:   Procedure Laterality Date   • BRAIN HEMATOMA EVACUATION     • CARDIAC SURGERY     • CATARACT EXTRACTION, BILATERAL     • COLONOSCOPY     • CORONARY ARTERY BYPASS GRAFT  2013    Triple   • LAPAROSCOPIC PARTIAL NEPHRECTOMY Right    • ROTATOR CUFF REPAIR Left    • UMBILICAL HERNIA REPAIR N/A 3/27/2019    Procedure: UMBILICAL HERNIA REPAIR;  Surgeon: Harshad Cotto Jr., MD;  Location: [facility] OR;  Service: General   • VASECTOMY  1985   • WOUND DEBRIDEMENT Right 06/10/2011    Excisional debridement of necrotizing fasciitis of the right  "groin extending along the right hemiscrotum, 5 x 2 x 2 cm in one wound and 7.5 x 2.5 x 2.5 cm of a second wound. This was sharp excisional debridement carried out to the muscle-Dr. Eduardo Cross          FAMILY HISTORY  Family History   Problem Relation Age of Onset   • Diabetes Mother    • Heart failure Mother    • Kidney failure Mother    • Anemia Mother    • Heart disease Mother    • Hypertension Mother    • Heart failure Father    • Coronary artery disease Father    • Heart disease Father    • Hypertension Father    • Diabetes Father    • Diabetes Sister    • Cervical cancer Sister    • Leukemia Sister    • Diabetes Brother    • Cervical cancer Daughter    • Ovarian cancer Sister    • Malig Hyperthermia Neg Hx          SOCIAL HISTORY  Social History     Socioeconomic History   • Marital status:      Spouse name: Samantha   • Number of children: Not on file   • Years of education: High school   • Highest education level: Not on file   Occupational History   • Occupation: retired     Employer: UNEMPLOYED   Tobacco Use   • Smoking status: Never Smoker   • Smokeless tobacco: Never Used   Substance and Sexual Activity   • Alcohol use: No     Comment: \"no more than a half a pint of coconut rum\"   • Drug use: No   • Sexual activity: Defer         ALLERGIES  Gabapentin; Morphine and related; Fentanyl; Ibuprofen; Latex; and Penicillins        REVIEW OF SYSTEMS  Review of Systems     All systems reviewed and negative except for those discussed in HPI.       PHYSICAL EXAM    I have reviewed the triage vital signs and nursing notes.    ED Triage Vitals [07/14/20 1947]   Temp Heart Rate Resp BP SpO2   98.2 °F (36.8 °C) 60 18 -- 95 %      Temp src Heart Rate Source Patient Position BP Location FiO2 (%)   Tympanic -- -- -- --       Physical Exam    Physical Exam   Constitutional: Disheveled but no distress.  Not overtly toxic.  HENT:  Head: Normocephalic and atraumatic.   Oropharynx: Mucous membranes are moist.   Eyes: No " scleral icterus. No conjunctival pallor.  Pupils 3 mm bilaterally  Neck: Painless range of motion noted. Neck supple.   Cardiovascular: Normal rate, regular rhythm and intact distal pulses.  Pulmonary/Chest: No respiratory distress. There are no wheezes, no rhonchi, and no rales.   Abdominal: Soft. There is no tenderness. There is no rebound and no guarding.   Musculoskeletal: Moves all extremities equally. There is no pedal edema or calf tenderness.   Neurological: Alert and oriented to self and location.  NIH stroke scale of 2 for slurred speech a little bit of difficulty with finger-nose on the left.  It is unclear whether this is related to sedation, history of CVA or chronic disease related to prior history of heavy alcohol use.  Skin: Skin is pink, warm, and dry. No pallor.   Psychiatric: Mood and affect normal.   Nursing note and vitals reviewed.    LAB RESULTS  Recent Results (from the past 24 hour(s))   POC Glucose Once    Collection Time: 07/14/20  8:14 PM   Result Value Ref Range    Glucose 240 (H) 70 - 130 mg/dL   Ammonia    Collection Time: 07/14/20  8:28 PM   Result Value Ref Range    Ammonia 27 16 - 60 umol/L   Comprehensive Metabolic Panel    Collection Time: 07/14/20  8:28 PM   Result Value Ref Range    Glucose 239 (H) 65 - 99 mg/dL    BUN 40 (H) 6 - 20 mg/dL    Creatinine 3.71 (H) 0.76 - 1.27 mg/dL    Sodium 140 136 - 145 mmol/L    Potassium 4.5 3.5 - 5.2 mmol/L    Chloride 106 98 - 107 mmol/L    CO2 23.5 22.0 - 29.0 mmol/L    Calcium 9.2 8.6 - 10.5 mg/dL    Total Protein 7.0 6.0 - 8.5 g/dL    Albumin 3.90 3.50 - 5.20 g/dL    ALT (SGPT) 10 1 - 41 U/L    AST (SGOT) 13 1 - 40 U/L    Alkaline Phosphatase 52 39 - 117 U/L    Total Bilirubin 0.4 0.0 - 1.2 mg/dL    eGFR Non African Amer 17 (L) >60 mL/min/1.73    Globulin 3.1 gm/dL    A/G Ratio 1.3 g/dL    BUN/Creatinine Ratio 10.8 7.0 - 25.0    Anion Gap 10.5 5.0 - 15.0 mmol/L   Lipase    Collection Time: 07/14/20  8:28 PM   Result Value Ref Range     Lipase 30 13 - 60 U/L   Troponin    Collection Time: 07/14/20  8:28 PM   Result Value Ref Range    Troponin T 0.020 0.000 - 0.030 ng/mL   CBC Auto Differential    Collection Time: 07/14/20  8:28 PM   Result Value Ref Range    WBC 4.63 3.40 - 10.80 10*3/mm3    RBC 3.60 (L) 4.14 - 5.80 10*6/mm3    Hemoglobin 10.8 (L) 13.0 - 17.7 g/dL    Hematocrit 32.8 (L) 37.5 - 51.0 %    MCV 91.1 79.0 - 97.0 fL    MCH 30.0 26.6 - 33.0 pg    MCHC 32.9 31.5 - 35.7 g/dL    RDW 12.8 12.3 - 15.4 %    RDW-SD 42.1 37.0 - 54.0 fl    MPV 11.9 6.0 - 12.0 fL    Platelets 84 (L) 140 - 450 10*3/mm3    Neutrophil % 71.4 42.7 - 76.0 %    Lymphocyte % 16.8 (L) 19.6 - 45.3 %    Monocyte % 6.5 5.0 - 12.0 %    Eosinophil % 4.5 0.3 - 6.2 %    Basophil % 0.6 0.0 - 1.5 %    Immature Grans % 0.2 0.0 - 0.5 %    Neutrophils, Absolute 3.30 1.70 - 7.00 10*3/mm3    Lymphocytes, Absolute 0.78 0.70 - 3.10 10*3/mm3    Monocytes, Absolute 0.30 0.10 - 0.90 10*3/mm3    Eosinophils, Absolute 0.21 0.00 - 0.40 10*3/mm3    Basophils, Absolute 0.03 0.00 - 0.20 10*3/mm3    Immature Grans, Absolute 0.01 0.00 - 0.05 10*3/mm3    nRBC 0.0 0.0 - 0.2 /100 WBC   Acetaminophen Level    Collection Time: 07/14/20  8:28 PM   Result Value Ref Range    Acetaminophen <5.0 (L) 10.0 - 30.0 mcg/mL   Ethanol    Collection Time: 07/14/20  8:28 PM   Result Value Ref Range    Ethanol <10 0 - 10 mg/dL    Ethanol % <0.010 %   Salicylate Level    Collection Time: 07/14/20  8:28 PM   Result Value Ref Range    Salicylate <0.3 <=30.0 mg/dL   Urinalysis With Microscopic If Indicated (No Culture) - Urine, Clean Catch    Collection Time: 07/14/20  9:09 PM   Result Value Ref Range    Color, UA Yellow Yellow, Straw    Appearance, UA Cloudy (A) Clear    pH, UA <=5.0 5.0 - 8.0    Specific Gravity, UA 1.025 1.005 - 1.030    Glucose,  mg/dL (1+) (A) Negative    Ketones, UA Negative Negative    Bilirubin, UA Negative Negative    Blood, UA Negative Negative    Protein, UA >=300 mg/dL (3+) (A) Negative     Leuk Esterase, UA Negative Negative    Nitrite, UA Negative Negative    Urobilinogen, UA 1.0 E.U./dL 0.2 - 1.0 E.U./dL   Urine Drug Screen - Urine, Clean Catch    Collection Time: 07/14/20  9:09 PM   Result Value Ref Range    Amphet/Methamphet, Screen Negative Negative    Barbiturates Screen, Urine Negative Negative    Benzodiazepine Screen, Urine Positive (A) Negative    Cocaine Screen, Urine Negative Negative    Opiate Screen Negative Negative    THC, Screen, Urine Positive (A) Negative    Methadone Screen, Urine Negative Negative    Oxycodone Screen, Urine Positive (A) Negative   Urinalysis, Microscopic Only - Urine, Clean Catch    Collection Time: 07/14/20  9:09 PM   Result Value Ref Range    RBC, UA 3-5 (A) None Seen, 0-2 /HPF    WBC, UA 13-20 (A) None Seen, 0-2 /HPF    Bacteria, UA None Seen None Seen /HPF    Squamous Epithelial Cells, UA 3-6 (A) None Seen, 0-2 /HPF    Hyaline Casts, UA 31-50 None Seen /LPF    Methodology Manual Light Microscopy        Ordered the above labs and independently reviewed the results.        RADIOLOGY  Ct Head Without Contrast    Result Date: 7/14/2020  CT HEAD WO CONTRAST-  INDICATIONS: Altered mental status  TECHNIQUE: Radiation dose reduction techniques were utilized, including automated exposure control and exposure modulation based on body size. Noncontrast head CT  COMPARISON: 10/21/2019  FINDINGS:    No acute intracranial hemorrhage, midline shift or mass effect. No acute territorial infarct is identified. Chronic encephalomalacia/old infarct change at the right occipital and temporal lobes.  Mild periventricular hypodensities suggest chronic small vessel ischemic change in a patient this age.  Arterial calcifications are seen at the base of the brain.  Ventricles, cisterns, cerebral sulci are unremarkable for patient age.  The visualized paranasal sinuses, orbits, mastoid air cells are unremarkable.           No acute intracranial hemorrhage or hydrocephalus. Chronic  changes of the brain. If there is further clinical concern, MRI could be considered for further evaluation.  This report was finalized on 7/14/2020 8:50 PM by Dr. Yadiel Espinal M.D.        I ordered the above noted radiological studies. Reviewed by me and discussed with radiologist.  See dictation for official radiology interpretation.      PROCEDURES    Procedures        MEDICATIONS GIVEN IN ER    Medications   sodium chloride 0.9 % flush 10 mL (has no administration in time range)   sodium chloride 0.9 % flush 10 mL (has no administration in time range)   aspirin chewable tablet 324 mg (has no administration in time range)         PROGRESS, DATA ANALYSIS, CONSULTS, AND MEDICAL DECISION MAKING    My differential diagnosis for altered mental status includes but is not limited to:  Hypoglycemia, hyperglycemia, DKA, overdose, ethanol intoxication, thiamine deficiency, niacin deficiency, hypothymia, hyperviscosity, Shashank’s disease, hyponatremia, hypernatremia, liver failure, kidney failure, hyper or hypothyroid, no insufficiency, hypoxia, hypercarbia, carbon monoxide poisoning, postanoxic encephalopathy, ischemic stroke, intracranial bleed, subarachnoid hemorrhage, brain tumor, closed head injury, epidural hematoma, epidural hematoma, seizure activity, postictal state, syncopal episode, disseminated encephalomyelitis, central pontine myelinolysis, post cardiac arrest, bacterial meningitis, viral meningitis, fungal meningitis, encephalitis, brain abscess, subdural empyema, hysteria, catatonic state, malingering, hypertensive encephalopathy, vasculitis, TTP, DIC      All labs have been independently reviewed by me.  All radiology studies have been reviewed by me and discussed with radiologist dictating the report.   EKG's independently viewed and interpreted by me.  Discussion below represents my analysis of pertinent findings related to patient's condition, differential diagnosis, treatment plan and final  disposition.      ED Course as of Jul 14 2232   Tue Jul 14, 2020 2210 Given the lack of leukocyte esterase, nitrates and bacteria, I am not convinced that this represents acute UTI.   WBC, UA(!): 13-20 [RS]   2210 Certainly based on the patient's urine drug screen, polypharmacy is leading differential for this patient's acute metabolic encephalopathy.  Lower on the list but still a possibility would be CVA.  Thus, aspirin is ordered.  Patient is agreeable with plan for admission.    [RS]   2231 Case reviewed with ALEXANDREA Kamara, who is agreeable to accept the patient for admission    [RS]      ED Course User Index  [RS] Carmine Charlton MD       AS OF 22:32 VITALS:    BP - 115/69  HR - 58  TEMP - 98.2 °F (36.8 °C) (Tympanic)  O2 SATS - 98%        DIAGNOSIS  Final diagnoses:   Acute metabolic encephalopathy   Polypharmacy   Chronic renal impairment, unspecified CKD stage   Hyperglycemia   Chronic anemia         DISPOSITION  ADMISSION    Discussed treatment plan and reason for admission with pt/family and admitting physician.  Pt/family voiced understanding of the plan for admission for further testing/treatment as needed.          Carmine Charlton MD  07/14/20 2232

## 2020-07-15 NOTE — H&P
History and physical    Primary care physician  Dr. GÓMEZ    Chief complaint  Altered mental status    History of present illness  59-year white male with history of diabetes mellitus hypertension hyperlipidemia chronic kidney disease stage III hepatitis B anxiety disorder depression and gastroesophageal disease who is well-known to our service.back to the emergency room with altered mental status.  Patient family stated that he has increased confusion with intermittent slurred speech.  Patient has no vision problem focal weakness headache nausea.  Patient evaluated in ER and work-up was initially negative but admit for management.  Patient fully alert oriented wants to eat and no other complaints.    PAST MEDICAL HISTORY  • Anemia     • Anxiety     • Arthritis     • CAD (coronary artery disease)     • Cancer (CMS/HCC)     • CHF (congestive heart failure) (CMS/HCC)     • Chronic back pain     • Chronic kidney disease     • Coronary artery disease     • Depression     • Diabetes mellitus (CMS/HCC)     • Eyes sensitive to light, bilateral     • GERD (gastroesophageal reflux disease)     • Headache     • Hepatitis C     • History of MRSA infection     • History of transfusion     • Hypertension     • Jaundice     • Myocardial infarction (CMS/HCC)     • Stroke (CMS/HCC) 12/2017      PAST SURGICAL HISTORY  Surgical History         Past Surgical History:   Procedure Laterality Date   • BRAIN HEMATOMA EVACUATION       • CARDIAC SURGERY       • CATARACT EXTRACTION, BILATERAL       • COLONOSCOPY       • CORONARY ARTERY BYPASS GRAFT   2013     Triple   • LAPAROSCOPIC PARTIAL NEPHRECTOMY Right     • ROTATOR CUFF REPAIR Left     • UMBILICAL HERNIA REPAIR N/A 3/27/2019     Procedure: UMBILICAL HERNIA REPAIR;  Surgeon: Harshad Cotto Jr., MD;  Location: [facility];  Service: General   • VASECTOMY   1985   • WOUND DEBRIDEMENT Right 06/10/2011     Excisional debridement of necrotizing fasciitis of the right groin extending  "along the right hemiscrotum, 5 x 2 x 2 cm in one wound and 7.5 x 2.5 x 2.5 cm of a second wound. This was sharp excisional debridement carried out to the muscle-Dr. Eduardo Cross          FAMILY HISTORY        Family History   Problem Relation Age of Onset   • Diabetes Mother     • Heart failure Mother     • Kidney failure Mother     • Anemia Mother     • Heart disease Mother     • Hypertension Mother     • Heart failure Father     • Coronary artery disease Father     • Heart disease Father     • Hypertension Father     • Diabetes Father     • Diabetes Sister     • Cervical cancer Sister     • Leukemia Sister     • Diabetes Brother     • Cervical cancer Daughter     • Ovarian cancer Sister     • Malig Hyperthermia Neg Hx        SOCIAL HISTORY  Social History   Social History            Socioeconomic History   • Marital status:        Spouse name: Samantha   • Number of children: Not on file   • Years of education: High school   • Highest education level: Not on file   Occupational History   • Occupation: retired       Employer: UNEMPLOYED   Tobacco Use   • Smoking status: Never Smoker   • Smokeless tobacco: Never Used   Substance and Sexual Activity   • Alcohol use: No       Comment: \"no more than a half a pint of coconut rum\"   • Drug use: No   • Sexual activity: Defer         ALLERGIES  Gabapentin; Morphine and related; Fentanyl; Ibuprofen; Latex; and Penicillins  Home medications reviewed    REVIEW OF SYSTEMS  All systems reviewed and negative except for those discussed in HPI.      PHYSICAL EXAM  Blood pressure (!) 190/70, pulse 61, temperature 99 °F (37.2 °C), temperature source Oral, resp. rate 16, height 175.3 cm (69\"), weight 75.4 kg (166 lb 4.8 oz), SpO2 97 %.    Constitutional: Disheveled but no distress.  Not overtly toxic.  Head: Normocephalic and atraumatic.   Oropharynx: Mucous membranes are moist.   Eyes: No scleral icterus. No conjunctival pallor.  Pupils 3 mm bilaterally  Neck: Painless range of " motion noted. Neck supple.   Cardiovascular: Normal rate, regular rhythm and intact distal pulses.  Pulmonary/Chest: No respiratory distress. There are no wheezes, no rhonchi, and no rales.   Abdominal: Soft. There is no tenderness. There is no rebound and no guarding.   Musculoskeletal: Moves all extremities equally. There is no pedal edema or calf tenderness.   Neurological: Alert and oriented to self and location.  NIH stroke scale of 2 for slurred speech a little bit of difficulty with finger-nose on the left.  It is unclear whether this is related to sedation, history of CVA or chronic disease related to prior history of heavy alcohol use.  Skin: Skin is pink, warm, and dry. No pallor.   Psychiatric: Mood and affect normal.     LAB RESULTS  Lab Results (last 24 hours)     Procedure Component Value Units Date/Time    POC Glucose Once [[id number]]  (Abnormal) Collected:  07/15/20 1105    Specimen:  Blood Updated:  07/15/20 1111     Glucose 324 mg/dL     POC Glucose Once [[id number]]  (Abnormal) Collected:  07/15/20 0744    Specimen:  Blood Updated:  07/15/20 0745     Glucose 189 mg/dL     Urine Drug Screen - Urine, Clean Catch [[id number]]  (Abnormal) Collected:  07/14/20 2109    Specimen:  Urine, Clean Catch Updated:  07/14/20 2203     Amphet/Methamphet, Screen Negative     Barbiturates Screen, Urine Negative     Benzodiazepine Screen, Urine Positive     Cocaine Screen, Urine Negative     Opiate Screen Negative     THC, Screen, Urine Positive     Methadone Screen, Urine Negative     Oxycodone Screen, Urine Positive    Narrative:       Negative Thresholds For Drugs Screened:     Amphetamines               500 ng/ml   Barbiturates               200 ng/ml   Benzodiazepines            100 ng/ml   Cocaine                    300 ng/ml   Methadone                  300 ng/ml   Opiates                    300 ng/ml   Oxycodone                  100 ng/ml   THC                        50 ng/ml    The Normal Value for all drugs  tested is negative. This report includes final unconfirmed screening results to be used for medical treatment purposes only. Unconfirmed results must not be used for non-medical purposes such as employment or legal testing. Clinical consideration should be applied to any drug of abuse test, particulary when unconfirmed results are used.    Urinalysis, Microscopic Only - Urine, Clean Catch [[id number]]  (Abnormal) Collected:  07/14/20 2109    Specimen:  Urine, Clean Catch Updated:  07/14/20 2149     RBC, UA 3-5 /HPF      WBC, UA 13-20 /HPF      Bacteria, UA None Seen /HPF      Squamous Epithelial Cells, UA 3-6 /HPF      Hyaline Casts, UA 31-50 /LPF      Methodology Manual Light Microscopy    Urinalysis With Microscopic If Indicated (No Culture) - Urine, Clean Catch [[id number]]  (Abnormal) Collected:  07/14/20 2109    Specimen:  Urine, Clean Catch Updated:  07/14/20 2133     Color, UA Yellow     Appearance, UA Cloudy     pH, UA <=5.0     Specific Gravity, UA 1.025     Glucose,  mg/dL (1+)     Ketones, UA Negative     Bilirubin, UA Negative     Blood, UA Negative     Protein, UA >=300 mg/dL (3+)     Leuk Esterase, UA Negative     Nitrite, UA Negative     Urobilinogen, UA 1.0 E.U./dL    Comprehensive Metabolic Panel [[id number]]  (Abnormal) Collected:  07/14/20 2028    Specimen:  Blood Updated:  07/14/20 2104     Glucose 239 mg/dL      BUN 40 mg/dL      Creatinine 3.71 mg/dL      Sodium 140 mmol/L      Potassium 4.5 mmol/L      Chloride 106 mmol/L      CO2 23.5 mmol/L      Calcium 9.2 mg/dL      Total Protein 7.0 g/dL      Albumin 3.90 g/dL      ALT (SGPT) 10 U/L      AST (SGOT) 13 U/L      Alkaline Phosphatase 52 U/L      Total Bilirubin 0.4 mg/dL      eGFR Non African Amer 17 mL/min/1.73      Globulin 3.1 gm/dL      A/G Ratio 1.3 g/dL      BUN/Creatinine Ratio 10.8     Anion Gap 10.5 mmol/L     Narrative:       GFR Normal >60  Chronic Kidney Disease <60  Kidney Failure <15      Lipase [[id number]]  (Normal)  Collected:  07/14/20 2028    Specimen:  Blood Updated:  07/14/20 2104     Lipase 30 U/L     Ethanol [[id number]] Collected:  07/14/20 2028    Specimen:  Blood Updated:  07/14/20 2104     Ethanol <10 mg/dL      Ethanol % <0.010 %     Acetaminophen Level [[id number]]  (Abnormal) Collected:  07/14/20 2028    Specimen:  Blood Updated:  07/14/20 2104     Acetaminophen <5.0 mcg/mL     Salicylate Level [[id number]]  (Normal) Collected:  07/14/20 2028    Specimen:  Blood Updated:  07/14/20 2104     Salicylate <0.3 mg/dL     Narrative:       Therapeutic range for Salicylates:  3.0 - 10.0 mg/dL for antipyretic/analgesic conditions  15.0 - 30.0 mg/dL for anti-inflammatory conditions    Troponin [[id number]]  (Normal) Collected:  07/14/20 2028    Specimen:  Blood Updated:  07/14/20 2101     Troponin T 0.020 ng/mL     Narrative:       Troponin T Reference Range:  <= 0.03 ng/mL-   Negative for AMI  >0.03 ng/mL-     Abnormal for myocardial necrosis.  Clinicians would have to utilize clinical acumen, EKG, Troponin and serial changes to determine if it is an Acute Myocardial Infarction or myocardial injury due to an underlying chronic condition.       Results may be falsely decreased if patient taking Biotin.      Ammonia [[id number]]  (Normal) Collected:  07/14/20 2028    Specimen:  Blood Updated:  07/14/20 2101     Ammonia 27 umol/L      Comment: Specimen hemolyzed.  Results may be affected.       CBC & Differential [[id number]] Collected:  07/14/20 2028    Specimen:  Blood Updated:  07/14/20 2049    Narrative:       The following orders were created for panel order CBC & Differential.  Procedure                               Abnormality         Status                     ---------                               -----------         ------                     CBC Auto Differential[[id number]]        Abnormal            Final result                 Please view results for these tests on the individual orders.    CBC Auto Differential  [[id number]]  (Abnormal) Collected:  07/14/20 2028    Specimen:  Blood Updated:  07/14/20 2049     WBC 4.63 10*3/mm3      RBC 3.60 10*6/mm3      Hemoglobin 10.8 g/dL      Hematocrit 32.8 %      MCV 91.1 fL      MCH 30.0 pg      MCHC 32.9 g/dL      RDW 12.8 %      RDW-SD 42.1 fl      MPV 11.9 fL      Platelets 84 10*3/mm3      Neutrophil % 71.4 %      Lymphocyte % 16.8 %      Monocyte % 6.5 %      Eosinophil % 4.5 %      Basophil % 0.6 %      Immature Grans % 0.2 %      Neutrophils, Absolute 3.30 10*3/mm3      Lymphocytes, Absolute 0.78 10*3/mm3      Monocytes, Absolute 0.30 10*3/mm3      Eosinophils, Absolute 0.21 10*3/mm3      Basophils, Absolute 0.03 10*3/mm3      Immature Grans, Absolute 0.01 10*3/mm3      nRBC 0.0 /100 WBC     POC Glucose Once [[id number]]  (Abnormal) Collected:  07/14/20 2014    Specimen:  Blood Updated:  07/14/20 2032     Glucose 240 mg/dL         Imaging Results (Last 24 Hours)     Procedure Component Value Units Date/Time    CT Head Without Contrast [[id number]] Collected:  07/14/20 2047     Updated:  07/14/20 2053    Narrative:       CT HEAD WO CONTRAST-     INDICATIONS: Altered mental status     TECHNIQUE: Radiation dose reduction techniques were utilized, including  automated exposure control and exposure modulation based on body size.  Noncontrast head CT     COMPARISON: 10/21/2019     FINDINGS:           No acute intracranial hemorrhage, midline shift or mass effect. No acute  territorial infarct is identified. Chronic encephalomalacia/old infarct  change at the right occipital and temporal lobes.     Mild periventricular hypodensities suggest chronic small vessel ischemic  change in a patient this age.     Arterial calcifications are seen at the base of the brain.     Ventricles, cisterns, cerebral sulci are unremarkable for patient age.     The visualized paranasal sinuses, orbits, mastoid air cells are  unremarkable.                   Impression:          No acute intracranial  hemorrhage or hydrocephalus. Chronic changes of  the brain. If there is further clinical concern, MRI could be considered  for further evaluation.     This report was finalized on 7/14/2020 8:50 PM by Dr. Yadiel Espinal M.D.           ECG 12 Lead        HEART RATE= 62  bpm  RR Interval= 960  ms  NC Interval= 166  ms  P Horizontal Axis= -13  deg  P Front Axis= 51  deg  QRSD Interval= 101  ms  QT Interval= 451  ms  QRS Axis= 58  deg  T Wave Axis= -13  deg  - NORMAL ECG -  Sinus rhythm             Current Facility-Administered Medications:   •  ALPRAZolam (XANAX) tablet 1 mg, 1 mg, Oral, 4x Daily PRN, Karl Swift MD  •  atorvastatin (LIPITOR) tablet 10 mg, 10 mg, Oral, Daily, Karl Swift MD  •  carvedilol (COREG) tablet 3.125 mg, 3.125 mg, Oral, BID With Meals, Karl Swift MD  •  hydrALAZINE (APRESOLINE) tablet 100 mg, 100 mg, Oral, TID, Karl Swift MD  •  Insulin Glargine (LANTUS SOLOSTAR) injection pen solution pen-injector 10 Units, 10 Units, Subcutaneous, Nightly, Karl Swift MD  •  insulin lispro (humaLOG) injection 5 Units, 5 Units, Subcutaneous, TID With Meals, Karl Swift MD  •  [START ON 7/16/2020] lisinopril (PRINIVIL,ZESTRIL) tablet 10 mg, 10 mg, Oral, QAM, Karl Swift MD  •  melatonin tablet 3 mg, 3 mg, Oral, Nightly PRN, Karl Swift MD  •  pantoprazole (PROTONIX) EC tablet 40 mg, 40 mg, Oral, Q AM, Karl Swift MD, 40 mg at 07/15/20 0927  •  [COMPLETED] Insert peripheral IV, , , Once **AND** sodium chloride 0.9 % flush 10 mL, 10 mL, Intravenous, PRN, Carmine Charlton MD  •  [COMPLETED] Insert peripheral IV, , , Once **AND** sodium chloride 0.9 % flush 10 mL, 10 mL, Intravenous, PRN, Carmine Charlton MD  •  tiZANidine (ZANAFLEX) tablet 4 mg, 4 mg, Oral, Q8H PRN, Karl Swift MD  •  vitamin B-12 (CYANOCOBALAMIN) tablet 1,000 mcg, 1,000 mcg, Oral, Daily, Karl Swift MD     ASSESSMENT  Acute metabolic encephalopathy secondary to polypharmacy  Hypertension  Hyperlipidemia  Diabetes  mellitus  Anxiety disorder  Depression  Chronic kidney disease stage III  Gastroesophageal reflux disease     PLAN  Admit  Neurochecks every 4 hours  Neurology consult  Adjust home medications  Stress ulcer DVT prophylaxis  Supportive care  Discussed with family and nursing staff  Patient is full code  Follow closely further recommendation current hospital course    ARIEL MCGOVERN MD

## 2020-07-15 NOTE — PLAN OF CARE
Problem: Patient Care Overview  Goal: Plan of Care Review  Flowsheets  Taken 7/15/2020 0723  Progress: no change  Taken 7/15/2020 0026  Plan of Care Reviewed With: patient  Note:   Pt admitted from Er. Pt presented to ER with wife and stated he had went to bed midnight night before, slept until 1pm the following day waking up confused, slurred speech and unsteady gait. PT on arrival is slow to respond, confused when having general discussions but answers orientation questions appropriately. PT up to bs uses urinal well without difficulty. Dr gutiérrez paged for orders. No ss of distress noted, pt calm and cooperative but seems depressed when discussing his past medical history and current situation. PT wife to return in am. No new orders at this time and will cont to monitor. Pt passes swallow study but no diet orders received. Bed alarm in place and will cont to maude

## 2020-07-16 VITALS
BODY MASS INDEX: 24.63 KG/M2 | TEMPERATURE: 97.6 F | OXYGEN SATURATION: 98 % | SYSTOLIC BLOOD PRESSURE: 194 MMHG | RESPIRATION RATE: 18 BRPM | WEIGHT: 166.3 LBS | HEART RATE: 65 BPM | DIASTOLIC BLOOD PRESSURE: 96 MMHG | HEIGHT: 69 IN

## 2020-07-16 LAB
ALBUMIN SERPL-MCNC: 3.3 G/DL (ref 3.5–5.2)
ALBUMIN/GLOB SERPL: 1.3 G/DL
ALP SERPL-CCNC: 50 U/L (ref 39–117)
ALT SERPL W P-5'-P-CCNC: 11 U/L (ref 1–41)
ANION GAP SERPL CALCULATED.3IONS-SCNC: 11.3 MMOL/L (ref 5–15)
AST SERPL-CCNC: 13 U/L (ref 1–40)
BASOPHILS # BLD AUTO: 0.03 10*3/MM3 (ref 0–0.2)
BASOPHILS NFR BLD AUTO: 0.8 % (ref 0–1.5)
BILIRUB SERPL-MCNC: 0.2 MG/DL (ref 0–1.2)
BUN SERPL-MCNC: 48 MG/DL (ref 6–20)
BUN/CREAT SERPL: 13.7 (ref 7–25)
CALCIUM SPEC-SCNC: 8.4 MG/DL (ref 8.6–10.5)
CHLORIDE SERPL-SCNC: 107 MMOL/L (ref 98–107)
CHOLEST SERPL-MCNC: 128 MG/DL (ref 0–200)
CO2 SERPL-SCNC: 22.7 MMOL/L (ref 22–29)
CREAT SERPL-MCNC: 3.5 MG/DL (ref 0.76–1.27)
DEPRECATED RDW RBC AUTO: 42.5 FL (ref 37–54)
EOSINOPHIL # BLD AUTO: 0.23 10*3/MM3 (ref 0–0.4)
EOSINOPHIL NFR BLD AUTO: 6.2 % (ref 0.3–6.2)
ERYTHROCYTE [DISTWIDTH] IN BLOOD BY AUTOMATED COUNT: 12.7 % (ref 12.3–15.4)
GFR SERPL CREATININE-BSD FRML MDRD: 18 ML/MIN/1.73
GLOBULIN UR ELPH-MCNC: 2.5 GM/DL
GLUCOSE BLDC GLUCOMTR-MCNC: 182 MG/DL (ref 70–130)
GLUCOSE BLDC GLUCOMTR-MCNC: 190 MG/DL (ref 70–130)
GLUCOSE SERPL-MCNC: 176 MG/DL (ref 65–99)
HBA1C MFR BLD: 8.74 % (ref 4.8–5.6)
HCT VFR BLD AUTO: 27.3 % (ref 37.5–51)
HDLC SERPL-MCNC: 32 MG/DL (ref 40–60)
HGB BLD-MCNC: 9.1 G/DL (ref 13–17.7)
IMM GRANULOCYTES # BLD AUTO: 0.01 10*3/MM3 (ref 0–0.05)
IMM GRANULOCYTES NFR BLD AUTO: 0.3 % (ref 0–0.5)
LDLC SERPL CALC-MCNC: 75 MG/DL (ref 0–100)
LDLC/HDLC SERPL: 2.36 {RATIO}
LYMPHOCYTES # BLD AUTO: 1.35 10*3/MM3 (ref 0.7–3.1)
LYMPHOCYTES NFR BLD AUTO: 36.2 % (ref 19.6–45.3)
MCH RBC QN AUTO: 30.1 PG (ref 26.6–33)
MCHC RBC AUTO-ENTMCNC: 33.3 G/DL (ref 31.5–35.7)
MCV RBC AUTO: 90.4 FL (ref 79–97)
MONOCYTES # BLD AUTO: 0.36 10*3/MM3 (ref 0.1–0.9)
MONOCYTES NFR BLD AUTO: 9.7 % (ref 5–12)
NEUTROPHILS NFR BLD AUTO: 1.75 10*3/MM3 (ref 1.7–7)
NEUTROPHILS NFR BLD AUTO: 46.8 % (ref 42.7–76)
NRBC BLD AUTO-RTO: 0 /100 WBC (ref 0–0.2)
NT-PROBNP SERPL-MCNC: 1286 PG/ML (ref 0–900)
PLATELET # BLD AUTO: 73 10*3/MM3 (ref 140–450)
PMV BLD AUTO: 12 FL (ref 6–12)
POTASSIUM SERPL-SCNC: 4.3 MMOL/L (ref 3.5–5.2)
PROT SERPL-MCNC: 5.8 G/DL (ref 6–8.5)
RBC # BLD AUTO: 3.02 10*6/MM3 (ref 4.14–5.8)
SODIUM SERPL-SCNC: 141 MMOL/L (ref 136–145)
TRIGL SERPL-MCNC: 103 MG/DL (ref 0–150)
TSH SERPL DL<=0.05 MIU/L-ACNC: 2.04 UIU/ML (ref 0.27–4.2)
VIT B12 BLD-MCNC: >2000 PG/ML (ref 211–946)
VLDLC SERPL-MCNC: 20.6 MG/DL (ref 5–40)
WBC # BLD AUTO: 3.73 10*3/MM3 (ref 3.4–10.8)

## 2020-07-16 PROCEDURE — 83880 ASSAY OF NATRIURETIC PEPTIDE: CPT | Performed by: HOSPITALIST

## 2020-07-16 PROCEDURE — 84443 ASSAY THYROID STIM HORMONE: CPT | Performed by: HOSPITALIST

## 2020-07-16 PROCEDURE — 82962 GLUCOSE BLOOD TEST: CPT

## 2020-07-16 PROCEDURE — 83036 HEMOGLOBIN GLYCOSYLATED A1C: CPT | Performed by: HOSPITALIST

## 2020-07-16 PROCEDURE — 85025 COMPLETE CBC W/AUTO DIFF WBC: CPT | Performed by: HOSPITALIST

## 2020-07-16 PROCEDURE — 80061 LIPID PANEL: CPT | Performed by: HOSPITALIST

## 2020-07-16 PROCEDURE — G0378 HOSPITAL OBSERVATION PER HR: HCPCS

## 2020-07-16 PROCEDURE — 82607 VITAMIN B-12: CPT | Performed by: HOSPITALIST

## 2020-07-16 PROCEDURE — 80053 COMPREHEN METABOLIC PANEL: CPT | Performed by: HOSPITALIST

## 2020-07-16 PROCEDURE — 63710000001 INSULIN LISPRO (HUMAN) PER 5 UNITS: Performed by: HOSPITALIST

## 2020-07-16 RX ORDER — ATORVASTATIN CALCIUM 10 MG/1
10 TABLET, FILM COATED ORAL NIGHTLY
Qty: 30 TABLET | Refills: 0 | Status: SHIPPED | OUTPATIENT
Start: 2020-07-16 | End: 2020-08-15

## 2020-07-16 RX ORDER — ATORVASTATIN CALCIUM 20 MG/1
10 TABLET, FILM COATED ORAL NIGHTLY
Status: DISCONTINUED | OUTPATIENT
Start: 2020-07-16 | End: 2020-07-16 | Stop reason: HOSPADM

## 2020-07-16 RX ORDER — LISINOPRIL 20 MG/1
20 TABLET ORAL EVERY 12 HOURS SCHEDULED
Qty: 60 TABLET | Refills: 0 | OUTPATIENT
Start: 2020-07-16 | End: 2021-04-12 | Stop reason: HOSPADM

## 2020-07-16 RX ORDER — LISINOPRIL 20 MG/1
20 TABLET ORAL EVERY 12 HOURS SCHEDULED
Status: DISCONTINUED | OUTPATIENT
Start: 2020-07-16 | End: 2020-07-16 | Stop reason: HOSPADM

## 2020-07-16 RX ORDER — ASPIRIN 81 MG/1
324 TABLET, CHEWABLE ORAL DAILY
Qty: 120 TABLET | Refills: 0 | Status: SHIPPED | OUTPATIENT
Start: 2020-07-17 | End: 2020-08-16

## 2020-07-16 RX ORDER — FAMOTIDINE 20 MG/1
20 TABLET, FILM COATED ORAL DAILY
Status: DISCONTINUED | OUTPATIENT
Start: 2020-07-17 | End: 2020-07-16 | Stop reason: HOSPADM

## 2020-07-16 RX ADMIN — FAMOTIDINE 20 MG: 20 TABLET, FILM COATED ORAL at 08:40

## 2020-07-16 RX ADMIN — ASPIRIN 324 MG: 81 TABLET, CHEWABLE ORAL at 08:40

## 2020-07-16 RX ADMIN — LISINOPRIL 10 MG: 10 TABLET ORAL at 08:40

## 2020-07-16 RX ADMIN — INSULIN LISPRO 5 UNITS: 100 INJECTION, SOLUTION INTRAVENOUS; SUBCUTANEOUS at 12:31

## 2020-07-16 RX ADMIN — Medication 1000 MCG: at 08:40

## 2020-07-16 RX ADMIN — INSULIN LISPRO 5 UNITS: 100 INJECTION, SOLUTION INTRAVENOUS; SUBCUTANEOUS at 08:41

## 2020-07-16 RX ADMIN — HYDRALAZINE HYDROCHLORIDE 100 MG: 50 TABLET, FILM COATED ORAL at 08:40

## 2020-07-16 RX ADMIN — INSULIN LISPRO 2 UNITS: 100 INJECTION, SOLUTION INTRAVENOUS; SUBCUTANEOUS at 12:31

## 2020-07-16 RX ADMIN — INSULIN LISPRO 2 UNITS: 100 INJECTION, SOLUTION INTRAVENOUS; SUBCUTANEOUS at 08:40

## 2020-07-16 RX ADMIN — CARVEDILOL 3.12 MG: 3.12 TABLET, FILM COATED ORAL at 08:40

## 2020-07-16 NOTE — PROGRESS NOTES
"Pharmacy Patient Counseling - Discharge    Mr. Brown is a pleasant 60 y/o M who came in w/ AMS. I spoke with Mr. Brown and his spouse about the following medications:    Aspirin 81 m tablets by mouth daily for 30 days  Atorvastatin 10 m tablet by mouth QHS for 30 days    I spoke with the patient about changing the simvastatin to atorvastatin. The patient's spouse seemed concerned and said the he had possibly developed \"puffy eyes\" when taking atorvastatin, which is why he was switched to simvastatin. The patient's spouse said she would contact their cardiologist for more information to see if he could take atorvastatin. When discussing Aspirin, I told the patient and his spouse that they may want to consider getting the medication over the counter if it is not covered by insurance.     The patient's spouse ask why Dr. Swift hadn't restarted the patient's home pain medications and I told her this was likely due to the patient's AMS at presentation and he wanted to be safe. I assured her that Dr. Swift did not want the patient to stop taking the medication after leaving because he left this on the discharge medrec. The patient's spouse then confessed she had given the patient a pain pill the previous night. I told her that we need to make sure we know everything the patient is getting and instructed her to ask the nurse anytime she feels the patient is in legitimate pain. I also told the nurse that this had happened. The patient nor the spouse had any further questions.     Adan Ch, PharmD, BCPS  Clinical Staff Pharmacist  Ext. [phone]    "

## 2020-07-16 NOTE — PROGRESS NOTES
Case Management Discharge Note      Final Note: Home; no needs identified.  [facility]    Provided Post Acute Provider List?: N/A  N/A Provider List Comment: No needs identified.     Destination      No service has been selected for the patient.      Durable Medical Equipment      No service has been selected for the patient.      Dialysis/Infusion      No service has been selected for the patient.      Home Medical Care      No service has been selected for the patient.      Therapy      No service has been selected for the patient.      Community Resources      No service has been selected for the patient.        Transportation Services  Private: Car    Final Discharge Disposition Code: 01 - home or self-care

## 2020-07-16 NOTE — PLAN OF CARE
Problem: Patient Care Overview  Goal: Plan of Care Review  Outcome: Ongoing (interventions implemented as appropriate)  Flowsheets (Taken 7/16/2020 0256)  Progress: no change  Plan of Care Reviewed With: patient  Outcome Summary: admit 7/14 with altered mental status, dx: acute metabolic encephalopathy. VSS, no s/s resp distress. rested very well this shift. will continue to monitor.  Goal: Individualization and Mutuality  Outcome: Ongoing (interventions implemented as appropriate)  Goal: Discharge Needs Assessment  Outcome: Ongoing (interventions implemented as appropriate)  Goal: Interprofessional Rounds/Family Conf  Outcome: Ongoing (interventions implemented as appropriate)     Problem: Pain, Chronic (Adult)  Goal: Acceptable Pain/Comfort Level and Functional Ability  Outcome: Ongoing (interventions implemented as appropriate)  Flowsheets (Taken 7/16/2020 0256)  Acceptable Pain/Comfort Level and Functional Ability: making progress toward outcome     Problem: Skin Injury Risk (Adult)  Goal: Skin Health and Integrity  Outcome: Ongoing (interventions implemented as appropriate)  Flowsheets (Taken 7/16/2020 0256)  Skin Health and Integrity: making progress toward outcome

## 2020-07-16 NOTE — PROGRESS NOTES
"Daily progress note    Chief complaint  Doing much better  No new complaints  Back to baseline per family  Wants to go home    History of present illness  59-year white male with history of diabetes mellitus hypertension hyperlipidemia chronic kidney disease stage III hepatitis B anxiety disorder depression and gastroesophageal disease who is well-known to our service.back to the emergency room with altered mental status.  Patient family stated that he has increased confusion with intermittent slurred speech.  Patient has no vision problem focal weakness headache nausea.  Patient evaluated in ER and work-up was initially negative but admit for management.  Patient fully alert oriented wants to eat and no other complaints.    REVIEW OF SYSTEMS  All systems reviewed and negative except for those discussed in HPI.      PHYSICAL EXAM  Blood pressure 180/100, pulse 55, temperature 97.7 °F (36.5 °C), temperature source Oral, resp. rate 18, height 175.3 cm (69\"), weight 75.4 kg (166 lb 4.8 oz), SpO2 98 %.    Constitutional: Disheveled but no distress.  Not overtly toxic.  Head: Normocephalic and atraumatic.   Oropharynx: Mucous membranes are moist.   Eyes: No scleral icterus. No conjunctival pallor.  Pupils 3 mm bilaterally  Neck: Painless range of motion noted. Neck supple.   Cardiovascular: Normal rate, regular rhythm and intact distal pulses.  Pulmonary/Chest: No respiratory distress. There are no wheezes, no rhonchi, and no rales.   Abdominal: Soft. There is no tenderness. There is no rebound and no guarding.   Musculoskeletal: Moves all extremities equally. There is no pedal edema or calf tenderness.   Neurological: Alert and oriented to self and location.  NIH stroke scale of 2 for slurred speech a little bit of difficulty with finger-nose on the left.  It is unclear whether this is related to sedation, history of CVA or chronic disease related to prior history of heavy alcohol use.  Skin: Skin is pink, warm, and " dry. No pallor.   Psychiatric: Mood and affect normal.     LAB RESULTS  Lab Results (last 24 hours)     Procedure Component Value Units Date/Time    POC Glucose Once [[id number]]  (Abnormal) Collected:  07/16/20 1151    Specimen:  Blood Updated:  07/16/20 1203     Glucose 182 mg/dL     POC Glucose Once [[id number]]  (Abnormal) Collected:  07/16/20 0719    Specimen:  Blood Updated:  07/16/20 0724     Glucose 190 mg/dL     Vitamin B12 [[id number]]  (Abnormal) Collected:  07/16/20 0434    Specimen:  Blood Updated:  07/16/20 0648     Vitamin B-12 >2,000 pg/mL     Narrative:       Results may be falsely increased if patient taking Biotin.      TSH [[id number]]  (Normal) Collected:  07/16/20 0434    Specimen:  Blood Updated:  07/16/20 0638     TSH 2.040 uIU/mL     BNP [[id number]]  (Abnormal) Collected:  07/16/20 0434    Specimen:  Blood Updated:  07/16/20 0638     proBNP 1,286.0 pg/mL     Narrative:       Among patients with dyspnea, NT-proBNP is highly sensitive for the detection of acute congestive heart failure. In addition NT-proBNP of <300 pg/ml effectively rules out acute congestive heart failure with 99% negative predictive value.    Results may be falsely decreased if patient taking Biotin.      Comprehensive Metabolic Panel [[id number]]  (Abnormal) Collected:  07/16/20 0434    Specimen:  Blood Updated:  07/16/20 0626     Glucose 176 mg/dL      BUN 48 mg/dL      Creatinine 3.50 mg/dL      Sodium 141 mmol/L      Potassium 4.3 mmol/L      Chloride 107 mmol/L      CO2 22.7 mmol/L      Calcium 8.4 mg/dL      Total Protein 5.8 g/dL      Albumin 3.30 g/dL      ALT (SGPT) 11 U/L      AST (SGOT) 13 U/L      Alkaline Phosphatase 50 U/L      Total Bilirubin 0.2 mg/dL      eGFR Non African Amer 18 mL/min/1.73      Globulin 2.5 gm/dL      A/G Ratio 1.3 g/dL      BUN/Creatinine Ratio 13.7     Anion Gap 11.3 mmol/L     Narrative:       GFR Normal >60  Chronic Kidney Disease <60  Kidney Failure <15      Lipid Panel [[id number]]   (Abnormal) Collected:  07/16/20 0434    Specimen:  Blood Updated:  07/16/20 0626     Total Cholesterol 128 mg/dL      Triglycerides 103 mg/dL      HDL Cholesterol 32 mg/dL      LDL Cholesterol  75 mg/dL      VLDL Cholesterol 20.6 mg/dL      LDL/HDL Ratio 2.36    Narrative:       Cholesterol Reference Ranges  (U.S. Department of Health and Human Services ATP III Classifications)    Desirable          <200 mg/dL  Borderline High    200-239 mg/dL  High Risk          >240 mg/dL      Triglyceride Reference Ranges  (U.S. Department of Health and Human Services ATP III Classifications)    Normal           <150 mg/dL  Borderline High  150-199 mg/dL  High             200-499 mg/dL  Very High        >500 mg/dL    HDL Reference Ranges  (U.S. Department of Health and Human Services ATP III Classifcations)    Low     <40 mg/dl (major risk factor for CHD)  High    >60 mg/dl ('negative' risk factor for CHD)        LDL Reference Ranges  (U.S. Department of Health and Human Services ATP III Classifcations)    Optimal          <100 mg/dL  Near Optimal     100-129 mg/dL  Borderline High  130-159 mg/dL  High             160-189 mg/dL  Very High        >189 mg/dL    Hemoglobin A1c [[id number]]  (Abnormal) Collected:  07/16/20 0434    Specimen:  Blood Updated:  07/16/20 0615     Hemoglobin A1C 8.74 %     Narrative:       Hemoglobin A1C Ranges:    Increased Risk for Diabetes  5.7% to 6.4%  Diabetes                     >= 6.5%  Diabetic Goal                < 7.0%    CBC & Differential [[id number]] Collected:  07/16/20 0434    Specimen:  Blood Updated:  07/16/20 0611    Narrative:       The following orders were created for panel order CBC & Differential.  Procedure                               Abnormality         Status                     ---------                               -----------         ------                     CBC Auto Differential[[id number]]        Abnormal            Final result                 Please view results for  these tests on the individual orders.    CBC Auto Differential [[id number]]  (Abnormal) Collected:  07/16/20 0434    Specimen:  Blood Updated:  07/16/20 0611     WBC 3.73 10*3/mm3      RBC 3.02 10*6/mm3      Hemoglobin 9.1 g/dL      Hematocrit 27.3 %      MCV 90.4 fL      MCH 30.1 pg      MCHC 33.3 g/dL      RDW 12.7 %      RDW-SD 42.5 fl      MPV 12.0 fL      Platelets 73 10*3/mm3      Neutrophil % 46.8 %      Lymphocyte % 36.2 %      Monocyte % 9.7 %      Eosinophil % 6.2 %      Basophil % 0.8 %      Immature Grans % 0.3 %      Neutrophils, Absolute 1.75 10*3/mm3      Lymphocytes, Absolute 1.35 10*3/mm3      Monocytes, Absolute 0.36 10*3/mm3      Eosinophils, Absolute 0.23 10*3/mm3      Basophils, Absolute 0.03 10*3/mm3      Immature Grans, Absolute 0.01 10*3/mm3      nRBC 0.0 /100 WBC     POC Glucose Once [[id number]]  (Abnormal) Collected:  07/15/20 2042    Specimen:  Blood Updated:  07/15/20 2044     Glucose 210 mg/dL     POC Glucose Once [[id number]]  (Abnormal) Collected:  07/15/20 1652    Specimen:  Blood Updated:  07/15/20 1654     Glucose 354 mg/dL         Imaging Results (Last 24 Hours)     ** No results found for the last 24 hours. **        ECG 12 Lead        HEART RATE= 62  bpm  RR Interval= 960  ms  MO Interval= 166  ms  P Horizontal Axis= -13  deg  P Front Axis= 51  deg  QRSD Interval= 101  ms  QT Interval= 451  ms  QRS Axis= 58  deg  T Wave Axis= -13  deg  - NORMAL ECG -  Sinus rhythm             Current Facility-Administered Medications:   •  ALPRAZolam (XANAX) tablet 1 mg, 1 mg, Oral, 4x Daily PRN, Karl Swift MD, 1 mg at 07/15/20 2113  •  aspirin chewable tablet 324 mg, 324 mg, Oral, Daily, Karl Swift MD, 324 mg at 07/16/20 0840  •  atorvastatin (LIPITOR) tablet 80 mg, 80 mg, Oral, Nightly, Karl Swift MD, 80 mg at 07/15/20 2113  •  carvedilol (COREG) tablet 3.125 mg, 3.125 mg, Oral, BID With Meals, Karl Swift MD, 3.125 mg at 07/16/20 0840  •  [START ON 7/17/2020] famotidine (PEPCID)  tablet 20 mg, 20 mg, Oral, Daily, Ariel Swift MD  •  hydrALAZINE (APRESOLINE) tablet 100 mg, 100 mg, Oral, TID, Ariel Swift MD, 100 mg at 07/16/20 0840  •  insulin glargine (LANTUS) injection 15 Units, 15 Units, Subcutaneous, Nightly, Ariel Swift MD, 15 Units at 07/15/20 2113  •  insulin lispro (humaLOG) injection 0-7 Units, 0-7 Units, Subcutaneous, TID AC, Ariel Swift MD, 2 Units at 07/16/20 1231  •  insulin lispro (humaLOG) injection 5 Units, 5 Units, Subcutaneous, TID With Meals, Ariel Swift MD, 5 Units at 07/16/20 1231  •  lisinopril (PRINIVIL,ZESTRIL) tablet 10 mg, 10 mg, Oral, Q12H, Ariel Swift MD, 10 mg at 07/16/20 0840  •  melatonin tablet 3 mg, 3 mg, Oral, Nightly PRN, Ariel Swift MD, 3 mg at 07/15/20 2113  •  [COMPLETED] Insert peripheral IV, , , Once **AND** sodium chloride 0.9 % flush 10 mL, 10 mL, Intravenous, PRN, Carmine Charlton MD  •  [COMPLETED] Insert peripheral IV, , , Once **AND** sodium chloride 0.9 % flush 10 mL, 10 mL, Intravenous, PRN, Carmine Charlton MD  •  tiZANidine (ZANAFLEX) tablet 4 mg, 4 mg, Oral, Q8H PRN, Ariel Swift MD, 4 mg at 07/15/20 1747  •  vitamin B-12 (CYANOCOBALAMIN) tablet 1,000 mcg, 1,000 mcg, Oral, Daily, Ariel Swift MD, 1,000 mcg at 07/16/20 0840     ASSESSMENT  Acute metabolic encephalopathy secondary to polypharmacy  Hypertension  Hyperlipidemia  Diabetes mellitus  Anxiety disorder  Depression  Chronic kidney disease stage III  Gastroesophageal reflux disease     PLAN  Discharge home if okay with neurology  Discharge summary dictated    ARIEL SWIFT MD

## 2020-07-16 NOTE — DISCHARGE SUMMARY
Discharge summary    Date of admission 7/14/2020  Date of discharge 7/16/2020    Final diagnosis  Acute metabolic encephalopathy secondary to polypharmacy resolved  Hypertension hypertension with elevated BP medications adjusted  Hyperlipidemia  Diabetes mellitus  Anxiety disorder  Depression  Chronic kidney disease stage III  Gastroesophageal reflux disease     Discharge medications    Current Facility-Administered Medications:   •  aspirin chewable tablet 324 mg, 324 mg, Oral, Daily, Karl Swift MD, 324 mg at 07/16/20 0840  •  atorvastatin (LIPITOR) tablet 10 mg, 10 mg, Oral, Nightly, Karl Swift MD  •  carvedilol (COREG) tablet 3.125 mg, 3.125 mg, Oral, BID With Meals, Karl Swift MD, 3.125 mg at 07/16/20 0840  •  [START ON 7/17/2020] famotidine (PEPCID) tablet 20 mg, 20 mg, Oral, Daily, Karl Swift MD  •  hydrALAZINE (APRESOLINE) tablet 100 mg, 100 mg, Oral, TID, Karl Swift MD, 100 mg at 07/16/20 0840  •  insulin glargine (LANTUS) injection 15 Units, 15 Units, Subcutaneous, Nightly, Karl Swift MD, 15 Units at 07/15/20 2113  •  insulin lispro (humaLOG) injection 0-7 Units, 0-7 Units, Subcutaneous, TID AC, Karl Swift MD, 2 Units at 07/16/20 1231  •  insulin lispro (humaLOG) injection 5 Units, 5 Units, Subcutaneous, TID With Meals, Karl Swift MD, 5 Units at 07/16/20 1231  •  lisinopril (PRINIVIL,ZESTRIL) tablet 20 mg, 20 mg, Oral, Q12H, Karl Swift MD  •  [COMPLETED] Insert peripheral IV, , , Once **AND** sodium chloride 0.9 % flush 10 mL, 10 mL, Intravenous, PRN, Carmine Charlton MD  •  [COMPLETED] Insert peripheral IV, , , Once **AND** sodium chloride 0.9 % flush 10 mL, 10 mL, Intravenous, PRN, Carmine Charlton MD     Consults obtained  Neurology    Procedures  None    Hospital course  59-year white male with history of diabetes hypertension hyperlipidemia and chronic kidney disease admitted to emergency room with altered mental status.  Patient evaluated in ER found to have acute metabolic  encephalopathy secondary to polypharmacy admit for observation.  Patient admitted and observe and his medication adjusted and is back to baseline.  Patient also evaluated by neurology and recommend no further work-up.  Patient be discharged home on home medications and he was taking a lot of unnecessary medication which are discontinued.  Patient blood pressure medication also adjusted during this hospitalization.    Discharge diet regular    Activity as tolerated    Medication as above    Follow with primary doctor in 1 week and follow with neurology per their instruction and take medication as directed    ARIEL MCGOVERN MD

## 2020-08-04 ENCOUNTER — APPOINTMENT (OUTPATIENT)
Dept: ONCOLOGY | Facility: HOSPITAL | Age: 60
End: 2020-08-04

## 2020-08-04 ENCOUNTER — LAB (OUTPATIENT)
Dept: LAB | Facility: HOSPITAL | Age: 60
End: 2020-08-04

## 2020-08-04 ENCOUNTER — OFFICE VISIT (OUTPATIENT)
Dept: ONCOLOGY | Facility: CLINIC | Age: 60
End: 2020-08-04

## 2020-08-04 VITALS
OXYGEN SATURATION: 97 % | WEIGHT: 173.2 LBS | DIASTOLIC BLOOD PRESSURE: 78 MMHG | TEMPERATURE: 97.7 F | BODY MASS INDEX: 27.18 KG/M2 | SYSTOLIC BLOOD PRESSURE: 173 MMHG | RESPIRATION RATE: 18 BRPM | HEART RATE: 59 BPM | HEIGHT: 67 IN

## 2020-08-04 DIAGNOSIS — N18.30 CHRONIC KIDNEY DISEASE, STAGE III (MODERATE) (HCC): ICD-10-CM

## 2020-08-04 DIAGNOSIS — D63.1 ANEMIA OF CHRONIC RENAL FAILURE, STAGE 3 (MODERATE) (HCC): ICD-10-CM

## 2020-08-04 DIAGNOSIS — D61.818 PANCYTOPENIA, ACQUIRED (HCC): Primary | ICD-10-CM

## 2020-08-04 DIAGNOSIS — N18.30 ANEMIA OF CHRONIC RENAL FAILURE, STAGE 3 (MODERATE) (HCC): ICD-10-CM

## 2020-08-04 DIAGNOSIS — D50.8 IRON DEFICIENCY ANEMIA SECONDARY TO INADEQUATE DIETARY IRON INTAKE: ICD-10-CM

## 2020-08-04 DIAGNOSIS — E53.8 B12 DEFICIENCY: ICD-10-CM

## 2020-08-04 LAB
BASOPHILS # BLD AUTO: 0.04 10*3/MM3 (ref 0–0.2)
BASOPHILS NFR BLD AUTO: 0.6 % (ref 0–1.5)
DEPRECATED RDW RBC AUTO: 45.2 FL (ref 37–54)
EOSINOPHIL # BLD AUTO: 0.45 10*3/MM3 (ref 0–0.4)
EOSINOPHIL NFR BLD AUTO: 6.9 % (ref 0.3–6.2)
ERYTHROCYTE [DISTWIDTH] IN BLOOD BY AUTOMATED COUNT: 13.3 % (ref 12.3–15.4)
HCT VFR BLD AUTO: 31.2 % (ref 37.5–51)
HGB BLD-MCNC: 10 G/DL (ref 13–17.7)
IMM GRANULOCYTES # BLD AUTO: 0.14 10*3/MM3 (ref 0–0.05)
IMM GRANULOCYTES NFR BLD AUTO: 2.2 % (ref 0–0.5)
LYMPHOCYTES # BLD AUTO: 1.08 10*3/MM3 (ref 0.7–3.1)
LYMPHOCYTES NFR BLD AUTO: 16.7 % (ref 19.6–45.3)
MCH RBC QN AUTO: 29.9 PG (ref 26.6–33)
MCHC RBC AUTO-ENTMCNC: 32.1 G/DL (ref 31.5–35.7)
MCV RBC AUTO: 93.4 FL (ref 79–97)
MONOCYTES # BLD AUTO: 0.47 10*3/MM3 (ref 0.1–0.9)
MONOCYTES NFR BLD AUTO: 7.3 % (ref 5–12)
NEUTROPHILS NFR BLD AUTO: 4.3 10*3/MM3 (ref 1.7–7)
NEUTROPHILS NFR BLD AUTO: 66.3 % (ref 42.7–76)
NRBC BLD AUTO-RTO: 0 /100 WBC (ref 0–0.2)
PLATELET # BLD AUTO: 90 10*3/MM3 (ref 140–450)
PMV BLD AUTO: 11.7 FL (ref 6–12)
RBC # BLD AUTO: 3.34 10*6/MM3 (ref 4.14–5.8)
WBC # BLD AUTO: 6.48 10*3/MM3 (ref 3.4–10.8)

## 2020-08-04 PROCEDURE — 99213 OFFICE O/P EST LOW 20 MIN: CPT | Performed by: INTERNAL MEDICINE

## 2020-08-04 PROCEDURE — 85025 COMPLETE CBC W/AUTO DIFF WBC: CPT

## 2020-08-04 PROCEDURE — 36415 COLL VENOUS BLD VENIPUNCTURE: CPT

## 2020-08-04 NOTE — PROGRESS NOTES
Subjective .     REASONS FOR FOLLOWUP:  Multifactorial Anemia: ANEMIA IN CKD STAGE 3 ON PROCRIT, IRON DEFICIENCY CORRECTED, B12 CORRECTED, CHRONIC DISEASE DIABETES WITH END ORGAN DAMAGE    HISTORY OF PRESENT ILLNESS:  The patient is a 59 y.o. year old male who is here for follow-up with the above-mentioned history.        PATIENT WAS CALLED THE DAY BEFORE BY THE OFFICE TO ASK FOR SYMPTOMS THAT COULD BE CONSISTENT WITH CORONAVIRUS INFECTION, AND BEING NEGATIVE WAS SCHEDULED TO BE SEEN IN THE OFFICE TODAY. SIMILAR QUESTIONING TODAY INCLUDING, CHILLS, FEVER, NEW COUGH, SHORTNESS OF BREATH, DIARRHEA,DIFFUSE BODY ACHES  AND CHANGES IN SMELL OR TASTE WERE NEGATIVE.DURING THE VISIT WITH THE PATIENT TODAY , PATIENT HAD FACE MASK, I HAD PROPPER PROTECTIVE EQUIPMENT, AND I DID HAND HYGIENE WITH SOAP AND WATER BEFORE AND AFTER THE VISIT.    This patient returns today to the office in company of his wife. He is losing memory due to vascular complications from diabetes and his vision is deteriorating related to retinal disease associated with diabetes. He requires company all of the time. The patient today has been given a new system to try to check and monitor his blood sugar to see if he ever accomplishes any improvement. They are pending to start the methodology. The patient's appetite is good as always, he has stable weight, no nausea, no vomiting, normal bowel activity, normal urination, no fluid accumulation in his legs in spite of a creatinine between 3 and 3.5 related to diabetes nephropathy. The patient denies any fever or infection. He has not taken a lot of precautions to be away from people with the potentiality for coronavirus infection. His wife has been on top of things trying to protect him as much as she can.           Past Medical History:   Diagnosis Date   • Anemia    • Anxiety    • Arthritis     HANDS   • CAD (coronary artery disease)    • Cancer (CMS/[facility])     KIDNEY 7/2018 SX   • CHF (congestive heart  failure) (CMS/[facility])    • Chronic back pain    • Chronic kidney disease    • Coronary artery disease    • Depression    • Diabetes mellitus (CMS/[facility])     TYPE 2   • Eyes sensitive to light, bilateral    • GERD (gastroesophageal reflux disease)    • Headache    • Hepatitis C     after blood tranfusion/treated   • History of MRSA infection    • History of transfusion     2013 CABG   • Hypertension    • Jaundice    • Myocardial infarction (CMS/[facility])     5-6 years ago per pt   • Stroke (CMS/[facility]) 12/2017    left sided weakness/       ONCOLOGIC HISTORY:  (History from previous dates can be found in the separate document.)    Current Outpatient Medications on File Prior to Visit   Medication Sig Dispense Refill   • ALPRAZolam (XANAX) 1 MG tablet Take 1 mg by mouth 4 (Four) Times a Day As Needed.     • aspirin 81 MG chewable tablet Chew 4 tablets Daily for 30 days. 120 tablet 0   • atorvastatin (LIPITOR) 10 MG tablet Take 1 tablet by mouth Every Night for 30 days. 30 tablet 0   • carvedilol (COREG) 3.125 MG tablet Take 3.125 mg by mouth 2 (Two) Times a Day With Meals.     • hydrALAZINE (APRESOLINE) 100 MG tablet Take 100 mg by mouth 3 (Three) Times a Day. Pt takes 2x daily at home-will talk with cardiologist in May per pt     • insulin aspart (novoLOG FLEXPEN) 100 UNIT/ML solution pen-injector sc pen Inject 2-15 Units under the skin 3 (Three) Times a Day With Meals. Per sliding scale     • Insulin Glargine (BASAGLAR KWIKPEN) 100 UNIT/ML injection pen INJECT 10 UNITS SC AT NIGHT  0   • Insulin Glargine (LANTUS SOLOSTAR) 100 UNIT/ML injection pen Inject 10 Units under the skin into the appropriate area as directed Every Night.     • lisinopril (PRINIVIL,ZESTRIL) 20 MG tablet Take 1 tablet by mouth Every 12 (Twelve) Hours for 30 days. 60 tablet 0   • MELATONIN PO Take  by mouth.     • oxyCODONE-acetaminophen (PERCOCET)  MG per tablet Take 1 tablet by mouth Every 6 (Six) Hours As Needed.     • pantoprazole (PROTONIX) 40 MG  "EC tablet Take 40 mg by mouth Daily.       No current facility-administered medications on file prior to visit.        ALLERGIES:     Allergies   Allergen Reactions   • Gabapentin Unknown - High Severity     Pt unresponsive   • Morphine And Related Delirium   • Fentanyl Unknown - High Severity     ER said he was allergy   • Ibuprofen Nausea Only   • Latex Rash   • Penicillins Hives       Social History     Socioeconomic History   • Marital status:      Spouse name: Samantha   • Number of children: Not on file   • Years of education: High school   • Highest education level: Not on file   Occupational History   • Occupation: retired     Employer: UNEMPLOYED   Tobacco Use   • Smoking status: Never Smoker   • Smokeless tobacco: Never Used   Substance and Sexual Activity   • Alcohol use: No     Comment: \"no more than a half a pint of coconut rum\"   • Drug use: No   • Sexual activity: Defer       Review of Systems:          General: no fever, no chills, no fatigue,no weight changes, no lack of appetite.  Eyes: no epiphora, xerophthalmia,conjunctivitis, pain, glaucoma, STATED blurred vision, blindness, secretion, photophobia, proptosis, diplopia.  Ears: no otorrhea, tinnitus, otorrhagia, deafness, pain, vertigo.  Nose: no rhinorrhea, no epistaxis, no alteration in perception of odors, no sinuses pressure.  Mouth: no alteration in gums or teeth,  No ulcers, no difficulty with mastication or deglut ion, no odynophagia.  Neck: no masses or pain, no thyroid alterations, no pain in muscles or arteries, no carotid odynia, no crepitation.  Respiratory: no cough, no sputum production,no dyspnea,no trepopnea, no pleuritic pain,no hemoptysis.  Heart: no syncope, no irregularity, no palpitations, no angina,no orthopnea,no paroxysmal nocturnal dyspnea.  Vascular Venous: no tenderness,no edema,no palpable cords,no postphlebitic syndrome, no skin changes no ulcerations.  Vascular Arterial: no distal ischemia, noclaudication, no " "gangrene, no neuropathic ischemic pain, no skin ulcers, no paleness no cyanosis.  GI: no dysphagia, no odynophagia, no regurgitation, no heartburn,no indigestion,no nausea,no vomiting,no hematemesis ,no melena,no jaundice,no distention, no obstipation,no enterorrhagia,no proctalgia,no anal  lesions, no changes in bowel habits.  : no frequency, no hesitancy, no hematuria, no discharge,no  pain.  Musculoskeletal: no muscle or tendon pain or inflammation,no  joint pain, no edema, no functional limitation,no fasciculations, no mass.  Neurologic: no headache, no seizures, noalterations on Craneal nerves, no motor deficit, no sensory deficit, normal coordination, STATED alteration in memory,normal orientation, calculation,normal writting, verbal and written language.  Skin: no rashes,no pruritus no localized lesions.  Psychiatric: no anxiety, no depression,no agitation, no delusions, proper insight.        Cancer-related family history includes Cervical cancer in his daughter and sister; Ovarian cancer in his sister.       Objective      Vitals:    08/04/20 1347   BP: 173/78   Pulse: 59   Resp: 18   Temp: 97.7 °F (36.5 °C)   TempSrc: Temporal   SpO2: 97%   Weight: 78.6 kg (173 lb 3.2 oz)   Height: 170.5 cm (67.13\")   PainSc: 0-No pain     Current Status 8/4/2020   ECOG score 0         Physical Exam:          GENERAL ASPECT: IN POOR HEALTH WALKING THROUGH THE ROOM NO DIFFICULTIES, NO PAIN.PROPER NUTRITION.WELL KEPT PHYSICALLY.  EYES : NOT JAUNDICED, PUPILS WERE EQUAL.  NECK: NO CERVICAL ADENOPATHIES OR MASSES OR THYROID ENLARGEMENT.  HEART: REGULAR ,NO MURMURS , NO GALLOPS, NO RUBS.  ABDOMEN: NOT DISTENDED, NO PAIN, NO LIVER OR SPLEEN ENLARGEMENT, NO ASCITES, NO COLLATERAL CIRCULATION.  EXTREMITIES: NO EDEMA, NO PAIN, NO CLUBBING, NO DEFORMITIES.  NEUROLOGIC: NORMAL CONVERSATION, MEMORY , SPEECH AND GAIT.  SKIN: NO LESIONS.                RECENT LABS:  Results from last 7 days   Lab Units 08/04/20  1337   WBC 10*3/mm3 " 6.48   NEUTROS ABS 10*3/mm3 4.30   HEMOGLOBIN g/dL 10.0*   HEMATOCRIT % 31.2*   PLATELETS 10*3/mm3 90*               Assessment/Plan     1. This patient has history of multifactorial anemia. This is consistent with B12 deficiency that has been corrected, most recent B12 level more than 2000, iron deficiency, that has been corrected, and anemia of chronic kidney disease that has treated with Procrit. Today the patient's hemoglobin is 10. The white count is normal. Therefore he will not require any Procrit today. He will remain on monthly CBC and RN check and Procrit administration depending on needs and schedule.  2. The patient has had chronic thrombocytopenia, this number never has changed. His IPF has been variable in the past. He had no improvement with B12 supplementation. He has no splenomegaly and this will be monitored at this time. No attempts for bone marrow will be done under the present circumstances with the stability of this number.   3. This patient has diabetes with end organ damage including retinopathy, microvascular changes in his brain with poor memory and poor control of diabetes overall. He also has diabetic nephropathy with very strong creatinine between 3 and 3.5. The patient has decreased vision, he has poor memory and his wife needs to be with him in all of the appointments. Under the present COVID circumstances  This will be generated to be sure that he is safe and sound and everybody knows what is the status of his condition. For this reason I have advised the patient's wife to be seen along with the patient in the office for future visits. He will require a CBC on monthly basis and RN and Procrit administration according to needs, CMP in 3 months and doctor visit in 4 months.       I DISCUSSED WITH PATIENT IN DETAIL FORMS TO DECREASE CHANCES OF CORONAVIRUS INFECTION INCLUDING ISOLATION, PROPER HAND HIGIENE, AVOID PUBLIC PLACES  WITH CROWDS, FOLLOW  CDC RECOMENDATIONS, AND KEEP PERSONAL  AND SOCIAL RESPONSIBILITY, WARE A MASK IN PUBLIC PLACES.  PATIENT IS AWARE THIS INFECTION COULD HAVE SEVERE CONSEQUENCES TO PERSONAL HEALTH AND FAMILY RAMIFICATIONS OF THIS.

## 2020-09-01 ENCOUNTER — LAB (OUTPATIENT)
Dept: LAB | Facility: HOSPITAL | Age: 60
End: 2020-09-01

## 2020-09-01 ENCOUNTER — CLINICAL SUPPORT (OUTPATIENT)
Dept: ONCOLOGY | Facility: HOSPITAL | Age: 60
End: 2020-09-01

## 2020-09-01 DIAGNOSIS — D63.1 ANEMIA OF CHRONIC RENAL FAILURE, STAGE 3 (MODERATE) (HCC): Primary | ICD-10-CM

## 2020-09-01 DIAGNOSIS — E53.8 B12 DEFICIENCY: ICD-10-CM

## 2020-09-01 DIAGNOSIS — D61.818 PANCYTOPENIA, ACQUIRED (HCC): ICD-10-CM

## 2020-09-01 DIAGNOSIS — N18.30 CHRONIC KIDNEY DISEASE, STAGE III (MODERATE) (HCC): ICD-10-CM

## 2020-09-01 DIAGNOSIS — D63.1 ANEMIA OF CHRONIC RENAL FAILURE, STAGE 3 (MODERATE) (HCC): ICD-10-CM

## 2020-09-01 DIAGNOSIS — N18.30 ANEMIA OF CHRONIC RENAL FAILURE, STAGE 3 (MODERATE) (HCC): ICD-10-CM

## 2020-09-01 DIAGNOSIS — D50.8 IRON DEFICIENCY ANEMIA SECONDARY TO INADEQUATE DIETARY IRON INTAKE: ICD-10-CM

## 2020-09-01 DIAGNOSIS — N18.30 ANEMIA OF CHRONIC RENAL FAILURE, STAGE 3 (MODERATE) (HCC): Primary | ICD-10-CM

## 2020-09-01 LAB
BASOPHILS # BLD AUTO: 0.05 10*3/MM3 (ref 0–0.2)
BASOPHILS NFR BLD AUTO: 0.7 % (ref 0–1.5)
DEPRECATED RDW RBC AUTO: 47.1 FL (ref 37–54)
EOSINOPHIL # BLD AUTO: 0.38 10*3/MM3 (ref 0–0.4)
EOSINOPHIL NFR BLD AUTO: 5.6 % (ref 0.3–6.2)
ERYTHROCYTE [DISTWIDTH] IN BLOOD BY AUTOMATED COUNT: 13.9 % (ref 12.3–15.4)
HCT VFR BLD AUTO: 31.6 % (ref 37.5–51)
HGB BLD-MCNC: 10.1 G/DL (ref 13–17.7)
IMM GRANULOCYTES # BLD AUTO: 0.06 10*3/MM3 (ref 0–0.05)
IMM GRANULOCYTES NFR BLD AUTO: 0.9 % (ref 0–0.5)
LYMPHOCYTES # BLD AUTO: 1.93 10*3/MM3 (ref 0.7–3.1)
LYMPHOCYTES NFR BLD AUTO: 28.3 % (ref 19.6–45.3)
MCH RBC QN AUTO: 29.7 PG (ref 26.6–33)
MCHC RBC AUTO-ENTMCNC: 32 G/DL (ref 31.5–35.7)
MCV RBC AUTO: 92.9 FL (ref 79–97)
MONOCYTES # BLD AUTO: 0.41 10*3/MM3 (ref 0.1–0.9)
MONOCYTES NFR BLD AUTO: 6 % (ref 5–12)
NEUTROPHILS NFR BLD AUTO: 3.98 10*3/MM3 (ref 1.7–7)
NEUTROPHILS NFR BLD AUTO: 58.5 % (ref 42.7–76)
NRBC BLD AUTO-RTO: 0 /100 WBC (ref 0–0.2)
PLATELET # BLD AUTO: 106 10*3/MM3 (ref 140–450)
PMV BLD AUTO: 11.8 FL (ref 6–12)
RBC # BLD AUTO: 3.4 10*6/MM3 (ref 4.14–5.8)
WBC # BLD AUTO: 6.81 10*3/MM3 (ref 3.4–10.8)

## 2020-09-01 PROCEDURE — G0463 HOSPITAL OUTPT CLINIC VISIT: HCPCS

## 2020-09-01 PROCEDURE — 36415 COLL VENOUS BLD VENIPUNCTURE: CPT

## 2020-09-01 PROCEDURE — 85025 COMPLETE CBC W/AUTO DIFF WBC: CPT

## 2020-09-01 NOTE — PROGRESS NOTES
CBC reviewed with pt. Hgb 10.1 today, Plts 106K. Pt denies any complaints. States he is feeling well. Per Dr. Aviles last note, we will give procrit as needed. D/W Shelly ALCALA RN in pre-certs. She states since pt's insurance has changed, he will need to be re-certified. This can happen once his Hgb <10. Informed pt of this and he v/u. Orders placed for ferritin and iron panel at next visit as he will need these results if he were to need procrit next time.     Lab Results   Component Value Date    WBC 6.81 09/01/2020    HGB 10.1 (L) 09/01/2020    HCT 31.6 (L) 09/01/2020    MCV 92.9 09/01/2020     (L) 09/01/2020

## 2020-09-16 ENCOUNTER — TELEPHONE (OUTPATIENT)
Dept: NEUROLOGY | Facility: CLINIC | Age: 60
End: 2020-09-16

## 2020-09-16 NOTE — TELEPHONE ENCOUNTER
Wife sts they are wanting to keep appt here. Pt does not want to continue at [facility].

## 2020-09-21 ENCOUNTER — OFFICE VISIT (OUTPATIENT)
Dept: NEUROLOGY | Facility: CLINIC | Age: 60
End: 2020-09-21

## 2020-09-21 VITALS
SYSTOLIC BLOOD PRESSURE: 198 MMHG | DIASTOLIC BLOOD PRESSURE: 112 MMHG | HEART RATE: 63 BPM | BODY MASS INDEX: 26.84 KG/M2 | OXYGEN SATURATION: 95 % | HEIGHT: 67 IN | WEIGHT: 171 LBS

## 2020-09-21 DIAGNOSIS — I63.9 ACUTE CVA (CEREBROVASCULAR ACCIDENT) (HCC): ICD-10-CM

## 2020-09-21 DIAGNOSIS — R73.9 HEMOGLOBIN A1C BETWEEN 7.0% AND 9.0%: ICD-10-CM

## 2020-09-21 DIAGNOSIS — E11.65 UNCONTROLLED TYPE 2 DIABETES MELLITUS WITH HYPERGLYCEMIA (HCC): ICD-10-CM

## 2020-09-21 DIAGNOSIS — E53.8 B12 DEFICIENCY: ICD-10-CM

## 2020-09-21 DIAGNOSIS — R20.0 LOWER EXTREMITY NUMBNESS: Primary | ICD-10-CM

## 2020-09-21 PROCEDURE — 99215 OFFICE O/P EST HI 40 MIN: CPT | Performed by: NURSE PRACTITIONER

## 2020-09-21 RX ORDER — LISINOPRIL 10 MG/1
1 TABLET ORAL DAILY
COMMUNITY
Start: 2020-08-20 | End: 2021-04-12 | Stop reason: HOSPADM

## 2020-09-21 RX ORDER — SIMVASTATIN 40 MG
40 TABLET ORAL NIGHTLY
COMMUNITY

## 2020-09-21 NOTE — PROGRESS NOTES
"DOS: 2020  NAME: Angelo Brown   : 1960  PCP: Yadiel Baxter III, MD    Chief Complaint   Patient presents with   • Stroke   • Altered Mental Status   Patient is accompanied by his wife who provides majority of history.       Neurological Problem and Interval History:  60 y.o. right-handed male with a Hx of diabetes mellitus, hypertension and CAD, CKD- III, anemia of chronic renal failure-stage III, B12 deficiency, thrombocytopenia, iron deficiency anemia (followed by heme-onc) who I am seeing today in follow-up from hospital 7/15/2020 where he presented with complaint of confusion.  Initial CT the head negative for acute findings.  Chart review reflects that patient had multiple medication manufacture changes and got confused therefore etiology of AMS was thought to be secondary to encephalopathy related to medication errors.  Patient on Zanaflex, Xanax and according to wife was on gabapentin at that time; gabapentin since discontinued-hospital discharge MAR does not reflect this.  Patient very anxious on exam today he reports he is mostly anxious because he is seeing a new neurologist.  Is hypertensive 198/112.  Discussed blood pressure goals; systolic blood pressure 1 30-1 40 and diastolic blood pressure 70-80.  Patient on Xanax he states he has been taking it more than normal although still at prescribed dose previously was taken twice daily now taking 4 times daily to all of the things going on in the city.  Patient reports he lives near [facility] and gets \"wild at night\".  Patient reports he feels safe.  He denies any suicidal and/or homicidal ideations although states if somebody puts their hands on me I will do what I have to do.  Discussed SI/HI and when to present to the emergency department.  Patient and wife verbalized understanding.    Patient was seen by me 2017 for the first time and presented to the hospital with a two-week history of headache, left-sided vision " loss, dizziness and issues with balance.  At that time, MRI confirmed acute to subacute infarction involving the mid and posterior medial right temporal lobe without evidence of hemorrhagic transformation.  Surface echo showed EF 70% with normal LV function.  No mention of PFO and negative saline test.  ZIO patch was recommended; not completed.  Patient declined ZIO patch at this time.  He was recommended aspirin and statin; currently not on aspirin have recommended to resume low-dose aspirin.  He was left with residual left-sided hemianopsia; still present on exam today.  He feels that his vision got worse after cataract repair; I have advised that he follow-up with neuro-ophthalmology in the near future-she is agreeable.  Labs: July 2020 A1c 8.74, B12 greater than 2000, TSH 2.040, LDL 72.  Patient known uncontrolled diabetic and has been experiencing some numbness and tingling of lower extremities left greater than right; treated by prior neurologist for idiopathic peripheral neuropathy.  No treatable cause peripheral neuropathy labs noted therefore will add and recommend EMG to further evaluate.  Etiology of peripheral neuropathy likely secondary to uncontrolled diabetes as last A1c was 8.74 as noted above.  Offered Rx alternatives topical pain cream which patient will discuss with pain management.  Patient is also established with endocrinology, hematology/oncology and PCP.  Given uncontrolled diabetes and patient/wife having questions regarding diabetic management and equipment I have placed referral to diabetic educator which both wife and patient are agreeable to.  Patient completes ADLs without assistance although he no longer drives due to vision loss which I agree with.  Denies any issues with sleep specifically no concern for TWYLA.  I will plan to see patient back in 6 months time; sooner if needed.       Review of Systems:        Review of Systems   Constitutional: Positive for appetite change  (decreased). Negative for activity change and unexpected weight change.   HENT: Negative for facial swelling, trouble swallowing and voice change.    Eyes: Positive for discharge. Negative for photophobia, pain and visual disturbance.   Respiratory: Negative for chest tightness, shortness of breath and wheezing.    Cardiovascular: Negative for chest pain, palpitations and leg swelling.   Gastrointestinal: Negative for abdominal pain, nausea and vomiting.   Endocrine: Negative for cold intolerance and heat intolerance.   Musculoskeletal: Negative for arthralgias, back pain, gait problem, joint swelling, myalgias, neck pain and neck stiffness.   Neurological: Positive for dizziness and headaches. Negative for tremors, seizures, syncope, facial asymmetry, speech difficulty, weakness, light-headedness and numbness.   Hematological: Does not bruise/bleed easily.   Psychiatric/Behavioral: Positive for agitation (du to city unrest), confusion and sleep disturbance. Negative for behavioral problems, decreased concentration, dysphoric mood, hallucinations, self-injury and suicidal ideas. The patient is nervous/anxious (due to city unrest). The patient is not hyperactive.          Current Outpatient Medications:   •  ALPRAZolam (XANAX) 1 MG tablet, Take 1 mg by mouth 4 (Four) Times a Day As Needed., Disp: , Rfl:   •  carvedilol (COREG) 3.125 MG tablet, Take 3.125 mg by mouth 2 (Two) Times a Day With Meals., Disp: , Rfl:   •  diclofenac (VOLTAREN) 1 % gel gel, Apply 4 g topically to the appropriate area as directed 4 (Four) Times a Day As Needed., Disp: , Rfl:   •  hydrALAZINE (APRESOLINE) 100 MG tablet, Take 100 mg by mouth 3 (Three) Times a Day. Pt takes 2x daily at home-will talk with cardiologist in May per pt, Disp: , Rfl:   •  insulin aspart (novoLOG FLEXPEN) 100 UNIT/ML solution pen-injector sc pen, Inject 2-15 Units under the skin 3 (Three) Times a Day With Meals. Per sliding scale, Disp: , Rfl:   •  Insulin Glargine  "(BASAGLAR KWIKPEN) 100 UNIT/ML injection pen, INJECT 10 UNITS SC AT NIGHT, Disp: , Rfl: 0  •  Insulin Glargine (LANTUS SOLOSTAR) 100 UNIT/ML injection pen, Inject 10 Units under the skin into the appropriate area as directed Every Night., Disp: , Rfl:   •  lisinopril (PRINIVIL,ZESTRIL) 10 MG tablet, 1 tablet Daily., Disp: , Rfl:   •  MELATONIN PO, Take  by mouth., Disp: , Rfl:   •  oxyCODONE-acetaminophen (PERCOCET)  MG per tablet, Take 1 tablet by mouth Every 6 (Six) Hours As Needed., Disp: , Rfl:   •  pantoprazole (PROTONIX) 40 MG EC tablet, Take 40 mg by mouth Daily., Disp: , Rfl:   •  simvastatin (ZOCOR) 40 MG tablet, Take 40 mg by mouth Every Night., Disp: , Rfl:     \"The following portions of the patient's history were reviewed and updated as appropriate: allergies, current medications, past family history, past medical history, past social history, past surgical history and problem list.\"  Review and Interpretation of Imaging:  Reviewed imaging discussed above.   Laboratory Results:             Lab Results   Component Value Date    HGBA1C 8.74 (H) 07/16/2020         Lab Results   Component Value Date    CHOL 128 07/16/2020    CHOL 165 06/07/2018    CHOL 138 12/21/2017         Lab Results   Component Value Date    HDL 37 (L) 07/20/2020    HDL 32 (L) 07/16/2020    HDL 52 09/06/2018         Lab Results   Component Value Date    LDL 97 07/20/2020    LDL 75 07/16/2020    LDL 79 09/06/2018         Lab Results   Component Value Date    TRIG 146 07/20/2020    TRIG 103 07/16/2020    TRIG 142 09/06/2018     No results found for: RPR  Lab Results   Component Value Date    TSH 2.040 07/16/2020     Lab Results   Component Value Date    AAYKIZNV75 >2,000 (H) 07/16/2020       Physical Examination: NIHSS: 0 mRS: 2 (does not drive d/t vision impairement)   General Appearance:   Well developed, well nourished, well groomed, alert, and cooperative.  HEENT: Normocephalic.  Normal fundoscopic exam including normal retina, " discs are flat with sharp margins, normal vasculature.  Neck and Spine: Normal range of motion.  Normal alignment. No mass or tenderness. No bruits.  Cardiac: Regular rate and rhythm. No murmurs.  Peripheral Vasculature: Radial and pedal pulses are equal and symmetric. No     signs of distal embolization.  Extremities:    No edema or deformities. Normal joint ROM.  Skin:    No rashes or birth marks.    Neurological examination:  Higher Integrative  Function: Oriented to time, place and person. Normal registration, recall, attention span and concentration. Normal language including comprehension, spontaneous speech, repetition, reading, writing, naming and vocabulary. No neglect with normal visual-spatial function and construction. Normal fund of knowledge and higher integrative function.  CN II: Pupils are equal, round, and reactive to light. Normal visual acuity and visual fields except left homonymous hemianopsia still present (originally seen 2017)  CN III IV VI: Extraocular movements are full without nystagmus.   CN V: Normal facial sensation and strength of muscles of mastication.  CN VII: Facial movements are symmetric. No weakness.  CN VIII:   Auditory acuity is normal.  CN IX & X:   Symmetric palatal movement.  CN XI: Sternocleidomastoid and trapezius are normal.  No weakness.  CN XII:   The tongue is midline.  No atrophy or fasciculations.  Motor: Normal muscle strength, bulk and tone in upper and lower extremities.  No fasciculations, rigidity, spasticity, or abnormal movements.  Reflexes: Plantar responses are flexor.  Sensation: Normal to light touch, pinprick, vibration, temperature in arms and legs except mild decrease in vibration left ankle.   Station and Gait: Normal gait and station.    Coordination: Finger to nose test shows no dysmetria.  Rapid alternating movements are normal.  Heel to shin normal.    Impression:  1. Right temporal lobe infarct (December 2017)  2. Metabolic Encephalopathy;  secondary to medication-recent admission July 2020   3. Suspected peripheral neuropathy likely secondary to uncontrolled blood sugar; A1C 7/2020 8.74   4. CKD--stage III      Plan:   EMG lower and upper ext.; Left > R; decreased vibration LLE    Treatable cause peripheral Neuropathy labs    RX alternative topical pain cream; pt. Will discuss w/ pain management    Neurophthalmology referral    Resume ASA 81mg daily    Continue Zocor 40mg daily    Consider holter in the future; declined per patient at this time    Blood pressure control to <130/80   Consider RX alternatives pain cream; on Gabapentin in the past w/ severe AMS    Goal LDL <70-recommend high dose statins-    Serum glucose < 140   Call 911 for stroke any stroke symptoms   Follow-up 3-6 months     50minutes spent with patient with >50% of that time spent educating patient regarding: peripherial neuropathy w/u, exam findings, treatment, stroke risk factors, prevention and follow-up, residual deficits.       Angelo was seen today for stroke and altered mental status.    Diagnoses and all orders for this visit:    Lower extremity numbness  -     EMG; Future  -     Copper, Serum; Future  -     Protein Elec + Interp, Serum; Future  -     RPR; Future  -     Sedimentation Rate; Future  -     Folate; Future  -     Cryoglobulin; Future  -     Heavy Metals, Blood; Future    Uncontrolled type 2 diabetes mellitus with hyperglycemia (CMS/HCC)  -     EMG; Future  -     Copper, Serum; Future  -     Protein Elec + Interp, Serum; Future  -     RPR; Future  -     Sedimentation Rate; Future  -     Folate; Future  -     Cryoglobulin; Future  -     Heavy Metals, Blood; Future  -     Ambulatory Referral to Diabetic Education    Hemoglobin A1c between 7.0% and 9.0%  -     EMG; Future  -     Copper, Serum; Future  -     Protein Elec + Interp, Serum; Future  -     RPR; Future  -     Sedimentation Rate; Future  -     Folate; Future  -     Cryoglobulin; Future  -     Heavy Metals,  Blood; Future  -     Ambulatory Referral to Diabetic Education        MDM  Reviewed: previous chart, nursing note and vitals (Care Everywhere Neurology notes and imaging reports )  Reviewed previous: labs, MRI and CT scan  Interpretation: labs, MRI and CT scan  Consults: Diabetic Educator

## 2020-09-22 RX ORDER — ASPIRIN 81 MG/1
81 TABLET ORAL DAILY
Qty: 150 TABLET | Refills: 2 | Status: SHIPPED | OUTPATIENT
Start: 2020-09-22 | End: 2021-08-10

## 2020-10-06 ENCOUNTER — CLINICAL SUPPORT (OUTPATIENT)
Dept: ONCOLOGY | Facility: HOSPITAL | Age: 60
End: 2020-10-06

## 2020-10-06 ENCOUNTER — LAB (OUTPATIENT)
Dept: LAB | Facility: HOSPITAL | Age: 60
End: 2020-10-06

## 2020-10-06 DIAGNOSIS — N18.30 ANEMIA OF CHRONIC RENAL FAILURE, STAGE 3 (MODERATE) (HCC): ICD-10-CM

## 2020-10-06 DIAGNOSIS — N18.30 CHRONIC KIDNEY DISEASE, STAGE III (MODERATE) (HCC): ICD-10-CM

## 2020-10-06 DIAGNOSIS — E53.8 B12 DEFICIENCY: ICD-10-CM

## 2020-10-06 DIAGNOSIS — D50.8 IRON DEFICIENCY ANEMIA SECONDARY TO INADEQUATE DIETARY IRON INTAKE: ICD-10-CM

## 2020-10-06 DIAGNOSIS — D61.818 PANCYTOPENIA, ACQUIRED (HCC): ICD-10-CM

## 2020-10-06 DIAGNOSIS — D63.1 ANEMIA OF CHRONIC RENAL FAILURE, STAGE 3 (MODERATE) (HCC): ICD-10-CM

## 2020-10-06 LAB
BASOPHILS # BLD AUTO: 0.05 10*3/MM3 (ref 0–0.2)
BASOPHILS NFR BLD AUTO: 0.8 % (ref 0–1.5)
DEPRECATED RDW RBC AUTO: 48.9 FL (ref 37–54)
EOSINOPHIL # BLD AUTO: 0.5 10*3/MM3 (ref 0–0.4)
EOSINOPHIL NFR BLD AUTO: 7.9 % (ref 0.3–6.2)
ERYTHROCYTE [DISTWIDTH] IN BLOOD BY AUTOMATED COUNT: 13.7 % (ref 12.3–15.4)
FERRITIN SERPL-MCNC: 179.4 NG/ML (ref 30–400)
HCT VFR BLD AUTO: 34.9 % (ref 37.5–51)
HGB BLD-MCNC: 10.9 G/DL (ref 13–17.7)
IMM GRANULOCYTES # BLD AUTO: 0.01 10*3/MM3 (ref 0–0.05)
IMM GRANULOCYTES NFR BLD AUTO: 0.2 % (ref 0–0.5)
IRON 24H UR-MRATE: 70 MCG/DL (ref 59–158)
IRON SATN MFR SERPL: 25 % (ref 14–48)
LYMPHOCYTES # BLD AUTO: 1.66 10*3/MM3 (ref 0.7–3.1)
LYMPHOCYTES NFR BLD AUTO: 26.3 % (ref 19.6–45.3)
MCH RBC QN AUTO: 30.2 PG (ref 26.6–33)
MCHC RBC AUTO-ENTMCNC: 31.2 G/DL (ref 31.5–35.7)
MCV RBC AUTO: 96.7 FL (ref 79–97)
MONOCYTES # BLD AUTO: 0.32 10*3/MM3 (ref 0.1–0.9)
MONOCYTES NFR BLD AUTO: 5.1 % (ref 5–12)
NEUTROPHILS NFR BLD AUTO: 3.77 10*3/MM3 (ref 1.7–7)
NEUTROPHILS NFR BLD AUTO: 59.7 % (ref 42.7–76)
NRBC BLD AUTO-RTO: 0 /100 WBC (ref 0–0.2)
PLATELET # BLD AUTO: 110 10*3/MM3 (ref 140–450)
PMV BLD AUTO: 10.7 FL (ref 6–12)
RBC # BLD AUTO: 3.61 10*6/MM3 (ref 4.14–5.8)
TIBC SERPL-MCNC: 286 MCG/DL (ref 249–505)
TRANSFERRIN SERPL-MCNC: 204 MG/DL (ref 200–360)
WBC # BLD AUTO: 6.31 10*3/MM3 (ref 3.4–10.8)

## 2020-10-06 PROCEDURE — 83540 ASSAY OF IRON: CPT

## 2020-10-06 PROCEDURE — G0463 HOSPITAL OUTPT CLINIC VISIT: HCPCS

## 2020-10-06 PROCEDURE — 82728 ASSAY OF FERRITIN: CPT

## 2020-10-06 PROCEDURE — 85025 COMPLETE CBC W/AUTO DIFF WBC: CPT

## 2020-10-06 PROCEDURE — 84466 ASSAY OF TRANSFERRIN: CPT

## 2020-10-06 PROCEDURE — 36415 COLL VENOUS BLD VENIPUNCTURE: CPT

## 2020-10-06 NOTE — PROGRESS NOTES
CBC reviewed with pt. Hgb 10.9 today, plts 110K today. Pt states he is feeling well, denies complaints. Pt does not qualify for EPO at this time. Ferritin and iron still pending today. Advised pt I would call if any abnormalities. D/W Shelly ALCALA in pre-certs. Per Shelly, if pt does need EPO in the future, she would need to re-certify through his insurance. This can be done same day however and pt could receive EPO on same day.       Lab Results   Component Value Date    WBC 6.31 10/06/2020    HGB 10.9 (L) 10/06/2020    HCT 34.9 (L) 10/06/2020    MCV 96.7 10/06/2020     (L) 10/06/2020

## 2020-10-22 ENCOUNTER — HOSPITAL ENCOUNTER (EMERGENCY)
Facility: HOSPITAL | Age: 60
Discharge: HOME | End: 2020-10-22
Attending: EMERGENCY MEDICINE | Admitting: EMERGENCY MEDICINE

## 2020-10-22 VITALS
RESPIRATION RATE: 16 BRPM | TEMPERATURE: 96.2 F | SYSTOLIC BLOOD PRESSURE: 208 MMHG | OXYGEN SATURATION: 98 % | HEART RATE: 66 BPM | DIASTOLIC BLOOD PRESSURE: 96 MMHG

## 2020-10-22 DIAGNOSIS — E11.649 HYPOGLYCEMIA ASSOCIATED WITH TYPE 2 DIABETES MELLITUS (HCC): Primary | ICD-10-CM

## 2020-10-22 LAB
ALBUMIN SERPL-MCNC: 4.8 G/DL (ref 3.5–5.2)
ALBUMIN/GLOB SERPL: 1.8 G/DL
ALP SERPL-CCNC: 56 U/L (ref 39–117)
ALT SERPL W P-5'-P-CCNC: 14 U/L (ref 1–41)
ANION GAP SERPL CALCULATED.3IONS-SCNC: 12.1 MMOL/L (ref 5–15)
AST SERPL-CCNC: 18 U/L (ref 1–40)
BASOPHILS # BLD AUTO: 0.07 10*3/MM3 (ref 0–0.2)
BASOPHILS NFR BLD AUTO: 0.6 % (ref 0–1.5)
BILIRUB SERPL-MCNC: 0.3 MG/DL (ref 0–1.2)
BUN SERPL-MCNC: 46 MG/DL (ref 6–20)
BUN/CREAT SERPL: 13.2 (ref 7–25)
CALCIUM SPEC-SCNC: 9.7 MG/DL (ref 8.6–10.5)
CHLORIDE SERPL-SCNC: 113 MMOL/L (ref 98–107)
CO2 SERPL-SCNC: 22.9 MMOL/L (ref 22–29)
CREAT SERPL-MCNC: 3.49 MG/DL (ref 0.76–1.27)
DEPRECATED RDW RBC AUTO: 46.8 FL (ref 37–54)
EOSINOPHIL # BLD AUTO: 0.38 10*3/MM3 (ref 0–0.4)
EOSINOPHIL NFR BLD AUTO: 3.1 % (ref 0.3–6.2)
ERYTHROCYTE [DISTWIDTH] IN BLOOD BY AUTOMATED COUNT: 14 % (ref 12.3–15.4)
GFR SERPL CREATININE-BSD FRML MDRD: 18 ML/MIN/1.73
GLOBULIN UR ELPH-MCNC: 2.7 GM/DL
GLUCOSE BLDC GLUCOMTR-MCNC: 234 MG/DL (ref 70–130)
GLUCOSE BLDC GLUCOMTR-MCNC: 330 MG/DL (ref 70–130)
GLUCOSE BLDC GLUCOMTR-MCNC: 48 MG/DL (ref 70–130)
GLUCOSE SERPL-MCNC: 53 MG/DL (ref 65–99)
HCT VFR BLD AUTO: 37 % (ref 37.5–51)
HGB BLD-MCNC: 12.2 G/DL (ref 13–17.7)
IMM GRANULOCYTES # BLD AUTO: 0.13 10*3/MM3 (ref 0–0.05)
IMM GRANULOCYTES NFR BLD AUTO: 1 % (ref 0–0.5)
LYMPHOCYTES # BLD AUTO: 2.29 10*3/MM3 (ref 0.7–3.1)
LYMPHOCYTES NFR BLD AUTO: 18.4 % (ref 19.6–45.3)
MCH RBC QN AUTO: 30.1 PG (ref 26.6–33)
MCHC RBC AUTO-ENTMCNC: 33 G/DL (ref 31.5–35.7)
MCV RBC AUTO: 91.4 FL (ref 79–97)
MONOCYTES # BLD AUTO: 0.64 10*3/MM3 (ref 0.1–0.9)
MONOCYTES NFR BLD AUTO: 5.1 % (ref 5–12)
NEUTROPHILS NFR BLD AUTO: 71.8 % (ref 42.7–76)
NEUTROPHILS NFR BLD AUTO: 8.93 10*3/MM3 (ref 1.7–7)
NRBC BLD AUTO-RTO: 0 /100 WBC (ref 0–0.2)
PLATELET # BLD AUTO: 190 10*3/MM3 (ref 140–450)
PMV BLD AUTO: 10.8 FL (ref 6–12)
POTASSIUM SERPL-SCNC: 4.5 MMOL/L (ref 3.5–5.2)
PROT SERPL-MCNC: 7.5 G/DL (ref 6–8.5)
RBC # BLD AUTO: 4.05 10*6/MM3 (ref 4.14–5.8)
SODIUM SERPL-SCNC: 148 MMOL/L (ref 136–145)
WBC # BLD AUTO: 12.44 10*3/MM3 (ref 3.4–10.8)

## 2020-10-22 PROCEDURE — 80053 COMPREHEN METABOLIC PANEL: CPT | Performed by: EMERGENCY MEDICINE

## 2020-10-22 PROCEDURE — 85025 COMPLETE CBC W/AUTO DIFF WBC: CPT | Performed by: EMERGENCY MEDICINE

## 2020-10-22 PROCEDURE — 99283 EMERGENCY DEPT VISIT LOW MDM: CPT

## 2020-10-22 PROCEDURE — 82962 GLUCOSE BLOOD TEST: CPT

## 2020-10-22 PROCEDURE — 96374 THER/PROPH/DIAG INJ IV PUSH: CPT

## 2020-10-22 RX ORDER — DEXTROSE MONOHYDRATE 25 G/50ML
25 INJECTION, SOLUTION INTRAVENOUS ONCE
Status: COMPLETED | OUTPATIENT
Start: 2020-10-22 | End: 2020-10-22

## 2020-10-22 RX ORDER — SODIUM CHLORIDE 0.9 % (FLUSH) 0.9 %
10 SYRINGE (ML) INJECTION AS NEEDED
Status: DISCONTINUED | OUTPATIENT
Start: 2020-10-22 | End: 2020-10-22 | Stop reason: HOSPADM

## 2020-10-22 RX ADMIN — DEXTROSE MONOHYDRATE 25 G: 500 INJECTION PARENTERAL at 18:45

## 2020-10-22 NOTE — ED PROVIDER NOTES
EMERGENCY DEPARTMENT ENCOUNTER  Patient was placed in face mask in first look and the following protective measures were taken unless additional measures were taken and documented below in the ED course. Patient was wearing facemask when I entered the room and throughout our encounter. I wore full protective equipment throughout this patient encounter including a face mask, and gloves. Hand hygiene was performed before donning protective equipment and after removal when leaving the room.    Room Number:  39/39  Date of encounter:  10/22/2020  PCP: Yadiel Baxter III, MD    HPI:  Context: Angelo Brown is a 60 y.o. male, hx of DM, who presents to the ED c/o chief complaint of hypoglycemia that occurred earlier today. Per wife, she noticed the pt was diaphoretic and confused at around 1730 today. She checked his BG and found it to be 25. Pt was given a small bottle of OJ and a pepsi and his BG improved to 35. Pt's wife then drove the pt to the ER. Pt's wife states the pt was formerly on sliding scale. He is currently taking Lantus and Novolog. Pt has a Hx of CVA and kidney CA.     MEDICAL HISTORY REVIEW  Reviewed in EPIC    PAST MEDICAL HISTORY  Active Ambulatory Problems     Diagnosis Date Noted   • Acute CVA (cerebrovascular accident) (CMS/HCC) 12/20/2017   • Proteinuria 12/24/2017   • Anemia in chronic kidney disease 03/05/2018   • Thrombocytopenia (CMS/HCC) 03/05/2018   • Pancytopenia, acquired (CMS/HCC) 03/05/2018   • History of hepatitis C virus infection 03/05/2018   • B12 deficiency 03/14/2018   • Iron deficiency anemia secondary to inadequate dietary iron intake 03/14/2018   • Iron and its compounds causing adverse effect in therapeutic use 03/14/2018   • Hyperkalemia 06/05/2018   • Cancer of kidney parenchyma, right (CMS/HCC) 07/31/2018   • Umbilical hernia without obstruction and without gangrene 03/05/2019   • Anemia of chronic renal failure, stage 3 (moderate) 03/05/2019   • Chronic kidney  disease, stage III (moderate) 03/05/2019   • Acute renal failure superimposed on chronic kidney disease (CMS/HCC) 10/21/2019   • Toxic metabolic encephalopathy 10/22/2019   • Solitary kidney 10/22/2019   • Acute metabolic encephalopathy 07/14/2020     Resolved Ambulatory Problems     Diagnosis Date Noted   • Renal mass 12/24/2017   • Leukopenia 03/05/2018     Past Medical History:   Diagnosis Date   • Anemia    • Anxiety    • Arthritis    • CAD (coronary artery disease)    • Cancer (CMS/HCC)    • CHF (congestive heart failure) (CMS/HCC)    • Chronic back pain    • Chronic kidney disease    • Coronary artery disease    • Depression    • Diabetes mellitus (CMS/HCC)    • Eyes sensitive to light, bilateral    • GERD (gastroesophageal reflux disease)    • Headache    • Hepatitis C    • History of MRSA infection    • History of transfusion    • Hypertension    • Jaundice    • Myocardial infarction (CMS/HCC)    • Stroke (CMS/HCC) 12/2017       PAST SURGICAL HISTORY  Past Surgical History:   Procedure Laterality Date   • BRAIN HEMATOMA EVACUATION     • CARDIAC SURGERY     • CATARACT EXTRACTION, BILATERAL     • COLONOSCOPY     • CORONARY ARTERY BYPASS GRAFT  2013    Triple   • LAPAROSCOPIC PARTIAL NEPHRECTOMY Right    • ROTATOR CUFF REPAIR Left    • UMBILICAL HERNIA REPAIR N/A 3/27/2019    Procedure: UMBILICAL HERNIA REPAIR;  Surgeon: Harshad Cotto Jr., MD;  Location: [facility];  Service: General   • VASECTOMY  1985   • WOUND DEBRIDEMENT Right 06/10/2011    Excisional debridement of necrotizing fasciitis of the right groin extending along the right hemiscrotum, 5 x 2 x 2 cm in one wound and 7.5 x 2.5 x 2.5 cm of a second wound. This was sharp excisional debridement carried out to the muscle-Dr. Eduardo Cross        FAMILY HISTORY  Family History   Problem Relation Age of Onset   • Diabetes Mother    • Heart failure Mother    • Kidney failure Mother    • Anemia Mother    • Heart disease Mother    • Hypertension Mother   "  • Stroke Mother    • Dementia Mother    • Arthritis Mother    • Migraines Mother    • Heart failure Father    • Coronary artery disease Father    • Heart disease Father    • Hypertension Father    • Diabetes Father    • Arthritis Father    • Stroke Father    • Diabetes Sister    • Cervical cancer Sister    • Leukemia Sister    • Stroke Sister    • Neuropathy Sister    • Seizures Sister    • Diabetes Brother    • Cervical cancer Daughter    • Ovarian cancer Sister    • Malig Hyperthermia Neg Hx        SOCIAL HISTORY  Social History     Socioeconomic History   • Marital status:      Spouse name: Samantha   • Number of children: Not on file   • Years of education: High school   • Highest education level: Not on file   Occupational History   • Occupation: retired     Employer: UNEMPLOYED   Tobacco Use   • Smoking status: Never Smoker   • Smokeless tobacco: Never Used   Substance and Sexual Activity   • Alcohol use: No     Comment: \"no more than a half a pint of coconut rum\"   • Drug use: No   • Sexual activity: Defer       ALLERGIES  Gabapentin, Morphine and related, Fentanyl, Ibuprofen, Latex, and Penicillins    The patient's allergies have been reviewed    REVIEW OF SYSTEMS  All systems reviewed and negative except for those discussed in HPI.     PHYSICAL EXAM  I have reviewed the triage vital signs and nursing notes.  ED Triage Vitals   Temp Heart Rate Resp BP SpO2   10/22/20 1835 10/22/20 1835 10/22/20 1835 10/22/20 1850 10/22/20 1835   96.2 °F (35.7 °C) 54 16 (!) 202/90 95 %      Temp src Heart Rate Source Patient Position BP Location FiO2 (%)   -- -- -- -- --                General: mild distress, diaphoretic   HENT: NCAT, PERRL, Nares patent  Eyes: no scleral icterus  Neck: trachea midline, no ROM limitations  CV: regular rhythm, regular rate, no murmur  Respiratory: normal effort, CTAB  Abdomen: soft, nondistended, nontender to palpation, no rebound tenderness, no guarding or rigidity  : " deferred  Musculoskeletal: no deformity  Neuro: alert, mildly confused and slow to respond to questions.   Skin: warm, dry    LAB RESULTS  Recent Results (from the past 24 hour(s))   POC Glucose Once    Collection Time: 10/22/20  6:31 PM    Specimen: Blood   Result Value Ref Range    Glucose 48 (C) 70 - 130 mg/dL   Comprehensive Metabolic Panel    Collection Time: 10/22/20  6:50 PM    Specimen: Blood   Result Value Ref Range    Glucose 53 (L) 65 - 99 mg/dL    BUN 46 (H) 6 - 20 mg/dL    Creatinine 3.49 (H) 0.76 - 1.27 mg/dL    Sodium 148 (H) 136 - 145 mmol/L    Potassium 4.5 3.5 - 5.2 mmol/L    Chloride 113 (H) 98 - 107 mmol/L    CO2 22.9 22.0 - 29.0 mmol/L    Calcium 9.7 8.6 - 10.5 mg/dL    Total Protein 7.5 6.0 - 8.5 g/dL    Albumin 4.80 3.50 - 5.20 g/dL    ALT (SGPT) 14 1 - 41 U/L    AST (SGOT) 18 1 - 40 U/L    Alkaline Phosphatase 56 39 - 117 U/L    Total Bilirubin 0.3 0.0 - 1.2 mg/dL    eGFR Non African Amer 18 (L) >60 mL/min/1.73    Globulin 2.7 gm/dL    A/G Ratio 1.8 g/dL    BUN/Creatinine Ratio 13.2 7.0 - 25.0    Anion Gap 12.1 5.0 - 15.0 mmol/L   CBC Auto Differential    Collection Time: 10/22/20  6:50 PM    Specimen: Blood   Result Value Ref Range    WBC 12.44 (H) 3.40 - 10.80 10*3/mm3    RBC 4.05 (L) 4.14 - 5.80 10*6/mm3    Hemoglobin 12.2 (L) 13.0 - 17.7 g/dL    Hematocrit 37.0 (L) 37.5 - 51.0 %    MCV 91.4 79.0 - 97.0 fL    MCH 30.1 26.6 - 33.0 pg    MCHC 33.0 31.5 - 35.7 g/dL    RDW 14.0 12.3 - 15.4 %    RDW-SD 46.8 37.0 - 54.0 fl    MPV 10.8 6.0 - 12.0 fL    Platelets 190 140 - 450 10*3/mm3    Neutrophil % 71.8 42.7 - 76.0 %    Lymphocyte % 18.4 (L) 19.6 - 45.3 %    Monocyte % 5.1 5.0 - 12.0 %    Eosinophil % 3.1 0.3 - 6.2 %    Basophil % 0.6 0.0 - 1.5 %    Immature Grans % 1.0 (H) 0.0 - 0.5 %    Neutrophils, Absolute 8.93 (H) 1.70 - 7.00 10*3/mm3    Lymphocytes, Absolute 2.29 0.70 - 3.10 10*3/mm3    Monocytes, Absolute 0.64 0.10 - 0.90 10*3/mm3    Eosinophils, Absolute 0.38 0.00 - 0.40 10*3/mm3     Basophils, Absolute 0.07 0.00 - 0.20 10*3/mm3    Immature Grans, Absolute 0.13 (H) 0.00 - 0.05 10*3/mm3    nRBC 0.0 0.0 - 0.2 /100 WBC   POC Glucose Once    Collection Time: 10/22/20  6:59 PM    Specimen: Blood   Result Value Ref Range    Glucose 234 (H) 70 - 130 mg/dL   POC Glucose Once    Collection Time: 10/22/20  8:46 PM    Specimen: Blood   Result Value Ref Range    Glucose 330 (H) 70 - 130 mg/dL       I ordered the above labs and reviewed the results.      PROCEDURES  Procedures    MEDICATIONS GIVEN IN ER  Medications   sodium chloride 0.9 % flush 10 mL (has no administration in time range)   dextrose (D50W) 25 g/ 50mL Intravenous Solution 25 g (25 g Intravenous Given 10/22/20 1845)       PROGRESS, DATA ANALYSIS, CONSULTS, AND MEDICAL DECISION MAKING  A complete history and physical exam have been performed.  All available laboratory and imaging results have been reviewed by myself prior to disposition.    Patient does not present with complaints for COVID19. However, my scribe and I were both wearing masks throughout any patient interaction.       MDM  After the initial H&P, I discussed pertinent information from history and physical exam with patient/family.  Discussed differential diagnosis.  Discussed plan for ED evaluation/work-up/treatment.  All questions answered.  Patient/family is agreeable with plan.  ED Course as of Oct 22 2302   Thu Oct 22, 2020   1849 After patient received an amp of D50, patient's level consciousness improved greatly.  He is now alert and oriented x4 and requests something to eat.  He suspects he accidentally used the wrong insulin when he ate lunch today.    [DE]      ED Course User Index  [DE] José Antonio Bowen MD       2106 Pt recheck. POC BG is 330. Pt states he feels fine. Notified pt of blood work results. Will discharge home. Pt understands and agrees with the plan, all concerns addressed.     AS OF 23:02 EDT VITALS:    BP - (!) 208/96  HR - 66  TEMP - 96.2 °F (35.7  °C)  O2 SATS - 98%    DIAGNOSIS  Final diagnoses:   Hypoglycemia associated with type 2 diabetes mellitus (CMS/[facility], located within [facility])         DISPOSITION  DISCHARGE    Patient discharged in stable condition.    Reviewed implications of results, diagnosis, meds, responsibility to follow up, warning signs and symptoms of possible worsening, potential complications and reasons to return to ER.    Patient/Family voiced understanding of above instructions.    Discussed plan for discharge, as there is no emergent indication for admission. Patient referred to primary care provider for BP management due to today's BP. Pt/family is agreeable and understands need for follow up and repeat testing.  Pt is aware that discharge does not mean that nothing is wrong but it indicates no emergency is present that requires admission and they must continue care with follow-up as given below or physician of their choice.     FOLLOW-UP  Yadiel Baxter III, MD  [address] [place] [address] [place] [id number] [phone]    Schedule an appointment as soon as possible for a visit           Medication List      No changes were made to your prescriptions during this visit.        --  Documentation assistance provided by bernadine Humphrey for Dr. Bowen.  Information recorded by the scribe was done at my direction and has been verified and validated by me.          Pavel Humphrey  10/22/20 [id number]       José Antonio Bowen MD  10/22/20 [id number]

## 2020-10-22 NOTE — ED TRIAGE NOTES
Pt wife states pt gave his own insulin today before dinner. States she thought he took 15 units Novolog.  Pt wife states pt in nearly blind so she is not sure if he gave correct dose.  Pt wife states some time later she looked at pt and knew something was wrong. She took his blood sugar and meter read 33. States she got him to drink some orange and pepsi and got sugar up to 47, so she drove him to ER.  Currently pt BG 45.  Pt alert but lethargic.  Pt wife states he takes lantus and novolog for his diabetes.  Mask placed on patient in triage.  Triage RN wearing mask throughout encounter.

## 2020-10-23 NOTE — ED NOTES
Pt verbalized understanding to f/u with his pcp and to check his glucose as directed and to hold insulin if glucose less than 150.      Roro Quinn, MORENA  10/22/20 8022

## 2020-10-23 NOTE — DISCHARGE INSTRUCTIONS
You can take your evening insulin after you check your blood sugar.  Hold your evening insulin if your blood sugar is less than 150.  Return to the emergency department if symptoms worsen.

## 2020-11-03 ENCOUNTER — LAB (OUTPATIENT)
Dept: LAB | Facility: HOSPITAL | Age: 60
End: 2020-11-03

## 2020-11-03 ENCOUNTER — CLINICAL SUPPORT (OUTPATIENT)
Dept: ONCOLOGY | Facility: HOSPITAL | Age: 60
End: 2020-11-03

## 2020-11-03 DIAGNOSIS — N18.30 CHRONIC KIDNEY DISEASE, STAGE III (MODERATE) (HCC): ICD-10-CM

## 2020-11-03 DIAGNOSIS — D50.8 IRON DEFICIENCY ANEMIA SECONDARY TO INADEQUATE DIETARY IRON INTAKE: ICD-10-CM

## 2020-11-03 DIAGNOSIS — D63.1 ANEMIA OF CHRONIC RENAL FAILURE, STAGE 3A (HCC): Primary | ICD-10-CM

## 2020-11-03 DIAGNOSIS — E53.8 B12 DEFICIENCY: ICD-10-CM

## 2020-11-03 DIAGNOSIS — D61.818 PANCYTOPENIA, ACQUIRED (HCC): ICD-10-CM

## 2020-11-03 DIAGNOSIS — N18.30 ANEMIA OF CHRONIC RENAL FAILURE, STAGE 3 (MODERATE) (HCC): ICD-10-CM

## 2020-11-03 DIAGNOSIS — D63.1 ANEMIA OF CHRONIC RENAL FAILURE, STAGE 3 (MODERATE) (HCC): ICD-10-CM

## 2020-11-03 DIAGNOSIS — N18.31 ANEMIA OF CHRONIC RENAL FAILURE, STAGE 3A (HCC): Primary | ICD-10-CM

## 2020-11-03 LAB
ALBUMIN SERPL-MCNC: 4.4 G/DL (ref 3.5–5.2)
ALBUMIN/GLOB SERPL: 1.5 G/DL (ref 1.1–2.4)
ALP SERPL-CCNC: 52 U/L (ref 38–116)
ALT SERPL W P-5'-P-CCNC: 9 U/L (ref 0–41)
ANION GAP SERPL CALCULATED.3IONS-SCNC: 12.6 MMOL/L (ref 5–15)
AST SERPL-CCNC: 15 U/L (ref 0–40)
BASOPHILS # BLD AUTO: 0.07 10*3/MM3 (ref 0–0.2)
BASOPHILS NFR BLD AUTO: 1 % (ref 0–1.5)
BILIRUB SERPL-MCNC: 0.3 MG/DL (ref 0.2–1.2)
BUN SERPL-MCNC: 51 MG/DL (ref 6–20)
BUN/CREAT SERPL: 12.5 (ref 7.3–30)
CALCIUM SPEC-SCNC: 9.7 MG/DL (ref 8.5–10.2)
CHLORIDE SERPL-SCNC: 109 MMOL/L (ref 98–107)
CO2 SERPL-SCNC: 21.4 MMOL/L (ref 22–29)
CREAT SERPL-MCNC: 4.08 MG/DL (ref 0.7–1.3)
DEPRECATED RDW RBC AUTO: 47.8 FL (ref 37–54)
EOSINOPHIL # BLD AUTO: 0.54 10*3/MM3 (ref 0–0.4)
EOSINOPHIL NFR BLD AUTO: 7.5 % (ref 0.3–6.2)
ERYTHROCYTE [DISTWIDTH] IN BLOOD BY AUTOMATED COUNT: 13.7 % (ref 12.3–15.4)
GFR SERPL CREATININE-BSD FRML MDRD: 15 ML/MIN/1.73
GLOBULIN UR ELPH-MCNC: 2.9 GM/DL (ref 1.8–3.5)
GLUCOSE SERPL-MCNC: 164 MG/DL (ref 74–124)
HCT VFR BLD AUTO: 35 % (ref 37.5–51)
HGB BLD-MCNC: 11 G/DL (ref 13–17.7)
IMM GRANULOCYTES # BLD AUTO: 0.01 10*3/MM3 (ref 0–0.05)
IMM GRANULOCYTES NFR BLD AUTO: 0.1 % (ref 0–0.5)
LYMPHOCYTES # BLD AUTO: 2.38 10*3/MM3 (ref 0.7–3.1)
LYMPHOCYTES NFR BLD AUTO: 33.1 % (ref 19.6–45.3)
MCH RBC QN AUTO: 30 PG (ref 26.6–33)
MCHC RBC AUTO-ENTMCNC: 31.4 G/DL (ref 31.5–35.7)
MCV RBC AUTO: 95.4 FL (ref 79–97)
MONOCYTES # BLD AUTO: 0.49 10*3/MM3 (ref 0.1–0.9)
MONOCYTES NFR BLD AUTO: 6.8 % (ref 5–12)
NEUTROPHILS NFR BLD AUTO: 3.7 10*3/MM3 (ref 1.7–7)
NEUTROPHILS NFR BLD AUTO: 51.5 % (ref 42.7–76)
NRBC BLD AUTO-RTO: 0 /100 WBC (ref 0–0.2)
PLATELET # BLD AUTO: 105 10*3/MM3 (ref 140–450)
PMV BLD AUTO: 11.5 FL (ref 6–12)
POTASSIUM SERPL-SCNC: 5.1 MMOL/L (ref 3.5–4.7)
PROT SERPL-MCNC: 7.3 G/DL (ref 6.3–8)
RBC # BLD AUTO: 3.67 10*6/MM3 (ref 4.14–5.8)
SODIUM SERPL-SCNC: 143 MMOL/L (ref 134–145)
WBC # BLD AUTO: 7.19 10*3/MM3 (ref 3.4–10.8)

## 2020-11-03 PROCEDURE — 80053 COMPREHEN METABOLIC PANEL: CPT

## 2020-11-03 PROCEDURE — 36415 COLL VENOUS BLD VENIPUNCTURE: CPT

## 2020-11-03 PROCEDURE — G0463 HOSPITAL OUTPT CLINIC VISIT: HCPCS

## 2020-11-03 PROCEDURE — 85025 COMPLETE CBC W/AUTO DIFF WBC: CPT

## 2020-11-03 NOTE — NURSING NOTE
Pt here for RN review. CBC reviewed with pt today. Labs are stable at this time. Hgb 11 and plts 105. Pt states he's been feeling good, no c/o. Will call pt with CMP results once they're back, pt v/u. Reviewed f/u appt with pt and gave a print out of his schedule and lab work.Told pt to call if he starts feeling more fatigued or SOA before his appt on 21/1. Pt v/u.     Called pt and spoke with wife about CMP. BUN 51. Cr 4.08, and K 5.1. Reviewed labs with Dr. Aviles. Per Dr. Aviles fax labs to nephrology.

## 2020-11-30 ENCOUNTER — LAB (OUTPATIENT)
Dept: LAB | Facility: HOSPITAL | Age: 60
End: 2020-11-30

## 2020-11-30 DIAGNOSIS — D61.818 PANCYTOPENIA, ACQUIRED (HCC): ICD-10-CM

## 2020-11-30 DIAGNOSIS — N18.30 CHRONIC KIDNEY DISEASE, STAGE III (MODERATE) (HCC): ICD-10-CM

## 2020-11-30 DIAGNOSIS — D63.1 ANEMIA OF CHRONIC RENAL FAILURE, STAGE 3 (MODERATE) (HCC): ICD-10-CM

## 2020-11-30 DIAGNOSIS — N18.30 ANEMIA OF CHRONIC RENAL FAILURE, STAGE 3 (MODERATE) (HCC): ICD-10-CM

## 2020-11-30 DIAGNOSIS — E53.8 B12 DEFICIENCY: ICD-10-CM

## 2020-11-30 DIAGNOSIS — D50.8 IRON DEFICIENCY ANEMIA SECONDARY TO INADEQUATE DIETARY IRON INTAKE: ICD-10-CM

## 2020-11-30 LAB
BASOPHILS # BLD AUTO: 0.06 10*3/MM3 (ref 0–0.2)
BASOPHILS NFR BLD AUTO: 0.8 % (ref 0–1.5)
DEPRECATED RDW RBC AUTO: 47.8 FL (ref 37–54)
EOSINOPHIL # BLD AUTO: 0.47 10*3/MM3 (ref 0–0.4)
EOSINOPHIL NFR BLD AUTO: 6 % (ref 0.3–6.2)
ERYTHROCYTE [DISTWIDTH] IN BLOOD BY AUTOMATED COUNT: 13.8 % (ref 12.3–15.4)
HCT VFR BLD AUTO: 32.9 % (ref 37.5–51)
HGB BLD-MCNC: 10.7 G/DL (ref 13–17.7)
IMM GRANULOCYTES # BLD AUTO: 0.05 10*3/MM3 (ref 0–0.05)
IMM GRANULOCYTES NFR BLD AUTO: 0.6 % (ref 0–0.5)
LYMPHOCYTES # BLD AUTO: 2.4 10*3/MM3 (ref 0.7–3.1)
LYMPHOCYTES NFR BLD AUTO: 30.8 % (ref 19.6–45.3)
MCH RBC QN AUTO: 30.7 PG (ref 26.6–33)
MCHC RBC AUTO-ENTMCNC: 32.5 G/DL (ref 31.5–35.7)
MCV RBC AUTO: 94.5 FL (ref 79–97)
MONOCYTES # BLD AUTO: 0.52 10*3/MM3 (ref 0.1–0.9)
MONOCYTES NFR BLD AUTO: 6.7 % (ref 5–12)
NEUTROPHILS NFR BLD AUTO: 4.28 10*3/MM3 (ref 1.7–7)
NEUTROPHILS NFR BLD AUTO: 55.1 % (ref 42.7–76)
NRBC BLD AUTO-RTO: 0 /100 WBC (ref 0–0.2)
PLATELET # BLD AUTO: 102 10*3/MM3 (ref 140–450)
PMV BLD AUTO: 11.4 FL (ref 6–12)
RBC # BLD AUTO: 3.48 10*6/MM3 (ref 4.14–5.8)
WBC # BLD AUTO: 7.78 10*3/MM3 (ref 3.4–10.8)

## 2020-11-30 PROCEDURE — 85025 COMPLETE CBC W/AUTO DIFF WBC: CPT

## 2020-11-30 PROCEDURE — 36415 COLL VENOUS BLD VENIPUNCTURE: CPT

## 2020-12-01 ENCOUNTER — OFFICE VISIT (OUTPATIENT)
Dept: ONCOLOGY | Facility: CLINIC | Age: 60
End: 2020-12-01

## 2020-12-01 ENCOUNTER — APPOINTMENT (OUTPATIENT)
Dept: LAB | Facility: HOSPITAL | Age: 60
End: 2020-12-01

## 2020-12-01 DIAGNOSIS — E53.8 B12 DEFICIENCY: ICD-10-CM

## 2020-12-01 DIAGNOSIS — N18.30 STAGE 3 CHRONIC KIDNEY DISEASE, UNSPECIFIED WHETHER STAGE 3A OR 3B CKD (HCC): ICD-10-CM

## 2020-12-01 DIAGNOSIS — D50.8 IRON DEFICIENCY ANEMIA SECONDARY TO INADEQUATE DIETARY IRON INTAKE: ICD-10-CM

## 2020-12-01 DIAGNOSIS — D61.818 PANCYTOPENIA, ACQUIRED (HCC): ICD-10-CM

## 2020-12-01 DIAGNOSIS — D63.1 ANEMIA OF CHRONIC RENAL FAILURE, STAGE 3 (MODERATE), UNSPECIFIED WHETHER STAGE 3A OR 3B CKD (HCC): ICD-10-CM

## 2020-12-01 DIAGNOSIS — D69.6 THROMBOCYTOPENIA (HCC): Primary | ICD-10-CM

## 2020-12-01 DIAGNOSIS — N18.30 ANEMIA OF CHRONIC RENAL FAILURE, STAGE 3 (MODERATE), UNSPECIFIED WHETHER STAGE 3A OR 3B CKD (HCC): ICD-10-CM

## 2020-12-01 PROCEDURE — 99441 PR PHYS/QHP TELEPHONE EVALUATION 5-10 MIN: CPT | Performed by: INTERNAL MEDICINE

## 2020-12-01 NOTE — PROGRESS NOTES
Subjective .     REASONS FOR FOLLOWUP:  Multifactorial Anemia: ANEMIA IN CKD STAGE 3 ON PROCRIT, IRON DEFICIENCY CORRECTED, B12 CORRECTED, CHRONIC DISEASE DIABETES WITH END ORGAN DAMAGE    HISTORY OF PRESENT ILLNESS:  The patient is a 60 y.o. year old male who is here for follow-up with the above-mentioned history.          I HAVE CALLED THIS PATIENT ON THE PHONE TODAY AND VERBALLY HAS CONSENTED ABOUT TELEMEDICINE VISIT     I have talked with this patient and his wife on the telephone today. The only new problem that they have is that he has been running low on his diabetes supplies including insulin and his pharmacy and insurance are working to be sure that he consecutes all of the things that he needs. His blood sugar has been on the low side and he is checking this twice a day only. His appetite has been good. He has not had any new infections. His bowel activity and urination remain ongoing. He has not had any swelling. As usual he is extremely hard headed about what he eats and how he runs his life.    None of them have had flu shots.       Past Medical History:   Diagnosis Date   • Anemia    • Anxiety    • Arthritis     HANDS   • CAD (coronary artery disease)    • Cancer (CMS/HCC)     KIDNEY 7/2018 SX   • CHF (congestive heart failure) (CMS/HCC)    • Chronic back pain    • Chronic kidney disease    • Coronary artery disease    • Depression    • Diabetes mellitus (CMS/HCC)     TYPE 2   • Eyes sensitive to light, bilateral    • GERD (gastroesophageal reflux disease)    • Headache    • Hepatitis C     after blood tranfusion/treated   • History of MRSA infection    • History of transfusion     2013 CABG   • Hypertension    • Jaundice    • Myocardial infarction (CMS/HCC)     5-6 years ago per pt   • Stroke (CMS/HCC) 12/2017    left sided weakness/     Past Surgical History:   Procedure Laterality Date   • BRAIN HEMATOMA EVACUATION     • CARDIAC SURGERY     • CATARACT EXTRACTION, BILATERAL     • COLONOSCOPY     •  CORONARY ARTERY BYPASS GRAFT  2013    Triple   • LAPAROSCOPIC PARTIAL NEPHRECTOMY Right    • ROTATOR CUFF REPAIR Left    • UMBILICAL HERNIA REPAIR N/A 3/27/2019    Procedure: UMBILICAL HERNIA REPAIR;  Surgeon: Harshad Cotot Jr., MD;  Location: [facility];  Service: General   • VASECTOMY  1985   • WOUND DEBRIDEMENT Right 06/10/2011    Excisional debridement of necrotizing fasciitis of the right groin extending along the right hemiscrotum, 5 x 2 x 2 cm in one wound and 7.5 x 2.5 x 2.5 cm of a second wound. This was sharp excisional debridement carried out to the muscle-Dr. Eduardo Cross        ONCOLOGIC HISTORY:  (History from previous dates can be found in the separate document.)    Current Outpatient Medications on File Prior to Visit   Medication Sig Dispense Refill   • ALPRAZolam (XANAX) 1 MG tablet Take 1 mg by mouth 4 (Four) Times a Day As Needed.     • aspirin 81 MG EC tablet Take 1 tablet by mouth Daily. 150 tablet 2   • carvedilol (COREG) 3.125 MG tablet Take 3.125 mg by mouth 2 (Two) Times a Day With Meals.     • diclofenac (VOLTAREN) 1 % gel gel Apply 4 g topically to the appropriate area as directed 4 (Four) Times a Day As Needed.     • hydrALAZINE (APRESOLINE) 100 MG tablet Take 100 mg by mouth 3 (Three) Times a Day. Pt takes 2x daily at home-will talk with cardiologist in May per pt     • insulin aspart (novoLOG FLEXPEN) 100 UNIT/ML solution pen-injector sc pen Inject 2-15 Units under the skin 3 (Three) Times a Day With Meals. Per sliding scale     • Insulin Glargine (BASAGLAR KWIKPEN) 100 UNIT/ML injection pen INJECT 10 UNITS SC AT NIGHT  0   • Insulin Glargine (LANTUS SOLOSTAR) 100 UNIT/ML injection pen Inject 10 Units under the skin into the appropriate area as directed Every Night.     • lisinopril (PRINIVIL,ZESTRIL) 10 MG tablet 1 tablet Daily.     • MELATONIN PO Take  by mouth.     • oxyCODONE-acetaminophen (PERCOCET)  MG per tablet Take 1 tablet by mouth Every 6 (Six) Hours As Needed.    "  • pantoprazole (PROTONIX) 40 MG EC tablet Take 40 mg by mouth Daily.     • simvastatin (ZOCOR) 40 MG tablet Take 40 mg by mouth Every Night.       No current facility-administered medications on file prior to visit.              Allergies   Allergen Reactions   • Gabapentin Unknown - High Severity     Pt unresponsive   • Morphine And Related Delirium   • Fentanyl Unknown - High Severity     ER said he was allergy   • Ibuprofen Nausea Only   • Latex Rash   • Penicillins Hives       Social History     Socioeconomic History   • Marital status:      Spouse name: Samantha   • Number of children: Not on file   • Years of education: High school   • Highest education level: Not on file   Occupational History   • Occupation: retired     Employer: UNEMPLOYED   Tobacco Use   • Smoking status: Never Smoker   • Smokeless tobacco: Never Used   Substance and Sexual Activity   • Alcohol use: No     Comment: \"no more than a half a pint of coconut rum\"   • Drug use: No   • Sexual activity: Defer       Family History   Problem Relation Age of Onset   • Diabetes Mother    • Heart failure Mother    • Kidney failure Mother    • Anemia Mother    • Heart disease Mother    • Hypertension Mother    • Stroke Mother    • Dementia Mother    • Arthritis Mother    • Migraines Mother    • Heart failure Father    • Coronary artery disease Father    • Heart disease Father    • Hypertension Father    • Diabetes Father    • Arthritis Father    • Stroke Father    • Diabetes Sister    • Cervical cancer Sister    • Leukemia Sister    • Stroke Sister    • Neuropathy Sister    • Seizures Sister    • Diabetes Brother    • Cervical cancer Daughter    • Ovarian cancer Sister    • Malig Hyperthermia Neg Hx      Review of Systems:              General: no fever, no chills,  fatigue,no weight changes, no lack of appetite.  Eyes: no epiphora, xerophthalmia,conjunctivitis, pain, glaucoma, blurred vision, blindness, secretion, photophobia, proptosis, " diplopia.  Ears: no otorrhea, tinnitus, otorrhagia, deafness, pain, vertigo.  Nose: no rhinorrhea, no epistaxis, no alteration in perception of odors, no sinuses pressure.  Mouth: no alteration in gums or teeth,  No ulcers, no difficulty with mastication or deglut ion, no odynophagia.  Neck: no masses or pain, no thyroid alterations, no pain in muscles or arteries, no carotid odynia, no crepitation.  Respiratory: no cough, no sputum production,no dyspnea,no trepopnea, no pleuritic pain,no hemoptysis.  Heart: no syncope, no irregularity, no palpitations, no angina,no orthopnea,no paroxysmal nocturnal dyspnea.  Vascular Venous: no tenderness,no edema,no palpable cords,no postphlebitic syndrome, no skin changes no ulcerations.  Vascular Arterial: no distal ischemia, noclaudication, no gangrene, no neuropathic ischemic pain, no skin ulcers, no paleness no cyanosis.  GI: no dysphagia, no odynophagia, no regurgitation, no heartburn,no indigestion,no nausea,no vomiting,no hematemesis ,no melena,no jaundice,no distention, no obstipation,no enterorrhagia,no proctalgia,no anal  lesions, no changes in bowel habits.  : no frequency, no hesitancy, no hematuria, no discharge,no  pain.  Musculoskeletal: no muscle or tendon pain or inflammation,no  joint pain, no edema, no functional limitation,no fasciculations, no mass.  Neurologic: no headache, no seizures, noalterations on Craneal nerves, no motor deficit, no sensory deficit, normal coordination, no alteration in memory,normal orientation, calculation,normal writting, verbal and written language.  Skin: no rashes,no pruritus no localized lesions.  Psychiatric: no anxiety, no depression,no agitation, no delusions, proper insight.      Cancer-related family history includes Cervical cancer in his daughter and sister; Ovarian cancer in his sister.       Objective      There were no vitals filed for this visit.  Current Status 8/4/2020   ECOG score 0         Physical Exam:    none                       RECENT LABS:  Results from last 7 days   Lab Units 11/30/20  1327   WBC 10*3/mm3 7.78   NEUTROS ABS 10*3/mm3 4.28   HEMOGLOBIN g/dL 10.7*   HEMATOCRIT % 32.9*   PLATELETS 10*3/mm3 102*               Assessment/Plan     1. This patient has history of multifactorial anemia. This is consistent with B12 deficiency that has been corrected, most recent B12 level more than 2000, iron deficiency, that has been corrected, and anemia of chronic kidney disease that has treated with Procrit. Today the patient's hemoglobin is 10.7 The white count is normal. Therefore he will not require any Procrit today. He will remain on monthly CBC and RN check and Procrit administration depending on needs and schedule.  2. The patient has had chronic thrombocytopenia, this number never has changed. His IPF has been variable in the past. He had no improvement with B12 supplementation. He has no splenomegaly and this will be monitored at this time. No attempts for bone marrow will be done under the present circumstances with the stability of this number.   3. This patient has diabetes with end organ damage including retinopathy, microvascular changes in his brain with poor memory and poor control of diabetes overall. He also has diabetic nephropathy with very strong creatinine between 3 and 3.5. The patient has decreased vision, he has poor memory and his wife needs to be with him in all of the appointments. Under the present COVID circumstances  This will be generated to be sure that he is safe and sound and everybody knows what is the status of his condition. For this reason I have advised the patient's wife to be seen along with the patient in the office for future visits. He will require a CBC on monthly basis and RN and Procrit administration according to needs, CMP in 1 months and doctor visit in 3 months.   I suggested to both of them that they need to have their flu shots. I suggested to go to [facility]'s they  are giving people flu shots for free.     It seems to be that he is having issues in regard supplies for diabetes management. I asked the wife to get in contact with his diabetes doctor and primary care to be sure that this issue is solved. There is no reason for this thing to happen at this point.    DURATION PHONE VISIT 9 MIN 20 SEC    I DISCUSSED WITH PATIENT IN DETAIL FORMS TO DECREASE CHANCES OF CORONAVIRUS INFECTION INCLUDING ISOLATION, PROPER HAND HIGIENE, AVOID PUBLIC PLACES  WITH CROWDS, FOLLOW  CDC RECOMENDATIONS, AND KEEP PERSONAL AND SOCIAL RESPONSIBILITY, WARE A MASK IN PUBLIC PLACES.  PATIENT IS AWARE THIS INFECTION COULD HAVE SEVERE CONSEQUENCES TO PERSONAL HEALTH AND FAMILY RAMIFICATIONS OF THIS.

## 2020-12-04 ENCOUNTER — HOSPITAL ENCOUNTER (OUTPATIENT)
Dept: NEUROLOGY | Facility: HOSPITAL | Age: 60
Discharge: HOME | End: 2020-12-04
Admitting: NURSE PRACTITIONER

## 2020-12-04 ENCOUNTER — TELEPHONE (OUTPATIENT)
Dept: NEUROLOGY | Facility: CLINIC | Age: 60
End: 2020-12-04

## 2020-12-04 DIAGNOSIS — R20.0 LOWER EXTREMITY NUMBNESS: ICD-10-CM

## 2020-12-04 DIAGNOSIS — E11.65 UNCONTROLLED TYPE 2 DIABETES MELLITUS WITH HYPERGLYCEMIA (HCC): ICD-10-CM

## 2020-12-04 DIAGNOSIS — R73.9 HEMOGLOBIN A1C BETWEEN 7.0% AND 9.0%: ICD-10-CM

## 2020-12-04 PROCEDURE — 95886 MUSC TEST DONE W/N TEST COMP: CPT | Performed by: PSYCHIATRY & NEUROLOGY

## 2020-12-04 PROCEDURE — 95910 NRV CNDJ TEST 7-8 STUDIES: CPT

## 2020-12-04 PROCEDURE — 95910 NRV CNDJ TEST 7-8 STUDIES: CPT | Performed by: PSYCHIATRY & NEUROLOGY

## 2020-12-04 PROCEDURE — 95886 MUSC TEST DONE W/N TEST COMP: CPT

## 2020-12-04 NOTE — TELEPHONE ENCOUNTER
----- Message from TRANG Vincent sent at 12/4/2020  1:02 PM EST -----  Patient has planned follow-up in Feb.; just received EMG results (will discuss at next appointment). Please make sure patient has treatable cause peripheral neuropathy labs completed prior to appointment.

## 2020-12-10 ENCOUNTER — TRANSCRIBE ORDERS (OUTPATIENT)
Dept: ADMINISTRATIVE | Facility: HOSPITAL | Age: 60
End: 2020-12-10

## 2020-12-10 ENCOUNTER — LAB (OUTPATIENT)
Dept: LAB | Facility: HOSPITAL | Age: 60
End: 2020-12-10

## 2020-12-10 DIAGNOSIS — E87.5 HYPERPOTASSEMIA: ICD-10-CM

## 2020-12-10 DIAGNOSIS — E87.5 HYPERPOTASSEMIA: Primary | ICD-10-CM

## 2020-12-10 LAB
ANION GAP SERPL CALCULATED.3IONS-SCNC: 11.4 MMOL/L (ref 5–15)
BUN SERPL-MCNC: 40 MG/DL (ref 6–20)
BUN/CREAT SERPL: 10.1 (ref 7–25)
CALCIUM SPEC-SCNC: 9.4 MG/DL (ref 8.6–10.5)
CHLORIDE SERPL-SCNC: 112 MMOL/L (ref 98–107)
CO2 SERPL-SCNC: 20.6 MMOL/L (ref 22–29)
CREAT SERPL-MCNC: 3.96 MG/DL (ref 0.76–1.27)
GFR SERPL CREATININE-BSD FRML MDRD: 16 ML/MIN/1.73
GLUCOSE SERPL-MCNC: 50 MG/DL (ref 65–99)
POTASSIUM SERPL-SCNC: 4.5 MMOL/L (ref 3.5–5.2)
SODIUM SERPL-SCNC: 144 MMOL/L (ref 136–145)

## 2020-12-10 PROCEDURE — 80048 BASIC METABOLIC PNL TOTAL CA: CPT

## 2020-12-10 PROCEDURE — 36415 COLL VENOUS BLD VENIPUNCTURE: CPT

## 2020-12-29 ENCOUNTER — APPOINTMENT (OUTPATIENT)
Dept: LAB | Facility: HOSPITAL | Age: 60
End: 2020-12-29

## 2020-12-29 ENCOUNTER — APPOINTMENT (OUTPATIENT)
Dept: ONCOLOGY | Facility: HOSPITAL | Age: 60
End: 2020-12-29

## 2020-12-30 ENCOUNTER — TELEPHONE (OUTPATIENT)
Dept: ONCOLOGY | Facility: CLINIC | Age: 60
End: 2020-12-30

## 2020-12-30 ENCOUNTER — APPOINTMENT (OUTPATIENT)
Dept: INFUSION THERAPY | Facility: HOSPITAL | Age: 60
End: 2020-12-30

## 2020-12-30 NOTE — TELEPHONE ENCOUNTER
Samantha, patient's wife, calling to r/s 12/29 lab and injection that patient had to cancel.    He will need and appt around 11:00-11:30 if possible.  Any day is fine.  He can come this week or next.  The sooner the better.    [phone]

## 2020-12-31 ENCOUNTER — INFUSION (OUTPATIENT)
Dept: ONCOLOGY | Facility: HOSPITAL | Age: 60
End: 2020-12-31

## 2020-12-31 ENCOUNTER — LAB (OUTPATIENT)
Dept: LAB | Facility: HOSPITAL | Age: 60
End: 2020-12-31

## 2020-12-31 DIAGNOSIS — D61.818 PANCYTOPENIA, ACQUIRED (HCC): ICD-10-CM

## 2020-12-31 DIAGNOSIS — N18.30 ANEMIA OF CHRONIC RENAL FAILURE, STAGE 3 (MODERATE), UNSPECIFIED WHETHER STAGE 3A OR 3B CKD (HCC): ICD-10-CM

## 2020-12-31 DIAGNOSIS — D50.8 IRON DEFICIENCY ANEMIA SECONDARY TO INADEQUATE DIETARY IRON INTAKE: ICD-10-CM

## 2020-12-31 DIAGNOSIS — D69.6 THROMBOCYTOPENIA (HCC): ICD-10-CM

## 2020-12-31 DIAGNOSIS — N18.30 STAGE 3 CHRONIC KIDNEY DISEASE, UNSPECIFIED WHETHER STAGE 3A OR 3B CKD (HCC): ICD-10-CM

## 2020-12-31 DIAGNOSIS — D63.1 ANEMIA OF CHRONIC RENAL FAILURE, STAGE 3 (MODERATE), UNSPECIFIED WHETHER STAGE 3A OR 3B CKD (HCC): ICD-10-CM

## 2020-12-31 DIAGNOSIS — E53.8 B12 DEFICIENCY: ICD-10-CM

## 2020-12-31 LAB
ALBUMIN SERPL-MCNC: 4 G/DL (ref 3.5–5.2)
ALBUMIN/GLOB SERPL: 1.4 G/DL (ref 1.1–2.4)
ALP SERPL-CCNC: 56 U/L (ref 38–116)
ALT SERPL W P-5'-P-CCNC: 10 U/L (ref 0–41)
ANION GAP SERPL CALCULATED.3IONS-SCNC: 11.2 MMOL/L (ref 5–15)
AST SERPL-CCNC: 18 U/L (ref 0–40)
BASOPHILS # BLD AUTO: 0.05 10*3/MM3 (ref 0–0.2)
BASOPHILS NFR BLD AUTO: 0.9 % (ref 0–1.5)
BILIRUB SERPL-MCNC: 0.2 MG/DL (ref 0.2–1.2)
BUN SERPL-MCNC: 46 MG/DL (ref 6–20)
BUN/CREAT SERPL: 11.8 (ref 7.3–30)
CALCIUM SPEC-SCNC: 9.1 MG/DL (ref 8.5–10.2)
CHLORIDE SERPL-SCNC: 111 MMOL/L (ref 98–107)
CO2 SERPL-SCNC: 22.8 MMOL/L (ref 22–29)
CREAT SERPL-MCNC: 3.89 MG/DL (ref 0.7–1.3)
DEPRECATED RDW RBC AUTO: 46 FL (ref 37–54)
EOSINOPHIL # BLD AUTO: 0.39 10*3/MM3 (ref 0–0.4)
EOSINOPHIL NFR BLD AUTO: 6.8 % (ref 0.3–6.2)
ERYTHROCYTE [DISTWIDTH] IN BLOOD BY AUTOMATED COUNT: 13.3 % (ref 12.3–15.4)
GFR SERPL CREATININE-BSD FRML MDRD: 16 ML/MIN/1.73
GLOBULIN UR ELPH-MCNC: 2.8 GM/DL (ref 1.8–3.5)
GLUCOSE SERPL-MCNC: 297 MG/DL (ref 74–124)
HCT VFR BLD AUTO: 33.2 % (ref 37.5–51)
HGB BLD-MCNC: 10.7 G/DL (ref 13–17.7)
IMM GRANULOCYTES # BLD AUTO: 0.01 10*3/MM3 (ref 0–0.05)
IMM GRANULOCYTES NFR BLD AUTO: 0.2 % (ref 0–0.5)
LYMPHOCYTES # BLD AUTO: 1.34 10*3/MM3 (ref 0.7–3.1)
LYMPHOCYTES NFR BLD AUTO: 23.3 % (ref 19.6–45.3)
MCH RBC QN AUTO: 30.4 PG (ref 26.6–33)
MCHC RBC AUTO-ENTMCNC: 32.2 G/DL (ref 31.5–35.7)
MCV RBC AUTO: 94.3 FL (ref 79–97)
MONOCYTES # BLD AUTO: 0.28 10*3/MM3 (ref 0.1–0.9)
MONOCYTES NFR BLD AUTO: 4.9 % (ref 5–12)
NEUTROPHILS NFR BLD AUTO: 3.68 10*3/MM3 (ref 1.7–7)
NEUTROPHILS NFR BLD AUTO: 63.9 % (ref 42.7–76)
NRBC BLD AUTO-RTO: 0 /100 WBC (ref 0–0.2)
PLATELET # BLD AUTO: 101 10*3/MM3 (ref 140–450)
PMV BLD AUTO: 11.2 FL (ref 6–12)
POTASSIUM SERPL-SCNC: 5.5 MMOL/L (ref 3.5–4.7)
PROT SERPL-MCNC: 6.8 G/DL (ref 6.3–8)
RBC # BLD AUTO: 3.52 10*6/MM3 (ref 4.14–5.8)
SODIUM SERPL-SCNC: 145 MMOL/L (ref 134–145)
WBC # BLD AUTO: 5.75 10*3/MM3 (ref 3.4–10.8)

## 2020-12-31 PROCEDURE — 36415 COLL VENOUS BLD VENIPUNCTURE: CPT

## 2020-12-31 PROCEDURE — 85025 COMPLETE CBC W/AUTO DIFF WBC: CPT

## 2020-12-31 PROCEDURE — 80053 COMPREHEN METABOLIC PANEL: CPT

## 2020-12-31 NOTE — PROGRESS NOTES
Hgb today is 10.7.  No procrit today per guidelines.   Patient without questions or concerns at this time.   Given copy of labs.

## 2021-01-26 ENCOUNTER — LAB (OUTPATIENT)
Dept: LAB | Facility: HOSPITAL | Age: 61
End: 2021-01-26

## 2021-01-26 ENCOUNTER — INFUSION (OUTPATIENT)
Dept: ONCOLOGY | Facility: HOSPITAL | Age: 61
End: 2021-01-26

## 2021-01-26 DIAGNOSIS — D50.9 IRON DEFICIENCY ANEMIA, UNSPECIFIED IRON DEFICIENCY ANEMIA TYPE: ICD-10-CM

## 2021-01-26 DIAGNOSIS — E53.8 B12 DEFICIENCY: ICD-10-CM

## 2021-01-26 DIAGNOSIS — D61.818 PANCYTOPENIA, ACQUIRED (HCC): ICD-10-CM

## 2021-01-26 DIAGNOSIS — D63.1 ANEMIA OF CHRONIC RENAL FAILURE, STAGE 3 (MODERATE), UNSPECIFIED WHETHER STAGE 3A OR 3B CKD (HCC): ICD-10-CM

## 2021-01-26 DIAGNOSIS — D69.6 THROMBOCYTOPENIA (HCC): ICD-10-CM

## 2021-01-26 DIAGNOSIS — N18.30 STAGE 3 CHRONIC KIDNEY DISEASE, UNSPECIFIED WHETHER STAGE 3A OR 3B CKD (HCC): ICD-10-CM

## 2021-01-26 DIAGNOSIS — N18.30 ANEMIA OF CHRONIC RENAL FAILURE, STAGE 3 (MODERATE), UNSPECIFIED WHETHER STAGE 3A OR 3B CKD (HCC): ICD-10-CM

## 2021-01-26 DIAGNOSIS — D50.8 IRON DEFICIENCY ANEMIA SECONDARY TO INADEQUATE DIETARY IRON INTAKE: ICD-10-CM

## 2021-01-26 DIAGNOSIS — D50.9 IRON DEFICIENCY ANEMIA, UNSPECIFIED IRON DEFICIENCY ANEMIA TYPE: Primary | ICD-10-CM

## 2021-01-26 LAB
BASOPHILS # BLD AUTO: 0.07 10*3/MM3 (ref 0–0.2)
BASOPHILS NFR BLD AUTO: 0.9 % (ref 0–1.5)
DEPRECATED RDW RBC AUTO: 44.6 FL (ref 37–54)
EOSINOPHIL # BLD AUTO: 0.45 10*3/MM3 (ref 0–0.4)
EOSINOPHIL NFR BLD AUTO: 6 % (ref 0.3–6.2)
ERYTHROCYTE [DISTWIDTH] IN BLOOD BY AUTOMATED COUNT: 13.1 % (ref 12.3–15.4)
FERRITIN SERPL-MCNC: 165.7 NG/ML (ref 30–400)
HCT VFR BLD AUTO: 37 % (ref 37.5–51)
HGB BLD-MCNC: 11.8 G/DL (ref 13–17.7)
IMM GRANULOCYTES # BLD AUTO: 0.02 10*3/MM3 (ref 0–0.05)
IMM GRANULOCYTES NFR BLD AUTO: 0.3 % (ref 0–0.5)
IRON 24H UR-MRATE: 81 MCG/DL (ref 59–158)
IRON SATN MFR SERPL: 28 % (ref 14–48)
LYMPHOCYTES # BLD AUTO: 2.11 10*3/MM3 (ref 0.7–3.1)
LYMPHOCYTES NFR BLD AUTO: 27.9 % (ref 19.6–45.3)
MCH RBC QN AUTO: 29.7 PG (ref 26.6–33)
MCHC RBC AUTO-ENTMCNC: 31.9 G/DL (ref 31.5–35.7)
MCV RBC AUTO: 93.2 FL (ref 79–97)
MONOCYTES # BLD AUTO: 0.4 10*3/MM3 (ref 0.1–0.9)
MONOCYTES NFR BLD AUTO: 5.3 % (ref 5–12)
NEUTROPHILS NFR BLD AUTO: 4.51 10*3/MM3 (ref 1.7–7)
NEUTROPHILS NFR BLD AUTO: 59.6 % (ref 42.7–76)
NRBC BLD AUTO-RTO: 0 /100 WBC (ref 0–0.2)
PLATELET # BLD AUTO: 134 10*3/MM3 (ref 140–450)
PMV BLD AUTO: 10.7 FL (ref 6–12)
RBC # BLD AUTO: 3.97 10*6/MM3 (ref 4.14–5.8)
TIBC SERPL-MCNC: 291 MCG/DL (ref 249–505)
TRANSFERRIN SERPL-MCNC: 208 MG/DL (ref 200–360)
WBC # BLD AUTO: 7.56 10*3/MM3 (ref 3.4–10.8)

## 2021-01-26 PROCEDURE — G0463 HOSPITAL OUTPT CLINIC VISIT: HCPCS

## 2021-01-26 PROCEDURE — 82728 ASSAY OF FERRITIN: CPT

## 2021-01-26 PROCEDURE — 83540 ASSAY OF IRON: CPT

## 2021-01-26 PROCEDURE — 85025 COMPLETE CBC W/AUTO DIFF WBC: CPT

## 2021-01-26 PROCEDURE — 84466 ASSAY OF TRANSFERRIN: CPT

## 2021-01-26 PROCEDURE — 36415 COLL VENOUS BLD VENIPUNCTURE: CPT

## 2021-01-26 NOTE — PROGRESS NOTES
Pt is here for lab with RN review.  CBC reviewed with pt, counts are stable for this pt at this time. Pt has no complaints.  Copy of labs given to pt and f/u appt reviewed. Pt is instructed to call the office with any concerns or new symptoms prior to next visit. Pt vu    No procrit today per guidelines.    Lab Results   Component Value Date    WBC 7.56 01/26/2021    HGB 11.8 (L) 01/26/2021    HCT 37.0 (L) 01/26/2021    MCV 93.2 01/26/2021     (L) 01/26/2021

## 2021-02-08 ENCOUNTER — OFFICE VISIT (OUTPATIENT)
Dept: NEUROLOGY | Facility: CLINIC | Age: 61
End: 2021-02-08

## 2021-02-08 VITALS
OXYGEN SATURATION: 96 % | SYSTOLIC BLOOD PRESSURE: 170 MMHG | WEIGHT: 178 LBS | HEIGHT: 67 IN | DIASTOLIC BLOOD PRESSURE: 98 MMHG | BODY MASS INDEX: 27.94 KG/M2 | HEART RATE: 62 BPM

## 2021-02-08 DIAGNOSIS — Z86.73 HISTORY OF CVA (CEREBROVASCULAR ACCIDENT): Primary | ICD-10-CM

## 2021-02-08 DIAGNOSIS — G56.03 BILATERAL CARPAL TUNNEL SYNDROME: ICD-10-CM

## 2021-02-08 DIAGNOSIS — G92.8 TOXIC METABOLIC ENCEPHALOPATHY: ICD-10-CM

## 2021-02-08 DIAGNOSIS — E11.649 TYPE 2 DIABETES MELLITUS WITH HYPOGLYCEMIA WITHOUT COMA, UNSPECIFIED WHETHER LONG TERM INSULIN USE (HCC): ICD-10-CM

## 2021-02-08 PROCEDURE — 99215 OFFICE O/P EST HI 40 MIN: CPT | Performed by: NURSE PRACTITIONER

## 2021-02-08 RX ORDER — BLOOD-GLUCOSE METER
KIT MISCELLANEOUS
Qty: 200 EACH | Refills: 12 | Status: SHIPPED | OUTPATIENT
Start: 2021-02-08 | End: 2021-04-12 | Stop reason: HOSPADM
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
## 2021-02-18 DIAGNOSIS — D50.9 IRON DEFICIENCY ANEMIA, UNSPECIFIED IRON DEFICIENCY ANEMIA TYPE: ICD-10-CM

## 2021-02-18 DIAGNOSIS — N18.30 ANEMIA OF CHRONIC RENAL FAILURE, STAGE 3 (MODERATE), UNSPECIFIED WHETHER STAGE 3A OR 3B CKD (HCC): ICD-10-CM

## 2021-02-18 DIAGNOSIS — T45.4X5A ADVERSE EFFECT OF IRON, INITIAL ENCOUNTER: Primary | ICD-10-CM

## 2021-02-18 DIAGNOSIS — D63.1 ANEMIA OF CHRONIC RENAL FAILURE, STAGE 3 (MODERATE), UNSPECIFIED WHETHER STAGE 3A OR 3B CKD (HCC): ICD-10-CM

## 2021-02-18 DIAGNOSIS — N18.30 STAGE 3 CHRONIC KIDNEY DISEASE, UNSPECIFIED WHETHER STAGE 3A OR 3B CKD (HCC): ICD-10-CM

## 2021-02-23 ENCOUNTER — LAB (OUTPATIENT)
Dept: LAB | Facility: HOSPITAL | Age: 61
End: 2021-02-23

## 2021-02-23 ENCOUNTER — OFFICE VISIT (OUTPATIENT)
Dept: ONCOLOGY | Facility: CLINIC | Age: 61
End: 2021-02-23

## 2021-02-23 ENCOUNTER — APPOINTMENT (OUTPATIENT)
Dept: ONCOLOGY | Facility: HOSPITAL | Age: 61
End: 2021-02-23

## 2021-02-23 VITALS
RESPIRATION RATE: 16 BRPM | WEIGHT: 178.4 LBS | HEART RATE: 64 BPM | DIASTOLIC BLOOD PRESSURE: 84 MMHG | OXYGEN SATURATION: 97 % | BODY MASS INDEX: 28 KG/M2 | SYSTOLIC BLOOD PRESSURE: 188 MMHG | TEMPERATURE: 98.4 F | HEIGHT: 67 IN

## 2021-02-23 DIAGNOSIS — E53.8 B12 DEFICIENCY: ICD-10-CM

## 2021-02-23 DIAGNOSIS — N18.30 ANEMIA OF CHRONIC RENAL FAILURE, STAGE 3 (MODERATE), UNSPECIFIED WHETHER STAGE 3A OR 3B CKD (HCC): ICD-10-CM

## 2021-02-23 DIAGNOSIS — D63.1 ANEMIA OF CHRONIC RENAL FAILURE, STAGE 3 (MODERATE), UNSPECIFIED WHETHER STAGE 3A OR 3B CKD (HCC): ICD-10-CM

## 2021-02-23 DIAGNOSIS — D50.9 IRON DEFICIENCY ANEMIA, UNSPECIFIED IRON DEFICIENCY ANEMIA TYPE: ICD-10-CM

## 2021-02-23 DIAGNOSIS — T45.4X5A ADVERSE EFFECT OF IRON, INITIAL ENCOUNTER: ICD-10-CM

## 2021-02-23 DIAGNOSIS — D61.818 PANCYTOPENIA, ACQUIRED (HCC): Primary | ICD-10-CM

## 2021-02-23 DIAGNOSIS — T45.4X5A IRON AND ITS COMPOUNDS CAUSING ADVERSE EFFECT IN THERAPEUTIC USE: ICD-10-CM

## 2021-02-23 DIAGNOSIS — D50.8 OTHER IRON DEFICIENCY ANEMIA: ICD-10-CM

## 2021-02-23 DIAGNOSIS — N18.30 STAGE 3 CHRONIC KIDNEY DISEASE, UNSPECIFIED WHETHER STAGE 3A OR 3B CKD (HCC): ICD-10-CM

## 2021-02-23 DIAGNOSIS — D50.8 IRON DEFICIENCY ANEMIA SECONDARY TO INADEQUATE DIETARY IRON INTAKE: ICD-10-CM

## 2021-02-23 DIAGNOSIS — T45.4X5D ADVERSE EFFECT OF IRON, SUBSEQUENT ENCOUNTER: ICD-10-CM

## 2021-02-23 LAB
BASOPHILS # BLD AUTO: 0.06 10*3/MM3 (ref 0–0.2)
BASOPHILS NFR BLD AUTO: 1 % (ref 0–1.5)
DEPRECATED RDW RBC AUTO: 46.1 FL (ref 37–54)
EOSINOPHIL # BLD AUTO: 0.41 10*3/MM3 (ref 0–0.4)
EOSINOPHIL NFR BLD AUTO: 7 % (ref 0.3–6.2)
ERYTHROCYTE [DISTWIDTH] IN BLOOD BY AUTOMATED COUNT: 13.3 % (ref 12.3–15.4)
HCT VFR BLD AUTO: 34.1 % (ref 37.5–51)
HGB BLD-MCNC: 10.9 G/DL (ref 13–17.7)
IMM GRANULOCYTES # BLD AUTO: 0.05 10*3/MM3 (ref 0–0.05)
IMM GRANULOCYTES NFR BLD AUTO: 0.8 % (ref 0–0.5)
LYMPHOCYTES # BLD AUTO: 1.46 10*3/MM3 (ref 0.7–3.1)
LYMPHOCYTES NFR BLD AUTO: 24.8 % (ref 19.6–45.3)
MCH RBC QN AUTO: 29.9 PG (ref 26.6–33)
MCHC RBC AUTO-ENTMCNC: 32 G/DL (ref 31.5–35.7)
MCV RBC AUTO: 93.7 FL (ref 79–97)
MONOCYTES # BLD AUTO: 0.38 10*3/MM3 (ref 0.1–0.9)
MONOCYTES NFR BLD AUTO: 6.5 % (ref 5–12)
NEUTROPHILS NFR BLD AUTO: 3.53 10*3/MM3 (ref 1.7–7)
NEUTROPHILS NFR BLD AUTO: 59.9 % (ref 42.7–76)
NRBC BLD AUTO-RTO: 0 /100 WBC (ref 0–0.2)
PLATELET # BLD AUTO: 85 10*3/MM3 (ref 140–450)
PMV BLD AUTO: 12 FL (ref 6–12)
RBC # BLD AUTO: 3.64 10*6/MM3 (ref 4.14–5.8)
WBC # BLD AUTO: 5.89 10*3/MM3 (ref 3.4–10.8)

## 2021-02-23 PROCEDURE — 85025 COMPLETE CBC W/AUTO DIFF WBC: CPT

## 2021-02-23 PROCEDURE — 99214 OFFICE O/P EST MOD 30 MIN: CPT | Performed by: INTERNAL MEDICINE

## 2021-02-23 PROCEDURE — 36415 COLL VENOUS BLD VENIPUNCTURE: CPT

## 2021-02-23 RX ORDER — TIZANIDINE HYDROCHLORIDE 4 MG/1
4 CAPSULE, GELATIN COATED ORAL EVERY 8 HOURS PRN
COMMUNITY
Start: 2021-02-17

## 2021-02-23 RX ORDER — BLOOD-GLUCOSE METER
1 KIT MISCELLANEOUS 4 TIMES DAILY
COMMUNITY
Start: 2021-02-15 | End: 2022-12-26

## 2021-02-23 RX ORDER — FLASH GLUCOSE SENSOR
KIT MISCELLANEOUS
COMMUNITY
Start: 2021-02-20 | End: 2022-12-26

## 2021-02-23 NOTE — PROGRESS NOTES
Subjective .     REASONS FOR FOLLOWUP:  Multifactorial Anemia: ANEMIA IN CKD STAGE 3 ON PROCRIT, IRON DEFICIENCY CORRECTED, B12 CORRECTED, CHRONIC DISEASE DIABETES WITH END ORGAN DAMAGE    HISTORY OF PRESENT ILLNESS:  The patient is a 60 y.o. year old male who is here for follow-up with the above-mentioned history.    This patient returns today to the office in company of his wife still eating whatever he wants to eat and drinking all of the Cokes and sodas that he wants to drink in the background of diabetes. He is putting up a fight with his insurance company to get a monitor to check his blood sugar that is voice commanded. I pointed out to him that this device will be useless if he continues doing the same circumstances in regard to his diet and poor control. He has been in the hospital on 2 different occasions with decompensation related to diabetes and his kidney disease and heart disease. He is not having any chest pain at this time. He has no swelling and no infection. His weight is stable. He has no teeth. The decay in the gums has been dramatic. He has good bowel activity. Urinary output is still going. He has no pain.                 Past Medical History:   Diagnosis Date   • Anemia    • Anxiety    • Arthritis     HANDS   • CAD (coronary artery disease)    • Cancer (CMS/HCC)     KIDNEY 7/2018 SX   • CHF (congestive heart failure) (CMS/HCC)    • Chronic back pain    • Chronic kidney disease    • Coronary artery disease    • Depression    • Diabetes mellitus (CMS/HCC)     TYPE 2   • Eyes sensitive to light, bilateral    • GERD (gastroesophageal reflux disease)    • Headache    • Hepatitis C     after blood tranfusion/treated   • History of MRSA infection    • History of transfusion     2013 CABG   • Hypertension    • Jaundice    • Myocardial infarction (CMS/HCC)     5-6 years ago per pt   • Stroke (CMS/HCC) 12/2017    left sided weakness/     Past Surgical History:   Procedure Laterality Date   • BRAIN  HEMATOMA EVACUATION     • CARDIAC SURGERY     • CATARACT EXTRACTION, BILATERAL     • COLONOSCOPY     • CORONARY ARTERY BYPASS GRAFT  2013    Triple   • LAPAROSCOPIC PARTIAL NEPHRECTOMY Right    • ROTATOR CUFF REPAIR Left    • UMBILICAL HERNIA REPAIR N/A 3/27/2019    Procedure: UMBILICAL HERNIA REPAIR;  Surgeon: Harshad Cotto Jr., MD;  Location: [facility];  Service: General   • VASECTOMY  1985   • WOUND DEBRIDEMENT Right 06/10/2011    Excisional debridement of necrotizing fasciitis of the right groin extending along the right hemiscrotum, 5 x 2 x 2 cm in one wound and 7.5 x 2.5 x 2.5 cm of a second wound. This was sharp excisional debridement carried out to the muscle-Dr. Eduardo Cross        ONCOLOGIC HISTORY:  (History from previous dates can be found in the separate document.)    Current Outpatient Medications on File Prior to Visit   Medication Sig Dispense Refill   • ALPRAZolam (XANAX) 1 MG tablet Take 1 mg by mouth 4 (Four) Times a Day As Needed.     • aspirin 81 MG EC tablet Take 1 tablet by mouth Daily. 150 tablet 2   • carvedilol (COREG) 3.125 MG tablet Take 3.125 mg by mouth 2 (Two) Times a Day With Meals.     • Continuous Blood Gluc Sensor (FreeStyle Triston 14 Day Sensor) misc      • diclofenac (VOLTAREN) 1 % gel gel Apply 4 g topically to the appropriate area as directed 4 (Four) Times a Day As Needed.     • glucose blood (FREESTYLE LITE) test strip Monitor Blood glucose  each 12   • hydrALAZINE (APRESOLINE) 100 MG tablet Take 100 mg by mouth 3 (Three) Times a Day. Pt takes 2x daily at home-will talk with cardiologist in May per pt     • insulin aspart (novoLOG FLEXPEN) 100 UNIT/ML solution pen-injector sc pen Inject 2-15 Units under the skin 3 (Three) Times a Day With Meals. Per sliding scale     • Insulin Glargine (BASAGLAR KWIKPEN) 100 UNIT/ML injection pen INJECT 10 UNITS SC AT NIGHT  0   • Insulin Glargine (LANTUS SOLOSTAR) 100 UNIT/ML injection pen Inject 10 Units under the skin into the  "appropriate area as directed Every Night.     • lisinopril (PRINIVIL,ZESTRIL) 10 MG tablet 1 tablet Daily.     • MELATONIN PO Take  by mouth.     • oxyCODONE-acetaminophen (PERCOCET)  MG per tablet Take 1 tablet by mouth Every 6 (Six) Hours As Needed.     • pantoprazole (PROTONIX) 40 MG EC tablet Take 40 mg by mouth Daily.     • simvastatin (ZOCOR) 40 MG tablet Take 40 mg by mouth Every Night.     • tiZANidine (ZANAFLEX) 4 MG tablet        No current facility-administered medications on file prior to visit.              Allergies   Allergen Reactions   • Gabapentin Unknown - High Severity     Pt unresponsive   • Lipitor  [Atorvastatin Calcium] Unknown - Low Severity and Shortness Of Breath   • Morphine And Related Delirium   • Fentanyl Unknown - High Severity     ER said he was allergy   • Ibuprofen Nausea Only   • Latex Rash   • Penicillins Hives       Social History     Socioeconomic History   • Marital status:      Spouse name: Samantha   • Number of children: Not on file   • Years of education: High school   • Highest education level: Not on file   Occupational History   • Occupation: retired     Employer: UNEMPLOYED   Tobacco Use   • Smoking status: Never Smoker   • Smokeless tobacco: Never Used   Substance and Sexual Activity   • Alcohol use: No     Comment: \"no more than a half a pint of coconut rum\"   • Drug use: No   • Sexual activity: Defer       Family History   Problem Relation Age of Onset   • Diabetes Mother    • Heart failure Mother    • Kidney failure Mother    • Anemia Mother    • Heart disease Mother    • Hypertension Mother    • Stroke Mother    • Dementia Mother    • Arthritis Mother    • Migraines Mother    • Heart failure Father    • Coronary artery disease Father    • Heart disease Father    • Hypertension Father    • Diabetes Father    • Arthritis Father    • Stroke Father    • Diabetes Sister    • Cervical cancer Sister    • Leukemia Sister    • Stroke Sister    • Neuropathy Sister  "   • Seizures Sister    • Diabetes Brother    • Cervical cancer Daughter    • Ovarian cancer Sister    • Malig Hyperthermia Neg Hx        Cancer-related family history includes Cervical cancer in his daughter and sister; Ovarian cancer in his sister.       Objective      There were no vitals filed for this visit.  Current Status 2/23/2021   ECOG score 1         Physical Exam:I HAVE PERSONALLY REVIEWED THE HISTORY OF THE PRESENT ILLNESS, PAST MEDICAL HISTORY, FAMILY HISTORY, SOCIAL HISTORY, ALLERGIES, MEDICATIONS STATED ABOVE IN THE OFFICE NOTE FROM TODAY.    Patient screened for depression based on a PHQ-9 score of 1 on 8/4/2020.   GENERAL:  Well-developed, well-nourished  Patient  in no acute distress.   SKIN:  Warm, dry ,NO rashes,NO purpura ,NO petechiae.  HEENT:  Pupils were equal and reactive to light and accomodation, conjunctivae noninjected, no pterygium, normal extraocular movements, normal visual acuity.   NECK:  Supple with good range of motion; no thyromegaly or masses, no JVD or bruits, no cervical adenopathies.No carotid artery pain, no carotid abnormal pulsation , NO arterial dance.  LYMPHATICS:  No cervical, NO supraclavicular, NO axillary,NO epitrochlear , NO inguinal adenopathy.  CARDIAC   normal rate and regular rhythm, without murmur,NO rubs NO S3 NO S4 right or left . Normal femoral, popliteal, pedis, brachial and carotid pulses.  VASCULAR ARTERIAL: normal carotids,brachial,radial,femoral,popliteal, pedis pulses , no bruits.no paleness or cyanosis, no pain, no edema, no numbness, no gangrene.  VASCULAR VENOUS: no cyanosis, collateral circulation, varicosities, edema, palpable cords, pain, erythema.  ABDOMEN:  Soft, nontender with no hepatomegaly, no splenomegaly,no masses, no ascites, no collateral circulation,no distention,no Moraga sign, no abdominal pain, no inguinal hernias,no umbilical hernia, no abdominal bruits. Normal bowel sounds.  GENITAL: Not  Performed.  EXTREMITIES  AND SPINE:  No  clubbing, cyanosis or edema, no deformities or pain .No kyphosis, scoliosis, deformities or pain in spine, ribs or pelvic bone.  NEUROLOGICAL:  Patient was awake, alert, oriented to time, person and place.                              RECENT LABS:Auto Differential  Order: [id number] - Part of Panel Order [id number]  Status:  Final result   Visible to patient:  No (not released)   Dx:  Adverse effect of iron, initial encou...  Specimen Information: Blood        Component   Ref Range & Units 13:56   WBC   3.40 - 10.80 10*3/mm3 5.89    RBC   4.14 - 5.80 10*6/mm3 3.64Low     Hemoglobin   13.0 - 17.7 g/dL 10.9Low     Hematocrit   37.5 - 51.0 % 34.1Low     MCV   79.0 - 97.0 fL 93.7    MCH   26.6 - 33.0 pg 29.9    MCHC   31.5 - 35.7 g/dL 32.0    RDW   12.3 - 15.4 % 13.3    RDW-SD   37.0 - 54.0 fl 46.1    MPV   6.0 - 12.0 fL 12.0    Platelets   140 - 450 10*3/mm3 85Low     Comment: Some plt clumping   Neutrophil %   42.7 - 76.0 % 59.9    Lymphocyte %   19.6 - 45.3 % 24.8    Monocyte %   5.0 - 12.0 % 6.5    Eosinophil %   0.3 - 6.2 % 7.0High     Basophil %   0.0 - 1.5 % 1.0    Immature Grans %   0.0 - 0.5 % 0.8High     Neutrophils, Absolute   1.70 - 7.00 10*3/mm3 3.53    Lymphocytes, Absolute   0.70 - 3.10 10*3/mm3 1.46    Monocytes, Absolute   0.10 - 0.90 10*3/mm3 0.38                            Assessment/Plan     1. This patient has history of multifactorial anemia. This is consistent with B12 deficiency that has been corrected, most recent B12 level more than 2000, iron deficiency, that has been corrected, and anemia of chronic kidney disease that has treated with Procrit. Today the patient's hemoglobin is 10.9 The white count is normal. Therefore he will not require any Procrit today. He will remain on monthly CBC and RN check and Procrit administration depending on needs and schedule.  2. The patient has had chronic thrombocytopenia, this number never has changed. His IPF has been variable in the past. He had no  improvement with B12 supplementation. He has no splenomegaly and this will be monitored at this time. No attempts for bone marrow will be done under the present circumstances with the stability of this number.   3. This patient has diabetes with end organ damage including retinopathy, microvascular changes in his brain with poor memory and poor control of diabetes overall. He also has diabetic nephropathy with very strong creatinine between 3 and 3.5. The patient has decreased vision, he has poor memory and his wife needs to be with him in all of the appointments. Under the present COVID circumstances  This will be generated to be sure that he is safe and sound and everybody knows what is the status of his condition. For this reason I have advised the patient's wife to be seen along with the patient in the office for future visits. He will require a CBC on Q6 WEEKS basis and RN and Procrit administration according to needs.    THIS IS A CHRONIC ILLNESS WITH EXACERBATION, PROGRESSION OR SIDE EFFECT OF THE TREATMENT THAT DOES / NOT REQUIRE HOSPITALIZATION, AND REQUIRED ORDERING MULTIPLE LABORATORY TESTING AND INTERPRETATION, AND REQUIRED PHYSIOLOGICAL TESTING AND IMAGES FOR FURTHER EVALUATION.    IN THIS PATIENT THERE IS EVIDENCE OR COFACTOR DETERMINANTS OF HEALTH LIKE FOOD AND OR HOUSING INSECURITY, WELFARE RISK, UNEMPLOYMENT, INADEQUATE EDUCATION, MENTAL DISEASE, POVERTY ; THAT REQUIRED TO BE ADDRESSED.      I DISCUSSED WITH PATIENT IN DETAIL FORMS TO DECREASE CHANCES OF CORONAVIRUS INFECTION INCLUDING ISOLATION, PROPER HAND HIGIENE, AVOID PUBLIC PLACES  WITH CROWDS, FOLLOW  CDC RECOMENDATIONS, AND KEEP PERSONAL AND SOCIAL RESPONSIBILITY, WARE A MASK IN PUBLIC PLACES.  PATIENT IS AWARE THIS INFECTION COULD HAVE SEVERE CONSEQUENCES TO PERSONAL HEALTH AND FAMILY RAMIFICATIONS OF THIS.

## 2021-04-06 ENCOUNTER — INFUSION (OUTPATIENT)
Dept: ONCOLOGY | Facility: HOSPITAL | Age: 61
End: 2021-04-06

## 2021-04-06 ENCOUNTER — LAB (OUTPATIENT)
Dept: LAB | Facility: HOSPITAL | Age: 61
End: 2021-04-06

## 2021-04-06 DIAGNOSIS — E53.8 B12 DEFICIENCY: ICD-10-CM

## 2021-04-06 DIAGNOSIS — N18.30 ANEMIA OF CHRONIC RENAL FAILURE, STAGE 3 (MODERATE), UNSPECIFIED WHETHER STAGE 3A OR 3B CKD (HCC): ICD-10-CM

## 2021-04-06 DIAGNOSIS — D61.818 PANCYTOPENIA, ACQUIRED (HCC): ICD-10-CM

## 2021-04-06 DIAGNOSIS — D63.1 ANEMIA OF CHRONIC RENAL FAILURE, STAGE 3 (MODERATE), UNSPECIFIED WHETHER STAGE 3A OR 3B CKD (HCC): ICD-10-CM

## 2021-04-06 DIAGNOSIS — D50.8 OTHER IRON DEFICIENCY ANEMIA: ICD-10-CM

## 2021-04-06 LAB
BASOPHILS # BLD AUTO: 0.04 10*3/MM3 (ref 0–0.2)
BASOPHILS NFR BLD AUTO: 0.7 % (ref 0–1.5)
DEPRECATED RDW RBC AUTO: 49.2 FL (ref 37–54)
EOSINOPHIL # BLD AUTO: 0.36 10*3/MM3 (ref 0–0.4)
EOSINOPHIL NFR BLD AUTO: 6.2 % (ref 0.3–6.2)
ERYTHROCYTE [DISTWIDTH] IN BLOOD BY AUTOMATED COUNT: 14 % (ref 12.3–15.4)
HCT VFR BLD AUTO: 34.2 % (ref 37.5–51)
HGB BLD-MCNC: 10.7 G/DL (ref 13–17.7)
IMM GRANULOCYTES # BLD AUTO: 0.05 10*3/MM3 (ref 0–0.05)
IMM GRANULOCYTES NFR BLD AUTO: 0.9 % (ref 0–0.5)
LYMPHOCYTES # BLD AUTO: 1.15 10*3/MM3 (ref 0.7–3.1)
LYMPHOCYTES NFR BLD AUTO: 19.9 % (ref 19.6–45.3)
MCH RBC QN AUTO: 30.1 PG (ref 26.6–33)
MCHC RBC AUTO-ENTMCNC: 31.3 G/DL (ref 31.5–35.7)
MCV RBC AUTO: 96.3 FL (ref 79–97)
MONOCYTES # BLD AUTO: 0.31 10*3/MM3 (ref 0.1–0.9)
MONOCYTES NFR BLD AUTO: 5.4 % (ref 5–12)
NEUTROPHILS NFR BLD AUTO: 3.86 10*3/MM3 (ref 1.7–7)
NEUTROPHILS NFR BLD AUTO: 66.9 % (ref 42.7–76)
NRBC BLD AUTO-RTO: 0 /100 WBC (ref 0–0.2)
PLATELET # BLD AUTO: 77 10*3/MM3 (ref 140–450)
PMV BLD AUTO: 12.5 FL (ref 6–12)
RBC # BLD AUTO: 3.55 10*6/MM3 (ref 4.14–5.8)
WBC # BLD AUTO: 5.77 10*3/MM3 (ref 3.4–10.8)

## 2021-04-06 PROCEDURE — 85025 COMPLETE CBC W/AUTO DIFF WBC: CPT

## 2021-04-06 PROCEDURE — 36415 COLL VENOUS BLD VENIPUNCTURE: CPT

## 2021-04-06 NOTE — PROGRESS NOTES
Hgb today is 10.9.  No procrit today per guidelines.   Patient without questions or concerns at this time.   Given copy of labs.

## 2021-04-08 ENCOUNTER — APPOINTMENT (OUTPATIENT)
Dept: GENERAL RADIOLOGY | Facility: HOSPITAL | Age: 61
End: 2021-04-08

## 2021-04-08 ENCOUNTER — HOSPITAL ENCOUNTER (INPATIENT)
Facility: HOSPITAL | Age: 61
LOS: 4 days | Discharge: HOME | End: 2021-04-12
Attending: EMERGENCY MEDICINE | Admitting: HOSPITALIST

## 2021-04-08 DIAGNOSIS — E87.5 HYPERKALEMIA: Primary | ICD-10-CM

## 2021-04-08 DIAGNOSIS — N18.5 STAGE 5 CHRONIC KIDNEY DISEASE NOT ON CHRONIC DIALYSIS (HCC): ICD-10-CM

## 2021-04-08 DIAGNOSIS — N17.0 ACUTE RENAL FAILURE WITH ACUTE TUBULAR NECROSIS SUPERIMPOSED ON STAGE 4 CHRONIC KIDNEY DISEASE (HCC): ICD-10-CM

## 2021-04-08 DIAGNOSIS — N18.4 ACUTE RENAL FAILURE WITH ACUTE TUBULAR NECROSIS SUPERIMPOSED ON STAGE 4 CHRONIC KIDNEY DISEASE (HCC): ICD-10-CM

## 2021-04-08 LAB
ANION GAP SERPL CALCULATED.3IONS-SCNC: 7.5 MMOL/L (ref 5–15)
BASOPHILS # BLD AUTO: 0.05 10*3/MM3 (ref 0–0.2)
BASOPHILS NFR BLD AUTO: 0.7 % (ref 0–1.5)
BUN SERPL-MCNC: 58 MG/DL (ref 6–20)
BUN/CREAT SERPL: 16 (ref 7–25)
CALCIUM SPEC-SCNC: 8.8 MG/DL (ref 8.6–10.5)
CHLORIDE SERPL-SCNC: 110 MMOL/L (ref 98–107)
CO2 SERPL-SCNC: 20.5 MMOL/L (ref 22–29)
CREAT SERPL-MCNC: 3.62 MG/DL (ref 0.76–1.27)
DEPRECATED RDW RBC AUTO: 46.9 FL (ref 37–54)
EOSINOPHIL # BLD AUTO: 0.31 10*3/MM3 (ref 0–0.4)
EOSINOPHIL NFR BLD AUTO: 4.1 % (ref 0.3–6.2)
ERYTHROCYTE [DISTWIDTH] IN BLOOD BY AUTOMATED COUNT: 13.8 % (ref 12.3–15.4)
GFR SERPL CREATININE-BSD FRML MDRD: 17 ML/MIN/1.73
GLUCOSE BLDC GLUCOMTR-MCNC: 192 MG/DL (ref 70–130)
GLUCOSE BLDC GLUCOMTR-MCNC: 209 MG/DL (ref 70–130)
GLUCOSE BLDC GLUCOMTR-MCNC: 280 MG/DL (ref 70–130)
GLUCOSE SERPL-MCNC: 178 MG/DL (ref 65–99)
HBV SURFACE AG SERPL QL IA: NORMAL
HCT VFR BLD AUTO: 32.8 % (ref 37.5–51)
HGB BLD-MCNC: 11.1 G/DL (ref 13–17.7)
IMM GRANULOCYTES # BLD AUTO: 0.03 10*3/MM3 (ref 0–0.05)
IMM GRANULOCYTES NFR BLD AUTO: 0.4 % (ref 0–0.5)
LYMPHOCYTES # BLD AUTO: 1.04 10*3/MM3 (ref 0.7–3.1)
LYMPHOCYTES NFR BLD AUTO: 13.6 % (ref 19.6–45.3)
MCH RBC QN AUTO: 31.4 PG (ref 26.6–33)
MCHC RBC AUTO-ENTMCNC: 33.8 G/DL (ref 31.5–35.7)
MCV RBC AUTO: 92.7 FL (ref 79–97)
MONOCYTES # BLD AUTO: 0.39 10*3/MM3 (ref 0.1–0.9)
MONOCYTES NFR BLD AUTO: 5.1 % (ref 5–12)
NEUTROPHILS NFR BLD AUTO: 5.8 10*3/MM3 (ref 1.7–7)
NEUTROPHILS NFR BLD AUTO: 76.1 % (ref 42.7–76)
NRBC BLD AUTO-RTO: 0 /100 WBC (ref 0–0.2)
PLAT MORPH BLD: NORMAL
PLATELET # BLD AUTO: 93 10*3/MM3 (ref 140–450)
PMV BLD AUTO: 11.8 FL (ref 6–12)
POTASSIUM SERPL-SCNC: 6.4 MMOL/L (ref 3.5–5.2)
POTASSIUM SERPL-SCNC: 7.5 MMOL/L (ref 3.5–5.2)
QT INTERVAL: 444 MS
RBC # BLD AUTO: 3.54 10*6/MM3 (ref 4.14–5.8)
RBC MORPH BLD: NORMAL
SARS-COV-2 ORF1AB RESP QL NAA+PROBE: NOT DETECTED
SODIUM SERPL-SCNC: 138 MMOL/L (ref 136–145)
WBC # BLD AUTO: 7.62 10*3/MM3 (ref 3.4–10.8)
WBC MORPH BLD: NORMAL

## 2021-04-08 PROCEDURE — 63710000001 INSULIN REGULAR HUMAN PER 5 UNITS: Performed by: EMERGENCY MEDICINE

## 2021-04-08 PROCEDURE — 85007 BL SMEAR W/DIFF WBC COUNT: CPT | Performed by: EMERGENCY MEDICINE

## 2021-04-08 PROCEDURE — U0004 COV-19 TEST NON-CDC HGH THRU: HCPCS | Performed by: EMERGENCY MEDICINE

## 2021-04-08 PROCEDURE — 82962 GLUCOSE BLOOD TEST: CPT

## 2021-04-08 PROCEDURE — 71046 X-RAY EXAM CHEST 2 VIEWS: CPT

## 2021-04-08 PROCEDURE — 84132 ASSAY OF SERUM POTASSIUM: CPT | Performed by: INTERNAL MEDICINE

## 2021-04-08 PROCEDURE — 80048 BASIC METABOLIC PNL TOTAL CA: CPT | Performed by: EMERGENCY MEDICINE

## 2021-04-08 PROCEDURE — 93005 ELECTROCARDIOGRAM TRACING: CPT | Performed by: EMERGENCY MEDICINE

## 2021-04-08 PROCEDURE — 25010000002 CALCIUM GLUCONATE-NACL 1-0.675 GM/50ML-% SOLUTION: Performed by: INTERNAL MEDICINE

## 2021-04-08 PROCEDURE — 63710000001 INSULIN GLARGINE PER 5 UNITS: Performed by: HOSPITALIST

## 2021-04-08 PROCEDURE — 99284 EMERGENCY DEPT VISIT MOD MDM: CPT

## 2021-04-08 PROCEDURE — 63710000001 INSULIN LISPRO (HUMAN) PER 5 UNITS: Performed by: HOSPITALIST

## 2021-04-08 PROCEDURE — 93010 ELECTROCARDIOGRAM REPORT: CPT | Performed by: INTERNAL MEDICINE

## 2021-04-08 PROCEDURE — 87340 HEPATITIS B SURFACE AG IA: CPT | Performed by: INTERNAL MEDICINE

## 2021-04-08 PROCEDURE — 36415 COLL VENOUS BLD VENIPUNCTURE: CPT | Performed by: INTERNAL MEDICINE

## 2021-04-08 PROCEDURE — 85025 COMPLETE CBC W/AUTO DIFF WBC: CPT | Performed by: EMERGENCY MEDICINE

## 2021-04-08 RX ORDER — SODIUM POLYSTYRENE SULFONATE 15 G/60ML
30 SUSPENSION ORAL; RECTAL ONCE
Status: COMPLETED | OUTPATIENT
Start: 2021-04-08 | End: 2021-04-08

## 2021-04-08 RX ORDER — HEPARIN SODIUM 1000 [USP'U]/ML
4000 INJECTION, SOLUTION INTRAVENOUS; SUBCUTANEOUS AS NEEDED
Status: DISCONTINUED | OUTPATIENT
Start: 2021-04-08 | End: 2021-04-12

## 2021-04-08 RX ORDER — HYDRALAZINE HYDROCHLORIDE 50 MG/1
100 TABLET, FILM COATED ORAL 3 TIMES DAILY
Status: DISCONTINUED | OUTPATIENT
Start: 2021-04-08 | End: 2021-04-12 | Stop reason: HOSPADM

## 2021-04-08 RX ORDER — LANOLIN ALCOHOL/MO/W.PET/CERES
2000 CREAM (GRAM) TOPICAL DAILY
COMMUNITY
End: 2021-04-12 | Stop reason: HOSPADM

## 2021-04-08 RX ORDER — SODIUM CHLORIDE 0.9 % (FLUSH) 0.9 %
10 SYRINGE (ML) INJECTION AS NEEDED
Status: DISCONTINUED | OUTPATIENT
Start: 2021-04-08 | End: 2021-04-12 | Stop reason: HOSPADM

## 2021-04-08 RX ORDER — ATORVASTATIN CALCIUM 20 MG/1
20 TABLET, FILM COATED ORAL DAILY
Status: DISCONTINUED | OUTPATIENT
Start: 2021-04-08 | End: 2021-04-08

## 2021-04-08 RX ORDER — INSULIN LISPRO 100 [IU]/ML
0-7 INJECTION, SOLUTION INTRAVENOUS; SUBCUTANEOUS
Status: DISCONTINUED | OUTPATIENT
Start: 2021-04-08 | End: 2021-04-12

## 2021-04-08 RX ORDER — ATORVASTATIN CALCIUM 20 MG/1
10 TABLET, FILM COATED ORAL NIGHTLY
Status: DISCONTINUED | OUTPATIENT
Start: 2021-04-08 | End: 2021-04-12 | Stop reason: HOSPADM

## 2021-04-08 RX ORDER — CALCIUM GLUCONATE 20 MG/ML
1 INJECTION, SOLUTION INTRAVENOUS ONCE
Status: COMPLETED | OUTPATIENT
Start: 2021-04-08 | End: 2021-04-08

## 2021-04-08 RX ORDER — LISINOPRIL 10 MG/1
10 TABLET ORAL EVERY 12 HOURS SCHEDULED
Status: DISCONTINUED | OUTPATIENT
Start: 2021-04-08 | End: 2021-04-08

## 2021-04-08 RX ORDER — SODIUM BICARBONATE IN D5W 150/1000ML
150 PLASTIC BAG, INJECTION (ML) INTRAVENOUS CONTINUOUS
Status: DISPENSED | OUTPATIENT
Start: 2021-04-08 | End: 2021-04-08

## 2021-04-08 RX ORDER — INSULIN GLARGINE 100 [IU]/ML
15 INJECTION, SOLUTION SUBCUTANEOUS NIGHTLY
Status: DISCONTINUED | OUTPATIENT
Start: 2021-04-08 | End: 2021-04-12 | Stop reason: HOSPADM

## 2021-04-08 RX ORDER — OXYCODONE AND ACETAMINOPHEN 10; 325 MG/1; MG/1
1 TABLET ORAL EVERY 6 HOURS PRN
Status: DISCONTINUED | OUTPATIENT
Start: 2021-04-08 | End: 2021-04-12

## 2021-04-08 RX ORDER — PANTOPRAZOLE SODIUM 40 MG/1
40 TABLET, DELAYED RELEASE ORAL DAILY
Status: DISCONTINUED | OUTPATIENT
Start: 2021-04-08 | End: 2021-04-12 | Stop reason: HOSPADM

## 2021-04-08 RX ORDER — TIZANIDINE 4 MG/1
4 TABLET ORAL EVERY 6 HOURS PRN
Status: DISCONTINUED | OUTPATIENT
Start: 2021-04-08 | End: 2021-04-12

## 2021-04-08 RX ORDER — ASPIRIN 81 MG/1
81 TABLET ORAL DAILY
Status: DISCONTINUED | OUTPATIENT
Start: 2021-04-08 | End: 2021-04-12 | Stop reason: HOSPADM

## 2021-04-08 RX ORDER — DEXTROSE MONOHYDRATE 25 G/50ML
25 INJECTION, SOLUTION INTRAVENOUS ONCE
Status: DISCONTINUED | OUTPATIENT
Start: 2021-04-08 | End: 2021-04-08

## 2021-04-08 RX ORDER — UREA 10 %
1 LOTION (ML) TOPICAL NIGHTLY PRN
Status: DISCONTINUED | OUTPATIENT
Start: 2021-04-08 | End: 2021-04-12

## 2021-04-08 RX ORDER — ERGOCALCIFEROL 1.25 MG/1
50000 CAPSULE ORAL
COMMUNITY
End: 2022-03-01 | Stop reason: SDUPTHER

## 2021-04-08 RX ORDER — ALPRAZOLAM 1 MG/1
1 TABLET ORAL 4 TIMES DAILY PRN
Status: DISCONTINUED | OUTPATIENT
Start: 2021-04-08 | End: 2021-04-12

## 2021-04-08 RX ORDER — LISINOPRIL 10 MG/1
10 TABLET ORAL
Status: DISCONTINUED | OUTPATIENT
Start: 2021-04-08 | End: 2021-04-08

## 2021-04-08 RX ORDER — DEXTROSE MONOHYDRATE 25 G/50ML
25 INJECTION, SOLUTION INTRAVENOUS ONCE
Status: COMPLETED | OUTPATIENT
Start: 2021-04-08 | End: 2021-04-08

## 2021-04-08 RX ORDER — CARVEDILOL 3.12 MG/1
3.12 TABLET ORAL 2 TIMES DAILY WITH MEALS
Status: DISCONTINUED | OUTPATIENT
Start: 2021-04-08 | End: 2021-04-12 | Stop reason: HOSPADM

## 2021-04-08 RX ORDER — INSULIN LISPRO 100 [IU]/ML
5 INJECTION, SOLUTION INTRAVENOUS; SUBCUTANEOUS
Status: DISCONTINUED | OUTPATIENT
Start: 2021-04-08 | End: 2021-04-12 | Stop reason: HOSPADM

## 2021-04-08 RX ADMIN — SODIUM POLYSTYRENE SULFONATE 30 G: 15 SUSPENSION ORAL; RECTAL at 21:09

## 2021-04-08 RX ADMIN — CALCIUM GLUCONATE 1 G: 20 INJECTION, SOLUTION INTRAVENOUS at 16:44

## 2021-04-08 RX ADMIN — CARVEDILOL 3.12 MG: 3.12 TABLET, FILM COATED ORAL at 20:01

## 2021-04-08 RX ADMIN — DEXTROSE MONOHYDRATE 25 G: 25 INJECTION, SOLUTION INTRAVENOUS at 14:44

## 2021-04-08 RX ADMIN — INSULIN LISPRO 4 UNITS: 100 INJECTION, SOLUTION INTRAVENOUS; SUBCUTANEOUS at 18:31

## 2021-04-08 RX ADMIN — ASPIRIN 81 MG: 81 TABLET, COATED ORAL at 18:31

## 2021-04-08 RX ADMIN — INSULIN LISPRO 5 UNITS: 100 INJECTION, SOLUTION INTRAVENOUS; SUBCUTANEOUS at 18:31

## 2021-04-08 RX ADMIN — ATORVASTATIN CALCIUM 10 MG: 20 TABLET, FILM COATED ORAL at 20:01

## 2021-04-08 RX ADMIN — ALPRAZOLAM 1 MG: 1 TABLET ORAL at 20:01

## 2021-04-08 RX ADMIN — INSULIN GLARGINE 15 UNITS: 100 INJECTION, SOLUTION SUBCUTANEOUS at 20:00

## 2021-04-08 RX ADMIN — OXYCODONE HYDROCHLORIDE AND ACETAMINOPHEN 1 TABLET: 10; 325 TABLET ORAL at 18:41

## 2021-04-08 RX ADMIN — Medication 150 MEQ: at 14:44

## 2021-04-08 RX ADMIN — Medication 1 MG: at 20:01

## 2021-04-08 RX ADMIN — HYDRALAZINE HYDROCHLORIDE 100 MG: 50 TABLET, FILM COATED ORAL at 20:01

## 2021-04-08 RX ADMIN — INSULIN HUMAN 10 UNITS: 100 INJECTION, SOLUTION PARENTERAL at 14:44

## 2021-04-08 NOTE — ED NOTES
Patient instructed to come to the ER this morning after his potassium was reported to him to be elevated. Patients labs were drawn yesterday outpatient. Patient denies complaints at this time.      Jeannie Beatty RN  04/08/21 [id number]

## 2021-04-08 NOTE — H&P
History and physical    Primary care physician  Dr. GÓMEZ    Chief complaint  Worsening kidney function    History of present illness  White male with history of diabetes hypertension hyperlipidemia hepatitis C and chronic kidney disease stage IV presented to [facility] emergency room with worsening kidney function and high potassium.  Patient has no specific complaints or symptoms.  Patient does feel weak and tired.  Patient denies any fever cough chest pain shortness of breath abdominal pain nausea vomiting diarrhea.  Patient evaluated in ER found to have hyperkalemia and worsening kidney function need to start hemodialysis admit for management.    PAST MEDICAL HISTORY  • Anemia     • Anxiety     • Arthritis     • CAD (coronary artery disease)     • Cancer (CMS/HCC)     • CHF (congestive heart failure) (CMS/HCC)     • Chronic back pain     • Chronic kidney disease     • Coronary artery disease     • Depression     • Diabetes mellitus (CMS/HCC)     • Eyes sensitive to light, bilateral     • GERD (gastroesophageal reflux disease)     • Headache     • Hepatitis C     • History of MRSA infection     • History of transfusion     • Hypertension     • Jaundice     • Myocardial infarction (CMS/HCC)     • Stroke (CMS/HCC) 12/2017      PAST SURGICAL HISTORY              Procedure Laterality Date   • BRAIN HEMATOMA EVACUATION       • CARDIAC SURGERY       • CATARACT EXTRACTION, BILATERAL       • COLONOSCOPY       • CORONARY ARTERY BYPASS GRAFT   2013     Triple   • LAPAROSCOPIC PARTIAL NEPHRECTOMY Right     • ROTATOR CUFF REPAIR Left     • UMBILICAL HERNIA REPAIR N/A 3/27/2019     Procedure: UMBILICAL HERNIA REPAIR;  Surgeon: Harshad Cotto Jr., MD;  Location: [facility];  Service: General   • VASECTOMY   1985   • WOUND DEBRIDEMENT Right 06/10/2011     Excisional debridement of necrotizing fasciitis of the right groin extending along the right hemiscrotum, 5 x 2 x 2 cm in one wound and 7.5 x 2.5 x 2.5 cm of a  "second wound. This was sharp excisional debridement carried out to the muscle-Dr. Eduardo Cross          FAMILY HISTORY           Problem Relation Age of Onset   • Diabetes Mother     • Heart failure Mother     • Kidney failure Mother     • Anemia Mother     • Heart disease Mother     • Hypertension Mother     • Stroke Mother     • Dementia Mother     • Arthritis Mother     • Migraines Mother     • Heart failure Father     • Coronary artery disease Father     • Heart disease Father     • Hypertension Father     • Diabetes Father     • Arthritis Father     • Stroke Father     • Diabetes Sister     • Cervical cancer Sister     • Leukemia Sister     • Stroke Sister     • Neuropathy Sister     • Seizures Sister     • Diabetes Brother     • Cervical cancer Daughter     • Ovarian cancer Sister     • Malig Hyperthermia Neg Hx        SOCIAL HISTORY                 Socioeconomic History   • Marital status:        Spouse name: Samantha   • Number of children: Not on file   • Years of education: High school   • Highest education level: Not on file   Tobacco Use   • Smoking status: Never Smoker   • Smokeless tobacco: Never Used   Substance and Sexual Activity   • Alcohol use: No       Comment: \"no more than a half a pint of coconut rum\"   • Drug use: No   • Sexual activity: Defer         ALLERGIES  Gabapentin, Lipitor  [atorvastatin calcium], Morphine and related, Fentanyl, Ibuprofen, Latex, and Penicillins  Home medications reviewed     REVIEW OF SYSTEMS  All systems reviewed and negative except for those discussed in HPI.      PHYSICAL EXAM   Blood pressure (!) 196/94, pulse 56, temperature 96.7 °F (35.9 °C), temperature source Tympanic, resp. rate 16, height 175.3 cm (69\"), weight 79.8 kg (176 lb), SpO2 96 %.    GENERAL: not distressed  HENT: nares patent  EYES: no scleral icterus  CV: regular rhythm, regular rate  RESPIRATORY: normal effort, clear to auscultation bilaterally  ABDOMEN: soft, nontender bowel sounds " positive  MUSCULOSKELETAL: no deformity  NEURO: alert, moves all extremities, follows commands  SKIN: warm, dry     LAB RESULTS  Lab Results (last 24 hours)     Procedure Component Value Units Date/Time    Basic Metabolic Panel [[id number]]  (Abnormal) Collected: 04/08/21 1423    Specimen: Blood Updated: 04/08/21 1453     Glucose 178 mg/dL      BUN 58 mg/dL      Creatinine 3.62 mg/dL      Sodium 138 mmol/L      Potassium 7.5 mmol/L      Chloride 110 mmol/L      CO2 20.5 mmol/L      Calcium 8.8 mg/dL      eGFR Non African Amer 17 mL/min/1.73      BUN/Creatinine Ratio 16.0     Anion Gap 7.5 mmol/L     Narrative:      GFR Normal >60  Chronic Kidney Disease <60  Kidney Failure <15      CBC & Differential [[id number]]  (Abnormal) Collected: 04/08/21 1308    Specimen: Blood Updated: 04/08/21 1331    Narrative:      The following orders were created for panel order CBC & Differential.  Procedure                               Abnormality         Status                     ---------                               -----------         ------                     Scan Slide[[id number]]                   Normal              Final result               CBC Auto Differential[[id number]]        Abnormal            Final result                 Please view results for these tests on the individual orders.    CBC Auto Differential [[id number]]  (Abnormal) Collected: 04/08/21 1308    Specimen: Blood Updated: 04/08/21 1331     WBC 7.62 10*3/mm3      RBC 3.54 10*6/mm3      Hemoglobin 11.1 g/dL      Hematocrit 32.8 %      MCV 92.7 fL      MCH 31.4 pg      MCHC 33.8 g/dL      RDW 13.8 %      RDW-SD 46.9 fl      MPV 11.8 fL      Platelets 93 10*3/mm3      Neutrophil % 76.1 %      Lymphocyte % 13.6 %      Monocyte % 5.1 %      Eosinophil % 4.1 %      Basophil % 0.7 %      Immature Grans % 0.4 %      Neutrophils, Absolute 5.80 10*3/mm3      Lymphocytes, Absolute 1.04 10*3/mm3      Monocytes, Absolute 0.39 10*3/mm3      Eosinophils, Absolute 0.31  10*3/mm3      Basophils, Absolute 0.05 10*3/mm3      Immature Grans, Absolute 0.03 10*3/mm3      nRBC 0.0 /100 WBC     Scan Slide [[id number]]  (Normal) Collected: 04/08/21 1308    Specimen: Blood Updated: 04/08/21 1331     RBC Morphology Normal     WBC Morphology Normal     Platelet Morphology Normal    POC Glucose Once [[id number]]  (Abnormal) Collected: 04/08/21 1319    Specimen: Blood Updated: 04/08/21 1320     Glucose 192 mg/dL         Imaging Results (Last 24 Hours)     ** No results found for the last 24 hours. **           ECG 12 Lead               HEART RATE= 56  bpm  RR Interval= 1068  ms  ME Interval= 197  ms  P Horizontal Axis= 24  deg  P Front Axis= 23  deg  QRSD Interval= 110  ms  QT Interval= 444  ms  QRS Axis= 70  deg  T Wave Axis= -36  deg  - ABNORMAL ECG -  Sinus rhythm  Nonspecific T abnormalities, inferior leads             Current Facility-Administered Medications:   •  ALPRAZolam (XANAX) tablet 1 mg, 1 mg, Oral, 4x Daily PRN, Karl Swift MD  •  aspirin EC tablet 81 mg, 81 mg, Oral, Daily, Karl Swift MD  •  atorvastatin (LIPITOR) tablet 10 mg, 10 mg, Oral, Nightly, Karl Swift MD  •  calcium gluconate 1g/50ml 0.675% NaCl IV SOLN, 1 g, Intravenous, Once, Yohan Murrell MD  •  carvedilol (COREG) tablet 3.125 mg, 3.125 mg, Oral, BID With Meals, Karl Swift MD  •  heparin (porcine) injection 4,000 Units, 4,000 Units, Intracatheter, PRN, Yohan Murrell MD  •  hydrALAZINE (APRESOLINE) tablet 100 mg, 100 mg, Oral, TID, Karl Swift MD  •  Insulin Glargine (LANTUS SOLOSTAR) injection pen solution pen-injector 15 Units, 15 Units, Subcutaneous, Nightly, Karl Swift MD  •  insulin lispro (ADMELOG) injection 0-7 Units, 0-7 Units, Subcutaneous, TID AC, Karl Swift MD  •  insulin lispro (ADMELOG) injection 5 Units, 5 Units, Subcutaneous, TID With Meals, Karl Swift MD  •  melatonin tablet 1 mg, 1 mg, Oral, Nightly PRN, Karl Swift MD  •  oxyCODONE-acetaminophen (PERCOCET)   MG per tablet 1 tablet, 1 tablet, Oral, Q6H PRN, Karl Swift MD  •  pantoprazole (PROTONIX) EC tablet 40 mg, 40 mg, Oral, Daily, Karl Swift MD  •  sodium bicarbonate 150 mEq/1000 mL dextrose infusion, 150 mEq, Intravenous, Continuous, Gus Castro II, MD, Last Rate: 125 mL/hr at 04/08/21 1444, 150 mEq at 04/08/21 1444  •  [COMPLETED] Insert peripheral IV, , , Once **AND** sodium chloride 0.9 % flush 10 mL, 10 mL, Intravenous, PRN, Gus Castro II, MD  •  sodium polystyrene (KAYEXALATE) 15 GM/60ML suspension 30 g, 30 g, Oral, Once, Yohan Murrell MD  •  tiZANidine (ZANAFLEX) tablet 4 mg, 4 mg, Oral, Q6H PRN, Karl Swift MD    Current Outpatient Medications:   •  ALPRAZolam (XANAX) 1 MG tablet, Take 1 mg by mouth 4 (Four) Times a Day As Needed., Disp: , Rfl:   •  aspirin 81 MG EC tablet, Take 1 tablet by mouth Daily., Disp: 150 tablet, Rfl: 2  •  Blood Glucose Monitoring Suppl (Prodigy Voice Blood Glucose) w/Device kit, 1 each 4 (Four) Times a Day., Disp: , Rfl:   •  carvedilol (COREG) 3.125 MG tablet, Take 3.125 mg by mouth 2 (Two) Times a Day With Meals., Disp: , Rfl:   •  Continuous Blood Gluc Sensor (FreeStyle Triston 14 Day Sensor) misc, , Disp: , Rfl:   •  diclofenac (VOLTAREN) 1 % gel gel, Apply 4 g topically to the appropriate area as directed 4 (Four) Times a Day As Needed., Disp: , Rfl:   •  glucose blood (FREESTYLE LITE) test strip, Monitor Blood glucose QID, Disp: 200 each, Rfl: 12  •  hydrALAZINE (APRESOLINE) 100 MG tablet, Take 100 mg by mouth 3 (Three) Times a Day. Pt takes 2x daily at home-will talk with cardiologist in May per pt, Disp: , Rfl:   •  insulin aspart (novoLOG FLEXPEN) 100 UNIT/ML solution pen-injector sc pen, Inject 2-15 Units under the skin 3 (Three) Times a Day With Meals. Per sliding scale, Disp: , Rfl:   •  Insulin Glargine (BASAGLAR KWIKPEN) 100 UNIT/ML injection pen, INJECT 10 UNITS SC AT NIGHT, Disp: , Rfl: 0  •  Insulin Glargine (LANTUS SOLOSTAR) 100  UNIT/ML injection pen, Inject 10 Units under the skin into the appropriate area as directed Every Night., Disp: , Rfl:   •  lisinopril (PRINIVIL,ZESTRIL) 10 MG tablet, 1 tablet Daily., Disp: , Rfl:   •  MELATONIN PO, Take  by mouth., Disp: , Rfl:   •  oxyCODONE-acetaminophen (PERCOCET)  MG per tablet, Take 1 tablet by mouth Every 6 (Six) Hours As Needed., Disp: , Rfl:   •  pantoprazole (PROTONIX) 40 MG EC tablet, Take 40 mg by mouth Daily., Disp: , Rfl:   •  simvastatin (ZOCOR) 40 MG tablet, Take 40 mg by mouth Every Night., Disp: , Rfl:   •  tiZANidine (ZANAFLEX) 4 MG tablet, , Disp: , Rfl:      ASSESSMENT  Hyperkalemia  Chronic kidney disease stage IV/V approaching end-stage renal disease need hemodialysis  Insulin-dependent diabetes mellitus  Hypertension  Hyperlipidemia  Gastroesophageal reflux disease    PLAN  Admit  Hyperkalemia protocol  Need hemodialysis  Adjust home medications  Stress ulcer DVT prophylaxis  Nephrology consult  Vascular surgery for hemodialysis catheter placement  Supportive care  Patient is full code  Discussed with wife and nursing staff  Follow closely further recommendation current hospital course    ARIEL MCGOVERN MD

## 2021-04-08 NOTE — ED PROVIDER NOTES
EMERGENCY DEPARTMENT ENCOUNTER    Room Number:  28/28  Date of encounter:  4/8/2021  PCP: Provider, No Known  Historian: Patient, caregiver      HPI:  Chief Complaint: Abnormal lab  A complete HPI/ROS/PMH/PSH/SH/FH are unobtainable due to: None    Context: Angelo Brown is a 60 y.o. male who presents to the ED c/o abnormal lab.  Patient had blood work done yesterday and reportedly had potassium greater than 7.  He states he has been feeling chronically tired for the past 2 to 3 months but that his symptoms are unchanged.  Nothing makes this better or worse.  No associated fever, vomiting, diarrhea, urinary symptoms.  He has known chronic kidney disease but continues to make urine.  He is also diabetic and takes insulin.      PAST MEDICAL HISTORY  Active Ambulatory Problems     Diagnosis Date Noted   • Acute CVA (cerebrovascular accident) (CMS/[facility]) 12/20/2017   • Proteinuria 12/24/2017   • Anemia in chronic kidney disease 03/05/2018   • Thrombocytopenia (CMS/[facility]) 03/05/2018   • Pancytopenia, acquired (CMS/[facility]) 03/05/2018   • History of hepatitis C virus infection 03/05/2018   • B12 deficiency 03/14/2018   • Hyperkalemia 06/05/2018   • Cancer of kidney parenchyma, right (CMS/[facility]) 07/31/2018   • Umbilical hernia without obstruction and without gangrene 03/05/2019   • Anemia of chronic renal failure, stage 3 (moderate) (CMS/HCC) 03/05/2019   • Chronic kidney disease, stage III (moderate) (CMS/[facility]) 03/05/2019   • Acute renal failure superimposed on chronic kidney disease (CMS/[facility]) 10/21/2019   • Toxic metabolic encephalopathy 10/22/2019   • Solitary kidney 10/22/2019   • Acute metabolic encephalopathy 07/14/2020   • Adverse effect of iron 02/18/2021   • Iron deficiency anemia 02/18/2021     Resolved Ambulatory Problems     Diagnosis Date Noted   • Renal mass 12/24/2017   • Leukopenia 03/05/2018     Past Medical History:   Diagnosis Date   • Anemia    • Anxiety    • Arthritis    • CAD (coronary artery disease)    •  Cancer (CMS/HCC)    • CHF (congestive heart failure) (CMS/HCC)    • Chronic back pain    • Chronic kidney disease    • Coronary artery disease    • Depression    • Diabetes mellitus (CMS/HCC)    • Eyes sensitive to light, bilateral    • GERD (gastroesophageal reflux disease)    • Headache    • Hepatitis C    • History of MRSA infection    • History of transfusion    • Hypertension    • Jaundice    • Myocardial infarction (CMS/HCC)    • Stroke (CMS/HCC) 12/2017         PAST SURGICAL HISTORY  Past Surgical History:   Procedure Laterality Date   • BRAIN HEMATOMA EVACUATION     • CARDIAC SURGERY     • CATARACT EXTRACTION, BILATERAL     • COLONOSCOPY     • CORONARY ARTERY BYPASS GRAFT  2013    Triple   • LAPAROSCOPIC PARTIAL NEPHRECTOMY Right    • ROTATOR CUFF REPAIR Left    • UMBILICAL HERNIA REPAIR N/A 3/27/2019    Procedure: UMBILICAL HERNIA REPAIR;  Surgeon: Harshad Cotto Jr., MD;  Location: [facility]  Service: General   • VASECTOMY  1985   • WOUND DEBRIDEMENT Right 06/10/2011    Excisional debridement of necrotizing fasciitis of the right groin extending along the right hemiscrotum, 5 x 2 x 2 cm in one wound and 7.5 x 2.5 x 2.5 cm of a second wound. This was sharp excisional debridement carried out to the muscle-Dr. Eduardo Cross          FAMILY HISTORY  Family History   Problem Relation Age of Onset   • Diabetes Mother    • Heart failure Mother    • Kidney failure Mother    • Anemia Mother    • Heart disease Mother    • Hypertension Mother    • Stroke Mother    • Dementia Mother    • Arthritis Mother    • Migraines Mother    • Heart failure Father    • Coronary artery disease Father    • Heart disease Father    • Hypertension Father    • Diabetes Father    • Arthritis Father    • Stroke Father    • Diabetes Sister    • Cervical cancer Sister    • Leukemia Sister    • Stroke Sister    • Neuropathy Sister    • Seizures Sister    • Diabetes Brother    • Cervical cancer Daughter    • Ovarian cancer Sister    •  "Malig Hyperthermia Neg Hx          SOCIAL HISTORY  Social History     Socioeconomic History   • Marital status:      Spouse name: Samantha   • Number of children: Not on file   • Years of education: High school   • Highest education level: Not on file   Tobacco Use   • Smoking status: Never Smoker   • Smokeless tobacco: Never Used   Substance and Sexual Activity   • Alcohol use: No     Comment: \"no more than a half a pint of coconut rum\"   • Drug use: No   • Sexual activity: Defer         ALLERGIES  Gabapentin, Lipitor  [atorvastatin calcium], Morphine and related, Fentanyl, Ibuprofen, Latex, and Penicillins        REVIEW OF SYSTEMS  Review of Systems     All systems reviewed and negative except for those discussed in HPI.       PHYSICAL EXAM    I have reviewed the triage vital signs and nursing notes.    ED Triage Vitals [04/08/21 1246]   Temp Heart Rate Resp BP SpO2   96.7 °F (35.9 °C) 56 16 -- 96 %      Temp src Heart Rate Source Patient Position BP Location FiO2 (%)   Tympanic -- -- -- --       Physical Exam  GENERAL: not distressed  HENT: nares patent  EYES: no scleral icterus  CV: regular rhythm, regular rate  RESPIRATORY: normal effort, clear to auscultation bilaterally  ABDOMEN: soft, nontender  MUSCULOSKELETAL: no deformity  NEURO: alert, moves all extremities, follows commands  SKIN: warm, dry        LAB RESULTS  Recent Results (from the past 24 hour(s))   CBC Auto Differential    Collection Time: 04/08/21  1:08 PM    Specimen: Blood   Result Value Ref Range    WBC 7.62 3.40 - 10.80 10*3/mm3    RBC 3.54 (L) 4.14 - 5.80 10*6/mm3    Hemoglobin 11.1 (L) 13.0 - 17.7 g/dL    Hematocrit 32.8 (L) 37.5 - 51.0 %    MCV 92.7 79.0 - 97.0 fL    MCH 31.4 26.6 - 33.0 pg    MCHC 33.8 31.5 - 35.7 g/dL    RDW 13.8 12.3 - 15.4 %    RDW-SD 46.9 37.0 - 54.0 fl    MPV 11.8 6.0 - 12.0 fL    Platelets 93 (L) 140 - 450 10*3/mm3    Neutrophil % 76.1 (H) 42.7 - 76.0 %    Lymphocyte % 13.6 (L) 19.6 - 45.3 %    Monocyte % 5.1 5.0 " - 12.0 %    Eosinophil % 4.1 0.3 - 6.2 %    Basophil % 0.7 0.0 - 1.5 %    Immature Grans % 0.4 0.0 - 0.5 %    Neutrophils, Absolute 5.80 1.70 - 7.00 10*3/mm3    Lymphocytes, Absolute 1.04 0.70 - 3.10 10*3/mm3    Monocytes, Absolute 0.39 0.10 - 0.90 10*3/mm3    Eosinophils, Absolute 0.31 0.00 - 0.40 10*3/mm3    Basophils, Absolute 0.05 0.00 - 0.20 10*3/mm3    Immature Grans, Absolute 0.03 0.00 - 0.05 10*3/mm3    nRBC 0.0 0.0 - 0.2 /100 WBC   Scan Slide    Collection Time: 04/08/21  1:08 PM    Specimen: Blood   Result Value Ref Range    RBC Morphology Normal Normal    WBC Morphology Normal Normal    Platelet Morphology Normal Normal   POC Glucose Once    Collection Time: 04/08/21  1:19 PM    Specimen: Blood   Result Value Ref Range    Glucose 192 (H) 70 - 130 mg/dL   ECG 12 Lead    Collection Time: 04/08/21  1:41 PM   Result Value Ref Range    QT Interval 444 ms   Basic Metabolic Panel    Collection Time: 04/08/21  2:23 PM    Specimen: Blood   Result Value Ref Range    Glucose 178 (H) 65 - 99 mg/dL    BUN 58 (H) 6 - 20 mg/dL    Creatinine 3.62 (H) 0.76 - 1.27 mg/dL    Sodium 138 136 - 145 mmol/L    Potassium 7.5 (C) 3.5 - 5.2 mmol/L    Chloride 110 (H) 98 - 107 mmol/L    CO2 20.5 (L) 22.0 - 29.0 mmol/L    Calcium 8.8 8.6 - 10.5 mg/dL    eGFR Non African Amer 17 (L) >60 mL/min/1.73    BUN/Creatinine Ratio 16.0 7.0 - 25.0    Anion Gap 7.5 5.0 - 15.0 mmol/L       Ordered the above labs and independently reviewed the results.        RADIOLOGY  No Radiology Exams Resulted Within Past 24 Hours    I ordered the above noted radiological studies. Reviewed by me and discussed with radiologist.  See dictation for official radiology interpretation.      PROCEDURES    Critical Care  Performed by: Gus Castro II, MD  Authorized by: Gus Castro II, MD     Critical care provider statement:     Critical care time (minutes):  33    Critical care was necessary to treat or prevent imminent or life-threatening  deterioration of the following conditions:  Metabolic crisis    Critical care was time spent personally by me on the following activities:  Ordering and review of laboratory studies, ordering and performing treatments and interventions, pulse oximetry, re-evaluation of patient's condition, review of old charts, obtaining history from patient or surrogate, discussions with consultants, evaluation of patient's response to treatment, examination of patient and development of treatment plan with patient or surrogate          MEDICATIONS GIVEN IN ER    Medications   sodium chloride 0.9 % flush 10 mL (has no administration in time range)   sodium bicarbonate 150 mEq/1000 mL dextrose infusion (150 mEq Intravenous New Bag 4/8/21 1444)   ALPRAZolam (XANAX) tablet 1 mg (has no administration in time range)   aspirin EC tablet 81 mg (has no administration in time range)   carvedilol (COREG) tablet 3.125 mg (has no administration in time range)   hydrALAZINE (APRESOLINE) tablet 100 mg (has no administration in time range)   melatonin tablet 1 mg (has no administration in time range)   oxyCODONE-acetaminophen (PERCOCET)  MG per tablet 1 tablet (has no administration in time range)   pantoprazole (PROTONIX) EC tablet 40 mg (has no administration in time range)   tiZANidine (ZANAFLEX) tablet 4 mg (has no administration in time range)   insulin lispro (ADMELOG) injection 0-7 Units (has no administration in time range)   atorvastatin (LIPITOR) tablet 10 mg (has no administration in time range)   Insulin Glargine (LANTUS SOLOSTAR) injection pen solution pen-injector 15 Units (has no administration in time range)   insulin lispro (ADMELOG) injection 5 Units (has no administration in time range)   heparin (porcine) injection 4,000 Units (has no administration in time range)   sodium polystyrene (KAYEXALATE) 15 GM/60ML suspension 30 g (has no administration in time range)   calcium gluconate 1g/50ml 0.675% NaCl IV SOLN (has no  administration in time range)   dextrose (D50W) 25 g/ 50mL Intravenous Solution 25 g (25 g Intravenous Given 4/8/21 1444)   insulin regular (humuLIN R,novoLIN R) injection 10 Units (10 Units Intravenous Given 4/8/21 1444)         PROGRESS, DATA ANALYSIS, CONSULTS, AND MEDICAL DECISION MAKING    All labs have been independently reviewed by me.  All radiology studies have been reviewed by me and discussed with radiologist dictating the report.   EKG's independently viewed and interpreted by me.  Discussion below represents my analysis of pertinent findings related to patient's condition, differential diagnosis, treatment plan and final disposition.    Patient presents with reported hyperkalemia.  EKG will be obtained soon as possible.  I discussed the patient short-term management as well as the need for likely dialysis in the near future given his history of rather significant chronic kidney disease.    ED Course as of Apr 08 1523   Thu Apr 08, 2021   1357 Glucose(!): 192 [TD]   1357 Hemoglobin(!): 11.1 [TD]   1359 EKG interpreted by myself.  Time 1341.  Sinus rhythm.  Heart rate 56.  Normal axis.  Normal intervals.  Peaked T waves most noticeable in V2.    [TD]   1359 On medical chart review, old EKG obtained from 7/14/2020.  Sinus rhythm.  Heart rate 62.  No peaked T waves at this time.    [TD]   1451 I discussed the case with Dr. Swift, hospitalist.  Reviewed the patient's labs, history, and plan for initiation of dialysisDr. Yohan Murrell has seen the patient and discussed in detail with the patient.    [TD]      ED Course User Index  [TD] Gus Castro II, MD           PPE: Both the patient and I wore a surgical mask throughout the entire patient encounter. I wore protective goggles.     AS OF 15:23 EDT VITALS:    BP - 150/97  HR - 56  TEMP - 96.7 °F (35.9 °C) (Tympanic)  O2 SATS - 99%        DIAGNOSIS  Final diagnoses:   Hyperkalemia   Stage 5 chronic kidney disease not on chronic dialysis (CMS/HCC)          DISPOSITION  Admit           Gus Castro II, MD  04/08/21 0369

## 2021-04-08 NOTE — PLAN OF CARE
Goal Outcome Evaluation:         Patient alert and oriented patient states in pain 7/10 lower back and right shoulder. Patient oriented to room. No acute distress noted will continue to monitor.

## 2021-04-08 NOTE — CONSULTS
[facility]                                                                                             Nephrology Initial Consult Note    Patient Identification:  Name: Angelo Brown MRN: [id number]  Age: 60 y.o. : 1960  Sex: male  Date:2021    Requesting Physician: As per consult order.  Reason for Consultation: Severe hyperkalemia with renal failure  Information from:patient/ family/ chart      History of Present Illness: This is a 60 y.o. year old male with progressive chronic kidney disease, stage IV.  He had labs done yesterday in anticipation of a follow-up appointment in my office.  I was called this morning with a potassium of 7.0 and progression of his chronic kidney disease with BUN and creatinine 61 and 4.5 respectively, GFR 14.  CO2 was 19 phosphorus 4.9 vitamin D was normal urinalysis had 3+ proteinuria.  He states adequate oral intake with no nausea vomiting diarrhea.  No new medications and has been compliant with prescription medications.  He is not on any supplemental potassium or ACE inhibitor's.  Long discussion regarding methods of dialysis and need for dialysis initiation.  He is agreeable and understands he will require surgical dialysis catheter placement and eventual AV fistula.  Briefly discussed PD but I do not believe he is a good candidate for this due to vision impairment.    The following medical history and medications personally reviewed by me:    Problem List:   Patient Active Problem List    Diagnosis    • Adverse effect of iron [T45.4X5A]    • Iron deficiency anemia [D50.9]    • Acute metabolic encephalopathy [G93.41]    • Toxic metabolic encephalopathy [G92]    • Solitary kidney [Q60.0]    • Acute renal failure superimposed on chronic kidney disease (CMS/HCC) [N17.9, N18.9]    • Umbilical hernia without obstruction and without gangrene [K42.9]    • Anemia of  chronic renal failure, stage 3 (moderate) (CMS/HCC) [N18.30, D63.1]    • Chronic kidney disease, stage III (moderate) (CMS/HCC) [N18.30]    • Cancer of kidney parenchyma, right (CMS/HCC) [C64.1]    • Hyperkalemia [E87.5]    • B12 deficiency [E53.8]    • Anemia in chronic kidney disease [N18.9, D63.1]    • Thrombocytopenia (CMS/HCC) [D69.6]    • Pancytopenia, acquired (CMS/HCC) [D61.818]    • History of hepatitis C virus infection [Z86.19]    • Proteinuria [R80.9]    • Acute CVA (cerebrovascular accident) (CMS/HCC) [I63.9]        Past Medical History:  Past Medical History:   Diagnosis Date   • Anemia    • Anxiety    • Arthritis     HANDS   • CAD (coronary artery disease)    • Cancer (CMS/HCC)     KIDNEY 7/2018 SX   • CHF (congestive heart failure) (CMS/HCC)    • Chronic back pain    • Chronic kidney disease    • Coronary artery disease    • Depression    • Diabetes mellitus (CMS/HCC)     TYPE 2   • Eyes sensitive to light, bilateral    • GERD (gastroesophageal reflux disease)    • Headache    • Hepatitis C     after blood tranfusion/treated   • History of MRSA infection    • History of transfusion     2013 CABG   • Hypertension    • Jaundice    • Myocardial infarction (CMS/HCC)     5-6 years ago per pt   • Stroke (CMS/HCC) 12/2017    left sided weakness/       Past Surgical History:  Past Surgical History:   Procedure Laterality Date   • BRAIN HEMATOMA EVACUATION     • CARDIAC SURGERY     • CATARACT EXTRACTION, BILATERAL     • COLONOSCOPY     • CORONARY ARTERY BYPASS GRAFT  2013    Triple   • LAPAROSCOPIC PARTIAL NEPHRECTOMY Right    • ROTATOR CUFF REPAIR Left    • UMBILICAL HERNIA REPAIR N/A 3/27/2019    Procedure: UMBILICAL HERNIA REPAIR;  Surgeon: Harshad Cotto Jr., MD;  Location: [facility];  Service: General   • VASECTOMY  1985   • WOUND DEBRIDEMENT Right 06/10/2011    Excisional debridement of necrotizing fasciitis of the right groin extending along the right hemiscrotum, 5 x 2 x 2 cm in one wound and  7.5 x 2.5 x 2.5 cm of a second wound. This was sharp excisional debridement carried out to the muscle-Dr. Eduardo Cross         Home Meds:   (Not in a hospital admission)      Current Meds:   Current Facility-Administered Medications   Medication Dose Route Frequency Provider Last Rate Last Admin   • ALPRAZolam (XANAX) tablet 1 mg  1 mg Oral 4x Daily PRN Karl Swift MD       • aspirin EC tablet 81 mg  81 mg Oral Daily Karl Swift MD       • atorvastatin (LIPITOR) tablet 20 mg  20 mg Oral Daily Karl Swift MD       • carvedilol (COREG) tablet 3.125 mg  3.125 mg Oral BID With Meals Karl Swift MD       • hydrALAZINE (APRESOLINE) tablet 100 mg  100 mg Oral TID Karl Swift MD       • Insulin Glargine (LANTUS SOLOSTAR) injection pen solution pen-injector 10 Units  10 Units Subcutaneous Nightly Karl Swift MD       • lisinopril (PRINIVIL,ZESTRIL) tablet 10 mg  10 mg Oral Q24H Karl Swift MD       • melatonin tablet 1 mg  1 mg Oral Nightly PRN Karl Swift MD       • oxyCODONE-acetaminophen (PERCOCET)  MG per tablet 1 tablet  1 tablet Oral Q6H PRN Karl Swift MD       • pantoprazole (PROTONIX) EC tablet 40 mg  40 mg Oral Daily Karl Swift MD       • sodium bicarbonate 150 mEq/1000 mL dextrose infusion  150 mEq Intravenous Continuous Gus Castro II,  mL/hr at 04/08/21 1444 150 mEq at 04/08/21 1444   • sodium chloride 0.9 % flush 10 mL  10 mL Intravenous PRN Gus Castro II, MD       • tiZANidine (ZANAFLEX) tablet 4 mg  4 mg Oral Q6H PRN Karl Swift MD         Current Outpatient Medications   Medication Sig Dispense Refill   • ALPRAZolam (XANAX) 1 MG tablet Take 1 mg by mouth 4 (Four) Times a Day As Needed.     • aspirin 81 MG EC tablet Take 1 tablet by mouth Daily. 150 tablet 2   • Blood Glucose Monitoring Suppl (Prodigy Voice Blood Glucose) w/Device kit 1 each 4 (Four) Times a Day.     • carvedilol (COREG) 3.125 MG tablet Take 3.125 mg by mouth 2 (Two) Times a Day With Meals.      • Continuous Blood Gluc Sensor (FreeStyle Triston 14 Day Sensor) misc      • diclofenac (VOLTAREN) 1 % gel gel Apply 4 g topically to the appropriate area as directed 4 (Four) Times a Day As Needed.     • glucose blood (FREESTYLE LITE) test strip Monitor Blood glucose  each 12   • hydrALAZINE (APRESOLINE) 100 MG tablet Take 100 mg by mouth 3 (Three) Times a Day. Pt takes 2x daily at home-will talk with cardiologist in May per pt     • insulin aspart (novoLOG FLEXPEN) 100 UNIT/ML solution pen-injector sc pen Inject 2-15 Units under the skin 3 (Three) Times a Day With Meals. Per sliding scale     • Insulin Glargine (BASAGLAR KWIKPEN) 100 UNIT/ML injection pen INJECT 10 UNITS SC AT NIGHT  0   • Insulin Glargine (LANTUS SOLOSTAR) 100 UNIT/ML injection pen Inject 10 Units under the skin into the appropriate area as directed Every Night.     • lisinopril (PRINIVIL,ZESTRIL) 10 MG tablet 1 tablet Daily.     • MELATONIN PO Take  by mouth.     • oxyCODONE-acetaminophen (PERCOCET)  MG per tablet Take 1 tablet by mouth Every 6 (Six) Hours As Needed.     • pantoprazole (PROTONIX) 40 MG EC tablet Take 40 mg by mouth Daily.     • simvastatin (ZOCOR) 40 MG tablet Take 40 mg by mouth Every Night.     • tiZANidine (ZANAFLEX) 4 MG tablet          Allergies:  Allergies   Allergen Reactions   • Gabapentin Unknown - High Severity     Pt unresponsive   • Lipitor  [Atorvastatin Calcium] Unknown - Low Severity and Shortness Of Breath   • Morphine And Related Delirium   • Fentanyl Unknown - High Severity     ER said he was allergy   • Ibuprofen Nausea Only   • Latex Rash   • Penicillins Hives       Social History:   Social History     Socioeconomic History   • Marital status:      Spouse name: Samantha   • Number of children: Not on file   • Years of education: High school   • Highest education level: Not on file   Tobacco Use   • Smoking status: Never Smoker   • Smokeless tobacco: Never Used   Substance and Sexual Activity  "  • Alcohol use: No     Comment: \"no more than a half a pint of coconut rum\"   • Drug use: No   • Sexual activity: Defer        Family History:  Family History   Problem Relation Age of Onset   • Diabetes Mother    • Heart failure Mother    • Kidney failure Mother    • Anemia Mother    • Heart disease Mother    • Hypertension Mother    • Stroke Mother    • Dementia Mother    • Arthritis Mother    • Migraines Mother    • Heart failure Father    • Coronary artery disease Father    • Heart disease Father    • Hypertension Father    • Diabetes Father    • Arthritis Father    • Stroke Father    • Diabetes Sister    • Cervical cancer Sister    • Leukemia Sister    • Stroke Sister    • Neuropathy Sister    • Seizures Sister    • Diabetes Brother    • Cervical cancer Daughter    • Ovarian cancer Sister    • Malig Hyperthermia Neg Hx         Review of Systems: as per HPI, in addition:    General:      Complains of weakness / fatigue,                       No fevers / chills                       no weight loss  HEENT:       no dysphagia / odynophagia  Neck:           normal range of motion, no swelling  Respiratory: no cough / congestion                      No shortness of air                       No wheezing  CV:              No chest pain                       No palpitations  Abdomen/GI: no nausea / vomiting                      No diarrhea / constipation                      No abdominal pain  :             no dysuria / urinary frequency                       No urgency, normal output  Endocrine:   no polyuria / polydipsia,                      No heat or cold intolerance  Skin:           no rashes or skin breakdown   Vascular:   No edema                     No claudication  Psych:        no depression/ anxiety  Neuro:        no focal weakness, no seizures  Musculoskeletal: no joint pain or deformities      Physical Exam:  Vitals:   Temp (24hrs), Av.7 °F (35.9 °C), Min:96.7 °F (35.9 °C), Max:96.7 °F (35.9 " "°C)    BP (!) 196/94 (BP Location: Right arm, Patient Position: Lying)   Pulse 56   Temp 96.7 °F (35.9 °C) (Tympanic)   Resp 16   Ht 175.3 cm (69\")   Wt 79.8 kg (176 lb)   SpO2 96%   BMI 25.99 kg/m²   Intake/Output:   No intake or output data in the 24 hours ending 04/08/21 1455     Wt Readings from Last 1 Encounters:   04/08/21 1302 79.8 kg (176 lb)       Exam:    General Appearance:  Awake, alert, oriented x3, no acute distress  Chronically ill-appearing   Head and Face:  Normocephalic, atraumatic, mucus membranes moist, oropharynx clear   Eyes:  No icterus, pupils equal round and reactive to light, extraocular movements intact    ENMT: Moist mucosa, tongue symmetric    Neck: Supple  no jugular venous distention  no thyromegaly   Pulmonary:  Respiratory effort: Normal  Auscultation of lungs: Clear bilaterally  No wheezes  No rhonchi  Good air movement, good expansion   Chest wall:  No tenderness or deformity   Cardiovascular:  Auscultation of the heart: Normal rhythm, no murmurs  No significant edema of bilateral lower extremities   Abdomen:  Abdomen: soft, non-tender, normal bowel sounds all four quadrants, no masses   Liver and spleen: no hepatosplenomegaly   Musculoskeletal: Digits and nails: normal  Normal range of motion  No joint swelling or gross deformities    Skin: Skin inspection: color normal, no visible rashes or lesions  Skin palpation: texture, turgor normal, no palpable lesions   Lymphatic:  no cervical lymphadenopathy    Psychiatric: Judgement and insight: normal  Orientation to person place and time: normal  Mood and affect: normal       DATA:  Radiology and Labs:  The following labs independently reviewed by me, additional AM labs ordered  Old records independently reviewed showing progressive CKD  The following radiologic studies independently viewed by me, findings EKG with peaked T waves  Interval notes, chart personally reviewed by me.  I have reviewed and summarized old records as " detailed above  Plan of care discussed with ER physician Dr. Castro  New problems include critical hyperkalemia and progressive CKD    Dialysis patient access: None currently    Risk/ complexity of medical care/ medical decision making: High risk given severe hyperkalemia, progressive renal failure with need for dialysis initiation  Chronic illness with severe exacerbation or progression      Labs:   Recent Results (from the past 24 hour(s))   CBC Auto Differential    Collection Time: 04/08/21  1:08 PM    Specimen: Blood   Result Value Ref Range    WBC 7.62 3.40 - 10.80 10*3/mm3    RBC 3.54 (L) 4.14 - 5.80 10*6/mm3    Hemoglobin 11.1 (L) 13.0 - 17.7 g/dL    Hematocrit 32.8 (L) 37.5 - 51.0 %    MCV 92.7 79.0 - 97.0 fL    MCH 31.4 26.6 - 33.0 pg    MCHC 33.8 31.5 - 35.7 g/dL    RDW 13.8 12.3 - 15.4 %    RDW-SD 46.9 37.0 - 54.0 fl    MPV 11.8 6.0 - 12.0 fL    Platelets 93 (L) 140 - 450 10*3/mm3    Neutrophil % 76.1 (H) 42.7 - 76.0 %    Lymphocyte % 13.6 (L) 19.6 - 45.3 %    Monocyte % 5.1 5.0 - 12.0 %    Eosinophil % 4.1 0.3 - 6.2 %    Basophil % 0.7 0.0 - 1.5 %    Immature Grans % 0.4 0.0 - 0.5 %    Neutrophils, Absolute 5.80 1.70 - 7.00 10*3/mm3    Lymphocytes, Absolute 1.04 0.70 - 3.10 10*3/mm3    Monocytes, Absolute 0.39 0.10 - 0.90 10*3/mm3    Eosinophils, Absolute 0.31 0.00 - 0.40 10*3/mm3    Basophils, Absolute 0.05 0.00 - 0.20 10*3/mm3    Immature Grans, Absolute 0.03 0.00 - 0.05 10*3/mm3    nRBC 0.0 0.0 - 0.2 /100 WBC   Scan Slide    Collection Time: 04/08/21  1:08 PM    Specimen: Blood   Result Value Ref Range    RBC Morphology Normal Normal    WBC Morphology Normal Normal    Platelet Morphology Normal Normal   POC Glucose Once    Collection Time: 04/08/21  1:19 PM    Specimen: Blood   Result Value Ref Range    Glucose 192 (H) 70 - 130 mg/dL   ECG 12 Lead    Collection Time: 04/08/21  1:41 PM   Result Value Ref Range    QT Interval 444 ms   Basic Metabolic Panel    Collection Time: 04/08/21  2:23 PM     Specimen: Blood   Result Value Ref Range    Glucose 178 (H) 65 - 99 mg/dL    BUN 58 (H) 6 - 20 mg/dL    Creatinine 3.62 (H) 0.76 - 1.27 mg/dL    Sodium 138 136 - 145 mmol/L    Potassium 7.5 (C) 3.5 - 5.2 mmol/L    Chloride 110 (H) 98 - 107 mmol/L    CO2 20.5 (L) 22.0 - 29.0 mmol/L    Calcium 8.8 8.6 - 10.5 mg/dL    eGFR Non African Amer 17 (L) >60 mL/min/1.73    BUN/Creatinine Ratio 16.0 7.0 - 25.0    Anion Gap 7.5 5.0 - 15.0 mmol/L       Radiology:  Imaging Results (Last 24 Hours)     ** No results found for the last 24 hours. **               ASSESSMENT:   CKD stage IV with progression  Acute kidney injury versus progression of CKD  Critical hyperkalemia  Type 2 diabetes mellitus with nephropathy  Anemia of CKD  Chronic hypertension  Hyperlipidemia    Hyperkalemia      PLAN:   Discussed plan with Dr. Castro in ER  Also discussed with patient and with his wife at bedside  Potassium 7.5.  We will plan to treat medically today  Consult vascular for tunneled dialysis catheter with plans for HD initiation tomorrow  Consult CCP for outpatient dialysis spot  Resume home antihypertensive regimen and adjust as needed  Hemoglobin above 11 so no need for Epogen  Phosphorus 4.8.  Should improve with dialysis  Agree with bicarb drip and IV hydration  Monitored bed, plan to admit to medicine service     monitor electrolytes and volume closely, avoid IV contrast and nephrotoxic medications     I appreciate the consult request, please call if any questions      Yohan Murrell M.D  [facility]  Office phone number: [phone]  Answering service phone number: [phone]        4/8/2021        Dictation via Dragon dictation software

## 2021-04-09 ENCOUNTER — ANESTHESIA (OUTPATIENT)
Dept: PERIOP | Facility: HOSPITAL | Age: 61
End: 2021-04-09

## 2021-04-09 ENCOUNTER — APPOINTMENT (OUTPATIENT)
Dept: GENERAL RADIOLOGY | Facility: HOSPITAL | Age: 61
End: 2021-04-09

## 2021-04-09 ENCOUNTER — ANESTHESIA EVENT (OUTPATIENT)
Dept: PERIOP | Facility: HOSPITAL | Age: 61
End: 2021-04-09

## 2021-04-09 LAB
ALBUMIN SERPL-MCNC: 3.4 G/DL (ref 3.5–5.2)
ALBUMIN/GLOB SERPL: 1.5 G/DL
ALP SERPL-CCNC: 46 U/L (ref 39–117)
ALT SERPL W P-5'-P-CCNC: 13 U/L (ref 1–41)
ANION GAP SERPL CALCULATED.3IONS-SCNC: 8.3 MMOL/L (ref 5–15)
AST SERPL-CCNC: 15 U/L (ref 1–40)
BASOPHILS # BLD AUTO: 0.03 10*3/MM3 (ref 0–0.2)
BASOPHILS NFR BLD AUTO: 0.7 % (ref 0–1.5)
BILIRUB SERPL-MCNC: <0.2 MG/DL (ref 0–1.2)
BUN SERPL-MCNC: 59 MG/DL (ref 6–20)
BUN/CREAT SERPL: 16.2 (ref 7–25)
CALCIUM SPEC-SCNC: 8 MG/DL (ref 8.6–10.5)
CHLORIDE SERPL-SCNC: 110 MMOL/L (ref 98–107)
CHOLEST SERPL-MCNC: 132 MG/DL (ref 0–200)
CO2 SERPL-SCNC: 25.7 MMOL/L (ref 22–29)
CREAT SERPL-MCNC: 3.65 MG/DL (ref 0.76–1.27)
DEPRECATED RDW RBC AUTO: 45.2 FL (ref 37–54)
EOSINOPHIL # BLD AUTO: 0.17 10*3/MM3 (ref 0–0.4)
EOSINOPHIL NFR BLD AUTO: 4.1 % (ref 0.3–6.2)
ERYTHROCYTE [DISTWIDTH] IN BLOOD BY AUTOMATED COUNT: 14.1 % (ref 12.3–15.4)
GFR SERPL CREATININE-BSD FRML MDRD: 17 ML/MIN/1.73
GLOBULIN UR ELPH-MCNC: 2.2 GM/DL
GLUCOSE BLDC GLUCOMTR-MCNC: 117 MG/DL (ref 70–130)
GLUCOSE BLDC GLUCOMTR-MCNC: 133 MG/DL (ref 70–130)
GLUCOSE BLDC GLUCOMTR-MCNC: 177 MG/DL (ref 70–130)
GLUCOSE BLDC GLUCOMTR-MCNC: 91 MG/DL (ref 70–130)
GLUCOSE SERPL-MCNC: 107 MG/DL (ref 65–99)
HBA1C MFR BLD: 6 % (ref 4.8–5.6)
HCT VFR BLD AUTO: 27.4 % (ref 37.5–51)
HDLC SERPL-MCNC: 41 MG/DL (ref 40–60)
HGB BLD-MCNC: 9 G/DL (ref 13–17.7)
IMM GRANULOCYTES # BLD AUTO: 0.01 10*3/MM3 (ref 0–0.05)
IMM GRANULOCYTES NFR BLD AUTO: 0.2 % (ref 0–0.5)
LDLC SERPL CALC-MCNC: 76 MG/DL (ref 0–100)
LDLC/HDLC SERPL: 1.84 {RATIO}
LYMPHOCYTES # BLD AUTO: 1.41 10*3/MM3 (ref 0.7–3.1)
LYMPHOCYTES NFR BLD AUTO: 34.1 % (ref 19.6–45.3)
MCH RBC QN AUTO: 29.7 PG (ref 26.6–33)
MCHC RBC AUTO-ENTMCNC: 32.8 G/DL (ref 31.5–35.7)
MCV RBC AUTO: 90.4 FL (ref 79–97)
MONOCYTES # BLD AUTO: 0.3 10*3/MM3 (ref 0.1–0.9)
MONOCYTES NFR BLD AUTO: 7.3 % (ref 5–12)
NEUTROPHILS NFR BLD AUTO: 2.21 10*3/MM3 (ref 1.7–7)
NEUTROPHILS NFR BLD AUTO: 53.6 % (ref 42.7–76)
NRBC BLD AUTO-RTO: 0 /100 WBC (ref 0–0.2)
NT-PROBNP SERPL-MCNC: 1248 PG/ML (ref 0–900)
PHOSPHATE SERPL-MCNC: 5.2 MG/DL (ref 2.5–4.5)
PLATELET # BLD AUTO: 85 10*3/MM3 (ref 140–450)
PMV BLD AUTO: 12.1 FL (ref 6–12)
POTASSIUM SERPL-SCNC: 5.5 MMOL/L (ref 3.5–5.2)
PROT SERPL-MCNC: 5.6 G/DL (ref 6–8.5)
RBC # BLD AUTO: 3.03 10*6/MM3 (ref 4.14–5.8)
SODIUM SERPL-SCNC: 144 MMOL/L (ref 136–145)
TRIGL SERPL-MCNC: 78 MG/DL (ref 0–150)
TSH SERPL DL<=0.05 MIU/L-ACNC: 2.61 UIU/ML (ref 0.27–4.2)
VLDLC SERPL-MCNC: 15 MG/DL (ref 5–40)
WBC # BLD AUTO: 4.13 10*3/MM3 (ref 3.4–10.8)

## 2021-04-09 PROCEDURE — 25010000002 MIDAZOLAM PER 1 MG: Performed by: ANESTHESIOLOGY

## 2021-04-09 PROCEDURE — 83036 HEMOGLOBIN GLYCOSYLATED A1C: CPT | Performed by: HOSPITALIST

## 2021-04-09 PROCEDURE — 83880 ASSAY OF NATRIURETIC PEPTIDE: CPT | Performed by: HOSPITALIST

## 2021-04-09 PROCEDURE — 84443 ASSAY THYROID STIM HORMONE: CPT | Performed by: HOSPITALIST

## 2021-04-09 PROCEDURE — 84100 ASSAY OF PHOSPHORUS: CPT | Performed by: INTERNAL MEDICINE

## 2021-04-09 PROCEDURE — 63710000001 INSULIN GLARGINE PER 5 UNITS: Performed by: SURGERY

## 2021-04-09 PROCEDURE — 25010000003 LIDOCAINE 1 % SOLUTION: Performed by: SURGERY

## 2021-04-09 PROCEDURE — 5A1D70Z PERFORMANCE OF URINARY FILTRATION, INTERMITTENT, LESS THAN 6 HOURS PER DAY: ICD-10-PCS | Performed by: INTERNAL MEDICINE

## 2021-04-09 PROCEDURE — 85025 COMPLETE CBC W/AUTO DIFF WBC: CPT | Performed by: HOSPITALIST

## 2021-04-09 PROCEDURE — C1894 INTRO/SHEATH, NON-LASER: HCPCS | Performed by: SURGERY

## 2021-04-09 PROCEDURE — 80053 COMPREHEN METABOLIC PANEL: CPT | Performed by: HOSPITALIST

## 2021-04-09 PROCEDURE — B518ZZA FLUOROSCOPY OF SUPERIOR VENA CAVA, GUIDANCE: ICD-10-PCS | Performed by: SURGERY

## 2021-04-09 PROCEDURE — 25010000002 HYDRALAZINE PER 20 MG: Performed by: NURSE ANESTHETIST, CERTIFIED REGISTERED

## 2021-04-09 PROCEDURE — 76000 FLUOROSCOPY <1 HR PHYS/QHP: CPT

## 2021-04-09 PROCEDURE — 80061 LIPID PANEL: CPT | Performed by: HOSPITALIST

## 2021-04-09 PROCEDURE — 02HV33Z INSERTION OF INFUSION DEVICE INTO SUPERIOR VENA CAVA, PERCUTANEOUS APPROACH: ICD-10-PCS | Performed by: SURGERY

## 2021-04-09 PROCEDURE — 82962 GLUCOSE BLOOD TEST: CPT

## 2021-04-09 PROCEDURE — 0JH60XZ INSERTION OF TUNNELED VASCULAR ACCESS DEVICE INTO CHEST SUBCUTANEOUS TISSUE AND FASCIA, OPEN APPROACH: ICD-10-PCS | Performed by: SURGERY

## 2021-04-09 PROCEDURE — 25010000003 CEFAZOLIN IN DEXTROSE 2-4 GM/100ML-% SOLUTION: Performed by: NURSE ANESTHETIST, CERTIFIED REGISTERED

## 2021-04-09 PROCEDURE — 63710000001 INSULIN LISPRO (HUMAN) PER 5 UNITS: Performed by: SURGERY

## 2021-04-09 PROCEDURE — 25010000002 PROPOFOL 10 MG/ML EMULSION: Performed by: NURSE ANESTHETIST, CERTIFIED REGISTERED

## 2021-04-09 PROCEDURE — C1750 CATH, HEMODIALYSIS,LONG-TERM: HCPCS | Performed by: SURGERY

## 2021-04-09 PROCEDURE — 25010000002 HEPARIN (PORCINE) PER 1000 UNITS: Performed by: SURGERY

## 2021-04-09 RX ORDER — HEPARIN SODIUM 1000 [USP'U]/ML
INJECTION, SOLUTION INTRAVENOUS; SUBCUTANEOUS AS NEEDED
Status: DISCONTINUED | OUTPATIENT
Start: 2021-04-09 | End: 2021-04-09 | Stop reason: HOSPADM

## 2021-04-09 RX ORDER — EPHEDRINE SULFATE 50 MG/ML
5 INJECTION, SOLUTION INTRAVENOUS ONCE AS NEEDED
Status: DISCONTINUED | OUTPATIENT
Start: 2021-04-09 | End: 2021-04-09 | Stop reason: HOSPADM

## 2021-04-09 RX ORDER — PROMETHAZINE HYDROCHLORIDE 25 MG/1
25 SUPPOSITORY RECTAL ONCE AS NEEDED
Status: DISCONTINUED | OUTPATIENT
Start: 2021-04-09 | End: 2021-04-09 | Stop reason: HOSPADM

## 2021-04-09 RX ORDER — MIDAZOLAM HYDROCHLORIDE 1 MG/ML
2 INJECTION INTRAMUSCULAR; INTRAVENOUS
Status: COMPLETED | OUTPATIENT
Start: 2021-04-09 | End: 2021-04-09

## 2021-04-09 RX ORDER — SODIUM CHLORIDE, SODIUM LACTATE, POTASSIUM CHLORIDE, CALCIUM CHLORIDE 600; 310; 30; 20 MG/100ML; MG/100ML; MG/100ML; MG/100ML
9 INJECTION, SOLUTION INTRAVENOUS CONTINUOUS
Status: DISCONTINUED | OUTPATIENT
Start: 2021-04-09 | End: 2021-04-09

## 2021-04-09 RX ORDER — FENTANYL CITRATE 50 UG/ML
50 INJECTION, SOLUTION INTRAMUSCULAR; INTRAVENOUS
Status: DISCONTINUED | OUTPATIENT
Start: 2021-04-09 | End: 2021-04-09 | Stop reason: HOSPADM

## 2021-04-09 RX ORDER — HYDROCODONE BITARTRATE AND ACETAMINOPHEN 7.5; 325 MG/1; MG/1
1 TABLET ORAL ONCE AS NEEDED
Status: DISCONTINUED | OUTPATIENT
Start: 2021-04-09 | End: 2021-04-09 | Stop reason: HOSPADM

## 2021-04-09 RX ORDER — LABETALOL HYDROCHLORIDE 5 MG/ML
5 INJECTION, SOLUTION INTRAVENOUS
Status: DISCONTINUED | OUTPATIENT
Start: 2021-04-09 | End: 2021-04-09 | Stop reason: HOSPADM

## 2021-04-09 RX ORDER — SODIUM CHLORIDE 0.9 % (FLUSH) 0.9 %
3-10 SYRINGE (ML) INJECTION AS NEEDED
Status: DISCONTINUED | OUTPATIENT
Start: 2021-04-09 | End: 2021-04-09 | Stop reason: HOSPADM

## 2021-04-09 RX ORDER — PROPOFOL 10 MG/ML
VIAL (ML) INTRAVENOUS CONTINUOUS PRN
Status: DISCONTINUED | OUTPATIENT
Start: 2021-04-09 | End: 2021-04-09 | Stop reason: SURG

## 2021-04-09 RX ORDER — MIDAZOLAM HYDROCHLORIDE 1 MG/ML
1 INJECTION INTRAMUSCULAR; INTRAVENOUS
Status: COMPLETED | OUTPATIENT
Start: 2021-04-09 | End: 2021-04-09

## 2021-04-09 RX ORDER — HYDRALAZINE HYDROCHLORIDE 20 MG/ML
5 INJECTION INTRAMUSCULAR; INTRAVENOUS
Status: DISCONTINUED | OUTPATIENT
Start: 2021-04-09 | End: 2021-04-09 | Stop reason: HOSPADM

## 2021-04-09 RX ORDER — FLUMAZENIL 0.1 MG/ML
0.2 INJECTION INTRAVENOUS AS NEEDED
Status: DISCONTINUED | OUTPATIENT
Start: 2021-04-09 | End: 2021-04-09 | Stop reason: HOSPADM

## 2021-04-09 RX ORDER — NALOXONE HCL 0.4 MG/ML
0.2 VIAL (ML) INJECTION AS NEEDED
Status: DISCONTINUED | OUTPATIENT
Start: 2021-04-09 | End: 2021-04-09 | Stop reason: HOSPADM

## 2021-04-09 RX ORDER — LIDOCAINE HYDROCHLORIDE 20 MG/ML
INJECTION, SOLUTION INFILTRATION; PERINEURAL AS NEEDED
Status: DISCONTINUED | OUTPATIENT
Start: 2021-04-09 | End: 2021-04-09 | Stop reason: SURG

## 2021-04-09 RX ORDER — HYDRALAZINE HYDROCHLORIDE 20 MG/ML
INJECTION INTRAMUSCULAR; INTRAVENOUS AS NEEDED
Status: DISCONTINUED | OUTPATIENT
Start: 2021-04-09 | End: 2021-04-09 | Stop reason: SURG

## 2021-04-09 RX ORDER — SODIUM CHLORIDE 9 MG/ML
9 INJECTION, SOLUTION INTRAVENOUS CONTINUOUS
Status: DISCONTINUED | OUTPATIENT
Start: 2021-04-09 | End: 2021-04-09

## 2021-04-09 RX ORDER — ONDANSETRON 2 MG/ML
4 INJECTION INTRAMUSCULAR; INTRAVENOUS ONCE AS NEEDED
Status: DISCONTINUED | OUTPATIENT
Start: 2021-04-09 | End: 2021-04-09 | Stop reason: HOSPADM

## 2021-04-09 RX ORDER — HYDROMORPHONE HYDROCHLORIDE 1 MG/ML
0.5 INJECTION, SOLUTION INTRAMUSCULAR; INTRAVENOUS; SUBCUTANEOUS
Status: DISCONTINUED | OUTPATIENT
Start: 2021-04-09 | End: 2021-04-09 | Stop reason: HOSPADM

## 2021-04-09 RX ORDER — LIDOCAINE HYDROCHLORIDE 10 MG/ML
0.5 INJECTION, SOLUTION EPIDURAL; INFILTRATION; INTRACAUDAL; PERINEURAL ONCE AS NEEDED
Status: DISCONTINUED | OUTPATIENT
Start: 2021-04-09 | End: 2021-04-09 | Stop reason: HOSPADM

## 2021-04-09 RX ORDER — OXYCODONE AND ACETAMINOPHEN 7.5; 325 MG/1; MG/1
1 TABLET ORAL ONCE AS NEEDED
Status: DISCONTINUED | OUTPATIENT
Start: 2021-04-09 | End: 2021-04-09 | Stop reason: HOSPADM

## 2021-04-09 RX ORDER — CEFAZOLIN SODIUM 2 G/100ML
INJECTION, SOLUTION INTRAVENOUS AS NEEDED
Status: DISCONTINUED | OUTPATIENT
Start: 2021-04-09 | End: 2021-04-09 | Stop reason: SURG

## 2021-04-09 RX ORDER — LISINOPRIL 20 MG/1
20 TABLET ORAL
Status: DISCONTINUED | OUTPATIENT
Start: 2021-04-09 | End: 2021-04-11

## 2021-04-09 RX ORDER — SODIUM CHLORIDE 0.9 % (FLUSH) 0.9 %
3 SYRINGE (ML) INJECTION EVERY 12 HOURS SCHEDULED
Status: DISCONTINUED | OUTPATIENT
Start: 2021-04-09 | End: 2021-04-09 | Stop reason: HOSPADM

## 2021-04-09 RX ORDER — FAMOTIDINE 10 MG/ML
20 INJECTION, SOLUTION INTRAVENOUS ONCE
Status: COMPLETED | OUTPATIENT
Start: 2021-04-09 | End: 2021-04-09

## 2021-04-09 RX ORDER — PROMETHAZINE HYDROCHLORIDE 25 MG/1
25 TABLET ORAL ONCE AS NEEDED
Status: DISCONTINUED | OUTPATIENT
Start: 2021-04-09 | End: 2021-04-09 | Stop reason: HOSPADM

## 2021-04-09 RX ORDER — LIDOCAINE HYDROCHLORIDE 10 MG/ML
INJECTION, SOLUTION INFILTRATION; PERINEURAL AS NEEDED
Status: DISCONTINUED | OUTPATIENT
Start: 2021-04-09 | End: 2021-04-09 | Stop reason: HOSPADM

## 2021-04-09 RX ADMIN — HYDRALAZINE HYDROCHLORIDE 5 MG: 20 INJECTION, SOLUTION INTRAMUSCULAR; INTRAVENOUS at 11:13

## 2021-04-09 RX ADMIN — LISINOPRIL 20 MG: 20 TABLET ORAL at 14:13

## 2021-04-09 RX ADMIN — INSULIN GLARGINE 15 UNITS: 100 INJECTION, SOLUTION SUBCUTANEOUS at 23:15

## 2021-04-09 RX ADMIN — LIDOCAINE HYDROCHLORIDE 100 MG: 20 INJECTION, SOLUTION INFILTRATION; PERINEURAL at 10:41

## 2021-04-09 RX ADMIN — FAMOTIDINE 20 MG: 10 INJECTION INTRAVENOUS at 08:50

## 2021-04-09 RX ADMIN — INSULIN LISPRO 5 UNITS: 100 INJECTION, SOLUTION INTRAVENOUS; SUBCUTANEOUS at 18:14

## 2021-04-09 RX ADMIN — MIDAZOLAM 1 MG: 1 INJECTION INTRAMUSCULAR; INTRAVENOUS at 10:30

## 2021-04-09 RX ADMIN — MIDAZOLAM 1 MG: 1 INJECTION INTRAMUSCULAR; INTRAVENOUS at 08:50

## 2021-04-09 RX ADMIN — Medication 1 MG: at 23:14

## 2021-04-09 RX ADMIN — PANTOPRAZOLE SODIUM 40 MG: 40 TABLET, DELAYED RELEASE ORAL at 13:24

## 2021-04-09 RX ADMIN — INSULIN LISPRO 5 UNITS: 100 INJECTION, SOLUTION INTRAVENOUS; SUBCUTANEOUS at 13:24

## 2021-04-09 RX ADMIN — ASPIRIN 81 MG: 81 TABLET, COATED ORAL at 12:22

## 2021-04-09 RX ADMIN — HYDRALAZINE HYDROCHLORIDE 5 MG: 20 INJECTION, SOLUTION INTRAMUSCULAR; INTRAVENOUS at 11:20

## 2021-04-09 RX ADMIN — ATORVASTATIN CALCIUM 10 MG: 20 TABLET, FILM COATED ORAL at 23:15

## 2021-04-09 RX ADMIN — PROPOFOL 100 MCG/KG/MIN: 10 INJECTION, EMULSION INTRAVENOUS at 10:41

## 2021-04-09 RX ADMIN — CEFAZOLIN SODIUM 2 G: 2 INJECTION, SOLUTION INTRAVENOUS at 11:00

## 2021-04-09 RX ADMIN — ALPRAZOLAM 1 MG: 1 TABLET ORAL at 20:00

## 2021-04-09 RX ADMIN — HYDRALAZINE HYDROCHLORIDE 100 MG: 50 TABLET, FILM COATED ORAL at 18:14

## 2021-04-09 RX ADMIN — HYDRALAZINE HYDROCHLORIDE 100 MG: 50 TABLET, FILM COATED ORAL at 12:23

## 2021-04-09 RX ADMIN — SODIUM CHLORIDE 9 ML/HR: 9 INJECTION, SOLUTION INTRAVENOUS at 08:10

## 2021-04-09 RX ADMIN — ALPRAZOLAM 1 MG: 1 TABLET ORAL at 12:49

## 2021-04-09 RX ADMIN — OXYCODONE HYDROCHLORIDE AND ACETAMINOPHEN 1 TABLET: 10; 325 TABLET ORAL at 19:59

## 2021-04-09 RX ADMIN — OXYCODONE HYDROCHLORIDE AND ACETAMINOPHEN 1 TABLET: 10; 325 TABLET ORAL at 12:49

## 2021-04-09 RX ADMIN — SODIUM CHLORIDE, PRESERVATIVE FREE 3 ML: 5 INJECTION INTRAVENOUS at 12:52

## 2021-04-09 RX ADMIN — CARVEDILOL 3.12 MG: 3.12 TABLET, FILM COATED ORAL at 07:20

## 2021-04-09 RX ADMIN — CARVEDILOL 3.12 MG: 3.12 TABLET, FILM COATED ORAL at 18:14

## 2021-04-09 RX ADMIN — HYDRALAZINE HYDROCHLORIDE 100 MG: 50 TABLET, FILM COATED ORAL at 23:14

## 2021-04-09 NOTE — NURSING NOTE
Patient to dialysis via bed. Patient unable to take salad due to policy in dialysis unit. No acute distress noted.

## 2021-04-09 NOTE — PLAN OF CARE
Goal Outcome Evaluation:         Patient went to surgery this am for placement of right chest tunneled dialysis catheter.  Patient returned to the room for a short period of time and then went to dialysis. Dialysis orders were called in for tomorrow. Patient alert and oriented with c/o chronic back and shoulder pain. Patient was medicated with xanax and percocet before going to dialysis. No acute distress was noted while patient was in the room. Patient left for dialysis at 1418. Patient was medicated with lisinopril before going to dialysis. Patient said Dr Murrell took  Him off that medication a few months back and didn't want to take it today. Nurse informed patient Dr Murrell was the ordering doctor and encouraged him to take it- medication was administered per MAR.

## 2021-04-09 NOTE — ANESTHESIA PREPROCEDURE EVALUATION
Anesthesia Evaluation     Patient summary reviewed and Nursing notes reviewed   no history of anesthetic complications:               Airway   Mallampati: II  TM distance: >3 FB  Neck ROM: full  no difficulty expected  Dental - normal exam   (+) upper dentures and poor dentition    Pulmonary - negative pulmonary ROS    breath sounds clear to auscultation  (-) shortness of breath, sleep apnea, decreased breath sounds, wheezes  Cardiovascular - normal exam  Exercise tolerance: good (4-7 METS)    Rhythm: regular  Rate: normal    (+) hypertension, past MI , CAD, CABG, CHF ,   (-) angina, orthopnea, PND, ROBLERO, PVD      Neuro/Psych  (+) CVA (left sided weakness) residual symptoms, headaches, psychiatric history,     (-) seizures, neuromuscular disease, TIA, dizziness/light headedness, weakness, numbness  GI/Hepatic/Renal/Endo    (+)  GERD,  hepatitis C, liver disease, renal disease ESRD, dialysis and ARF, diabetes mellitus poorly controlled,     Musculoskeletal     Abdominal  - normal exam   Substance History - negative use  (-) alcohol use, drug use     OB/GYN negative ob/gyn ROS         Other   arthritis,    history of cancer (nephrectomy) remission                    Anesthesia Plan    ASA 4     MAC   (D/W pt. MAC and possible awareness intra op.  Pt understands MAC and GA are not the same and the possibility of GA being required for failed MAC)  intravenous induction     Anesthetic plan, all risks, benefits, and alternatives have been provided, discussed and informed consent has been obtained with: patient.

## 2021-04-09 NOTE — PROGRESS NOTES
LOS: 1 day     Chief Complaint/ Reason for encounter: Hyperkalemia, renal failure dialysis initiation  Chief Complaint   Patient presents with   • Abnormal Lab         Subjective   Patient was seen in PACU, status post right IJ tunnel catheter placement  Other than some postop pain denies any new complaints  Denies shortness of breath or chest pain  No fevers or chills  No edema  Potassium level improved with medical treatment overnight, dialysis to follow this afternoon      Medical history reviewed:  History of Present Illness    Subjective    History taken from: Patient and chart    Vital Signs  Temp:  [97.8 °F (36.6 °C)-98 °F (36.7 °C)] 98 °F (36.7 °C)  Heart Rate:  [57-78] 78  Resp:  [13-18] 16  BP: (138-238)/() 195/98       Wt Readings from Last 1 Encounters:   04/08/21 1747 80.8 kg (178 lb 3.2 oz)   04/08/21 1302 79.8 kg (176 lb)       Objective    Objective:  General Appearance:  Comfortable, chronically ill-appearing, in no acute distress and not in pain.  Awake, alert, oriented  HEENT: Mucous membranes moist, no injury, oropharynx clear  Lungs:  Normal effort and normal respiratory rate.  Breath sounds clear to auscultation.  No  respiratory distress.  No rales, decreased breath sounds or rhonchi.    Heart: Normal rate.  Regular rhythm.  S1 normal.  No murmur.   Abdomen: Abdomen is soft.  Bowel sounds are normal, no abdominal tenderness.  There is no rebound or guarding  Extremities: Normal range of motion.  No significant edema of bilateral lower extremities, distal pulses intact  Neurological: No focal motor or sensory deficits, pupils reactive  Skin:  Warm and dry.  No rash or cyanosis.       Results Review:    Intake/Output:     Intake/Output Summary (Last 24 hours) at 4/9/2021 1333  Last data filed at 4/9/2021 1035  Gross per 24 hour   Intake --   Output 400 ml   Net -400 ml         DATA:  Radiology and Labs:  The following labs independently reviewed by me. Additional labs ordered for  tomorrow a.m.  Interval notes, chart personally reviewed by me.   Old records independently reviewed showing rest of CKD with hyperkalemia  The following radiologic studies independently viewed by me, findings chest x-ray no acute pulmonary process normal vasculature  New problems include hyperkalemia, progression to ESRD  Discussed with RN in the PACU    Risk/ complexity of medical care/ medical decision making high risk, need for surgical dialysis catheter placement and dialysis initiation today    Labs:   Recent Results (from the past 24 hour(s))   ECG 12 Lead    Collection Time: 04/08/21  1:41 PM   Result Value Ref Range    QT Interval 444 ms   Basic Metabolic Panel    Collection Time: 04/08/21  2:23 PM    Specimen: Blood   Result Value Ref Range    Glucose 178 (H) 65 - 99 mg/dL    BUN 58 (H) 6 - 20 mg/dL    Creatinine 3.62 (H) 0.76 - 1.27 mg/dL    Sodium 138 136 - 145 mmol/L    Potassium 7.5 (C) 3.5 - 5.2 mmol/L    Chloride 110 (H) 98 - 107 mmol/L    CO2 20.5 (L) 22.0 - 29.0 mmol/L    Calcium 8.8 8.6 - 10.5 mg/dL    eGFR Non African Amer 17 (L) >60 mL/min/1.73    BUN/Creatinine Ratio 16.0 7.0 - 25.0    Anion Gap 7.5 5.0 - 15.0 mmol/L   COVID-19,APTIMA PANTHERIZZY IN-HOUSE, NP/OP SWAB IN UTM/VTM/SALINE TRANSPORT MEDIA,24 HR TAT - Swab, Nasopharynx    Collection Time: 04/08/21  2:52 PM    Specimen: Nasopharynx; Swab   Result Value Ref Range    COVID19 Not Detected Not Detected - Ref. Range   POC Glucose Once    Collection Time: 04/08/21  6:20 PM    Specimen: Blood   Result Value Ref Range    Glucose 280 (H) 70 - 130 mg/dL   Potassium    Collection Time: 04/08/21  7:52 PM    Specimen: Blood   Result Value Ref Range    Potassium 6.4 (C) 3.5 - 5.2 mmol/L   Hepatitis B Surface Antigen    Collection Time: 04/08/21  7:52 PM    Specimen: Blood   Result Value Ref Range    Hepatitis B Surface Ag Non-Reactive Non-Reactive   POC Glucose Once    Collection Time: 04/08/21  8:20 PM    Specimen: Blood   Result Value Ref Range     Glucose 209 (H) 70 - 130 mg/dL   Comprehensive Metabolic Panel    Collection Time: 04/09/21  4:22 AM    Specimen: Blood   Result Value Ref Range    Glucose 107 (H) 65 - 99 mg/dL    BUN 59 (H) 6 - 20 mg/dL    Creatinine 3.65 (H) 0.76 - 1.27 mg/dL    Sodium 144 136 - 145 mmol/L    Potassium 5.5 (H) 3.5 - 5.2 mmol/L    Chloride 110 (H) 98 - 107 mmol/L    CO2 25.7 22.0 - 29.0 mmol/L    Calcium 8.0 (L) 8.6 - 10.5 mg/dL    Total Protein 5.6 (L) 6.0 - 8.5 g/dL    Albumin 3.40 (L) 3.50 - 5.20 g/dL    ALT (SGPT) 13 1 - 41 U/L    AST (SGOT) 15 1 - 40 U/L    Alkaline Phosphatase 46 39 - 117 U/L    Total Bilirubin <0.2 0.0 - 1.2 mg/dL    eGFR Non African Amer 17 (L) >60 mL/min/1.73    Globulin 2.2 gm/dL    A/G Ratio 1.5 g/dL    BUN/Creatinine Ratio 16.2 7.0 - 25.0    Anion Gap 8.3 5.0 - 15.0 mmol/L   BNP    Collection Time: 04/09/21  4:22 AM    Specimen: Blood   Result Value Ref Range    proBNP 1,248.0 (H) 0.0 - 900.0 pg/mL   TSH    Collection Time: 04/09/21  4:22 AM    Specimen: Blood   Result Value Ref Range    TSH 2.610 0.270 - 4.200 uIU/mL   Lipid Panel    Collection Time: 04/09/21  4:22 AM    Specimen: Blood   Result Value Ref Range    Total Cholesterol 132 0 - 200 mg/dL    Triglycerides 78 0 - 150 mg/dL    HDL Cholesterol 41 40 - 60 mg/dL    LDL Cholesterol  76 0 - 100 mg/dL    VLDL Cholesterol 15 5 - 40 mg/dL    LDL/HDL Ratio 1.84    Hemoglobin A1c    Collection Time: 04/09/21  4:22 AM    Specimen: Blood   Result Value Ref Range    Hemoglobin A1C 6.00 (H) 4.80 - 5.60 %   CBC Auto Differential    Collection Time: 04/09/21  4:22 AM    Specimen: Blood   Result Value Ref Range    WBC 4.13 3.40 - 10.80 10*3/mm3    RBC 3.03 (L) 4.14 - 5.80 10*6/mm3    Hemoglobin 9.0 (L) 13.0 - 17.7 g/dL    Hematocrit 27.4 (L) 37.5 - 51.0 %    MCV 90.4 79.0 - 97.0 fL    MCH 29.7 26.6 - 33.0 pg    MCHC 32.8 31.5 - 35.7 g/dL    RDW 14.1 12.3 - 15.4 %    RDW-SD 45.2 37.0 - 54.0 fl    MPV 12.1 (H) 6.0 - 12.0 fL    Platelets 85 (L) 140 - 450  10*3/mm3    Neutrophil % 53.6 42.7 - 76.0 %    Lymphocyte % 34.1 19.6 - 45.3 %    Monocyte % 7.3 5.0 - 12.0 %    Eosinophil % 4.1 0.3 - 6.2 %    Basophil % 0.7 0.0 - 1.5 %    Immature Grans % 0.2 0.0 - 0.5 %    Neutrophils, Absolute 2.21 1.70 - 7.00 10*3/mm3    Lymphocytes, Absolute 1.41 0.70 - 3.10 10*3/mm3    Monocytes, Absolute 0.30 0.10 - 0.90 10*3/mm3    Eosinophils, Absolute 0.17 0.00 - 0.40 10*3/mm3    Basophils, Absolute 0.03 0.00 - 0.20 10*3/mm3    Immature Grans, Absolute 0.01 0.00 - 0.05 10*3/mm3    nRBC 0.0 0.0 - 0.2 /100 WBC   Phosphorus    Collection Time: 04/09/21  4:22 AM    Specimen: Blood   Result Value Ref Range    Phosphorus 5.2 (H) 2.5 - 4.5 mg/dL   POC Glucose Once    Collection Time: 04/09/21  6:04 AM    Specimen: Blood   Result Value Ref Range    Glucose 117 70 - 130 mg/dL   POC Glucose Once    Collection Time: 04/09/21 12:58 PM    Specimen: Blood   Result Value Ref Range    Glucose 133 (H) 70 - 130 mg/dL       Radiology:  Imaging Results (Last 24 Hours)     Procedure Component Value Units Date/Time    IR PICC W Fluoro Surgery Only [[id number]] Resulted: 04/09/21 1130     Updated: 04/09/21 1130    Narrative:      This procedure was auto-finalized with no dictation required.             Medications have been reviewed:  Current Facility-Administered Medications   Medication Dose Route Frequency Provider Last Rate Last Admin   • ALPRAZolam (XANAX) tablet 1 mg  1 mg Oral 4x Daily PRN Ad Weber MD   1 mg at 04/09/21 1249   • aspirin EC tablet 81 mg  81 mg Oral Daily Ad Weber MD   81 mg at 04/09/21 1222   • atorvastatin (LIPITOR) tablet 10 mg  10 mg Oral Nightly Ad Weber MD   10 mg at 04/08/21 2001   • carvedilol (COREG) tablet 3.125 mg  3.125 mg Oral BID With Meals Ad Weber MD   3.125 mg at 04/09/21 0720   • heparin (porcine) injection 4,000 Units  4,000 Units Intracatheter PRN Ad Weber MD       • hydrALAZINE (APRESOLINE) tablet  100 mg  100 mg Oral TID Ad Weber MD   100 mg at 04/09/21 1223   • insulin glargine (LANTUS, SEMGLEE) injection 15 Units  15 Units Subcutaneous Nightly Ad Weber MD   15 Units at 04/08/21 2000   • insulin lispro (ADMELOG) injection 0-7 Units  0-7 Units Subcutaneous TID AC Ad Weber MD   4 Units at 04/08/21 1831   • insulin lispro (ADMELOG) injection 5 Units  5 Units Subcutaneous TID With Meals Ad Weber MD   5 Units at 04/09/21 1324   • lisinopril (PRINIVIL,ZESTRIL) tablet 20 mg  20 mg Oral Q24H Yohan Murrell MD       • melatonin tablet 1 mg  1 mg Oral Nightly PRN Ad Weber MD   1 mg at 04/08/21 2001   • oxyCODONE-acetaminophen (PERCOCET)  MG per tablet 1 tablet  1 tablet Oral Q6H PRN Ad Weber MD   1 tablet at 04/09/21 1249   • pantoprazole (PROTONIX) EC tablet 40 mg  40 mg Oral Daily Ad Weber MD   40 mg at 04/09/21 1324   • sodium chloride 0.9 % flush 10 mL  10 mL Intravenous PRN Ad Weber MD       • tiZANidine (ZANAFLEX) tablet 4 mg  4 mg Oral Q6H PRN Ad Weber MD           ASSESSMENT:  Progression to ESRD  Critical hyperkalemia, improved with medical treatment overnight, HD today  Type 2 diabetes mellitus with nephropathy  Anemia of CKD  Chronic hypertension, above goal  Hyperlipidemia        PLAN:   Potassium has improved with medical treatment overnight  Appreciate vascular assistance with tunneled dialysis catheter placement  Hemodialysis today with additional treatment tomorrow  Monday Wednesday Friday schedule next week  Await outpatient dialysis spot  Restart lisinopril now that he is on dialysis, continue to adjust antihypertensive medications  BP high this morning but a.m. meds were held for surgery    CCP to see for outpatient dialysis spot  monitor electrolytes and volume closely       Yohan Murrell MD   [facility]   Office phone number:  [phone]  Answering service phone number: [phone]    04/09/21  13:33 EDT    Dictation performed using Dragon dictation [employer]

## 2021-04-09 NOTE — PROGRESS NOTES
"Daily progress note    Chief complaint  S/p renal dialysis catheter insertion  Doing same  No new complaints  Family at bedside    History of present illness  White male with history of diabetes hypertension hyperlipidemia hepatitis C and chronic kidney disease stage IV presented to [facility] emergency room with worsening kidney function and high potassium.  Patient has no specific complaints or symptoms.  Patient does feel weak and tired.  Patient denies any fever cough chest pain shortness of breath abdominal pain nausea vomiting diarrhea.  Patient evaluated in ER found to have hyperkalemia and worsening kidney function need to start hemodialysis admit for management.    REVIEW OF SYSTEMS  All systems reviewed and negative except for those discussed in HPI.      PHYSICAL EXAM   Blood pressure (!) 192/91, pulse 69, temperature 98.1 °F (36.7 °C), temperature source Temporal, resp. rate 18, height 175.3 cm (69\"), weight 80.8 kg (178 lb 3.2 oz), SpO2 97 %.    GENERAL: not distressed  HENT: nares patent  EYES: no scleral icterus  CV: regular rhythm, regular rate  RESPIRATORY: normal effort, clear to auscultation bilaterally  ABDOMEN: soft, nontender bowel sounds positive  MUSCULOSKELETAL: no deformity  NEURO: alert, moves all extremities, follows commands  SKIN: warm, dry     LAB RESULTS  Lab Results (last 24 hours)     Procedure Component Value Units Date/Time    POC Glucose Once [[id number]]  (Abnormal) Collected: 04/09/21 1258    Specimen: Blood Updated: 04/09/21 1301     Glucose 133 mg/dL     POC Glucose Once [[id number]]  (Normal) Collected: 04/09/21 0604    Specimen: Blood Updated: 04/09/21 0605     Glucose 117 mg/dL     CBC & Differential [[id number]]  (Abnormal) Collected: 04/09/21 0422    Specimen: Blood Updated: 04/09/21 0533    Narrative:      The following orders were created for panel order CBC & Differential.  Procedure                               Abnormality         Status                   "   ---------                               -----------         ------                     CBC Auto Differential[[id number]]        Abnormal            Final result                 Please view results for these tests on the individual orders.    CBC Auto Differential [[id number]]  (Abnormal) Collected: 04/09/21 0422    Specimen: Blood Updated: 04/09/21 0533     WBC 4.13 10*3/mm3      RBC 3.03 10*6/mm3      Hemoglobin 9.0 g/dL      Hematocrit 27.4 %      MCV 90.4 fL      MCH 29.7 pg      MCHC 32.8 g/dL      RDW 14.1 %      RDW-SD 45.2 fl      MPV 12.1 fL      Platelets 85 10*3/mm3      Neutrophil % 53.6 %      Lymphocyte % 34.1 %      Monocyte % 7.3 %      Eosinophil % 4.1 %      Basophil % 0.7 %      Immature Grans % 0.2 %      Neutrophils, Absolute 2.21 10*3/mm3      Lymphocytes, Absolute 1.41 10*3/mm3      Monocytes, Absolute 0.30 10*3/mm3      Eosinophils, Absolute 0.17 10*3/mm3      Basophils, Absolute 0.03 10*3/mm3      Immature Grans, Absolute 0.01 10*3/mm3      nRBC 0.0 /100 WBC     TSH [[id number]]  (Normal) Collected: 04/09/21 0422    Specimen: Blood Updated: 04/09/21 0456     TSH 2.610 uIU/mL     BNP [[id number]]  (Abnormal) Collected: 04/09/21 0422    Specimen: Blood Updated: 04/09/21 0456     proBNP 1,248.0 pg/mL     Narrative:      Among patients with dyspnea, NT-proBNP is highly sensitive for the detection of acute congestive heart failure. In addition NT-proBNP of <300 pg/ml effectively rules out acute congestive heart failure with 99% negative predictive value.    Results may be falsely decreased if patient taking Biotin.      Comprehensive Metabolic Panel [[id number]]  (Abnormal) Collected: 04/09/21 0422    Specimen: Blood Updated: 04/09/21 0451     Glucose 107 mg/dL      BUN 59 mg/dL      Creatinine 3.65 mg/dL      Sodium 144 mmol/L      Potassium 5.5 mmol/L      Chloride 110 mmol/L      CO2 25.7 mmol/L      Calcium 8.0 mg/dL      Total Protein 5.6 g/dL      Albumin 3.40 g/dL      ALT (SGPT) 13 U/L       AST (SGOT) 15 U/L      Alkaline Phosphatase 46 U/L      Total Bilirubin <0.2 mg/dL      eGFR Non African Amer 17 mL/min/1.73      Globulin 2.2 gm/dL      A/G Ratio 1.5 g/dL      BUN/Creatinine Ratio 16.2     Anion Gap 8.3 mmol/L     Narrative:      GFR Normal >60  Chronic Kidney Disease <60  Kidney Failure <15      Phosphorus [[id number]]  (Abnormal) Collected: 04/09/21 0422    Specimen: Blood Updated: 04/09/21 0451     Phosphorus 5.2 mg/dL     Lipid Panel [[id number]] Collected: 04/09/21 0422    Specimen: Blood Updated: 04/09/21 0451     Total Cholesterol 132 mg/dL      Triglycerides 78 mg/dL      HDL Cholesterol 41 mg/dL      LDL Cholesterol  76 mg/dL      VLDL Cholesterol 15 mg/dL      LDL/HDL Ratio 1.84    Narrative:      Cholesterol Reference Ranges  (U.S. Department of Health and Human Services ATP III Classifications)    Desirable          <200 mg/dL  Borderline High    200-239 mg/dL  High Risk          >240 mg/dL      Triglyceride Reference Ranges  (U.S. Department of Health and Human Services ATP III Classifications)    Normal           <150 mg/dL  Borderline High  150-199 mg/dL  High             200-499 mg/dL  Very High        >500 mg/dL    HDL Reference Ranges  (U.S. Department of Health and Human Services ATP III Classifcations)    Low     <40 mg/dl (major risk factor for CHD)  High    >60 mg/dl ('negative' risk factor for CHD)        LDL Reference Ranges  (U.S. Department of Health and Human Services ATP III Classifcations)    Optimal          <100 mg/dL  Near Optimal     100-129 mg/dL  Borderline High  130-159 mg/dL  High             160-189 mg/dL  Very High        >189 mg/dL    Hemoglobin A1c [[id number]]  (Abnormal) Collected: 04/09/21 0422    Specimen: Blood Updated: 04/09/21 0438     Hemoglobin A1C 6.00 %     Narrative:      Hemoglobin A1C Ranges:    Increased Risk for Diabetes  5.7% to 6.4%  Diabetes                     >= 6.5%  Diabetic Goal                < 7.0%    Potassium [[id number]]  (Abnormal)  Collected: 04/08/21 1952    Specimen: Blood Updated: 04/08/21 2046     Potassium 6.4 mmol/L     Hepatitis B Surface Antigen [[id number]]  (Normal) Collected: 04/08/21 1952    Specimen: Blood Updated: 04/08/21 2042     Hepatitis B Surface Ag Non-Reactive    COVID PRE-OP / PRE-PROCEDURE SCREENING ORDER (NO ISOLATION) - Swab, Nasopharynx [[id number]]  (Normal) Collected: 04/08/21 1452    Specimen: Swab from Nasopharynx Updated: 04/08/21 2041    Narrative:      The following orders were created for panel order COVID PRE-OP / PRE-PROCEDURE SCREENING ORDER (NO ISOLATION) - Swab, Nasopharynx.  Procedure                               Abnormality         Status                     ---------                               -----------         ------                     COVID-19,APTIMA PANTHER,...[[id number]]  Normal              Final result                 Please view results for these tests on the individual orders.    COVID-19,APTIMA PANTHER,IZZY IN-HOUSE, NP/OP SWAB IN UTM/VTM/SALINE TRANSPORT MEDIA,24 HR TAT - Swab, Nasopharynx [[id number]]  (Normal) Collected: 04/08/21 1452    Specimen: Swab from Nasopharynx Updated: 04/08/21 2041     COVID19 Not Detected    Narrative:      Fact sheet for providers: https://www.fda.gov/media/906625/download     Fact sheet for patients: https://www.fda.gov/media/455576/download    Test performed by RT PCR.    POC Glucose Once [[id number]]  (Abnormal) Collected: 04/08/21 2020    Specimen: Blood Updated: 04/08/21 2021     Glucose 209 mg/dL         Imaging Results (Last 24 Hours)     Procedure Component Value Units Date/Time    IR PICC W Fluoro Surgery Only [[id number]] Resulted: 04/09/21 1130     Updated: 04/09/21 1130    Narrative:      This procedure was auto-finalized with no dictation required.           ECG 12 Lead               HEART RATE= 56  bpm  RR Interval= 1068  ms  NM Interval= 197  ms  P Horizontal Axis= 24  deg  P Front Axis= 23  deg  QRSD Interval= 110  ms  QT Interval= 444  ms  QRS  Axis= 70  deg  T Wave Axis= -36  deg  - ABNORMAL ECG -  Sinus rhythm  Nonspecific T abnormalities, inferior leads             Current Facility-Administered Medications:   •  ALPRAZolam (XANAX) tablet 1 mg, 1 mg, Oral, 4x Daily PRN, Ad Weber MD, 1 mg at 04/09/21 1249  •  aspirin EC tablet 81 mg, 81 mg, Oral, Daily, Ad Weber MD, 81 mg at 04/09/21 1222  •  atorvastatin (LIPITOR) tablet 10 mg, 10 mg, Oral, Nightly, Ad Weber MD, 10 mg at 04/08/21 2001  •  carvedilol (COREG) tablet 3.125 mg, 3.125 mg, Oral, BID With Meals, Ad Weber MD, 3.125 mg at 04/09/21 0720  •  heparin (porcine) injection 4,000 Units, 4,000 Units, Intracatheter, PRN, Ad Weber MD  •  hydrALAZINE (APRESOLINE) tablet 100 mg, 100 mg, Oral, TID, dA Weber MD, 100 mg at 04/09/21 1223  •  insulin glargine (LANTUS, SEMGLEE) injection 15 Units, 15 Units, Subcutaneous, Nightly, Ad Weber MD, 15 Units at 04/08/21 2000  •  insulin lispro (ADMELOG) injection 0-7 Units, 0-7 Units, Subcutaneous, TID AC, Ad Weber MD, 4 Units at 04/08/21 1831  •  insulin lispro (ADMELOG) injection 5 Units, 5 Units, Subcutaneous, TID With Meals, Ad Weber MD, 5 Units at 04/09/21 1324  •  lisinopril (PRINIVIL,ZESTRIL) tablet 20 mg, 20 mg, Oral, Q24H, Yohan Murrell MD, 20 mg at 04/09/21 1413  •  melatonin tablet 1 mg, 1 mg, Oral, Nightly PRN, Ad Weber MD, 1 mg at 04/08/21 2001  •  oxyCODONE-acetaminophen (PERCOCET)  MG per tablet 1 tablet, 1 tablet, Oral, Q6H PRN, Ad Weber MD, 1 tablet at 04/09/21 1249  •  pantoprazole (PROTONIX) EC tablet 40 mg, 40 mg, Oral, Daily, Ad Weber MD, 40 mg at 04/09/21 1324  •  [COMPLETED] Insert peripheral IV, , , Once **AND** sodium chloride 0.9 % flush 10 mL, 10 mL, Intravenous, PRN, Ad Weber MD  •  tiZANidine (ZANAFLEX) tablet 4 mg, 4 mg, Oral, Q6H PRN, Gus  dA Hernandez MD     ASSESSMENT  End-stage renal disease  Hyperkalemia  Status post hemodialysis catheter placement  Insulin-dependent diabetes mellitus  Hypertension  Hyperlipidemia  Gastroesophageal reflux disease    PLAN  CPM  Hemodialysis today  Adjust home medications  Stress ulcer DVT prophylaxis  Nephrology follow patient  Supportive care  Discussed with wife and nursing staff  Follow closely further recommendation current hospital course    ARIEL MCGOVERN MD

## 2021-04-09 NOTE — NURSING NOTE
Dr Murrell at bedside. Requested that patient be sent up to room to received PO morning meds, specifically PO hydralazine for patient's hypertension and so he can go to dialysis

## 2021-04-09 NOTE — ANESTHESIA POSTPROCEDURE EVALUATION
"Patient: Angelo Brown    Procedure Summary     Date: 04/09/21 Room / Location: [facility] OR  / [facility] MAIN OR    Anesthesia Start: 1039 Anesthesia Stop: 1132    Procedure: HEMODIALYSIS CATHETER INSERTION (Right ) Diagnosis:       Acute renal failure with acute tubular necrosis superimposed on stage 4 chronic kidney disease (CMS/HCC)      (Acute renal failure with acute tubular necrosis superimposed on stage 4 chronic kidney disease (CMS/HCC) [N17.0, N18.4])    Surgeons: Ad Weber MD Provider: Eduardo Turner MD    Anesthesia Type: MAC ASA Status: 4          Anesthesia Type: MAC    Vitals  Vitals Value Taken Time   /94 04/09/21 1145   Temp 36.7 °C (98 °F) 04/09/21 1131   Pulse 63 04/09/21 1148   Resp 18 04/09/21 1140   SpO2 98 % 04/09/21 1148   Vitals shown include unvalidated device data.        Post Anesthesia Care and Evaluation    Patient location during evaluation: bedside  Patient participation: complete - patient participated  Level of consciousness: awake and alert  Pain management: adequate  Airway patency: patent  Anesthetic complications: No anesthetic complications    Cardiovascular status: acceptable  Respiratory status: acceptable  Hydration status: acceptable    Comments: BP (!) 211/99   Pulse 64   Temp 36.7 °C (98 °F) (Oral)   Resp 18   Ht 175.3 cm (69\")   Wt 80.8 kg (178 lb 3.2 oz)   SpO2 98%   BMI 26.32 kg/m²           "

## 2021-04-09 NOTE — PROGRESS NOTES
Continued Stay Note  [facility]     Patient Name: Angelo Brown  MRN: [id number]  Today's Date: 4/9/2021    Admit Date: 4/8/2021    Discharge Plan     Row Name 04/09/21 1514       Plan    Plan Comments  Pt off unit-  consult for [facility] to arrange outpatient dialysis.  Paperwork faxed.to [facility].   Called and spoke Sammi ( [phone]  ext [phone]) to inform her of consult.  Attempted to call pt's spouse (Samantha Brown [phone]) to screen pt but unable to reach.  CCP to follow for discharge needs.   LISE Adams RN        Discharge Codes    No documentation.             Kyleigh Adams, RN

## 2021-04-09 NOTE — OP NOTE
Operative Note  Location: [facility]  Date of Admission:  4/8/2021  OR Date: 4/9/2021    Pre-op Diagnosis:   Acute renal failure with acute tubular necrosis superimposed on stage 4 chronic kidney disease (CMS/HCC) [N17.0, N18.4]    Post-op Diagnosis:     Same    Procedure:  1) Tunneled dialysis catheter insertion (54462)  2) Ultrasound guided vascular access (88173)  3) Radiologic supervision of catheter tip placement (47315)    Assistant: none    Anesthesia: Monitored Anesthesia Care    Estimated Blood Loss: minimal    Staff:   Circulator: Marylin Bello RN  Radiology Technologist: Geni Sr  Scrub Person: Kuldip Muroin    Complications: None    Specimen: None    Findings:  Tip in SVC/Atrial     Implants: Nothing was implanted during the procedure    Indications:    The patient is an 60 y.o. male referred for tunneled dialysis catheter placement.  The risks, benefits, and alternatives have been discussed with the patient and/or family and include but are not limited to injury to artery, vein, lung, bleeding, and infection.    Procedure:  The patient was taken to the operating room and placed supine on the operating room table. After the induction of IV sedation, the neck and chest were prepped and draped with ChloraPrep. The patient was placed in Trendelenburg position. Timeout was observed. The right  Internal Jugular  was evaluated on ultrasound and found to be easily compressible. The vessel was entered under direct ultrasound guidance and photographic documentation was recorded in the chart for the record. An angled wire was then advanced down into the superior vena cava under fluoroscopy without any difficulty. Exit site was chosen on the chest. The tract was anesthetized with 1% lidocaine mixed with 0.25% Marcaine. A 23 cm catheter was then flushed and brought up onto the field. It was tunneled in an antegrade fashion up to the IJ insertion site. Dilator and peel-away sheath were then advanced over  the wire under fluoroscopy without any significant difficulty. Dilator and wire were removed leaving the peel-away sheath in place. The distal tip of the catheter was then placed into the peel-away sheath and the peel-away sheath was removed. Under fluoroscopy, the distal tip of the catheter was positioned into the right atrium. There was no kinking at the catheter insertion site. The catheter flushed and aspirated easily. The lung fields were examined. There was no evidence of hemothorax or pneumothorax. The catheter flushed and aspirated quite easily. It was locked with concentrated heparin. The catheter was then anchored to the chest with nylon sutures. A stitch and dermal glue were used to close the neck site as well. Sterile dressings were applied. The patient tolerated the procedure well. There were no immediately apparent complications.           Active Hospital Problems    Diagnosis  POA   • **Acute renal failure with acute tubular necrosis superimposed on stage 4 chronic kidney disease (CMS/HCC) [N17.0, N18.4]  Unknown   • Hyperkalemia [E87.5]  Yes      Resolved Hospital Problems   No resolved problems to display.      Ad Weber MD     Date: 4/9/2021  Time: 11:23 EDT

## 2021-04-09 NOTE — ADDENDUM NOTE
Addendum  created 04/09/21 1202 by Eduardo Turner MD    Attestation recorded in Intraprocedure, Clinical Note Signed, Intraprocedure Attestations filed

## 2021-04-09 NOTE — PLAN OF CARE
Goal Outcome Evaluation:  Plan of Care Reviewed With: patient  Progress: improving  Outcome Summary: VSS. Potassium levels decreasing. Tunnel catheter placement this AM. NPO since midnight. Pt rested comfortably throughout the shift. Nursing will continue to monitor.

## 2021-04-09 NOTE — CONSULTS
Name: Angelo Brown ADMIT: 2021   : 1960  PCP: Provider, No Known    MRN: [id number] LOS: 1 days   AGE/SEX: 60 y.o. male  ROOM: [facility] OR/MAIN OR     Inpatient Vascular Surgery Consult  Consult performed by: Ad Weber MD  Consult ordered by: Yohan Murrell MD  Reason for consult: Tunneled dialysis catheter placement and AV fistula evaluation        CC: Chronic kidney disease and now end-stage renal disease  Subjective     History of Present Illness  60 y.o. male whom we were asked to evaluate for urgent and long-term hemodialysis access    HPI Elements:  Location: Bilateral kidney disease  Severity: CKD 5/ESRD at this point  Context: Multifactorial likely related at least in part to hypertension and kidney cancer on the right  Associated Signs and Symptoms: No nausea or vomiting    Review of Systems    History Review Reviewed Comments   Past Medical History:  [x]   Throat, coronary artery disease, history of MRSA infection, congestive heart failure, history of hepatitis, CKD, diabetes   Past Surgical History: [x]   No prior vascular access.  He has had a CABG in  as well as craniotomy for hematoma evacuation also had a laparoscopic partial right nephrectomy   Family History: [x]   Kidney disease, cardiac disease, diabetes   Social History: [x]   Patient is a non-smoker.  History of illicit fentanyl use but none in several years     Allergies: Lipitor [atorvastatin calcium], Gabapentin, Morphine and related, Fentanyl, Ibuprofen, Latex, and Penicillins      Objective   Temp:  [96.7 °F (35.9 °C)-98 °F (36.7 °C)] 97.8 °F (36.6 °C)  Heart Rate:  [56-65] 63  Resp:  [13-18] 13  BP: (138-226)/() 226/105    No intake/output data recorded.    Physical Exam  Vitals reviewed.   Constitutional:       Appearance: He is well-developed. He is ill-appearing.   HENT:      Right Ear: External ear normal.      Left Ear: External ear normal.      Nose: Nose normal.       Mouth/Throat:      Dentition: Normal dentition.   Eyes:      General: Lids are normal.      Conjunctiva/sclera: Conjunctivae normal.      Pupils: Pupils are equal, round, and reactive to light.   Neck:      Thyroid: No thyromegaly.      Vascular: No carotid bruit or JVD.   Cardiovascular:      Rate and Rhythm: Normal rate and regular rhythm.      Pulses:           Carotid pulses are 2+ on the right side and 2+ on the left side.       Radial pulses are 2+ on the right side and 2+ on the left side.      Heart sounds: Normal heart sounds. No murmur heard.        Comments: No peripheral edema.  Pulmonary:      Effort: Pulmonary effort is normal.      Breath sounds: Normal breath sounds.   Abdominal:      General: There is no distension.      Palpations: Abdomen is soft.      Tenderness: There is no abdominal tenderness.   Musculoskeletal:      Comments: Gait not examined. No bony tenderness or deformities.  No clubbing or cyanosis.     Skin:     General: Skin is warm and dry.   Neurological:      Mental Status: He is alert and oriented to person, place, and time.   Psychiatric:         Behavior: Behavior normal.         Judgment: Judgment normal.         Data Reviewed:  CBC    Results from last 7 days   Lab Units 04/09/21  0422 04/08/21  1308 04/06/21  1252   WBC 10*3/mm3 4.13 7.62 5.77   HEMOGLOBIN g/dL 9.0* 11.1* 10.7*   PLATELETS 10*3/mm3 85* 93* 77*     BMP   Results from last 7 days   Lab Units 04/09/21  0422 04/08/21  1952 04/08/21  1423   SODIUM mmol/L 144  --  138   POTASSIUM mmol/L 5.5* 6.4* 7.5*   CHLORIDE mmol/L 110*  --  110*   CO2 mmol/L 25.7  --  20.5*   BUN mg/dL 59*  --  58*   CREATININE mg/dL 3.65*  --  3.62*   GLUCOSE mg/dL 107*  --  178*   PHOSPHORUS mg/dL 5.2*  --   --      HbA1C   Lab Results   Component Value Date    HGBA1C 6.00 (H) 04/09/2021    HGBA1C 8.74 (H) 07/16/2020    HGBA1C 6.60 (H) 06/07/2018     Radiology(recent) XR Chest PA & Lateral    Result Date: 4/8/2021  No evidence for acute  pulmonary process. Follow-up as clinical indications persist.  This report was finalized on 2021 3:59 PM by Dr. Yadiel Espinal M.D.        Labs significant for: Normal white blood cell count.  Hemoglobin down to 9 and platelets down to 85 since admission.  Potassium is thankfully decreased from 7.5-5.5 with medical management.  BUN 59.  Creatinine 3.6    Imaging Studies:  Chest x-ray images reviewed.  No presence of ports, tunneled catheters, pacemakers or defibrillators.      Active Hospital Problems    Diagnosis  POA   • **Acute renal failure with acute tubular necrosis superimposed on stage 4 chronic kidney disease (CMS/HCC) [N17.0, N18.4]  Unknown   • Hyperkalemia [E87.5]  Yes      Resolved Hospital Problems   No resolved problems to display.       Data Points:  During this visit the following were done:  Labs Reviewed [x]    Labs Ordered []    Radiology Reports Reviewed [x]    Radiology Ordered []    EKG, echo, and/or stress test reviewed []    Vascular lab results reviewed  []    Vascular lab images reviewed and interpreted per myself   []    Referring Provider Records Reviewed []    ER Records Reviewed []    Hospital Records Reviewed/Summarized [x]    History Obtained From Family []    Radiological images view and Interpreted per myself [x]    Case Discussed with referring provider []     Decision to obtain and request outside records  []    Total data points:4 or more    Active Hospital Problems:   Active Hospital Problems    Diagnosis  POA   • **Acute renal failure with acute tubular necrosis superimposed on stage 4 chronic kidney disease (CMS/HCC) [N17.0, N18.4]  Unknown   • Hyperkalemia [E87.5]  Yes      Resolved Hospital Problems   No resolved problems to display.      Assessment/Plan   Billin  Assessment / Plan     60 y.o. male with multiple medical problems his chronic kidney disease has likely progressed to end-stage renal disease.  His symptoms include primarily fatigue but no nausea  or vomiting or other uremic symptoms.  He was admitted with hyperkalemia and his potassium is actually 7.5.  This was managed by nephrology overnight medically and they brought it down to 5.5.  Patient will be taken to the operating room today for tunneled dialysis catheter placement.  He has multiple risk factors for the procedure including his acute metabolic derangement and thrombocytopenia as well as some uremia.  He will need long-term dialysis access as well and we will plan on vein mapping him this admission.  The risk, benefits, and alternatives were discussed with the patient.  He agrees to proceed.    I discussed the patients findings and my recommendations with patient.  Please call my office with any question: [phone]

## 2021-04-09 NOTE — NURSING NOTE
Clarence from pre-op to unit to transport patient to surgery via bed. Patient's gown and socks changed. Shorts and underwear removed. Wallet placed in side drawers- will let wife know when she arrives. Patient alert and oriented- no acute distress noted at this time.

## 2021-04-09 NOTE — PROGRESS NOTES
Continued Stay Note  [facility]     Patient Name: Angelo Brown  MRN: [id number]  Today's Date: 4/9/2021    Admit Date: 4/8/2021    Discharge Plan     Row Name 04/09/21 1549       Plan    Plan  Plan home with outpatient dialysis arranged at [facility].  LISE Adams RN    Patient/Family in Agreement with Plan  yes    Plan Comments  FACE SHEET VERIFIED/ IM LETTER SIGNED. Pt's spouse (Samantha Brown  [phone]) called and spoke with CCP.  Pt's PCP is Dr. Yadiel Baxter .  Pt lives in a one story house with spouse  ( Samantha Brown [phone]) and son  ( Chiki 27 y/o).  Pt is independent with ADLs.  Pt has a straight cane, glucometer, shower chair and rolling walker for home use.  [facility]  ( [address]  Pt denies any issues affording medications.  Pt is not current with HH.  Pt has not been in SNF.  Pt's spouse or son will transport pt home.  Pt is a new dialysis pt.   Paperwork with exception of dialysis flow sheet have been faxed to [facility].  Pt is requesting dialysis at [facility] on [address]   Plan home with outpatient dialysis arranged at [facility] [address].   LISE Adams RN    Row Name 04/09/21 1514       Plan    Plan Comments  Pt off unit-  consult for [facility] and the [facility] to arrange outpatient dialysis.  Paperwork faxed.to [facility] and the [facility].   Called and spoke Sammi ( [phone]  ext [phone]) to inform her of consult.  Attempted to call pt's spouse (Samantha Brown [phone]) to screen pt but unable to reach.  CCP to follow for discharge needs.   LISE Adams RN        Discharge Codes    No documentation.             Kyleigh Adams RN

## 2021-04-09 NOTE — PROGRESS NOTES
Discharge Planning Assessment  [facility]     Patient Name: Angelo Brown  MRN: [id number]  Today's Date: 4/9/2021    Admit Date: 4/8/2021    Discharge Needs Assessment     Row Name 04/09/21 1546       Living Environment    Lives With  spouse;child(emelyn), adult    Name(s) of Who Lives With Patient  Spouse  ( Samantha Brown [phone]) and son  ( Chiki 25 y/o)    Current Living Arrangements  home/apartment/condo    Primary Care Provided by  self    Provides Primary Care For  no one    Family Caregiver if Needed  spouse    Family Caregiver Names  Spouse  ( Samantha Brown [phone]) and son  ( Chiki 25 y/o)    Quality of Family Relationships  helpful;involved;supportive    Able to Return to Prior Arrangements  yes    Living Arrangement Comments  Pt lives in a one story house with spouse  ( Samantha Brown [phone]) and son  ( Chiki 25 y/o).       Resource/Environmental Concerns    Resource/Environmental Concerns  none    Transportation Concerns  car, none       Transition Planning    Patient/Family Anticipates Transition to  home with family    Patient/Family Anticipated Services at Transition  none    Transportation Anticipated  family or friend will provide       Discharge Needs Assessment    Equipment Currently Used at Home  cane, straight;glucometer;shower chair;walker, rolling    Concerns to be Addressed  denies needs/concerns at this time    Anticipated Changes Related to Illness  none    Equipment Needed After Discharge  cane, straight;glucometer;shower chair;walker, rolling        Discharge Plan     Row Name 04/09/21 1549       Plan    Plan  Plan home with outpatient dialysis arranged at [facility].  LISE Adams RN    Patient/Family in Agreement with Plan  yes    Plan Comments  FACE SHEET VERIFIED/ IM LETTER SIGNED. Pt's spouse (Samantha Brown  [phone]) called and spoke with CCP.  Pt's PCP is Dr. Yadiel Baxter .  Pt lives in a one story house with spouse  ( Samantha Brown [phone]) and son  (  Chiki 27 y/o).  Pt is independent with ADLs.  Pt has a straight cane, glucometer, shower chair and rolling walker for home use.  [facility]  ( [address]).  Pt denies any issues affording medications.  Pt is not current with HH.  Pt has not been in SNF.  Pt's spouse or son will transport pt home.  Pt is a new dialysis pt.   Paperwork with exception of dialysis flow sheet have been faxed to [facility].  Pt is requesting dialysis at [facility] [address].   Plan home with outpatient dialysis arranged at [facility].   LISE Adams RN    Row Name 04/09/21 1176       Plan    Plan Comments  Pt off unit-  consult for [facility] to arrange outpatient dialysis.  Paperwork faxed.to [facility].   Called and spoke Sammi ( [phone]  ext [phone]) to inform her of consult.  Attempted to call pt's spouse (Samantha Brown [phone]) to screen pt but unable to reach.  CCP to follow for discharge needs.   LISE Adams RN        Continued Care and Services - Admitted Since 4/8/2021    Coordination has not been started for this encounter.         Demographic Summary     Row Name 04/09/21 1547       General Information    Admission Type  inpatient    Arrived From  emergency department    Required Notices Provided  Important Message from Medicare    Referral Source  admission list    Reason for Consult  discharge planning    Preferred Language  English     Used During This Interaction  no        Functional Status     Row Name 04/09/21 1546       Functional Status    Usual Activity Tolerance  moderate    Current Activity Tolerance  moderate       Functional Status, IADL    Medications  independent    Meal Preparation  independent    Housekeeping  independent    Laundry  independent    Shopping  independent       Mental Status    General Appearance WDL  WDL       Mental Status Summary    Recent Changes in Mental Status/Cognitive Functioning  no changes        Psychosocial    No  documentation.       Abuse/Neglect    No documentation.       Legal    No documentation.       Substance Abuse    No documentation.       Patient Forms    No documentation.           Kyleigh Adams RN

## 2021-04-09 NOTE — ANESTHESIA POSTPROCEDURE EVALUATION
Patient: Angelo Brown    Procedure Summary     Date: 04/09/21 Room / Location: [facility] OR  / [facility] MAIN OR    Anesthesia Start: 1039 Anesthesia Stop: 1132    Procedure: HEMODIALYSIS CATHETER INSERTION (Right ) Diagnosis:       Acute renal failure with acute tubular necrosis superimposed on stage 4 chronic kidney disease (CMS/HCC)      (Acute renal failure with acute tubular necrosis superimposed on stage 4 chronic kidney disease (CMS/HCC) [N17.0, N18.4])    Surgeons: Ad Weber MD Provider: Eduardo Turner MD    Anesthesia Type: MAC ASA Status: 4          Anesthesia Type: MAC    Vitals  Vitals Value Taken Time   /94 04/09/21 1145   Temp 36.7 °C (98 °F) 04/09/21 1131   Pulse 65 04/09/21 1159   Resp 18 04/09/21 1140   SpO2 98 % 04/09/21 1159   Vitals shown include unvalidated device data.        Anesthesia Post Evaluation

## 2021-04-09 NOTE — PROGRESS NOTES
Pharmacy Consult    Consult per Dr. Weber to review for appropriate perioperative antibiotics given allergy of hives with penicillin.   Patient has not received antibiotics here but on review of records from [facility], pt received ceftriaxone x 4 doses during a March 2017 admission.  Cefazolin appropriate for prophylaxis given previous tolerance of cephalosporin antibiotic.  Discussed with Dr. Weber.    Thanks,  Noa Jaramillo, PharmD, BCPS

## 2021-04-10 ENCOUNTER — APPOINTMENT (OUTPATIENT)
Dept: CARDIOLOGY | Facility: HOSPITAL | Age: 61
End: 2021-04-10

## 2021-04-10 LAB
ALBUMIN SERPL-MCNC: 3.6 G/DL (ref 3.5–5.2)
ANION GAP SERPL CALCULATED.3IONS-SCNC: 9.8 MMOL/L (ref 5–15)
BH CV VAS MEAS BASILIC ANTECUBITAL FOSSA LEFT: 0.16 CM
BH CV VAS MEAS BASILIC ANTECUBITAL FOSSA RIGHT: 0.19 CM
BH CV VAS MEAS BASILIC FOREARM LEFT - DIST: 0.1 CM
BH CV VAS MEAS BASILIC FOREARM LEFT - MID: 0.1 CM
BH CV VAS MEAS BASILIC FOREARM LEFT - PROX: 0.11 CM
BH CV VAS MEAS BASILIC FOREARM RIGHT - MID: 0.08 CM
BH CV VAS MEAS BASILIC FOREARM RIGHT - PROX: 0.13 CM
BH CV VAS MEAS BASILIC UPPER ARM LEFT - DIST: 0.27 CM
BH CV VAS MEAS BASILIC UPPER ARM LEFT - MID: 0.35 CM
BH CV VAS MEAS BASILIC UPPER ARM LEFT - PROX: 0.32 CM
BH CV VAS MEAS BASILIC UPPER ARM RIGHT - DIST: 0.23 CM
BH CV VAS MEAS BASILIC UPPER ARM RIGHT - MID: 0.24 CM
BH CV VAS MEAS BASILIC UPPER ARM RIGHT - PROX: 0.32 CM
BH CV VAS MEAS CEPHALIC ANTECUBITAL FOSSA LEFT: 0.31 CM
BH CV VAS MEAS CEPHALIC ANTECUBITAL FOSSA RIGHT: 0.46 CM
BH CV VAS MEAS CEPHALIC FOREARM LEFT - DIST: 0.35 CM
BH CV VAS MEAS CEPHALIC FOREARM LEFT - MID: 0.37 CM
BH CV VAS MEAS CEPHALIC FOREARM LEFT - PROX: 0.35 CM
BH CV VAS MEAS CEPHALIC FOREARM RIGHT - DIST: 0.2 CM
BH CV VAS MEAS CEPHALIC FOREARM RIGHT - MID: 0.23 CM
BH CV VAS MEAS CEPHALIC FOREARM RIGHT - PROX: 0.18 CM
BH CV VAS MEAS CEPHALIC UPPER ARM LEFT - DIST: 0.34 CM
BH CV VAS MEAS CEPHALIC UPPER ARM LEFT - MID: 0.37 CM
BH CV VAS MEAS CEPHALIC UPPER ARM LEFT - PROX: 0.43 CM
BH CV VAS MEAS CEPHALIC UPPER ARM RIGHT - DIST: 0.3 CM
BH CV VAS MEAS CEPHALIC UPPER ARM RIGHT - MID: 0.24 CM
BH CV VAS MEAS CEPHALIC UPPER ARM RIGHT - PROX: 0.28 CM
BH CV VAS MEAS RADIAL UPPER ARM LEFT - DIST: 0.17 CM
BH CV VAS MEAS RADIAL UPPER ARM LEFT - MID: 0.19 CM
BH CV VAS MEAS RADIAL UPPER ARM LEFT - PROX: 0.3 CM
BH CV VAS MEAS RADIAL UPPER ARM RIGHT - DIST: 0.19 CM
BH CV VAS MEAS RADIAL UPPER ARM RIGHT - MID: 0.18 CM
BH CV VAS MEAS RADIAL UPPER ARM RIGHT - PROX: 0.18 CM
BUN SERPL-MCNC: 40 MG/DL (ref 6–20)
BUN/CREAT SERPL: 14.4 (ref 7–25)
CALCIUM SPEC-SCNC: 8.1 MG/DL (ref 8.6–10.5)
CHLORIDE SERPL-SCNC: 107 MMOL/L (ref 98–107)
CO2 SERPL-SCNC: 27.2 MMOL/L (ref 22–29)
CREAT SERPL-MCNC: 2.77 MG/DL (ref 0.76–1.27)
GFR SERPL CREATININE-BSD FRML MDRD: 24 ML/MIN/1.73
GLUCOSE BLDC GLUCOMTR-MCNC: 101 MG/DL (ref 70–130)
GLUCOSE BLDC GLUCOMTR-MCNC: 103 MG/DL (ref 70–130)
GLUCOSE BLDC GLUCOMTR-MCNC: 120 MG/DL (ref 70–130)
GLUCOSE BLDC GLUCOMTR-MCNC: 250 MG/DL (ref 70–130)
GLUCOSE SERPL-MCNC: 114 MG/DL (ref 65–99)
PHOSPHATE SERPL-MCNC: 4.7 MG/DL (ref 2.5–4.5)
POTASSIUM SERPL-SCNC: 4.4 MMOL/L (ref 3.5–5.2)
SODIUM SERPL-SCNC: 144 MMOL/L (ref 136–145)
UPPER ARTERIAL LEFT ARM BRACHIAL LENGTH: 0.35 CM
UPPER ARTERIAL RIGHT ARM BRACHIAL LENGTH: 0.37 CM

## 2021-04-10 PROCEDURE — 25010000002 HYDRALAZINE PER 20 MG: Performed by: HOSPITALIST

## 2021-04-10 PROCEDURE — 25010000002 HEPARIN (PORCINE) PER 1000 UNITS: Performed by: SURGERY

## 2021-04-10 PROCEDURE — 93985 DUP-SCAN HEMO COMPL BI STD: CPT

## 2021-04-10 PROCEDURE — 63710000001 INSULIN GLARGINE PER 5 UNITS: Performed by: SURGERY

## 2021-04-10 PROCEDURE — 63710000001 INSULIN LISPRO (HUMAN) PER 5 UNITS: Performed by: SURGERY

## 2021-04-10 PROCEDURE — 36415 COLL VENOUS BLD VENIPUNCTURE: CPT | Performed by: SURGERY

## 2021-04-10 PROCEDURE — 82962 GLUCOSE BLOOD TEST: CPT

## 2021-04-10 PROCEDURE — 5A1D70Z PERFORMANCE OF URINARY FILTRATION, INTERMITTENT, LESS THAN 6 HOURS PER DAY: ICD-10-PCS | Performed by: INTERNAL MEDICINE

## 2021-04-10 PROCEDURE — 80069 RENAL FUNCTION PANEL: CPT | Performed by: SURGERY

## 2021-04-10 RX ORDER — LABETALOL HYDROCHLORIDE 5 MG/ML
10 INJECTION, SOLUTION INTRAVENOUS EVERY 4 HOURS PRN
Status: DISCONTINUED | OUTPATIENT
Start: 2021-04-10 | End: 2021-04-10

## 2021-04-10 RX ORDER — AMLODIPINE BESYLATE 5 MG/1
5 TABLET ORAL
Status: DISCONTINUED | OUTPATIENT
Start: 2021-04-10 | End: 2021-04-12

## 2021-04-10 RX ORDER — HYDRALAZINE HYDROCHLORIDE 20 MG/ML
10 INJECTION INTRAMUSCULAR; INTRAVENOUS EVERY 4 HOURS PRN
Status: DISCONTINUED | OUTPATIENT
Start: 2021-04-10 | End: 2021-04-12

## 2021-04-10 RX ADMIN — SODIUM CHLORIDE, PRESERVATIVE FREE 10 ML: 5 INJECTION INTRAVENOUS at 08:28

## 2021-04-10 RX ADMIN — HEPARIN SODIUM 4000 UNITS: 1000 INJECTION INTRAVENOUS; SUBCUTANEOUS at 15:08

## 2021-04-10 RX ADMIN — HYDRALAZINE HYDROCHLORIDE 100 MG: 50 TABLET, FILM COATED ORAL at 08:27

## 2021-04-10 RX ADMIN — INSULIN LISPRO 5 UNITS: 100 INJECTION, SOLUTION INTRAVENOUS; SUBCUTANEOUS at 12:45

## 2021-04-10 RX ADMIN — OXYCODONE HYDROCHLORIDE AND ACETAMINOPHEN 1 TABLET: 10; 325 TABLET ORAL at 18:53

## 2021-04-10 RX ADMIN — ALPRAZOLAM 1 MG: 1 TABLET ORAL at 12:48

## 2021-04-10 RX ADMIN — HYDRALAZINE HYDROCHLORIDE 100 MG: 50 TABLET, FILM COATED ORAL at 19:38

## 2021-04-10 RX ADMIN — AMLODIPINE BESYLATE 5 MG: 5 TABLET ORAL at 19:00

## 2021-04-10 RX ADMIN — LISINOPRIL 20 MG: 20 TABLET ORAL at 08:27

## 2021-04-10 RX ADMIN — CARVEDILOL 3.12 MG: 3.12 TABLET, FILM COATED ORAL at 08:28

## 2021-04-10 RX ADMIN — INSULIN LISPRO 4 UNITS: 100 INJECTION, SOLUTION INTRAVENOUS; SUBCUTANEOUS at 12:45

## 2021-04-10 RX ADMIN — OXYCODONE HYDROCHLORIDE AND ACETAMINOPHEN 1 TABLET: 10; 325 TABLET ORAL at 06:13

## 2021-04-10 RX ADMIN — PANTOPRAZOLE SODIUM 40 MG: 40 TABLET, DELAYED RELEASE ORAL at 08:31

## 2021-04-10 RX ADMIN — HYDRALAZINE HYDROCHLORIDE 100 MG: 50 TABLET, FILM COATED ORAL at 15:06

## 2021-04-10 RX ADMIN — ATORVASTATIN CALCIUM 10 MG: 20 TABLET, FILM COATED ORAL at 19:38

## 2021-04-10 RX ADMIN — INSULIN GLARGINE 15 UNITS: 100 INJECTION, SOLUTION SUBCUTANEOUS at 21:25

## 2021-04-10 RX ADMIN — SODIUM CHLORIDE, PRESERVATIVE FREE 10 ML: 5 INJECTION INTRAVENOUS at 19:38

## 2021-04-10 RX ADMIN — ALPRAZOLAM 1 MG: 1 TABLET ORAL at 19:38

## 2021-04-10 RX ADMIN — INSULIN LISPRO 5 UNITS: 100 INJECTION, SOLUTION INTRAVENOUS; SUBCUTANEOUS at 08:28

## 2021-04-10 RX ADMIN — CARVEDILOL 3.12 MG: 3.12 TABLET, FILM COATED ORAL at 18:51

## 2021-04-10 RX ADMIN — ASPIRIN 81 MG: 81 TABLET, COATED ORAL at 08:28

## 2021-04-10 RX ADMIN — HYDRALAZINE HYDROCHLORIDE 10 MG: 20 INJECTION INTRAMUSCULAR; INTRAVENOUS at 21:24

## 2021-04-10 NOTE — PROGRESS NOTES
Name: Angelo Brown ADMIT: 2021   : 1960  PCP: Provider, No Known    MRN: [id number] LOS: 2 days   AGE/SEX: 60 y.o. male  ROOM: E669/1   [facility]    Billin-24, Subsequent Hospital Care, Hemodialysis access plans    Chief Complaint   Patient presents with   • Abnormal Lab     CC: Acute renal failure with plans for chronic hemodialysis  Subjective     60 y.o. male with acute renal failure progressing to end-stage renal failure.  Patient is in need of permanent dialysis access.  Mapping is performed this morning but pending results.  We will preserve vein if available.  We will make an effort to try to get something in his arm if possible during this admission.    Review of Systems no new complaints    Objective     Scheduled Medications:   aspirin, 81 mg, Oral, Daily  atorvastatin, 10 mg, Oral, Nightly  carvedilol, 3.125 mg, Oral, BID With Meals  hydrALAZINE, 100 mg, Oral, TID  insulin glargine, 15 Units, Subcutaneous, Nightly  insulin lispro, 0-7 Units, Subcutaneous, TID AC  insulin lispro, 5 Units, Subcutaneous, TID With Meals  lisinopril, 20 mg, Oral, Q24H  pantoprazole, 40 mg, Oral, Daily        Active Infusions:       As Needed Medications:  •  ALPRAZolam  •  heparin (porcine)  •  melatonin  •  oxyCODONE-acetaminophen  •  [COMPLETED] Insert peripheral IV **AND** sodium chloride  •  tiZANidine    Vital Signs  Vital Signs Patient Vitals for the past 24 hrs:   BP Temp Temp src Pulse Resp SpO2 Weight   04/10/21 0754 (!) 195/99 98.2 °F (36.8 °C) Oral 66 18 -- --   04/10/21 0559 -- -- -- -- -- -- 81.7 kg (180 lb 1.9 oz)   21 2321 120/77 -- -- 72 16 97 % --   21 152/81 98 °F (36.7 °C) Oral 77 18 97 % --   21 1805 (!) 147/128 98.4 °F (36.9 °C) Oral 64 18 96 % --   21 1443 (!) 192/91 98.1 °F (36.7 °C) Temporal 69 18 -- --   21 1325 (!) 195/98 -- -- 78 16 97 % --   21 1221 (!) 218/104 98 °F (36.7 °C) Oral 64 16 98 % --     Vital Signs (range)  Temp:  [98  °F (36.7 °C)-98.4 °F (36.9 °C)] 98.2 °F (36.8 °C)  Heart Rate:  [64-78] 66  Resp:  [16-18] 18  BP: (120-218)/() 195/99  I/O:  I/O last 3 completed shifts:  In: 120 [P.O.:120]  Out: 400 [Urine:400]  I/O:   Intake/Output Summary (Last 24 hours) at 4/10/2021 1204  Last data filed at 4/9/2021 2318  Gross per 24 hour   Intake 120 ml   Output --   Net 120 ml     BMI:  Body mass index is 26.6 kg/m².    Physical Exam:  Physical Exam   Lungs clear   Abdomen benign   Extremities with left forearm IV and right antecubital IV in place    results Review:     CBC    Results from last 7 days   Lab Units 04/09/21  0422 04/08/21  1308 04/06/21  1252   WBC 10*3/mm3 4.13 7.62 5.77   HEMOGLOBIN g/dL 9.0* 11.1* 10.7*   PLATELETS 10*3/mm3 85* 93* 77*     BMP   Results from last 7 days   Lab Units 04/10/21  0639 04/09/21  0422 04/08/21  1952 04/08/21  1423   SODIUM mmol/L 144 144  --  138   POTASSIUM mmol/L 4.4 5.5* 6.4* 7.5*   CHLORIDE mmol/L 107 110*  --  110*   CO2 mmol/L 27.2 25.7  --  20.5*   BUN mg/dL 40* 59*  --  58*   CREATININE mg/dL 2.77* 3.65*  --  3.62*   GLUCOSE mg/dL 114* 107*  --  178*   PHOSPHORUS mg/dL 4.7* 5.2*  --   --      Cr Clearance Estimated Creatinine Clearance: 33.6 mL/min (A) (by C-G formula based on SCr of 2.77 mg/dL (H)).  Coag     Infection     Radiology(recent) XR Chest PA & Lateral    Result Date: 4/8/2021  No evidence for acute pulmonary process. Follow-up as clinical indications persist.  This report was finalized on 4/8/2021 3:59 PM by Dr. Yadiel Espinal M.D.        Assessment/Plan     Assessment & Plan      Acute renal failure with acute tubular necrosis superimposed on stage 4 chronic kidney disease (CMS/HCC)    Hyperkalemia      60 y.o. male with acute on chronic renal failure now in end-stage renal failure with dialysis dependence.  Plans for permanent access will be made based on available vein on mapping.      Personal protective equipment used for this patient encounter:  Patient wearing  surgical mask [x]    Provider wearing a surgical mask [x]    Gloves [x]    Eye protection [x]    Face Shield [x]    Gown []    N 95 respirator or CAPR/PAPR []   Duration of interaction 10    Kyle Sanchez MD  04/10/21  12:01 EDT    Please call my office with any question: [phone]    Active Hospital Problems    Diagnosis  POA   • **Acute renal failure with acute tubular necrosis superimposed on stage 4 chronic kidney disease (CMS/HCC) [N17.0, N18.4]  Unknown   • Hyperkalemia [E87.5]  Yes      Resolved Hospital Problems   No resolved problems to display.

## 2021-04-10 NOTE — PROGRESS NOTES
"   LOS: 2 days     Chief Complaint/ Reason for encounter: Hyperkalemia, renal failure dialysis initiation  Chief Complaint   Patient presents with   • Abnormal Lab         Subjective   S/p HD yesterday   Appreciate vascular placing line    Medical history reviewed:  History of Present Illness    Subjective    History taken from: Patient and chart    Vital Signs  Temp:  [98 °F (36.7 °C)-98.4 °F (36.9 °C)] 98.2 °F (36.8 °C)  Heart Rate:  [64-78] 66  Resp:  [16-18] 18  BP: (120-195)/() 195/99       Wt Readings from Last 1 Encounters:   04/10/21 0559 81.7 kg (180 lb 1.9 oz)   04/08/21 1747 80.8 kg (178 lb 3.2 oz)   04/08/21 1302 79.8 kg (176 lb)       Objective:  Vital signs: (most recent): Blood pressure (!) 195/99, pulse 66, temperature 98.2 °F (36.8 °C), temperature source Oral, resp. rate 18, height 175.3 cm (69\"), weight 81.7 kg (180 lb 1.9 oz), SpO2 97 %.              Objective:  General Appearance:  Comfortable, chronically ill-appearing, in no acute distress and not in pain.  Awake, alert, oriented  HEENT: Mucous membranes moist, no injury, oropharynx clear  Lungs:  Normal effort and normal respiratory rate.  Breath sounds course auscultation.  No  respiratory distress.  No rales, decreased breath sounds or rhonchi.    Heart: Normal rate.  Regular rhythm.  S1 normal.  No murmur.   Abdomen: Abdomen is soft.  Bowel sounds are normal, no abdominal tenderness.  There is no rebound or guarding  Extremities: Normal range of motion.  No significant edema of bilateral lower extremities, distal pulses intact  Neurological: No focal motor or sensory deficits, pupils reactive  Skin:  Warm and dry.  No rash or cyanosis.       Results Review:    Intake/Output:     Intake/Output Summary (Last 24 hours) at 4/10/2021 1226  Last data filed at 4/9/2021 2318  Gross per 24 hour   Intake 120 ml   Output --   Net 120 ml         DATA:  Radiology and Labs:  The following labs independently reviewed by me. Additional labs ordered " for tomorrow a.m.  Interval notes, chart personally reviewed by me.   Old records independently reviewed showing rest of CKD with hyperkalemia  The following radiologic studies independently viewed by me, findings chest x-ray no acute pulmonary process normal vasculature  New problems include hyperkalemia, progression to ESRD  Discussed with RN in the PACU    Risk/ complexity of medical care/ medical decision making high risk, need for surgical dialysis catheter placement and dialysis initiation today    Labs:   Recent Results (from the past 24 hour(s))   POC Glucose Once    Collection Time: 04/09/21 12:58 PM    Specimen: Blood   Result Value Ref Range    Glucose 133 (H) 70 - 130 mg/dL   POC Glucose Once    Collection Time: 04/09/21  6:13 PM    Specimen: Blood   Result Value Ref Range    Glucose 91 70 - 130 mg/dL   POC Glucose Once    Collection Time: 04/09/21  8:58 PM    Specimen: Blood   Result Value Ref Range    Glucose 177 (H) 70 - 130 mg/dL   POC Glucose Once    Collection Time: 04/10/21  5:39 AM    Specimen: Blood   Result Value Ref Range    Glucose 101 70 - 130 mg/dL   Renal Function Panel    Collection Time: 04/10/21  6:39 AM    Specimen: Blood   Result Value Ref Range    Glucose 114 (H) 65 - 99 mg/dL    BUN 40 (H) 6 - 20 mg/dL    Creatinine 2.77 (H) 0.76 - 1.27 mg/dL    Sodium 144 136 - 145 mmol/L    Potassium 4.4 3.5 - 5.2 mmol/L    Chloride 107 98 - 107 mmol/L    CO2 27.2 22.0 - 29.0 mmol/L    Calcium 8.1 (L) 8.6 - 10.5 mg/dL    Albumin 3.60 3.50 - 5.20 g/dL    Phosphorus 4.7 (H) 2.5 - 4.5 mg/dL    Anion Gap 9.8 5.0 - 15.0 mmol/L    BUN/Creatinine Ratio 14.4 7.0 - 25.0    eGFR Non African Amer 24 (L) >60 mL/min/1.73   Duplex Initial Vein Mapping Hemodialysis Access Bilateral CAR    Collection Time: 04/10/21 11:10 AM   Result Value Ref Range    Cephalic upper arm right - prox 0.28 cm    Cephalic upper arm right - mid 0.24 cm    Cephalic upper arm right - dist 0.30 cm    Cephalic Forearm right - prox 0.18  cm    Cephalic Forearm right - mid 0.23 cm    Cephalic Forearm right - dist 0.20 cm    Basilic upper arm right - prox 0.32 cm    Basilic upper arm right - mid 0.24 cm    Basilic upper arm right - dist 0.23 cm    Basilic Forearm right - prox 0.13 cm    Basilic Forearm right - mid 0.08 cm    Radial upper arm right - prox 0.18 cm    Radial upper arm right - mid 0.18 cm    Radial upper arm right - dist 0.19 cm    Cephalic upper arm left - prox 0.43 cm    Cephalic upper arm left - mid 0.37 cm    Cephalic upper arm left - dist 0.34 cm    Cephalic Forearm left - prox 0.35 cm    Cephalic Forearm left - mid 0.37 cm    Cephalic Forearm left - dist 0.35 cm    Basilic upper arm left - prox 0.32 cm    Basilic upper arm left - mid 0.35 cm    Basilic upper arm left - dist 0.27 cm    Basilic Forearm left - prox 0.11 cm    Basilic Forearm left - mid 0.10 cm    Basilic Forearm left - dist 0.10 cm    Radial upper arm left - prox 0.30 cm    Radial upper arm left - mid 0.19 cm    Radial upper arm left - dist 0.17 cm    Upper arterial right arm brachial length 0.37 cm    Upper arterial left arm brachial length 0.35 cm    Basilic Antecubital Fossa Left 0.16 cm    Basilic Antecubital Fossa Right 0.19 cm    Cephalic Antecubital Fossa Left 0.31 cm    Cephalic Antecubital Fossa Right 0.46 cm   POC Glucose Once    Collection Time: 04/10/21 11:35 AM    Specimen: Blood   Result Value Ref Range    Glucose 250 (H) 70 - 130 mg/dL       Radiology:  Imaging Results (Last 24 Hours)     ** No results found for the last 24 hours. **             Medications have been reviewed:  Current Facility-Administered Medications   Medication Dose Route Frequency Provider Last Rate Last Admin   • ALPRAZolam (XANAX) tablet 1 mg  1 mg Oral 4x Daily PRN Ad Weber MD   1 mg at 04/09/21 2000   • aspirin EC tablet 81 mg  81 mg Oral Daily Ad Weber MD   81 mg at 04/10/21 0828   • atorvastatin (LIPITOR) tablet 10 mg  10 mg Oral Nightly Gus  Ad Hernandez MD   10 mg at 04/09/21 2315   • carvedilol (COREG) tablet 3.125 mg  3.125 mg Oral BID With Meals Ad Weber MD   3.125 mg at 04/10/21 0828   • heparin (porcine) injection 4,000 Units  4,000 Units Intracatheter PRN Ad Weber MD       • hydrALAZINE (APRESOLINE) tablet 100 mg  100 mg Oral TID Ad Weber MD   100 mg at 04/10/21 0827   • insulin glargine (LANTUS, SEMGLEE) injection 15 Units  15 Units Subcutaneous Nightly Ad Weber MD   15 Units at 04/09/21 2315   • insulin lispro (ADMELOG) injection 0-7 Units  0-7 Units Subcutaneous TID AC Ad Weber MD   4 Units at 04/08/21 1831   • insulin lispro (ADMELOG) injection 5 Units  5 Units Subcutaneous TID With Meals Ad Weber MD   5 Units at 04/10/21 0828   • lisinopril (PRINIVIL,ZESTRIL) tablet 20 mg  20 mg Oral Q24H Yohan Murrell MD   20 mg at 04/10/21 0827   • melatonin tablet 1 mg  1 mg Oral Nightly PRN Ad Weber MD   1 mg at 04/09/21 2314   • oxyCODONE-acetaminophen (PERCOCET)  MG per tablet 1 tablet  1 tablet Oral Q6H PRN Ad Weber MD   1 tablet at 04/10/21 0613   • pantoprazole (PROTONIX) EC tablet 40 mg  40 mg Oral Daily Ad Weber MD   40 mg at 04/10/21 0831   • sodium chloride 0.9 % flush 10 mL  10 mL Intravenous PRN Ad Weber MD   10 mL at 04/10/21 0828   • tiZANidine (ZANAFLEX) tablet 4 mg  4 mg Oral Q6H PRN Ad Weber MD           ASSESSMENT:  Progression to ESRD  Critical hyperkalemia, improved with medical treatment overnight, HD today  Type 2 diabetes mellitus with nephropathy  Anemia of CKD  Chronic hypertension, above goal  Hyperlipidemia        PLAN:   Continue on HD, plans for HD today   Continue to UF on HD to assist with HTN   Ace resumed     Transfuse prn hgB <7   Monitor Po4     Dose all medications for GFR <20   Monitor electrolytes and volume   Call with any questions or concerns    Eugenia Murrell MD   [phone]      04/10/21  12:26 EDT

## 2021-04-10 NOTE — PLAN OF CARE
Goal Outcome Evaluation:         Pt resting in bed quietly. Medicated prn for pain. Ambulates independently in room.

## 2021-04-10 NOTE — PLAN OF CARE
Goal Outcome Evaluation:     Progress: no change  Outcome Summary: Patient alert and oriented. Complaints of shoulder pain at incision site. No complaints of SOA or nausea. Up with stand by assitance. Dialysis this afternoon. BP elevated, routine meds given. VSS. No acute distress noted. Nursing will continue to monitor.

## 2021-04-10 NOTE — PROGRESS NOTES
"Daily progress note    Chief complaint  Doing same  No new complaints  Family at bedside    History of present illness  White male with history of diabetes hypertension hyperlipidemia hepatitis C and chronic kidney disease stage IV presented to [facility] emergency room with worsening kidney function and high potassium.  Patient has no specific complaints or symptoms.  Patient does feel weak and tired.  Patient denies any fever cough chest pain shortness of breath abdominal pain nausea vomiting diarrhea.  Patient evaluated in ER found to have hyperkalemia and worsening kidney function need to start hemodialysis admit for management.    REVIEW OF SYSTEMS  Unremarkable     PHYSICAL EXAM   Blood pressure (!) 195/99, pulse 66, temperature 98.2 °F (36.8 °C), temperature source Oral, resp. rate 18, height 175.3 cm (69\"), weight 81.7 kg (180 lb 1.9 oz), SpO2 97 %.    GENERAL: not distressed  HENT: nares patent  EYES: no scleral icterus  CV: regular rhythm, regular rate  RESPIRATORY: normal effort, clear to auscultation bilaterally  ABDOMEN: soft, nontender bowel sounds positive  MUSCULOSKELETAL: no deformity  NEURO: alert, moves all extremities, follows commands  SKIN: warm, dry     LAB RESULTS  Lab Results (last 24 hours)     Procedure Component Value Units Date/Time    POC Glucose Once [[id number]]  (Abnormal) Collected: 04/10/21 1135    Specimen: Blood Updated: 04/10/21 1143     Glucose 250 mg/dL     Renal Function Panel [[id number]]  (Abnormal) Collected: 04/10/21 0639    Specimen: Blood Updated: 04/10/21 0844     Glucose 114 mg/dL      BUN 40 mg/dL      Creatinine 2.77 mg/dL      Sodium 144 mmol/L      Potassium 4.4 mmol/L      Chloride 107 mmol/L      CO2 27.2 mmol/L      Calcium 8.1 mg/dL      Albumin 3.60 g/dL      Phosphorus 4.7 mg/dL      Anion Gap 9.8 mmol/L      BUN/Creatinine Ratio 14.4     eGFR Non African Amer 24 mL/min/1.73     Narrative:      GFR Normal >60  Chronic Kidney Disease <60  Kidney " Failure <15      POC Glucose Once [[id number]]  (Normal) Collected: 04/10/21 0539    Specimen: Blood Updated: 04/10/21 0545     Glucose 101 mg/dL     POC Glucose Once [[id number]]  (Abnormal) Collected: 04/09/21 2058    Specimen: Blood Updated: 04/09/21 2109     Glucose 177 mg/dL     POC Glucose Once [[id number]]  (Normal) Collected: 04/09/21 1813    Specimen: Blood Updated: 04/09/21 1815     Glucose 91 mg/dL         Imaging Results (Last 24 Hours)     ** No results found for the last 24 hours. **           ECG 12 Lead               HEART RATE= 56  bpm  RR Interval= 1068  ms  CO Interval= 197  ms  P Horizontal Axis= 24  deg  P Front Axis= 23  deg  QRSD Interval= 110  ms  QT Interval= 444  ms  QRS Axis= 70  deg  T Wave Axis= -36  deg  - ABNORMAL ECG -  Sinus rhythm  Nonspecific T abnormalities, inferior leads             Current Facility-Administered Medications:   •  ALPRAZolam (XANAX) tablet 1 mg, 1 mg, Oral, 4x Daily PRN, Ad Weber MD, 1 mg at 04/10/21 1248  •  aspirin EC tablet 81 mg, 81 mg, Oral, Daily, Ad Weber MD, 81 mg at 04/10/21 0828  •  atorvastatin (LIPITOR) tablet 10 mg, 10 mg, Oral, Nightly, Ad Weber MD, 10 mg at 04/09/21 2315  •  carvedilol (COREG) tablet 3.125 mg, 3.125 mg, Oral, BID With Meals, Ad Weber MD, 3.125 mg at 04/10/21 0828  •  heparin (porcine) injection 4,000 Units, 4,000 Units, Intracatheter, PRN, Ad Weber MD  •  hydrALAZINE (APRESOLINE) tablet 100 mg, 100 mg, Oral, TID, Ad Weber MD, 100 mg at 04/10/21 0827  •  insulin glargine (LANTUS, SEMGLEE) injection 15 Units, 15 Units, Subcutaneous, Nightly, Ad Weber MD, 15 Units at 04/09/21 2315  •  insulin lispro (ADMELOG) injection 0-7 Units, 0-7 Units, Subcutaneous, TID ACGus Bradley Glynn, MD, 4 Units at 04/10/21 1245  •  insulin lispro (ADMELOG) injection 5 Units, 5 Units, Subcutaneous, TID With Meals, Ad Weber MD, 5 Units at  04/10/21 1245  •  lisinopril (PRINIVIL,ZESTRIL) tablet 20 mg, 20 mg, Oral, Q24H, Yohan Murrell MD, 20 mg at 04/10/21 0827  •  melatonin tablet 1 mg, 1 mg, Oral, Nightly PRN, Ad Weber MD, 1 mg at 04/09/21 2314  •  oxyCODONE-acetaminophen (PERCOCET)  MG per tablet 1 tablet, 1 tablet, Oral, Q6H PRN, Ad Weber MD, 1 tablet at 04/10/21 0613  •  pantoprazole (PROTONIX) EC tablet 40 mg, 40 mg, Oral, Daily, Ad Weber MD, 40 mg at 04/10/21 0831  •  [COMPLETED] Insert peripheral IV, , , Once **AND** sodium chloride 0.9 % flush 10 mL, 10 mL, Intravenous, PRN, Ad Weber MD, 10 mL at 04/10/21 0828  •  tiZANidine (ZANAFLEX) tablet 4 mg, 4 mg, Oral, Q6H PRN, Ad Weber MD     ASSESSMENT  End-stage renal disease on hemodialysis  Hyperkalemia resolved  Status post hemodialysis catheter placement  Insulin-dependent diabetes mellitus  Hypertension  Hyperlipidemia  Gastroesophageal reflux disease    PLAN  CPM  Repeat hemodialysis today  Adjust home medications  Stress ulcer DVT prophylaxis  Nephrology follow patient  Supportive care  Discussed with wife and nursing staff  Follow closely further recommendation current hospital course    ARIEL MCGOVERN MD

## 2021-04-11 LAB
ALBUMIN SERPL-MCNC: 3.7 G/DL (ref 3.5–5.2)
ANION GAP SERPL CALCULATED.3IONS-SCNC: 12.6 MMOL/L (ref 5–15)
BASOPHILS # BLD AUTO: 0.04 10*3/MM3 (ref 0–0.2)
BASOPHILS NFR BLD AUTO: 0.7 % (ref 0–1.5)
BUN SERPL-MCNC: 25 MG/DL (ref 6–20)
BUN/CREAT SERPL: 9.4 (ref 7–25)
CALCIUM SPEC-SCNC: 8.1 MG/DL (ref 8.6–10.5)
CHLORIDE SERPL-SCNC: 105 MMOL/L (ref 98–107)
CO2 SERPL-SCNC: 25.4 MMOL/L (ref 22–29)
CREAT SERPL-MCNC: 2.66 MG/DL (ref 0.76–1.27)
DEPRECATED RDW RBC AUTO: 50.7 FL (ref 37–54)
EOSINOPHIL # BLD AUTO: 0.24 10*3/MM3 (ref 0–0.4)
EOSINOPHIL NFR BLD AUTO: 4.2 % (ref 0.3–6.2)
ERYTHROCYTE [DISTWIDTH] IN BLOOD BY AUTOMATED COUNT: 13.7 % (ref 12.3–15.4)
GFR SERPL CREATININE-BSD FRML MDRD: 25 ML/MIN/1.73
GLUCOSE BLDC GLUCOMTR-MCNC: 136 MG/DL (ref 70–130)
GLUCOSE BLDC GLUCOMTR-MCNC: 219 MG/DL (ref 70–130)
GLUCOSE BLDC GLUCOMTR-MCNC: 75 MG/DL (ref 70–130)
GLUCOSE BLDC GLUCOMTR-MCNC: 78 MG/DL (ref 70–130)
GLUCOSE SERPL-MCNC: 165 MG/DL (ref 65–99)
HCT VFR BLD AUTO: 34.6 % (ref 37.5–51)
HGB BLD-MCNC: 10.8 G/DL (ref 13–17.7)
IMM GRANULOCYTES # BLD AUTO: 0.01 10*3/MM3 (ref 0–0.05)
IMM GRANULOCYTES NFR BLD AUTO: 0.2 % (ref 0–0.5)
LYMPHOCYTES # BLD AUTO: 1.31 10*3/MM3 (ref 0.7–3.1)
LYMPHOCYTES NFR BLD AUTO: 23.1 % (ref 19.6–45.3)
MCH RBC QN AUTO: 30.5 PG (ref 26.6–33)
MCHC RBC AUTO-ENTMCNC: 31.2 G/DL (ref 31.5–35.7)
MCV RBC AUTO: 97.7 FL (ref 79–97)
MONOCYTES # BLD AUTO: 0.54 10*3/MM3 (ref 0.1–0.9)
MONOCYTES NFR BLD AUTO: 9.5 % (ref 5–12)
NEUTROPHILS NFR BLD AUTO: 3.54 10*3/MM3 (ref 1.7–7)
NEUTROPHILS NFR BLD AUTO: 62.3 % (ref 42.7–76)
NRBC BLD AUTO-RTO: 0 /100 WBC (ref 0–0.2)
PHOSPHATE SERPL-MCNC: 3.7 MG/DL (ref 2.5–4.5)
PLATELET # BLD AUTO: 68 10*3/MM3 (ref 140–450)
PMV BLD AUTO: 12.4 FL (ref 6–12)
POTASSIUM SERPL-SCNC: 4.4 MMOL/L (ref 3.5–5.2)
RBC # BLD AUTO: 3.54 10*6/MM3 (ref 4.14–5.8)
SODIUM SERPL-SCNC: 143 MMOL/L (ref 136–145)
WBC # BLD AUTO: 5.68 10*3/MM3 (ref 3.4–10.8)

## 2021-04-11 PROCEDURE — 85025 COMPLETE CBC W/AUTO DIFF WBC: CPT | Performed by: HOSPITALIST

## 2021-04-11 PROCEDURE — 63710000001 INSULIN GLARGINE PER 5 UNITS: Performed by: SURGERY

## 2021-04-11 PROCEDURE — 80069 RENAL FUNCTION PANEL: CPT | Performed by: SURGERY

## 2021-04-11 PROCEDURE — 82962 GLUCOSE BLOOD TEST: CPT

## 2021-04-11 PROCEDURE — 63710000001 INSULIN LISPRO (HUMAN) PER 5 UNITS: Performed by: SURGERY

## 2021-04-11 RX ORDER — LISINOPRIL 40 MG/1
40 TABLET ORAL
Status: DISCONTINUED | OUTPATIENT
Start: 2021-04-12 | End: 2021-04-12 | Stop reason: HOSPADM

## 2021-04-11 RX ADMIN — INSULIN LISPRO 5 UNITS: 100 INJECTION, SOLUTION INTRAVENOUS; SUBCUTANEOUS at 17:18

## 2021-04-11 RX ADMIN — HYDRALAZINE HYDROCHLORIDE 100 MG: 50 TABLET, FILM COATED ORAL at 15:06

## 2021-04-11 RX ADMIN — HYDRALAZINE HYDROCHLORIDE 100 MG: 50 TABLET, FILM COATED ORAL at 20:01

## 2021-04-11 RX ADMIN — ATORVASTATIN CALCIUM 10 MG: 20 TABLET, FILM COATED ORAL at 20:01

## 2021-04-11 RX ADMIN — ALPRAZOLAM 1 MG: 1 TABLET ORAL at 22:20

## 2021-04-11 RX ADMIN — AMLODIPINE BESYLATE 5 MG: 5 TABLET ORAL at 07:51

## 2021-04-11 RX ADMIN — INSULIN LISPRO 5 UNITS: 100 INJECTION, SOLUTION INTRAVENOUS; SUBCUTANEOUS at 07:50

## 2021-04-11 RX ADMIN — CARVEDILOL 3.12 MG: 3.12 TABLET, FILM COATED ORAL at 07:52

## 2021-04-11 RX ADMIN — OXYCODONE HYDROCHLORIDE AND ACETAMINOPHEN 1 TABLET: 10; 325 TABLET ORAL at 06:33

## 2021-04-11 RX ADMIN — PANTOPRAZOLE SODIUM 40 MG: 40 TABLET, DELAYED RELEASE ORAL at 07:51

## 2021-04-11 RX ADMIN — OXYCODONE HYDROCHLORIDE AND ACETAMINOPHEN 1 TABLET: 10; 325 TABLET ORAL at 12:41

## 2021-04-11 RX ADMIN — CARVEDILOL 3.12 MG: 3.12 TABLET, FILM COATED ORAL at 17:17

## 2021-04-11 RX ADMIN — ASPIRIN 81 MG: 81 TABLET, COATED ORAL at 07:52

## 2021-04-11 RX ADMIN — OXYCODONE HYDROCHLORIDE AND ACETAMINOPHEN 1 TABLET: 10; 325 TABLET ORAL at 20:01

## 2021-04-11 RX ADMIN — ALPRAZOLAM 1 MG: 1 TABLET ORAL at 12:41

## 2021-04-11 RX ADMIN — INSULIN GLARGINE 15 UNITS: 100 INJECTION, SOLUTION SUBCUTANEOUS at 22:20

## 2021-04-11 RX ADMIN — INSULIN LISPRO 3 UNITS: 100 INJECTION, SOLUTION INTRAVENOUS; SUBCUTANEOUS at 17:17

## 2021-04-11 RX ADMIN — ALPRAZOLAM 1 MG: 1 TABLET ORAL at 06:33

## 2021-04-11 RX ADMIN — HYDRALAZINE HYDROCHLORIDE 100 MG: 50 TABLET, FILM COATED ORAL at 07:52

## 2021-04-11 RX ADMIN — LISINOPRIL 20 MG: 20 TABLET ORAL at 07:51

## 2021-04-11 NOTE — PROGRESS NOTES
LOS: 3 days     Chief Complaint/ Reason for encounter: Hyperkalemia, renal failure dialysis initiation  Chief Complaint   Patient presents with   • Abnormal Lab         Subjective    Tolerated initial dialysis well  He anxious but denies any new complaints  Blood pressure still elevated but improved overall  Denies shortness of breath or chest pain  No fevers or chills  No edema  Discussed with his wife at bedside    Medical history reviewed:  History of Present Illness    Subjective    History taken from: Patient and chart    Vital Signs  Temp:  [97.8 °F (36.6 °C)-98.6 °F (37 °C)] 98.6 °F (37 °C)  Heart Rate:  [61-74] 69  Resp:  [16-18] 18  BP: (113-242)/() 130/79       Wt Readings from Last 1 Encounters:   04/10/21 0559 81.7 kg (180 lb 1.9 oz)   04/08/21 1747 80.8 kg (178 lb 3.2 oz)   04/08/21 1302 79.8 kg (176 lb)       Objective    Objective:  General Appearance:  Comfortable, chronically ill-appearing, in no acute distress and not in pain.  Awake, alert, oriented  HEENT: Mucous membranes moist, no injury, oropharynx clear  Lungs:  Normal effort and normal respiratory rate.  Breath sounds clear to auscultation.  No  respiratory distress.  No rales, decreased breath sounds or rhonchi.    Heart: Normal rate.  Regular rhythm.  S1 normal.  No murmur.   Abdomen: Abdomen is soft.  Bowel sounds are normal, no abdominal tenderness.  There is no rebound or guarding  Extremities: Normal range of motion.  No significant edema of bilateral lower extremities, distal pulses intact  Neurological: No focal motor or sensory deficits, pupils reactive  Skin:  Warm and dry.  No rash or cyanosis.       Results Review:    Intake/Output:     Intake/Output Summary (Last 24 hours) at 4/11/2021 1246  Last data filed at 4/11/2021 0100  Gross per 24 hour   Intake 360 ml   Output 0 ml   Net 360 ml         DATA:  Radiology and Labs:  The following labs independently reviewed by me. Additional labs ordered for tomorrow a.m.  Interval  notes, chart personally reviewed by me.   Old records independently reviewed showing rest of CKD with hyperkalemia  Discussed with patient and his wife at bedside.  Discussed care with his RN    Risk need the need for continued dialysis    Labs:   Recent Results (from the past 24 hour(s))   POC Glucose Once    Collection Time: 04/10/21  6:50 PM    Specimen: Blood   Result Value Ref Range    Glucose 103 70 - 130 mg/dL   POC Glucose Once    Collection Time: 04/10/21  9:10 PM    Specimen: Blood   Result Value Ref Range    Glucose 120 70 - 130 mg/dL   Renal Function Panel    Collection Time: 04/11/21  4:21 AM    Specimen: Blood   Result Value Ref Range    Glucose 165 (H) 65 - 99 mg/dL    BUN 25 (H) 6 - 20 mg/dL    Creatinine 2.66 (H) 0.76 - 1.27 mg/dL    Sodium 143 136 - 145 mmol/L    Potassium 4.4 3.5 - 5.2 mmol/L    Chloride 105 98 - 107 mmol/L    CO2 25.4 22.0 - 29.0 mmol/L    Calcium 8.1 (L) 8.6 - 10.5 mg/dL    Albumin 3.70 3.50 - 5.20 g/dL    Phosphorus 3.7 2.5 - 4.5 mg/dL    Anion Gap 12.6 5.0 - 15.0 mmol/L    BUN/Creatinine Ratio 9.4 7.0 - 25.0    eGFR Non African Amer 25 (L) >60 mL/min/1.73   CBC Auto Differential    Collection Time: 04/11/21  4:21 AM    Specimen: Blood   Result Value Ref Range    WBC 5.68 3.40 - 10.80 10*3/mm3    RBC 3.54 (L) 4.14 - 5.80 10*6/mm3    Hemoglobin 10.8 (L) 13.0 - 17.7 g/dL    Hematocrit 34.6 (L) 37.5 - 51.0 %    MCV 97.7 (H) 79.0 - 97.0 fL    MCH 30.5 26.6 - 33.0 pg    MCHC 31.2 (L) 31.5 - 35.7 g/dL    RDW 13.7 12.3 - 15.4 %    RDW-SD 50.7 37.0 - 54.0 fl    MPV 12.4 (H) 6.0 - 12.0 fL    Platelets 68 (L) 140 - 450 10*3/mm3    Neutrophil % 62.3 42.7 - 76.0 %    Lymphocyte % 23.1 19.6 - 45.3 %    Monocyte % 9.5 5.0 - 12.0 %    Eosinophil % 4.2 0.3 - 6.2 %    Basophil % 0.7 0.0 - 1.5 %    Immature Grans % 0.2 0.0 - 0.5 %    Neutrophils, Absolute 3.54 1.70 - 7.00 10*3/mm3    Lymphocytes, Absolute 1.31 0.70 - 3.10 10*3/mm3    Monocytes, Absolute 0.54 0.10 - 0.90 10*3/mm3    Eosinophils,  Absolute 0.24 0.00 - 0.40 10*3/mm3    Basophils, Absolute 0.04 0.00 - 0.20 10*3/mm3    Immature Grans, Absolute 0.01 0.00 - 0.05 10*3/mm3    nRBC 0.0 0.0 - 0.2 /100 WBC   POC Glucose Once    Collection Time: 04/11/21  6:21 AM    Specimen: Blood   Result Value Ref Range    Glucose 136 (H) 70 - 130 mg/dL   POC Glucose Once    Collection Time: 04/11/21 11:29 AM    Specimen: Blood   Result Value Ref Range    Glucose 75 70 - 130 mg/dL       Radiology:  Imaging Results (Last 24 Hours)     ** No results found for the last 24 hours. **             Medications have been reviewed:  Current Facility-Administered Medications   Medication Dose Route Frequency Provider Last Rate Last Admin   • ALPRAZolam (XANAX) tablet 1 mg  1 mg Oral 4x Daily PRN Ad Weber MD   1 mg at 04/11/21 1241   • amLODIPine (NORVASC) tablet 5 mg  5 mg Oral Q24H Karl Swift MD   5 mg at 04/11/21 0751   • aspirin EC tablet 81 mg  81 mg Oral Daily Ad Weber MD   81 mg at 04/11/21 0752   • atorvastatin (LIPITOR) tablet 10 mg  10 mg Oral Nightly Ad Weber MD   10 mg at 04/10/21 1938   • carvedilol (COREG) tablet 3.125 mg  3.125 mg Oral BID With Meals Ad Weber MD   3.125 mg at 04/11/21 0752   • heparin (porcine) injection 4,000 Units  4,000 Units Intracatheter PRN Ad Weber MD   4,000 Units at 04/10/21 1508   • hydrALAZINE (APRESOLINE) injection 10 mg  10 mg Intravenous Q4H PRN Karl Swift MD   10 mg at 04/10/21 2124   • hydrALAZINE (APRESOLINE) tablet 100 mg  100 mg Oral TID Ad Weber MD   100 mg at 04/11/21 0752   • insulin glargine (LANTUS, SEMGLEE) injection 15 Units  15 Units Subcutaneous Nightly Ad Weber MD   15 Units at 04/10/21 2125   • insulin lispro (ADMELOG) injection 0-7 Units  0-7 Units Subcutaneous TID AC Ad Weber MD   4 Units at 04/10/21 1245   • insulin lispro (ADMELOG) injection 5 Units  5 Units Subcutaneous TID With Meals Gus  Ad Hernandez MD   5 Units at 04/11/21 0750   • [START ON 4/12/2021] lisinopril (PRINIVIL,ZESTRIL) tablet 40 mg  40 mg Oral Q24H Yohan Murrell MD       • melatonin tablet 1 mg  1 mg Oral Nightly PRN Ad Weber MD   1 mg at 04/09/21 2314   • oxyCODONE-acetaminophen (PERCOCET)  MG per tablet 1 tablet  1 tablet Oral Q6H PRN Ad Weber MD   1 tablet at 04/11/21 1241   • pantoprazole (PROTONIX) EC tablet 40 mg  40 mg Oral Daily Ad Weber MD   40 mg at 04/11/21 0751   • sodium chloride 0.9 % flush 10 mL  10 mL Intravenous PRN Ad Weber MD   10 mL at 04/10/21 1938   • tiZANidine (ZANAFLEX) tablet 4 mg  4 mg Oral Q6H PRN Ad Weber MD           ASSESSMENT:  Progression to ESRD  Critical hyperkalemia, improved with medical treatment overnight, HD today  Type 2 diabetes mellitus with nephropathy  Anemia of CKD  Chronic hypertension, above goal  Hyperlipidemia        PLAN:   BP trend continues to improve  We will increase lisinopril to 40 mg daily, agree with amlodipine  Potassium has improved with HD  Appreciate vascular assistance with tunneled dialysis catheter placement  Plan is for outpatient AV fistula creation  Plan dialysis tomorrow and Monday Wednesday Friday schedule  Will need to hold off on discharge until an outpatient dialysis spot is available, I do not believe it has been arranged yet per CCP notes    Otherwise should be stable for discharge home tomorrow after dialysis    Yohan Murrell MD   [facility]   Office phone number: [phone]  Answering service phone number: [phone]    04/11/21  12:46 EDT    Dictation performed using Dragon dictation software

## 2021-04-11 NOTE — PROGRESS NOTES
Name: Angelo Brown ADMIT: 2021   : 1960  PCP: Provider, No Known    MRN: [id number] LOS: 3 days   AGE/SEX: 60 y.o. male  ROOM: E669/1   [facility]    Billin-24, Subsequent Hospital Care, Hemodialysis access plans    Chief Complaint   Patient presents with   • Abnormal Lab     CC: Acute renal failure with plans for chronic hemodialysis  Subjective     60 y.o. male with acute renal failure progressing to end-stage renal failure.  Patient is in need of permanent dialysis access.  Mapping shows viable left arm vein for fistula placement.  This will be pursued as an outpatient at the patient's request.    Review of Systems no new complaints, desires discharge    Objective     Scheduled Medications:   amLODIPine, 5 mg, Oral, Q24H  aspirin, 81 mg, Oral, Daily  atorvastatin, 10 mg, Oral, Nightly  carvedilol, 3.125 mg, Oral, BID With Meals  hydrALAZINE, 100 mg, Oral, TID  insulin glargine, 15 Units, Subcutaneous, Nightly  insulin lispro, 0-7 Units, Subcutaneous, TID AC  insulin lispro, 5 Units, Subcutaneous, TID With Meals  lisinopril, 20 mg, Oral, Q24H  pantoprazole, 40 mg, Oral, Daily        Active Infusions:       As Needed Medications:  •  ALPRAZolam  •  heparin (porcine)  •  hydrALAZINE  •  melatonin  •  oxyCODONE-acetaminophen  •  [COMPLETED] Insert peripheral IV **AND** sodium chloride  •  tiZANidine    Vital Signs  Vital Signs   Patient Vitals for the past 24 hrs:   BP Temp Temp src Pulse Resp SpO2   21 0751 (!) 174/101 -- -- 71 -- --   04/10/21 2323 113/96 98.6 °F (37 °C) Oral 70 18 90 %   04/10/21 2124 (!) 205/99 -- -- 74 -- --   04/10/21 2002 -- 98.6 °F (37 °C) Oral 61 18 94 %   04/10/21 1938 (!) 209/104 -- -- 66 -- --   04/10/21 1851 -- -- -- 66 -- --   04/10/21 1847 (!) 240/115 -- -- -- -- --   04/10/21 1844 (!) 242/111 -- -- -- -- --   04/10/21 1830 (!) 209/110 97.8 °F (36.6 °C) Temporal 64 16 --   04/10/21 1506 (!) 225/95 -- -- 62 -- --   04/10/21 1450 (!) 198/100 98 °F (36.7  °C) Temporal 62 16 --   04/10/21 1423 136/85 98.1 °F (36.7 °C) Oral 63 18 92 %     Vital Signs (range)  Temp:  [97.8 °F (36.6 °C)-98.6 °F (37 °C)] 98.6 °F (37 °C)  Heart Rate:  [61-74] 71  Resp:  [16-18] 18  BP: (113-242)/() 174/101  I/O:  I/O last 3 completed shifts:  In: 480 [P.O.:480]  Out: 0   I/O:     Intake/Output Summary (Last 24 hours) at 4/11/2021 0812  Last data filed at 4/11/2021 0100  Gross per 24 hour   Intake 360 ml   Output 0 ml   Net 360 ml     BMI:  Body mass index is 26.6 kg/m².    Physical Exam:  Physical Exam   Lungs clear   Abdomen benign   Extremities with left forearm IV removed.  Palpable pulses and no swelling in the arms.  results Review:     CBC    Results from last 7 days   Lab Units 04/11/21  0421 04/09/21  0422 04/08/21  1308 04/06/21  1252   WBC 10*3/mm3 5.68 4.13 7.62 5.77   HEMOGLOBIN g/dL 10.8* 9.0* 11.1* 10.7*   PLATELETS 10*3/mm3 68* 85* 93* 77*     BMP   Results from last 7 days   Lab Units 04/11/21  0421 04/10/21  0639 04/09/21  0422 04/08/21  1952 04/08/21  1423   SODIUM mmol/L 143 144 144  --  138   POTASSIUM mmol/L 4.4 4.4 5.5* 6.4* 7.5*   CHLORIDE mmol/L 105 107 110*  --  110*   CO2 mmol/L 25.4 27.2 25.7  --  20.5*   BUN mg/dL 25* 40* 59*  --  58*   CREATININE mg/dL 2.66* 2.77* 3.65*  --  3.62*   GLUCOSE mg/dL 165* 114* 107*  --  178*   PHOSPHORUS mg/dL 3.7 4.7* 5.2*  --   --      Cr Clearance Estimated Creatinine Clearance: 35 mL/min (A) (by C-G formula based on SCr of 2.66 mg/dL (H)).  Coag     Infection     Radiology(recent) No radiology results for the last day    Assessment/Plan     Assessment & Plan      Acute renal failure with acute tubular necrosis superimposed on stage 4 chronic kidney disease (CMS/HCC)    Hyperkalemia      60 y.o. male with acute on chronic renal failure now in end-stage renal failure with dialysis dependence.  Patient has a viable left arm vein which will be preserved.  We discussed the nature of fistula placement and the need to get it  done.  Patient is very anxious for discharge.  He wants to go home today and do the fistula as an outpatient.  This can be arranged.  It would not be till late Tuesday or Wednesday that we will build to do it if he stays.  From our standpoint he can be discharged home and will arrange with our office a plan for left arm fistula placement by Dr. Weber as an outpatient.  He is aware that he needs to get this done and should not put it off after discharge    Personal protective equipment used for this patient encounter:  Patient wearing surgical mask [x]    Provider wearing a surgical mask [x]    Gloves [x]    Eye protection [x]    Face Shield [x]    Gown []    N 95 respirator or CAPR/PAPR []   Duration of interaction 15    Kyle Sanchez MD  04/11/21  08:12 EDT    Please call my office with any question: [phone]    Active Hospital Problems    Diagnosis  POA   • **Acute renal failure with acute tubular necrosis superimposed on stage 4 chronic kidney disease (CMS/HCC) [N17.0, N18.4]  Unknown   • Hyperkalemia [E87.5]  Yes      Resolved Hospital Problems   No resolved problems to display.

## 2021-04-11 NOTE — PLAN OF CARE
Goal Outcome Evaluation:     Progress: improving  Outcome Summary: Patient alert and oriented. Complaints of shoulder pain at incision site. Medicated x1. BP improved. No complaints of SOA or nausea. Dialysis in the AM. VSS. No acute distress noted. Nursing will continue to monitor.

## 2021-04-11 NOTE — PROGRESS NOTES
"Daily progress note    Chief complaint  Doing better  No new complaints  Family at bedside    History of present illness  White male with history of diabetes hypertension hyperlipidemia hepatitis C and chronic kidney disease stage IV presented to [facility] emergency room with worsening kidney function and high potassium.  Patient has no specific complaints or symptoms.  Patient does feel weak and tired.  Patient denies any fever cough chest pain shortness of breath abdominal pain nausea vomiting diarrhea.  Patient evaluated in ER found to have hyperkalemia and worsening kidney function need to start hemodialysis admit for management.    REVIEW OF SYSTEMS  Unremarkable     PHYSICAL EXAM   Blood pressure 130/79, pulse 69, temperature 98.6 °F (37 °C), temperature source Oral, resp. rate 18, height 175.3 cm (69\"), weight 81.7 kg (180 lb 1.9 oz), SpO2 90 %.    GENERAL: not distressed  HENT: nares patent  EYES: no scleral icterus  CV: regular rhythm, regular rate  RESPIRATORY: normal effort, clear to auscultation bilaterally  ABDOMEN: soft, nontender bowel sounds positive  MUSCULOSKELETAL: no deformity  NEURO: alert, moves all extremities, follows commands  SKIN: warm, dry     LAB RESULTS  Lab Results (last 24 hours)     Procedure Component Value Units Date/Time    POC Glucose Once [[id number]]  (Normal) Collected: 04/11/21 1129    Specimen: Blood Updated: 04/11/21 1136     Glucose 75 mg/dL     POC Glucose Once [[id number]]  (Abnormal) Collected: 04/11/21 0621    Specimen: Blood Updated: 04/11/21 0627     Glucose 136 mg/dL     Renal Function Panel [[id number]]  (Abnormal) Collected: 04/11/21 0421    Specimen: Blood Updated: 04/11/21 0608     Glucose 165 mg/dL      BUN 25 mg/dL      Creatinine 2.66 mg/dL      Sodium 143 mmol/L      Potassium 4.4 mmol/L      Comment: Slight hemolysis detected by analyzer. Results may be affected.        Chloride 105 mmol/L      CO2 25.4 mmol/L      Calcium 8.1 mg/dL      Albumin " 3.70 g/dL      Phosphorus 3.7 mg/dL      Anion Gap 12.6 mmol/L      BUN/Creatinine Ratio 9.4     eGFR Non African Amer 25 mL/min/1.73     Narrative:      GFR Normal >60  Chronic Kidney Disease <60  Kidney Failure <15      CBC & Differential [[id number]]  (Abnormal) Collected: 04/11/21 0421    Specimen: Blood Updated: 04/11/21 0529    Narrative:      The following orders were created for panel order CBC & Differential.  Procedure                               Abnormality         Status                     ---------                               -----------         ------                     CBC Auto Differential[[id number]]        Abnormal            Final result                 Please view results for these tests on the individual orders.    CBC Auto Differential [[id number]]  (Abnormal) Collected: 04/11/21 0421    Specimen: Blood Updated: 04/11/21 0529     WBC 5.68 10*3/mm3      RBC 3.54 10*6/mm3      Hemoglobin 10.8 g/dL      Hematocrit 34.6 %      MCV 97.7 fL      MCH 30.5 pg      MCHC 31.2 g/dL      RDW 13.7 %      RDW-SD 50.7 fl      MPV 12.4 fL      Platelets 68 10*3/mm3      Neutrophil % 62.3 %      Lymphocyte % 23.1 %      Monocyte % 9.5 %      Eosinophil % 4.2 %      Basophil % 0.7 %      Immature Grans % 0.2 %      Neutrophils, Absolute 3.54 10*3/mm3      Lymphocytes, Absolute 1.31 10*3/mm3      Monocytes, Absolute 0.54 10*3/mm3      Eosinophils, Absolute 0.24 10*3/mm3      Basophils, Absolute 0.04 10*3/mm3      Immature Grans, Absolute 0.01 10*3/mm3      nRBC 0.0 /100 WBC     POC Glucose Once [[id number]]  (Normal) Collected: 04/10/21 2110    Specimen: Blood Updated: 04/10/21 2121     Glucose 120 mg/dL     POC Glucose Once [[id number]]  (Normal) Collected: 04/10/21 1850    Specimen: Blood Updated: 04/10/21 1852     Glucose 103 mg/dL         Imaging Results (Last 24 Hours)     ** No results found for the last 24 hours. **           ECG 12 Lead               HEART RATE= 56  bpm  RR Interval= 1068  ms  IL  Interval= 197  ms  P Horizontal Axis= 24  deg  P Front Axis= 23  deg  QRSD Interval= 110  ms  QT Interval= 444  ms  QRS Axis= 70  deg  T Wave Axis= -36  deg  - ABNORMAL ECG -  Sinus rhythm  Nonspecific T abnormalities, inferior leads             Current Facility-Administered Medications:   •  ALPRAZolam (XANAX) tablet 1 mg, 1 mg, Oral, 4x Daily PRN, Ad Weber MD, 1 mg at 04/11/21 1241  •  amLODIPine (NORVASC) tablet 5 mg, 5 mg, Oral, Q24H, Karl Swift MD, 5 mg at 04/11/21 0751  •  aspirin EC tablet 81 mg, 81 mg, Oral, Daily, Ad Weber MD, 81 mg at 04/11/21 0752  •  atorvastatin (LIPITOR) tablet 10 mg, 10 mg, Oral, Nightly, Ad Weber MD, 10 mg at 04/10/21 1938  •  carvedilol (COREG) tablet 3.125 mg, 3.125 mg, Oral, BID With Meals, Ad Weber MD, 3.125 mg at 04/11/21 0752  •  heparin (porcine) injection 4,000 Units, 4,000 Units, Intracatheter, PRN, Ad Weber MD, 4,000 Units at 04/10/21 1508  •  hydrALAZINE (APRESOLINE) injection 10 mg, 10 mg, Intravenous, Q4H PRN, Karl Swift MD, 10 mg at 04/10/21 2124  •  hydrALAZINE (APRESOLINE) tablet 100 mg, 100 mg, Oral, TID, Ad Weber MD, 100 mg at 04/11/21 0752  •  insulin glargine (LANTUS, SEMGLEE) injection 15 Units, 15 Units, Subcutaneous, Nightly, Ad Weber MD, 15 Units at 04/10/21 2125  •  insulin lispro (ADMELOG) injection 0-7 Units, 0-7 Units, Subcutaneous, TID AC, Ad Weber MD, 4 Units at 04/10/21 1245  •  insulin lispro (ADMELOG) injection 5 Units, 5 Units, Subcutaneous, TID With Meals, Ad Weber MD, 5 Units at 04/11/21 0750  •  [START ON 4/12/2021] lisinopril (PRINIVIL,ZESTRIL) tablet 40 mg, 40 mg, Oral, Q24H, Yohan Murrell MD  •  melatonin tablet 1 mg, 1 mg, Oral, Nightly PRN, Ad Weber MD, 1 mg at 04/09/21 2314  •  oxyCODONE-acetaminophen (PERCOCET)  MG per tablet 1 tablet, 1 tablet, Oral, Q6H PRN, Ad Weber,  MD, 1 tablet at 04/11/21 1241  •  pantoprazole (PROTONIX) EC tablet 40 mg, 40 mg, Oral, Daily, Ad Weber MD, 40 mg at 04/11/21 0751  •  [COMPLETED] Insert peripheral IV, , , Once **AND** sodium chloride 0.9 % flush 10 mL, 10 mL, Intravenous, PRN, Ad Weber MD, 10 mL at 04/10/21 1938  •  tiZANidine (ZANAFLEX) tablet 4 mg, 4 mg, Oral, Q6H PRN, Ad Weber MD     ASSESSMENT  End-stage renal disease on hemodialysis  Hyperkalemia resolved  Status post hemodialysis catheter placement  Insulin-dependent diabetes mellitus  Hypertension  Hyperlipidemia  Gastroesophageal reflux disease    PLAN  CPM  Hemodialysis Monday Wednesday Friday  Adjust home medications  Stress ulcer DVT prophylaxis  Nephrology follow patient  Supportive care  Discussed with wife and nursing staff  Discharge planning    ARIEL MCGOVERN MD

## 2021-04-12 VITALS
HEART RATE: 64 BPM | SYSTOLIC BLOOD PRESSURE: 151 MMHG | TEMPERATURE: 98 F | BODY MASS INDEX: 26.68 KG/M2 | HEIGHT: 69 IN | RESPIRATION RATE: 16 BRPM | DIASTOLIC BLOOD PRESSURE: 88 MMHG | OXYGEN SATURATION: 98 % | WEIGHT: 180.12 LBS

## 2021-04-12 LAB
ALBUMIN SERPL-MCNC: 3.6 G/DL (ref 3.5–5.2)
ANION GAP SERPL CALCULATED.3IONS-SCNC: 8.9 MMOL/L (ref 5–15)
BUN SERPL-MCNC: 42 MG/DL (ref 6–20)
BUN/CREAT SERPL: 11.8 (ref 7–25)
CALCIUM SPEC-SCNC: 8 MG/DL (ref 8.6–10.5)
CHLORIDE SERPL-SCNC: 105 MMOL/L (ref 98–107)
CO2 SERPL-SCNC: 25.1 MMOL/L (ref 22–29)
CREAT SERPL-MCNC: 3.57 MG/DL (ref 0.76–1.27)
GFR SERPL CREATININE-BSD FRML MDRD: 18 ML/MIN/1.73
GLUCOSE BLDC GLUCOMTR-MCNC: 104 MG/DL (ref 70–130)
GLUCOSE BLDC GLUCOMTR-MCNC: 151 MG/DL (ref 70–130)
GLUCOSE SERPL-MCNC: 164 MG/DL (ref 65–99)
PHOSPHATE SERPL-MCNC: 4.6 MG/DL (ref 2.5–4.5)
POTASSIUM SERPL-SCNC: 4.5 MMOL/L (ref 3.5–5.2)
SODIUM SERPL-SCNC: 139 MMOL/L (ref 136–145)

## 2021-04-12 PROCEDURE — 36415 COLL VENOUS BLD VENIPUNCTURE: CPT | Performed by: SURGERY

## 2021-04-12 PROCEDURE — 82962 GLUCOSE BLOOD TEST: CPT

## 2021-04-12 PROCEDURE — 5A1D70Z PERFORMANCE OF URINARY FILTRATION, INTERMITTENT, LESS THAN 6 HOURS PER DAY: ICD-10-PCS | Performed by: INTERNAL MEDICINE

## 2021-04-12 PROCEDURE — 80069 RENAL FUNCTION PANEL: CPT | Performed by: SURGERY

## 2021-04-12 PROCEDURE — 63710000001 INSULIN LISPRO (HUMAN) PER 5 UNITS: Performed by: SURGERY

## 2021-04-12 RX ORDER — LISINOPRIL 40 MG/1
40 TABLET ORAL
Qty: 30 TABLET | Refills: 0 | Status: SHIPPED | OUTPATIENT
Start: 2021-04-13 | End: 2022-12-26

## 2021-04-12 RX ORDER — AMLODIPINE BESYLATE 10 MG/1
10 TABLET ORAL
Status: DISCONTINUED | OUTPATIENT
Start: 2021-04-13 | End: 2021-04-12 | Stop reason: HOSPADM

## 2021-04-12 RX ORDER — AMLODIPINE BESYLATE 10 MG/1
10 TABLET ORAL
Qty: 30 TABLET | Refills: 0 | Status: SHIPPED | OUTPATIENT
Start: 2021-04-13 | End: 2021-05-13

## 2021-04-12 RX ADMIN — LISINOPRIL 40 MG: 40 TABLET ORAL at 14:12

## 2021-04-12 RX ADMIN — INSULIN LISPRO 5 UNITS: 100 INJECTION, SOLUTION INTRAVENOUS; SUBCUTANEOUS at 08:13

## 2021-04-12 RX ADMIN — HYDRALAZINE HYDROCHLORIDE 100 MG: 50 TABLET, FILM COATED ORAL at 14:12

## 2021-04-12 RX ADMIN — PANTOPRAZOLE SODIUM 40 MG: 40 TABLET, DELAYED RELEASE ORAL at 14:13

## 2021-04-12 RX ADMIN — ASPIRIN 81 MG: 81 TABLET, COATED ORAL at 14:12

## 2021-04-12 RX ADMIN — CARVEDILOL 3.12 MG: 3.12 TABLET, FILM COATED ORAL at 10:02

## 2021-04-12 RX ADMIN — INSULIN LISPRO 5 UNITS: 100 INJECTION, SOLUTION INTRAVENOUS; SUBCUTANEOUS at 14:13

## 2021-04-12 RX ADMIN — INSULIN LISPRO 2 UNITS: 100 INJECTION, SOLUTION INTRAVENOUS; SUBCUTANEOUS at 08:13

## 2021-04-12 RX ADMIN — OXYCODONE HYDROCHLORIDE AND ACETAMINOPHEN 1 TABLET: 10; 325 TABLET ORAL at 06:33

## 2021-04-12 RX ADMIN — AMLODIPINE BESYLATE 5 MG: 5 TABLET ORAL at 10:02

## 2021-04-12 RX ADMIN — ALPRAZOLAM 1 MG: 1 TABLET ORAL at 08:13

## 2021-04-12 NOTE — DISCHARGE SUMMARY
Discharge summary    Date of admission 4/8/2021  Date of discharge 4/12/2021                 Final diagnosis  End-stage renal disease on hemodialysis  Hyperkalemia resolved  Status post hemodialysis catheter placement  Insulin-dependent diabetes mellitus  Hypertension  Hyperlipidemia  Gastroesophageal reflux disease    Discharge medications    Current Facility-Administered Medications:   •  [START ON 4/13/2021] amLODIPine (NORVASC) tablet 10 mg, 10 mg, Oral, Q24H, Karl Swift MD  •  aspirin EC tablet 81 mg, 81 mg, Oral, Daily, Ad Weber MD, 81 mg at 04/12/21 1412  •  atorvastatin (LIPITOR) tablet 10 mg, 10 mg, Oral, Nightly, Ad Weber MD, 10 mg at 04/11/21 2001  •  carvedilol (COREG) tablet 3.125 mg, 3.125 mg, Oral, BID With Meals, Ad Weber MD, 3.125 mg at 04/12/21 1002  •  hydrALAZINE (APRESOLINE) tablet 100 mg, 100 mg, Oral, TID, Ad Weber MD, 100 mg at 04/12/21 1412  •  insulin glargine (LANTUS, SEMGLEE) injection 15 Units, 15 Units, Subcutaneous, Nightly, Ad Weber MD, 15 Units at 04/11/21 2220  •  insulin lispro (ADMELOG) injection 5 Units, 5 Units, Subcutaneous, TID With Meals, Ad Weber MD, 5 Units at 04/12/21 1413  •  lisinopril (PRINIVIL,ZESTRIL) tablet 40 mg, 40 mg, Oral, Q24H, Yohan Murrell MD, 40 mg at 04/12/21 1412  •  pantoprazole (PROTONIX) EC tablet 40 mg, 40 mg, Oral, Daily, Ad Weber MD, 40 mg at 04/12/21 1413  •  [COMPLETED] Insert peripheral IV, , , Once **AND** sodium chloride 0.9 % flush 10 mL, 10 mL, Intravenous, PRN, Ad Weber MD, 10 mL at 04/10/21 1938     Consults obtained  Vascular surgery  Nephrology    Procedures  Tunneled dialysis catheter insertion    Hospital course  60-year-old white male with history of diabetes hypertension hyperlipidemia hepatitis C and chronic kidney disease stage IV admitted with worsening kidney function and need to start hemodialysis.  Patient has  no specific complaint except feel weak and tired.  Patient admitted hemodialysis catheter placed and started on hemodialysis.  Patient blood pressure medication adjusted during this hospitalization.  Patient followed by vascular surgery and nephrology.  Now patient has a hemodialysis spot as an outpatient and will be discharged to continue hemodialysis 3 times a week Monday Wednesday Friday.  Patient is medically stable and cleared for discharge.    Discharge diet regular    Activity as tolerated    Medication as above    Follow with primary doctor in 1 week and follow with nephrology per the instruction keep the appointment for hemodialysis 3 times a week and take medication as directed    ARIEL MCGOVERN MD

## 2021-04-12 NOTE — PROGRESS NOTES
"   LOS: 4 days     Chief Complaint/ Reason for encounter: Hyperkalemia, renal failure dialysis initiation  Chief Complaint   Patient presents with   • Abnormal Lab         Subjective    He is doing well today and BP is improved.  Currently 154/65 on dialysis  Patient was seen on hemodialysis  Treatment orders discussed with the dialysis RNFranco  Arterial pressure/ Venous pressure: 160/140  Blood flow rate/Dialysate flow rate: 350/600  Blood pressure: 154/65  Potassium bath/Fluid removal goal: 2K, 1 L    Tolerating HD well      Medical history reviewed:  History of Present Illness    Subjective    History taken from: Patient and chart    Vital Signs  Temp:  [97.8 °F (36.6 °C)-98 °F (36.7 °C)] 97.8 °F (36.6 °C)  Heart Rate:  [67-78] 67  Resp:  [16-18] 16  BP: (130-200)/() 200/110       Wt Readings from Last 1 Encounters:   04/10/21 0559 81.7 kg (180 lb 1.9 oz)   04/08/21 1747 80.8 kg (178 lb 3.2 oz)   04/08/21 1302 79.8 kg (176 lb)       Objective:  Vital signs: (most recent): Blood pressure (!) 200/110, pulse 67, temperature 97.8 °F (36.6 °C), temperature source Oral, resp. rate 16, height 175.3 cm (69\"), weight 81.7 kg (180 lb 1.9 oz), SpO2 98 %.              Objective:  General Appearance:  Comfortable, chronically ill-appearing, in no acute distress and not in pain.  Awake, alert, oriented  HEENT: Mucous membranes moist, no injury, oropharynx clear  Lungs:  Normal effort and normal respiratory rate.  Breath sounds clear to auscultation.  No  respiratory distress.  No rales, decreased breath sounds or rhonchi.    Heart: Normal rate.  Regular rhythm.  S1 normal.  No murmur.   Abdomen: Abdomen is soft.  Bowel sounds are normal, no abdominal tenderness.  There is no rebound or guarding  Extremities: Normal range of motion.  No significant edema of bilateral lower extremities, distal pulses intact  Neurological: No focal motor or sensory deficits, pupils reactive  Skin:  Warm and dry.  No rash or cyanosis. "       Results Review:    Intake/Output:     Intake/Output Summary (Last 24 hours) at 4/12/2021 1218  Last data filed at 4/11/2021 2120  Gross per 24 hour   Intake 240 ml   Output --   Net 240 ml         DATA:  Radiology and Labs:  The following labs independently reviewed by me. Additional labs ordered for tomorrow a.m.  Interval notes, chart personally reviewed by me.   Old records independently reviewed showing rest of CKD with hyperkalemia  Discussed with patient and his wife at bedside.  Discussed care with his RN and with the dialysis nurse, also discussed with the  and with the RN at his outpatient dialysis unit, [facility] regarding outpatient treatment time    Risk need the need for continued dialysis    Labs:   Recent Results (from the past 24 hour(s))   POC Glucose Once    Collection Time: 04/11/21  5:01 PM    Specimen: Blood   Result Value Ref Range    Glucose 219 (H) 70 - 130 mg/dL   POC Glucose Once    Collection Time: 04/11/21  9:18 PM    Specimen: Blood   Result Value Ref Range    Glucose 78 70 - 130 mg/dL   Renal Function Panel    Collection Time: 04/12/21  6:05 AM    Specimen: Blood   Result Value Ref Range    Glucose 164 (H) 65 - 99 mg/dL    BUN 42 (H) 6 - 20 mg/dL    Creatinine 3.57 (H) 0.76 - 1.27 mg/dL    Sodium 139 136 - 145 mmol/L    Potassium 4.5 3.5 - 5.2 mmol/L    Chloride 105 98 - 107 mmol/L    CO2 25.1 22.0 - 29.0 mmol/L    Calcium 8.0 (L) 8.6 - 10.5 mg/dL    Albumin 3.60 3.50 - 5.20 g/dL    Phosphorus 4.6 (H) 2.5 - 4.5 mg/dL    Anion Gap 8.9 5.0 - 15.0 mmol/L    BUN/Creatinine Ratio 11.8 7.0 - 25.0    eGFR Non African Amer 18 (L) >60 mL/min/1.73   POC Glucose Once    Collection Time: 04/12/21  6:27 AM    Specimen: Blood   Result Value Ref Range    Glucose 151 (H) 70 - 130 mg/dL       Radiology:  Imaging Results (Last 24 Hours)     ** No results found for the last 24 hours. **             Medications have been reviewed:  Current Facility-Administered Medications   Medication  Dose Route Frequency Provider Last Rate Last Admin   • ALPRAZolam (XANAX) tablet 1 mg  1 mg Oral 4x Daily PRN Ad Weber MD   1 mg at 04/12/21 0813   • amLODIPine (NORVASC) tablet 5 mg  5 mg Oral Q24H Karl Swift MD   5 mg at 04/12/21 1002   • aspirin EC tablet 81 mg  81 mg Oral Daily Ad Weber MD   81 mg at 04/11/21 0752   • atorvastatin (LIPITOR) tablet 10 mg  10 mg Oral Nightly Ad Weber MD   10 mg at 04/11/21 2001   • carvedilol (COREG) tablet 3.125 mg  3.125 mg Oral BID With Meals Ad Weber MD   3.125 mg at 04/12/21 1002   • heparin (porcine) injection 4,000 Units  4,000 Units Intracatheter PRN Ad Weber MD   4,000 Units at 04/10/21 1508   • hydrALAZINE (APRESOLINE) injection 10 mg  10 mg Intravenous Q4H PRN Karl Swift MD   10 mg at 04/10/21 2124   • hydrALAZINE (APRESOLINE) tablet 100 mg  100 mg Oral TID Ad Weber MD   100 mg at 04/11/21 2001   • insulin glargine (LANTUS, SEMGLEE) injection 15 Units  15 Units Subcutaneous Nightly Ad eWber MD   15 Units at 04/11/21 2220   • insulin lispro (ADMELOG) injection 0-7 Units  0-7 Units Subcutaneous TID AC Ad Weber MD   2 Units at 04/12/21 0813   • insulin lispro (ADMELOG) injection 5 Units  5 Units Subcutaneous TID With Meals Ad Weber MD   5 Units at 04/12/21 0813   • lisinopril (PRINIVIL,ZESTRIL) tablet 40 mg  40 mg Oral Q24H Yohan Murrell MD       • melatonin tablet 1 mg  1 mg Oral Nightly PRN Ad Weber MD   1 mg at 04/09/21 2314   • oxyCODONE-acetaminophen (PERCOCET)  MG per tablet 1 tablet  1 tablet Oral Q6H PRN Ad Weber MD   1 tablet at 04/12/21 0633   • pantoprazole (PROTONIX) EC tablet 40 mg  40 mg Oral Daily Ad Weber MD   40 mg at 04/11/21 0751   • sodium chloride 0.9 % flush 10 mL  10 mL Intravenous PRN Ad Weber MD   10 mL at 04/10/21 1938   • tiZANidine (ZANAFLEX)  tablet 4 mg  4 mg Oral Q6H PRN Ad Weber MD           ASSESSMENT:  Progression to ESRD, initiated on HD  Critical hyperkalemia, improved with dialysis  Type 2 diabetes mellitus with nephropathy  Anemia of CKD  Chronic hypertension, above goal but improved  Hyperlipidemia        PLAN:   Dialysis today and continue Monday Wednesday Friday schedule  His outpatient dialysis has been set up, next HD 12 noon on Wednesday  Potassium improved  BP control improved, continue current regimen  UF with dialysis as tolerated    Appreciate vascular assistance with tunneled dialysis catheter placement  Plan is for outpatient AV fistula creation    Stable for discharge after dialysis today.  Follow-up at outpatient dialysis on Wednesday      Yohan Murrell MD   [facility]   Office phone number: [phone]  Answering service phone number: [phone]    04/12/21  12:18 EDT    Dictation performed using Dragon dictation software

## 2021-04-12 NOTE — DISCHARGE PLACEMENT REQUEST
"Selvin Tian (60 y.o. Male)     Date of Birth Social Security Number Address Home Phone MRN    1960  [address] [facility] [address] [phone] [id number]    [facility] Mar[facility] Status          None        Admission Date Admission Type Admitting Provider Attending Provider Department, Room/Bed    4/8/21 Emergency Karl Swift MD Ahmed, Aftab, MD [address], E669/1    Discharge Date Discharge Disposition Discharge Destination         Home or Self Care              Attending Provider: Karl Swift MD    Allergies: Lipitor [Atorvastatin Calcium], Gabapentin, Morphine And Related, Fentanyl, Ibuprofen, Latex, Penicillins    Isolation: None   Infection: MRSA/History Only (12/20/17)   Code Status: CPR    Ht: 175.3 cm (69\")   Wt: 81.7 kg (180 lb 1.9 oz)    Admission Cmt: None   Principal Problem: Acute renal failure with acute tubular necrosis superimposed on stage 4 chronic kidney disease (CMS/HCC) [N17.0,N18.4] More...                 Active Insurance as of 4/8/2021     Primary Coverage     Payor Plan Insurance Group Employer/Plan Group    ANTHEM MEDICARE REPLACEMENT ANTHEM MEDICARE ADVANTAGE [id number]     Payor Plan Address Payor Plan Phone Number Payor Plan Fax Number Effective Dates    [address] [phone]  7/1/2019 - None Entered    [facility] [id number]       Subscriber Name Subscriber Birth Date Member ID       SELVIN TIAN 1960 [id number]                 Emergency Contacts      (Rel.) Home Phone Work Phone Mobile Phone    Samantha Tian (Spouse) [phone] -- --              "

## 2021-04-12 NOTE — PROGRESS NOTES
Continued Stay Note  [facility]     Patient Name: Angelo Brown  MRN: [id number]  Today's Date: 4/12/2021    Admit Date: 4/8/2021    Discharge Plan     Row Name 04/12/21 1549       Plan    Plan  Plan home with outpatient dialysis arranged at [facility].  LISE Adams RN    Patient/Family in Agreement with Plan  yes    Plan Comments  Pt has dialysis arranged at [facility].   Pt's first appointment is on 4/14.   Patient information sheet given to pt.   Plan home with outpatient dialysis arranged at [facility].  LISE Adams RN        Discharge Codes    No documentation.       Expected Discharge Date and Time     Expected Discharge Date Expected Discharge Time    Apr 12, 2021             Kyleigh Adams, RN

## 2021-04-12 NOTE — NURSING NOTE
Called and spoke with Tirso at [facility] for pt's dialysis orders. Tirso stated that pt's orders have already been called in and a nurse is on their way.

## 2021-04-12 NOTE — PLAN OF CARE
Goal Outcome Evaluation:  Plan of Care Reviewed With: patient  Progress: no change   Medicated for low back pain, with relief. Medicated for anxiety, with relief. Pt denies SOA and nausea. A&Ox4. Medications administered per orders. Pt refusing bed alarm, states that he will call out for assistance when getting up. RN educated pt about why pt is an increased fall risk, pt states he understands. Self-turning in bed. VSS. No s/s of distress at this time. Will continue to monitor.

## 2021-04-12 NOTE — PROGRESS NOTES
"Daily progress note    Chief complaint  Doing better  No new complaints  Wants to go home  Family at bedside    History of present illness  White male with history of diabetes hypertension hyperlipidemia hepatitis C and chronic kidney disease stage IV presented to [facility] emergency room with worsening kidney function and high potassium.  Patient has no specific complaints or symptoms.  Patient does feel weak and tired.  Patient denies any fever cough chest pain shortness of breath abdominal pain nausea vomiting diarrhea.  Patient evaluated in ER found to have hyperkalemia and worsening kidney function need to start hemodialysis admit for management.    REVIEW OF SYSTEMS  Unremarkable     PHYSICAL EXAM   Blood pressure 151/88, pulse 64, temperature 97.8 °F (36.6 °C), temperature source Oral, resp. rate 16, height 175.3 cm (69\"), weight 81.7 kg (180 lb 1.9 oz), SpO2 98 %.    GENERAL: not distressed  HENT: nares patent  EYES: no scleral icterus  CV: regular rhythm, regular rate  RESPIRATORY: normal effort, clear to auscultation bilaterally  ABDOMEN: soft, nontender bowel sounds positive  MUSCULOSKELETAL: no deformity  NEURO: alert, moves all extremities, follows commands  SKIN: warm, dry     LAB RESULTS  Lab Results (last 24 hours)     Procedure Component Value Units Date/Time    POC Glucose Once [[id number]]  (Normal) Collected: 04/12/21 1334    Specimen: Blood Updated: 04/12/21 1342     Glucose 104 mg/dL     Renal Function Panel [[id number]]  (Abnormal) Collected: 04/12/21 0605    Specimen: Blood Updated: 04/12/21 0651     Glucose 164 mg/dL      BUN 42 mg/dL      Creatinine 3.57 mg/dL      Sodium 139 mmol/L      Potassium 4.5 mmol/L      Chloride 105 mmol/L      CO2 25.1 mmol/L      Calcium 8.0 mg/dL      Albumin 3.60 g/dL      Phosphorus 4.6 mg/dL      Anion Gap 8.9 mmol/L      BUN/Creatinine Ratio 11.8     eGFR Non African Amer 18 mL/min/1.73     Narrative:      GFR Normal >60  Chronic Kidney Disease " <60  Kidney Failure <15      POC Glucose Once [[id number]]  (Abnormal) Collected: 04/12/21 0627    Specimen: Blood Updated: 04/12/21 0631     Glucose 151 mg/dL     POC Glucose Once [[id number]]  (Normal) Collected: 04/11/21 2118    Specimen: Blood Updated: 04/11/21 2120     Glucose 78 mg/dL         Imaging Results (Last 24 Hours)     ** No results found for the last 24 hours. **           ECG 12 Lead               HEART RATE= 56  bpm  RR Interval= 1068  ms  CA Interval= 197  ms  P Horizontal Axis= 24  deg  P Front Axis= 23  deg  QRSD Interval= 110  ms  QT Interval= 444  ms  QRS Axis= 70  deg  T Wave Axis= -36  deg  - ABNORMAL ECG -  Sinus rhythm  Nonspecific T abnormalities, inferior leads             Current Facility-Administered Medications:   •  ALPRAZolam (XANAX) tablet 1 mg, 1 mg, Oral, 4x Daily PRN, Ad Weber MD, 1 mg at 04/12/21 0813  •  amLODIPine (NORVASC) tablet 5 mg, 5 mg, Oral, Q24H, Karl Swift MD, 5 mg at 04/12/21 1002  •  aspirin EC tablet 81 mg, 81 mg, Oral, Daily, Ad Weber MD, 81 mg at 04/12/21 1412  •  atorvastatin (LIPITOR) tablet 10 mg, 10 mg, Oral, Nightly, Ad Weber MD, 10 mg at 04/11/21 2001  •  carvedilol (COREG) tablet 3.125 mg, 3.125 mg, Oral, BID With Meals, Ad Weber MD, 3.125 mg at 04/12/21 1002  •  heparin (porcine) injection 4,000 Units, 4,000 Units, Intracatheter, PRN, Ad Weber MD, 4,000 Units at 04/10/21 1508  •  hydrALAZINE (APRESOLINE) injection 10 mg, 10 mg, Intravenous, Q4H PRN, Karl Swift MD, 10 mg at 04/10/21 2124  •  hydrALAZINE (APRESOLINE) tablet 100 mg, 100 mg, Oral, TID, Ad Weber MD, 100 mg at 04/12/21 1412  •  insulin glargine (LANTUS, SEMGLEE) injection 15 Units, 15 Units, Subcutaneous, Nightly, Ad Weber MD, 15 Units at 04/11/21 2220  •  insulin lispro (ADMELOG) injection 0-7 Units, 0-7 Units, Subcutaneous, TID AC, Ad Weber MD, 2 Units at 04/12/21 0813  •   insulin lispro (ADMELOG) injection 5 Units, 5 Units, Subcutaneous, TID With Meals, Ad Weber MD, 5 Units at 04/12/21 1413  •  lisinopril (PRINIVIL,ZESTRIL) tablet 40 mg, 40 mg, Oral, Q24H, Yohan Murrell MD, 40 mg at 04/12/21 1412  •  melatonin tablet 1 mg, 1 mg, Oral, Nightly PRN, Ad Weber MD, 1 mg at 04/09/21 2314  •  oxyCODONE-acetaminophen (PERCOCET)  MG per tablet 1 tablet, 1 tablet, Oral, Q6H PRN, Ad Weber MD, 1 tablet at 04/12/21 0633  •  pantoprazole (PROTONIX) EC tablet 40 mg, 40 mg, Oral, Daily, Ad Weber MD, 40 mg at 04/12/21 1413  •  [COMPLETED] Insert peripheral IV, , , Once **AND** sodium chloride 0.9 % flush 10 mL, 10 mL, Intravenous, PRN, Ad Weber MD, 10 mL at 04/10/21 1938  •  tiZANidine (ZANAFLEX) tablet 4 mg, 4 mg, Oral, Q6H PRN, Ad Weber MD     ASSESSMENT  End-stage renal disease on hemodialysis  Hyperkalemia resolved  Status post hemodialysis catheter placement  Insulin-dependent diabetes mellitus  Hypertension  Hyperlipidemia  Gastroesophageal reflux disease    PLAN  Discharge home after hemodialysis with outpatient hemodialysis Monday Wednesday Friday  Discharge summary dictated    ARIEL MCGOVERN MD

## 2021-04-12 NOTE — PROGRESS NOTES
Continued Stay Note  [facility]     Patient Name: Angelo Brown  MRN: [id number]  Today's Date: 4/12/2021    Admit Date: 4/8/2021    Discharge Plan     Row Name 04/12/21 0821       Plan    Plan  Plan home with outpatient dialysis arranged at [facility].   LISE Adams RN    Patient/Family in Agreement with Plan  yes    Plan Comments  Dialysis Flow sheet faxed to [facility] that was not available on 4/9.   Plan home with outpatient dialysis at [facility] on Almyra Penny Adams RN        Discharge Codes    No documentation.             Kyleigh Adams, RN

## 2021-04-13 ENCOUNTER — READMISSION MANAGEMENT (OUTPATIENT)
Dept: CALL CENTER | Facility: HOSPITAL | Age: 61
End: 2021-04-13

## 2021-04-13 NOTE — OUTREACH NOTE
Prep Survey      Responses   [facility] [place] facility patient discharged from?  [place]   Is LACE score < 7 ?  No   Emergency Room discharge w/ pulse ox?  No   Eligibility  Readm Mgmt   Discharge diagnosis  End-stage renal disease on hemodialysis,  hemodialysis catheter insertion   Does the patient have one of the following disease processes/diagnoses(primary or secondary)?  General Surgery   Does the patient have Home health ordered?  No   Is there a DME ordered?  No   General alerts for this patient  dialysis pt   Prep survey completed?  Yes          Jaja Santizo RN

## 2021-04-13 NOTE — PROGRESS NOTES
Case Management Discharge Note      Final Note: DC home with new HD arranged at [facility]         Selected Continued Care - Discharged on 4/12/2021 Admission date: 4/8/2021 - Discharge disposition: Home or Self Care    Destination    No services have been selected for the patient.              Durable Medical Equipment    No services have been selected for the patient.              Dialysis/Infusion Coordination complete    Service Provider Selected Services Address Phone Fax Patient Preferred    [facility]  Dialysis [address] [address] [phone] --          Home Medical Care    No services have been selected for the patient.              Therapy    No services have been selected for the patient.              Community Resources    No services have been selected for the patient.                       Final Discharge Disposition Code: 01 - home or self-care

## 2021-04-16 ENCOUNTER — READMISSION MANAGEMENT (OUTPATIENT)
Dept: CALL CENTER | Facility: HOSPITAL | Age: 61
End: 2021-04-16

## 2021-04-16 NOTE — OUTREACH NOTE
General Surgery Week 1 Survey      Responses   [facility] patient discharged from?  [place]   Does the patient have one of the following disease processes/diagnoses(primary or secondary)?  General Surgery   Week 1 attempt successful?  Yes   Call start time  1028   Call end time  1037   Meds reviewed with patient/caregiver?  Yes   Is the patient having any side effects they believe may be caused by any medication additions or changes?  No   Does the patient have all medications related to this admission filled (includes all antibiotics, pain medications, etc.)  Yes   Is the patient taking all medications as directed (includes completed medication regime)?  Yes   Does the patient have a follow up appointment scheduled with their surgeon?  Yes   Has the patient kept scheduled appointments due by today?  N/A   Psychosocial issues?  No   Did the patient receive a copy of their discharge instructions?  Yes   Nursing interventions  Reviewed instructions with patient   What is the patient's perception of their health status since discharge?  Improving   Nursing interventions  Nurse provided patient education   Is the patient /caregiver able to teach back basic post-op care?  Practice 'cough and deep breath', Take showers only when approved by MD-sponge bathe until then, No tub bath, swimming, or hot tub until instructed by MD, Keep incision areas clean,dry and protected, Lifting as instructed by MD in discharge instructions, Drive as instructed by MD in discharge instructions   Is the patient/caregiver able to teach back signs and symptoms of incisional infection?  Increased redness, swelling or pain at the incisonal site, Increased drainage or bleeding, Incisional warmth, Pus or odor from incision, Fever   Is the patient/caregiver able to teach back steps to recovery at home?  Eat a well-balance diet, Rest and rebuild strength, gradually increase activity, Set small, achievable goals for return to baseline health    If the patient is a current smoker, are they able to teach back resources for cessation?  Not a smoker   Is the patient/caregiver able to teach back the hierarchy of who to call/visit for symptoms/problems? PCP, Specialist, Home health nurse, Urgent Care, ED, 911  Yes   Week 1 call completed?  Yes   Wrap up additional comments  -- [Pt reports eyes are puffy and feels r/t Norvasc started in hospital. Pt was on Norvasc in past,  had same reaction and cardioligist stopped. Advised to call cardiologist about Norvasc,  surgical site has no complications. No questions/concerns.]          Preeti Yoder RN

## 2021-04-23 ENCOUNTER — READMISSION MANAGEMENT (OUTPATIENT)
Dept: CALL CENTER | Facility: HOSPITAL | Age: 61
End: 2021-04-23

## 2021-04-23 NOTE — OUTREACH NOTE
General Surgery Week 2 Survey      Responses   [facility] patient discharged from?  [place]   Does the patient have one of the following disease processes/diagnoses(primary or secondary)?  General Surgery   Week 2 attempt successful?  No   Unsuccessful attempts  Attempt 1          Bhumika Victor RN

## 2021-04-27 ENCOUNTER — READMISSION MANAGEMENT (OUTPATIENT)
Dept: CALL CENTER | Facility: HOSPITAL | Age: 61
End: 2021-04-27

## 2021-04-27 NOTE — OUTREACH NOTE
General Surgery Week 2 Survey      Responses   [facility] patient discharged from?  [place]   Does the patient have one of the following disease processes/diagnoses(primary or secondary)?  General Surgery   Week 2 attempt successful?  No   Unsuccessful attempts  Attempt 2          Bhumika Victor RN

## 2021-04-28 ENCOUNTER — READMISSION MANAGEMENT (OUTPATIENT)
Dept: CALL CENTER | Facility: HOSPITAL | Age: 61
End: 2021-04-28

## 2021-04-28 ENCOUNTER — HOSPITAL ENCOUNTER (OUTPATIENT)
Facility: HOSPITAL | Age: 61
Discharge: HOME | End: 2021-04-29
Attending: EMERGENCY MEDICINE | Admitting: SURGERY

## 2021-04-28 ENCOUNTER — APPOINTMENT (OUTPATIENT)
Dept: GENERAL RADIOLOGY | Facility: HOSPITAL | Age: 61
End: 2021-04-28

## 2021-04-28 DIAGNOSIS — Z99.2 DEPENDENCE ON RENAL DIALYSIS (HCC): ICD-10-CM

## 2021-04-28 DIAGNOSIS — T82.41XD HEMODIALYSIS CATHETER DYSFUNCTION, SUBSEQUENT ENCOUNTER (HCC): Primary | ICD-10-CM

## 2021-04-28 DIAGNOSIS — N18.6 ESRD (END STAGE RENAL DISEASE) (HCC): ICD-10-CM

## 2021-04-28 DIAGNOSIS — T82.9XXA COMPLICATION OF VASCULAR ACCESS FOR DIALYSIS, INITIAL ENCOUNTER: ICD-10-CM

## 2021-04-28 PROBLEM — Z86.73 HISTORY OF CVA (CEREBROVASCULAR ACCIDENT): Status: ACTIVE | Noted: 2021-04-28

## 2021-04-28 PROBLEM — T82.41XA HEMODIALYSIS CATHETER DYSFUNCTION: Status: ACTIVE | Noted: 2021-04-28

## 2021-04-28 PROBLEM — E11.65 TYPE 2 DIABETES MELLITUS WITH HYPERGLYCEMIA, WITHOUT LONG-TERM CURRENT USE OF INSULIN (HCC): Status: ACTIVE | Noted: 2021-04-28

## 2021-04-28 PROBLEM — E78.5 HLD (HYPERLIPIDEMIA): Status: ACTIVE | Noted: 2021-04-28

## 2021-04-28 PROBLEM — I50.32 CHRONIC DIASTOLIC CONGESTIVE HEART FAILURE: Status: ACTIVE | Noted: 2021-04-28

## 2021-04-28 PROBLEM — I10 HTN (HYPERTENSION): Status: ACTIVE | Noted: 2021-04-28

## 2021-04-28 PROBLEM — K21.9 GERD (GASTROESOPHAGEAL REFLUX DISEASE): Status: ACTIVE | Noted: 2021-04-28

## 2021-04-28 PROBLEM — I25.10 CAD (CORONARY ARTERY DISEASE): Status: ACTIVE | Noted: 2021-04-28

## 2021-04-28 LAB
ALBUMIN SERPL-MCNC: 4.2 G/DL (ref 3.5–5.2)
ALBUMIN SERPL-MCNC: 4.2 G/DL (ref 3.5–5.2)
ALBUMIN/GLOB SERPL: 1.5 G/DL
ALP SERPL-CCNC: 58 U/L (ref 39–117)
ALT SERPL W P-5'-P-CCNC: 7 U/L (ref 1–41)
ANION GAP SERPL CALCULATED.3IONS-SCNC: 15.2 MMOL/L (ref 5–15)
ANION GAP SERPL CALCULATED.3IONS-SCNC: 15.2 MMOL/L (ref 5–15)
AST SERPL-CCNC: 11 U/L (ref 1–40)
BASOPHILS # BLD MANUAL: 0.12 10*3/MM3 (ref 0–0.2)
BASOPHILS NFR BLD AUTO: 2 % (ref 0–1.5)
BILIRUB SERPL-MCNC: 0.2 MG/DL (ref 0–1.2)
BUN SERPL-MCNC: 60 MG/DL (ref 6–20)
BUN SERPL-MCNC: 62 MG/DL (ref 6–20)
BUN/CREAT SERPL: 10 (ref 7–25)
BUN/CREAT SERPL: 10.4 (ref 7–25)
CALCIUM SPEC-SCNC: 9.3 MG/DL (ref 8.6–10.5)
CALCIUM SPEC-SCNC: 9.4 MG/DL (ref 8.6–10.5)
CHLORIDE SERPL-SCNC: 102 MMOL/L (ref 98–107)
CHLORIDE SERPL-SCNC: 102 MMOL/L (ref 98–107)
CO2 SERPL-SCNC: 22.8 MMOL/L (ref 22–29)
CO2 SERPL-SCNC: 23.8 MMOL/L (ref 22–29)
CREAT SERPL-MCNC: 5.99 MG/DL (ref 0.76–1.27)
CREAT SERPL-MCNC: 6 MG/DL (ref 0.76–1.27)
DEPRECATED RDW RBC AUTO: 45.7 FL (ref 37–54)
DEPRECATED RDW RBC AUTO: 46.6 FL (ref 37–54)
EOSINOPHIL # BLD MANUAL: 0.36 10*3/MM3 (ref 0–0.4)
EOSINOPHIL NFR BLD MANUAL: 6.1 % (ref 0.3–6.2)
ERYTHROCYTE [DISTWIDTH] IN BLOOD BY AUTOMATED COUNT: 13.6 % (ref 12.3–15.4)
ERYTHROCYTE [DISTWIDTH] IN BLOOD BY AUTOMATED COUNT: 13.8 % (ref 12.3–15.4)
GFR SERPL CREATININE-BSD FRML MDRD: 10 ML/MIN/1.73
GFR SERPL CREATININE-BSD FRML MDRD: 10 ML/MIN/1.73
GFR SERPL CREATININE-BSD FRML MDRD: ABNORMAL ML/MIN/{1.73_M2}
GFR SERPL CREATININE-BSD FRML MDRD: ABNORMAL ML/MIN/{1.73_M2}
GLOBULIN UR ELPH-MCNC: 2.8 GM/DL
GLUCOSE BLDC GLUCOMTR-MCNC: 163 MG/DL (ref 70–130)
GLUCOSE SERPL-MCNC: 149 MG/DL (ref 65–99)
GLUCOSE SERPL-MCNC: 150 MG/DL (ref 65–99)
HCT VFR BLD AUTO: 26.8 % (ref 37.5–51)
HCT VFR BLD AUTO: 30.1 % (ref 37.5–51)
HGB BLD-MCNC: 8.9 G/DL (ref 13–17.7)
HGB BLD-MCNC: 9.6 G/DL (ref 13–17.7)
LYMPHOCYTES # BLD MANUAL: 1.09 10*3/MM3 (ref 0.7–3.1)
LYMPHOCYTES NFR BLD MANUAL: 18.4 % (ref 19.6–45.3)
LYMPHOCYTES NFR BLD MANUAL: 4.1 % (ref 5–12)
MCH RBC QN AUTO: 29.7 PG (ref 26.6–33)
MCH RBC QN AUTO: 30.5 PG (ref 26.6–33)
MCHC RBC AUTO-ENTMCNC: 31.9 G/DL (ref 31.5–35.7)
MCHC RBC AUTO-ENTMCNC: 33.2 G/DL (ref 31.5–35.7)
MCV RBC AUTO: 91.8 FL (ref 79–97)
MCV RBC AUTO: 93.2 FL (ref 79–97)
MONOCYTES # BLD AUTO: 0.24 10*3/MM3 (ref 0.1–0.9)
NEUTROPHILS # BLD AUTO: 4.13 10*3/MM3 (ref 1.7–7)
NEUTROPHILS NFR BLD MANUAL: 69.4 % (ref 42.7–76)
PHOSPHATE SERPL-MCNC: 6.3 MG/DL (ref 2.5–4.5)
PLAT MORPH BLD: NORMAL
PLATELET # BLD AUTO: 115 10*3/MM3 (ref 140–450)
PLATELET # BLD AUTO: 115 10*3/MM3 (ref 140–450)
PMV BLD AUTO: 11.3 FL (ref 6–12)
PMV BLD AUTO: 11.5 FL (ref 6–12)
POTASSIUM SERPL-SCNC: 4.9 MMOL/L (ref 3.5–5.2)
POTASSIUM SERPL-SCNC: 4.9 MMOL/L (ref 3.5–5.2)
PROT SERPL-MCNC: 7 G/DL (ref 6–8.5)
QT INTERVAL: 435 MS
RBC # BLD AUTO: 2.92 10*6/MM3 (ref 4.14–5.8)
RBC # BLD AUTO: 3.23 10*6/MM3 (ref 4.14–5.8)
RBC MORPH BLD: NORMAL
SARS-COV-2 ORF1AB RESP QL NAA+PROBE: NOT DETECTED
SODIUM SERPL-SCNC: 140 MMOL/L (ref 136–145)
SODIUM SERPL-SCNC: 141 MMOL/L (ref 136–145)
WBC # BLD AUTO: 5.67 10*3/MM3 (ref 3.4–10.8)
WBC # BLD AUTO: 5.95 10*3/MM3 (ref 3.4–10.8)
WBC MORPH BLD: NORMAL

## 2021-04-28 PROCEDURE — 36415 COLL VENOUS BLD VENIPUNCTURE: CPT | Performed by: SURGERY

## 2021-04-28 PROCEDURE — 71045 X-RAY EXAM CHEST 1 VIEW: CPT

## 2021-04-28 PROCEDURE — 93005 ELECTROCARDIOGRAM TRACING: CPT | Performed by: EMERGENCY MEDICINE

## 2021-04-28 PROCEDURE — G0378 HOSPITAL OBSERVATION PER HR: HCPCS

## 2021-04-28 PROCEDURE — C9803 HOPD COVID-19 SPEC COLLECT: HCPCS

## 2021-04-28 PROCEDURE — 80053 COMPREHEN METABOLIC PANEL: CPT | Performed by: EMERGENCY MEDICINE

## 2021-04-28 PROCEDURE — 85027 COMPLETE CBC AUTOMATED: CPT | Performed by: SURGERY

## 2021-04-28 PROCEDURE — 93010 ELECTROCARDIOGRAM REPORT: CPT | Performed by: INTERNAL MEDICINE

## 2021-04-28 PROCEDURE — 82962 GLUCOSE BLOOD TEST: CPT

## 2021-04-28 PROCEDURE — 99284 EMERGENCY DEPT VISIT MOD MDM: CPT

## 2021-04-28 PROCEDURE — 85025 COMPLETE CBC W/AUTO DIFF WBC: CPT | Performed by: EMERGENCY MEDICINE

## 2021-04-28 PROCEDURE — 80069 RENAL FUNCTION PANEL: CPT | Performed by: SURGERY

## 2021-04-28 PROCEDURE — U0004 COV-19 TEST NON-CDC HGH THRU: HCPCS | Performed by: SURGERY

## 2021-04-28 PROCEDURE — 85007 BL SMEAR W/DIFF WBC COUNT: CPT | Performed by: EMERGENCY MEDICINE

## 2021-04-28 RX ORDER — HYDRALAZINE HYDROCHLORIDE 50 MG/1
100 TABLET, FILM COATED ORAL 2 TIMES DAILY
Status: DISCONTINUED | OUTPATIENT
Start: 2021-04-28 | End: 2021-04-29 | Stop reason: HOSPADM

## 2021-04-28 RX ORDER — LISINOPRIL 40 MG/1
40 TABLET ORAL
Status: DISCONTINUED | OUTPATIENT
Start: 2021-04-29 | End: 2021-04-29 | Stop reason: HOSPADM

## 2021-04-28 RX ORDER — TIZANIDINE 4 MG/1
4 TABLET ORAL EVERY 6 HOURS PRN
Status: DISCONTINUED | OUTPATIENT
Start: 2021-04-28 | End: 2021-04-29 | Stop reason: HOSPADM

## 2021-04-28 RX ORDER — ONDANSETRON 2 MG/ML
4 INJECTION INTRAMUSCULAR; INTRAVENOUS EVERY 6 HOURS PRN
Status: DISCONTINUED | OUTPATIENT
Start: 2021-04-28 | End: 2021-04-29 | Stop reason: HOSPADM

## 2021-04-28 RX ORDER — INSULIN GLARGINE 100 [IU]/ML
10 INJECTION, SOLUTION SUBCUTANEOUS NIGHTLY
Status: DISCONTINUED | OUTPATIENT
Start: 2021-04-28 | End: 2021-04-29 | Stop reason: HOSPADM

## 2021-04-28 RX ORDER — NITROGLYCERIN 0.4 MG/1
0.4 TABLET SUBLINGUAL
Status: DISCONTINUED | OUTPATIENT
Start: 2021-04-28 | End: 2021-04-29 | Stop reason: HOSPADM

## 2021-04-28 RX ORDER — ACETAMINOPHEN 325 MG/1
650 TABLET ORAL EVERY 4 HOURS PRN
Status: DISCONTINUED | OUTPATIENT
Start: 2021-04-28 | End: 2021-04-29 | Stop reason: HOSPADM

## 2021-04-28 RX ORDER — AMLODIPINE BESYLATE 10 MG/1
10 TABLET ORAL
Status: DISCONTINUED | OUTPATIENT
Start: 2021-04-29 | End: 2021-04-29 | Stop reason: HOSPADM

## 2021-04-28 RX ORDER — ALPRAZOLAM 0.5 MG/1
1 TABLET ORAL 4 TIMES DAILY PRN
Status: DISCONTINUED | OUTPATIENT
Start: 2021-04-28 | End: 2021-04-29 | Stop reason: HOSPADM

## 2021-04-28 RX ORDER — CARVEDILOL 3.12 MG/1
3.12 TABLET ORAL 2 TIMES DAILY WITH MEALS
Status: DISCONTINUED | OUTPATIENT
Start: 2021-04-28 | End: 2021-04-29 | Stop reason: HOSPADM

## 2021-04-28 RX ORDER — SODIUM CHLORIDE 0.9 % (FLUSH) 0.9 %
10 SYRINGE (ML) INJECTION AS NEEDED
Status: DISCONTINUED | OUTPATIENT
Start: 2021-04-28 | End: 2021-04-29 | Stop reason: HOSPADM

## 2021-04-28 RX ORDER — PANTOPRAZOLE SODIUM 40 MG/1
40 TABLET, DELAYED RELEASE ORAL DAILY
Status: DISCONTINUED | OUTPATIENT
Start: 2021-04-29 | End: 2021-04-29 | Stop reason: HOSPADM

## 2021-04-28 RX ORDER — ASPIRIN 81 MG/1
81 TABLET ORAL DAILY
Status: DISCONTINUED | OUTPATIENT
Start: 2021-04-29 | End: 2021-04-29 | Stop reason: HOSPADM

## 2021-04-28 RX ORDER — ONDANSETRON 4 MG/1
4 TABLET, FILM COATED ORAL EVERY 6 HOURS PRN
Status: DISCONTINUED | OUTPATIENT
Start: 2021-04-28 | End: 2021-04-29 | Stop reason: HOSPADM

## 2021-04-28 RX ORDER — OXYCODONE AND ACETAMINOPHEN 10; 325 MG/1; MG/1
1 TABLET ORAL EVERY 6 HOURS PRN
Status: DISCONTINUED | OUTPATIENT
Start: 2021-04-28 | End: 2021-04-29 | Stop reason: HOSPADM

## 2021-04-28 RX ADMIN — CARVEDILOL 3.12 MG: 3.12 TABLET, FILM COATED ORAL at 22:25

## 2021-04-28 RX ADMIN — ALPRAZOLAM 1 MG: 0.5 TABLET ORAL at 22:25

## 2021-04-28 RX ADMIN — OXYCODONE HYDROCHLORIDE AND ACETAMINOPHEN 1 TABLET: 10; 325 TABLET ORAL at 22:25

## 2021-04-28 RX ADMIN — HYDRALAZINE HYDROCHLORIDE 100 MG: 50 TABLET, FILM COATED ORAL at 22:25

## 2021-04-29 ENCOUNTER — ANESTHESIA EVENT (OUTPATIENT)
Dept: PERIOP | Facility: HOSPITAL | Age: 61
End: 2021-04-29

## 2021-04-29 ENCOUNTER — ANESTHESIA (OUTPATIENT)
Dept: PERIOP | Facility: HOSPITAL | Age: 61
End: 2021-04-29

## 2021-04-29 ENCOUNTER — APPOINTMENT (OUTPATIENT)
Dept: GENERAL RADIOLOGY | Facility: HOSPITAL | Age: 61
End: 2021-04-29

## 2021-04-29 VITALS
BODY MASS INDEX: 25.96 KG/M2 | WEIGHT: 175.3 LBS | RESPIRATION RATE: 16 BRPM | HEART RATE: 100 BPM | DIASTOLIC BLOOD PRESSURE: 76 MMHG | OXYGEN SATURATION: 96 % | HEIGHT: 69 IN | SYSTOLIC BLOOD PRESSURE: 122 MMHG | TEMPERATURE: 99.4 F

## 2021-04-29 LAB
ALBUMIN SERPL-MCNC: 3.9 G/DL (ref 3.5–5.2)
ANION GAP SERPL CALCULATED.3IONS-SCNC: 13.8 MMOL/L (ref 5–15)
BUN SERPL-MCNC: 64 MG/DL (ref 6–20)
BUN/CREAT SERPL: 11.3 (ref 7–25)
CALCIUM SPEC-SCNC: 9 MG/DL (ref 8.6–10.5)
CHLORIDE SERPL-SCNC: 104 MMOL/L (ref 98–107)
CO2 SERPL-SCNC: 23.2 MMOL/L (ref 22–29)
CREAT SERPL-MCNC: 5.67 MG/DL (ref 0.76–1.27)
DEPRECATED RDW RBC AUTO: 46.3 FL (ref 37–54)
ERYTHROCYTE [DISTWIDTH] IN BLOOD BY AUTOMATED COUNT: 13.8 % (ref 12.3–15.4)
GFR SERPL CREATININE-BSD FRML MDRD: 10 ML/MIN/1.73
GFR SERPL CREATININE-BSD FRML MDRD: ABNORMAL ML/MIN/{1.73_M2}
GLUCOSE BLDC GLUCOMTR-MCNC: 173 MG/DL (ref 70–130)
GLUCOSE BLDC GLUCOMTR-MCNC: 192 MG/DL (ref 70–130)
GLUCOSE SERPL-MCNC: 183 MG/DL (ref 65–99)
HCT VFR BLD AUTO: 28.5 % (ref 37.5–51)
HGB BLD-MCNC: 9.4 G/DL (ref 13–17.7)
MCH RBC QN AUTO: 30.9 PG (ref 26.6–33)
MCHC RBC AUTO-ENTMCNC: 33 G/DL (ref 31.5–35.7)
MCV RBC AUTO: 93.8 FL (ref 79–97)
PHOSPHATE SERPL-MCNC: 7.5 MG/DL (ref 2.5–4.5)
PLATELET # BLD AUTO: 113 10*3/MM3 (ref 140–450)
PMV BLD AUTO: 11.4 FL (ref 6–12)
POTASSIUM SERPL-SCNC: 5.2 MMOL/L (ref 3.5–5.2)
RBC # BLD AUTO: 3.04 10*6/MM3 (ref 4.14–5.8)
SODIUM SERPL-SCNC: 141 MMOL/L (ref 136–145)
WBC # BLD AUTO: 5.33 10*3/MM3 (ref 3.4–10.8)

## 2021-04-29 PROCEDURE — 75827 VEIN X-RAY CHEST: CPT

## 2021-04-29 PROCEDURE — 25010000002 HEPARIN (PORCINE) PER 1000 UNITS: Performed by: INTERNAL MEDICINE

## 2021-04-29 PROCEDURE — 85027 COMPLETE CBC AUTOMATED: CPT | Performed by: NURSE PRACTITIONER

## 2021-04-29 PROCEDURE — 25010000002 PROPOFOL 10 MG/ML EMULSION: Performed by: NURSE ANESTHETIST, CERTIFIED REGISTERED

## 2021-04-29 PROCEDURE — 25010000002 MIDAZOLAM PER 1 MG: Performed by: ANESTHESIOLOGY

## 2021-04-29 PROCEDURE — 80069 RENAL FUNCTION PANEL: CPT | Performed by: NURSE PRACTITIONER

## 2021-04-29 PROCEDURE — C1894 INTRO/SHEATH, NON-LASER: HCPCS | Performed by: SURGERY

## 2021-04-29 PROCEDURE — G0378 HOSPITAL OBSERVATION PER HR: HCPCS

## 2021-04-29 PROCEDURE — 25010000002 EPOETIN ALFA-EPBX 3000 UNIT/ML SOLUTION: Performed by: SURGERY

## 2021-04-29 PROCEDURE — 25010000002 HYDROMORPHONE PER 4 MG: Performed by: NURSE ANESTHETIST, CERTIFIED REGISTERED

## 2021-04-29 PROCEDURE — 0 IOTHALAMATE 60 % SOLUTION: Performed by: SURGERY

## 2021-04-29 PROCEDURE — 25010000002 MIDAZOLAM PER 1 MG: Performed by: NURSE ANESTHETIST, CERTIFIED REGISTERED

## 2021-04-29 PROCEDURE — 82962 GLUCOSE BLOOD TEST: CPT

## 2021-04-29 PROCEDURE — 94799 UNLISTED PULMONARY SVC/PX: CPT

## 2021-04-29 PROCEDURE — G0257 UNSCHED DIALYSIS ESRD PT HOS: HCPCS

## 2021-04-29 RX ORDER — SODIUM CHLORIDE 9 MG/ML
9 INJECTION, SOLUTION INTRAVENOUS CONTINUOUS
Status: DISCONTINUED | OUTPATIENT
Start: 2021-04-29 | End: 2021-04-29 | Stop reason: HOSPADM

## 2021-04-29 RX ORDER — FLUMAZENIL 0.1 MG/ML
0.2 INJECTION INTRAVENOUS AS NEEDED
Status: DISCONTINUED | OUTPATIENT
Start: 2021-04-29 | End: 2021-04-29 | Stop reason: HOSPADM

## 2021-04-29 RX ORDER — SODIUM CHLORIDE, SODIUM LACTATE, POTASSIUM CHLORIDE, CALCIUM CHLORIDE 600; 310; 30; 20 MG/100ML; MG/100ML; MG/100ML; MG/100ML
9 INJECTION, SOLUTION INTRAVENOUS CONTINUOUS
Status: DISCONTINUED | OUTPATIENT
Start: 2021-04-29 | End: 2021-04-29

## 2021-04-29 RX ORDER — HYDROMORPHONE HCL 110MG/55ML
PATIENT CONTROLLED ANALGESIA SYRINGE INTRAVENOUS AS NEEDED
Status: DISCONTINUED | OUTPATIENT
Start: 2021-04-29 | End: 2021-04-29 | Stop reason: SURG

## 2021-04-29 RX ORDER — HYDROCODONE BITARTRATE AND ACETAMINOPHEN 7.5; 325 MG/1; MG/1
1 TABLET ORAL ONCE AS NEEDED
Status: DISCONTINUED | OUTPATIENT
Start: 2021-04-29 | End: 2021-04-29 | Stop reason: HOSPADM

## 2021-04-29 RX ORDER — HEPARIN SODIUM 1000 [USP'U]/ML
4000 INJECTION, SOLUTION INTRAVENOUS; SUBCUTANEOUS ONCE
Status: COMPLETED | OUTPATIENT
Start: 2021-04-29 | End: 2021-04-29

## 2021-04-29 RX ORDER — LABETALOL HYDROCHLORIDE 5 MG/ML
5 INJECTION, SOLUTION INTRAVENOUS
Status: DISCONTINUED | OUTPATIENT
Start: 2021-04-29 | End: 2021-04-29 | Stop reason: HOSPADM

## 2021-04-29 RX ORDER — ONDANSETRON 2 MG/ML
4 INJECTION INTRAMUSCULAR; INTRAVENOUS ONCE AS NEEDED
Status: DISCONTINUED | OUTPATIENT
Start: 2021-04-29 | End: 2021-04-29 | Stop reason: HOSPADM

## 2021-04-29 RX ORDER — FENTANYL CITRATE 50 UG/ML
50 INJECTION, SOLUTION INTRAMUSCULAR; INTRAVENOUS
Status: DISCONTINUED | OUTPATIENT
Start: 2021-04-29 | End: 2021-04-29 | Stop reason: HOSPADM

## 2021-04-29 RX ORDER — SODIUM CHLORIDE 0.9 % (FLUSH) 0.9 %
3 SYRINGE (ML) INJECTION EVERY 12 HOURS SCHEDULED
Status: DISCONTINUED | OUTPATIENT
Start: 2021-04-29 | End: 2021-04-29 | Stop reason: HOSPADM

## 2021-04-29 RX ORDER — PROMETHAZINE HYDROCHLORIDE 25 MG/1
25 TABLET ORAL ONCE AS NEEDED
Status: DISCONTINUED | OUTPATIENT
Start: 2021-04-29 | End: 2021-04-29 | Stop reason: HOSPADM

## 2021-04-29 RX ORDER — SEVELAMER CARBONATE 800 MG/1
1600 TABLET, FILM COATED ORAL
Status: DISCONTINUED | OUTPATIENT
Start: 2021-04-29 | End: 2021-04-29 | Stop reason: HOSPADM

## 2021-04-29 RX ORDER — SODIUM CHLORIDE 9 MG/ML
9 INJECTION, SOLUTION INTRAVENOUS CONTINUOUS PRN
Status: DISCONTINUED | OUTPATIENT
Start: 2021-04-29 | End: 2021-04-29

## 2021-04-29 RX ORDER — EPHEDRINE SULFATE 50 MG/ML
5 INJECTION, SOLUTION INTRAVENOUS ONCE AS NEEDED
Status: DISCONTINUED | OUTPATIENT
Start: 2021-04-29 | End: 2021-04-29 | Stop reason: HOSPADM

## 2021-04-29 RX ORDER — LIDOCAINE HYDROCHLORIDE 10 MG/ML
0.5 INJECTION, SOLUTION EPIDURAL; INFILTRATION; INTRACAUDAL; PERINEURAL ONCE AS NEEDED
Status: DISCONTINUED | OUTPATIENT
Start: 2021-04-29 | End: 2021-04-29 | Stop reason: HOSPADM

## 2021-04-29 RX ORDER — CLINDAMYCIN PHOSPHATE 900 MG/50ML
900 INJECTION INTRAVENOUS ONCE
Status: COMPLETED | OUTPATIENT
Start: 2021-04-29 | End: 2021-04-29

## 2021-04-29 RX ORDER — PROMETHAZINE HYDROCHLORIDE 25 MG/1
25 SUPPOSITORY RECTAL ONCE AS NEEDED
Status: DISCONTINUED | OUTPATIENT
Start: 2021-04-29 | End: 2021-04-29 | Stop reason: HOSPADM

## 2021-04-29 RX ORDER — SODIUM CHLORIDE 9 MG/ML
9 INJECTION, SOLUTION INTRAVENOUS CONTINUOUS PRN
Status: DISCONTINUED | OUTPATIENT
Start: 2021-04-29 | End: 2021-04-29 | Stop reason: HOSPADM

## 2021-04-29 RX ORDER — NALOXONE HCL 0.4 MG/ML
0.2 VIAL (ML) INJECTION AS NEEDED
Status: DISCONTINUED | OUTPATIENT
Start: 2021-04-29 | End: 2021-04-29 | Stop reason: HOSPADM

## 2021-04-29 RX ORDER — SODIUM CHLORIDE 0.9 % (FLUSH) 0.9 %
3-10 SYRINGE (ML) INJECTION AS NEEDED
Status: DISCONTINUED | OUTPATIENT
Start: 2021-04-29 | End: 2021-04-29 | Stop reason: HOSPADM

## 2021-04-29 RX ORDER — FAMOTIDINE 10 MG/ML
20 INJECTION, SOLUTION INTRAVENOUS ONCE
Status: COMPLETED | OUTPATIENT
Start: 2021-04-29 | End: 2021-04-29

## 2021-04-29 RX ORDER — MIDAZOLAM HYDROCHLORIDE 1 MG/ML
INJECTION INTRAMUSCULAR; INTRAVENOUS AS NEEDED
Status: DISCONTINUED | OUTPATIENT
Start: 2021-04-29 | End: 2021-04-29 | Stop reason: SURG

## 2021-04-29 RX ORDER — MIDAZOLAM HYDROCHLORIDE 1 MG/ML
1 INJECTION INTRAMUSCULAR; INTRAVENOUS
Status: COMPLETED | OUTPATIENT
Start: 2021-04-29 | End: 2021-04-29

## 2021-04-29 RX ORDER — MIDAZOLAM HYDROCHLORIDE 1 MG/ML
2 INJECTION INTRAMUSCULAR; INTRAVENOUS
Status: COMPLETED | OUTPATIENT
Start: 2021-04-29 | End: 2021-04-29

## 2021-04-29 RX ORDER — DIPHENHYDRAMINE HYDROCHLORIDE 50 MG/ML
12.5 INJECTION INTRAMUSCULAR; INTRAVENOUS
Status: DISCONTINUED | OUTPATIENT
Start: 2021-04-29 | End: 2021-04-29 | Stop reason: HOSPADM

## 2021-04-29 RX ORDER — DIPHENHYDRAMINE HCL 25 MG
25 CAPSULE ORAL
Status: DISCONTINUED | OUTPATIENT
Start: 2021-04-29 | End: 2021-04-29 | Stop reason: HOSPADM

## 2021-04-29 RX ADMIN — LISINOPRIL 40 MG: 40 TABLET ORAL at 09:30

## 2021-04-29 RX ADMIN — CLINDAMYCIN IN 5 PERCENT DEXTROSE 900 MG: 18 INJECTION, SOLUTION INTRAVENOUS at 12:28

## 2021-04-29 RX ADMIN — OXYCODONE HYDROCHLORIDE AND ACETAMINOPHEN 1 TABLET: 10; 325 TABLET ORAL at 09:36

## 2021-04-29 RX ADMIN — AMLODIPINE BESYLATE 10 MG: 10 TABLET ORAL at 09:29

## 2021-04-29 RX ADMIN — FAMOTIDINE 20 MG: 10 INJECTION INTRAVENOUS at 11:40

## 2021-04-29 RX ADMIN — CARVEDILOL 3.12 MG: 3.12 TABLET, FILM COATED ORAL at 09:29

## 2021-04-29 RX ADMIN — PANTOPRAZOLE SODIUM 40 MG: 40 TABLET, DELAYED RELEASE ORAL at 09:30

## 2021-04-29 RX ADMIN — MIDAZOLAM 1 MG: 1 INJECTION INTRAMUSCULAR; INTRAVENOUS at 11:40

## 2021-04-29 RX ADMIN — SODIUM CHLORIDE: 9 INJECTION, SOLUTION INTRAVENOUS at 12:48

## 2021-04-29 RX ADMIN — MIDAZOLAM 2 MG: 1 INJECTION INTRAMUSCULAR; INTRAVENOUS at 12:26

## 2021-04-29 RX ADMIN — ASPIRIN 81 MG: 81 TABLET, COATED ORAL at 09:29

## 2021-04-29 RX ADMIN — HYDROMORPHONE HYDROCHLORIDE 0.25 MG: 2 INJECTION, SOLUTION INTRAMUSCULAR; INTRAVENOUS; SUBCUTANEOUS at 12:29

## 2021-04-29 RX ADMIN — HEPARIN SODIUM 4000 UNITS: 1000 INJECTION INTRAVENOUS; SUBCUTANEOUS at 18:15

## 2021-04-29 RX ADMIN — PROPOFOL 100 MCG/KG/MIN: 10 INJECTION, EMULSION INTRAVENOUS at 12:33

## 2021-04-29 RX ADMIN — MIDAZOLAM 1 MG: 1 INJECTION INTRAMUSCULAR; INTRAVENOUS at 11:26

## 2021-04-29 RX ADMIN — EPOETIN ALFA-EPBX 6000 UNITS: 3000 INJECTION, SOLUTION INTRAVENOUS; SUBCUTANEOUS at 18:15

## 2021-04-29 RX ADMIN — CLINDAMYCIN IN 5 PERCENT DEXTROSE 900 MG: 18 INJECTION, SOLUTION INTRAVENOUS at 12:05

## 2021-04-29 RX ADMIN — HYDRALAZINE HYDROCHLORIDE 100 MG: 50 TABLET, FILM COATED ORAL at 09:30

## 2021-04-29 NOTE — ANESTHESIA PREPROCEDURE EVALUATION
Anesthesia Evaluation     Patient summary reviewed and Nursing notes reviewed   no history of anesthetic complications:  NPO Solid Status: > 8 hours  NPO Liquid Status: > 2 hours           Airway   Mallampati: II  TM distance: >3 FB  Neck ROM: full  no difficulty expected  Dental - normal exam   (+) upper dentures and poor dentition    Pulmonary - negative pulmonary ROS and normal exam    breath sounds clear to auscultation  (-) shortness of breath, sleep apnea, decreased breath sounds, wheezes  Cardiovascular - normal exam  Exercise tolerance: good (4-7 METS)    ECG reviewed  Rhythm: regular  Rate: normal    (+) hypertension, past MI  >12 months, CAD, CABG, CHF , hyperlipidemia,   (-) angina, orthopnea, PND, ROBLERO, PVD      Neuro/Psych  (+) CVA (left sided weakness), headaches, psychiatric history Anxiety and Depression,     (-) seizures, neuromuscular disease, TIA, dizziness/light headedness, weakness, numbness    ROS Comment: Toxic metabolic encephalopathy  GI/Hepatic/Renal/Endo    (+)  GERD,  hepatitis C, liver disease, renal disease ARF and ESRD, diabetes mellitus,     Musculoskeletal     Abdominal  - normal exam   Substance History - negative use  (-) alcohol use, drug use     OB/GYN negative ob/gyn ROS         Other   arthritis,    history of cancer (nephrectomy) remission                      Anesthesia Plan    ASA 4     MAC   (D/W pt. MAC and possible awareness intra op.  Pt understands MAC and GA are not the same and the possibility of GA being required for failed MAC)  intravenous induction     Anesthetic plan, all risks, benefits, and alternatives have been provided, discussed and informed consent has been obtained with: patient.

## 2021-04-29 NOTE — OUTREACH NOTE
General Surgery Week 3 Survey      Responses   [facility] [place] patient discharged from?  [place]   Does the patient have one of the following disease processes/diagnoses(primary or secondary)?  General Surgery   Week 3 attempt successful?  No   Revoke  Readmitted          Loni Charlton RN

## 2021-04-29 NOTE — ANESTHESIA POSTPROCEDURE EVALUATION
"Patient: Angelo Brown    Procedure Summary     Date: 04/29/21 Room / Location: [facility] OR 03 / [facility] MAIN OR    Anesthesia Start: 1216 Anesthesia Stop: 1258    Procedure: SUPERIOR VENACAVAGRAM (N/A ) Diagnosis:       Hemodialysis catheter dysfunction, subsequent encounter (CMS/[facility])      ESRD (end stage renal disease) (CMS/[facility])      Dependence on renal dialysis (CMS/H[facility])      (Hemodialysis catheter dysfunction, subsequent encounter (CMS/[facility]) [T82.41XD])      (ESRD (end stage renal disease) (CMS/[facility]) [N18.6])      (Dependence on renal dialysis (CMS/[facility]) [Z99.2])    Surgeons: Ad Weber MD Provider: Malick Martinez MD    Anesthesia Type: MAC ASA Status: 4          Anesthesia Type: MAC    Vitals  Vitals Value Taken Time   /64 04/29/21 1330   Temp 36.4 °C (97.6 °F) 04/29/21 1320   Pulse 57 04/29/21 1332   Resp 16 04/29/21 1330   SpO2 100 % 04/29/21 1333   Vitals shown include unvalidated device data.        Post Anesthesia Care and Evaluation    Patient location during evaluation: bedside  Patient participation: complete - patient participated  Level of consciousness: awake  Pain score: 2  Pain management: adequate  Airway patency: patent  Anesthetic complications: No anesthetic complications  PONV Status: none  Cardiovascular status: acceptable  Respiratory status: acceptable  Hydration status: acceptable    Comments: /64 (BP Location: Right arm, Patient Position: Lying)   Pulse 58   Temp 36.4 °C (97.6 °F) (Oral)   Resp 16   Ht 175.3 cm (69\")   Wt 79.5 kg (175 lb 4.8 oz)   SpO2 100%   BMI 25.89 kg/m²         "

## 2021-04-30 ENCOUNTER — READMISSION MANAGEMENT (OUTPATIENT)
Dept: CALL CENTER | Facility: HOSPITAL | Age: 61
End: 2021-04-30

## 2021-04-30 NOTE — OUTREACH NOTE
Prep Survey      Responses   [facility] facility patient discharged from?  [place]   Is LACE score < 7 ?  No   Emergency Room discharge w/ pulse ox?  No   Eligibility  Readm Mgmt   Discharge diagnosis  dialysis catheter exchange   Does the patient have one of the following disease processes/diagnoses(primary or secondary)?  General Surgery   Does the patient have Home health ordered?  No   Is there a DME ordered?  Yes   What DME was ordered?  RW   Comments regarding appointments  call for apmt   Medication alerts for this patient  no change   General alerts for this patient  HD   Prep survey completed?  Yes          Sanaz Charlton RN

## 2021-05-04 ENCOUNTER — READMISSION MANAGEMENT (OUTPATIENT)
Dept: CALL CENTER | Facility: HOSPITAL | Age: 61
End: 2021-05-04

## 2021-05-04 ENCOUNTER — PRE-ADMISSION TESTING (OUTPATIENT)
Dept: PREADMISSION TESTING | Facility: HOSPITAL | Age: 61
End: 2021-05-04

## 2021-05-04 VITALS
SYSTOLIC BLOOD PRESSURE: 153 MMHG | DIASTOLIC BLOOD PRESSURE: 78 MMHG | RESPIRATION RATE: 16 BRPM | OXYGEN SATURATION: 98 % | BODY MASS INDEX: 26.41 KG/M2 | TEMPERATURE: 97.7 F | WEIGHT: 178.3 LBS | HEART RATE: 63 BPM | HEIGHT: 69 IN

## 2021-05-04 LAB — SARS-COV-2 ORF1AB RESP QL NAA+PROBE: NOT DETECTED

## 2021-05-04 PROCEDURE — C9803 HOPD COVID-19 SPEC COLLECT: HCPCS | Performed by: NURSE PRACTITIONER

## 2021-05-04 PROCEDURE — U0004 COV-19 TEST NON-CDC HGH THRU: HCPCS | Performed by: NURSE PRACTITIONER

## 2021-05-04 NOTE — PROGRESS NOTES
PT'S WIFE ASKING US TO TAKE OVER MEDS FOR THE PT BEC HE DOESN'T HAVE A PCP ANYMORE - LISINOPRIL AND AMLODIPINE. PER DR. RIBEIRO THESE MEDS NEED TO BE FILLED PER PT'S NEPHROLOGIST. ATTEMPTED TO CALL PT'S WIFE BACK AND L/M W/ DETAILS.

## 2021-05-04 NOTE — OUTREACH NOTE
General Surgery Week 1 Survey      Responses   [facility] patient discharged from?  [place]   Does the patient have one of the following disease processes/diagnoses(primary or secondary)?  General Surgery   Week 1 attempt successful?  Yes   Call start time  0818   Call end time  0826   General alerts for this patient  HD   Discharge diagnosis  dialysis catheter exchange   Person spoke with today (if not patient) and relationship  Samantha-spouse    Meds reviewed with patient/caregiver?  Yes   Is the patient having any side effects they believe may be caused by any medication additions or changes?  No   Does the patient have all medications related to this admission filled (includes all antibiotics, pain medications, etc.)  N/A   Prescription comments  Pt is running out of his amlodipine and he will need a refill on his lisinopril 40 mg. Routed refill request to oncology office.    Is the patient taking all medications as directed (includes completed medication regime)?  Yes   Does the patient have a follow up appointment scheduled with their surgeon?  No   What is preventing the patient from scheduling follow up appointments?  -- [Pt doesn't need to f/u with surgeon]   Nursing Interventions  -- [None needed]   Has the patient kept scheduled appointments due by today?  N/A   Comments  Pt does not have a PCP. Pt will have a port placed on 5/6/21   What DME was ordered?  RW   Psychosocial issues?  No   Did the patient receive a copy of their discharge instructions?  Yes   Nursing interventions  Reviewed instructions with patient   What is the patient's perception of their health status since discharge?  Improving   Nursing interventions  Nurse provided patient education   Is the patient /caregiver able to teach back basic post-op care?  Drive as instructed by MD in discharge instructions   Is the patient/caregiver able to teach back signs and symptoms of incisional infection?  Fever   Is the patient/caregiver able  to teach back steps to recovery at home?  Rest and rebuild strength, gradually increase activity, Set small, achievable goals for return to baseline health   Week 1 call completed?  Yes          Naa Moralez RN

## 2021-05-06 ENCOUNTER — ANESTHESIA EVENT (OUTPATIENT)
Dept: PERIOP | Facility: HOSPITAL | Age: 61
End: 2021-05-06

## 2021-05-06 ENCOUNTER — ANESTHESIA (OUTPATIENT)
Dept: PERIOP | Facility: HOSPITAL | Age: 61
End: 2021-05-06

## 2021-05-06 ENCOUNTER — HOSPITAL ENCOUNTER (OUTPATIENT)
Facility: HOSPITAL | Age: 61
Setting detail: HOSPITAL OUTPATIENT SURGERY
Discharge: HOME | End: 2021-05-06
Attending: SURGERY | Admitting: SURGERY

## 2021-05-06 VITALS
WEIGHT: 177.6 LBS | TEMPERATURE: 97.5 F | RESPIRATION RATE: 15 BRPM | OXYGEN SATURATION: 95 % | BODY MASS INDEX: 26.31 KG/M2 | HEIGHT: 69 IN | DIASTOLIC BLOOD PRESSURE: 64 MMHG | HEART RATE: 65 BPM | SYSTOLIC BLOOD PRESSURE: 105 MMHG

## 2021-05-06 DIAGNOSIS — N18.6 ESRD (END STAGE RENAL DISEASE) (HCC): Primary | ICD-10-CM

## 2021-05-06 LAB
ANION GAP SERPL CALCULATED.3IONS-SCNC: 12 MMOL/L (ref 5–15)
BUN SERPL-MCNC: 26 MG/DL (ref 6–20)
BUN/CREAT SERPL: 7.1 (ref 7–25)
CALCIUM SPEC-SCNC: 8.9 MG/DL (ref 8.6–10.5)
CHLORIDE SERPL-SCNC: 100 MMOL/L (ref 98–107)
CO2 SERPL-SCNC: 24 MMOL/L (ref 22–29)
CREAT SERPL-MCNC: 3.67 MG/DL (ref 0.76–1.27)
GFR SERPL CREATININE-BSD FRML MDRD: 17 ML/MIN/1.73
GLUCOSE BLDC GLUCOMTR-MCNC: 127 MG/DL (ref 70–130)
GLUCOSE BLDC GLUCOMTR-MCNC: 178 MG/DL (ref 70–130)
GLUCOSE SERPL-MCNC: 126 MG/DL (ref 65–99)
POTASSIUM SERPL-SCNC: 4.2 MMOL/L (ref 3.5–5.2)
SODIUM SERPL-SCNC: 136 MMOL/L (ref 136–145)

## 2021-05-06 PROCEDURE — 25010000002 MIDAZOLAM PER 1 MG: Performed by: ANESTHESIOLOGY

## 2021-05-06 PROCEDURE — 25010000002 MIDAZOLAM PER 1 MG: Performed by: NURSE ANESTHETIST, CERTIFIED REGISTERED

## 2021-05-06 PROCEDURE — 80048 BASIC METABOLIC PNL TOTAL CA: CPT | Performed by: SURGERY

## 2021-05-06 PROCEDURE — 25010000002 PROPOFOL 10 MG/ML EMULSION: Performed by: NURSE ANESTHETIST, CERTIFIED REGISTERED

## 2021-05-06 PROCEDURE — 25010000002 VANCOMYCIN 10 G RECONSTITUTED SOLUTION: Performed by: SURGERY

## 2021-05-06 PROCEDURE — 25010000002 DEXAMETHASONE PER 1 MG: Performed by: ANESTHESIOLOGY

## 2021-05-06 PROCEDURE — 25010000002 PHENYLEPHRINE PER 1 ML: Performed by: NURSE ANESTHETIST, CERTIFIED REGISTERED

## 2021-05-06 PROCEDURE — 82962 GLUCOSE BLOOD TEST: CPT

## 2021-05-06 PROCEDURE — 25010000003 CEFAZOLIN PER 500 MG: Performed by: SURGERY

## 2021-05-06 PROCEDURE — 25010000002 ROPIVACAINE PER 1 MG: Performed by: ANESTHESIOLOGY

## 2021-05-06 PROCEDURE — 25010000002 ONDANSETRON PER 1 MG: Performed by: NURSE ANESTHETIST, CERTIFIED REGISTERED

## 2021-05-06 PROCEDURE — 25010000002 PROTAMINE SULFATE PER 10 MG: Performed by: NURSE ANESTHETIST, CERTIFIED REGISTERED

## 2021-05-06 PROCEDURE — 25010000002 HEPARIN (PORCINE) PER 1000 UNITS: Performed by: NURSE ANESTHETIST, CERTIFIED REGISTERED

## 2021-05-06 PROCEDURE — 76942 ECHO GUIDE FOR BIOPSY: CPT | Performed by: SURGERY

## 2021-05-06 PROCEDURE — 25010000002 HEPARIN (PORCINE) PER 1000 UNITS: Performed by: SURGERY

## 2021-05-06 PROCEDURE — C1889 IMPLANT/INSERT DEVICE, NOC: HCPCS | Performed by: SURGERY

## 2021-05-06 DEVICE — LIGACLIP MCA MULTIPLE CLIP APPLIERS, 20 SMALL CLIPS
Type: IMPLANTABLE DEVICE | Site: ARM | Status: FUNCTIONAL
Brand: LIGACLIP

## 2021-05-06 RX ORDER — BUPIVACAINE HYDROCHLORIDE 5 MG/ML
INJECTION, SOLUTION EPIDURAL; INTRACAUDAL
Status: COMPLETED | OUTPATIENT
Start: 2021-05-06 | End: 2021-05-06

## 2021-05-06 RX ORDER — DIPHENHYDRAMINE HCL 25 MG
25 CAPSULE ORAL
Status: DISCONTINUED | OUTPATIENT
Start: 2021-05-06 | End: 2021-05-06 | Stop reason: HOSPADM

## 2021-05-06 RX ORDER — MIDAZOLAM HYDROCHLORIDE 1 MG/ML
INJECTION INTRAMUSCULAR; INTRAVENOUS AS NEEDED
Status: DISCONTINUED | OUTPATIENT
Start: 2021-05-06 | End: 2021-05-06 | Stop reason: SURG

## 2021-05-06 RX ORDER — LABETALOL HYDROCHLORIDE 5 MG/ML
5 INJECTION, SOLUTION INTRAVENOUS
Status: DISCONTINUED | OUTPATIENT
Start: 2021-05-06 | End: 2021-05-06 | Stop reason: HOSPADM

## 2021-05-06 RX ORDER — SODIUM CHLORIDE 9 MG/ML
9 INJECTION, SOLUTION INTRAVENOUS CONTINUOUS PRN
Status: DISCONTINUED | OUTPATIENT
Start: 2021-05-06 | End: 2021-05-06 | Stop reason: HOSPADM

## 2021-05-06 RX ORDER — EPHEDRINE SULFATE 50 MG/ML
5 INJECTION, SOLUTION INTRAVENOUS ONCE AS NEEDED
Status: DISCONTINUED | OUTPATIENT
Start: 2021-05-06 | End: 2021-05-06 | Stop reason: HOSPADM

## 2021-05-06 RX ORDER — SODIUM CHLORIDE 0.9 % (FLUSH) 0.9 %
3 SYRINGE (ML) INJECTION EVERY 12 HOURS SCHEDULED
Status: DISCONTINUED | OUTPATIENT
Start: 2021-05-06 | End: 2021-05-06 | Stop reason: HOSPADM

## 2021-05-06 RX ORDER — PROMETHAZINE HYDROCHLORIDE 25 MG/1
25 TABLET ORAL ONCE AS NEEDED
Status: DISCONTINUED | OUTPATIENT
Start: 2021-05-06 | End: 2021-05-06 | Stop reason: HOSPADM

## 2021-05-06 RX ORDER — PROTAMINE SULFATE 10 MG/ML
INJECTION, SOLUTION INTRAVENOUS AS NEEDED
Status: DISCONTINUED | OUTPATIENT
Start: 2021-05-06 | End: 2021-05-06 | Stop reason: SURG

## 2021-05-06 RX ORDER — OXYCODONE AND ACETAMINOPHEN 10; 325 MG/1; MG/1
1 TABLET ORAL ONCE AS NEEDED
Status: COMPLETED | OUTPATIENT
Start: 2021-05-06 | End: 2021-05-06

## 2021-05-06 RX ORDER — LIDOCAINE HYDROCHLORIDE 20 MG/ML
INJECTION, SOLUTION INFILTRATION; PERINEURAL AS NEEDED
Status: DISCONTINUED | OUTPATIENT
Start: 2021-05-06 | End: 2021-05-06 | Stop reason: SURG

## 2021-05-06 RX ORDER — NALOXONE HCL 0.4 MG/ML
0.2 VIAL (ML) INJECTION AS NEEDED
Status: DISCONTINUED | OUTPATIENT
Start: 2021-05-06 | End: 2021-05-06 | Stop reason: HOSPADM

## 2021-05-06 RX ORDER — HYDROMORPHONE HYDROCHLORIDE 1 MG/ML
0.25 INJECTION, SOLUTION INTRAMUSCULAR; INTRAVENOUS; SUBCUTANEOUS
Status: DISCONTINUED | OUTPATIENT
Start: 2021-05-06 | End: 2021-05-06 | Stop reason: HOSPADM

## 2021-05-06 RX ORDER — HEPARIN SODIUM 1000 [USP'U]/ML
INJECTION, SOLUTION INTRAVENOUS; SUBCUTANEOUS AS NEEDED
Status: DISCONTINUED | OUTPATIENT
Start: 2021-05-06 | End: 2021-05-06 | Stop reason: SURG

## 2021-05-06 RX ORDER — LIDOCAINE HYDROCHLORIDE 10 MG/ML
0.5 INJECTION, SOLUTION EPIDURAL; INFILTRATION; INTRACAUDAL; PERINEURAL ONCE AS NEEDED
Status: DISCONTINUED | OUTPATIENT
Start: 2021-05-06 | End: 2021-05-06 | Stop reason: HOSPADM

## 2021-05-06 RX ORDER — ACETAMINOPHEN 325 MG/1
650 TABLET ORAL ONCE AS NEEDED
Status: DISCONTINUED | OUTPATIENT
Start: 2021-05-06 | End: 2021-05-06 | Stop reason: HOSPADM

## 2021-05-06 RX ORDER — PROMETHAZINE HYDROCHLORIDE 25 MG/1
25 SUPPOSITORY RECTAL ONCE AS NEEDED
Status: DISCONTINUED | OUTPATIENT
Start: 2021-05-06 | End: 2021-05-06 | Stop reason: HOSPADM

## 2021-05-06 RX ORDER — FLUMAZENIL 0.1 MG/ML
0.2 INJECTION INTRAVENOUS AS NEEDED
Status: DISCONTINUED | OUTPATIENT
Start: 2021-05-06 | End: 2021-05-06 | Stop reason: HOSPADM

## 2021-05-06 RX ORDER — DEXAMETHASONE SODIUM PHOSPHATE 4 MG/ML
INJECTION, SOLUTION INTRA-ARTICULAR; INTRALESIONAL; INTRAMUSCULAR; INTRAVENOUS; SOFT TISSUE
Status: COMPLETED | OUTPATIENT
Start: 2021-05-06 | End: 2021-05-06

## 2021-05-06 RX ORDER — MIDAZOLAM HYDROCHLORIDE 1 MG/ML
1 INJECTION INTRAMUSCULAR; INTRAVENOUS
Status: DISCONTINUED | OUTPATIENT
Start: 2021-05-06 | End: 2021-05-06 | Stop reason: HOSPADM

## 2021-05-06 RX ORDER — ONDANSETRON 2 MG/ML
4 INJECTION INTRAMUSCULAR; INTRAVENOUS ONCE AS NEEDED
Status: COMPLETED | OUTPATIENT
Start: 2021-05-06 | End: 2021-05-06

## 2021-05-06 RX ORDER — SODIUM CHLORIDE 0.9 % (FLUSH) 0.9 %
3-10 SYRINGE (ML) INJECTION AS NEEDED
Status: DISCONTINUED | OUTPATIENT
Start: 2021-05-06 | End: 2021-05-06 | Stop reason: HOSPADM

## 2021-05-06 RX ORDER — PROPOFOL 10 MG/ML
VIAL (ML) INTRAVENOUS CONTINUOUS PRN
Status: DISCONTINUED | OUTPATIENT
Start: 2021-05-06 | End: 2021-05-06 | Stop reason: SURG

## 2021-05-06 RX ORDER — FAMOTIDINE 10 MG/ML
20 INJECTION, SOLUTION INTRAVENOUS ONCE
Status: COMPLETED | OUTPATIENT
Start: 2021-05-06 | End: 2021-05-06

## 2021-05-06 RX ORDER — HYDRALAZINE HYDROCHLORIDE 20 MG/ML
5 INJECTION INTRAMUSCULAR; INTRAVENOUS
Status: DISCONTINUED | OUTPATIENT
Start: 2021-05-06 | End: 2021-05-06 | Stop reason: HOSPADM

## 2021-05-06 RX ORDER — ROPIVACAINE HYDROCHLORIDE 2 MG/ML
INJECTION, SOLUTION EPIDURAL; INFILTRATION; PERINEURAL
Status: COMPLETED | OUTPATIENT
Start: 2021-05-06 | End: 2021-05-06

## 2021-05-06 RX ORDER — HYDROCODONE BITARTRATE AND ACETAMINOPHEN 5; 325 MG/1; MG/1
2 TABLET ORAL EVERY 6 HOURS PRN
Qty: 10 TABLET | Refills: 0 | Status: SHIPPED | OUTPATIENT
Start: 2021-05-06 | End: 2021-05-06 | Stop reason: HOSPADM

## 2021-05-06 RX ORDER — DIPHENHYDRAMINE HYDROCHLORIDE 50 MG/ML
12.5 INJECTION INTRAMUSCULAR; INTRAVENOUS
Status: DISCONTINUED | OUTPATIENT
Start: 2021-05-06 | End: 2021-05-06 | Stop reason: HOSPADM

## 2021-05-06 RX ADMIN — MIDAZOLAM 1 MG: 1 INJECTION INTRAMUSCULAR; INTRAVENOUS at 07:22

## 2021-05-06 RX ADMIN — BUPIVACAINE HYDROCHLORIDE 15 ML: 5 INJECTION, SOLUTION EPIDURAL; INTRACAUDAL; PERINEURAL at 07:29

## 2021-05-06 RX ADMIN — VANCOMYCIN HYDROCHLORIDE 1250 MG: 10 INJECTION, POWDER, LYOPHILIZED, FOR SOLUTION INTRAVENOUS at 07:30

## 2021-05-06 RX ADMIN — PHENYLEPHRINE HYDROCHLORIDE 50 MCG: 10 INJECTION INTRAVENOUS at 09:34

## 2021-05-06 RX ADMIN — OXYCODONE HYDROCHLORIDE AND ACETAMINOPHEN 1 TABLET: 10; 325 TABLET ORAL at 10:39

## 2021-05-06 RX ADMIN — MIDAZOLAM 1 MG: 1 INJECTION INTRAMUSCULAR; INTRAVENOUS at 09:20

## 2021-05-06 RX ADMIN — HEPARIN SODIUM 5000 UNITS: 1000 INJECTION INTRAVENOUS; SUBCUTANEOUS at 09:00

## 2021-05-06 RX ADMIN — SODIUM CHLORIDE 9 ML/HR: 9 INJECTION, SOLUTION INTRAVENOUS at 07:35

## 2021-05-06 RX ADMIN — ROPIVACAINE HYDROCHLORIDE 15 ML: 2 INJECTION, SOLUTION EPIDURAL; INFILTRATION at 07:29

## 2021-05-06 RX ADMIN — DEXAMETHASONE SODIUM PHOSPHATE 4 MG: 4 INJECTION, SOLUTION INTRAMUSCULAR; INTRAVENOUS at 07:29

## 2021-05-06 RX ADMIN — ONDANSETRON 4 MG: 2 INJECTION INTRAMUSCULAR; INTRAVENOUS at 10:39

## 2021-05-06 RX ADMIN — PROPOFOL 25 MCG/KG/MIN: 10 INJECTION, EMULSION INTRAVENOUS at 08:07

## 2021-05-06 RX ADMIN — FAMOTIDINE 20 MG: 10 INJECTION INTRAVENOUS at 07:19

## 2021-05-06 RX ADMIN — PROTAMINE SULFATE 30 MG: 10 INJECTION, SOLUTION INTRAVENOUS at 09:24

## 2021-05-06 RX ADMIN — LIDOCAINE HYDROCHLORIDE 60 MG: 20 INJECTION, SOLUTION INFILTRATION; PERINEURAL at 08:07

## 2021-05-06 NOTE — ANESTHESIA PROCEDURE NOTES
Peripheral Block    Pre-sedation assessment completed: 5/6/2021 7:22 AM    Patient reassessed immediately prior to procedure    Patient location during procedure: holding area  Start time: 5/6/2021 7:24 AM  Stop time: 5/6/2021 7:30 AM  Reason for block: at surgeon's request and post-op pain management  Performed by  Anesthesiologist: Vik Amato DO  Preanesthetic Checklist  Completed: patient identified, IV checked, site marked, risks and benefits discussed, surgical consent, monitors and equipment checked, pre-op evaluation and timeout performed  Prep:  Pt Position: sitting  Sterile barriers:gloves, mask and washed/disinfected hands  Prep: ChloraPrep  Patient monitoring: blood pressure monitoring, continuous pulse oximetry and EKG  Procedure  Sedation:yes  Performed under: local infiltration  Guidance:ultrasound guided  ULTRASOUND INTERPRETATION. Using ultrasound guidance a gauge needle was placed in close proximity to the brachial plexus nerve, at which point, under ultrasound guidance anesthetic was injected in the area of the nerve and spread of the anesthesia was seen on ultrasound in close proximity thereto.  There were no abnormalities seen on ultrasound; a digital image was taken; and the patient tolerated the procedure with no complications. Images:still images obtained, printed/placed on chart    Laterality:left  Block Type:supraclavicular  Injection Technique:single-shot  Needle Type:echogenic  Needle Gauge:22 G  Resistance on Injection: none    Medications Used: dexamethasone (DECADRON) injection, 4 mg  ropivacaine (NAROPIN) 0.2 % injection, 15 mL  bupivacaine (PF) (MARCAINE) 0.5 % injection, 15 mL  Med admintered at 5/6/2021 7:29 AM      Medications  Comment:Ultrasound Interpretation:  Using ultrasound guidance the needle was placed in close proximity to the brachial plexus and anesthetic was injected in the area of the nerve and with spread of the anesthetic seen on ultrasound in close  proximity thereto and spreading distally along the nerve.  There were no abnormalities seen on ultrasound; a digital image was taken; and the patient tolerated the procedure with no complications.      Post Assessment  Injection Assessment: negative aspiration for heme, no paresthesia on injection and incremental injection  Patient Tolerance:comfortable throughout block  Complications:no

## 2021-05-06 NOTE — ANESTHESIA POSTPROCEDURE EVALUATION
Patient: Angelo Brown    Procedure Summary     Date: 05/06/21 Room / Location: [facility] OR 03 / [facility] MAIN OR    Anesthesia Start: 0759 Anesthesia Stop: 0952    Procedure: LEFT ARM ARTERIOVENOUS FISTULA  PLACEMENT (Left ) Diagnosis:     Surgeons: Ad Weber MD Provider: Vik Amato DO    Anesthesia Type: MAC, regional ASA Status: 4          Anesthesia Type: MAC, regional    Vitals  Vitals Value Taken Time   /68 05/06/21 1045   Temp 36.4 °C (97.5 °F) 05/06/21 1045   Pulse 64 05/06/21 1045   Resp 14 05/06/21 1045   SpO2 93 % 05/06/21 1051   Vitals shown include unvalidated device data.        Post Anesthesia Care and Evaluation    Patient location during evaluation: bedside  Pain management: adequate  Airway patency: patent  Anesthetic complications: No anesthetic complications    Cardiovascular status: acceptable  Respiratory status: acceptable  Hydration status: acceptable

## 2021-05-06 NOTE — ANESTHESIA PREPROCEDURE EVALUATION
Anesthesia Evaluation     Patient summary reviewed   no history of anesthetic complications:  NPO Solid Status: > 8 hours  NPO Liquid Status: > 2 hours           Airway   Mallampati: II  TM distance: >3 FB  Neck ROM: full  no difficulty expected  Dental    (+) upper dentures and poor dentition    Pulmonary - negative pulmonary ROS    breath sounds clear to auscultation  (-) shortness of breath, recent URI, sleep apnea, not a smoker  Cardiovascular   Exercise tolerance: good (4-7 METS)    ECG reviewed  Patient on routine beta blocker and Beta blocker given within 24 hours of surgery  Rhythm: regular  Rate: normal    (+) hypertension, past MI (reports 5-6ya)  >12 months, CAD, CABG (2013), CHF , hyperlipidemia,   (-) dysrhythmias, angina      Neuro/Psych  (+) CVA (left sided weakness), headaches, psychiatric history Anxiety and Depression,     (-) seizures, dizziness/light headedness    ROS Comment: Toxic metabolic encephalopathy  GI/Hepatic/Renal/Endo    (+)  GERD,  hepatitis (following trfsn) C, liver disease, renal disease (MWF) ARF and ESRD, diabetes mellitus,   (-)  obesity    Musculoskeletal     (-) neck stiffness  Abdominal    Substance History      OB/GYN          Other   arthritis, blood dyscrasia (Hgb 9.4, Plt 113 ) anemia,   history of cancer (nephrectomy) remission                    Anesthesia Plan    ASA 4     MAC and regional   (D/W pt. MAC c PNB and possible awareness intra op.  Pt understands MAC and GA are not the same and the possibility of GA being required for failed MAC)  intravenous induction     Anesthetic plan, all risks, benefits, and alternatives have been provided, discussed and informed consent has been obtained with: patient.    Plan discussed with CRNA.

## 2021-05-12 ENCOUNTER — READMISSION MANAGEMENT (OUTPATIENT)
Dept: CALL CENTER | Facility: HOSPITAL | Age: 61
End: 2021-05-12

## 2021-05-12 NOTE — OUTREACH NOTE
"General Surgery Week 2 Survey      Responses   [facility] patient discharged from?  [place]   Does the patient have one of the following disease processes/diagnoses(primary or secondary)?  General Surgery   Week 2 attempt successful?  Yes   Call start time  1407   Call end time  1415   Is patient permission given to speak with other caregiver?  Yes   List who call center can speak with  Samantha spouse    Person spoke with today (if not patient) and relationship  Samantha spouse    Meds reviewed with patient/caregiver?  Yes   Is the patient taking all medications as directed (includes completed medication regime)?  Yes   Medication comments  \"He had the Covid on Friday and has effected his right leg and has not been able to walk on it\"    Has the patient kept scheduled appointments due by today?  Yes   Comments  Dialysis treatment today - pt does not have a PCP-provided spouse with number to help find one    Psychosocial issues?  No   What is the patient's perception of their health status since discharge?  New symptoms unrelated to diagnosis [Spouse states pt believes he is having side effects from Covid vaccine ]   Week 2 call completed?  Yes   Wrap up additional comments  Spouse states she believes he has had reaction from Covid vaccine affecting his right leg - has been very weak - spouse states appetite has been fair since he had the shot- he has cried a lot lately- kidney doc will see him today and make a judgement call on whether or not patient needs to go to the doctor/hospital to have symptoms checked out           Marylin Regan RN  "

## 2021-05-18 ENCOUNTER — APPOINTMENT (OUTPATIENT)
Dept: ONCOLOGY | Facility: HOSPITAL | Age: 61
End: 2021-05-18

## 2021-05-18 ENCOUNTER — LAB (OUTPATIENT)
Dept: LAB | Facility: HOSPITAL | Age: 61
End: 2021-05-18

## 2021-05-18 ENCOUNTER — OFFICE VISIT (OUTPATIENT)
Dept: ONCOLOGY | Facility: CLINIC | Age: 61
End: 2021-05-18

## 2021-05-18 VITALS
WEIGHT: 182.6 LBS | OXYGEN SATURATION: 96 % | HEIGHT: 69 IN | TEMPERATURE: 98 F | RESPIRATION RATE: 16 BRPM | DIASTOLIC BLOOD PRESSURE: 65 MMHG | SYSTOLIC BLOOD PRESSURE: 104 MMHG | HEART RATE: 62 BPM | BODY MASS INDEX: 27.05 KG/M2

## 2021-05-18 DIAGNOSIS — D69.6 THROMBOCYTOPENIA (HCC): ICD-10-CM

## 2021-05-18 DIAGNOSIS — D63.1 ANEMIA OF CHRONIC RENAL FAILURE, STAGE 3 (MODERATE), UNSPECIFIED WHETHER STAGE 3A OR 3B CKD (HCC): ICD-10-CM

## 2021-05-18 DIAGNOSIS — D50.9 IRON DEFICIENCY ANEMIA, UNSPECIFIED IRON DEFICIENCY ANEMIA TYPE: ICD-10-CM

## 2021-05-18 DIAGNOSIS — T45.4X5A ADVERSE EFFECT OF IRON, INITIAL ENCOUNTER: ICD-10-CM

## 2021-05-18 DIAGNOSIS — N18.6 ANEMIA IN CHRONIC KIDNEY DISEASE, ON CHRONIC DIALYSIS (HCC): Primary | ICD-10-CM

## 2021-05-18 DIAGNOSIS — N18.30 STAGE 3 CHRONIC KIDNEY DISEASE, UNSPECIFIED WHETHER STAGE 3A OR 3B CKD (HCC): ICD-10-CM

## 2021-05-18 DIAGNOSIS — D63.1 ANEMIA IN CHRONIC KIDNEY DISEASE, ON CHRONIC DIALYSIS (HCC): Primary | ICD-10-CM

## 2021-05-18 DIAGNOSIS — T45.4X5A ADVERSE EFFECT OF IRON, INITIAL ENCOUNTER: Primary | ICD-10-CM

## 2021-05-18 DIAGNOSIS — N18.30 ANEMIA OF CHRONIC RENAL FAILURE, STAGE 3 (MODERATE), UNSPECIFIED WHETHER STAGE 3A OR 3B CKD (HCC): Primary | ICD-10-CM

## 2021-05-18 DIAGNOSIS — Z99.2 ANEMIA IN CHRONIC KIDNEY DISEASE, ON CHRONIC DIALYSIS (HCC): Primary | ICD-10-CM

## 2021-05-18 DIAGNOSIS — N18.30 ANEMIA OF CHRONIC RENAL FAILURE, STAGE 3 (MODERATE), UNSPECIFIED WHETHER STAGE 3A OR 3B CKD (HCC): ICD-10-CM

## 2021-05-18 DIAGNOSIS — D63.1 ANEMIA OF CHRONIC RENAL FAILURE, STAGE 3 (MODERATE), UNSPECIFIED WHETHER STAGE 3A OR 3B CKD (HCC): Primary | ICD-10-CM

## 2021-05-18 LAB
BASOPHILS # BLD AUTO: 0.05 10*3/MM3 (ref 0–0.2)
BASOPHILS NFR BLD AUTO: 0.8 % (ref 0–1.5)
DEPRECATED RDW RBC AUTO: 51.4 FL (ref 37–54)
EOSINOPHIL # BLD AUTO: 0.33 10*3/MM3 (ref 0–0.4)
EOSINOPHIL NFR BLD AUTO: 5.4 % (ref 0.3–6.2)
ERYTHROCYTE [DISTWIDTH] IN BLOOD BY AUTOMATED COUNT: 14.4 % (ref 12.3–15.4)
FERRITIN SERPL-MCNC: 270.4 NG/ML (ref 30–400)
HCT VFR BLD AUTO: 31.8 % (ref 37.5–51)
HGB BLD-MCNC: 9.9 G/DL (ref 13–17.7)
IMM GRANULOCYTES # BLD AUTO: 0.02 10*3/MM3 (ref 0–0.05)
IMM GRANULOCYTES NFR BLD AUTO: 0.3 % (ref 0–0.5)
IRON 24H UR-MRATE: 46 MCG/DL (ref 59–158)
IRON SATN MFR SERPL: 19 % (ref 14–48)
LYMPHOCYTES # BLD AUTO: 1.31 10*3/MM3 (ref 0.7–3.1)
LYMPHOCYTES NFR BLD AUTO: 21.4 % (ref 19.6–45.3)
MCH RBC QN AUTO: 30.7 PG (ref 26.6–33)
MCHC RBC AUTO-ENTMCNC: 31.1 G/DL (ref 31.5–35.7)
MCV RBC AUTO: 98.5 FL (ref 79–97)
MONOCYTES # BLD AUTO: 0.43 10*3/MM3 (ref 0.1–0.9)
MONOCYTES NFR BLD AUTO: 7 % (ref 5–12)
NEUTROPHILS NFR BLD AUTO: 3.98 10*3/MM3 (ref 1.7–7)
NEUTROPHILS NFR BLD AUTO: 65.1 % (ref 42.7–76)
NRBC BLD AUTO-RTO: 0 /100 WBC (ref 0–0.2)
PLATELET # BLD AUTO: 107 10*3/MM3 (ref 140–450)
PMV BLD AUTO: 10.8 FL (ref 6–12)
RBC # BLD AUTO: 3.23 10*6/MM3 (ref 4.14–5.8)
TIBC SERPL-MCNC: 239 MCG/DL (ref 249–505)
TRANSFERRIN SERPL-MCNC: 171 MG/DL (ref 200–360)
WBC # BLD AUTO: 6.12 10*3/MM3 (ref 3.4–10.8)

## 2021-05-18 PROCEDURE — 36415 COLL VENOUS BLD VENIPUNCTURE: CPT

## 2021-05-18 PROCEDURE — 84466 ASSAY OF TRANSFERRIN: CPT

## 2021-05-18 PROCEDURE — 83540 ASSAY OF IRON: CPT

## 2021-05-18 PROCEDURE — 99214 OFFICE O/P EST MOD 30 MIN: CPT | Performed by: NURSE PRACTITIONER

## 2021-05-18 PROCEDURE — 82728 ASSAY OF FERRITIN: CPT

## 2021-05-18 PROCEDURE — 85025 COMPLETE CBC W/AUTO DIFF WBC: CPT

## 2021-05-18 RX ORDER — BLOOD SUGAR DIAGNOSTIC
STRIP MISCELLANEOUS
COMMUNITY
Start: 2021-05-03 | End: 2022-12-26

## 2021-05-18 RX ORDER — BLOOD SUGAR DIAGNOSTIC
STRIP MISCELLANEOUS
COMMUNITY
Start: 2021-03-03 | End: 2022-12-26

## 2021-05-18 RX ORDER — LANCETS 28 GAUGE
EACH MISCELLANEOUS
COMMUNITY
End: 2022-12-26

## 2021-05-18 RX ORDER — AMLODIPINE BESYLATE 10 MG/1
10 TABLET ORAL DAILY
COMMUNITY
End: 2022-03-01

## 2021-05-18 RX ORDER — INSULIN LISPRO 100 [IU]/ML
10 INJECTION, SOLUTION INTRAVENOUS; SUBCUTANEOUS 3 TIMES DAILY
COMMUNITY
Start: 2021-04-09 | End: 2022-12-26
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
## 2021-05-20 ENCOUNTER — APPOINTMENT (OUTPATIENT)
Dept: ONCOLOGY | Facility: HOSPITAL | Age: 61
End: 2021-05-20

## 2021-05-20 ENCOUNTER — READMISSION MANAGEMENT (OUTPATIENT)
Dept: CALL CENTER | Facility: HOSPITAL | Age: 61
End: 2021-05-20

## 2021-05-20 NOTE — OUTREACH NOTE
General Surgery Week 3 Survey      Responses   [facility] patient discharged from?  [place]   Does the patient have one of the following disease processes/diagnoses(primary or secondary)?  General Surgery   Week 3 attempt successful?  Yes   Call start time  1724   Call end time  1734   General alerts for this patient  HD   Discharge diagnosis  dialysis catheter exchange   Meds reviewed with patient/caregiver?  Yes   Is the patient having any side effects they believe may be caused by any medication additions or changes?  No   Does the patient have all medications related to this admission filled (includes all antibiotics, pain medications, etc.)  Yes   Is the patient taking all medications as directed (includes completed medication regime)?  Yes   Does the patient have a follow up appointment scheduled with their surgeon?  Yes   Has the patient kept scheduled appointments due by today?  Yes   Comments  Patient does not have a PCP- he reports his cardiologist takes care of needs. Encouraged patient to obtain doctor. Number provided for PCP line.    Has home health visited the patient within 72 hours of discharge?  N/A   What DME was ordered?  RW   Has all DME been delivered?  Yes   Psychosocial issues?  No   Comments  Patient reports he is still having issues with walking on right leg. He can bear weight and uses a cane to assist with ambulation.    Did the patient receive a copy of their discharge instructions?  Yes   Nursing interventions  Reviewed instructions with patient   What is the patient's perception of their health status since discharge?  New symptoms unrelated to diagnosis   Nursing interventions  Nurse provided patient education   Is the patient /caregiver able to teach back basic post-op care?  Take showers only when approved by MD-sponge bathe until then, No tub bath, swimming, or hot tub until instructed by MD, Keep incision areas clean,dry and protected   Is the patient/caregiver able to  teach back signs and symptoms of incisional infection?  Fever, Increased redness, swelling or pain at the incisonal site, Increased drainage or bleeding, Incisional warmth, Pus or odor from incision   Is the patient/caregiver able to teach back steps to recovery at home?  Set small, achievable goals for return to baseline health, Rest and rebuild strength, gradually increase activity, Make a list of questions for surgeon's appointment   If the patient is a current smoker, are they able to teach back resources for cessation?  Not a smoker   Is the patient/caregiver able to teach back the hierarchy of who to call/visit for symptoms/problems? PCP, Specialist, Home health nurse, Urgent Care, ED, 911  Yes   Additional teach back comments  Encouraged patient to obtain PCP and followup concerning leg weakness.    Week 3 call completed?  Yes          Beny Hubbard RN

## 2021-08-10 ENCOUNTER — LAB (OUTPATIENT)
Dept: LAB | Facility: HOSPITAL | Age: 61
End: 2021-08-10

## 2021-08-10 ENCOUNTER — TELEMEDICINE (OUTPATIENT)
Dept: ONCOLOGY | Facility: CLINIC | Age: 61
End: 2021-08-10

## 2021-08-10 VITALS
HEIGHT: 69 IN | DIASTOLIC BLOOD PRESSURE: 72 MMHG | HEART RATE: 58 BPM | TEMPERATURE: 97.3 F | OXYGEN SATURATION: 96 % | BODY MASS INDEX: 26.45 KG/M2 | RESPIRATION RATE: 16 BRPM | WEIGHT: 178.6 LBS | SYSTOLIC BLOOD PRESSURE: 138 MMHG

## 2021-08-10 DIAGNOSIS — D63.1 ANEMIA IN CHRONIC KIDNEY DISEASE, ON CHRONIC DIALYSIS (HCC): Primary | ICD-10-CM

## 2021-08-10 DIAGNOSIS — N18.6 ANEMIA IN CHRONIC KIDNEY DISEASE, ON CHRONIC DIALYSIS (HCC): Primary | ICD-10-CM

## 2021-08-10 DIAGNOSIS — Z99.2 ANEMIA IN CHRONIC KIDNEY DISEASE, ON CHRONIC DIALYSIS (HCC): Primary | ICD-10-CM

## 2021-08-10 DIAGNOSIS — D50.8 OTHER IRON DEFICIENCY ANEMIA: ICD-10-CM

## 2021-08-10 DIAGNOSIS — D69.6 THROMBOCYTOPENIA (HCC): ICD-10-CM

## 2021-08-10 DIAGNOSIS — D63.1 ANEMIA OF CHRONIC RENAL FAILURE, STAGE 3 (MODERATE), UNSPECIFIED WHETHER STAGE 3A OR 3B CKD (HCC): ICD-10-CM

## 2021-08-10 DIAGNOSIS — N18.30 ANEMIA OF CHRONIC RENAL FAILURE, STAGE 3 (MODERATE), UNSPECIFIED WHETHER STAGE 3A OR 3B CKD (HCC): ICD-10-CM

## 2021-08-10 DIAGNOSIS — D61.818 PANCYTOPENIA, ACQUIRED (HCC): ICD-10-CM

## 2021-08-10 DIAGNOSIS — E53.8 B12 DEFICIENCY: ICD-10-CM

## 2021-08-10 LAB
BASOPHILS # BLD AUTO: 0.08 10*3/MM3 (ref 0–0.2)
BASOPHILS NFR BLD AUTO: 1 % (ref 0–1.5)
DEPRECATED RDW RBC AUTO: 46 FL (ref 37–54)
EOSINOPHIL # BLD AUTO: 0.44 10*3/MM3 (ref 0–0.4)
EOSINOPHIL NFR BLD AUTO: 5.3 % (ref 0.3–6.2)
ERYTHROCYTE [DISTWIDTH] IN BLOOD BY AUTOMATED COUNT: 13.5 % (ref 12.3–15.4)
HCT VFR BLD AUTO: 41.1 % (ref 37.5–51)
HGB BLD-MCNC: 13.6 G/DL (ref 13–17.7)
IMM GRANULOCYTES # BLD AUTO: 0.06 10*3/MM3 (ref 0–0.05)
IMM GRANULOCYTES NFR BLD AUTO: 0.7 % (ref 0–0.5)
LYMPHOCYTES # BLD AUTO: 1.75 10*3/MM3 (ref 0.7–3.1)
LYMPHOCYTES NFR BLD AUTO: 21 % (ref 19.6–45.3)
MCH RBC QN AUTO: 31.1 PG (ref 26.6–33)
MCHC RBC AUTO-ENTMCNC: 33.1 G/DL (ref 31.5–35.7)
MCV RBC AUTO: 93.8 FL (ref 79–97)
MONOCYTES # BLD AUTO: 0.49 10*3/MM3 (ref 0.1–0.9)
MONOCYTES NFR BLD AUTO: 5.9 % (ref 5–12)
NEUTROPHILS NFR BLD AUTO: 5.51 10*3/MM3 (ref 1.7–7)
NEUTROPHILS NFR BLD AUTO: 66.1 % (ref 42.7–76)
NRBC BLD AUTO-RTO: 0 /100 WBC (ref 0–0.2)
PLATELET # BLD AUTO: 100 10*3/MM3 (ref 140–450)
PMV BLD AUTO: 11.2 FL (ref 6–12)
RBC # BLD AUTO: 4.38 10*6/MM3 (ref 4.14–5.8)
WBC # BLD AUTO: 8.33 10*3/MM3 (ref 3.4–10.8)

## 2021-08-10 PROCEDURE — 85025 COMPLETE CBC W/AUTO DIFF WBC: CPT

## 2021-08-10 PROCEDURE — 99213 OFFICE O/P EST LOW 20 MIN: CPT | Performed by: NURSE PRACTITIONER

## 2021-08-10 PROCEDURE — 36415 COLL VENOUS BLD VENIPUNCTURE: CPT

## 2021-08-10 RX ORDER — FERRIC CITRATE 210 MG/1
1 TABLET, COATED ORAL 3 TIMES DAILY
COMMUNITY
End: 2022-12-29 | Stop reason: HOSPADM
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
## 2021-10-07 ENCOUNTER — OFFICE VISIT (OUTPATIENT)
Dept: NEUROLOGY | Facility: CLINIC | Age: 61
End: 2021-10-07

## 2021-10-07 VITALS
OXYGEN SATURATION: 98 % | DIASTOLIC BLOOD PRESSURE: 62 MMHG | SYSTOLIC BLOOD PRESSURE: 136 MMHG | WEIGHT: 181 LBS | HEART RATE: 76 BPM | BODY MASS INDEX: 26.72 KG/M2

## 2021-10-07 DIAGNOSIS — E53.8 B12 DEFICIENCY: ICD-10-CM

## 2021-10-07 DIAGNOSIS — I63.9 ACUTE CVA (CEREBROVASCULAR ACCIDENT) (HCC): Primary | ICD-10-CM

## 2021-10-07 DIAGNOSIS — R20.2 NUMBNESS AND TINGLING OF BOTH LOWER EXTREMITIES: ICD-10-CM

## 2021-10-07 DIAGNOSIS — E11.65 TYPE 2 DIABETES MELLITUS WITH HYPERGLYCEMIA, WITHOUT LONG-TERM CURRENT USE OF INSULIN (HCC): ICD-10-CM

## 2021-10-07 DIAGNOSIS — N18.30 STAGE 3 CHRONIC KIDNEY DISEASE, UNSPECIFIED WHETHER STAGE 3A OR 3B CKD (HCC): ICD-10-CM

## 2021-10-07 DIAGNOSIS — E78.5 HYPERLIPIDEMIA, UNSPECIFIED HYPERLIPIDEMIA TYPE: ICD-10-CM

## 2021-10-07 DIAGNOSIS — I10 HYPERTENSION, UNSPECIFIED TYPE: ICD-10-CM

## 2021-10-07 DIAGNOSIS — R20.0 NUMBNESS AND TINGLING OF BOTH LOWER EXTREMITIES: ICD-10-CM

## 2021-10-07 PROCEDURE — 99214 OFFICE O/P EST MOD 30 MIN: CPT | Performed by: NURSE PRACTITIONER

## 2021-10-07 RX ORDER — ASPIRIN 81 MG/1
81 TABLET ORAL DAILY
Qty: 150 TABLET | Refills: 11 | Status: SHIPPED | OUTPATIENT
Start: 2021-10-07 | End: 2022-02-02 | Stop reason: SDUPTHER

## 2021-10-07 NOTE — PROGRESS NOTES
"DOS: 10/7/2021  NAME: Angelo Brown   : 1960  PCP: Provider, No Known    Chief Complaint   Patient presents with   • Stroke     Neurological Problem and Interval History:  61 y.o.  male with a Hx of Diabetes mellitus-type II, hypertension, CAD, CKD stage III-4 recently started hemodialysis, anemia, B12 deficiency, thrombocytopenia I am seeing today in follow-up for right temporal lobe infarct (2017) and inpatient metabolic encephalopathy (since resolved) along with presumed peripheral neuropathy likely secondary to previously uncontrolled diabetes/lower extremity numbness tingling decreased sensation left greater than right.     Patient was last seen in follow-up by me 2021.  At that time he denied any new signs and/or symptoms of stroke.  TME had resolved prior to discharge etiology of this was thought to be secondary to medication side effects Xanax and Percocet.  Patient previously reported lower extremity numbness tingling/decreased sensation in setting of uncontrolled diabetes therefore presumed peripheral neuropathy secondary to untreated diabetes although treatable cause peripheral neuropathy labs were recommended which have not yet been completed-ronel with patient who states he will get it done at dialysis tomorrow.  Since initial labs were added for treatable cause peripheral neuropathy; A1c has improved to 6.0.  Patient continues to have decrease sensation numbness/tingling left greater than right.  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      Since last evaluation, patient denies any new complaints and or concerns although he has had dialysis placed and initiated  dialysis which he has been tolerating although \"it is wearing me out\".  He has had 2 hospitalizations since last seen 1 where it was determined dialysis was " "indicated in the second due to dialysis port malfunction.  He denies any other recent hospitalizations/illnesses and no falls.  He is on Zocor; secondary stroke prevention although he has discontinued aspirin.  Recommended that he resume for secondary stroke prevention; 81 mg daily.  Also patient has discontinued B12 which I have also recommended that he resume.  He reports his blood pressure is \"well-controlled\".  Today 136/62.  Heart rate 76.  Mood is good.  He states that \"things of calm down downtown\" and \"we decided not to sell her house\".  He continues to report difficulty with sleep; previously advised to complete sleep study which she declined; today patient is agreeable therefore I will refer him to sleep medicine.  Patient reports that he had to discontinue gabapentin previously due to oversedation in setting of Xanax/Percocet.  Offered Rx alternatives topical to a pain cream which patient is agreeable to.  He denies any other complaints and or concerns on my exam.  We will plan to see him back in 6 months time; sooner if indicated.      Review of Systems:        Review of Systems   Constitutional: Negative for activity change, appetite change and chills.   Eyes: Negative for pain, discharge and itching.   Neurological: Positive for speech difficulty, light-headedness, numbness and headaches. Negative for dizziness, tremors, seizures, syncope, facial asymmetry and weakness.   Psychiatric/Behavioral: Positive for agitation, behavioral problems, decreased concentration and sleep disturbance. Negative for confusion, dysphoric mood, hallucinations, self-injury and suicidal ideas. The patient is hyperactive. The patient is not nervous/anxious.          Current Outpatient Medications:   •  ALPRAZolam (XANAX) 1 MG tablet, Take 1 mg by mouth 4 (Four) Times a Day As Needed., Disp: , Rfl:   •  amLODIPine (NORVASC) 10 MG tablet, Take 10 mg by mouth Daily., Disp: , Rfl:   •  Auryxia 1  MG(Fe) tablet, , Disp: , " Rfl:   •  carvedilol (COREG) 3.125 MG tablet, Take 3.125 mg by mouth 2 (Two) Times a Day With Meals., Disp: , Rfl:   •  hydrALAZINE (APRESOLINE) 100 MG tablet, Take 100 mg by mouth 3 (Three) Times a Day. Pt takes 2x daily at home-will talk with cardiologist in May per pt, Disp: , Rfl:   •  insulin aspart (novoLOG FLEXPEN) 100 UNIT/ML solution pen-injector sc pen, Inject 2-15 Units under the skin 3 (Three) Times a Day With Meals. Per sliding scale, Disp: , Rfl:   •  Insulin Glargine (LANTUS SOLOSTAR) 100 UNIT/ML injection pen, Inject 10 Units under the skin into the appropriate area as directed Every Night., Disp: , Rfl:   •  lisinopril (PRINIVIL,ZESTRIL) 40 MG tablet, Take 1 tablet by mouth Daily for 30 days. (Patient taking differently: Take 40 mg by mouth Every Morning.), Disp: 30 tablet, Rfl: 0  •  oxyCODONE-acetaminophen (PERCOCET)  MG per tablet, Take 1 tablet by mouth Every 6 (Six) Hours As Needed., Disp: , Rfl:   •  pantoprazole (PROTONIX) 40 MG EC tablet, Take 40 mg by mouth Daily., Disp: , Rfl:   •  simvastatin (ZOCOR) 40 MG tablet, Take 40 mg by mouth Every Night., Disp: , Rfl:   •  vitamin D (ERGOCALCIFEROL) 1.25 MG (07558 UT) capsule capsule, Take 50,000 Units by mouth Every 7 (Seven) Days. Patient usually takes on Saturday morning, Disp: , Rfl:   •  aspirin 81 MG EC tablet, Take 1 tablet by mouth Daily., Disp: 150 tablet, Rfl: 11  •  Blood Glucose Monitoring Suppl (Prodigy Voice Blood Glucose) w/Device kit, 1 each 4 (Four) Times a Day., Disp: , Rfl:   •  Continuous Blood Gluc Sensor (FreeStyle Triston 14 Day Sensor) misc, , Disp: , Rfl:   •  Diclofenac Sodium (VOLTAREN) 1 % gel gel, Apply 4 g topically to the appropriate area as directed 4 (Four) Times a Day As Needed., Disp: , Rfl:   •  glucose blood (Prodigy No Coding Blood Gluc) test strip, Use 1 each 4 (four) times daily for blood glucose., Disp: , Rfl:   •  glucose blood test strip, Prodigy Voice Glucose Meter kit, Disp: , Rfl:   •  Insulin  "Glargine (BASAGLAR KWIKPEN) 100 UNIT/ML injection pen, Inject 10 Units under the skin into the appropriate area as directed Every Night. Patient uses this when in the hospital but uses lantus at Home, Disp: , Rfl: 0  •  Insulin Lispro, 1 Unit Dial, (HUMALOG) 100 UNIT/ML solution pen-injector, INJECT 10 TO 15 UNITS UNDER THE SKIN THREE TIMES A DAY WITH A MEAL., Disp: , Rfl:   •  MELATONIN PO, Take 5 mg by mouth Every Evening., Disp: , Rfl:   •  Prodigy No Coding Blood Gluc test strip, , Disp: , Rfl:   •  Prodigy Twist Top Lancets 28G misc, Prodigy Twist Top Lancet 28 gauge, Disp: , Rfl:   •  tiZANidine (ZANAFLEX) 4 MG tablet, Take 4 mg by mouth Every 6 (Six) Hours As Needed., Disp: , Rfl:     \"The following portions of the patient's history were reviewed and updated as appropriate: allergies, current medications, past family history, past medical history, past social history, past surgical history and problem list.\"  Review and Interpretation of Imaging:  No new imaging reviewed  Laboratory Results:             Lab Results   Component Value Date    HGBA1C 6.00 (H) 04/09/2021         Lab Results   Component Value Date    CHOL 132 04/09/2021    CHOL 128 07/16/2020    CHOL 165 06/07/2018         Lab Results   Component Value Date    HDL 41 04/09/2021    HDL 37 (L) 07/20/2020    HDL 32 (L) 07/16/2020         Lab Results   Component Value Date    LDL 76 04/09/2021    LDL 97 07/20/2020    LDL 75 07/16/2020         Lab Results   Component Value Date    TRIG 78 04/09/2021    TRIG 146 07/20/2020    TRIG 103 07/16/2020     No results found for: RPR  Lab Results   Component Value Date    TSH 2.610 04/09/2021     Lab Results   Component Value Date    MQZIMEQH44 >2,000 (H) 07/16/2020     Physical Examination: NIHSS: 0     mRS: 2 (does not drive d/t vision impairement)   General Appearance:           Well developed, well nourished, well groomed, alert, and cooperative.  HEENT:            Normocephalic.    Neck and Spine:        "  Normal range of motion.  Normal alignment. No mass or tenderness. No bruits.  Cardiac:                                 Regular rate and rhythm. No murmurs.  Peripheral Vasculature:       Radial and pedal pulses are equal and     symmetric.   Extremities:                           No edema or deformities. Normal joint     ROM.  Skin:                                       No rashes or birth marks.     Neurological examination:  Higher Integrative  Function:         Oriented to time, place and person. Normal registration, recall, attention span and concentration. Normal language including comprehension, spontaneous speech, repetition, reading, writing, naming and vocabulary. No neglect with normal visual-spatial function and construction. Normal fund of knowledge and higher integrative function.  CN II:    Pupils are equal, round, and reactive to light. Normal visual acuity and visual fields except left homonymous hemianopsia still present (originally seen 2017)  CN III IV VI:      Extraocular movements are full without nystagmus.   CN V:   Normal facial sensation and strength of muscles of mastication.  CN VII:             Facial movements are symmetric. No weakness.  CN VIII:                                   Auditory acuity is normal.  CN IX & X:                              Symmetric palatal movement.  CN XI:  Sternocleidomastoid and trapezius are normal.  No weakness.  CN XII:                                    The tongue is midline.  No atrophy or     fasciculations.  Motor:  Normal muscle strength, bulk and tone in upper and lower extremities.  No fasciculations, rigidity, spasticity, or abnormal movements.  Reflexes:         Plantar responses are flexor.  Sensation:       Normal to light touch, pinprick, vibration, temperature in arms and legs except decreased from knees down bilateral lower extremities left greater than  Station and Gait:        Normal gait and station.    Coordination:             Finger to  nose test shows no dysmetria.  Rapid alternating movements are slowed.        Diagnoses:    1. Right temporal lobe infarct (December 2017)  2. Metabolic Encephalopathy; secondary to medication-recent admission July 2020   3. Suspected peripheral neuropathy likely secondary to uncontrolled blood sugar; A1C 7/2020 8.74, 4/21 6.00  4. CKD--stage III-IV  started dialysis 4/21 M,W,F   5. At risk for TWYLA   6.  Long-term opioid use  7.  Increased risk for falls due to #6    Plan:   Rx alternatives topical to a cream for neuropathic  discomfort-previously tried gabapentin 400 mg 3 times daily  which was too sedating for him with prescribed Percocet   Recommend resuming B12 and low-dose aspirin   Sleep medicine referral   Labs: Treatable cause peripheral neuropathy-to be completed  with next dialysis   Blood pressure control to <130/80   Goal LDL <70-recommend high dose statins-    Serum glucose < 140   Call 911 for stroke any stroke symptoms   Follow-up with me in 6 months  Diagnoses and all orders for this visit:    1. Acute CVA (cerebrovascular accident) ([facility]) (Primary)  -     aspirin 81 MG EC tablet; Take 1 tablet by mouth Daily.  Dispense: 150 tablet; Refill: 11  -     C-reactive Protein; Future    2. Numbness and tingling of both lower extremities  -     Copper, Serum; Future  -     Protein Elec + Interp, Serum; Future  -     Cryoglobulin; Future  -     Sedimentation Rate; Future  -     Vitamin B12; Future    3. Hypertension, unspecified type    4. Hyperlipidemia, unspecified hyperlipidemia type    5. Type 2 diabetes mellitus with hyperglycemia, without long-term current use of insulin ([facility])    6. B12 deficiency    7. Stage 3 chronic kidney disease, unspecified whether stage 3a or 3b CKD ([facility])

## 2021-10-13 ENCOUNTER — TELEPHONE (OUTPATIENT)
Dept: NEUROLOGY | Facility: CLINIC | Age: 61
End: 2021-10-13

## 2021-10-13 NOTE — TELEPHONE ENCOUNTER
PT'S WIFE CALLED. TOLU LIRA HAD ORDERED LABS AND PT WAS DOING DIALYSIS AND WAS WANTING TO KNOW IF THE OFFICE COULD FAX THOSE ORDERS TO THE [facility] ON [facility].    I WAS NOT SURE IF THAT WOULD BE POSSIBLE SO I CALLED OFFICE AND SPOKE WITH RADHA AND SHE SAID SHE WOULD CALL TO GET THE FAX NO SINCE PT'S WIFE DIDN'T HAVE THE FAX NUMBER AND SEND THE ORDERS OVER.

## 2021-10-13 NOTE — TELEPHONE ENCOUNTER
"Faxed lab orders to [facility] per patient's wife's request at [phone].     Unable to complete fax.  Received \"no answer\" multiple times.      Called Radha at [phone] to speak with Tiara.  She states that they cannot draw labs for other providers or add on labs.  Spoke with patient's wife to let her know that they will still need to go elsewhere to have the labs drawn.  She states she will take him to the outpatient lab at [facility].  "

## 2021-11-02 ENCOUNTER — CLINICAL SUPPORT (OUTPATIENT)
Dept: ONCOLOGY | Facility: CLINIC | Age: 61
End: 2021-11-02

## 2021-11-02 ENCOUNTER — OFFICE VISIT (OUTPATIENT)
Dept: ONCOLOGY | Facility: CLINIC | Age: 61
End: 2021-11-02

## 2021-11-02 ENCOUNTER — LAB (OUTPATIENT)
Dept: LAB | Facility: HOSPITAL | Age: 61
End: 2021-11-02

## 2021-11-02 ENCOUNTER — APPOINTMENT (OUTPATIENT)
Dept: LAB | Facility: HOSPITAL | Age: 61
End: 2021-11-02

## 2021-11-02 VITALS
WEIGHT: 181.7 LBS | BODY MASS INDEX: 26.91 KG/M2 | TEMPERATURE: 98.2 F | OXYGEN SATURATION: 98 % | SYSTOLIC BLOOD PRESSURE: 105 MMHG | HEART RATE: 78 BPM | RESPIRATION RATE: 16 BRPM | DIASTOLIC BLOOD PRESSURE: 65 MMHG | HEIGHT: 69 IN

## 2021-11-02 DIAGNOSIS — N18.6 ANEMIA IN CHRONIC KIDNEY DISEASE, ON CHRONIC DIALYSIS (HCC): Primary | ICD-10-CM

## 2021-11-02 DIAGNOSIS — Z99.2 ANEMIA IN CHRONIC KIDNEY DISEASE, ON CHRONIC DIALYSIS (HCC): Primary | ICD-10-CM

## 2021-11-02 DIAGNOSIS — D63.1 ANEMIA IN CHRONIC KIDNEY DISEASE, ON CHRONIC DIALYSIS (HCC): Primary | ICD-10-CM

## 2021-11-02 DIAGNOSIS — D63.1 ANEMIA IN CHRONIC KIDNEY DISEASE, ON CHRONIC DIALYSIS (HCC): ICD-10-CM

## 2021-11-02 DIAGNOSIS — E53.8 B12 DEFICIENCY: ICD-10-CM

## 2021-11-02 DIAGNOSIS — R20.0 NUMBNESS AND TINGLING OF BOTH LOWER EXTREMITIES: ICD-10-CM

## 2021-11-02 DIAGNOSIS — D50.8 OTHER IRON DEFICIENCY ANEMIA: ICD-10-CM

## 2021-11-02 DIAGNOSIS — Z99.2 ANEMIA IN CHRONIC KIDNEY DISEASE, ON CHRONIC DIALYSIS (HCC): ICD-10-CM

## 2021-11-02 DIAGNOSIS — R20.2 NUMBNESS AND TINGLING OF BOTH LOWER EXTREMITIES: ICD-10-CM

## 2021-11-02 DIAGNOSIS — N18.30 ANEMIA OF CHRONIC RENAL FAILURE, STAGE 3 (MODERATE), UNSPECIFIED WHETHER STAGE 3A OR 3B CKD (HCC): ICD-10-CM

## 2021-11-02 DIAGNOSIS — N18.6 ANEMIA IN CHRONIC KIDNEY DISEASE, ON CHRONIC DIALYSIS (HCC): ICD-10-CM

## 2021-11-02 DIAGNOSIS — D63.1 ANEMIA OF CHRONIC RENAL FAILURE, STAGE 3 (MODERATE), UNSPECIFIED WHETHER STAGE 3A OR 3B CKD (HCC): ICD-10-CM

## 2021-11-02 DIAGNOSIS — D61.818 PANCYTOPENIA, ACQUIRED (HCC): ICD-10-CM

## 2021-11-02 LAB
BASOPHILS # BLD AUTO: 0.04 10*3/MM3 (ref 0–0.2)
BASOPHILS NFR BLD AUTO: 0.7 % (ref 0–1.5)
DEPRECATED RDW RBC AUTO: 48.3 FL (ref 37–54)
EOSINOPHIL # BLD AUTO: 0.26 10*3/MM3 (ref 0–0.4)
EOSINOPHIL NFR BLD AUTO: 4.3 % (ref 0.3–6.2)
ERYTHROCYTE [DISTWIDTH] IN BLOOD BY AUTOMATED COUNT: 13.8 % (ref 12.3–15.4)
FERRITIN SERPL-MCNC: 560 NG/ML (ref 30–400)
HCT VFR BLD AUTO: 32.6 % (ref 37.5–51)
HGB BLD-MCNC: 10.6 G/DL (ref 13–17.7)
IMM GRANULOCYTES # BLD AUTO: 0.02 10*3/MM3 (ref 0–0.05)
IMM GRANULOCYTES NFR BLD AUTO: 0.3 % (ref 0–0.5)
IRON 24H UR-MRATE: 172 MCG/DL (ref 59–158)
IRON SATN MFR SERPL: 68 % (ref 20–50)
LYMPHOCYTES # BLD AUTO: 1.33 10*3/MM3 (ref 0.7–3.1)
LYMPHOCYTES NFR BLD AUTO: 22 % (ref 19.6–45.3)
MCH RBC QN AUTO: 31.2 PG (ref 26.6–33)
MCHC RBC AUTO-ENTMCNC: 32.5 G/DL (ref 31.5–35.7)
MCV RBC AUTO: 95.9 FL (ref 79–97)
MONOCYTES # BLD AUTO: 0.49 10*3/MM3 (ref 0.1–0.9)
MONOCYTES NFR BLD AUTO: 8.1 % (ref 5–12)
NEUTROPHILS NFR BLD AUTO: 3.91 10*3/MM3 (ref 1.7–7)
NEUTROPHILS NFR BLD AUTO: 64.6 % (ref 42.7–76)
NRBC BLD AUTO-RTO: 0 /100 WBC (ref 0–0.2)
PLATELET # BLD AUTO: 105 10*3/MM3 (ref 140–450)
PMV BLD AUTO: 10.7 FL (ref 6–12)
RBC # BLD AUTO: 3.4 10*6/MM3 (ref 4.14–5.8)
TIBC SERPL-MCNC: 255 MCG/DL (ref 298–536)
TRANSFERRIN SERPL-MCNC: 171 MG/DL (ref 200–360)
VIT B12 BLD-MCNC: 1017 PG/ML (ref 211–946)
WBC # BLD AUTO: 6.05 10*3/MM3 (ref 3.4–10.8)

## 2021-11-02 PROCEDURE — 36415 COLL VENOUS BLD VENIPUNCTURE: CPT

## 2021-11-02 PROCEDURE — 99214 OFFICE O/P EST MOD 30 MIN: CPT | Performed by: NURSE PRACTITIONER

## 2021-11-02 PROCEDURE — 82607 VITAMIN B-12: CPT | Performed by: NURSE PRACTITIONER

## 2021-11-02 PROCEDURE — 85025 COMPLETE CBC W/AUTO DIFF WBC: CPT

## 2021-11-02 PROCEDURE — 82728 ASSAY OF FERRITIN: CPT | Performed by: NURSE PRACTITIONER

## 2021-11-02 PROCEDURE — 83540 ASSAY OF IRON: CPT | Performed by: NURSE PRACTITIONER

## 2021-11-02 PROCEDURE — 84466 ASSAY OF TRANSFERRIN: CPT | Performed by: NURSE PRACTITIONER
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
## 2021-11-02 NOTE — PROGRESS NOTES
Oncology Social Work  Case Management Consult    OSW received referral from Tamar Gauthier due to financial concerns reported by pt and his wife, Jake. OSW met with pt and wife. Both are on SSD, and jake reports that a hold has been placed on her disability benefits and this has caused significant financial strain for them. She shares that due to pt's being on dialysis and changes in other conditions, he is now on a specific diet, which has created a significant impact upon them financially. Pt reports that he has significant bills from [employer], some of which have already gone to collections.    Interventions  1. OSW provided pt and his wife with the number for central billing and encouraged them to call to make arrangements for a payment plan for their outstanding bills.  They v.u. and stated that they would follow up.  2. OSW provided pt and his wife with an application for financial assistance for [employer] and provided education on how to complete application. They v.u. and stated that they would work on this.  3. OSW provided them with an application and instructions for completion for [employer]. They report that this is a benefit that they have utilized in the past and that they feel confident pursuing this again.  4. OSW provided pt and wife with a SNAP application and instructions on how to apply. They v.u. and stated that they would be following up on this.  5. OSW encouraged pt's wife to contact social security to begin process of appealing this decision, as they have 10 days to appeal. She reports that the letter states that if there are medical or other reasons that the benefits should be reinstated, that this needs to be submitted. OSW offered to write a letter of support, and pt's wife will return on Friday to make a copy of the letter with further instructions from [facility].  6. OSW received verbal permission to place referral with Cara Jones, regarding pt's specialized diet, and the financial strain it has  caused.    Pt and his wife stated that they were very grateful for this support. OSW provided them with her business card and will remain available for additional support, as needed.    Ester Neal, KARIMEW  Oncology Social Worker

## 2021-11-03 ENCOUNTER — APPOINTMENT (OUTPATIENT)
Dept: LAB | Facility: HOSPITAL | Age: 61
End: 2021-11-03

## 2021-11-03 LAB
ALBUMIN SERPL ELPH-MCNC: 3.7 G/DL (ref 2.9–4.4)
ALBUMIN/GLOB SERPL: 1.1 {RATIO} (ref 0.7–1.7)
ALPHA1 GLOB SERPL ELPH-MCNC: 0.3 G/DL (ref 0–0.4)
ALPHA2 GLOB SERPL ELPH-MCNC: 0.9 G/DL (ref 0.4–1)
B-GLOBULIN SERPL ELPH-MCNC: 0.9 G/DL (ref 0.7–1.3)
GAMMA GLOB SERPL ELPH-MCNC: 1.3 G/DL (ref 0.4–1.8)
GLOBULIN SER CALC-MCNC: 3.4 G/DL (ref 2.2–3.9)
LABORATORY COMMENT REPORT: NORMAL
M PROTEIN SERPL ELPH-MCNC: NORMAL G/DL
PROT PATTERN SERPL ELPH-IMP: NORMAL
PROT SERPL-MCNC: 7.1 G/DL (ref 6–8.5)

## 2021-11-04 NOTE — PROGRESS NOTES
Attempted to call patient regarding lab results today.  Ferritin and iron panel without iron deficiency.  B12 level sufficient and patient does not need current supplementation as he has been off this for some time.  Voicemail left to return phone call for these results, may be discussed with patient when he calls back into the office.

## 2021-11-05 ENCOUNTER — DOCUMENTATION (OUTPATIENT)
Dept: ONCOLOGY | Facility: CLINIC | Age: 61
End: 2021-11-05

## 2021-11-05 NOTE — PROGRESS NOTES
"Oncology Social Work    OSW met with pt's wife, Samantha, while pt was at [facility], to follow up on discussions regarding SSD and SNAP applications. Samantha was happy to share that upon leaving her last appt with OSW, that she found over $1,400 in her bank account, deposited by , as payments for October and November. She shared with OSW the letters she received stating that her payments would be reinstated moving forward, but would be lessened due to purported money owed. She has already gone to the bank, and printed out statements from the last 2 years, with emphasis on the months in questions, to provide as supporting documentation for her appeal. OSW also provided her with a letter supporting her appeal, and aided her in printing out the information needed for the appeal. Samantha is on disability due to her anxiety disorder and seizure disorder, and she reports that her social anxiety makes communication challenging for her. OSW aided Samantha by composing and printing out scripts for her to utilize when calling SSD to request an appeal. OSW also aided Samantha by printing out a script for her to use when calling to request reinstatement of SNAP benefits for herself and her , the pt. OSW provided words of support and encouragement, recognizing Samantha's strength and the positive steps she has already taken in these processes. Samantha reports that she will feel confident in being able to use the scripts and the other information provided in navigating these next steps. OSW encouraged her to follow up with any additional questions or needs. Samantha stated, \" You will be blessed for all the help that you've given us!\"  Samantha denied any further needs at this time, and OSW will remain available to provide additional support, as needed.    Ester Neal, [facility]  Oncology Social Worker      "

## 2021-11-08 LAB — CRYOGLOB SER QL 1D COLD INC: NORMAL

## 2021-11-15 ENCOUNTER — TELEPHONE (OUTPATIENT)
Dept: ONCOLOGY | Facility: CLINIC | Age: 61
End: 2021-11-15

## 2021-12-01 ENCOUNTER — APPOINTMENT (OUTPATIENT)
Dept: GENERAL RADIOLOGY | Facility: HOSPITAL | Age: 61
End: 2021-12-01

## 2021-12-01 ENCOUNTER — HOSPITAL ENCOUNTER (OUTPATIENT)
Facility: HOSPITAL | Age: 61
Setting detail: OBSERVATION
LOS: 2 days | Discharge: HOME | End: 2021-12-03
Attending: EMERGENCY MEDICINE | Admitting: HOSPITALIST

## 2021-12-01 DIAGNOSIS — E87.5 ACUTE HYPERKALEMIA: ICD-10-CM

## 2021-12-01 DIAGNOSIS — D69.6 THROMBOCYTOPENIA (HCC): ICD-10-CM

## 2021-12-01 DIAGNOSIS — Z99.2 ESRD (END STAGE RENAL DISEASE) ON DIALYSIS (HCC): Primary | ICD-10-CM

## 2021-12-01 DIAGNOSIS — T82.590A MALFUNCTION OF ARTERIOVENOUS DIALYSIS FISTULA, INITIAL ENCOUNTER (HCC): ICD-10-CM

## 2021-12-01 DIAGNOSIS — N18.6 ESRD (END STAGE RENAL DISEASE) ON DIALYSIS (HCC): Primary | ICD-10-CM

## 2021-12-01 LAB
ALBUMIN SERPL-MCNC: 4.6 G/DL (ref 3.5–5.2)
ANION GAP SERPL CALCULATED.3IONS-SCNC: 6 MMOL/L (ref 5–15)
APTT PPP: 22.9 SECONDS (ref 22.7–35.4)
BASOPHILS # BLD MANUAL: 0.05 10*3/MM3 (ref 0–0.2)
BASOPHILS NFR BLD MANUAL: 1 % (ref 0–1.5)
BUN SERPL-MCNC: 76 MG/DL (ref 6–20)
BUN/CREAT SERPL: 9.5 (ref 7–25)
CALCIUM SPEC-SCNC: 9.2 MG/DL (ref 8.6–10.5)
CHLORIDE SERPL-SCNC: 115 MMOL/L (ref 98–107)
CO2 SERPL-SCNC: 23 MMOL/L (ref 22–29)
CREAT SERPL-MCNC: 8 MG/DL (ref 0.76–1.27)
DEPRECATED RDW RBC AUTO: 49 FL (ref 37–54)
EOSINOPHIL # BLD MANUAL: 0.35 10*3/MM3 (ref 0–0.4)
EOSINOPHIL NFR BLD MANUAL: 7.3 % (ref 0.3–6.2)
ERYTHROCYTE [DISTWIDTH] IN BLOOD BY AUTOMATED COUNT: 13.3 % (ref 12.3–15.4)
GFR SERPL CREATININE-BSD FRML MDRD: 7 ML/MIN/1.73
GFR SERPL CREATININE-BSD FRML MDRD: ABNORMAL ML/MIN/{1.73_M2}
GLUCOSE SERPL-MCNC: 175 MG/DL (ref 65–99)
HCT VFR BLD AUTO: 33.9 % (ref 37.5–51)
HGB BLD-MCNC: 10.5 G/DL (ref 13–17.7)
INR PPP: 0.98 (ref 0.9–1.1)
LYMPHOCYTES # BLD MANUAL: 1.21 10*3/MM3 (ref 0.7–3.1)
LYMPHOCYTES NFR BLD MANUAL: 2.1 % (ref 5–12)
MCH RBC QN AUTO: 31.2 PG (ref 26.6–33)
MCHC RBC AUTO-ENTMCNC: 31 G/DL (ref 31.5–35.7)
MCV RBC AUTO: 100.6 FL (ref 79–97)
MONOCYTES # BLD: 0.1 10*3/MM3 (ref 0.1–0.9)
NEUTROPHILS # BLD AUTO: 3.13 10*3/MM3 (ref 1.7–7)
NEUTROPHILS NFR BLD MANUAL: 64.6 % (ref 42.7–76)
PHOSPHATE SERPL-MCNC: 4.9 MG/DL (ref 2.5–4.5)
PLAT MORPH BLD: NORMAL
PLATELET # BLD AUTO: 94 10*3/MM3 (ref 140–450)
PMV BLD AUTO: 11.4 FL (ref 6–12)
POTASSIUM SERPL-SCNC: 6.8 MMOL/L (ref 3.5–5.2)
PROTHROMBIN TIME: 12.8 SECONDS (ref 11.7–14.2)
RBC # BLD AUTO: 3.37 10*6/MM3 (ref 4.14–5.8)
RBC MORPH BLD: NORMAL
SARS-COV-2 RNA PNL SPEC NAA+PROBE: NOT DETECTED
SODIUM SERPL-SCNC: 144 MMOL/L (ref 136–145)
VARIANT LYMPHS NFR BLD MANUAL: 25 % (ref 19.6–45.3)
WBC MORPH BLD: NORMAL
WBC NRBC COR # BLD: 4.85 10*3/MM3 (ref 3.4–10.8)

## 2021-12-01 PROCEDURE — 85007 BL SMEAR W/DIFF WBC COUNT: CPT | Performed by: EMERGENCY MEDICINE

## 2021-12-01 PROCEDURE — 80069 RENAL FUNCTION PANEL: CPT | Performed by: EMERGENCY MEDICINE

## 2021-12-01 PROCEDURE — 71045 X-RAY EXAM CHEST 1 VIEW: CPT

## 2021-12-01 PROCEDURE — 85025 COMPLETE CBC W/AUTO DIFF WBC: CPT | Performed by: EMERGENCY MEDICINE

## 2021-12-01 PROCEDURE — G0378 HOSPITAL OBSERVATION PER HR: HCPCS

## 2021-12-01 PROCEDURE — 85610 PROTHROMBIN TIME: CPT | Performed by: EMERGENCY MEDICINE

## 2021-12-01 PROCEDURE — 96374 THER/PROPH/DIAG INJ IV PUSH: CPT

## 2021-12-01 PROCEDURE — 96375 TX/PRO/DX INJ NEW DRUG ADDON: CPT

## 2021-12-01 PROCEDURE — 99284 EMERGENCY DEPT VISIT MOD MDM: CPT

## 2021-12-01 PROCEDURE — 87635 SARS-COV-2 COVID-19 AMP PRB: CPT | Performed by: EMERGENCY MEDICINE

## 2021-12-01 PROCEDURE — 36415 COLL VENOUS BLD VENIPUNCTURE: CPT

## 2021-12-01 PROCEDURE — 85730 THROMBOPLASTIN TIME PARTIAL: CPT | Performed by: EMERGENCY MEDICINE

## 2021-12-01 PROCEDURE — C9803 HOPD COVID-19 SPEC COLLECT: HCPCS

## 2021-12-01 PROCEDURE — 63710000001 INSULIN REGULAR HUMAN PER 5 UNITS: Performed by: EMERGENCY MEDICINE

## 2021-12-01 PROCEDURE — 25010000002 CALCIUM GLUCONATE-NACL 1-0.675 GM/50ML-% SOLUTION: Performed by: EMERGENCY MEDICINE

## 2021-12-01 PROCEDURE — 93005 ELECTROCARDIOGRAM TRACING: CPT | Performed by: EMERGENCY MEDICINE

## 2021-12-01 RX ORDER — ONDANSETRON 4 MG/1
4 TABLET, FILM COATED ORAL EVERY 6 HOURS PRN
Status: DISCONTINUED | OUTPATIENT
Start: 2021-12-01 | End: 2021-12-03 | Stop reason: HOSPADM

## 2021-12-01 RX ORDER — ACETAMINOPHEN 325 MG/1
650 TABLET ORAL EVERY 4 HOURS PRN
Status: DISCONTINUED | OUTPATIENT
Start: 2021-12-01 | End: 2021-12-03 | Stop reason: HOSPADM

## 2021-12-01 RX ORDER — DEXTROSE MONOHYDRATE 25 G/50ML
25 INJECTION, SOLUTION INTRAVENOUS
Status: DISCONTINUED | OUTPATIENT
Start: 2021-12-01 | End: 2021-12-03 | Stop reason: HOSPADM

## 2021-12-01 RX ORDER — INSULIN LISPRO 100 [IU]/ML
0-9 INJECTION, SOLUTION INTRAVENOUS; SUBCUTANEOUS
Status: DISCONTINUED | OUTPATIENT
Start: 2021-12-02 | End: 2021-12-02

## 2021-12-01 RX ORDER — ONDANSETRON 2 MG/ML
4 INJECTION INTRAMUSCULAR; INTRAVENOUS EVERY 6 HOURS PRN
Status: DISCONTINUED | OUTPATIENT
Start: 2021-12-01 | End: 2021-12-03 | Stop reason: HOSPADM

## 2021-12-01 RX ORDER — DEXTROSE MONOHYDRATE 25 G/50ML
50 INJECTION, SOLUTION INTRAVENOUS ONCE
Status: COMPLETED | OUTPATIENT
Start: 2021-12-01 | End: 2021-12-01

## 2021-12-01 RX ORDER — ACETAMINOPHEN 650 MG/1
650 SUPPOSITORY RECTAL EVERY 4 HOURS PRN
Status: DISCONTINUED | OUTPATIENT
Start: 2021-12-01 | End: 2021-12-03 | Stop reason: HOSPADM

## 2021-12-01 RX ORDER — CALCIUM CARBONATE 200(500)MG
2 TABLET,CHEWABLE ORAL 2 TIMES DAILY PRN
Status: DISCONTINUED | OUTPATIENT
Start: 2021-12-01 | End: 2021-12-03 | Stop reason: HOSPADM

## 2021-12-01 RX ORDER — ACETAMINOPHEN 160 MG/5ML
650 SOLUTION ORAL EVERY 4 HOURS PRN
Status: DISCONTINUED | OUTPATIENT
Start: 2021-12-01 | End: 2021-12-03 | Stop reason: HOSPADM

## 2021-12-01 RX ORDER — SODIUM CHLORIDE 0.9 % (FLUSH) 0.9 %
10 SYRINGE (ML) INJECTION AS NEEDED
Status: DISCONTINUED | OUTPATIENT
Start: 2021-12-01 | End: 2021-12-03 | Stop reason: HOSPADM

## 2021-12-01 RX ORDER — CALCIUM GLUCONATE 20 MG/ML
1 INJECTION, SOLUTION INTRAVENOUS ONCE
Status: COMPLETED | OUTPATIENT
Start: 2021-12-01 | End: 2021-12-01

## 2021-12-01 RX ORDER — NICOTINE POLACRILEX 4 MG
15 LOZENGE BUCCAL
Status: DISCONTINUED | OUTPATIENT
Start: 2021-12-01 | End: 2021-12-03 | Stop reason: HOSPADM

## 2021-12-01 RX ORDER — NITROGLYCERIN 0.4 MG/1
0.4 TABLET SUBLINGUAL
Status: DISCONTINUED | OUTPATIENT
Start: 2021-12-01 | End: 2021-12-03 | Stop reason: HOSPADM

## 2021-12-01 RX ORDER — SODIUM CHLORIDE 0.9 % (FLUSH) 0.9 %
10 SYRINGE (ML) INJECTION EVERY 12 HOURS SCHEDULED
Status: DISCONTINUED | OUTPATIENT
Start: 2021-12-01 | End: 2021-12-03 | Stop reason: HOSPADM

## 2021-12-01 RX ADMIN — SODIUM BICARBONATE 50 MEQ: 84 INJECTION, SOLUTION INTRAVENOUS at 22:09

## 2021-12-01 RX ADMIN — CALCIUM GLUCONATE 1 G: 20 INJECTION, SOLUTION INTRAVENOUS at 22:12

## 2021-12-01 RX ADMIN — DEXTROSE MONOHYDRATE 50 ML: 500 INJECTION PARENTERAL at 22:07

## 2021-12-01 RX ADMIN — INSULIN HUMAN 10 UNITS: 100 INJECTION, SOLUTION PARENTERAL at 22:11

## 2021-12-01 NOTE — ED TRIAGE NOTES
.Pt masked on arrival, staff masked    Family reports unable to get full dialysis on Monday d/t poss clot in fistula, unable to see surgeon until 12/30

## 2021-12-02 LAB
ALBUMIN SERPL-MCNC: 4.7 G/DL (ref 3.5–5.2)
ALBUMIN/GLOB SERPL: 1.6 G/DL
ALP SERPL-CCNC: 81 U/L (ref 39–117)
ALT SERPL W P-5'-P-CCNC: 18 U/L (ref 1–41)
ANION GAP SERPL CALCULATED.3IONS-SCNC: 11.6 MMOL/L (ref 5–15)
AST SERPL-CCNC: 18 U/L (ref 1–40)
BILIRUB SERPL-MCNC: 0.4 MG/DL (ref 0–1.2)
BUN SERPL-MCNC: 33 MG/DL (ref 6–20)
BUN/CREAT SERPL: 7.1 (ref 7–25)
CALCIUM SPEC-SCNC: 9.3 MG/DL (ref 8.6–10.5)
CHLORIDE SERPL-SCNC: 102 MMOL/L (ref 98–107)
CO2 SERPL-SCNC: 23.4 MMOL/L (ref 22–29)
CREAT SERPL-MCNC: 4.64 MG/DL (ref 0.76–1.27)
DEPRECATED RDW RBC AUTO: 45.4 FL (ref 37–54)
ERYTHROCYTE [DISTWIDTH] IN BLOOD BY AUTOMATED COUNT: 13.1 % (ref 12.3–15.4)
GFR SERPL CREATININE-BSD FRML MDRD: 13 ML/MIN/1.73
GFR SERPL CREATININE-BSD FRML MDRD: ABNORMAL ML/MIN/{1.73_M2}
GLOBULIN UR ELPH-MCNC: 2.9 GM/DL
GLUCOSE BLDC GLUCOMTR-MCNC: 115 MG/DL (ref 70–130)
GLUCOSE BLDC GLUCOMTR-MCNC: 177 MG/DL (ref 70–130)
GLUCOSE BLDC GLUCOMTR-MCNC: 197 MG/DL (ref 70–130)
GLUCOSE BLDC GLUCOMTR-MCNC: 206 MG/DL (ref 70–130)
GLUCOSE SERPL-MCNC: 185 MG/DL (ref 65–99)
HBA1C MFR BLD: 7.17 % (ref 4.8–5.6)
HBV SURFACE AG SERPL QL IA: NORMAL
HCT VFR BLD AUTO: 32.2 % (ref 37.5–51)
HGB BLD-MCNC: 10.8 G/DL (ref 13–17.7)
MCH RBC QN AUTO: 32 PG (ref 26.6–33)
MCHC RBC AUTO-ENTMCNC: 33.5 G/DL (ref 31.5–35.7)
MCV RBC AUTO: 95.3 FL (ref 79–97)
PLATELET # BLD AUTO: 81 10*3/MM3 (ref 140–450)
PMV BLD AUTO: 11.6 FL (ref 6–12)
POTASSIUM SERPL-SCNC: 5.3 MMOL/L (ref 3.5–5.2)
PROT SERPL-MCNC: 7.6 G/DL (ref 6–8.5)
QT INTERVAL: 395 MS
RBC # BLD AUTO: 3.38 10*6/MM3 (ref 4.14–5.8)
SODIUM SERPL-SCNC: 137 MMOL/L (ref 136–145)
WBC NRBC COR # BLD: 4.51 10*3/MM3 (ref 3.4–10.8)

## 2021-12-02 PROCEDURE — G0378 HOSPITAL OBSERVATION PER HR: HCPCS

## 2021-12-02 PROCEDURE — 83036 HEMOGLOBIN GLYCOSYLATED A1C: CPT | Performed by: HOSPITALIST

## 2021-12-02 PROCEDURE — 85027 COMPLETE CBC AUTOMATED: CPT | Performed by: HOSPITALIST

## 2021-12-02 PROCEDURE — 80053 COMPREHEN METABOLIC PANEL: CPT | Performed by: HOSPITALIST

## 2021-12-02 PROCEDURE — 93010 ELECTROCARDIOGRAM REPORT: CPT | Performed by: INTERNAL MEDICINE

## 2021-12-02 PROCEDURE — 87340 HEPATITIS B SURFACE AG IA: CPT | Performed by: INTERNAL MEDICINE

## 2021-12-02 PROCEDURE — G0257 UNSCHED DIALYSIS ESRD PT HOS: HCPCS

## 2021-12-02 PROCEDURE — 82962 GLUCOSE BLOOD TEST: CPT

## 2021-12-02 PROCEDURE — 63710000001 INSULIN GLARGINE PER 5 UNITS: Performed by: HOSPITALIST

## 2021-12-02 PROCEDURE — 63710000001 INSULIN LISPRO (HUMAN) PER 5 UNITS: Performed by: HOSPITALIST

## 2021-12-02 PROCEDURE — 63710000001 INSULIN LISPRO (HUMAN) PER 5 UNITS: Performed by: NURSE PRACTITIONER

## 2021-12-02 RX ORDER — CARVEDILOL 3.12 MG/1
3.12 TABLET ORAL 2 TIMES DAILY WITH MEALS
Status: DISCONTINUED | OUTPATIENT
Start: 2021-12-02 | End: 2021-12-03 | Stop reason: HOSPADM

## 2021-12-02 RX ORDER — INSULIN GLARGINE 100 [IU]/ML
10 INJECTION, SOLUTION SUBCUTANEOUS NIGHTLY
Status: DISCONTINUED | OUTPATIENT
Start: 2021-12-02 | End: 2021-12-03 | Stop reason: HOSPADM

## 2021-12-02 RX ORDER — AMLODIPINE BESYLATE 10 MG/1
10 TABLET ORAL DAILY
Status: DISCONTINUED | OUTPATIENT
Start: 2021-12-02 | End: 2021-12-03 | Stop reason: HOSPADM

## 2021-12-02 RX ORDER — ASPIRIN 81 MG/1
81 TABLET ORAL DAILY
Status: DISCONTINUED | OUTPATIENT
Start: 2021-12-02 | End: 2021-12-03 | Stop reason: HOSPADM

## 2021-12-02 RX ORDER — OXYCODONE AND ACETAMINOPHEN 10; 325 MG/1; MG/1
1 TABLET ORAL EVERY 6 HOURS PRN
Status: DISCONTINUED | OUTPATIENT
Start: 2021-12-02 | End: 2021-12-03 | Stop reason: HOSPADM

## 2021-12-02 RX ORDER — PANTOPRAZOLE SODIUM 40 MG/1
40 TABLET, DELAYED RELEASE ORAL DAILY
Status: DISCONTINUED | OUTPATIENT
Start: 2021-12-02 | End: 2021-12-03 | Stop reason: HOSPADM

## 2021-12-02 RX ORDER — INSULIN LISPRO 100 [IU]/ML
3 INJECTION, SOLUTION INTRAVENOUS; SUBCUTANEOUS
Status: DISCONTINUED | OUTPATIENT
Start: 2021-12-02 | End: 2021-12-03 | Stop reason: HOSPADM

## 2021-12-02 RX ORDER — HYDRALAZINE HYDROCHLORIDE 50 MG/1
100 TABLET, FILM COATED ORAL 2 TIMES DAILY
Status: DISCONTINUED | OUTPATIENT
Start: 2021-12-02 | End: 2021-12-03 | Stop reason: HOSPADM

## 2021-12-02 RX ORDER — LISINOPRIL 40 MG/1
40 TABLET ORAL EVERY MORNING
Status: DISCONTINUED | OUTPATIENT
Start: 2021-12-03 | End: 2021-12-03 | Stop reason: HOSPADM

## 2021-12-02 RX ORDER — INSULIN LISPRO 100 [IU]/ML
0-7 INJECTION, SOLUTION INTRAVENOUS; SUBCUTANEOUS
Status: DISCONTINUED | OUTPATIENT
Start: 2021-12-02 | End: 2021-12-03 | Stop reason: HOSPADM

## 2021-12-02 RX ORDER — TIZANIDINE 4 MG/1
4 TABLET ORAL EVERY 6 HOURS PRN
Status: DISCONTINUED | OUTPATIENT
Start: 2021-12-02 | End: 2021-12-03 | Stop reason: HOSPADM

## 2021-12-02 RX ORDER — ALPRAZOLAM 1 MG/1
1 TABLET ORAL 4 TIMES DAILY PRN
Status: DISCONTINUED | OUTPATIENT
Start: 2021-12-02 | End: 2021-12-03 | Stop reason: HOSPADM

## 2021-12-02 RX ADMIN — ASPIRIN 81 MG: 81 TABLET, COATED ORAL at 13:24

## 2021-12-02 RX ADMIN — PANTOPRAZOLE SODIUM 40 MG: 40 TABLET, DELAYED RELEASE ORAL at 13:24

## 2021-12-02 RX ADMIN — ALPRAZOLAM 1 MG: 1 TABLET ORAL at 21:14

## 2021-12-02 RX ADMIN — AMLODIPINE BESYLATE 10 MG: 10 TABLET ORAL at 16:21

## 2021-12-02 RX ADMIN — INSULIN LISPRO 4 UNITS: 100 INJECTION, SOLUTION INTRAVENOUS; SUBCUTANEOUS at 09:53

## 2021-12-02 RX ADMIN — HYDRALAZINE HYDROCHLORIDE 100 MG: 50 TABLET, FILM COATED ORAL at 20:33

## 2021-12-02 RX ADMIN — CARVEDILOL 3.12 MG: 3.12 TABLET, FILM COATED ORAL at 16:21

## 2021-12-02 RX ADMIN — ALPRAZOLAM 1 MG: 1 TABLET ORAL at 13:24

## 2021-12-02 RX ADMIN — SODIUM CHLORIDE, PRESERVATIVE FREE 10 ML: 5 INJECTION INTRAVENOUS at 20:33

## 2021-12-02 RX ADMIN — INSULIN LISPRO 2 UNITS: 100 INJECTION, SOLUTION INTRAVENOUS; SUBCUTANEOUS at 17:29

## 2021-12-02 RX ADMIN — OXYCODONE AND ACETAMINOPHEN 1 TABLET: 325; 10 TABLET ORAL at 12:36

## 2021-12-02 RX ADMIN — INSULIN GLARGINE 10 UNITS: 100 INJECTION, SOLUTION SUBCUTANEOUS at 21:14

## 2021-12-02 RX ADMIN — INSULIN LISPRO 3 UNITS: 100 INJECTION, SOLUTION INTRAVENOUS; SUBCUTANEOUS at 17:29

## 2021-12-02 RX ADMIN — OXYCODONE AND ACETAMINOPHEN 1 TABLET: 325; 10 TABLET ORAL at 21:14

## 2021-12-02 RX ADMIN — SODIUM ZIRCONIUM CYCLOSILICATE 10 G: 10 POWDER, FOR SUSPENSION ORAL at 00:27

## 2021-12-02 NOTE — CASE MANAGEMENT/SOCIAL WORK
Discharge Planning Assessment  [facility]     Patient Name: Angelo rBown  MRN: [id number]  Today's Date: 12/2/2021    Admit Date: 12/1/2021     Discharge Needs Assessment     Row Name 12/02/21 0914       Living Environment    Lives With child(emelyn), adult; spouse    Name(s) of Who Lives With Patient Spouse   (Samantha Brown  [phone]) and son  ( Jonah Brown [phone])    Current Living Arrangements home/apartment/condo    Primary Care Provided by self    Provides Primary Care For no one    Family Caregiver if Needed spouse; child(emelyn), adult    Family Caregiver Names Spouse   (Samantha Brown  [phone]) and son  ( Jonah Brown [phone])    Quality of Family Relationships helpful; involved; supportive    Able to Return to Prior Arrangements yes    Living Arrangement Comments Pt lives in a single story house with spouse   (Samantha Brown  [phone]) and son  ( Jonah Brown [phone]).       Resource/Environmental Concerns    Resource/Environmental Concerns none    Transportation Concerns car, none       Transition Planning    Patient/Family Anticipates Transition to home with family    Patient/Family Anticipated Services at Transition none    Transportation Anticipated family or friend will provide       Discharge Needs Assessment    Readmission Within the Last 30 Days no previous admission in last 30 days    Equipment Currently Used at Home bath bench; cane, straight; glucometer; rollator; wheelchair    Concerns to be Addressed no discharge needs identified; denies needs/concerns at this time    Anticipated Changes Related to Illness none    Equipment Needed After Discharge bath bench; cane, straight; glucometer; rollator; wheelchair, manual               Discharge Plan     Row Name 12/02/21 0926       Plan    Plan Plan home with family.   LISE Adams RN    Patient/Family in Agreement with Plan yes    Plan Comments FACE SHEET VERIFIED/ IM LETTER.  Spoke with pt and spouse  ( Samantha Brown) at bedside.    Pt's PCP  is Dr. Yadiel Baxter.   Pt lives in a single story house with spouse  ( Samantha Brown [phone]) and son  (Jonah Brown  [phone]).   Pt is independent with ADLs.  Pt has a bath bench, straight cane, glucometer, and rollator for home use.  Pt gets his prescriptions at [address] [place] [address].  Pt denies any issues affording medications.  Pt is not current with HH.  Pt has not been in SNF.  Pt is dialysis pt at [facility] on [address] M-W-F.  Pt's spouse assists pt at home.  Plan home with family.   LISE Adams RN              Continued Care and Services - Admitted Since 12/1/2021    Coordination has not been started for this encounter.          Demographic Summary     Row Name 12/02/21 0914       General Information    Admission Type inpatient    Arrived From emergency department    Required Notices Provided Important Message from Medicare    Referral Source admission list    Reason for Consult discharge planning    Preferred Language English     Used During This Interaction no               Functional Status     Row Name 12/02/21 0914       Functional Status    Usual Activity Tolerance moderate    Current Activity Tolerance moderate       Functional Status, IADL    Medications independent    Meal Preparation assistive person    Housekeeping assistive person    Laundry assistive person    Shopping assistive person       Mental Status    General Appearance WDL WDL               Psychosocial    No documentation.                Abuse/Neglect    No documentation.                Legal    No documentation.                Substance Abuse    No documentation.                Patient Forms    No documentation.                   Kyleigh Adams, RN

## 2021-12-02 NOTE — CASE MANAGEMENT/SOCIAL WORK
Continued Stay Note  [facility]     Patient Name: Angelo Brown  MRN: [id number]  Today's Date: 12/2/2021    Admit Date: 12/1/2021     Discharge Plan     Row Name 12/02/21 0926       Plan    Plan Plan home with family.   LISE Adams RN    Patient/Family in Agreement with Plan yes    Plan Comments FACE SHEET VERIFIED/ IM LETTER.  Spoke with pt and spouse  ( Samantha Brown) at bedside.    Pt's PCP is Dr. Yadiel Baxter.   Pt lives in a single story house with spouse  ( Samantha Brwon [phone]) and son  (Jonah Brown  [phone]).   Pt is independent with ADLs.  Pt has a bath bench, straight cane, glucometer, and rollator for home use.  Pt gets his prescriptions at [address] [place] [address].  Pt denies any issues affording medications.  Pt is not current with HH.  Pt has not been in SNF.  Pt is dialysis pt at [facility] on [address] M-W-F.  Pt's spouse assists pt at home.  Plan home with family.   LISE Adams RN               Discharge Codes    No documentation.                     Kyleigh Adams, RN

## 2021-12-02 NOTE — PLAN OF CARE
Goal Outcome Evaluation:  Plan of Care Reviewed With: patient        Progress: no change  Outcome Summary: Received from er potassium level 6.8, on dialysis at this time, tolerating, c/o pain right shoulder, no request for pain med at this time, VSS, NSR on monitor, dozing short intervals, will continue to monitor
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
## 2021-12-02 NOTE — PLAN OF CARE
Goal Outcome Evaluation:  Plan of Care Reviewed With: patient, spouse           Outcome Summary: Meds have been reconciled.  Pain med and Xanax given.  Pt resting in bed.  VSS.  Will cont to monitor.  Dialysis ordered and called in for tomorrow.  NSR on monitor.

## 2021-12-02 NOTE — PROGRESS NOTES
Name: Angelo Brown ADMIT: 2021   : 1960  PCP: Yadiel Baxter III, MD    MRN: [id number] LOS: 1 days   AGE/SEX: 61 y.o. male  ROOM: [facility]     Subjective   Subjective   He feels better. He had dialysis this morning. No chest pain or shortness of breath.    Review of Systems   Constitutional: Negative for fever.   Respiratory: Negative for cough and shortness of breath.    Cardiovascular: Negative for chest pain.   Gastrointestinal: Negative for abdominal pain.      Objective   Objective   Vital Signs  Temp:  [96.5 °F (35.8 °C)-98.6 °F (37 °C)] 98.6 °F (37 °C)  Heart Rate:  [62-80] 77  Resp:  [17-18] 17  BP: (129-173)/(75-91) 173/91  SpO2:  [97 %-100 %] 100 %  on   ;   Device (Oxygen Therapy): room air  Body mass index is 28.33 kg/m².    Physical Exam  Constitutional:       General: He is not in acute distress.     Appearance: Normal appearance. He is not toxic-appearing.   HENT:      Head: Normocephalic and atraumatic.   Cardiovascular:      Rate and Rhythm: Normal rate and regular rhythm.   Pulmonary:      Effort: Pulmonary effort is normal. No respiratory distress.      Breath sounds: Normal breath sounds. No wheezing or rhonchi.   Abdominal:      General: Bowel sounds are normal. There is no distension.      Palpations: Abdomen is soft.      Tenderness: There is no abdominal tenderness. There is no guarding or rebound.   Musculoskeletal:         General: No swelling.      Right lower leg: No edema.      Left lower leg: No edema.      Comments: LUE fistula   Skin:     General: Skin is warm and dry.   Neurological:      General: No focal deficit present.      Mental Status: He is alert and oriented to person, place, and time.   Psychiatric:         Mood and Affect: Mood normal.         Behavior: Behavior normal.     Results Review  I reviewed the patient's new clinical results.  Results from last 7 days   Lab Units 21   WBC 10*3/mm3 4.85   HEMOGLOBIN g/dL 10.5*   PLATELETS  10*3/mm3 94*     Results from last 7 days   Lab Units 12/01/21 2014   SODIUM mmol/L 144   POTASSIUM mmol/L 6.8*   CHLORIDE mmol/L 115*   CO2 mmol/L 23.0   BUN mg/dL 76*   CREATININE mg/dL 8.00*   GLUCOSE mg/dL 175*     Lab Results   Component Value Date    ANIONGAP 6.0 12/01/2021     Estimated Creatinine Clearance: 10.5 mL/min (A) (by C-G formula based on SCr of 8 mg/dL (H)).    Results from last 7 days   Lab Units 12/01/21 2014   ALBUMIN g/dL 4.60     Results from last 7 days   Lab Units 12/01/21 2014   CALCIUM mg/dL 9.2   ALBUMIN g/dL 4.60   PHOSPHORUS mg/dL 4.9*       Glucose   Date/Time Value Ref Range Status   12/02/2021 1139 197 (H) 70 - 130 mg/dL Final     Comment:     Meter: [id number] : [id number] Harshad David CNA   12/02/2021 0602 206 (H) 70 - 130 mg/dL Final     Comment:     Meter: [id number] : [id number] Geraldine Jimenez NA       Scheduled Meds  amLODIPine, 10 mg, Oral, Daily  aspirin, 81 mg, Oral, Daily  carvedilol, 3.125 mg, Oral, BID With Meals  hydrALAZINE, 100 mg, Oral, BID  insulin lispro, 0-9 Units, Subcutaneous, 4x Daily With Meals & Nightly  [START ON 12/3/2021] lisinopril, 40 mg, Oral, QAM  pantoprazole, 40 mg, Oral, Daily  sodium chloride, 10 mL, Intravenous, Q12H    Continuous Infusions   PRN Meds  •  acetaminophen **OR** acetaminophen **OR** acetaminophen  •  ALPRAZolam  •  calcium carbonate  •  dextrose  •  dextrose  •  glucagon (human recombinant)  •  nitroglycerin  •  ondansetron **OR** ondansetron  •  oxyCODONE-acetaminophen  •  sodium chloride  •  tiZANidine     Diet  Diet Regular; Consistent Carbohydrate, Renal      I have personally reviewed:  [x]  Laboratory   [x]  Microbiology   [x]  Radiology   []  EKG/Telemetry   []  Cardiology/Vascular   []  Pathology   [x]  Some old records history of hepatitis C 2013    Assessment/Plan     Active Hospital Problems    Diagnosis  POA   • **Hyperkalemia [E87.5]  Yes   • ESRD (end stage renal disease) on dialysis (HCC) [N18.6, Z99.2]  Not  Applicable   • HTN (hypertension) [I10]  Yes   • HLD (hyperlipidemia) [E78.5]  Yes   • GERD (gastroesophageal reflux disease) [K21.9]  Yes   • History of CVA (cerebrovascular accident) [Z86.73]  Not Applicable   • Type 2 diabetes mellitus with hyperglycemia, without long-term current use of insulin (HCC) [E11.65]  Yes   • Complication of vascular access for dialysis [T82.9XXA]  Yes   • CAD (coronary artery disease) [I25.10]  Yes   • Anemia in chronic kidney disease [N18.9, D63.1]  Yes   • Thrombocytopenia (HCC) [D69.6]  Yes      Resolved Hospital Problems   No resolved problems to display.     61 y.o. male admitted with Hyperkalemia. Nonfunctioning dialysis fistula    · ESRD, hyperkalemia dialysis this morning follow-up labs pending. For now using tunneled catheter for dialysis. Evaluation/intervention fistula not urgent per vascular.  · Thrombocytopenia not new, follow-up lab pending  · Hypertension  · Hyperlipidemia  · History of stroke  · Diabetes mellitus type 2: Resume home Lantus add mealtime insulin. Continue correctional (but switch to low-dose)  · SCDs for DVT prophylaxis  · Discussed with patient, nursing staff, CCP and care team on multidisciplinary rounds  · Discharge: Home with family soon    Bobo Hernandez MD  [facility]  12/02/21  13:59 EST    I wore protective equipment throughout this patient encounter including a face mask, gloves, and protective eyewear.  Hand hygiene was performed before donning protective equipment and after removal when leaving the room.

## 2021-12-02 NOTE — CONSULTS
Name: Angelo Brown ADMIT: 2021   : 1960  PCP: Yadiel Baxter III, MD    MRN: [id number] LOS: 1 days   AGE/SEX: 61 y.o. male  ROOM: [facility] E665/1     Inpatient Vascular Surgery Consult  Consult performed by: Ad Weber MD  Consult ordered by: Jaja Cota APRN        CC: Hyperkalemia  Subjective     History of Present Illness  61 y.o. male patient still has a tunneled catheter and although we had intended to use it apparently this summer.  He had problems with access on Monday and his treatment had to be terminated early.  On Wednesday, he had a doctor's appointment, did not feel well and decided not to go to dialysis.  When he called his center they suggested he come to the emergency room for evaluation.  Here he was found to be hyperkalemic and was admitted.    HPI Elements:  Location: Left arm brachiocephalic fistula   Severity: Moderate  Duration: Intermittent  Context: Previous juxta anastomotic angioplasty  Associated Signs and Symptoms: Denies arrhythmias but feels poorly with some nausea    Review of Systems   Constitutional: Positive for fatigue.   Gastrointestinal: Positive for nausea.   All other systems reviewed and are negative.      History Review Reviewed Comments   Past Medical History:  [x]   End-stage renal disease, coronary artery status post CABG, stroke, GERD,   Past Surgical History: [x]   Left arm brachiocephalic fistula, brain hematoma/evacuation, HD catheter insertions, partial right nephrectomy   Family History: [x]   No DVTs or aneurysms   Social History: [x]   Non-smoker     Allergies: Atorvastatin, Lipitor [atorvastatin calcium], Gabapentin, Morphine and related, Adhesive tape, Fentanyl, Ibuprofen, and Penicillins      Objective   Temp:  [96.5 °F (35.8 °C)-97.9 °F (36.6 °C)] 97.9 °F (36.6 °C)  Heart Rate:  [62-80] 80  Resp:  [18] 18  BP: (133-166)/(75-80) 164/80    No intake/output data recorded.    Physical Exam  Vitals reviewed.    Constitutional:       Appearance: He is well-developed.   Eyes:      Conjunctiva/sclera: Conjunctivae normal.   Cardiovascular:      Rate and Rhythm: Normal rate.   Pulmonary:      Effort: Pulmonary effort is normal.   Abdominal:      Palpations: Abdomen is soft.      Tenderness: There is no abdominal tenderness.   Neurological:      Mental Status: He is alert and oriented to person, place, and time.     Vascular: Patient has a left arm AV fistula.  Does have a thrill but the margins of the vein are difficult to appreciate and he has some ecchymosis associated with this and probably some infiltration.    Data Reviewed:  CBC    Results from last 7 days   Lab Units 12/01/21 2013   WBC 10*3/mm3 4.85   HEMOGLOBIN g/dL 10.5*   PLATELETS 10*3/mm3 94*     BMP   Results from last 7 days   Lab Units 12/01/21 2014   SODIUM mmol/L 144   POTASSIUM mmol/L 6.8*   CHLORIDE mmol/L 115*   CO2 mmol/L 23.0   BUN mg/dL 76*   CREATININE mg/dL 8.00*   GLUCOSE mg/dL 175*   PHOSPHORUS mg/dL 4.9*     Radiology(recent) XR Chest 1 View    Result Date: 12/1/2021  No focal pulmonary consolidation. Follow-up as clinical indications persist.  This report was finalized on 12/1/2021 8:59 PM by Dr. Yadiel Espinla M.D.        Labs significant for: Potassium 6.8 at 8:13 PM last night.  Repeat not available.  BUN 76.  Creatinine 8.0    Imaging Studies:  Chest x-ray shows tunneled dialysis catheter in good position.    Fistulogram from September:      Active Hospital Problems    Diagnosis  POA   • **Hyperkalemia [E87.5]  Yes   • ESRD (end stage renal disease) on dialysis (HCC) [N18.6, Z99.2]  Not Applicable   • HTN (hypertension) [I10]  Yes   • HLD (hyperlipidemia) [E78.5]  Yes   • GERD (gastroesophageal reflux disease) [K21.9]  Yes   • History of CVA (cerebrovascular accident) [Z86.73]  Not Applicable   • Type 2 diabetes mellitus with hyperglycemia, without long-term current use of insulin (HCC) [E11.65]  Yes   • Complication of vascular access  for dialysis [T82.9XXA]  Yes   • CAD (coronary artery disease) [I25.10]  Yes   • Anemia in chronic kidney disease [N18.9, D63.1]  Yes   • Thrombocytopenia (HCC) [D69.6]  Yes      Resolved Hospital Problems   No resolved problems to display.       Data Points:  During this visit the following were done:  Labs Reviewed [x]    Labs Ordered []    Radiology Reports Reviewed [x]    Radiology Ordered []    EKG, echo, and/or stress test reviewed []    Vascular lab results reviewed  [x]    Vascular lab images reviewed and interpreted per myself   []    Referring Provider Records Reviewed []    ER Records Reviewed []    Hospital Records Reviewed/Summarized [x]    History Obtained From Family []    Radiological images view and Interpreted per myself [x]    Case Discussed with referring provider []     Decision to obtain and request outside records  [x]    Total data points:4 or more    Active Hospital Problems:   Active Hospital Problems    Diagnosis  POA   • **Hyperkalemia [E87.5]  Yes   • ESRD (end stage renal disease) on dialysis (HCC) [N18.6, Z99.2]  Not Applicable   • HTN (hypertension) [I10]  Yes   • HLD (hyperlipidemia) [E78.5]  Yes   • GERD (gastroesophageal reflux disease) [K21.9]  Yes   • History of CVA (cerebrovascular accident) [Z86.73]  Not Applicable   • Type 2 diabetes mellitus with hyperglycemia, without long-term current use of insulin (HCC) [E11.65]  Yes   • Complication of vascular access for dialysis [T82.9XXA]  Yes   • CAD (coronary artery disease) [I25.10]  Yes   • Anemia in chronic kidney disease [N18.9, D63.1]  Yes   • Thrombocytopenia (HCC) [D69.6]  Yes      Resolved Hospital Problems   No resolved problems to display.      Assessment/Plan   Billin  Assessment / Plan     61 y.o. male with hyperkalemia.  I reviewed my office notes.  It looks like we did a fistulogram in early September and treated an inflow stenosis successfully.  At that time, we allowed him to begin cannulating his AV fistula.   However, it seems like he has had significant ongoing troubles.  I do not see that we have been recently notified until yesterday.  Apparently, report from his dialysis center says that his last full treatment was on November 24.  Patient did not go Friday, November 26 due to transportation issues and I think these interruptions in treatment explain his hyperkalemia.    For now, use his tunneled catheter for dialysis.  We can ultrasound and intervene on his left arm to see if we can improve its functionality but no reason to do as an urgent inpatient.    I will have my office arrange follow-up.    I discussed the patients findings and my recommendations with patient.  Please call my office with any question: [phone]

## 2021-12-03 VITALS
TEMPERATURE: 97.8 F | WEIGHT: 177.2 LBS | HEART RATE: 90 BPM | HEIGHT: 68 IN | SYSTOLIC BLOOD PRESSURE: 116 MMHG | OXYGEN SATURATION: 96 % | DIASTOLIC BLOOD PRESSURE: 78 MMHG | BODY MASS INDEX: 26.86 KG/M2 | RESPIRATION RATE: 16 BRPM

## 2021-12-03 LAB
ANION GAP SERPL CALCULATED.3IONS-SCNC: 12.8 MMOL/L (ref 5–15)
BUN SERPL-MCNC: 50 MG/DL (ref 6–20)
BUN/CREAT SERPL: 8.5 (ref 7–25)
CALCIUM SPEC-SCNC: 9.5 MG/DL (ref 8.6–10.5)
CHLORIDE SERPL-SCNC: 105 MMOL/L (ref 98–107)
CO2 SERPL-SCNC: 25.2 MMOL/L (ref 22–29)
CREAT SERPL-MCNC: 5.86 MG/DL (ref 0.76–1.27)
GFR SERPL CREATININE-BSD FRML MDRD: 10 ML/MIN/1.73
GFR SERPL CREATININE-BSD FRML MDRD: ABNORMAL ML/MIN/{1.73_M2}
GLUCOSE BLDC GLUCOMTR-MCNC: 109 MG/DL (ref 70–130)
GLUCOSE BLDC GLUCOMTR-MCNC: 156 MG/DL (ref 70–130)
GLUCOSE BLDC GLUCOMTR-MCNC: 193 MG/DL (ref 70–130)
GLUCOSE SERPL-MCNC: 107 MG/DL (ref 65–99)
POTASSIUM SERPL-SCNC: 5.2 MMOL/L (ref 3.5–5.2)
SODIUM SERPL-SCNC: 143 MMOL/L (ref 136–145)

## 2021-12-03 PROCEDURE — 25010000002 EPOETIN ALFA-EPBX 3000 UNIT/ML SOLUTION: Performed by: INTERNAL MEDICINE

## 2021-12-03 PROCEDURE — G0378 HOSPITAL OBSERVATION PER HR: HCPCS

## 2021-12-03 PROCEDURE — 80048 BASIC METABOLIC PNL TOTAL CA: CPT | Performed by: INTERNAL MEDICINE

## 2021-12-03 PROCEDURE — 82962 GLUCOSE BLOOD TEST: CPT

## 2021-12-03 PROCEDURE — G0257 UNSCHED DIALYSIS ESRD PT HOS: HCPCS

## 2021-12-03 PROCEDURE — 63710000001 INSULIN LISPRO (HUMAN) PER 5 UNITS: Performed by: HOSPITALIST

## 2021-12-03 PROCEDURE — 25010000002 HEPARIN (PORCINE) PER 1000 UNITS: Performed by: INTERNAL MEDICINE

## 2021-12-03 RX ORDER — HEPARIN SODIUM 1000 [USP'U]/ML
4000 INJECTION, SOLUTION INTRAVENOUS; SUBCUTANEOUS ONCE
Status: COMPLETED | OUTPATIENT
Start: 2021-12-03 | End: 2021-12-03

## 2021-12-03 RX ADMIN — CARVEDILOL 3.12 MG: 3.12 TABLET, FILM COATED ORAL at 17:34

## 2021-12-03 RX ADMIN — SODIUM CHLORIDE, PRESERVATIVE FREE 10 ML: 5 INJECTION INTRAVENOUS at 08:27

## 2021-12-03 RX ADMIN — INSULIN LISPRO 3 UNITS: 100 INJECTION, SOLUTION INTRAVENOUS; SUBCUTANEOUS at 08:27

## 2021-12-03 RX ADMIN — HYDRALAZINE HYDROCHLORIDE 100 MG: 50 TABLET, FILM COATED ORAL at 08:26

## 2021-12-03 RX ADMIN — INSULIN LISPRO 3 UNITS: 100 INJECTION, SOLUTION INTRAVENOUS; SUBCUTANEOUS at 17:34

## 2021-12-03 RX ADMIN — INSULIN LISPRO 2 UNITS: 100 INJECTION, SOLUTION INTRAVENOUS; SUBCUTANEOUS at 17:34

## 2021-12-03 RX ADMIN — PANTOPRAZOLE SODIUM 40 MG: 40 TABLET, DELAYED RELEASE ORAL at 08:28

## 2021-12-03 RX ADMIN — OXYCODONE AND ACETAMINOPHEN 1 TABLET: 325; 10 TABLET ORAL at 08:33

## 2021-12-03 RX ADMIN — ALPRAZOLAM 1 MG: 1 TABLET ORAL at 08:33

## 2021-12-03 RX ADMIN — EPOETIN ALFA-EPBX 5000 UNITS: 3000 INJECTION, SOLUTION INTRAVENOUS; SUBCUTANEOUS at 15:40

## 2021-12-03 RX ADMIN — CARVEDILOL 3.12 MG: 3.12 TABLET, FILM COATED ORAL at 08:26

## 2021-12-03 RX ADMIN — INSULIN LISPRO 2 UNITS: 100 INJECTION, SOLUTION INTRAVENOUS; SUBCUTANEOUS at 08:27

## 2021-12-03 RX ADMIN — LISINOPRIL 40 MG: 40 TABLET ORAL at 08:26

## 2021-12-03 RX ADMIN — HEPARIN SODIUM 4000 UNITS: 1000 INJECTION INTRAVENOUS; SUBCUTANEOUS at 13:53

## 2021-12-03 RX ADMIN — ASPIRIN 81 MG: 81 TABLET, COATED ORAL at 08:26

## 2021-12-03 RX ADMIN — AMLODIPINE BESYLATE 10 MG: 10 TABLET ORAL at 08:26

## 2021-12-03 NOTE — PLAN OF CARE
Goal Outcome Evaluation:  Plan of Care Reviewed With: patient        Progress: improving  Outcome Summary: Mes with pain med x 1 with relief stated, xanax given x 1, VSS, NSR on monitor, eyes closed at long interval, resting quietly in room, will continue to monitor

## 2021-12-03 NOTE — CASE MANAGEMENT/SOCIAL WORK
Case Management Discharge Note      Final Note: Pt discharged home with spouse.  LISE Adams RN         Selected Continued Care - Admitted Since 12/1/2021     Destination    No services have been selected for the patient.              Durable Medical Equipment    No services have been selected for the patient.              Dialysis/Infusion    No services have been selected for the patient.              Home Medical Care    No services have been selected for the patient.              Therapy    No services have been selected for the patient.              Community Resources    No services have been selected for the patient.              Community & DME    No services have been selected for the patient.                  Transportation Services  Private: Car    Final Discharge Disposition Code: 01 - home or self-care

## 2021-12-03 NOTE — PROGRESS NOTES
LOS: 2 days     Chief Complaint/ Reason for encounter: ESRD, hyperkalemia  Chief Complaint   Patient presents with   • Fistula issue         Subjective   Patient is doing well today with no new complaints  Good appetite with no nausea or vomiting  No shortness of breath chest pain or edema  Voiding well with no dysuria  Agreeable to go home later today after dialysis with outpatient AV fistula intervention planned        Medical history reviewed:  History of Present Illness    Subjective    History taken from: Patient and chart    Vital Signs  Temp:  [97.5 °F (36.4 °C)-98.6 °F (37 °C)] 97.8 °F (36.6 °C)  Heart Rate:  [63-77] 67  Resp:  [16-18] 16  BP: (101-173)/(70-91) 107/76       Wt Readings from Last 1 Encounters:   12/03/21 0647 80.4 kg (177 lb 3.2 oz)   12/02/21 0152 84.5 kg (186 lb 4.8 oz)       Objective    Objective:  General Appearance:  Comfortable, fairly well-appearing, in no acute distress and not in pain.  Awake, alert, oriented  HEENT: Mucous membranes moist, no injury, oropharynx clear  Lungs:  Normal effort and normal respiratory rate.  Breath sounds clear to auscultation.  No  respiratory distress.  No rales, decreased breath sounds or rhonchi.    Heart: Normal rate.  Regular rhythm.  S1 normal.  No murmur.   Abdomen: Abdomen is soft.  Bowel sounds are normal, no abdominal tenderness.  There is no rebound or guarding  Extremities: Normal range of motion.  Trace edema of bilateral lower extremities, distal pulses intact  Neurological: No focal motor or sensory deficits, pupils reactive  Skin:  Warm and dry.  No rash or cyanosis.   Upper arm AV fistula good thrill and bruit, right chest CVC in place      Results Review:    Intake/Output:     Intake/Output Summary (Last 24 hours) at 12/3/2021 1212  Last data filed at 12/3/2021 0822  Gross per 24 hour   Intake 120 ml   Output --   Net 120 ml         DATA:  Radiology and Labs:  The following labs independently reviewed by me. Additional labs ordered  for tomorrow a.m.  Interval notes, chart personally reviewed by me.   Old records independently reviewed showing recent AV fistula dysfunction  The following radiologic studies independently viewed by me, findings chest x-ray no focal consolidation  New problems include AV fistula malfunction  Discussed with patient/family/Dr. Hernandez at bedside    Risk/ complexity of medical care/ medical decision making moderate risk      Labs:   Recent Results (from the past 24 hour(s))   Comprehensive Metabolic Panel    Collection Time: 12/02/21  3:18 PM    Specimen: Blood   Result Value Ref Range    Glucose 185 (H) 65 - 99 mg/dL    BUN 33 (H) 6 - 20 mg/dL    Creatinine 4.64 (H) 0.76 - 1.27 mg/dL    Sodium 137 136 - 145 mmol/L    Potassium 5.3 (H) 3.5 - 5.2 mmol/L    Chloride 102 98 - 107 mmol/L    CO2 23.4 22.0 - 29.0 mmol/L    Calcium 9.3 8.6 - 10.5 mg/dL    Total Protein 7.6 6.0 - 8.5 g/dL    Albumin 4.70 3.50 - 5.20 g/dL    ALT (SGPT) 18 1 - 41 U/L    AST (SGOT) 18 1 - 40 U/L    Alkaline Phosphatase 81 39 - 117 U/L    Total Bilirubin 0.4 0.0 - 1.2 mg/dL    eGFR Non African Amer 13 (L) >60 mL/min/1.73    eGFR  African Amer      Globulin 2.9 gm/dL    A/G Ratio 1.6 g/dL    BUN/Creatinine Ratio 7.1 7.0 - 25.0    Anion Gap 11.6 5.0 - 15.0 mmol/L   CBC (No Diff)    Collection Time: 12/02/21  3:18 PM    Specimen: Blood   Result Value Ref Range    WBC 4.51 3.40 - 10.80 10*3/mm3    RBC 3.38 (L) 4.14 - 5.80 10*6/mm3    Hemoglobin 10.8 (L) 13.0 - 17.7 g/dL    Hematocrit 32.2 (L) 37.5 - 51.0 %    MCV 95.3 79.0 - 97.0 fL    MCH 32.0 26.6 - 33.0 pg    MCHC 33.5 31.5 - 35.7 g/dL    RDW 13.1 12.3 - 15.4 %    RDW-SD 45.4 37.0 - 54.0 fl    MPV 11.6 6.0 - 12.0 fL    Platelets 81 (L) 140 - 450 10*3/mm3   Hemoglobin A1c    Collection Time: 12/02/21  3:18 PM    Specimen: Blood   Result Value Ref Range    Hemoglobin A1C 7.17 (H) 4.80 - 5.60 %   POC Glucose Once    Collection Time: 12/02/21  4:32 PM    Specimen: Blood   Result Value Ref Range     Glucose 177 (H) 70 - 130 mg/dL   POC Glucose Once    Collection Time: 12/02/21  8:55 PM    Specimen: Blood   Result Value Ref Range    Glucose 115 70 - 130 mg/dL   POC Glucose Once    Collection Time: 12/03/21  6:46 AM    Specimen: Blood   Result Value Ref Range    Glucose 156 (H) 70 - 130 mg/dL   Basic Metabolic Panel    Collection Time: 12/03/21 10:51 AM    Specimen: Blood   Result Value Ref Range    Glucose 107 (H) 65 - 99 mg/dL    BUN 50 (H) 6 - 20 mg/dL    Creatinine 5.86 (H) 0.76 - 1.27 mg/dL    Sodium 143 136 - 145 mmol/L    Potassium 5.2 3.5 - 5.2 mmol/L    Chloride 105 98 - 107 mmol/L    CO2 25.2 22.0 - 29.0 mmol/L    Calcium 9.5 8.6 - 10.5 mg/dL    eGFR  African Amer      eGFR Non African Amer 10 (L) >60 mL/min/1.73    BUN/Creatinine Ratio 8.5 7.0 - 25.0    Anion Gap 12.8 5.0 - 15.0 mmol/L   POC Glucose Once    Collection Time: 12/03/21 10:57 AM    Specimen: Blood   Result Value Ref Range    Glucose 109 70 - 130 mg/dL       Radiology:  Imaging Results (Last 24 Hours)     ** No results found for the last 24 hours. **             Medications have been reviewed:  Current Facility-Administered Medications   Medication Dose Route Frequency Provider Last Rate Last Admin   • acetaminophen (TYLENOL) tablet 650 mg  650 mg Oral Q4H PRN Jaja Cota APRN        Or   • acetaminophen (TYLENOL) 160 MG/5ML solution 650 mg  650 mg Oral Q4H PRN Jaja Cota APRN        Or   • acetaminophen (TYLENOL) suppository 650 mg  650 mg Rectal Q4H PRN Jaja Cota APRN       • ALPRAZolam (XANAX) tablet 1 mg  1 mg Oral 4x Daily PRN Bobo Hernandez MD   1 mg at 12/03/21 0833   • amLODIPine (NORVASC) tablet 10 mg  10 mg Oral Daily Bobo Hernandez MD   10 mg at 12/03/21 0826   • aspirin EC tablet 81 mg  81 mg Oral Daily Bobo Hernandez MD   81 mg at 12/03/21 0826   • calcium carbonate (TUMS) chewable tablet 500 mg (200 mg elemental)  2 tablet Oral BID PRN Jaja Cota APRN       • carvedilol  (COREG) tablet 3.125 mg  3.125 mg Oral BID With Meals Bobo Hernandez MD   3.125 mg at 12/03/21 0826   • dextrose (D50W) (25 g/50 mL) IV injection 25 g  25 g Intravenous Q15 Min PRN Jaja Cota APRN       • dextrose (GLUTOSE) oral gel 15 g  15 g Oral Q15 Min PRN Jaja Cota APRN       • epoetin real-epbx (RETACRIT) injection 5,000 Units  5,000 Units Intravenous Once per day on Mon Wed Fri Yohan Murrell MD       • glucagon (human recombinant) (GLUCAGEN DIAGNOSTIC) injection 1 mg  1 mg Subcutaneous PRN Jaja Cota APRN       • heparin (porcine) injection 4,000 Units  4,000 Units Intracatheter Once Yohan Murrell MD       • hydrALAZINE (APRESOLINE) tablet 100 mg  100 mg Oral BID Bobo Hernandez MD   100 mg at 12/03/21 0826   • insulin glargine (LANTUS, SEMGLEE) injection 10 Units  10 Units Subcutaneous Nightly Bobo Hernandez MD   10 Units at 12/02/21 2114   • insulin lispro (ADMELOG) injection 0-7 Units  0-7 Units Subcutaneous 4x Daily With Meals & Nightly Bobo Hernandez MD   2 Units at 12/03/21 0827   • insulin lispro (ADMELOG) injection 3 Units  3 Units Subcutaneous TID With Meals Bobo Hernandez MD   3 Units at 12/03/21 0827   • lisinopril (PRINIVIL,ZESTRIL) tablet 40 mg  40 mg Oral QAM Bobo Hernandez MD   40 mg at 12/03/21 0826   • nitroglycerin (NITROSTAT) SL tablet 0.4 mg  0.4 mg Sublingual Q5 Min PRN Jaja Cota APRN       • ondansetron (ZOFRAN) tablet 4 mg  4 mg Oral Q6H PRN Jaja Cota APRN        Or   • ondansetron (ZOFRAN) injection 4 mg  4 mg Intravenous Q6H PRN Jaja Cota APRN       • oxyCODONE-acetaminophen (PERCOCET)  MG per tablet 1 tablet  1 tablet Oral Q6H PRN Jaja Cota APRN   1 tablet at 12/03/21 0833   • pantoprazole (PROTONIX) EC tablet 40 mg  40 mg Oral Daily Bobo Hernandez MD   40 mg at 12/03/21 0828   • sodium chloride 0.9 % flush 10 mL  10 mL Intravenous Q12H Jaja Cota  APRN   10 mL at 12/03/21 0827   • sodium chloride 0.9 % flush 10 mL  10 mL Intravenous PRN Jaja Cota APRN       • tiZANidine (ZANAFLEX) tablet 4 mg  4 mg Oral Q6H PRN Bobo Hernandez MD           ASSESSMENT:  Hyperkalemia secondary to vascular access issues and missed HD last week, improved post HD    Anemia in chronic kidney disease    Thrombocytopenia (HCC)    Complication of vascular access for dialysis    History of CVA (cerebrovascular accident)    CAD (coronary artery disease)    Type 2 diabetes mellitus with hyperglycemia, without long-term current use of insulin (HCC)    GERD (gastroesophageal reflux disease)    HLD (hyperlipidemia)    HTN (hypertension)    ESRD (end stage renal disease) on dialysis ([facility])           PLAN:   Discussed with Dr. Hernandez.  Stable for discharge after HD today  HD today and Monday Wednesday Friday schedule, UF as tolerated  Potassium level stable at 5.2 today    Plans for outpatient management of his AV fistula dysfunction noted  Keep CVC in place for now  Continue home antihypertensive regimen  Epogen with HD for anemia of CKD     Continue to monitor electrolytes and volume closely  Stable for discharge after HD today      Yohan Murrell MD   [facility]   Office phone number: [phone]  Answering service phone number: [phone]    12/03/21  12:12 EST    Dictation performed using Dragon dictation software

## 2021-12-03 NOTE — DISCHARGE SUMMARY
[facility]    Date of Discharge:  12/3/2021    PCP: Yadiel Baxter III, MD    Discharge Diagnosis:   Active Hospital Problems    Diagnosis  POA   • **Hyperkalemia [E87.5]  Yes   • ESRD (end stage renal disease) on dialysis (HCC) [N18.6, Z99.2]  Not Applicable   • HTN (hypertension) [I10]  Yes   • HLD (hyperlipidemia) [E78.5]  Yes   • GERD (gastroesophageal reflux disease) [K21.9]  Yes   • History of CVA (cerebrovascular accident) [Z86.73]  Not Applicable   • Type 2 diabetes mellitus with hyperglycemia, without long-term current use of insulin (HCC) [E11.65]  Yes   • Complication of vascular access for dialysis [T82.9XXA]  Yes   • CAD (coronary artery disease) [I25.10]  Yes   • Anemia in chronic kidney disease [N18.9, D63.1]  Yes   • Thrombocytopenia (HCC) [D69.6]  Yes      Resolved Hospital Problems   No resolved problems to display.          Consults     Date and Time Order Name Status Description    12/1/2021 11:29 PM Inpatient Vascular Surgery Consult Completed     12/1/2021  9:08 PM [facility] (on-call MD unless specified) Details      12/1/2021  9:07 PM Nephrology (on -call MD unless specified) Completed         Hospital Course  61 y.o. male with a history of ESRD on dialysis, hypertension, stroke, CAD, hyperlipidemia, anemia of chronic disease, diabetes mellitus type 2, and GERD admitted with nonfunctioning dialysis fistula and hyperkalemia.     Patient was seen by vascular surgery and they recommended using his tunneled catheter for dialysis for now and doing an ultrasound at [facility] on his left fistula as an outpatient. Their office is going to arrange follow-up.    Patient was seen by nephrology and he completed dialysis treatment through his right chest catheter. Nephrology felt he could be discharged home and he would keep his Monday Wednesday Friday dialysis schedule. Next dialysis is today and he can be discharged after.    A1c 7.17. He also  had mild thrombocytopenia which is not new but appears to be stable would have it rechecked/followed outpatient.    I discussed the patient's findings and my recommendations with patient, family and nursing staff and Dr. Murrell.    Condition on Discharge: Improved. He would like to go home today    Temp:  [97.5 °F (36.4 °C)-98.6 °F (37 °C)] 97.8 °F (36.6 °C)  Heart Rate:  [63-77] 71  Resp:  [16-18] 16  BP: (101-173)/(70-91) 132/84  Body mass index is 26.94 kg/m².    Physical Exam  Constitutional:       General: He is not in acute distress.     Appearance: Normal appearance. He is not toxic-appearing.   HENT:      Head: Normocephalic and atraumatic.   Cardiovascular:      Rate and Rhythm: Normal rate and regular rhythm.   Pulmonary:      Effort: Pulmonary effort is normal. No respiratory distress.      Breath sounds: Normal breath sounds. No wheezing or rhonchi.   Abdominal:      General: Bowel sounds are normal. There is no distension.      Palpations: Abdomen is soft.      Tenderness: There is no abdominal tenderness.   Musculoskeletal:      Comments: Left upper extremity fistula   Skin:     General: Skin is warm and dry.   Neurological:      General: No focal deficit present.      Mental Status: He is alert and oriented to person, place, and time.   Psychiatric:         Mood and Affect: Mood normal.         Behavior: Behavior normal.     While in the room and during my examination of the patient I wore gloves, mask, eye protection.  I washed my hands before and after this patient encounter.     Disposition: Home or Self Care       Discharge Medications      Changes to Medications      Instructions Start Date   Lantus SoloStar 100 UNIT/ML injection pen  Generic drug: Insulin Glargine  What changed: Another medication with the same name was removed. Continue taking this medication, and follow the directions you see here.   10 Units, Subcutaneous, Nightly      lisinopril 40 MG tablet  Commonly known as:  MARICEL MCCOY  What changed: when to take this   40 mg, Oral, Every 24 Hours Scheduled         Continue These Medications      Instructions Start Date   ALPRAZolam 1 MG tablet  Commonly known as: XANAX   1 mg, Oral, 4 Times Daily PRN      amLODIPine 10 MG tablet  Commonly known as: NORVASC   10 mg, Oral, Daily      aspirin 81 MG EC tablet   81 mg, Oral, Daily      Auryxia 1  MG(Fe) tablet  Generic drug: Ferric Citrate   Oral, 3 Times Daily      carvedilol 3.125 MG tablet  Commonly known as: COREG   3.125 mg, Oral, 2 Times Daily With Meals      Diclofenac Sodium 1 % gel gel  Commonly known as: VOLTAREN   4 g, Topical, 4 Times Daily PRN      FreeStyle Triston 14 Day Sensor misc   No dose, route, or frequency recorded.      hydrALAZINE 100 MG tablet  Commonly known as: APRESOLINE   100 mg, Oral, 2 Times Daily, Pt takes 2x daily at home-will talk with cardiologist in May per pt      Insulin Lispro (1 Unit Dial) 100 UNIT/ML solution pen-injector  Commonly known as: HUMALOG   INJECT 10 TO 15 UNITS UNDER THE SKIN THREE TIMES A DAY WITH A MEAL.      MELATONIN PO   5 mg, Oral, Every Evening      oxyCODONE-acetaminophen  MG per tablet  Commonly known as: PERCOCET   1 tablet, Oral, Every 6 Hours PRN      pantoprazole 40 MG EC tablet  Commonly known as: PROTONIX   40 mg, Oral, Daily      glucose blood test strip   Prodigy Voice Glucose Meter kit      Prodigy No Coding Blood Gluc test strip  Generic drug: glucose blood   Use 1 each 4 (four) times daily for blood glucose.      Prodigy No Coding Blood Gluc test strip  Generic drug: glucose blood   No dose, route, or frequency recorded.      Prodigy Twist Top Lancets 28G misc   Prodigy Twist Top Lancet 28 gauge      Prodigy Voice Blood Glucose w/Device kit   1 each, Does not apply, 4 Times Daily      simvastatin 40 MG tablet  Commonly known as: ZOCOR   40 mg, Oral, Nightly      tiZANidine 4 MG tablet  Commonly known as: ZANAFLEX   4 mg, Oral, Every 6 Hours PRN       vitamin D 1.25 MG (05176 UT) capsule capsule  Commonly known as: ERGOCALCIFEROL   50,000 Units, Oral, Every 7 Days, Patient usually takes on Saturday morning         Stop These Medications    insulin aspart 100 UNIT/ML solution pen-injector sc pen  Commonly known as: novoLOG FLEXPEN           Diet Instructions     Diet: Regular, Consistent Carbohydrate, Cardiac      Discharge Diet:  Regular  Consistent Carbohydrate  Cardiac            Activity Instructions     Activity as Tolerated           Additional Instructions for the Follow-ups that You Need to Schedule     Call MD for problems / concerns.   As directed      CBC (No Diff)    Dec 08, 2021 (Approximate)      Release to patient: Immediate            Follow-up Information     Ad Weber MD Follow up.    Specialty: Vascular Surgery  Why: 1 to 3 weeks with left arm AV fistula duplex  Contact information:  [address]    [place] [id number] [phone]             Yadiel Baxter III, MD .    Specialty: Family Medicine  Contact information:  [address]  [facility] [id number] [phone]             Yohan Murrell MD Follow up.    Specialty: Nephrology  Contact information:  [address]  [address]  [facility] [id number] [phone]                           Bobo Hernandez MD  12/03/21  10:34 EST    Discharge time spent greater than 30 minutes.

## 2021-12-04 ENCOUNTER — READMISSION MANAGEMENT (OUTPATIENT)
Dept: CALL CENTER | Facility: HOSPITAL | Age: 61
End: 2021-12-04

## 2021-12-04 NOTE — OUTREACH NOTE
Prep Survey      Responses   [facility] facility patient discharged from? [place]   Is LACE score < 7 ? No   Emergency Room discharge w/ pulse ox? No   Eligibility Readm Mgmt   Discharge diagnosis **Hyperkalemia    Does the patient have one of the following disease processes/diagnoses(primary or secondary)? Other   Does the patient have Home health ordered? No   Is there a DME ordered? No   General alerts for this patient HD    Prep survey completed? Yes          Sravani Hayden RN

## 2021-12-08 ENCOUNTER — READMISSION MANAGEMENT (OUTPATIENT)
Dept: CALL CENTER | Facility: HOSPITAL | Age: 61
End: 2021-12-08

## 2021-12-08 NOTE — OUTREACH NOTE
Medical Week 1 Survey      Responses   [facility] patient discharged from? [place]   Does the patient have one of the following disease processes/diagnoses(primary or secondary)? Other   Week 1 attempt successful? Yes   Call start time 1027   Call end time 1031   Is patient permission given to speak with other caregiver? Yes   List who call center can speak with Jake Spouse   Person spoke with today (if not patient) and relationship jake   Meds reviewed with patient/caregiver? Yes   Is the patient having any side effects they believe may be caused by any medication additions or changes? No   Does the patient have all medications ordered at discharge? Yes   Is the patient taking all medications as directed (includes completed medication regime)? Yes   Does the patient have a primary care provider?  Yes   Does the patient have an appointment with their PCP within 7 days of discharge? Yes   Comments regarding PCP Cardiology was seen yesterday   Has the patient kept scheduled appointments due by today? Yes   Psychosocial issues? No   Comments Wife reports, he uses cane as needed or walker, dialysis pt. 3 times week, going today   Did the patient receive a copy of their discharge instructions? Yes   Nursing interventions Reviewed instructions with patient,  Educated on MyChart   What is the patient's perception of their health status since discharge? Improving   Is the patient/caregiver able to teach back signs and symptoms related to disease process for when to call PCP? Yes   Is the patient/caregiver able to teach back signs and symptoms related to disease process for when to call 911? Yes   Is the patient/caregiver able to teach back the hierarchy of who to call/visit for symptoms/problems? PCP, Specialist, Home health nurse, Urgent Care, ED, 911 Yes   If the patient is a current smoker, are they able to teach back resources for cessation? Not a smoker   Week 1 call completed? Yes   Wrap up additional  comments Spouse, states he is improving some, will have dialysis today has been really weak,           Sarahi Silva RN

## 2021-12-17 ENCOUNTER — READMISSION MANAGEMENT (OUTPATIENT)
Dept: CALL CENTER | Facility: HOSPITAL | Age: 61
End: 2021-12-17

## 2021-12-17 NOTE — OUTREACH NOTE
"Medical Week 2 Survey      Responses   [facility] patient discharged from? [place]   Does the patient have one of the following disease processes/diagnoses(primary or secondary)? Other   Week 2 attempt successful? Yes   Call start time 1512   Call end time 1517   Person spoke with today (if not patient) and relationship Samantha-spouse   Meds reviewed with patient/caregiver? Yes   Is the patient having any side effects they believe may be caused by any medication additions or changes? No   Does the patient have all medications ordered at discharge? Yes   Is the patient taking all medications as directed (includes completed medication regime)? Yes   Comments regarding appointments Wife states has f/u appt with Dr Weber to plan for balloon procedure to arm with fistula. States \"vein is too narrow in that arm\".   Does the patient have a primary care provider?  Yes   Does the patient have an appointment with their PCP within 7 days of discharge? Yes   Has the patient kept scheduled appointments due by today? Yes   Has home health visited the patient within 72 hours of discharge? N/A   Psychosocial issues? No   What is the patient's perception of their health status since discharge? Improving   Is the patient/caregiver able to teach back signs and symptoms related to disease process for when to call PCP? Yes   Is the patient/caregiver able to teach back signs and symptoms related to disease process for when to call 911? Yes   Is the patient/caregiver able to teach back the hierarchy of who to call/visit for symptoms/problems? PCP, Specialist, Home health nurse, Urgent Care, ED, 911 Yes   Week 2 Call Completed? Yes   Wrap up additional comments Wife states patient is better, but will have to have balloon procedure to fistula arm. States getting dialysis through emergency port. Wife requesting prayer-denies any other needs today.          Shonna Mckinley RN  "

## 2022-01-26 ENCOUNTER — APPOINTMENT (OUTPATIENT)
Dept: GENERAL RADIOLOGY | Facility: HOSPITAL | Age: 62
End: 2022-01-26

## 2022-01-26 ENCOUNTER — HOSPITAL ENCOUNTER (INPATIENT)
Facility: HOSPITAL | Age: 62
LOS: 3 days | Discharge: HOME | End: 2022-01-29
Attending: EMERGENCY MEDICINE | Admitting: INTERNAL MEDICINE

## 2022-01-26 ENCOUNTER — APPOINTMENT (OUTPATIENT)
Dept: CT IMAGING | Facility: HOSPITAL | Age: 62
End: 2022-01-26

## 2022-01-26 DIAGNOSIS — R50.9 FEVER, UNSPECIFIED FEVER CAUSE: ICD-10-CM

## 2022-01-26 DIAGNOSIS — R56.9 FIRST TIME SEIZURE: Primary | ICD-10-CM

## 2022-01-26 PROBLEM — N18.6 ESRD (END STAGE RENAL DISEASE): Status: ACTIVE | Noted: 2022-01-26

## 2022-01-26 PROBLEM — I25.10 CAD (CORONARY ARTERY DISEASE): Status: ACTIVE | Noted: 2022-01-26

## 2022-01-26 PROBLEM — E11.65 TYPE 2 DIABETES MELLITUS WITH HYPERGLYCEMIA (HCC): Status: ACTIVE | Noted: 2022-01-26

## 2022-01-26 PROBLEM — E87.1 HYPONATREMIA: Status: ACTIVE | Noted: 2022-01-26

## 2022-01-26 PROBLEM — D63.8 ANEMIA, CHRONIC DISEASE: Status: ACTIVE | Noted: 2022-01-26

## 2022-01-26 PROBLEM — I10 HTN (HYPERTENSION): Status: ACTIVE | Noted: 2022-01-26

## 2022-01-26 PROBLEM — D72.829 LEUKOCYTOSIS: Status: ACTIVE | Noted: 2022-01-26

## 2022-01-26 LAB
ALBUMIN SERPL-MCNC: 4 G/DL (ref 3.5–5.2)
ALBUMIN/GLOB SERPL: 1.3 G/DL
ALP SERPL-CCNC: 185 U/L (ref 39–117)
ALT SERPL W P-5'-P-CCNC: 34 U/L (ref 1–41)
ANION GAP SERPL CALCULATED.3IONS-SCNC: 18 MMOL/L (ref 5–15)
APPEARANCE CSF: ABNORMAL
AST SERPL-CCNC: 44 U/L (ref 1–40)
B PARAPERT DNA SPEC QL NAA+PROBE: NOT DETECTED
B PERT DNA SPEC QL NAA+PROBE: NOT DETECTED
BILIRUB SERPL-MCNC: 0.6 MG/DL (ref 0–1.2)
BUN SERPL-MCNC: 14 MG/DL (ref 6–20)
BUN/CREAT SERPL: 5.6 (ref 7–25)
C PNEUM DNA NPH QL NAA+NON-PROBE: NOT DETECTED
CALCIUM SPEC-SCNC: 8.1 MG/DL (ref 8.6–10.5)
CHLORIDE SERPL-SCNC: 92 MMOL/L (ref 98–107)
CO2 SERPL-SCNC: 20 MMOL/L (ref 22–29)
COLOR CSF: ABNORMAL
CREAT SERPL-MCNC: 2.49 MG/DL (ref 0.76–1.27)
D-LACTATE SERPL-SCNC: 1.5 MMOL/L (ref 0.5–2)
DEPRECATED RDW RBC AUTO: 44.7 FL (ref 37–54)
ERYTHROCYTE [DISTWIDTH] IN BLOOD BY AUTOMATED COUNT: 13 % (ref 12.3–15.4)
FLUAV SUBTYP SPEC NAA+PROBE: NOT DETECTED
FLUBV RNA ISLT QL NAA+PROBE: NOT DETECTED
GFR SERPL CREATININE-BSD FRML MDRD: 27 ML/MIN/1.73
GLOBULIN UR ELPH-MCNC: 3.2 GM/DL
GLUCOSE BLDC GLUCOMTR-MCNC: 229 MG/DL (ref 70–130)
GLUCOSE CSF-MCNC: 107 MG/DL (ref 40–70)
GLUCOSE SERPL-MCNC: 245 MG/DL (ref 65–99)
HADV DNA SPEC NAA+PROBE: NOT DETECTED
HCOV 229E RNA SPEC QL NAA+PROBE: NOT DETECTED
HCOV HKU1 RNA SPEC QL NAA+PROBE: NOT DETECTED
HCOV NL63 RNA SPEC QL NAA+PROBE: NOT DETECTED
HCOV OC43 RNA SPEC QL NAA+PROBE: NOT DETECTED
HCT VFR BLD AUTO: 25.5 % (ref 37.5–51)
HGB BLD-MCNC: 8.3 G/DL (ref 13–17.7)
HMPV RNA NPH QL NAA+NON-PROBE: NOT DETECTED
HPIV1 RNA ISLT QL NAA+PROBE: NOT DETECTED
HPIV2 RNA SPEC QL NAA+PROBE: NOT DETECTED
HPIV3 RNA NPH QL NAA+PROBE: NOT DETECTED
HPIV4 P GENE NPH QL NAA+PROBE: NOT DETECTED
INR PPP: 1.11 (ref 0.9–1.1)
LIPASE SERPL-CCNC: 50 U/L (ref 13–60)
LYMPHOCYTES # BLD MANUAL: 0.46 10*3/MM3 (ref 0.7–3.1)
LYMPHOCYTES NFR BLD MANUAL: 3 % (ref 5–12)
LYMPHOCYTES NFR CSF MANUAL: 11 %
M PNEUMO IGG SER IA-ACNC: NOT DETECTED
MCH RBC QN AUTO: 31.1 PG (ref 26.6–33)
MCHC RBC AUTO-ENTMCNC: 32.5 G/DL (ref 31.5–35.7)
MCV RBC AUTO: 95.5 FL (ref 79–97)
METHOD: ABNORMAL
MONOCYTES # BLD: 0.46 10*3/MM3 (ref 0.1–0.9)
NEUTROPHILS # BLD AUTO: 14.31 10*3/MM3 (ref 1.7–7)
NEUTROPHILS NFR BLD MANUAL: 94 % (ref 42.7–76)
NEUTROPHILS NFR CSF MICRO: 89 %
NUC CELL # CSF MANUAL: 29 /MM3 (ref 0–5)
PLAT MORPH BLD: NORMAL
PLATELET # BLD AUTO: 144 10*3/MM3 (ref 140–450)
PMV BLD AUTO: 10.8 FL (ref 6–12)
POIKILOCYTOSIS BLD QL SMEAR: ABNORMAL
POLYCHROMASIA BLD QL SMEAR: ABNORMAL
POTASSIUM SERPL-SCNC: 4.1 MMOL/L (ref 3.5–5.2)
PROT CSF-MCNC: 60 MG/DL (ref 15–45)
PROT SERPL-MCNC: 7.2 G/DL (ref 6–8.5)
PROTHROMBIN TIME: 14.1 SECONDS (ref 11.7–14.2)
QT INTERVAL: 321 MS
RBC # BLD AUTO: 2.67 10*6/MM3 (ref 4.14–5.8)
RBC # CSF MANUAL: 7653 /MM3 (ref 0–0)
RHINOVIRUS RNA SPEC NAA+PROBE: NOT DETECTED
RSV RNA NPH QL NAA+NON-PROBE: NOT DETECTED
SARS-COV-2 RNA NPH QL NAA+NON-PROBE: NOT DETECTED
SODIUM SERPL-SCNC: 130 MMOL/L (ref 136–145)
STOMATOCYTES BLD QL SMEAR: ABNORMAL
TUBE # CSF: 1
VARIANT LYMPHS NFR BLD MANUAL: 3 % (ref 19.6–45.3)
WBC MORPH BLD: NORMAL
WBC NRBC COR # BLD: 15.22 10*3/MM3 (ref 3.4–10.8)

## 2022-01-26 PROCEDURE — 93005 ELECTROCARDIOGRAM TRACING: CPT | Performed by: EMERGENCY MEDICINE

## 2022-01-26 PROCEDURE — 87205 SMEAR GRAM STAIN: CPT | Performed by: EMERGENCY MEDICINE

## 2022-01-26 PROCEDURE — 0 LIDOCAINE 1 % SOLUTION: Performed by: INTERNAL MEDICINE

## 2022-01-26 PROCEDURE — 80053 COMPREHEN METABOLIC PANEL: CPT | Performed by: EMERGENCY MEDICINE

## 2022-01-26 PROCEDURE — 87015 SPECIMEN INFECT AGNT CONCNTJ: CPT | Performed by: EMERGENCY MEDICINE

## 2022-01-26 PROCEDURE — 82962 GLUCOSE BLOOD TEST: CPT

## 2022-01-26 PROCEDURE — 71045 X-RAY EXAM CHEST 1 VIEW: CPT

## 2022-01-26 PROCEDURE — 87040 BLOOD CULTURE FOR BACTERIA: CPT | Performed by: EMERGENCY MEDICINE

## 2022-01-26 PROCEDURE — 009U3ZX DRAINAGE OF SPINAL CANAL, PERCUTANEOUS APPROACH, DIAGNOSTIC: ICD-10-PCS | Performed by: RADIOLOGY

## 2022-01-26 PROCEDURE — 99285 EMERGENCY DEPT VISIT HI MDM: CPT

## 2022-01-26 PROCEDURE — 25010000002 VANCOMYCIN 10 G RECONSTITUTED SOLUTION: Performed by: EMERGENCY MEDICINE

## 2022-01-26 PROCEDURE — 70450 CT HEAD/BRAIN W/O DYE: CPT

## 2022-01-26 PROCEDURE — 85610 PROTHROMBIN TIME: CPT | Performed by: INTERNAL MEDICINE

## 2022-01-26 PROCEDURE — 0202U NFCT DS 22 TRGT SARS-COV-2: CPT | Performed by: EMERGENCY MEDICINE

## 2022-01-26 PROCEDURE — 83690 ASSAY OF LIPASE: CPT | Performed by: EMERGENCY MEDICINE

## 2022-01-26 PROCEDURE — 25010000002 CEFEPIME PER 500 MG: Performed by: EMERGENCY MEDICINE

## 2022-01-26 PROCEDURE — 87483 CNS DNA AMP PROBE TYPE 12-25: CPT | Performed by: EMERGENCY MEDICINE

## 2022-01-26 PROCEDURE — 85007 BL SMEAR W/DIFF WBC COUNT: CPT | Performed by: EMERGENCY MEDICINE

## 2022-01-26 PROCEDURE — 85025 COMPLETE CBC W/AUTO DIFF WBC: CPT | Performed by: EMERGENCY MEDICINE

## 2022-01-26 PROCEDURE — 83605 ASSAY OF LACTIC ACID: CPT | Performed by: EMERGENCY MEDICINE

## 2022-01-26 PROCEDURE — 93010 ELECTROCARDIOGRAM REPORT: CPT | Performed by: INTERNAL MEDICINE

## 2022-01-26 PROCEDURE — 84157 ASSAY OF PROTEIN OTHER: CPT | Performed by: EMERGENCY MEDICINE

## 2022-01-26 PROCEDURE — 63710000001 INSULIN LISPRO (HUMAN) PER 5 UNITS

## 2022-01-26 PROCEDURE — 89051 BODY FLUID CELL COUNT: CPT | Performed by: EMERGENCY MEDICINE

## 2022-01-26 PROCEDURE — 87070 CULTURE OTHR SPECIMN AEROBIC: CPT | Performed by: EMERGENCY MEDICINE

## 2022-01-26 PROCEDURE — 82945 GLUCOSE OTHER FLUID: CPT | Performed by: EMERGENCY MEDICINE

## 2022-01-26 RX ORDER — ASCORBIC ACID 500 MG
500 TABLET ORAL 2 TIMES DAILY
COMMUNITY

## 2022-01-26 RX ORDER — LIDOCAINE HYDROCHLORIDE 10 MG/ML
20 INJECTION, SOLUTION INFILTRATION; PERINEURAL
Status: COMPLETED | OUTPATIENT
Start: 2022-01-26 | End: 2022-01-26

## 2022-01-26 RX ORDER — DEXTROSE MONOHYDRATE 25 G/50ML
25 INJECTION, SOLUTION INTRAVENOUS
Status: DISCONTINUED | OUTPATIENT
Start: 2022-01-26 | End: 2022-01-29 | Stop reason: HOSPADM

## 2022-01-26 RX ORDER — UREA 10 %
1 LOTION (ML) TOPICAL DAILY
COMMUNITY

## 2022-01-26 RX ORDER — UREA 10 %
3 LOTION (ML) TOPICAL NIGHTLY PRN
Status: DISCONTINUED | OUTPATIENT
Start: 2022-01-26 | End: 2022-01-29 | Stop reason: HOSPADM

## 2022-01-26 RX ORDER — CHLORAL HYDRATE 500 MG
1000 CAPSULE ORAL
COMMUNITY

## 2022-01-26 RX ORDER — ONDANSETRON 4 MG/1
4 TABLET, FILM COATED ORAL EVERY 6 HOURS PRN
Status: DISCONTINUED | OUTPATIENT
Start: 2022-01-26 | End: 2022-01-29 | Stop reason: HOSPADM

## 2022-01-26 RX ORDER — SIMVASTATIN 40 MG
40 TABLET ORAL NIGHTLY
COMMUNITY
End: 2022-02-02 | Stop reason: SDUPTHER

## 2022-01-26 RX ORDER — NICOTINE POLACRILEX 4 MG
15 LOZENGE BUCCAL
Status: DISCONTINUED | OUTPATIENT
Start: 2022-01-26 | End: 2022-01-29 | Stop reason: HOSPADM

## 2022-01-26 RX ORDER — ALPRAZOLAM 1 MG/1
1 TABLET ORAL EVERY 6 HOURS PRN
COMMUNITY
End: 2022-02-02 | Stop reason: SDUPTHER

## 2022-01-26 RX ORDER — INSULIN GLARGINE 100 [IU]/ML
10 INJECTION, SOLUTION SUBCUTANEOUS NIGHTLY
COMMUNITY

## 2022-01-26 RX ORDER — SODIUM CHLORIDE 0.9 % (FLUSH) 0.9 %
10 SYRINGE (ML) INJECTION AS NEEDED
Status: DISCONTINUED | OUTPATIENT
Start: 2022-01-26 | End: 2022-01-29 | Stop reason: HOSPADM

## 2022-01-26 RX ORDER — ACETAMINOPHEN 325 MG/1
650 TABLET ORAL EVERY 4 HOURS PRN
Status: DISCONTINUED | OUTPATIENT
Start: 2022-01-26 | End: 2022-01-29 | Stop reason: HOSPADM

## 2022-01-26 RX ORDER — NITROGLYCERIN 0.4 MG/1
0.4 TABLET SUBLINGUAL
Status: DISCONTINUED | OUTPATIENT
Start: 2022-01-26 | End: 2022-01-29 | Stop reason: HOSPADM

## 2022-01-26 RX ORDER — CARVEDILOL 3.12 MG/1
3.12 TABLET ORAL 2 TIMES DAILY WITH MEALS
Status: ON HOLD | COMMUNITY
End: 2022-01-29 | Stop reason: SDUPTHER

## 2022-01-26 RX ORDER — PANTOPRAZOLE SODIUM 40 MG/1
40 TABLET, DELAYED RELEASE ORAL DAILY
COMMUNITY
End: 2022-02-02 | Stop reason: SDUPTHER

## 2022-01-26 RX ORDER — CETIRIZINE HYDROCHLORIDE 10 MG/1
10 TABLET ORAL DAILY
COMMUNITY

## 2022-01-26 RX ORDER — ONDANSETRON 2 MG/ML
4 INJECTION INTRAMUSCULAR; INTRAVENOUS EVERY 6 HOURS PRN
Status: DISCONTINUED | OUTPATIENT
Start: 2022-01-26 | End: 2022-01-29 | Stop reason: HOSPADM

## 2022-01-26 RX ORDER — NITROGLYCERIN 0.4 MG/1
0.4 TABLET SUBLINGUAL
COMMUNITY

## 2022-01-26 RX ORDER — OXYCODONE AND ACETAMINOPHEN 10; 325 MG/1; MG/1
1 TABLET ORAL EVERY 6 HOURS PRN
COMMUNITY
End: 2022-02-02 | Stop reason: SDUPTHER

## 2022-01-26 RX ORDER — CLINDAMYCIN HYDROCHLORIDE 150 MG/1
150 CAPSULE ORAL 3 TIMES DAILY
COMMUNITY
End: 2022-01-29 | Stop reason: HOSPADM

## 2022-01-26 RX ORDER — TIZANIDINE 4 MG/1
4 TABLET ORAL EVERY 6 HOURS PRN
COMMUNITY
End: 2022-02-02 | Stop reason: SDUPTHER

## 2022-01-26 RX ORDER — INSULIN LISPRO 100 [IU]/ML
0-9 INJECTION, SOLUTION INTRAVENOUS; SUBCUTANEOUS
Status: DISCONTINUED | OUTPATIENT
Start: 2022-01-26 | End: 2022-01-29 | Stop reason: HOSPADM

## 2022-01-26 RX ADMIN — SODIUM CHLORIDE, POTASSIUM CHLORIDE, SODIUM LACTATE AND CALCIUM CHLORIDE 1000 ML: 600; 310; 30; 20 INJECTION, SOLUTION INTRAVENOUS at 17:41

## 2022-01-26 RX ADMIN — INSULIN LISPRO 4 UNITS: 100 INJECTION, SOLUTION INTRAVENOUS; SUBCUTANEOUS at 22:43

## 2022-01-26 RX ADMIN — LIDOCAINE HYDROCHLORIDE 20 ML: 10 INJECTION, SOLUTION INFILTRATION; PERINEURAL at 20:38

## 2022-01-26 RX ADMIN — VANCOMYCIN HYDROCHLORIDE 1500 MG: 10 INJECTION, POWDER, LYOPHILIZED, FOR SOLUTION INTRAVENOUS at 22:26

## 2022-01-26 RX ADMIN — CEFEPIME HYDROCHLORIDE 2 G: 2 INJECTION, POWDER, FOR SOLUTION INTRAVENOUS at 18:42

## 2022-01-27 ENCOUNTER — APPOINTMENT (OUTPATIENT)
Dept: NEUROLOGY | Facility: HOSPITAL | Age: 62
End: 2022-01-27

## 2022-01-27 PROBLEM — R56.9 SYNCOPAL SEIZURE (HCC): Status: ACTIVE | Noted: 2022-01-27

## 2022-01-27 PROBLEM — R55 SYNCOPAL SEIZURE (HCC): Status: ACTIVE | Noted: 2022-01-27

## 2022-01-27 LAB
ALBUMIN SERPL-MCNC: 3.6 G/DL (ref 3.5–5.2)
ANION GAP SERPL CALCULATED.3IONS-SCNC: 13 MMOL/L (ref 5–15)
BUN SERPL-MCNC: 27 MG/DL (ref 6–20)
BUN/CREAT SERPL: 7.2 (ref 7–25)
C GATTII+NEOFOR DNA CSF QL NAA+NON-PROBE: NOT DETECTED
CALCIUM SPEC-SCNC: 8.1 MG/DL (ref 8.6–10.5)
CHLORIDE SERPL-SCNC: 100 MMOL/L (ref 98–107)
CMV DNA CSF QL NAA+PROBE: NOT DETECTED
CO2 SERPL-SCNC: 23 MMOL/L (ref 22–29)
CREAT SERPL-MCNC: 3.73 MG/DL (ref 0.76–1.27)
DEPRECATED RDW RBC AUTO: 45.1 FL (ref 37–54)
E COLI K1 DNA CSF QL NAA+NON-PROBE: NOT DETECTED
ERYTHROCYTE [DISTWIDTH] IN BLOOD BY AUTOMATED COUNT: 13.3 % (ref 12.3–15.4)
EV RNA CSF QL NAA+PROBE: NOT DETECTED
GFR SERPL CREATININE-BSD FRML MDRD: 17 ML/MIN/1.73
GLUCOSE BLDC GLUCOMTR-MCNC: 137 MG/DL (ref 70–130)
GLUCOSE BLDC GLUCOMTR-MCNC: 269 MG/DL (ref 70–130)
GLUCOSE BLDC GLUCOMTR-MCNC: 277 MG/DL (ref 70–130)
GLUCOSE BLDC GLUCOMTR-MCNC: 98 MG/DL (ref 70–130)
GLUCOSE SERPL-MCNC: 216 MG/DL (ref 65–99)
GP B STREP DNA SPEC QL NAA+PROBE: NOT DETECTED
HAEM INFLU SEROTYP DNA SPEC NAA+PROBE: NOT DETECTED
HBA1C MFR BLD: 6.91 % (ref 4.8–5.6)
HCT VFR BLD AUTO: 25.7 % (ref 37.5–51)
HGB BLD-MCNC: 8.6 G/DL (ref 13–17.7)
HHV6 DNA CSF QL NAA+PROBE: NOT DETECTED
HOLD SPECIMEN: NORMAL
HSV1 DNA CSF QL NAA+PROBE: NOT DETECTED
HSV2 DNA CSF QL NAA+PROBE: NOT DETECTED
L MONOCYTOG RRNA SPEC QL PROBE: NOT DETECTED
MCH RBC QN AUTO: 31.4 PG (ref 26.6–33)
MCHC RBC AUTO-ENTMCNC: 33.5 G/DL (ref 31.5–35.7)
MCV RBC AUTO: 93.8 FL (ref 79–97)
N MEN DNA SPEC QL NAA+PROBE: NOT DETECTED
PARECHOVIRUS A RNA CSF QL NAA+NON-PROBE: NOT DETECTED
PHOSPHATE SERPL-MCNC: 5.2 MG/DL (ref 2.5–4.5)
PLATELET # BLD AUTO: 144 10*3/MM3 (ref 140–450)
PMV BLD AUTO: 11.6 FL (ref 6–12)
POTASSIUM SERPL-SCNC: 4 MMOL/L (ref 3.5–5.2)
RBC # BLD AUTO: 2.74 10*6/MM3 (ref 4.14–5.8)
S PNEUM DNA CSF QL NAA+NON-PROBE: NOT DETECTED
SODIUM SERPL-SCNC: 136 MMOL/L (ref 136–145)
VANCOMYCIN SERPL-MCNC: 26.3 MCG/ML (ref 5–40)
VZV DNA CSF QL NAA+PROBE: NOT DETECTED
WBC NRBC COR # BLD: 10.03 10*3/MM3 (ref 3.4–10.8)

## 2022-01-27 PROCEDURE — 97162 PT EVAL MOD COMPLEX 30 MIN: CPT

## 2022-01-27 PROCEDURE — 63710000001 INSULIN LISPRO (HUMAN) PER 5 UNITS

## 2022-01-27 PROCEDURE — 80069 RENAL FUNCTION PANEL: CPT | Performed by: INTERNAL MEDICINE

## 2022-01-27 PROCEDURE — 85027 COMPLETE CBC AUTOMATED: CPT

## 2022-01-27 PROCEDURE — 82962 GLUCOSE BLOOD TEST: CPT

## 2022-01-27 PROCEDURE — 97110 THERAPEUTIC EXERCISES: CPT

## 2022-01-27 PROCEDURE — 25010000002 CEFEPIME PER 500 MG

## 2022-01-27 PROCEDURE — 80202 ASSAY OF VANCOMYCIN: CPT | Performed by: STUDENT IN AN ORGANIZED HEALTH CARE EDUCATION/TRAINING PROGRAM

## 2022-01-27 PROCEDURE — 83036 HEMOGLOBIN GLYCOSYLATED A1C: CPT

## 2022-01-27 PROCEDURE — 99222 1ST HOSP IP/OBS MODERATE 55: CPT | Performed by: PSYCHIATRY & NEUROLOGY

## 2022-01-27 PROCEDURE — 95816 EEG AWAKE AND DROWSY: CPT

## 2022-01-27 PROCEDURE — 95816 EEG AWAKE AND DROWSY: CPT | Performed by: STUDENT IN AN ORGANIZED HEALTH CARE EDUCATION/TRAINING PROGRAM

## 2022-01-27 PROCEDURE — 36415 COLL VENOUS BLD VENIPUNCTURE: CPT

## 2022-01-27 RX ORDER — TIZANIDINE 4 MG/1
4 TABLET ORAL EVERY 6 HOURS PRN
Status: DISCONTINUED | OUTPATIENT
Start: 2022-01-27 | End: 2022-01-29 | Stop reason: HOSPADM

## 2022-01-27 RX ORDER — CARVEDILOL 3.12 MG/1
3.12 TABLET ORAL 2 TIMES DAILY WITH MEALS
Status: DISCONTINUED | OUTPATIENT
Start: 2022-01-27 | End: 2022-01-29 | Stop reason: HOSPADM

## 2022-01-27 RX ORDER — ASCORBIC ACID 500 MG
500 TABLET ORAL 2 TIMES DAILY
Status: DISCONTINUED | OUTPATIENT
Start: 2022-01-27 | End: 2022-01-29 | Stop reason: HOSPADM

## 2022-01-27 RX ORDER — L.ACID,PARA/B.BIFIDUM/S.THERM 8B CELL
1 CAPSULE ORAL DAILY
Status: DISCONTINUED | OUTPATIENT
Start: 2022-01-27 | End: 2022-01-29 | Stop reason: HOSPADM

## 2022-01-27 RX ORDER — PANTOPRAZOLE SODIUM 40 MG/1
40 TABLET, DELAYED RELEASE ORAL
Status: DISCONTINUED | OUTPATIENT
Start: 2022-01-27 | End: 2022-01-29 | Stop reason: HOSPADM

## 2022-01-27 RX ORDER — INSULIN GLARGINE 100 [IU]/ML
15 INJECTION, SOLUTION SUBCUTANEOUS NIGHTLY
Status: DISCONTINUED | OUTPATIENT
Start: 2022-01-27 | End: 2022-01-27

## 2022-01-27 RX ORDER — ATORVASTATIN CALCIUM 20 MG/1
20 TABLET, FILM COATED ORAL DAILY
Status: DISCONTINUED | OUTPATIENT
Start: 2022-01-27 | End: 2022-01-27

## 2022-01-27 RX ORDER — ASPIRIN 81 MG/1
81 TABLET ORAL DAILY
Status: DISCONTINUED | OUTPATIENT
Start: 2022-01-28 | End: 2022-01-29 | Stop reason: HOSPADM

## 2022-01-27 RX ORDER — OXYCODONE AND ACETAMINOPHEN 10; 325 MG/1; MG/1
1 TABLET ORAL EVERY 6 HOURS PRN
Status: DISCONTINUED | OUTPATIENT
Start: 2022-01-27 | End: 2022-01-29 | Stop reason: HOSPADM

## 2022-01-27 RX ORDER — ZINC SULFATE 50(220)MG
220 CAPSULE ORAL DAILY
Status: DISCONTINUED | OUTPATIENT
Start: 2022-01-27 | End: 2022-01-29 | Stop reason: HOSPADM

## 2022-01-27 RX ORDER — ALPRAZOLAM 0.5 MG/1
1 TABLET ORAL EVERY 6 HOURS PRN
Status: DISCONTINUED | OUTPATIENT
Start: 2022-01-27 | End: 2022-01-29 | Stop reason: HOSPADM

## 2022-01-27 RX ADMIN — ALPRAZOLAM 1 MG: 0.5 TABLET ORAL at 09:20

## 2022-01-27 RX ADMIN — INSULIN LISPRO 6 UNITS: 100 INJECTION, SOLUTION INTRAVENOUS; SUBCUTANEOUS at 12:57

## 2022-01-27 RX ADMIN — CEFEPIME HYDROCHLORIDE 2 G: 2 INJECTION, POWDER, FOR SOLUTION INTRAVENOUS at 18:27

## 2022-01-27 RX ADMIN — TIZANIDINE 4 MG: 4 TABLET ORAL at 12:56

## 2022-01-27 RX ADMIN — OXYCODONE HYDROCHLORIDE AND ACETAMINOPHEN 500 MG: 500 TABLET ORAL at 09:18

## 2022-01-27 RX ADMIN — ALPRAZOLAM 1 MG: 0.5 TABLET ORAL at 02:13

## 2022-01-27 RX ADMIN — OXYCODONE HYDROCHLORIDE AND ACETAMINOPHEN 500 MG: 500 TABLET ORAL at 21:54

## 2022-01-27 RX ADMIN — ACETAMINOPHEN 650 MG: 325 TABLET, FILM COATED ORAL at 02:13

## 2022-01-27 RX ADMIN — ZINC SULFATE 220 MG (50 MG) CAPSULE 220 MG: CAPSULE at 09:17

## 2022-01-27 RX ADMIN — PANTOPRAZOLE SODIUM 40 MG: 40 TABLET, DELAYED RELEASE ORAL at 05:48

## 2022-01-27 RX ADMIN — TIZANIDINE 4 MG: 4 TABLET ORAL at 21:55

## 2022-01-27 RX ADMIN — CARVEDILOL 3.12 MG: 3.12 TABLET, FILM COATED ORAL at 18:28

## 2022-01-27 RX ADMIN — INSULIN GLARGINE-YFGN 10 UNITS: 100 INJECTION, SOLUTION SUBCUTANEOUS at 02:40

## 2022-01-27 RX ADMIN — Medication 1 CAPSULE: at 09:18

## 2022-01-27 RX ADMIN — ALPRAZOLAM 1 MG: 0.5 TABLET ORAL at 19:05

## 2022-01-27 RX ADMIN — CARVEDILOL 3.12 MG: 3.12 TABLET, FILM COATED ORAL at 09:17

## 2022-01-27 RX ADMIN — INSULIN GLARGINE-YFGN 10 UNITS: 100 INJECTION, SOLUTION SUBCUTANEOUS at 21:53

## 2022-01-27 RX ADMIN — INSULIN LISPRO 6 UNITS: 100 INJECTION, SOLUTION INTRAVENOUS; SUBCUTANEOUS at 21:54

## 2022-01-27 RX ADMIN — OXYCODONE HYDROCHLORIDE AND ACETAMINOPHEN 1 TABLET: 10; 325 TABLET ORAL at 15:56

## 2022-01-28 ENCOUNTER — APPOINTMENT (OUTPATIENT)
Dept: CARDIOLOGY | Facility: HOSPITAL | Age: 62
End: 2022-01-28

## 2022-01-28 LAB
ALBUMIN SERPL-MCNC: 4 G/DL (ref 3.5–5.2)
ANION GAP SERPL CALCULATED.3IONS-SCNC: 11 MMOL/L (ref 5–15)
BASOPHILS # BLD AUTO: 0.03 10*3/MM3 (ref 0–0.2)
BASOPHILS NFR BLD AUTO: 0.4 % (ref 0–1.5)
BH CV VAS DIALYSIS ARTERIAL ANASTOMOSIS DIAMETER: 0.43 CM
BH CV VAS DIALYSIS ARTERIAL ANASTOMOSIS EDV: 88 CM/SEC
BH CV VAS DIALYSIS ARTERIAL ANASTOMOSIS PSV: 261 CM/SEC
BH CV VAS DIALYSIS CONDUIT DIST DIAMETER: 0.63 CM
BH CV VAS DIALYSIS CONDUIT DIST EDV: 41 CM/SEC
BH CV VAS DIALYSIS CONDUIT DIST PSV: 101 CM/SEC
BH CV VAS DIALYSIS CONDUIT MID DIAMETER: 0.56 CM
BH CV VAS DIALYSIS CONDUIT MID EDV: 72 CM/SEC
BH CV VAS DIALYSIS CONDUIT MID PSV: 132 CM/SEC
BH CV VAS DIALYSIS CONDUIT MID/DIST DIAMETER: 0.41 CM
BH CV VAS DIALYSIS CONDUIT MID/DIST EDV: 61 CM/SEC
BH CV VAS DIALYSIS CONDUIT MID/DIST PSV: 101 CM/SEC
BH CV VAS DIALYSIS CONDUIT PROX DEPTH: 0.2 CM
BH CV VAS DIALYSIS CONDUIT PROX DIAMETER: 0.31 CM
BH CV VAS DIALYSIS CONDUIT PROX EDV: 416 CM/SEC
BH CV VAS DIALYSIS CONDUIT PROX PSV: 691 CM/SEC
BH CV VAS DIALYSIS CONDUIT PROX/MID DEPTH: 0.2 CM
BH CV VAS DIALYSIS CONDUIT PROX/MID DIAMETER: 0.55 CM
BH CV VAS DIALYSIS CONDUIT PROX/MID EDV: 416 CM/SEC
BH CV VAS DIALYSIS CONDUIT PROX/MID FLOW VOL: 783 ML/MIN
BH CV VAS DIALYSIS CONDUIT PROX/MID PSV: 395 CM/SEC
BH CV VAS DIALYSIS PRE-INFLOW BRACHIAL DIAMETER: 0.54 CM
BH CV VAS DIALYSIS PRE-INFLOW BRACHIAL EDV: 136 CM/SEC
BH CV VAS DIALYSIS PRE-INFLOW BRACHIAL FLOW VOL: 954 ML/MIN
BH CV VAS DIALYSIS PRE-INFLOW BRACHIAL PSV: 245 CM/SEC
BH CV VAS DIALYSIS PRE-INFLOW RADIAL EDV: 17 CM/SEC
BH CV VAS DIALYSIS PRE-INFLOW RADIAL PSV: 114 CM/SEC
BH CV VAS DIALYSIS PRE-INFLOW SUBCLAV PSV: 108 CM/SEC
BH CV VAS DIALYSIS PRE-INFLOW SUBCLAVIAN EDV: 34 CM/SEC
BH CV VAS DIALYSIS VENOUS OUTFLOW CEPHALIC ARCH DIAMETE: 0.63 CM
BH CV VAS DIALYSIS VENOUS OUTFLOW CEPHALIC ARCH EDV: 45 CM/SEC
BH CV VAS DIALYSIS VENOUS OUTFLOW CEPHALIC ARCH PSV: 98 CM/SEC
BH CV VAS DIALYSIS VENOUS OUTFLOW SUBCLAVIAN DIAMETER: 0.84 CM
BH CV VAS DIALYSIS VENOUS OUTFLOW SUBCLAVIAN EDV: 27 CM/SEC
BH CV VAS DIALYSIS VENOUS OUTFLOW SUBCLAVIAN PSV: 141 CM/SEC
BUN SERPL-MCNC: 19 MG/DL (ref 6–20)
BUN/CREAT SERPL: 7.1 (ref 7–25)
CALCIUM SPEC-SCNC: 8.1 MG/DL (ref 8.6–10.5)
CHLORIDE SERPL-SCNC: 97 MMOL/L (ref 98–107)
CO2 SERPL-SCNC: 25 MMOL/L (ref 22–29)
CREAT SERPL-MCNC: 2.69 MG/DL (ref 0.76–1.27)
DEPRECATED RDW RBC AUTO: 47.2 FL (ref 37–54)
EOSINOPHIL # BLD AUTO: 0.05 10*3/MM3 (ref 0–0.4)
EOSINOPHIL NFR BLD AUTO: 0.7 % (ref 0.3–6.2)
ERYTHROCYTE [DISTWIDTH] IN BLOOD BY AUTOMATED COUNT: 13.2 % (ref 12.3–15.4)
GFR SERPL CREATININE-BSD FRML MDRD: 24 ML/MIN/1.73
GLUCOSE BLDC GLUCOMTR-MCNC: 140 MG/DL (ref 70–130)
GLUCOSE BLDC GLUCOMTR-MCNC: 291 MG/DL (ref 70–130)
GLUCOSE BLDC GLUCOMTR-MCNC: 305 MG/DL (ref 70–130)
GLUCOSE SERPL-MCNC: 296 MG/DL (ref 65–99)
HCT VFR BLD AUTO: 28.9 % (ref 37.5–51)
HGB BLD-MCNC: 9.1 G/DL (ref 13–17.7)
IMM GRANULOCYTES # BLD AUTO: 0.04 10*3/MM3 (ref 0–0.05)
IMM GRANULOCYTES NFR BLD AUTO: 0.5 % (ref 0–0.5)
LYMPHOCYTES # BLD AUTO: 0.52 10*3/MM3 (ref 0.7–3.1)
LYMPHOCYTES NFR BLD AUTO: 6.8 % (ref 19.6–45.3)
MAGNESIUM SERPL-MCNC: 2.1 MG/DL (ref 1.6–2.6)
MAXIMAL PREDICTED HEART RATE: 161 BPM
MCH RBC QN AUTO: 30.7 PG (ref 26.6–33)
MCHC RBC AUTO-ENTMCNC: 31.5 G/DL (ref 31.5–35.7)
MCV RBC AUTO: 97.6 FL (ref 79–97)
MONOCYTES # BLD AUTO: 0.49 10*3/MM3 (ref 0.1–0.9)
MONOCYTES NFR BLD AUTO: 6.4 % (ref 5–12)
NEUTROPHILS NFR BLD AUTO: 6.5 10*3/MM3 (ref 1.7–7)
NEUTROPHILS NFR BLD AUTO: 85.2 % (ref 42.7–76)
NRBC BLD AUTO-RTO: 0 /100 WBC (ref 0–0.2)
PHOSPHATE SERPL-MCNC: 3.3 MG/DL (ref 2.5–4.5)
PLATELET # BLD AUTO: 155 10*3/MM3 (ref 140–450)
PMV BLD AUTO: 11.5 FL (ref 6–12)
POTASSIUM SERPL-SCNC: 4 MMOL/L (ref 3.5–5.2)
RBC # BLD AUTO: 2.96 10*6/MM3 (ref 4.14–5.8)
SODIUM SERPL-SCNC: 133 MMOL/L (ref 136–145)
STRESS TARGET HR: 137 BPM
WBC NRBC COR # BLD: 7.63 10*3/MM3 (ref 3.4–10.8)

## 2022-01-28 PROCEDURE — 25010000002 CEFEPIME PER 500 MG

## 2022-01-28 PROCEDURE — 83735 ASSAY OF MAGNESIUM: CPT | Performed by: STUDENT IN AN ORGANIZED HEALTH CARE EDUCATION/TRAINING PROGRAM

## 2022-01-28 PROCEDURE — 25010000002 ONDANSETRON PER 1 MG

## 2022-01-28 PROCEDURE — 25010000002 EPOETIN ALFA-EPBX 10000 UNIT/ML SOLUTION: Performed by: INTERNAL MEDICINE

## 2022-01-28 PROCEDURE — 82962 GLUCOSE BLOOD TEST: CPT

## 2022-01-28 PROCEDURE — 63710000001 INSULIN LISPRO (HUMAN) PER 5 UNITS

## 2022-01-28 PROCEDURE — 93990 DOPPLER FLOW TESTING: CPT

## 2022-01-28 PROCEDURE — 25010000002 VANCOMYCIN PER 500 MG: Performed by: STUDENT IN AN ORGANIZED HEALTH CARE EDUCATION/TRAINING PROGRAM

## 2022-01-28 PROCEDURE — 80069 RENAL FUNCTION PANEL: CPT | Performed by: INTERNAL MEDICINE

## 2022-01-28 PROCEDURE — 85025 COMPLETE CBC W/AUTO DIFF WBC: CPT | Performed by: STUDENT IN AN ORGANIZED HEALTH CARE EDUCATION/TRAINING PROGRAM

## 2022-01-28 PROCEDURE — 25010000002 HEPARIN (PORCINE) PER 1000 UNITS: Performed by: INTERNAL MEDICINE

## 2022-01-28 RX ORDER — HEPARIN SODIUM 1000 [USP'U]/ML
3800 INJECTION, SOLUTION INTRAVENOUS; SUBCUTANEOUS AS NEEDED
Status: DISCONTINUED | OUTPATIENT
Start: 2022-01-28 | End: 2022-01-29 | Stop reason: HOSPADM

## 2022-01-28 RX ADMIN — OXYCODONE HYDROCHLORIDE AND ACETAMINOPHEN 500 MG: 500 TABLET ORAL at 20:54

## 2022-01-28 RX ADMIN — HEPARIN SODIUM 3800 UNITS: 1000 INJECTION INTRAVENOUS; SUBCUTANEOUS at 11:22

## 2022-01-28 RX ADMIN — ASPIRIN 81 MG: 81 TABLET, COATED ORAL at 13:20

## 2022-01-28 RX ADMIN — CARVEDILOL 3.12 MG: 3.12 TABLET, FILM COATED ORAL at 13:20

## 2022-01-28 RX ADMIN — ALPRAZOLAM 1 MG: 0.5 TABLET ORAL at 20:54

## 2022-01-28 RX ADMIN — ZINC SULFATE 220 MG (50 MG) CAPSULE 220 MG: CAPSULE at 13:20

## 2022-01-28 RX ADMIN — OXYCODONE HYDROCHLORIDE AND ACETAMINOPHEN 1 TABLET: 10; 325 TABLET ORAL at 13:20

## 2022-01-28 RX ADMIN — EPOETIN ALFA-EPBX 10000 UNITS: 10000 INJECTION, SOLUTION INTRAVENOUS; SUBCUTANEOUS at 11:22

## 2022-01-28 RX ADMIN — CARVEDILOL 3.12 MG: 3.12 TABLET, FILM COATED ORAL at 17:57

## 2022-01-28 RX ADMIN — INSULIN LISPRO 7 UNITS: 100 INJECTION, SOLUTION INTRAVENOUS; SUBCUTANEOUS at 17:09

## 2022-01-28 RX ADMIN — CEFEPIME HYDROCHLORIDE 2 G: 2 INJECTION, POWDER, FOR SOLUTION INTRAVENOUS at 17:08

## 2022-01-28 RX ADMIN — ONDANSETRON 4 MG: 2 INJECTION INTRAMUSCULAR; INTRAVENOUS at 13:19

## 2022-01-28 RX ADMIN — VANCOMYCIN HYDROCHLORIDE 500 MG: 500 INJECTION, POWDER, LYOPHILIZED, FOR SOLUTION INTRAVENOUS at 20:54

## 2022-01-28 RX ADMIN — OXYCODONE HYDROCHLORIDE AND ACETAMINOPHEN 500 MG: 500 TABLET ORAL at 13:20

## 2022-01-28 RX ADMIN — INSULIN GLARGINE-YFGN 10 UNITS: 100 INJECTION, SOLUTION SUBCUTANEOUS at 20:54

## 2022-01-28 RX ADMIN — Medication 1 CAPSULE: at 13:20

## 2022-01-28 RX ADMIN — OXYCODONE HYDROCHLORIDE AND ACETAMINOPHEN 1 TABLET: 10; 325 TABLET ORAL at 20:54

## 2022-01-28 RX ADMIN — ALPRAZOLAM 1 MG: 0.5 TABLET ORAL at 05:07

## 2022-01-28 RX ADMIN — PANTOPRAZOLE SODIUM 40 MG: 40 TABLET, DELAYED RELEASE ORAL at 05:08

## 2022-01-29 ENCOUNTER — READMISSION MANAGEMENT (OUTPATIENT)
Dept: CALL CENTER | Facility: HOSPITAL | Age: 62
End: 2022-01-29

## 2022-01-29 VITALS
RESPIRATION RATE: 16 BRPM | TEMPERATURE: 98.2 F | DIASTOLIC BLOOD PRESSURE: 75 MMHG | OXYGEN SATURATION: 97 % | SYSTOLIC BLOOD PRESSURE: 125 MMHG | HEART RATE: 63 BPM | WEIGHT: 178 LBS

## 2022-01-29 LAB
ALBUMIN SERPL-MCNC: 3.7 G/DL (ref 3.5–5.2)
ANION GAP SERPL CALCULATED.3IONS-SCNC: 12 MMOL/L (ref 5–15)
BACTERIA SPEC AEROBE CULT: NORMAL
BASOPHILS # BLD AUTO: 0.03 10*3/MM3 (ref 0–0.2)
BASOPHILS NFR BLD AUTO: 0.5 % (ref 0–1.5)
BUN SERPL-MCNC: 30 MG/DL (ref 6–20)
BUN/CREAT SERPL: 7.1 (ref 7–25)
CALCIUM SPEC-SCNC: 8 MG/DL (ref 8.6–10.5)
CHLORIDE SERPL-SCNC: 98 MMOL/L (ref 98–107)
CO2 SERPL-SCNC: 24 MMOL/L (ref 22–29)
CREAT SERPL-MCNC: 4.24 MG/DL (ref 0.76–1.27)
DEPRECATED RDW RBC AUTO: 50.1 FL (ref 37–54)
EOSINOPHIL # BLD AUTO: 0.11 10*3/MM3 (ref 0–0.4)
EOSINOPHIL NFR BLD AUTO: 2 % (ref 0.3–6.2)
ERYTHROCYTE [DISTWIDTH] IN BLOOD BY AUTOMATED COUNT: 13.7 % (ref 12.3–15.4)
GFR SERPL CREATININE-BSD FRML MDRD: 14 ML/MIN/1.73
GFR SERPL CREATININE-BSD FRML MDRD: ABNORMAL ML/MIN/{1.73_M2}
GLUCOSE BLDC GLUCOMTR-MCNC: 188 MG/DL (ref 70–130)
GLUCOSE BLDC GLUCOMTR-MCNC: 243 MG/DL (ref 70–130)
GLUCOSE SERPL-MCNC: 271 MG/DL (ref 65–99)
GRAM STN SPEC: NORMAL
HCT VFR BLD AUTO: 27.3 % (ref 37.5–51)
HGB BLD-MCNC: 8.5 G/DL (ref 13–17.7)
IMM GRANULOCYTES # BLD AUTO: 0.05 10*3/MM3 (ref 0–0.05)
IMM GRANULOCYTES NFR BLD AUTO: 0.9 % (ref 0–0.5)
LYMPHOCYTES # BLD AUTO: 1.05 10*3/MM3 (ref 0.7–3.1)
LYMPHOCYTES NFR BLD AUTO: 18.9 % (ref 19.6–45.3)
MCH RBC QN AUTO: 30.8 PG (ref 26.6–33)
MCHC RBC AUTO-ENTMCNC: 31.1 G/DL (ref 31.5–35.7)
MCV RBC AUTO: 98.9 FL (ref 79–97)
MONOCYTES # BLD AUTO: 0.46 10*3/MM3 (ref 0.1–0.9)
MONOCYTES NFR BLD AUTO: 8.3 % (ref 5–12)
NEUTROPHILS NFR BLD AUTO: 3.86 10*3/MM3 (ref 1.7–7)
NEUTROPHILS NFR BLD AUTO: 69.4 % (ref 42.7–76)
NRBC BLD AUTO-RTO: 0 /100 WBC (ref 0–0.2)
PHOSPHATE SERPL-MCNC: 4.9 MG/DL (ref 2.5–4.5)
PLATELET # BLD AUTO: 145 10*3/MM3 (ref 140–450)
PMV BLD AUTO: 11.7 FL (ref 6–12)
POTASSIUM SERPL-SCNC: 4.2 MMOL/L (ref 3.5–5.2)
RBC # BLD AUTO: 2.76 10*6/MM3 (ref 4.14–5.8)
SODIUM SERPL-SCNC: 134 MMOL/L (ref 136–145)
WBC NRBC COR # BLD: 5.56 10*3/MM3 (ref 3.4–10.8)

## 2022-01-29 PROCEDURE — 63710000001 INSULIN LISPRO (HUMAN) PER 5 UNITS

## 2022-01-29 PROCEDURE — 85025 COMPLETE CBC W/AUTO DIFF WBC: CPT | Performed by: STUDENT IN AN ORGANIZED HEALTH CARE EDUCATION/TRAINING PROGRAM

## 2022-01-29 PROCEDURE — 80069 RENAL FUNCTION PANEL: CPT | Performed by: INTERNAL MEDICINE

## 2022-01-29 PROCEDURE — 82962 GLUCOSE BLOOD TEST: CPT

## 2022-01-29 RX ORDER — CARVEDILOL 3.12 MG/1
TABLET ORAL
Start: 2022-01-29 | End: 2022-02-02 | Stop reason: SDUPTHER

## 2022-01-29 RX ORDER — ASPIRIN 81 MG/1
81 TABLET ORAL DAILY
Qty: 30 TABLET | Refills: 0 | Status: SHIPPED | OUTPATIENT
Start: 2022-01-30 | End: 2022-12-26

## 2022-01-29 RX ADMIN — INSULIN LISPRO 2 UNITS: 100 INJECTION, SOLUTION INTRAVENOUS; SUBCUTANEOUS at 13:25

## 2022-01-29 RX ADMIN — Medication 1 CAPSULE: at 09:22

## 2022-01-29 RX ADMIN — ALPRAZOLAM 1 MG: 0.5 TABLET ORAL at 04:28

## 2022-01-29 RX ADMIN — PANTOPRAZOLE SODIUM 40 MG: 40 TABLET, DELAYED RELEASE ORAL at 04:28

## 2022-01-29 RX ADMIN — ZINC SULFATE 220 MG (50 MG) CAPSULE 220 MG: CAPSULE at 09:22

## 2022-01-29 RX ADMIN — CARVEDILOL 3.12 MG: 3.12 TABLET, FILM COATED ORAL at 09:22

## 2022-01-29 RX ADMIN — INSULIN LISPRO 4 UNITS: 100 INJECTION, SOLUTION INTRAVENOUS; SUBCUTANEOUS at 07:27

## 2022-01-29 RX ADMIN — OXYCODONE HYDROCHLORIDE AND ACETAMINOPHEN 1 TABLET: 10; 325 TABLET ORAL at 04:28

## 2022-01-29 RX ADMIN — OXYCODONE HYDROCHLORIDE AND ACETAMINOPHEN 500 MG: 500 TABLET ORAL at 09:22

## 2022-01-29 RX ADMIN — ASPIRIN 81 MG: 81 TABLET, COATED ORAL at 09:22

## 2022-01-30 NOTE — OUTREACH NOTE
Prep Survey      Responses   [facility] facility patient discharged from? [place]   Is LACE score < 7 ? No   Emergency Room discharge w/ pulse ox? No   Eligibility Readm Mgmt   Discharge diagnosis Witnessed seizure-like activity Syncopal seizure    Does the patient have one of the following disease processes/diagnoses(primary or secondary)? Other   Does the patient have Home health ordered? No   Is there a DME ordered? No   Comments regarding appointments HD at [facility] M/W/F   Prep survey completed? Yes          Jeri Hall RN

## 2022-01-31 LAB
BACTERIA SPEC AEROBE CULT: NORMAL
BACTERIA SPEC AEROBE CULT: NORMAL

## 2022-02-01 ENCOUNTER — APPOINTMENT (OUTPATIENT)
Dept: LAB | Facility: HOSPITAL | Age: 62
End: 2022-02-01

## 2022-02-01 ENCOUNTER — READMISSION MANAGEMENT (OUTPATIENT)
Dept: CALL CENTER | Facility: HOSPITAL | Age: 62
End: 2022-02-01

## 2022-02-01 NOTE — OUTREACH NOTE
Medical Week 1 Survey      Responses   [facility] patient discharged from? [place]   Does the patient have one of the following disease processes/diagnoses(primary or secondary)? Other   Week 1 attempt successful? Yes   Call start time 1649   Call end time 1652   Discharge diagnosis Witnessed seizure-like activity Syncopal seizure    Is patient permission given to speak with other caregiver? Yes   Person spoke with today (if not patient) and relationship jake spouse   Meds reviewed with patient/caregiver? Yes   Is the patient having any side effects they believe may be caused by any medication additions or changes? No   Does the patient have all medications ordered at discharge? Yes   Is the patient taking all medications as directed (includes completed medication regime)? Yes   Does the patient have a primary care provider?  Yes   Does the patient have an appointment with their PCP within 7 days of discharge? Yes   Has the patient kept scheduled appointments due by today? Yes   Comments Has been to dialysis and has seen nephrology.   Has home health visited the patient within 72 hours of discharge? N/A   Psychosocial issues? No   Did the patient receive a copy of their discharge instructions? Yes   Nursing interventions Reviewed instructions with patient   What is the patient's perception of their health status since discharge? Improving   Is the patient/caregiver able to teach back signs and symptoms related to disease process for when to call PCP? Yes   Is the patient/caregiver able to teach back signs and symptoms related to disease process for when to call 911? Yes   Is the patient/caregiver able to teach back the hierarchy of who to call/visit for symptoms/problems? PCP, Specialist, Home health nurse, Urgent Care, ED, 911 Yes   If the patient is a current smoker, are they able to teach back resources for cessation? Not a smoker   Additional teach back comments He has had a good day they say. Has other  appts coming up.   Week 1 call completed? Yes   Wrap up additional comments Improving.          Elen Hook RN

## 2022-02-02 ENCOUNTER — OFFICE VISIT (OUTPATIENT)
Dept: ONCOLOGY | Facility: CLINIC | Age: 62
End: 2022-02-02

## 2022-02-02 ENCOUNTER — TELEPHONE (OUTPATIENT)
Dept: ONCOLOGY | Facility: CLINIC | Age: 62
End: 2022-02-02

## 2022-02-02 ENCOUNTER — LAB (OUTPATIENT)
Dept: LAB | Facility: HOSPITAL | Age: 62
End: 2022-02-02

## 2022-02-02 ENCOUNTER — INFUSION (OUTPATIENT)
Dept: ONCOLOGY | Facility: HOSPITAL | Age: 62
End: 2022-02-02

## 2022-02-02 VITALS
BODY MASS INDEX: 27.02 KG/M2 | HEIGHT: 68 IN | HEART RATE: 72 BPM | WEIGHT: 178.3 LBS | SYSTOLIC BLOOD PRESSURE: 169 MMHG | DIASTOLIC BLOOD PRESSURE: 69 MMHG | TEMPERATURE: 98.4 F | RESPIRATION RATE: 16 BRPM | OXYGEN SATURATION: 98 %

## 2022-02-02 DIAGNOSIS — E53.8 B12 DEFICIENCY: ICD-10-CM

## 2022-02-02 DIAGNOSIS — Z99.2 ANEMIA IN CHRONIC KIDNEY DISEASE, ON CHRONIC DIALYSIS: Primary | ICD-10-CM

## 2022-02-02 DIAGNOSIS — D50.8 OTHER IRON DEFICIENCY ANEMIA: ICD-10-CM

## 2022-02-02 DIAGNOSIS — N18.30 ANEMIA OF CHRONIC RENAL FAILURE, STAGE 3 (MODERATE), UNSPECIFIED WHETHER STAGE 3A OR 3B CKD: ICD-10-CM

## 2022-02-02 DIAGNOSIS — D61.818 PANCYTOPENIA, ACQUIRED: ICD-10-CM

## 2022-02-02 DIAGNOSIS — D63.1 ANEMIA IN CHRONIC KIDNEY DISEASE, ON CHRONIC DIALYSIS: Primary | ICD-10-CM

## 2022-02-02 DIAGNOSIS — D69.6 THROMBOCYTOPENIA: ICD-10-CM

## 2022-02-02 DIAGNOSIS — N18.6 ANEMIA IN CHRONIC KIDNEY DISEASE, ON CHRONIC DIALYSIS: Primary | ICD-10-CM

## 2022-02-02 DIAGNOSIS — E53.8 B12 DEFICIENCY: Primary | ICD-10-CM

## 2022-02-02 DIAGNOSIS — D63.1 ANEMIA OF CHRONIC RENAL FAILURE, STAGE 3 (MODERATE), UNSPECIFIED WHETHER STAGE 3A OR 3B CKD: ICD-10-CM

## 2022-02-02 LAB
BASOPHILS # BLD AUTO: 0.07 10*3/MM3 (ref 0–0.2)
BASOPHILS NFR BLD AUTO: 1.2 % (ref 0–1.5)
DEPRECATED RDW RBC AUTO: 54.4 FL (ref 37–54)
EOSINOPHIL # BLD AUTO: 0.15 10*3/MM3 (ref 0–0.4)
EOSINOPHIL NFR BLD AUTO: 2.6 % (ref 0.3–6.2)
ERYTHROCYTE [DISTWIDTH] IN BLOOD BY AUTOMATED COUNT: 15 % (ref 12.3–15.4)
ERYTHROCYTE [SEDIMENTATION RATE] IN BLOOD: 52 MM/HR (ref 0–20)
FERRITIN SERPL-MCNC: 1514.6 NG/ML (ref 30–400)
FOLATE SERPL-MCNC: 7.58 NG/ML (ref 4.78–24.2)
HCT VFR BLD AUTO: 28.7 % (ref 37.5–51)
HGB BLD-MCNC: 8.9 G/DL (ref 13–17.7)
HGB RETIC QN AUTO: 34.4 PG (ref 29.8–36.1)
IMM GRANULOCYTES # BLD AUTO: 0.07 10*3/MM3 (ref 0–0.05)
IMM GRANULOCYTES NFR BLD AUTO: 1.2 % (ref 0–0.5)
IMM RETICS NFR: 22.8 % (ref 3–15.8)
IRON 24H UR-MRATE: 89 MCG/DL (ref 59–158)
IRON SATN MFR SERPL: 35 % (ref 14–48)
LYMPHOCYTES # BLD AUTO: 1.55 10*3/MM3 (ref 0.7–3.1)
LYMPHOCYTES NFR BLD AUTO: 26.7 % (ref 19.6–45.3)
MCH RBC QN AUTO: 31.6 PG (ref 26.6–33)
MCHC RBC AUTO-ENTMCNC: 31 G/DL (ref 31.5–35.7)
MCV RBC AUTO: 101.8 FL (ref 79–97)
MONOCYTES # BLD AUTO: 0.55 10*3/MM3 (ref 0.1–0.9)
MONOCYTES NFR BLD AUTO: 9.5 % (ref 5–12)
NEUTROPHILS NFR BLD AUTO: 3.42 10*3/MM3 (ref 1.7–7)
NEUTROPHILS NFR BLD AUTO: 58.8 % (ref 42.7–76)
NRBC BLD AUTO-RTO: 0 /100 WBC (ref 0–0.2)
PLATELET # BLD AUTO: 179 10*3/MM3 (ref 140–450)
PMV BLD AUTO: 10.2 FL (ref 6–12)
RBC # BLD AUTO: 2.82 10*6/MM3 (ref 4.14–5.8)
RETICS # AUTO: 0.16 10*6/MM3 (ref 0.02–0.13)
RETICS/RBC NFR AUTO: 5.65 % (ref 0.7–1.9)
TIBC SERPL-MCNC: 256 MCG/DL (ref 249–505)
TRANSFERRIN SERPL-MCNC: 183 MG/DL (ref 200–360)
VIT B12 BLD-MCNC: 898 PG/ML (ref 211–946)
WBC NRBC COR # BLD: 5.81 10*3/MM3 (ref 3.4–10.8)

## 2022-02-02 PROCEDURE — 82607 VITAMIN B-12: CPT | Performed by: INTERNAL MEDICINE

## 2022-02-02 PROCEDURE — 85025 COMPLETE CBC W/AUTO DIFF WBC: CPT

## 2022-02-02 PROCEDURE — 25010000002 CYANOCOBALAMIN PER 1000 MCG: Performed by: INTERNAL MEDICINE

## 2022-02-02 PROCEDURE — 83540 ASSAY OF IRON: CPT | Performed by: INTERNAL MEDICINE

## 2022-02-02 PROCEDURE — 85046 RETICYTE/HGB CONCENTRATE: CPT | Performed by: INTERNAL MEDICINE

## 2022-02-02 PROCEDURE — 96372 THER/PROPH/DIAG INJ SC/IM: CPT

## 2022-02-02 PROCEDURE — 99213 OFFICE O/P EST LOW 20 MIN: CPT | Performed by: INTERNAL MEDICINE

## 2022-02-02 PROCEDURE — 84466 ASSAY OF TRANSFERRIN: CPT | Performed by: INTERNAL MEDICINE

## 2022-02-02 PROCEDURE — 82728 ASSAY OF FERRITIN: CPT | Performed by: INTERNAL MEDICINE

## 2022-02-02 PROCEDURE — 85652 RBC SED RATE AUTOMATED: CPT | Performed by: INTERNAL MEDICINE

## 2022-02-02 PROCEDURE — 82746 ASSAY OF FOLIC ACID SERUM: CPT | Performed by: INTERNAL MEDICINE

## 2022-02-02 PROCEDURE — 36415 COLL VENOUS BLD VENIPUNCTURE: CPT

## 2022-02-02 RX ORDER — CYANOCOBALAMIN 1000 UG/ML
1000 INJECTION, SOLUTION INTRAMUSCULAR; SUBCUTANEOUS
Status: CANCELLED | OUTPATIENT
Start: 2022-02-02

## 2022-02-02 RX ORDER — PROMETHAZINE HYDROCHLORIDE 25 MG/1
25 TABLET ORAL AS NEEDED
COMMUNITY

## 2022-02-02 RX ORDER — CLINDAMYCIN HYDROCHLORIDE 150 MG/1
450 CAPSULE ORAL 4 TIMES DAILY
COMMUNITY
Start: 2022-01-22 | End: 2022-02-05

## 2022-02-02 RX ORDER — CYANOCOBALAMIN 1000 UG/ML
1000 INJECTION, SOLUTION INTRAMUSCULAR; SUBCUTANEOUS
Status: DISCONTINUED | OUTPATIENT
Start: 2022-02-02 | End: 2022-02-02 | Stop reason: HOSPADM

## 2022-02-02 RX ORDER — UREA 10 %
1 LOTION (ML) TOPICAL 3 TIMES DAILY
COMMUNITY
Start: 2022-01-22 | End: 2022-02-05

## 2022-02-02 RX ADMIN — CYANOCOBALAMIN 1000 MCG: 1000 INJECTION, SOLUTION INTRAMUSCULAR at 10:22

## 2022-02-02 NOTE — TELEPHONE ENCOUNTER
Called pt's wife Samantha to inform her pt is able to get vaccinated third week of February per Dr. Aviles. Adela Carlson RN
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
## 2022-02-09 ENCOUNTER — READMISSION MANAGEMENT (OUTPATIENT)
Dept: CALL CENTER | Facility: HOSPITAL | Age: 62
End: 2022-02-09

## 2022-02-09 NOTE — OUTREACH NOTE
Medical Week 2 Survey      Responses   [facility] patient discharged from? [place]   Does the patient have one of the following disease processes/diagnoses(primary or secondary)? Other   Week 2 attempt successful? Yes   Call start time 1603   Discharge diagnosis Witnessed seizure-like activity Syncopal seizure    Call end time 1606   Person spoke with today (if not patient) and relationship Spouse   Meds reviewed with patient/caregiver? Yes   Is the patient having any side effects they believe may be caused by any medication additions or changes? No   Does the patient have all medications ordered at discharge? Yes   Is the patient taking all medications as directed (includes completed medication regime)? Yes   Does the patient have a primary care provider?  Yes   Does the patient have an appointment with their PCP within 7 days of discharge? Yes   Has the patient kept scheduled appointments due by today? N/A   Psychosocial issues? No   What is the patient's perception of their health status since discharge? Improving   Is the patient/caregiver able to teach back signs and symptoms related to disease process for when to call PCP? Yes   Is the patient/caregiver able to teach back signs and symptoms related to disease process for when to call 911? Yes   Is the patient/caregiver able to teach back the hierarchy of who to call/visit for symptoms/problems? PCP, Specialist, Home health nurse, Urgent Care, ED, 911 Yes   If the patient is a current smoker, are they able to teach back resources for cessation? Not a smoker   Week 2 Call Completed? Yes   Wrap up additional comments Spouse states pt is doing better. No questions/concerns.          Preeti Yoder RN

## 2022-02-16 ENCOUNTER — READMISSION MANAGEMENT (OUTPATIENT)
Dept: CALL CENTER | Facility: HOSPITAL | Age: 62
End: 2022-02-16

## 2022-02-16 NOTE — OUTREACH NOTE
Medical Week 3 Survey      Responses   [facility] patient discharged from? [place]   Does the patient have one of the following disease processes/diagnoses(primary or secondary)? Other   Week 3 attempt successful? Yes   Call start time 1506   Call end time 1510   Discharge diagnosis Witnessed seizure-like activity Syncopal seizure    Person spoke with today (if not patient) and relationship Samantha, wife   Meds reviewed with patient/caregiver? Yes   Is the patient having any side effects they believe may be caused by any medication additions or changes? No   Does the patient have all medications ordered at discharge? Yes   Is the patient taking all medications as directed (includes completed medication regime)? Yes   Does the patient have a primary care provider?  Yes   Does the patient have an appointment with their PCP within 7 days of discharge? Yes   Has the patient kept scheduled appointments due by today? Yes   Has home health visited the patient within 72 hours of discharge? N/A   Psychosocial issues? No   Did the patient receive a copy of their discharge instructions? Yes   Nursing interventions Reviewed instructions with patient   What is the patient's perception of their health status since discharge? Improving   Is the patient/caregiver able to teach back signs and symptoms related to disease process for when to call PCP? Yes   Is the patient/caregiver able to teach back signs and symptoms related to disease process for when to call 911? Yes   Is the patient/caregiver able to teach back the hierarchy of who to call/visit for symptoms/problems? PCP, Specialist, Home health nurse, Urgent Care, ED, 911 Yes   Additional teach back comments wife states pt has been free of syncopal episodes, states has tried cutting down on dialysis times and amounts and has proved to be beneficial, feels better after dialysis   Week 3 Call Completed? Yes          Linda Mueller RN

## 2022-03-01 ENCOUNTER — OFFICE VISIT (OUTPATIENT)
Dept: NEUROLOGY | Facility: CLINIC | Age: 62
End: 2022-03-01

## 2022-03-01 VITALS
HEIGHT: 68 IN | SYSTOLIC BLOOD PRESSURE: 130 MMHG | OXYGEN SATURATION: 90 % | WEIGHT: 169.8 LBS | HEART RATE: 83 BPM | DIASTOLIC BLOOD PRESSURE: 70 MMHG | BODY MASS INDEX: 25.73 KG/M2

## 2022-03-01 DIAGNOSIS — R20.2 NUMBNESS AND TINGLING OF BOTH LOWER EXTREMITIES: ICD-10-CM

## 2022-03-01 DIAGNOSIS — R56.9 SEIZURE-LIKE ACTIVITY: ICD-10-CM

## 2022-03-01 DIAGNOSIS — E78.5 HYPERLIPIDEMIA, UNSPECIFIED HYPERLIPIDEMIA TYPE: ICD-10-CM

## 2022-03-01 DIAGNOSIS — I10 HYPERTENSION, UNSPECIFIED TYPE: ICD-10-CM

## 2022-03-01 DIAGNOSIS — R20.0 NUMBNESS AND TINGLING OF BOTH LOWER EXTREMITIES: ICD-10-CM

## 2022-03-01 DIAGNOSIS — G56.03 BILATERAL CARPAL TUNNEL SYNDROME: ICD-10-CM

## 2022-03-01 DIAGNOSIS — Z86.73 HISTORY OF STROKE: Primary | ICD-10-CM

## 2022-03-01 DIAGNOSIS — E11.65 TYPE 2 DIABETES MELLITUS WITH HYPERGLYCEMIA, WITHOUT LONG-TERM CURRENT USE OF INSULIN: ICD-10-CM

## 2022-03-01 DIAGNOSIS — F11.90 OPIOID USE: ICD-10-CM

## 2022-03-01 DIAGNOSIS — Z91.89 AT RISK FOR OBSTRUCTIVE SLEEP APNEA: ICD-10-CM

## 2022-03-01 DIAGNOSIS — Z91.81 AT HIGH RISK FOR FALLS: ICD-10-CM

## 2022-03-01 DIAGNOSIS — E53.8 B12 DEFICIENCY: ICD-10-CM

## 2022-03-01 PROCEDURE — 99214 OFFICE O/P EST MOD 30 MIN: CPT | Performed by: NURSE PRACTITIONER

## 2022-03-01 RX ORDER — LANOLIN ALCOHOL/MO/W.PET/CERES
1000 CREAM (GRAM) TOPICAL DAILY
COMMUNITY
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
## 2022-03-04 ENCOUNTER — HOSPITAL ENCOUNTER (INPATIENT)
Facility: HOSPITAL | Age: 62
LOS: 14 days | Discharge: LEFT BEFORE DISCHARGE | End: 2022-03-18
Attending: EMERGENCY MEDICINE | Admitting: HOSPITALIST

## 2022-03-04 ENCOUNTER — APPOINTMENT (OUTPATIENT)
Dept: GENERAL RADIOLOGY | Facility: HOSPITAL | Age: 62
End: 2022-03-04

## 2022-03-04 DIAGNOSIS — Z91.199 H/O NONCOMPLIANCE WITH MEDICAL TREATMENT, PRESENTING HAZARDS TO HEALTH: ICD-10-CM

## 2022-03-04 DIAGNOSIS — Z99.2 END-STAGE RENAL DISEASE ON HEMODIALYSIS: Primary | ICD-10-CM

## 2022-03-04 DIAGNOSIS — N18.6 END-STAGE RENAL DISEASE ON HEMODIALYSIS: Primary | ICD-10-CM

## 2022-03-04 DIAGNOSIS — N18.9 ACUTE RENAL FAILURE SUPERIMPOSED ON CHRONIC KIDNEY DISEASE, UNSPECIFIED CKD STAGE, UNSPECIFIED ACUTE RENAL FAILURE TYPE: ICD-10-CM

## 2022-03-04 DIAGNOSIS — N17.9 ACUTE RENAL FAILURE SUPERIMPOSED ON CHRONIC KIDNEY DISEASE, UNSPECIFIED CKD STAGE, UNSPECIFIED ACUTE RENAL FAILURE TYPE: ICD-10-CM

## 2022-03-04 LAB
ALBUMIN SERPL-MCNC: 3.1 G/DL (ref 3.5–5.2)
ALBUMIN/GLOB SERPL: 0.8 G/DL
ALP SERPL-CCNC: 242 U/L (ref 39–117)
ALT SERPL W P-5'-P-CCNC: 92 U/L (ref 1–41)
ANION GAP SERPL CALCULATED.3IONS-SCNC: 23 MMOL/L (ref 5–15)
AST SERPL-CCNC: 82 U/L (ref 1–40)
BASOPHILS # BLD AUTO: 0.03 10*3/MM3 (ref 0–0.2)
BASOPHILS NFR BLD AUTO: 0.3 % (ref 0–1.5)
BILIRUB SERPL-MCNC: 0.8 MG/DL (ref 0–1.2)
BUN SERPL-MCNC: 107 MG/DL (ref 6–20)
BUN/CREAT SERPL: 8 (ref 7–25)
CALCIUM SPEC-SCNC: 8.4 MG/DL (ref 8.6–10.5)
CHLORIDE SERPL-SCNC: 94 MMOL/L (ref 98–107)
CO2 SERPL-SCNC: 17 MMOL/L (ref 22–29)
CREAT SERPL-MCNC: 13.43 MG/DL (ref 0.76–1.27)
DEPRECATED RDW RBC AUTO: 48 FL (ref 37–54)
EGFRCR SERPLBLD CKD-EPI 2021: 3.8 ML/MIN/1.73
EOSINOPHIL # BLD AUTO: 0.03 10*3/MM3 (ref 0–0.4)
EOSINOPHIL NFR BLD AUTO: 0.3 % (ref 0.3–6.2)
ERYTHROCYTE [DISTWIDTH] IN BLOOD BY AUTOMATED COUNT: 13.8 % (ref 12.3–15.4)
FERRITIN SERPL-MCNC: 5988 NG/ML (ref 30–400)
GLOBULIN UR ELPH-MCNC: 3.8 GM/DL
GLUCOSE SERPL-MCNC: 427 MG/DL (ref 65–99)
HCT VFR BLD AUTO: 27.4 % (ref 37.5–51)
HGB BLD-MCNC: 8.7 G/DL (ref 13–17.7)
HOLD SPECIMEN: NORMAL
HOLD SPECIMEN: NORMAL
IMM GRANULOCYTES # BLD AUTO: 0.12 10*3/MM3 (ref 0–0.05)
IMM GRANULOCYTES NFR BLD AUTO: 1.2 % (ref 0–0.5)
INR PPP: 1.21 (ref 0.9–1.1)
IRON 24H UR-MRATE: 131 MCG/DL (ref 59–158)
IRON SATN MFR SERPL: 79 % (ref 20–50)
LYMPHOCYTES # BLD AUTO: 0.34 10*3/MM3 (ref 0.7–3.1)
LYMPHOCYTES NFR BLD AUTO: 3.4 % (ref 19.6–45.3)
MAGNESIUM SERPL-MCNC: 2.1 MG/DL (ref 1.6–2.6)
MCH RBC QN AUTO: 30.4 PG (ref 26.6–33)
MCHC RBC AUTO-ENTMCNC: 31.8 G/DL (ref 31.5–35.7)
MCV RBC AUTO: 95.8 FL (ref 79–97)
MONOCYTES # BLD AUTO: 0.66 10*3/MM3 (ref 0.1–0.9)
MONOCYTES NFR BLD AUTO: 6.6 % (ref 5–12)
NEUTROPHILS NFR BLD AUTO: 8.83 10*3/MM3 (ref 1.7–7)
NEUTROPHILS NFR BLD AUTO: 88.2 % (ref 42.7–76)
NRBC BLD AUTO-RTO: 0 /100 WBC (ref 0–0.2)
NT-PROBNP SERPL-MCNC: ABNORMAL PG/ML (ref 0–900)
PHOSPHATE SERPL-MCNC: 5.6 MG/DL (ref 2.5–4.5)
PLATELET # BLD AUTO: 51 10*3/MM3 (ref 140–450)
PMV BLD AUTO: 12.8 FL (ref 6–12)
POTASSIUM SERPL-SCNC: 4.8 MMOL/L (ref 3.5–5.2)
PROT SERPL-MCNC: 6.9 G/DL (ref 6–8.5)
PROTHROMBIN TIME: 15.2 SECONDS (ref 11.7–14.2)
QT INTERVAL: 392 MS
RBC # BLD AUTO: 2.86 10*6/MM3 (ref 4.14–5.8)
SARS-COV-2 RNA RESP QL NAA+PROBE: NOT DETECTED
SODIUM SERPL-SCNC: 134 MMOL/L (ref 136–145)
TIBC SERPL-MCNC: 165 MCG/DL (ref 298–536)
TRANSFERRIN SERPL-MCNC: 111 MG/DL (ref 200–360)
TROPONIN T SERPL-MCNC: 0.04 NG/ML (ref 0–0.03)
WBC NRBC COR # BLD: 10.01 10*3/MM3 (ref 3.4–10.8)
WHOLE BLOOD HOLD SPECIMEN: NORMAL
WHOLE BLOOD HOLD SPECIMEN: NORMAL

## 2022-03-04 PROCEDURE — 85610 PROTHROMBIN TIME: CPT | Performed by: EMERGENCY MEDICINE

## 2022-03-04 PROCEDURE — 84466 ASSAY OF TRANSFERRIN: CPT | Performed by: INTERNAL MEDICINE

## 2022-03-04 PROCEDURE — U0003 INFECTIOUS AGENT DETECTION BY NUCLEIC ACID (DNA OR RNA); SEVERE ACUTE RESPIRATORY SYNDROME CORONAVIRUS 2 (SARS-COV-2) (CORONAVIRUS DISEASE [COVID-19]), AMPLIFIED PROBE TECHNIQUE, MAKING USE OF HIGH THROUGHPUT TECHNOLOGIES AS DESCRIBED BY CMS-2020-01-R: HCPCS | Performed by: EMERGENCY MEDICINE

## 2022-03-04 PROCEDURE — 99284 EMERGENCY DEPT VISIT MOD MDM: CPT

## 2022-03-04 PROCEDURE — 25010000002 HYDROMORPHONE PER 4 MG: Performed by: EMERGENCY MEDICINE

## 2022-03-04 PROCEDURE — 82728 ASSAY OF FERRITIN: CPT | Performed by: INTERNAL MEDICINE

## 2022-03-04 PROCEDURE — 85025 COMPLETE CBC W/AUTO DIFF WBC: CPT | Performed by: EMERGENCY MEDICINE

## 2022-03-04 PROCEDURE — 5A1D70Z PERFORMANCE OF URINARY FILTRATION, INTERMITTENT, LESS THAN 6 HOURS PER DAY: ICD-10-PCS | Performed by: INTERNAL MEDICINE

## 2022-03-04 PROCEDURE — 71045 X-RAY EXAM CHEST 1 VIEW: CPT

## 2022-03-04 PROCEDURE — 83735 ASSAY OF MAGNESIUM: CPT | Performed by: EMERGENCY MEDICINE

## 2022-03-04 PROCEDURE — 87340 HEPATITIS B SURFACE AG IA: CPT | Performed by: INTERNAL MEDICINE

## 2022-03-04 PROCEDURE — 93010 ELECTROCARDIOGRAM REPORT: CPT | Performed by: INTERNAL MEDICINE

## 2022-03-04 PROCEDURE — 93005 ELECTROCARDIOGRAM TRACING: CPT | Performed by: EMERGENCY MEDICINE

## 2022-03-04 PROCEDURE — 25010000002 EPOETIN ALFA-EPBX 10000 UNIT/ML SOLUTION: Performed by: INTERNAL MEDICINE

## 2022-03-04 PROCEDURE — 84484 ASSAY OF TROPONIN QUANT: CPT | Performed by: EMERGENCY MEDICINE

## 2022-03-04 PROCEDURE — 63710000001 INSULIN REGULAR HUMAN PER 5 UNITS: Performed by: EMERGENCY MEDICINE

## 2022-03-04 PROCEDURE — 83880 ASSAY OF NATRIURETIC PEPTIDE: CPT | Performed by: EMERGENCY MEDICINE

## 2022-03-04 PROCEDURE — 25010000002 ONDANSETRON PER 1 MG: Performed by: EMERGENCY MEDICINE

## 2022-03-04 PROCEDURE — 84100 ASSAY OF PHOSPHORUS: CPT | Performed by: EMERGENCY MEDICINE

## 2022-03-04 PROCEDURE — 83540 ASSAY OF IRON: CPT | Performed by: INTERNAL MEDICINE

## 2022-03-04 PROCEDURE — 80053 COMPREHEN METABOLIC PANEL: CPT | Performed by: EMERGENCY MEDICINE

## 2022-03-04 RX ORDER — SODIUM CHLORIDE 0.9 % (FLUSH) 0.9 %
10 SYRINGE (ML) INJECTION AS NEEDED
Status: DISCONTINUED | OUTPATIENT
Start: 2022-03-04 | End: 2022-03-18 | Stop reason: HOSPADM

## 2022-03-04 RX ORDER — HYDROMORPHONE HYDROCHLORIDE 1 MG/ML
0.5 INJECTION, SOLUTION INTRAMUSCULAR; INTRAVENOUS; SUBCUTANEOUS ONCE
Status: COMPLETED | OUTPATIENT
Start: 2022-03-04 | End: 2022-03-04

## 2022-03-04 RX ORDER — ONDANSETRON 2 MG/ML
4 INJECTION INTRAMUSCULAR; INTRAVENOUS ONCE
Status: COMPLETED | OUTPATIENT
Start: 2022-03-04 | End: 2022-03-04

## 2022-03-04 RX ADMIN — INSULIN HUMAN 10 UNITS: 100 INJECTION, SOLUTION PARENTERAL at 19:56

## 2022-03-04 RX ADMIN — ONDANSETRON 4 MG: 2 INJECTION INTRAMUSCULAR; INTRAVENOUS at 20:30

## 2022-03-04 RX ADMIN — HYDROMORPHONE HYDROCHLORIDE 0.5 MG: 1 INJECTION, SOLUTION INTRAMUSCULAR; INTRAVENOUS; SUBCUTANEOUS at 20:30

## 2022-03-04 RX ADMIN — EPOETIN ALFA-EPBX 20000 UNITS: 10000 INJECTION, SOLUTION INTRAVENOUS; SUBCUTANEOUS at 20:25
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
## 2022-03-04 NOTE — ED TRIAGE NOTES
.Pt masked on arrival, staff masked    Pt from home via ems, called for missed dialysis x3 sessions, only compains of generalized weakness

## 2022-03-05 PROBLEM — B18.2 CHRONIC HEPATITIS C WITHOUT HEPATIC COMA (HCC): Status: ACTIVE | Noted: 2022-03-05

## 2022-03-05 PROBLEM — D69.6 THROMBOCYTOPENIA (HCC): Status: ACTIVE | Noted: 2022-03-05

## 2022-03-05 PROBLEM — K74.60 LIVER CIRRHOSIS (HCC): Status: ACTIVE | Noted: 2022-03-05

## 2022-03-05 PROBLEM — N18.5 ANEMIA DUE TO CHRONIC RENAL FAILURE TREATED WITH ERYTHROPOIETIN, STAGE 5 (HCC): Status: ACTIVE | Noted: 2018-03-05

## 2022-03-05 LAB
ANION GAP SERPL CALCULATED.3IONS-SCNC: 24 MMOL/L (ref 5–15)
BUN SERPL-MCNC: 113 MG/DL (ref 6–20)
BUN/CREAT SERPL: 9.2 (ref 7–25)
CALCIUM SPEC-SCNC: 7.9 MG/DL (ref 8.6–10.5)
CHLORIDE SERPL-SCNC: 95 MMOL/L (ref 98–107)
CO2 SERPL-SCNC: 18 MMOL/L (ref 22–29)
CREAT SERPL-MCNC: 12.32 MG/DL (ref 0.76–1.27)
DEPRECATED RDW RBC AUTO: 50.9 FL (ref 37–54)
EGFRCR SERPLBLD CKD-EPI 2021: 4.3 ML/MIN/1.73
ERYTHROCYTE [DISTWIDTH] IN BLOOD BY AUTOMATED COUNT: 14.3 % (ref 12.3–15.4)
GLUCOSE BLDC GLUCOMTR-MCNC: 330 MG/DL (ref 70–130)
GLUCOSE BLDC GLUCOMTR-MCNC: 342 MG/DL (ref 70–130)
GLUCOSE BLDC GLUCOMTR-MCNC: 363 MG/DL (ref 70–130)
GLUCOSE BLDC GLUCOMTR-MCNC: 374 MG/DL (ref 70–130)
GLUCOSE BLDC GLUCOMTR-MCNC: 376 MG/DL (ref 70–130)
GLUCOSE SERPL-MCNC: 370 MG/DL (ref 65–99)
HBV SURFACE AG SERPL QL IA: NORMAL
HCT VFR BLD AUTO: 26.6 % (ref 37.5–51)
HGB BLD-MCNC: 8.4 G/DL (ref 13–17.7)
LDH SERPL-CCNC: 210 U/L (ref 135–225)
LYMPHOCYTES # BLD MANUAL: 0.08 10*3/MM3 (ref 0.7–3.1)
LYMPHOCYTES NFR BLD MANUAL: 1 % (ref 5–12)
MCH RBC QN AUTO: 30.9 PG (ref 26.6–33)
MCHC RBC AUTO-ENTMCNC: 31.6 G/DL (ref 31.5–35.7)
MCV RBC AUTO: 97.8 FL (ref 79–97)
MONOCYTES # BLD: 0.08 10*3/MM3 (ref 0.1–0.9)
NEUTROPHILS # BLD AUTO: 7.75 10*3/MM3 (ref 1.7–7)
NEUTROPHILS NFR BLD MANUAL: 98 % (ref 42.7–76)
PLAT MORPH BLD: NORMAL
PLATELET # BLD AUTO: 43 10*3/MM3 (ref 140–450)
PMV BLD AUTO: 12.4 FL (ref 6–12)
POTASSIUM SERPL-SCNC: 5.5 MMOL/L (ref 3.5–5.2)
QT INTERVAL: 271 MS
RBC # BLD AUTO: 2.72 10*6/MM3 (ref 4.14–5.8)
RBC MORPH BLD: NORMAL
SODIUM SERPL-SCNC: 137 MMOL/L (ref 136–145)
VARIANT LYMPHS NFR BLD MANUAL: 1 % (ref 19.6–45.3)
WBC MORPH BLD: NORMAL
WBC NRBC COR # BLD: 7.91 10*3/MM3 (ref 3.4–10.8)

## 2022-03-05 PROCEDURE — 85007 BL SMEAR W/DIFF WBC COUNT: CPT | Performed by: HOSPITALIST

## 2022-03-05 PROCEDURE — 83615 LACTATE (LD) (LDH) ENZYME: CPT | Performed by: INTERNAL MEDICINE

## 2022-03-05 PROCEDURE — 99223 1ST HOSP IP/OBS HIGH 75: CPT | Performed by: INTERNAL MEDICINE

## 2022-03-05 PROCEDURE — 93010 ELECTROCARDIOGRAM REPORT: CPT | Performed by: INTERNAL MEDICINE

## 2022-03-05 PROCEDURE — 85025 COMPLETE CBC W/AUTO DIFF WBC: CPT | Performed by: HOSPITALIST

## 2022-03-05 PROCEDURE — 93005 ELECTROCARDIOGRAM TRACING: CPT | Performed by: HOSPITALIST

## 2022-03-05 PROCEDURE — 63710000001 INSULIN LISPRO (HUMAN) PER 5 UNITS: Performed by: HOSPITALIST

## 2022-03-05 PROCEDURE — 80048 BASIC METABOLIC PNL TOTAL CA: CPT | Performed by: HOSPITALIST

## 2022-03-05 PROCEDURE — 82962 GLUCOSE BLOOD TEST: CPT

## 2022-03-05 RX ORDER — ALPRAZOLAM 0.5 MG/1
1 TABLET ORAL 4 TIMES DAILY PRN
Status: DISCONTINUED | OUTPATIENT
Start: 2022-03-05 | End: 2022-03-18 | Stop reason: HOSPADM

## 2022-03-05 RX ORDER — INSULIN GLARGINE 100 [IU]/ML
30 INJECTION, SOLUTION SUBCUTANEOUS NIGHTLY
Status: DISCONTINUED | OUTPATIENT
Start: 2022-03-05 | End: 2022-03-05

## 2022-03-05 RX ORDER — ZINC SULFATE 50(220)MG
220 CAPSULE ORAL DAILY
Status: DISCONTINUED | OUTPATIENT
Start: 2022-03-05 | End: 2022-03-07

## 2022-03-05 RX ORDER — CARVEDILOL 3.12 MG/1
3.12 TABLET ORAL 2 TIMES DAILY WITH MEALS
Status: DISCONTINUED | OUTPATIENT
Start: 2022-03-05 | End: 2022-03-05

## 2022-03-05 RX ORDER — SEVELAMER CARBONATE 800 MG/1
800 TABLET, FILM COATED ORAL
Status: DISCONTINUED | OUTPATIENT
Start: 2022-03-05 | End: 2022-03-18 | Stop reason: HOSPADM

## 2022-03-05 RX ORDER — L.ACID,PARA/B.BIFIDUM/S.THERM 8B CELL
1 CAPSULE ORAL DAILY
Status: DISCONTINUED | OUTPATIENT
Start: 2022-03-05 | End: 2022-03-18 | Stop reason: HOSPADM

## 2022-03-05 RX ORDER — INSULIN LISPRO 100 [IU]/ML
10 INJECTION, SOLUTION INTRAVENOUS; SUBCUTANEOUS 3 TIMES DAILY
Status: DISCONTINUED | OUTPATIENT
Start: 2022-03-05 | End: 2022-03-05

## 2022-03-05 RX ORDER — CHOLECALCIFEROL (VITAMIN D3) 125 MCG
1000 CAPSULE ORAL DAILY
Status: DISCONTINUED | OUTPATIENT
Start: 2022-03-05 | End: 2022-03-07

## 2022-03-05 RX ORDER — NITROGLYCERIN 0.4 MG/1
0.4 TABLET SUBLINGUAL
Status: DISCONTINUED | OUTPATIENT
Start: 2022-03-05 | End: 2022-03-08

## 2022-03-05 RX ORDER — ASCORBIC ACID 500 MG
500 TABLET ORAL 2 TIMES DAILY
Status: DISCONTINUED | OUTPATIENT
Start: 2022-03-05 | End: 2022-03-06

## 2022-03-05 RX ORDER — NITROGLYCERIN 0.4 MG/1
0.4 TABLET SUBLINGUAL
Status: CANCELLED | OUTPATIENT
Start: 2022-03-05

## 2022-03-05 RX ORDER — CETIRIZINE HYDROCHLORIDE 10 MG/1
5 TABLET ORAL DAILY
Status: DISCONTINUED | OUTPATIENT
Start: 2022-03-05 | End: 2022-03-05

## 2022-03-05 RX ORDER — PROMETHAZINE HYDROCHLORIDE 12.5 MG/1
12.5 TABLET ORAL EVERY 4 HOURS PRN
Status: DISCONTINUED | OUTPATIENT
Start: 2022-03-05 | End: 2022-03-14

## 2022-03-05 RX ORDER — NICOTINE POLACRILEX 4 MG
15 LOZENGE BUCCAL
Status: DISCONTINUED | OUTPATIENT
Start: 2022-03-05 | End: 2022-03-05

## 2022-03-05 RX ORDER — ONDANSETRON 2 MG/ML
4 INJECTION INTRAMUSCULAR; INTRAVENOUS ONCE
Status: DISCONTINUED | OUTPATIENT
Start: 2022-03-05 | End: 2022-03-05

## 2022-03-05 RX ORDER — INSULIN LISPRO 100 [IU]/ML
5 INJECTION, SOLUTION INTRAVENOUS; SUBCUTANEOUS
Status: DISCONTINUED | OUTPATIENT
Start: 2022-03-05 | End: 2022-03-06

## 2022-03-05 RX ORDER — INSULIN LISPRO 100 [IU]/ML
0-7 INJECTION, SOLUTION INTRAVENOUS; SUBCUTANEOUS
Status: DISCONTINUED | OUTPATIENT
Start: 2022-03-05 | End: 2022-03-05

## 2022-03-05 RX ORDER — ASPIRIN 81 MG/1
81 TABLET ORAL DAILY
Status: DISCONTINUED | OUTPATIENT
Start: 2022-03-05 | End: 2022-03-05

## 2022-03-05 RX ORDER — INSULIN LISPRO 100 [IU]/ML
0-7 INJECTION, SOLUTION INTRAVENOUS; SUBCUTANEOUS
Status: DISCONTINUED | OUTPATIENT
Start: 2022-03-05 | End: 2022-03-08

## 2022-03-05 RX ORDER — OXYCODONE AND ACETAMINOPHEN 10; 325 MG/1; MG/1
1 TABLET ORAL EVERY 6 HOURS PRN
Status: DISCONTINUED | OUTPATIENT
Start: 2022-03-05 | End: 2022-03-08

## 2022-03-05 RX ORDER — ALBUMIN (HUMAN) 12.5 G/50ML
25 SOLUTION INTRAVENOUS AS NEEDED
Status: ACTIVE | OUTPATIENT
Start: 2022-03-05 | End: 2022-03-06

## 2022-03-05 RX ORDER — PANTOPRAZOLE SODIUM 40 MG/1
40 TABLET, DELAYED RELEASE ORAL DAILY
Status: DISCONTINUED | OUTPATIENT
Start: 2022-03-05 | End: 2022-03-07

## 2022-03-05 RX ORDER — DEXTROSE MONOHYDRATE 25 G/50ML
25 INJECTION, SOLUTION INTRAVENOUS
Status: DISCONTINUED | OUTPATIENT
Start: 2022-03-05 | End: 2022-03-05

## 2022-03-05 RX ORDER — TIZANIDINE 4 MG/1
4 TABLET ORAL EVERY 8 HOURS PRN
Status: DISCONTINUED | OUTPATIENT
Start: 2022-03-05 | End: 2022-03-18 | Stop reason: HOSPADM

## 2022-03-05 RX ORDER — ATORVASTATIN CALCIUM 20 MG/1
20 TABLET, FILM COATED ORAL DAILY
Status: DISCONTINUED | OUTPATIENT
Start: 2022-03-05 | End: 2022-03-05

## 2022-03-05 RX ORDER — ATORVASTATIN CALCIUM 20 MG/1
10 TABLET, FILM COATED ORAL NIGHTLY
Status: DISCONTINUED | OUTPATIENT
Start: 2022-03-05 | End: 2022-03-10

## 2022-03-05 RX ORDER — OXYCODONE AND ACETAMINOPHEN 10; 325 MG/1; MG/1
1 TABLET ORAL EVERY 6 HOURS PRN
Status: DISCONTINUED | OUTPATIENT
Start: 2022-03-05 | End: 2022-03-05

## 2022-03-05 RX ORDER — HYDROMORPHONE HYDROCHLORIDE 1 MG/ML
0.5 INJECTION, SOLUTION INTRAMUSCULAR; INTRAVENOUS; SUBCUTANEOUS ONCE
Status: DISCONTINUED | OUTPATIENT
Start: 2022-03-05 | End: 2022-03-05

## 2022-03-05 RX ORDER — CARVEDILOL 3.12 MG/1
3.12 TABLET ORAL 2 TIMES DAILY WITH MEALS
Status: ON HOLD | COMMUNITY
End: 2022-08-19 | Stop reason: SDUPTHER

## 2022-03-05 RX ORDER — INSULIN GLARGINE-YFGN 100 [IU]/ML
INJECTION, SOLUTION SUBCUTANEOUS
Status: COMPLETED
Start: 2022-03-05 | End: 2022-03-05

## 2022-03-05 RX ORDER — INSULIN GLARGINE 100 [IU]/ML
10 INJECTION, SOLUTION SUBCUTANEOUS NIGHTLY
Status: DISCONTINUED | OUTPATIENT
Start: 2022-03-05 | End: 2022-03-05

## 2022-03-05 RX ORDER — CARVEDILOL 12.5 MG/1
12.5 TABLET ORAL 2 TIMES DAILY WITH MEALS
Status: DISCONTINUED | OUTPATIENT
Start: 2022-03-05 | End: 2022-03-06

## 2022-03-05 RX ADMIN — Medication 1000 MCG: at 11:28

## 2022-03-05 RX ADMIN — OXYCODONE HYDROCHLORIDE AND ACETAMINOPHEN 1 TABLET: 10; 325 TABLET ORAL at 06:12

## 2022-03-05 RX ADMIN — ATORVASTATIN CALCIUM 10 MG: 20 TABLET, FILM COATED ORAL at 21:12

## 2022-03-05 RX ADMIN — CARVEDILOL 12.5 MG: 12.5 TABLET, FILM COATED ORAL at 18:11

## 2022-03-05 RX ADMIN — INSULIN LISPRO 5 UNITS: 100 INJECTION, SOLUTION INTRAVENOUS; SUBCUTANEOUS at 18:11

## 2022-03-05 RX ADMIN — OXYCODONE HYDROCHLORIDE AND ACETAMINOPHEN 500 MG: 500 TABLET ORAL at 11:28

## 2022-03-05 RX ADMIN — SEVELAMER CARBONATE 800 MG: 800 TABLET, FILM COATED ORAL at 11:28

## 2022-03-05 RX ADMIN — INSULIN LISPRO 6 UNITS: 100 INJECTION, SOLUTION INTRAVENOUS; SUBCUTANEOUS at 08:32

## 2022-03-05 RX ADMIN — CARVEDILOL 3.12 MG: 3.12 TABLET, FILM COATED ORAL at 09:54

## 2022-03-05 RX ADMIN — INSULIN GLARGINE-YFGN 10 UNITS: 100 INJECTION, SOLUTION SUBCUTANEOUS at 01:17

## 2022-03-05 RX ADMIN — OXYCODONE HYDROCHLORIDE AND ACETAMINOPHEN 500 MG: 500 TABLET ORAL at 21:12

## 2022-03-05 RX ADMIN — INSULIN LISPRO 5 UNITS: 100 INJECTION, SOLUTION INTRAVENOUS; SUBCUTANEOUS at 11:27

## 2022-03-05 RX ADMIN — INSULIN LISPRO 5 UNITS: 100 INJECTION, SOLUTION INTRAVENOUS; SUBCUTANEOUS at 21:12

## 2022-03-05 RX ADMIN — Medication 1 CAPSULE: at 11:28

## 2022-03-05 RX ADMIN — INSULIN LISPRO 6 UNITS: 100 INJECTION, SOLUTION INTRAVENOUS; SUBCUTANEOUS at 11:27

## 2022-03-05 RX ADMIN — Medication 220 MG: at 11:27

## 2022-03-05 RX ADMIN — PANTOPRAZOLE SODIUM 40 MG: 40 TABLET, DELAYED RELEASE ORAL at 11:31

## 2022-03-05 RX ADMIN — CETIRIZINE HYDROCHLORIDE 5 MG: 10 TABLET ORAL at 11:28

## 2022-03-05 RX ADMIN — SEVELAMER CARBONATE 800 MG: 800 TABLET, FILM COATED ORAL at 18:11

## 2022-03-05 RX ADMIN — INSULIN GLARGINE-YFGN 15 UNITS: 100 INJECTION, SOLUTION SUBCUTANEOUS at 21:12
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
## 2022-03-05 NOTE — PLAN OF CARE
Goal Outcome Evaluation:  Plan of Care Reviewed With: patient  Progress: no change  Outcome Evaluation: New admit this shift for ESRD HD patient. All needs met. Rested well. Regular cc renal diet. VSS. A&O x 4. RA. Sinus tach on the monitor. No complaints. Blood glucose monitoring. Plans for HD today. WCTM.
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
## 2022-03-05 NOTE — NURSING NOTE
Called in dialysis orders to [facility] at 0132 during Epic Downtime at the [facility] [place] and they are expecting dialysis to occur sometime after 0600. WCTM.
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
## 2022-03-05 NOTE — PLAN OF CARE
Goal Outcome Evaluation: Pt resting in bed with no signs of distress noted at this time. During dialysis this morning pt went into SVT with HR in the 180's - 200's. A STAT EKG was ordered. MD was notified. Pt was asymptomatic and stated that he felt fine. Dialysis was stopped and 3.125mg of coreg was given. Pts HR resolved and VS have been stable since. Bed in lowest position with siderails up X3. Call light within reach. RN will continue to monitor.       Problem: Adult Inpatient Plan of Care  Goal: Optimal Comfort and Wellbeing  Outcome: Met  Intervention: Provide Person-Centered Care  Recent Flowsheet Documentation  Taken 3/5/2022 1404 by Mary Joyce RN  Trust Relationship/Rapport:   care explained   choices provided   questions answered   questions encouraged   thoughts/feelings acknowledged  Taken 3/5/2022 0831 by Mary Joyce RN  Trust Relationship/Rapport:   care explained   choices provided   questions answered   questions encouraged   reassurance provided   thoughts/feelings acknowledged     Problem: Fall Injury Risk  Goal: Absence of Fall and Fall-Related Injury  Outcome: Met  Intervention: Identify and Manage Contributors  Recent Flowsheet Documentation  Taken 3/5/2022 1812 by Mary Joyce RN  Medication Review/Management: medications reviewed  Taken 3/5/2022 1609 by Mary Joyce RN  Medication Review/Management: medications reviewed  Taken 3/5/2022 1404 by Mary Joyce RN  Medication Review/Management: medications reviewed  Taken 3/5/2022 1207 by Mary Joyce RN  Medication Review/Management: medications reviewed  Taken 3/5/2022 1050 by Mary Joyce RN  Medication Review/Management: medications reviewed  Taken 3/5/2022 0831 by Mary Joyce RN  Medication Review/Management: medications reviewed  Intervention: Promote Injury-Free Environment  Recent Flowsheet Documentation  Taken 3/5/2022 1812 by Mary Joyce RN  Safety  Promotion/Fall Prevention:   activity supervised   assistive device/personal items within reach   clutter free environment maintained   fall prevention program maintained   nonskid shoes/slippers when out of bed   room organization consistent   safety round/check completed  Taken 3/5/2022 1609 by Mary Joyce RN  Safety Promotion/Fall Prevention:   assistive device/personal items within reach   activity supervised   clutter free environment maintained   fall prevention program maintained   nonskid shoes/slippers when out of bed   room organization consistent   safety round/check completed  Taken 3/5/2022 1404 by Mary Joyce RN  Safety Promotion/Fall Prevention:   activity supervised   assistive device/personal items within reach   clutter free environment maintained   fall prevention program maintained   nonskid shoes/slippers when out of bed   room organization consistent   safety round/check completed  Taken 3/5/2022 1207 by Mary Joyce, RN  Safety Promotion/Fall Prevention:   activity supervised   assistive device/personal items within reach   clutter free environment maintained   fall prevention program maintained   nonskid shoes/slippers when out of bed   room organization consistent   safety round/check completed  Taken 3/5/2022 1050 by Mary Joyce, RN  Safety Promotion/Fall Prevention:   activity supervised   assistive device/personal items within reach   clutter free environment maintained   fall prevention program maintained   nonskid shoes/slippers when out of bed   room organization consistent   safety round/check completed  Taken 3/5/2022 0831 by Mary Joyce, RN  Safety Promotion/Fall Prevention:   activity supervised   assistive device/personal items within reach   clutter free environment maintained   fall prevention program maintained   nonskid shoes/slippers when out of bed   room organization consistent   safety round/check completed

## 2022-03-05 NOTE — NURSING NOTE
Dialysis started at 0840 without complications, at 09:30, heart rate elevated to 180-200 with rapid a-fib.  Patient remained stable and asymptomatic, u.f. turned off and a stat page was placed to Dr. Finnegan.  Order to return blood and stop the treatment.  Blood pressure remained in a normal range and patient remained alert and oriented and asymptomatic until this nurse left the room.  The floor nurse received and order to administer coreg, and that the attending physician would assess the patient soon.

## 2022-03-05 NOTE — ED NOTES
Nursing report ED to floor  Angelo Brown  61 y.o.  male    HPI :   Chief Complaint   Patient presents with   • Weakness - Generalized   • Needs dialysis       Admitting doctor:   Karl Swift MD    Admitting diagnosis:   The primary encounter diagnosis was End-stage renal disease on hemodialysis (HCC). Diagnoses of H/O noncompliance with medical treatment, presenting hazards to health and Acute renal failure superimposed on chronic kidney disease, unspecified CKD stage, unspecified acute renal failure type (HCC) were also pertinent to this visit.    Code status:   Current Code Status     Date Active Code Status Order ID Comments User Context       Prior    Advance Care Planning Activity          Allergies:   Lipitor [atorvastatin calcium], Morphine, Eggs or egg-derived products, Gabapentin, Adhesive tape, Fentanyl, Fentanyl, Ibuprofen, and Penicillins    Intake and Output  No intake or output data in the 24 hours ending 03/04/22 2304    Weight:   There were no vitals filed for this visit.    Most recent vitals:   Vitals:    03/04/22 1414 03/04/22 1731 03/04/22 1908 03/04/22 1931   BP: 124/94 119/65  126/88   Pulse: 92 93     Resp: 17      Temp: 98.4 °F (36.9 °C)      SpO2: 97% 97% 95%        Active LDAs/IV Access:   Lines, Drains & Airways     Active LDAs     Name Placement date Placement time Site Days    Peripheral IV 03/04/22 1829 Left; Anterior Wrist 03/04/22  1829  Wrist  less than 1    Hemodialysis Cath Double 04/09/21  1114  Internal Jugular  329    Hemodialysis Cath Double with Pigtail --  --  Subclavian                  Labs (abnormal labs have a star):   Labs Reviewed   COMPREHENSIVE METABOLIC PANEL - Abnormal; Notable for the following components:       Result Value    Glucose 427 (*)      (*)     Creatinine 13.43 (*)     Sodium 134 (*)     Chloride 94 (*)     CO2 17.0 (*)     Calcium 8.4 (*)     Albumin 3.10 (*)     ALT (SGPT) 92 (*)     AST (SGOT) 82 (*)     Alkaline Phosphatase 242 (*)      Anion Gap 23.0 (*)     eGFR 3.8 (*)     All other components within normal limits    Narrative:     GFR Normal >60  Chronic Kidney Disease <60  Kidney Failure <15     CBC WITH AUTO DIFFERENTIAL - Abnormal; Notable for the following components:    RBC 2.86 (*)     Hemoglobin 8.7 (*)     Hematocrit 27.4 (*)     MPV 12.8 (*)     Platelets 51 (*)     Neutrophil % 88.2 (*)     Lymphocyte % 3.4 (*)     Immature Grans % 1.2 (*)     Neutrophils, Absolute 8.83 (*)     Lymphocytes, Absolute 0.34 (*)     Immature Grans, Absolute 0.12 (*)     All other components within normal limits   PROTIME-INR - Abnormal; Notable for the following components:    Protime 15.2 (*)     INR 1.21 (*)     All other components within normal limits   BNP (IN-HOUSE) - Abnormal; Notable for the following components:    proBNP 36,665.0 (*)     All other components within normal limits    Narrative:     Among patients with dyspnea, NT-proBNP is highly sensitive for the detection of acute congestive heart failure. In addition NT-proBNP of <300 pg/ml effectively rules out acute congestive heart failure with 99% negative predictive value.    Results may be falsely decreased if patient taking Biotin.     TROPONIN (IN-HOUSE) - Abnormal; Notable for the following components:    Troponin T 0.036 (*)     All other components within normal limits    Narrative:     Troponin T Reference Range:  <= 0.03 ng/mL-   Negative for AMI  >0.03 ng/mL-     Abnormal for myocardial necrosis.  Clinicians would have to utilize clinical acumen, EKG, Troponin and serial changes to determine if it is an Acute Myocardial Infarction or myocardial injury due to an underlying chronic condition.       Results may be falsely decreased if patient taking Biotin.     PHOSPHORUS - Abnormal; Notable for the following components:    Phosphorus 5.6 (*)     All other components within normal limits   FERRITIN - Abnormal; Notable for the following components:    Ferritin 5,988.00 (*)     All  other components within normal limits    Narrative:     Results may be falsely decreased if patient taking Biotin.     IRON PROFILE - Abnormal; Notable for the following components:    Iron Saturation 79 (*)     Transferrin 111 (*)     TIBC 165 (*)     All other components within normal limits   COVID-19,BH IZZY IN-HOUSE CEPHEID/NIKHIL, NP SWAB IN TRANSPORT MEDIA 8-12 HR TAT - Normal    Narrative:     Fact sheet for providers: https://www.fda.gov/media/164105/download     Fact sheet for patients: https://www.fda.gov/media/314556/download   MAGNESIUM - Normal   COVID PRE-OP / PRE-PROCEDURE SCREENING ORDER (NO ISOLATION)    Narrative:     The following orders were created for panel order COVID PRE-OP / PRE-PROCEDURE SCREENING ORDER (NO ISOLATION) - Swab, Nasopharynx.  Procedure                               Abnormality         Status                     ---------                               -----------         ------                     COVID-19, IZZY IN-HOUSE...[[id number]]  Normal              Final result                 Please view results for these tests on the individual orders.   RAINBOW DRAW    Narrative:     The following orders were created for panel order [place] Draw.  Procedure                               Abnormality         Status                     ---------                               -----------         ------                     Green Top (Gel)[[id number]]                                  Final result               Lavender Top[[id number]]                                     Final result               Gold Top - SST[[id number]]                                   Final result               Light Blue Top[[id number]]                                   Final result                 Please view results for these tests on the individual orders.   CBC AND DIFFERENTIAL    Narrative:     The following orders were created for panel order CBC & Differential.  Procedure                               Abnormality          Status                     ---------                               -----------         ------                     CBC Auto Differential[[id number]]        Abnormal            Final result                 Please view results for these tests on the individual orders.   GREEN TOP   LAVENDER TOP   GOLD TOP - SST   LIGHT BLUE TOP       EKG:   ECG 12 Lead   Final Result   HEART RATE= 92  bpm   RR Interval= 652  ms   RI Interval= 149  ms   P Horizontal Axis= 1  deg   P Front Axis= 66  deg   QRSD Interval= 103  ms   QT Interval= 392  ms   QRS Axis= 62  deg   T Wave Axis= 6  deg   - BORDERLINE ECG -   Sinus rhythm   Borderline prolonged QT interval   No change from prior tracing   Electronically Signed By: Winston HenningRJ) ([facility]) 04-Mar-2022 20:54:56   Date and Time of Study: 2022-03-04 18:25:13          Meds given in ED:   Medications   sodium chloride 0.9 % flush 10 mL (has no administration in time range)   insulin regular (humuLIN R,novoLIN R) injection 10 Units (10 Units Intravenous Given 3/4/22 1956)   epoetin real-epbx (RETACRIT) injection 20,000 Units (20,000 Units Subcutaneous Given 3/4/22 2025)   HYDROmorphone (DILAUDID) injection 0.5 mg (0.5 mg Intravenous Given 3/4/22 2030)   ondansetron (ZOFRAN) injection 4 mg (4 mg Intravenous Given 3/4/22 2030)       Imaging results:  No radiology results for the last day    Ambulatory status:   - x1    Social issues:   Social History     Socioeconomic History   • Marital status:      Spouse name: Samantha   • Years of education: High school   Tobacco Use   • Smoking status: Never Smoker   • Smokeless tobacco: Never Used   Vaping Use   • Vaping Use: Never used   Substance and Sexual Activity   • Alcohol use: Never     Comment: NONE SINCE 1997   • Drug use: Never     Comment: HAD A DEPENDENCY ON [place]; HASN'T USED SINCE 2011   • Sexual activity: Yes     Partners: Female       NIH Stroke Scale:        Nursing report ED to floor:       Aimee Ron, MORENA  03/04/22  2429

## 2022-03-06 LAB
ALBUMIN SERPL-MCNC: 2.8 G/DL (ref 3.5–5.2)
ALBUMIN/GLOB SERPL: 0.9 G/DL
ALP SERPL-CCNC: 246 U/L (ref 39–117)
ALT SERPL W P-5'-P-CCNC: 84 U/L (ref 1–41)
ANION GAP SERPL CALCULATED.3IONS-SCNC: 22 MMOL/L (ref 5–15)
AST SERPL-CCNC: 109 U/L (ref 1–40)
BASOPHILS # BLD AUTO: 0.01 10*3/MM3 (ref 0–0.2)
BASOPHILS NFR BLD AUTO: 0.2 % (ref 0–1.5)
BILIRUB SERPL-MCNC: 0.9 MG/DL (ref 0–1.2)
BUN SERPL-MCNC: 105 MG/DL (ref 6–20)
BUN/CREAT SERPL: 9.5 (ref 7–25)
CALCIUM SPEC-SCNC: 8 MG/DL (ref 8.6–10.5)
CHLORIDE SERPL-SCNC: 94 MMOL/L (ref 98–107)
CHOLEST SERPL-MCNC: 69 MG/DL (ref 0–200)
CO2 SERPL-SCNC: 19 MMOL/L (ref 22–29)
CREAT SERPL-MCNC: 11.05 MG/DL (ref 0.76–1.27)
DEPRECATED RDW RBC AUTO: 46.9 FL (ref 37–54)
EGFRCR SERPLBLD CKD-EPI 2021: 4.9 ML/MIN/1.73
EOSINOPHIL # BLD AUTO: 0.04 10*3/MM3 (ref 0–0.4)
EOSINOPHIL NFR BLD AUTO: 0.6 % (ref 0.3–6.2)
ERYTHROCYTE [DISTWIDTH] IN BLOOD BY AUTOMATED COUNT: 14 % (ref 12.3–15.4)
GLOBULIN UR ELPH-MCNC: 3.2 GM/DL
GLUCOSE BLDC GLUCOMTR-MCNC: 249 MG/DL (ref 70–130)
GLUCOSE BLDC GLUCOMTR-MCNC: 295 MG/DL (ref 70–130)
GLUCOSE BLDC GLUCOMTR-MCNC: 298 MG/DL (ref 70–130)
GLUCOSE BLDC GLUCOMTR-MCNC: 308 MG/DL (ref 70–130)
GLUCOSE SERPL-MCNC: 291 MG/DL (ref 65–99)
HBA1C MFR BLD: 6.8 % (ref 4.8–5.6)
HCT VFR BLD AUTO: 23.7 % (ref 37.5–51)
HDLC SERPL-MCNC: 7 MG/DL (ref 40–60)
HGB BLD-MCNC: 7.8 G/DL (ref 13–17.7)
LDLC SERPL CALC-MCNC: 24 MG/DL (ref 0–100)
LDLC/HDLC SERPL: 1.8 {RATIO}
LYMPHOCYTES # BLD AUTO: 0.22 10*3/MM3 (ref 0.7–3.1)
LYMPHOCYTES NFR BLD AUTO: 3.5 % (ref 19.6–45.3)
MCH RBC QN AUTO: 30.7 PG (ref 26.6–33)
MCHC RBC AUTO-ENTMCNC: 32.9 G/DL (ref 31.5–35.7)
MCV RBC AUTO: 93.3 FL (ref 79–97)
MONOCYTES # BLD AUTO: 0.49 10*3/MM3 (ref 0.1–0.9)
MONOCYTES NFR BLD AUTO: 7.8 % (ref 5–12)
NEUTROPHILS NFR BLD AUTO: 5.44 10*3/MM3 (ref 1.7–7)
NEUTROPHILS NFR BLD AUTO: 86.6 % (ref 42.7–76)
PLATELET # BLD AUTO: 41 10*3/MM3 (ref 140–450)
PLATELET # BLD AUTO: 41 10*3/MM3 (ref 140–450)
PLATELETS.RETICULATED NFR BLD AUTO: 9 % (ref 0.9–6.5)
PMV BLD AUTO: 13 FL (ref 6–12)
POTASSIUM SERPL-SCNC: 5.2 MMOL/L (ref 3.5–5.2)
PROT SERPL-MCNC: 6 G/DL (ref 6–8.5)
RBC # BLD AUTO: 2.54 10*6/MM3 (ref 4.14–5.8)
SODIUM SERPL-SCNC: 135 MMOL/L (ref 136–145)
TRIGL SERPL-MCNC: 247 MG/DL (ref 0–150)
TSH SERPL DL<=0.05 MIU/L-ACNC: 1.49 UIU/ML (ref 0.27–4.2)
VLDLC SERPL-MCNC: 38 MG/DL (ref 5–40)
WBC NRBC COR # BLD: 6.28 10*3/MM3 (ref 3.4–10.8)

## 2022-03-06 PROCEDURE — 82525 ASSAY OF COPPER: CPT | Performed by: INTERNAL MEDICINE

## 2022-03-06 PROCEDURE — 82962 GLUCOSE BLOOD TEST: CPT

## 2022-03-06 PROCEDURE — 80053 COMPREHEN METABOLIC PANEL: CPT | Performed by: HOSPITALIST

## 2022-03-06 PROCEDURE — 99231 SBSQ HOSP IP/OBS SF/LOW 25: CPT | Performed by: INTERNAL MEDICINE

## 2022-03-06 PROCEDURE — 80061 LIPID PANEL: CPT | Performed by: HOSPITALIST

## 2022-03-06 PROCEDURE — 87522 HEPATITIS C REVRS TRNSCRPJ: CPT | Performed by: INTERNAL MEDICINE

## 2022-03-06 PROCEDURE — 85055 RETICULATED PLATELET ASSAY: CPT | Performed by: INTERNAL MEDICINE

## 2022-03-06 PROCEDURE — 63710000001 INSULIN LISPRO (HUMAN) PER 5 UNITS: Performed by: HOSPITALIST

## 2022-03-06 PROCEDURE — 84443 ASSAY THYROID STIM HORMONE: CPT | Performed by: HOSPITALIST

## 2022-03-06 PROCEDURE — 83036 HEMOGLOBIN GLYCOSYLATED A1C: CPT | Performed by: HOSPITALIST

## 2022-03-06 PROCEDURE — 85025 COMPLETE CBC W/AUTO DIFF WBC: CPT | Performed by: HOSPITALIST

## 2022-03-06 PROCEDURE — 84630 ASSAY OF ZINC: CPT | Performed by: INTERNAL MEDICINE

## 2022-03-06 RX ORDER — ASPIRIN 81 MG/1
81 TABLET, CHEWABLE ORAL DAILY
Status: DISCONTINUED | OUTPATIENT
Start: 2022-03-06 | End: 2022-03-18 | Stop reason: HOSPADM

## 2022-03-06 RX ORDER — ACETAMINOPHEN 325 MG/1
650 TABLET ORAL EVERY 6 HOURS PRN
Status: DISCONTINUED | OUTPATIENT
Start: 2022-03-06 | End: 2022-03-18 | Stop reason: HOSPADM

## 2022-03-06 RX ORDER — ASCORBIC ACID 500 MG
500 TABLET ORAL DAILY
Status: DISCONTINUED | OUTPATIENT
Start: 2022-03-07 | End: 2022-03-07

## 2022-03-06 RX ORDER — INSULIN LISPRO 100 [IU]/ML
7 INJECTION, SOLUTION INTRAVENOUS; SUBCUTANEOUS
Status: DISCONTINUED | OUTPATIENT
Start: 2022-03-06 | End: 2022-03-07

## 2022-03-06 RX ADMIN — SODIUM CHLORIDE 250 ML: 0.9 INJECTION, SOLUTION INTRAVENOUS at 13:43

## 2022-03-06 RX ADMIN — INSULIN LISPRO 7 UNITS: 100 INJECTION, SOLUTION INTRAVENOUS; SUBCUTANEOUS at 17:39

## 2022-03-06 RX ADMIN — Medication 1000 MCG: at 09:17

## 2022-03-06 RX ADMIN — SEVELAMER CARBONATE 800 MG: 800 TABLET, FILM COATED ORAL at 09:17

## 2022-03-06 RX ADMIN — INSULIN LISPRO 5 UNITS: 100 INJECTION, SOLUTION INTRAVENOUS; SUBCUTANEOUS at 09:18

## 2022-03-06 RX ADMIN — CARVEDILOL 12.5 MG: 12.5 TABLET, FILM COATED ORAL at 09:17

## 2022-03-06 RX ADMIN — INSULIN LISPRO 4 UNITS: 100 INJECTION, SOLUTION INTRAVENOUS; SUBCUTANEOUS at 09:17

## 2022-03-06 RX ADMIN — SEVELAMER CARBONATE 800 MG: 800 TABLET, FILM COATED ORAL at 12:43

## 2022-03-06 RX ADMIN — INSULIN LISPRO 5 UNITS: 100 INJECTION, SOLUTION INTRAVENOUS; SUBCUTANEOUS at 11:48

## 2022-03-06 RX ADMIN — ACETAMINOPHEN 650 MG: 325 TABLET, FILM COATED ORAL at 09:17

## 2022-03-06 RX ADMIN — SEVELAMER CARBONATE 800 MG: 800 TABLET, FILM COATED ORAL at 17:38

## 2022-03-06 RX ADMIN — PANTOPRAZOLE SODIUM 40 MG: 40 TABLET, DELAYED RELEASE ORAL at 09:17

## 2022-03-06 RX ADMIN — INSULIN LISPRO 4 UNITS: 100 INJECTION, SOLUTION INTRAVENOUS; SUBCUTANEOUS at 20:46

## 2022-03-06 RX ADMIN — INSULIN GLARGINE-YFGN 20 UNITS: 100 INJECTION, SOLUTION SUBCUTANEOUS at 20:56

## 2022-03-06 RX ADMIN — OXYCODONE HYDROCHLORIDE AND ACETAMINOPHEN 500 MG: 500 TABLET ORAL at 09:17

## 2022-03-06 RX ADMIN — Medication 1 CAPSULE: at 09:17

## 2022-03-06 RX ADMIN — SODIUM CHLORIDE 500 ML: 9 INJECTION, SOLUTION INTRAVENOUS at 16:11

## 2022-03-06 RX ADMIN — ALPRAZOLAM 1 MG: 0.5 TABLET ORAL at 21:00

## 2022-03-06 RX ADMIN — Medication 220 MG: at 09:18

## 2022-03-06 RX ADMIN — ASPIRIN 81 MG: 81 TABLET, CHEWABLE ORAL at 16:10

## 2022-03-06 RX ADMIN — INSULIN LISPRO 3 UNITS: 100 INJECTION, SOLUTION INTRAVENOUS; SUBCUTANEOUS at 17:38

## 2022-03-06 RX ADMIN — ATORVASTATIN CALCIUM 10 MG: 20 TABLET, FILM COATED ORAL at 20:46

## 2022-03-06 NOTE — PLAN OF CARE
Goal Outcome Evaluation: Pt resting in bed with wife at bedside. Pt has been lethargic today with BP running low. Pt has had a 250mL bolus ordered by [facility] and a 500mL bolus ordered by Nephrology. Pt is more alert and has eaten dinner. Dialysis orders for tomorrow have been called into [facility]. Bed is in lowest position with siderails up X3. Call light within reach. RN will continue to monitor.

## 2022-03-06 NOTE — PROGRESS NOTES
[facility]     Progress Note    Patient Name: Angelo Brown  : 1960  MRN: [id number]  Primary Care Physician:  Yadiel Baxter III, MD  Date of admission: 3/4/2022    Subjective   Subjective     Chief Complaint: ESRD    History of Present Illness  Patient with ESRD    Review of Systems    Objective   Objective     Vitals:   Temp:  [97.4 °F (36.3 °C)-102.5 °F (39.2 °C)] 97.4 °F (36.3 °C)  Heart Rate:  [56-91] 56  Resp:  [18-26] 20  BP: ()/(53-65) 80/55  Flow (L/min):  [1] 1    Physical Exam   General Appearance:  In no acute distress.    HEENT: Normal HEENT exam.     Extremities: .  There is no deformity, effusion or dependent edema.    Neurological: Patient is alert     Result Review    Result Review:  I have personally reviewed the results from the time of this admission to 3/6/2022 15:33 EST and agree with these findings:  []  Laboratory  []  Microbiology  []  Radiology  []  EKG/Telemetry   []  Cardiology/Vascular   []  Pathology  []  Old records  []  Other:      Assessment/Plan   Assessment / Plan     Brief Patient Summary:  Angelo Brown is a 61 y.o. male who has ESRD    Active Hospital Problems:  Active Hospital Problems    Diagnosis    • Thrombocytopenia (HCC)    • Liver cirrhosis (HCC)    • Chronic hepatitis C without hepatic coma (HCC)    • End-stage renal disease on hemodialysis (HCC)    • B12 deficiency    • Anemia due to chronic renal failure treated with erythropoietin, stage 5 (HCC)      Plan:   1. ESRD  2. Tachycardia  3. Thrombocytopenia    Patient seen and examined. Labs reviewed. Will continue hd m/w/f.  Tachycardia resolved. Will monitor    Jo Finnegan MD

## 2022-03-06 NOTE — PROGRESS NOTES
Daily progress note    Chief complaint  Doing same  No specific complaints  Slow to respond  Does answer appropriately  In no respiratory distress   Denies chest pain shortness of breath or palpitation    History of present illness  61-year white male with very complex past medical history including end-stage renal disease on hemodialysis CVA diabetes mellitus coronary artery disease hypertension chronic anemia presented to [facility], operated by [facility] emergency room after missing hemodialysis with generalized weakness.  Patient is poor historian but denies any headache dizziness chest pain shortness of breath abdominal pain vomiting diarrhea.  Patient does have nausea and weak all over.  Patient has no cough fever night sweats weight loss.  Patient evaluated in ER found to have high potassium need hemodialysis admit for management.  Patient also found to have low platelets which is new.  Patient has no bleeding whatsoever.     REVIEW OF SYSTEMS  All systems reviewed and negative except for those discussed in HPI.      PHYSICAL EXAM  Blood pressure (!) 84/53, pulse 62, temperature 97.7 °F (36.5 °C), temperature source Oral, resp. rate 20, SpO2 96 %.    GENERAL: 61-year-old chronically ill-appearing male in mild distress  HENT: NCAT, neck supple, trachea midline  EYES: no scleral icterus, PERRL, normal conjunctivae  CV: regular rhythm, regular rate, no murmur  RESPIRATORY: unlabored effort, mild Rales in bases bilaterally  ABDOMEN: soft, nontender, nondistended, bowel sounds present  MUSCULOSKELETAL: no gross deformity, no pedal edema, no calf tenderness  NEURO: Sleepy but easily arousable,  sensory and motor function of extremities intact, speech clear, mental status normal  SKIN: warm, dry, no rash  PSYCH: Flat affect     LAB RESULTS  Lab Results (last 24 hours)     Procedure Component Value Units Date/Time    POC Glucose Once [[id number]]  (Abnormal) Collected: 03/06/22 1120    Specimen: Blood Updated: 03/06/22 1122      Glucose 308 mg/dL      Comment: Meter: [id number] : [id number] Leon KIM       TSH [[id number]]  (Normal) Collected: 03/06/22 0616    Specimen: Blood Updated: 03/06/22 0653     TSH 1.490 uIU/mL     Comprehensive Metabolic Panel [[id number]]  (Abnormal) Collected: 03/06/22 0616    Specimen: Blood Updated: 03/06/22 0647     Glucose 291 mg/dL       mg/dL      Creatinine 11.05 mg/dL      Sodium 135 mmol/L      Potassium 5.2 mmol/L      Chloride 94 mmol/L      CO2 19.0 mmol/L      Calcium 8.0 mg/dL      Total Protein 6.0 g/dL      Albumin 2.80 g/dL      ALT (SGPT) 84 U/L      AST (SGOT) 109 U/L      Alkaline Phosphatase 246 U/L      Total Bilirubin 0.9 mg/dL      Globulin 3.2 gm/dL      A/G Ratio 0.9 g/dL      BUN/Creatinine Ratio 9.5     Anion Gap 22.0 mmol/L      eGFR 4.9 mL/min/1.73      Comment: <15 Indicative of kidney failure       Narrative:      GFR Normal >60  Chronic Kidney Disease <60  Kidney Failure <15      Lipid Panel [[id number]]  (Abnormal) Collected: 03/06/22 0616    Specimen: Blood Updated: 03/06/22 0647     Total Cholesterol 69 mg/dL      Triglycerides 247 mg/dL      HDL Cholesterol 7 mg/dL      LDL Cholesterol  24 mg/dL      VLDL Cholesterol 38 mg/dL      LDL/HDL Ratio 1.80    Narrative:      Cholesterol Reference Ranges  (U.S. Department of Health and Human Services ATP III Classifications)    Desirable          <200 mg/dL  Borderline High    200-239 mg/dL  High Risk          >240 mg/dL      Triglyceride Reference Ranges  (U.S. Department of Health and Human Services ATP III Classifications)    Normal           <150 mg/dL  Borderline High  150-199 mg/dL  High             200-499 mg/dL  Very High        >500 mg/dL    HDL Reference Ranges  (U.S. Department of Health and Human Services ATP III Classifications)    Low     <40 mg/dl (major risk factor for CHD)  High    >60 mg/dl ('negative' risk factor for CHD)        LDL Reference Ranges  (U.S. Department of Health and Human Services ATP III  Classifications)    Optimal          <100 mg/dL  Near Optimal     100-129 mg/dL  Borderline High  130-159 mg/dL  High             160-189 mg/dL  Very High        >189 mg/dL    Hemoglobin A1c [[id number]]  (Abnormal) Collected: 03/06/22 0615    Specimen: Blood Updated: 03/06/22 0643     Hemoglobin A1C 6.80 %     Narrative:      Hemoglobin A1C Ranges:    Increased Risk for Diabetes  5.7% to 6.4%  Diabetes                     >= 6.5%  Diabetic Goal                < 7.0%    CBC & Differential [[id number]]  (Abnormal) Collected: 03/06/22 0616    Specimen: Blood Updated: 03/06/22 0640    Narrative:      The following orders were created for panel order CBC & Differential.  Procedure                               Abnormality         Status                     ---------                               -----------         ------                     CBC Auto Differential[[id number]]        Abnormal            Final result                 Please view results for these tests on the individual orders.    CBC Auto Differential [[id number]]  (Abnormal) Collected: 03/06/22 0616    Specimen: Blood Updated: 03/06/22 0640     WBC 6.28 10*3/mm3      RBC 2.54 10*6/mm3      Hemoglobin 7.8 g/dL      Hematocrit 23.7 %      MCV 93.3 fL      MCH 30.7 pg      MCHC 32.9 g/dL      RDW 14.0 %      RDW-SD 46.9 fl      MPV 13.0 fL      Platelets 41 10*3/mm3      Neutrophil % 86.6 %      Lymphocyte % 3.5 %      Monocyte % 7.8 %      Eosinophil % 0.6 %      Basophil % 0.2 %      Neutrophils, Absolute 5.44 10*3/mm3      Lymphocytes, Absolute 0.22 10*3/mm3      Monocytes, Absolute 0.49 10*3/mm3      Eosinophils, Absolute 0.04 10*3/mm3      Basophils, Absolute 0.01 10*3/mm3     Immature Platelet Fraction [[id number]]  (Abnormal) Collected: 03/06/22 0616    Specimen: Blood Updated: 03/06/22 0640     IPF 9.00 %      Platelets 41 10*3/mm3     POC Glucose Once [[id number]]  (Abnormal) Collected: 03/06/22 0629    Specimen: Blood Updated: 03/06/22 0631     Glucose  295 mg/dL      Comment: Meter: [id number] : [id number] Frazier Noa JULIO       Copper, Serum [[id number]] Collected: 03/06/22 0615    Specimen: Blood Updated: 03/06/22 0620    Zinc [[id number]] Collected: 03/06/22 0615    Specimen: Blood Updated: 03/06/22 0620    Hepatitis C RNA, Quantitative, PCR (graph) [[id number]] Collected: 03/06/22 0616    Specimen: Blood Updated: 03/06/22 0620    POC Glucose Once [[id number]]  (Abnormal) Collected: 03/05/22 2012    Specimen: Blood Updated: 03/05/22 2013     Glucose 330 mg/dL      Comment: Meter: [id number] : [id number] Frazierrachel Latham JULIO       POC Glucose Once [[id number]]  (Abnormal) Collected: 03/05/22 1621    Specimen: Blood Updated: 03/05/22 1623     Glucose 342 mg/dL      Comment: Meter: [id number] : [id number] Mane KIM       Lactate Dehydrogenase [[id number]]  (Normal) Collected: 03/05/22 0420    Specimen: Blood Updated: 03/05/22 1457      U/L         Imaging Results (Last 24 Hours)     ** No results found for the last 24 hours. **             ECG 12 Lead  Component   Ref Range & Units 3/5/22 0945 3/4/22 1825 1/26/22 1729 12/2/21 0309 4/28/21 1722 4/8/21 1341   QT Interval   ms 271 P  392  321  395  435  444              HEART RATE= 187  bpm  RR Interval= 320  ms  MD Interval= 132  ms  P Horizontal Axis= 90  deg  P Front Axis= 30  deg  QRSD Interval= 95  ms  QT Interval= 271  ms  QRS Axis= 91  deg  T Wave Axis= -72  deg  - ABNORMAL ECG -  Supraventricular tachycardia  Borderline right axis deviation  Repolarization abnormality, prob rate related             Current Facility-Administered Medications:   •  acetaminophen (TYLENOL) tablet 650 mg, 650 mg, Oral, Q6H PRN, Karl Swift MD, 650 mg at 03/06/22 0917  •  albumin human 25 % IV SOLN 25 g, 25 g, Intravenous, PRN, Franco Soni MD  •  ALPRAZolam (XANAX) tablet 1 mg, 1 mg, Oral, 4x Daily PRN, Karl Swift MD  •  ascorbic acid (VITAMIN C) tablet 500 mg, 500 mg, Oral, BID, Karl Swift MD,  500 mg at 03/06/22 0917  •  atorvastatin (LIPITOR) tablet 10 mg, 10 mg, Oral, Nightly, Karl Swift MD, 10 mg at 03/05/22 2112  •  carvedilol (COREG) tablet 12.5 mg, 12.5 mg, Oral, BID With Meals, Karl Swift MD, 12.5 mg at 03/06/22 0917  •  insulin glargine (LANTUS, SEMGLEE) injection 15 Units, 15 Units, Subcutaneous, Nightly, Karl Swift MD, 15 Units at 03/05/22 2112  •  insulin lispro (ADMELOG) injection 0-7 Units, 0-7 Units, Subcutaneous, 4x Daily With Meals & Nightly, Karl Swift MD, 5 Units at 03/06/22 1148  •  insulin lispro (ADMELOG) injection 5 Units, 5 Units, Subcutaneous, TID With Meals, Karl Swift MD, 5 Units at 03/06/22 1148  •  lactobacillus acidophilus (RISAQUAD) capsule 1 capsule, 1 capsule, Oral, Daily, Karl Swift MD, 1 capsule at 03/06/22 0917  •  nitroglycerin (NITROSTAT) SL tablet 0.4 mg, 0.4 mg, Sublingual, Q5 Min PRN, Karl Swift MD  •  oxyCODONE-acetaminophen (PERCOCET)  MG per tablet 1 tablet, 1 tablet, Oral, Q6H PRN, Karl Swift MD  •  pantoprazole (PROTONIX) EC tablet 40 mg, 40 mg, Oral, Daily, Karl Swift MD, 40 mg at 03/06/22 0917  •  promethazine (PHENERGAN) tablet 12.5 mg, 12.5 mg, Oral, Q4H PRN, Karl Swift MD  •  sevelamer (RENVELA) tablet 800 mg, 800 mg, Oral, TID With Meals, Karl Swift MD, 800 mg at 03/06/22 1243  •  [COMPLETED] Insert peripheral IV, , , Once **AND** sodium chloride 0.9 % flush 10 mL, 10 mL, Intravenous, PRN, Franklyn Cornejo MD  •  tiZANidine (ZANAFLEX) tablet 4 mg, 4 mg, Oral, Q8H PRN, Karl Swift MD  •  vitamin B-12 (CYANOCOBALAMIN) tablet 1,000 mcg, 1,000 mcg, Oral, Daily, Karl Swift MD, 1,000 mcg at 03/06/22 0917  •  zinc sulfate (ZINCATE) capsule 220 mg, 220 mg, Oral, Daily, Karl Swift MD, 220 mg at 03/06/22 0918     ASSESSMENT  End-stage renal disease with noncompliance with hemodialysis  Supraventricular tachycardia  Insulin-dependent diabetes mellitus  Hypertension  Hyperlipidemia  History of CVA  Chronic anemia and  thrombocytopenia  Anxiety disorder  Gastroesophageal reflux disease    PLAN  CPM  Hemodialysis TW  Insulin dose adjusted  Adjust home medications  Stress ulcer DVT prophylaxis  Cardiology and hematology consult appreciated  Nephrology to follow patient   Supportive care  PT OT  Discussed with nursing staff  Follow closely further recommendation current hospital course    ARIEL MCGOVERN MD
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
## 2022-03-07 ENCOUNTER — APPOINTMENT (OUTPATIENT)
Dept: CARDIOLOGY | Facility: HOSPITAL | Age: 62
End: 2022-03-07

## 2022-03-07 ENCOUNTER — APPOINTMENT (OUTPATIENT)
Dept: CT IMAGING | Facility: HOSPITAL | Age: 62
End: 2022-03-07

## 2022-03-07 PROBLEM — Z91.15 NONCOMPLIANCE OF PATIENT WITH RENAL DIALYSIS: Status: ACTIVE | Noted: 2022-03-07

## 2022-03-07 PROBLEM — Z91.158 NONCOMPLIANCE OF PATIENT WITH RENAL DIALYSIS: Status: ACTIVE | Noted: 2022-03-07

## 2022-03-07 LAB
ANION GAP SERPL CALCULATED.3IONS-SCNC: 21.2 MMOL/L (ref 5–15)
BACTERIA BLD CULT: ABNORMAL
BASOPHILS # BLD AUTO: 0.03 10*3/MM3 (ref 0–0.2)
BASOPHILS NFR BLD AUTO: 0.4 % (ref 0–1.5)
BOTTLE TYPE: ABNORMAL
BUN SERPL-MCNC: 114 MG/DL (ref 6–20)
BUN/CREAT SERPL: 8.7 (ref 7–25)
CALCIUM SPEC-SCNC: 8.6 MG/DL (ref 8.6–10.5)
CHLORIDE SERPL-SCNC: 96 MMOL/L (ref 98–107)
CO2 SERPL-SCNC: 18.8 MMOL/L (ref 22–29)
CREAT SERPL-MCNC: 13.15 MG/DL (ref 0.76–1.27)
DEPRECATED RDW RBC AUTO: 48.1 FL (ref 37–54)
EGFRCR SERPLBLD CKD-EPI 2021: 3.9 ML/MIN/1.73
EOSINOPHIL # BLD AUTO: 0.06 10*3/MM3 (ref 0–0.4)
EOSINOPHIL NFR BLD AUTO: 0.8 % (ref 0.3–6.2)
ERYTHROCYTE [DISTWIDTH] IN BLOOD BY AUTOMATED COUNT: 14.4 % (ref 12.3–15.4)
GLUCOSE BLDC GLUCOMTR-MCNC: 151 MG/DL (ref 70–130)
GLUCOSE BLDC GLUCOMTR-MCNC: 191 MG/DL (ref 70–130)
GLUCOSE BLDC GLUCOMTR-MCNC: 233 MG/DL (ref 70–130)
GLUCOSE BLDC GLUCOMTR-MCNC: 287 MG/DL (ref 70–130)
GLUCOSE SERPL-MCNC: 280 MG/DL (ref 65–99)
HCT VFR BLD AUTO: 23.9 % (ref 37.5–51)
HGB BLD-MCNC: 7.9 G/DL (ref 13–17.7)
LYMPHOCYTES # BLD AUTO: 0.23 10*3/MM3 (ref 0.7–3.1)
LYMPHOCYTES NFR BLD AUTO: 2.9 % (ref 19.6–45.3)
MCH RBC QN AUTO: 30.9 PG (ref 26.6–33)
MCHC RBC AUTO-ENTMCNC: 33.1 G/DL (ref 31.5–35.7)
MCV RBC AUTO: 93.4 FL (ref 79–97)
MONOCYTES # BLD AUTO: 0.64 10*3/MM3 (ref 0.1–0.9)
MONOCYTES NFR BLD AUTO: 8.1 % (ref 5–12)
NEUTROPHILS NFR BLD AUTO: 6.9 10*3/MM3 (ref 1.7–7)
NEUTROPHILS NFR BLD AUTO: 86.8 % (ref 42.7–76)
PLATELET # BLD AUTO: 38 10*3/MM3 (ref 140–450)
PLATELET # BLD AUTO: 38 10*3/MM3 (ref 140–450)
PLATELETS.RETICULATED NFR BLD AUTO: 15.8 % (ref 0.9–6.5)
PMV BLD AUTO: 13.6 FL (ref 6–12)
POTASSIUM SERPL-SCNC: 5.7 MMOL/L (ref 3.5–5.2)
RBC # BLD AUTO: 2.56 10*6/MM3 (ref 4.14–5.8)
SODIUM SERPL-SCNC: 136 MMOL/L (ref 136–145)
VIT B12 BLD-MCNC: >2000 PG/ML (ref 211–946)
WBC NRBC COR # BLD: 7.94 10*3/MM3 (ref 3.4–10.8)

## 2022-03-07 PROCEDURE — 87186 SC STD MICRODIL/AGAR DIL: CPT | Performed by: INTERNAL MEDICINE

## 2022-03-07 PROCEDURE — 25010000002 HEPARIN (PORCINE) PER 1000 UNITS: Performed by: INTERNAL MEDICINE

## 2022-03-07 PROCEDURE — 82962 GLUCOSE BLOOD TEST: CPT

## 2022-03-07 PROCEDURE — 63710000001 INSULIN LISPRO (HUMAN) PER 5 UNITS: Performed by: HOSPITALIST

## 2022-03-07 PROCEDURE — 93306 TTE W/DOPPLER COMPLETE: CPT | Performed by: INTERNAL MEDICINE

## 2022-03-07 PROCEDURE — 70450 CT HEAD/BRAIN W/O DYE: CPT

## 2022-03-07 PROCEDURE — 85025 COMPLETE CBC W/AUTO DIFF WBC: CPT | Performed by: HOSPITALIST

## 2022-03-07 PROCEDURE — 85055 RETICULATED PLATELET ASSAY: CPT | Performed by: INTERNAL MEDICINE

## 2022-03-07 PROCEDURE — 25010000002 PERFLUTREN (DEFINITY) 8.476 MG IN SODIUM CHLORIDE (PF) 0.9 % 10 ML INJECTION: Performed by: HOSPITALIST

## 2022-03-07 PROCEDURE — 25010000002 EPOETIN ALFA-EPBX 10000 UNIT/ML SOLUTION: Performed by: INTERNAL MEDICINE

## 2022-03-07 PROCEDURE — 82607 VITAMIN B-12: CPT | Performed by: HOSPITALIST

## 2022-03-07 PROCEDURE — 80048 BASIC METABOLIC PNL TOTAL CA: CPT | Performed by: HOSPITALIST

## 2022-03-07 PROCEDURE — 25010000002 CEFTRIAXONE PER 250 MG: Performed by: HOSPITALIST

## 2022-03-07 PROCEDURE — 93306 TTE W/DOPPLER COMPLETE: CPT

## 2022-03-07 PROCEDURE — 99233 SBSQ HOSP IP/OBS HIGH 50: CPT | Performed by: INTERNAL MEDICINE

## 2022-03-07 PROCEDURE — 87040 BLOOD CULTURE FOR BACTERIA: CPT | Performed by: INTERNAL MEDICINE

## 2022-03-07 PROCEDURE — 87150 DNA/RNA AMPLIFIED PROBE: CPT | Performed by: INTERNAL MEDICINE

## 2022-03-07 RX ORDER — INSULIN LISPRO 100 [IU]/ML
8 INJECTION, SOLUTION INTRAVENOUS; SUBCUTANEOUS
Status: DISCONTINUED | OUTPATIENT
Start: 2022-03-07 | End: 2022-03-08

## 2022-03-07 RX ORDER — HEPARIN SODIUM 1000 [USP'U]/ML
3800 INJECTION, SOLUTION INTRAVENOUS; SUBCUTANEOUS AS NEEDED
Status: DISCONTINUED | OUTPATIENT
Start: 2022-03-07 | End: 2022-03-18 | Stop reason: HOSPADM

## 2022-03-07 RX ORDER — PANTOPRAZOLE SODIUM 40 MG/1
40 TABLET, DELAYED RELEASE ORAL
Status: DISCONTINUED | OUTPATIENT
Start: 2022-03-07 | End: 2022-03-18 | Stop reason: HOSPADM

## 2022-03-07 RX ORDER — HEPARIN SODIUM 1000 [USP'U]/ML
3800 INJECTION, SOLUTION INTRAVENOUS; SUBCUTANEOUS AS NEEDED
Status: DISCONTINUED | OUTPATIENT
Start: 2022-03-07 | End: 2022-03-07

## 2022-03-07 RX ADMIN — CEFTRIAXONE 2 G: 2 INJECTION, POWDER, FOR SOLUTION INTRAMUSCULAR; INTRAVENOUS at 22:41

## 2022-03-07 RX ADMIN — INSULIN LISPRO 3 UNITS: 100 INJECTION, SOLUTION INTRAVENOUS; SUBCUTANEOUS at 21:53

## 2022-03-07 RX ADMIN — INSULIN LISPRO 7 UNITS: 100 INJECTION, SOLUTION INTRAVENOUS; SUBCUTANEOUS at 08:00

## 2022-03-07 RX ADMIN — INSULIN LISPRO 4 UNITS: 100 INJECTION, SOLUTION INTRAVENOUS; SUBCUTANEOUS at 07:59

## 2022-03-07 RX ADMIN — INSULIN GLARGINE-YFGN 25 UNITS: 100 INJECTION, SOLUTION SUBCUTANEOUS at 21:53

## 2022-03-07 RX ADMIN — PERFLUTREN 2 ML: 6.52 INJECTION, SUSPENSION INTRAVENOUS at 09:07

## 2022-03-07 RX ADMIN — HEPARIN SODIUM 3800 UNITS: 1000 INJECTION INTRAVENOUS; SUBCUTANEOUS at 12:51

## 2022-03-07 RX ADMIN — INSULIN LISPRO 2 UNITS: 100 INJECTION, SOLUTION INTRAVENOUS; SUBCUTANEOUS at 17:25

## 2022-03-07 RX ADMIN — EPOETIN ALFA-EPBX 10000 UNITS: 10000 INJECTION, SOLUTION INTRAVENOUS; SUBCUTANEOUS at 12:52

## 2022-03-07 RX ADMIN — OXYCODONE HYDROCHLORIDE AND ACETAMINOPHEN 1 TABLET: 10; 325 TABLET ORAL at 21:49

## 2022-03-07 NOTE — PROGRESS NOTES
"Daily progress note    Chief complaint  Doing same  No specific complaints  Denies chest pain shortness of breath or palpitation    History of present illness  61-year white male with very complex past medical history including end-stage renal disease on hemodialysis CVA diabetes mellitus coronary artery disease hypertension chronic anemia presented to [facility] emergency room after missing hemodialysis with generalized weakness.  Patient is poor historian but denies any headache dizziness chest pain shortness of breath abdominal pain vomiting diarrhea.  Patient does have nausea and weak all over.  Patient has no cough fever night sweats weight loss.  Patient evaluated in ER found to have high potassium need hemodialysis admit for management.  Patient also found to have low platelets which is new.  Patient has no bleeding whatsoever.     REVIEW OF SYSTEMS  All systems reviewed and negative except for those discussed in HPI.      PHYSICAL EXAM  Blood pressure 126/82, pulse 66, temperature 99.1 °F (37.3 °C), temperature source Temporal, resp. rate 14, height 172.7 cm (68\"), weight 74.4 kg (164 lb), SpO2 93 %.    GENERAL: 61-year-old chronically ill-appearing male in mild distress  HENT: NCAT, neck supple, trachea midline  EYES: no scleral icterus, PERRL, normal conjunctivae  CV: regular rhythm, regular rate, no murmur  RESPIRATORY: unlabored effort, mild Rales in bases bilaterally  ABDOMEN: soft, nontender, nondistended, bowel sounds present  MUSCULOSKELETAL: no gross deformity, no pedal edema, no calf tenderness  NEURO: Sleepy but easily arousable,  sensory and motor function of extremities intact, speech clear, mental status normal  SKIN: warm, dry, no rash  PSYCH: Flat affect     LAB RESULTS  Lab Results (last 24 hours)     Procedure Component Value Units Date/Time    Blood Culture - Blood, Blood, Central Line [[id number]] Collected: 03/07/22 1131    Specimen: Blood, Central Line Updated: 03/07/22 1131 "    Blood Culture - Blood, Blood, Central Line [[id number]] Collected: 03/07/22 1131    Specimen: Blood, Central Line Updated: 03/07/22 1131    Vitamin B12 [[id number]]  (Abnormal) Collected: 03/07/22 0639    Specimen: Blood Updated: 03/07/22 0823     Vitamin B-12 >2,000 pg/mL     Narrative:      Results may be falsely increased if patient taking Biotin.      Basic Metabolic Panel [[id number]]  (Abnormal) Collected: 03/07/22 0639    Specimen: Blood Updated: 03/07/22 0808     Glucose 280 mg/dL       mg/dL      Creatinine 13.15 mg/dL      Sodium 136 mmol/L      Potassium 5.7 mmol/L      Chloride 96 mmol/L      CO2 18.8 mmol/L      Calcium 8.6 mg/dL      BUN/Creatinine Ratio 8.7     Anion Gap 21.2 mmol/L      eGFR 3.9 mL/min/1.73      Comment: <15 Indicative of kidney failure       Narrative:      GFR Normal >60  Chronic Kidney Disease <60  Kidney Failure <15      CBC & Differential [[id number]]  (Abnormal) Collected: 03/07/22 0639    Specimen: Blood Updated: 03/07/22 0724    Narrative:      The following orders were created for panel order CBC & Differential.  Procedure                               Abnormality         Status                     ---------                               -----------         ------                     CBC Auto Differential[[id number]]        Abnormal            Final result                 Please view results for these tests on the individual orders.    CBC Auto Differential [[id number]]  (Abnormal) Collected: 03/07/22 0639    Specimen: Blood Updated: 03/07/22 0724     WBC 7.94 10*3/mm3      RBC 2.56 10*6/mm3      Hemoglobin 7.9 g/dL      Hematocrit 23.9 %      MCV 93.4 fL      MCH 30.9 pg      MCHC 33.1 g/dL      RDW 14.4 %      RDW-SD 48.1 fl      MPV 13.6 fL      Platelets 38 10*3/mm3      Neutrophil % 86.8 %      Lymphocyte % 2.9 %      Monocyte % 8.1 %      Eosinophil % 0.8 %      Basophil % 0.4 %      Neutrophils, Absolute 6.90 10*3/mm3      Lymphocytes, Absolute 0.23 10*3/mm3       Monocytes, Absolute 0.64 10*3/mm3      Eosinophils, Absolute 0.06 10*3/mm3      Basophils, Absolute 0.03 10*3/mm3     Immature Platelet Fraction [[id number]]  (Abnormal) Collected: 03/07/22 0639    Specimen: Blood Updated: 03/07/22 0724     IPF 15.80 %      Platelets 38 10*3/mm3     POC Glucose Once [[id number]]  (Abnormal) Collected: 03/07/22 0613    Specimen: Blood Updated: 03/07/22 0615     Glucose 287 mg/dL      Comment: Meter: [id number] : [id number] [employer]point NA       POC Glucose Once [[id number]]  (Abnormal) Collected: 03/06/22 1944    Specimen: Blood Updated: 03/06/22 1946     Glucose 298 mg/dL      Comment: Meter: [id number] : [id number] [employer] NA       POC Glucose Once [[id number]]  (Abnormal) Collected: 03/06/22 1623    Specimen: Blood Updated: 03/06/22 1626     Glucose 249 mg/dL      Comment: Meter: [id number] : [id number] Leon KIM           Imaging Results (Last 24 Hours)     ** No results found for the last 24 hours. **             ECG 12 Lead  Component   Ref Range & Units 3/5/22 0945 3/4/22 1825 1/26/22 1729 12/2/21 0309 4/28/21 1722 4/8/21 1341   QT Interval   ms 271 P  392  321  395  435  444              HEART RATE= 187  bpm  RR Interval= 320  ms  DE Interval= 132  ms  P Horizontal Axis= 90  deg  P Front Axis= 30  deg  QRSD Interval= 95  ms  QT Interval= 271  ms  QRS Axis= 91  deg  T Wave Axis= -72  deg  - ABNORMAL ECG -  Supraventricular tachycardia  Borderline right axis deviation  Repolarization abnormality, prob rate related             Current Facility-Administered Medications:   •  acetaminophen (TYLENOL) tablet 650 mg, 650 mg, Oral, Q6H PRN, Karl Swift MD, 650 mg at 03/06/22 0917  •  ALPRAZolam (XANAX) tablet 1 mg, 1 mg, Oral, 4x Daily PRN, Karl Swift MD, 1 mg at 03/06/22 2100  •  ascorbic acid (VITAMIN C) tablet 500 mg, 500 mg, Oral, Daily, Karl Swift MD  •  aspirin chewable tablet 81 mg, 81 mg, Oral, Daily, Karl Swift MD, 81 mg at 03/06/22 1610  •   atorvastatin (LIPITOR) tablet 10 mg, 10 mg, Oral, Nightly, Karl Swift MD, 10 mg at 03/06/22 2046  •  epoetin real-epbx (RETACRIT) injection 10,000 Units, 10,000 Units, Intravenous, Once per day on Mon Wed Fri, Yohan Murrell MD, 10,000 Units at 03/07/22 1252  •  heparin (porcine) injection 3,800 Units, 3,800 Units, Intracatheter, PRN, Yohan Murrell MD, 3,800 Units at 03/07/22 1251  •  insulin glargine (LANTUS, SEMGLEE) injection 20 Units, 20 Units, Subcutaneous, Nightly, Karl Swift MD, 20 Units at 03/06/22 2056  •  insulin lispro (ADMELOG) injection 0-7 Units, 0-7 Units, Subcutaneous, 4x Daily With Meals & Nightly, Karl Swift MD, 4 Units at 03/07/22 0759  •  insulin lispro (ADMELOG) injection 7 Units, 7 Units, Subcutaneous, TID With Meals, Karl Swift MD, 7 Units at 03/07/22 0800  •  lactobacillus acidophilus (RISAQUAD) capsule 1 capsule, 1 capsule, Oral, Daily, Karl Swift MD, 1 capsule at 03/06/22 0917  •  nitroglycerin (NITROSTAT) SL tablet 0.4 mg, 0.4 mg, Sublingual, Q5 Min PRN, Karl Swift MD  •  oxyCODONE-acetaminophen (PERCOCET)  MG per tablet 1 tablet, 1 tablet, Oral, Q6H PRN, Karl Swift MD  •  pantoprazole (PROTONIX) EC tablet 40 mg, 40 mg, Oral, Daily, Karl Swift MD, 40 mg at 03/06/22 0917  •  promethazine (PHENERGAN) tablet 12.5 mg, 12.5 mg, Oral, Q4H PRN, Karl Swift MD  •  sevelamer (RENVELA) tablet 800 mg, 800 mg, Oral, TID With Meals, Karl Swift MD, 800 mg at 03/06/22 1738  •  [COMPLETED] Insert peripheral IV, , , Once **AND** sodium chloride 0.9 % flush 10 mL, 10 mL, Intravenous, PRN, Franklyn Cornejo MD  •  tiZANidine (ZANAFLEX) tablet 4 mg, 4 mg, Oral, Q8H PRN, Karl Swift MD  •  vitamin B-12 (CYANOCOBALAMIN) tablet 1,000 mcg, 1,000 mcg, Oral, Daily, Karl Swift MD, 1,000 mcg at 03/06/22 0917  •  zinc sulfate (ZINCATE) capsule 220 mg, 220 mg, Oral, Daily, Karl Swift MD, 220 mg at 03/06/22 0918     ASSESSMENT  End-stage renal disease with  noncompliance with hemodialysis  Supraventricular tachycardia  Insulin-dependent diabetes mellitus  Hypertension  Hyperlipidemia  History of CVA  Chronic anemia and thrombocytopenia  Anxiety disorder  Gastroesophageal reflux disease    PLAN  CPM  Hemodialysis today  Insulin dose adjusted  Adjust home medications  Stress ulcer DVT prophylaxis  Cardiology and hematology consult appreciated  Nephrology to follow patient   Supportive care  PT OT  Discussed with nursing staff  Discharge planning    ARIEL MCGOVERN MD

## 2022-03-07 NOTE — SIGNIFICANT NOTE
03/07/22 1321   OTHER   Discipline occupational therapist   Rehab Time/Intention   Session Not Performed patient unavailable for evaluation  (OT checked on pt, pt MARY ELLEN for dialysis and echo. OT to f/u tomorrow)   Recommendation   OT - Next Appointment 03/08/22

## 2022-03-07 NOTE — NURSING NOTE
HD WITHOUT INCIDENT OR C/O.  TOLERATED WELL. REMOVED 1 L AS ORDERED. RETACRIT AS ORDERED. JANICE CURRENT, CDI WITH BIOPATCH. STABLE UPON COMPLETION OF TREATMENT.

## 2022-03-07 NOTE — PROGRESS NOTES
[facility] INPATIENT PROGRESS NOTE    Length of Stay:  3 days    CHIEF COMPLAINT: Thrombocytopenia, ESRD, chronic hepatitis C with cirrhosis, anemia, B12 deficiency      SUBJECTIVE: Patient is intermittently somnolent, not able to carry on full conversation, oriented to person and place.  He is mildly confused.  He notes abdominal pain currently.  He did  complete dialysis dialysis today.      ROS:  Review of Systems   A comprehensive review of systems was obtained with pertinent positive findings as noted in the interval history above.  All other systems negative.    OBJECTIVE:  Vitals:    03/06/22 1527 03/06/22 1948 03/06/22 2318 03/07/22 0743   BP: (!) 80/55 91/58 91/59 99/56   BP Location:    Right arm   Patient Position:    Lying   Pulse: 56 57 58 76   Resp:  20 20 20   Temp: 97.4 °F (36.3 °C) 97.5 °F (36.4 °C) 97.6 °F (36.4 °C) 98.5 °F (36.9 °C)   TempSrc: Oral   Oral   SpO2:  96% 97% 93%         PHYSICAL EXAMINATION:  General: Mildly confused, intermittently somnolent  Chest/Lungs: Clear to auscultation bilaterally anteriorly  Heart: Regular rate and rhythm no murmurs gallops or rubs  Abdomen/GI: Soft nontender nondistended bowel sounds present  Extremities: No edema    DIAGNOSTIC DATA:  Results Review:     I reviewed the patient's new clinical results.    Results from last 7 days   Lab Units 03/07/22  0639 03/06/22  0616 03/05/22  0420   WBC 10*3/mm3 7.94 6.28 7.91   HEMOGLOBIN g/dL 7.9* 7.8* 8.4*   HEMATOCRIT % 23.9* 23.7* 26.6*   PLATELETS 10*3/mm3 38*  38* 41*  41* 43*      Results from last 7 days   Lab Units 03/06/22  0616 03/05/22  0420 03/04/22  1800   SODIUM mmol/L 135* 137 134*   POTASSIUM mmol/L 5.2 5.5* 4.8   CHLORIDE mmol/L 94* 95* 94*   CO2 mmol/L 19.0* 18.0* 17.0*   BUN mg/dL 105* 113* 107*   CREATININE mg/dL 11.05* 12.32* 13.43*   CALCIUM mg/dL 8.0* 7.9* 8.4*   BILIRUBIN mg/dL 0.9  --  0.8   ALK PHOS U/L 246*  --  242*   ALT (SGPT) U/L 84*  --  92*   AST (SGOT) U/L 109*  --   82*   GLUCOSE mg/dL 291* 370* 427*      Lab Results   Component Value Date    NEUTROABS 6.90 03/07/2022     Results from last 7 days   Lab Units 03/04/22  1800   INR  1.21*     Results from last 7 days   Lab Units 03/04/22  1800   MAGNESIUM mg/dL 2.1         Assessment/Plan   ASSESSMENT/PLAN:  This is a 61 y.o. male with:     *Thrombocytopenia  · Patient with history of chronic mild thrombocytopenia followed in the outpatient setting by Dr. Aviles  · Felt to be related to underlying cirrhosis with splenomegaly  · Platelet count has been in the low 100,000 range in the past  · Platelet count in January 2022 had been in the mid to high 100,000 range at which time patient had COVID-19 infection  · Patient was off of dialysis for approximately 1 week before current hospital admission  · On admission 3/4/2022, platelet count was 51,000 and declined into the 40,000 range.  · Additional labs on 3/6/2022 with elevated IPF at 9%  · Peripheral blood smear reviewed on 3/5/2022, was unremarkable.  · B12 on 3/7/2022 greater than 2000  · Suspect that thrombocytopenia is related to underlying cirrhosis/splenomegaly, exacerbated by volume overload off dialysis prior to hospital admission.  · Platelet count today 38,000 with IPF elevated further at 15.8%.  As above, suspect that this is related to cirrhosis/splenomegaly exacerbated by being off dialysis the week prior to admission.  No evidence of bleeding.  No current need for transfusion.  Recheck IPF and check LDH in a.m.    *ESRD  · Patient with chronic noncompliance with outpatient dialysis per nephrology  · Patient had been on Monday Wednesday Friday schedule  · Patient apparently had not been to dialysis for 1 week prior to hospital admission  · Patient is now back on dialysis, nephrology following.  Had dialysis earlier today    *Chronic hepatitis C with cirrhosis  · Received previous treatment, details unclear  · Previous CT abdomen and pelvis 3/12/2018 with cirrhotic  appearing liver and borderline splenomegaly to 13.5 cm  · Hepatitis C RNA pending from 3/7/2022    *Anemia  · Longstanding history of anemia followed by Dr. Aviles in the outpatient setting  · Anemia has been multifactorial related to CKD/ESRD, iron deficiency, B12 deficiency  · Patient had previously received Procrit related to anemia secondary to CKD.  Eventually ROMULO administration transition to nephrology once patient initiated hemodialysis around May 2021.  · Patient is receiving intermittent IV iron with dialysis per nephrology.  · Labs on 11/2/2021 with iron 172, ferritin 560, iron saturation 68%, TIBC 255.  B12 level 1017 (see below).  · Hemoglobin in January 2022 had been on the 8-9 range at time of COVID-19 infection  · Labs on 2/2/2022 with iron 89, ferritin 1514, iron saturation 35%, TIBC 256, folate 7.58  · During current admission, hemoglobin 3/4/2022 was 8.7 with subsequent declined into the high 7 range.  · Laboratory evaluation on 3/4/2022 with iron 131, ferritin 5988, iron saturation 79%, TIBC 165.  · No laboratory evidence to suggest hemolysis  · B12 on 3/7/2022 greater than 2000  · Additional labs pending from 3/6/2022 with copper and zinc  · Patient is continuing on Retacrit 10,000 units 3 times weekly with dialysis per nephrology  · Hemoglobin today stable at 7.9.  Would transfuse for hemoglobin less than 7    *B12 deficiency  · Patient was previously receiving oral B12 supplementation  · Labs on 11/2/2022 with B12 level 1017, patient remained off of oral B12 supplementation.  · B12 level on 3/7/2022 greater than 2000, no need for further B12 supplementation    *Right clear-cell renal cell carcinoma, stage I ([id number])  · Status post right partial nephrectomy on 7/6/2018 for a 2.2 cm clear-cell renal cell carcinoma, grade 3, margins could not be assessed, no lymphovascular invasion.  Notation of extensive fibrosis with hemosiderin deposition reflective of fibrosis and  hemorrhage.    *Confusion/somnolence  · With current thrombocytopenia we will check stat CT head to rule out bleed  · We will check an ammonia level    Plan:  1. Stat CT head today  2. No current need for platelet or PRBC transfusion  3. Patient continuing on Retacrit 10,000 units Monday Wednesday Friday with dialysis per nephrology  4. Daily CBC/IPF.  Check ammonia level and LDH in a.m.    Discussed with patient at bedside.  Discussed with Dr. Murphy    The patient and all the above issues are new to me today.  Reviewed multiple records including progress notes, laboratory studies, scan results.             Rios Pa MD

## 2022-03-07 NOTE — CASE MANAGEMENT/SOCIAL WORK
Discharge Planning Assessment  [facility]     Patient Name: Angelo Brown  MRN: [id number]  Today's Date: 3/7/2022    Admit Date: 3/4/2022     Discharge Needs Assessment     Row Name 03/07/22 1334       Living Environment    People in Home spouse    Current Living Arrangements home    Primary Care Provided by self    Provides Primary Care For no one    Family Caregiver if Needed child(emelyn), adult;spouse    Able to Return to Prior Arrangements yes       Resource/Environmental Concerns    Resource/Environmental Concerns none       Transition Planning    Patient/Family Anticipates Transition to home with family    Patient/Family Anticipated Services at Transition none    Transportation Anticipated family or friend will provide       Discharge Needs Assessment    Equipment Currently Used at Home cane, straight    Concerns to be Addressed denies needs/concerns at this time;no discharge needs identified    Equipment Needed After Discharge none               Discharge Plan     Row Name 03/07/22 1337       Plan    Plan Home, family to transport    Provided Post Acute Provider List? Refused    Provided Post Acute Provider Quality & Resource List? Refused    Patient/Family in Agreement with Plan yes    Plan Comments Due to patient orientation (disoriented), CCP spoke to his spouse over the phone; explained role, verified facesheet, and discussed d/c planning. Plan is home, family to transport. Patient uses a cane at home as needed. He lives with his spouse and sometimes adult son in a 1 level home with 1 step to get inside. He has no history of HH or SNF. Patient’s spouse denies any d/c needs at this time. STAR Rust              Continued Care and Services - Admitted Since 3/4/2022    Coordination has not been started for this encounter.       Expected Discharge Date and Time     Expected Discharge Date Expected Discharge Time    Mar 9, 2022          Demographic Summary     Row Name 03/07/22 1334       General  Information    Admission Type inpatient    Arrived From home    Referral Source admission list    Reason for Consult discharge planning    Preferred Language English       Contact Information    Permission Granted to Share Info With family/designee               Functional Status     Row Name 03/07/22 8700       Functional Status    Usual Activity Tolerance moderate    Current Activity Tolerance moderate       Functional Status, IADL    Medications independent    Meal Preparation independent    Housekeeping independent    Laundry independent    Shopping independent       Mental Status    General Appearance WDL WDL               Psychosocial    No documentation.                Abuse/Neglect    No documentation.                Legal    No documentation.                Substance Abuse    No documentation.                Patient Forms    No documentation.                   STAR KOCH

## 2022-03-07 NOTE — PLAN OF CARE
Goal Outcome Evaluation:  Plan of Care Reviewed With: patient           Outcome Evaluation: Pt down for ECHO this am and dialysis after.  Pt very lethargic this shift, refused all meals and all medications.  Pt c/o neck pain, when offered pain medication pt refused.  Pt AO to self and situation, VSS, all needs met this shift, will continue to monitor

## 2022-03-07 NOTE — PROGRESS NOTES
LOS: 3 days     Chief Complaint/ Reason for encounter: ESRD  Chief Complaint   Patient presents with   • Weakness - Generalized   • Needs dialysis         Subjective   Patient was seen on dialysis, discussed orders with HD RN, Temi ARCHIBALD bath, set for 2 L but were going to drop that down to 1 L  Moderately confused, low-grade fevers at the start of HD  CVC in use, arterial and venous pressures acceptable        Medical history reviewed:  History of Present Illness    Subjective    History taken from: Patient and chart    Vital Signs  Temp:  [97.4 °F (36.3 °C)-99.1 °F (37.3 °C)] 99.1 °F (37.3 °C)  Heart Rate:  [56-76] 66  Resp:  [14-20] 14  BP: ()/(53-82) 126/82       Wt Readings from Last 1 Encounters:   03/07/22 0906 74.4 kg (164 lb)       Objective    Objective:  General Appearance:  Comfortable, mildly confused, chronically ill-appearing, in no acute distress and not in pain.  CVC in place and in use  HEENT: Mucous membranes moist, no injury, oropharynx clear  Lungs:  Normal effort and normal respiratory rate.  Breath sounds clear to auscultation.  No  respiratory distress.  No rales, decreased breath sounds or rhonchi.    Heart: Normal rate.  Regular rhythm.  S1 normal.  No murmur.   Abdomen: Abdomen is soft.  Bowel sounds are normal, no abdominal tenderness.  There is no rebound or guarding  Extremities: Normal range of motion.  Trace edema of bilateral lower extremities, distal pulses intact  Neurological: No focal motor or sensory deficits, pupils reactive  Skin:  Warm and dry.  No rash or cyanosis.       Results Review:    Intake/Output:     Intake/Output Summary (Last 24 hours) at 3/7/2022 1122  Last data filed at 3/7/2022 0906  Gross per 24 hour   Intake 420 ml   Output --   Net 420 ml         DATA:  Radiology and Labs:  The following labs independently reviewed by me. Additional labs ordered for tomorrow a.m.  Interval notes, chart personally reviewed by me.   Old records independently reviewed  showing ESRD on dialysis  The following radiologic studies independently viewed by me, findings chest x-ray shows clear well-expanded lungs no evidence of CHF or fluid overload  New problems include hyperkalemia, thrombocytopenia  Discussed with dialysis RNTemi    Risk/ complexity of medical care/ medical decision making moderate complexity      Labs:   Recent Results (from the past 24 hour(s))   POC Glucose Once    Collection Time: 03/06/22  4:23 PM    Specimen: Blood   Result Value Ref Range    Glucose 249 (H) 70 - 130 mg/dL   POC Glucose Once    Collection Time: 03/06/22  7:44 PM    Specimen: Blood   Result Value Ref Range    Glucose 298 (H) 70 - 130 mg/dL   POC Glucose Once    Collection Time: 03/07/22  6:13 AM    Specimen: Blood   Result Value Ref Range    Glucose 287 (H) 70 - 130 mg/dL   Vitamin B12    Collection Time: 03/07/22  6:39 AM    Specimen: Blood   Result Value Ref Range    Vitamin B-12 >2,000 (H) 211 - 946 pg/mL   Basic Metabolic Panel    Collection Time: 03/07/22  6:39 AM    Specimen: Blood   Result Value Ref Range    Glucose 280 (H) 65 - 99 mg/dL     (H) 6 - 20 mg/dL    Creatinine 13.15 (H) 0.76 - 1.27 mg/dL    Sodium 136 136 - 145 mmol/L    Potassium 5.7 (H) 3.5 - 5.2 mmol/L    Chloride 96 (L) 98 - 107 mmol/L    CO2 18.8 (L) 22.0 - 29.0 mmol/L    Calcium 8.6 8.6 - 10.5 mg/dL    BUN/Creatinine Ratio 8.7 7.0 - 25.0    Anion Gap 21.2 (H) 5.0 - 15.0 mmol/L    eGFR 3.9 (L) >60.0 mL/min/1.73   Immature Platelet Fraction    Collection Time: 03/07/22  6:39 AM    Specimen: Blood   Result Value Ref Range    IPF 15.80 (H) 0.90 - 6.50 %    Platelets 38 (C) 140 - 450 10*3/mm3   CBC Auto Differential    Collection Time: 03/07/22  6:39 AM    Specimen: Blood   Result Value Ref Range    WBC 7.94 3.40 - 10.80 10*3/mm3    RBC 2.56 (L) 4.14 - 5.80 10*6/mm3    Hemoglobin 7.9 (L) 13.0 - 17.7 g/dL    Hematocrit 23.9 (L) 37.5 - 51.0 %    MCV 93.4 79.0 - 97.0 fL    MCH 30.9 26.6 - 33.0 pg    MCHC 33.1 31.5 -  35.7 g/dL    RDW 14.4 12.3 - 15.4 %    RDW-SD 48.1 37.0 - 54.0 fl    MPV 13.6 (H) 6.0 - 12.0 fL    Platelets 38 (C) 140 - 450 10*3/mm3    Neutrophil % 86.8 (H) 42.7 - 76.0 %    Lymphocyte % 2.9 (L) 19.6 - 45.3 %    Monocyte % 8.1 5.0 - 12.0 %    Eosinophil % 0.8 0.3 - 6.2 %    Basophil % 0.4 0.0 - 1.5 %    Neutrophils, Absolute 6.90 1.70 - 7.00 10*3/mm3    Lymphocytes, Absolute 0.23 (L) 0.70 - 3.10 10*3/mm3    Monocytes, Absolute 0.64 0.10 - 0.90 10*3/mm3    Eosinophils, Absolute 0.06 0.00 - 0.40 10*3/mm3    Basophils, Absolute 0.03 0.00 - 0.20 10*3/mm3   Adult Transthoracic Echo Complete W/ Cont if Necessary Per Protocol    Collection Time: 03/07/22  9:06 AM   Result Value Ref Range    Target HR (85%) 137 bpm    Max. Pred. HR (100%) 161 bpm    RV S' 11.9 cm/sec    RV Base 3.3 cm    RV Length 6.7 cm    RV Mid 3.2 cm    Dimensionless Index 0.80 (DI)    LA Volume Index 35.2 mL/m2    ACS 1.92 cm    Ao pk donato 157.5 cm/sec    Ao V2 VTI 25.5 cm    AMI(I,D) 2.48 cm2    EDV(cubed) 145.0 ml    EDV(MOD-sp2) 151.0 ml    EDV(MOD-sp4) 155.0 ml    EF(MOD-bp) 55.6 %    EF(MOD-sp2) 54.3 %    EF(MOD-sp4) 56.8 %    ESV(cubed) 26.6 ml    ESV(MOD-sp2) 69.0 ml    ESV(MOD-sp4) 67.0 ml    IVS/LVPW 0.96 cm    Lat Peak E' Donato 11.4 cm/sec    LV mass(C)d 200.2 grams    LV V1 max PG 5.0 mmHg    LV V1 mean PG 2.38 mmHg    LV V1 max 111.4 cm/sec    LVPWd 1.03 cm    Med Peak E' Donato 7.5 cm/sec    MV dec slope 338.2 cm/sec2    MV dec time 260 msec    MV V2 VTI 28.4 cm    MVA(VTI) 2.22 cm2    PA acc time 0.09 sec    PA pr(Accel) 38.6 mmHg    PA V2 max 118.1 cm/sec    Pulm A Revs Donato 30.9 cm/sec    RAP systole 3.0 mmHg    RV V1 max PG 1.72 mmHg    RV V1 max 65.5 cm/sec    RV V1 VTI 12.2 cm    RVSP(TR) 22.8 mmHg    SI(MOD-sp2) 43.6 ml/m2    SI(MOD-sp4) 46.8 ml/m2    SV(LVOT) 63.2 ml    SV(MOD-sp2) 82.0 ml    SV(MOD-sp4) 88.0 ml    TR max PG 19.8 mmHg    Ao max PG 9.9 mmHg    Ao mean PG 4.9 mmHg    FS 43.2 %    IVSd 0.99 cm    LV V1 VTI 19.5 cm     LVIDd 5.3 cm    LVIDs 3.0 cm    LVOT area 3.2 cm2    LVOT diam 2.03 cm    MV E/A 0.93     MV max PG 4.7 mmHg    MV mean PG 1.83 mmHg    MV A dur 0.10 sec    MV A max donato 97.3 cm/sec    MV E max donato 90.6 cm/sec    Pulm A Revs Dur 0.13 sec    Pulm Contreras Donato 49.5 cm/sec    Pulm Sys Donato 34.3 cm/sec    TR max donato 222.6 cm/sec    EF - Contrast (4Ch) 56.0 cm2    LV Contreras Vol (BSA corrected) 82.5 cm2    LV Sys Vol (BSA corrected) 35.7 cm2    TAPSE (>1.6) 1.10 cm       Radiology:  Imaging Results (Last 24 Hours)     ** No results found for the last 24 hours. **             Medications have been reviewed:  Current Facility-Administered Medications   Medication Dose Route Frequency Provider Last Rate Last Admin   • acetaminophen (TYLENOL) tablet 650 mg  650 mg Oral Q6H PRN Karl Swift MD   650 mg at 03/06/22 0917   • ALPRAZolam (XANAX) tablet 1 mg  1 mg Oral 4x Daily PRN Karl Swift MD   1 mg at 03/06/22 2100   • ascorbic acid (VITAMIN C) tablet 500 mg  500 mg Oral Daily Karl Swift MD       • aspirin chewable tablet 81 mg  81 mg Oral Daily Karl Swift MD   81 mg at 03/06/22 1610   • atorvastatin (LIPITOR) tablet 10 mg  10 mg Oral Nightly Karl Swift MD   10 mg at 03/06/22 2046   • heparin (porcine) injection 3,800 Units  3,800 Units Intracatheter PRN Yohan Murrell MD       • insulin glargine (LANTUS, SEMGLEE) injection 20 Units  20 Units Subcutaneous Nightly Karl Swift MD   20 Units at 03/06/22 2056   • insulin lispro (ADMELOG) injection 0-7 Units  0-7 Units Subcutaneous 4x Daily With Meals & Nightly Karl Swift MD   4 Units at 03/07/22 0759   • insulin lispro (ADMELOG) injection 7 Units  7 Units Subcutaneous TID With Meals Karl Swift MD   7 Units at 03/07/22 0800   • lactobacillus acidophilus (RISAQUAD) capsule 1 capsule  1 capsule Oral Daily Karl Swift MD   1 capsule at 03/06/22 0917   • nitroglycerin (NITROSTAT) SL tablet 0.4 mg  0.4 mg Sublingual Q5 Min PRN Karl Swift MD       •  oxyCODONE-acetaminophen (PERCOCET)  MG per tablet 1 tablet  1 tablet Oral Q6H PRN Karl Swift MD       • pantoprazole (PROTONIX) EC tablet 40 mg  40 mg Oral Daily Karl Swift MD   40 mg at 03/06/22 0917   • promethazine (PHENERGAN) tablet 12.5 mg  12.5 mg Oral Q4H PRN Karl Swift MD       • sevelamer (RENVELA) tablet 800 mg  800 mg Oral TID With Meals Karl Swift MD   800 mg at 03/06/22 1738   • sodium chloride 0.9 % flush 10 mL  10 mL Intravenous PRN Franklyn Cornejo MD       • tiZANidine (ZANAFLEX) tablet 4 mg  4 mg Oral Q8H PRN Karl Swift MD       • vitamin B-12 (CYANOCOBALAMIN) tablet 1,000 mcg  1,000 mcg Oral Daily Karl Swift MD   1,000 mcg at 03/06/22 0917   • zinc sulfate (ZINCATE) capsule 220 mg  220 mg Oral Daily Karl Swift MD   220 mg at 03/06/22 0918         ASSESSMENT:   Thrombocytopenia (HCC)     • Liver cirrhosis (HCC)     • Chronic hepatitis C without hepatic coma (HCC)     • End-stage renal disease on hemodialysis (HCC)     • B12 deficiency     • Anemia due to chronic renal failure treated with erythropoietin, stage 5 (HCC)          Assessment:   1. ESRD  2. Tachycardia  3. Thrombocytopenia, chronic issue but slightly worse than baseline  4.  Hyperkalemia  5.  Diabetes mellitus type 2  6.  Fevers  7.  Chronic noncompliance with outpatient dialysis.  Patient has been counseled multiple times about the importance of 100% attendance at outpatient dialysis      Plan:  HD today and Monday Wednesday Friday schedule  UF 1 L today with HD  Okay to lock catheter with heparin, thrombocytopenia looks chronic and likely exacerbated by missed dialysis  Check blood cultures with HD today  Using CVC today but his fistula has been cannulated at outpatient dialysis with no issues, 16-gauge needles  Will try using the fistula again on Wednesday      Yohan Murrell MD   [facility]   Office phone number: [phone]  Answering service phone number: [phone]    03/07/22  11:22  EST    Dictation performed using Dragon dictation software

## 2022-03-07 NOTE — PLAN OF CARE
Alert x 2. More lethargic throughout the night. Wife at bedside early in the night and discussed PMH and concerns. IV saline locked. Urinal at bedside, brief in place. Awaiting stool to send for occult blood. BP stable but slightly hypotensive. 1L NC. Fistula open to air and intact. Tunneled cath to right chest capped. Will have hemo today.     Problem: Adult Inpatient Plan of Care  Goal: Absence of Hospital-Acquired Illness or Injury  Intervention: Identify and Manage Fall Risk  Recent Flowsheet Documentation  Taken 3/7/2022 0201 by Lucy Araiza RN  Safety Promotion/Fall Prevention: safety round/check completed  Taken 3/7/2022 0005 by Lucy Araiza RN  Safety Promotion/Fall Prevention: safety round/check completed  Taken 3/6/2022 2201 by Lucy Araiza RN  Safety Promotion/Fall Prevention: safety round/check completed  Taken 3/6/2022 2011 by Lucy Araiza RN  Safety Promotion/Fall Prevention: safety round/check completed     Problem: Adult Inpatient Plan of Care  Goal: Absence of Hospital-Acquired Illness or Injury  Intervention: Prevent Skin Injury  Recent Flowsheet Documentation  Taken 3/7/2022 0201 by Lucy Araiza RN  Body Position:   left   side-lying  Taken 3/7/2022 0005 by Lucy Araiza RN  Body Position:   left   side-lying  Skin Protection: adhesive use limited  Taken 3/6/2022 2201 by Lucy Araiza RN  Body Position:   right   side-lying  Taken 3/6/2022 2011 by Lucy Araiza RN  Body Position: sitting up in bed  Skin Protection:   adhesive use limited   incontinence pads utilized   Goal Outcome Evaluation:

## 2022-03-08 LAB
ABO GROUP BLD: NORMAL
AMMONIA BLD-SCNC: 17 UMOL/L (ref 16–60)
ANION GAP SERPL CALCULATED.3IONS-SCNC: 15.9 MMOL/L (ref 5–15)
BASOPHILS # BLD AUTO: 0.01 10*3/MM3 (ref 0–0.2)
BASOPHILS # BLD AUTO: 0.02 10*3/MM3 (ref 0–0.2)
BASOPHILS # BLD AUTO: 0.03 10*3/MM3 (ref 0–0.2)
BASOPHILS NFR BLD AUTO: 0.2 % (ref 0–1.5)
BASOPHILS NFR BLD AUTO: 0.3 % (ref 0–1.5)
BASOPHILS NFR BLD AUTO: 0.3 % (ref 0–1.5)
BLD GP AB SCN SERPL QL: NEGATIVE
BUN SERPL-MCNC: 59 MG/DL (ref 6–20)
BUN/CREAT SERPL: 8.7 (ref 7–25)
CALCIUM SPEC-SCNC: 8.1 MG/DL (ref 8.6–10.5)
CHLORIDE SERPL-SCNC: 96 MMOL/L (ref 98–107)
CO2 SERPL-SCNC: 24.1 MMOL/L (ref 22–29)
CREAT SERPL-MCNC: 6.77 MG/DL (ref 0.76–1.27)
DEPRECATED RDW RBC AUTO: 47.2 FL (ref 37–54)
DEPRECATED RDW RBC AUTO: 47.4 FL (ref 37–54)
DEPRECATED RDW RBC AUTO: 47.7 FL (ref 37–54)
EGFRCR SERPLBLD CKD-EPI 2021: 8.7 ML/MIN/1.73
EOSINOPHIL # BLD AUTO: 0.03 10*3/MM3 (ref 0–0.4)
EOSINOPHIL # BLD AUTO: 0.06 10*3/MM3 (ref 0–0.4)
EOSINOPHIL # BLD AUTO: 0.17 10*3/MM3 (ref 0–0.4)
EOSINOPHIL NFR BLD AUTO: 0.5 % (ref 0.3–6.2)
EOSINOPHIL NFR BLD AUTO: 0.5 % (ref 0.3–6.2)
EOSINOPHIL NFR BLD AUTO: 2.1 % (ref 0.3–6.2)
ERYTHROCYTE [DISTWIDTH] IN BLOOD BY AUTOMATED COUNT: 13.8 % (ref 12.3–15.4)
ERYTHROCYTE [DISTWIDTH] IN BLOOD BY AUTOMATED COUNT: 14 % (ref 12.3–15.4)
ERYTHROCYTE [DISTWIDTH] IN BLOOD BY AUTOMATED COUNT: 14.1 % (ref 12.3–15.4)
GLUCOSE BLDC GLUCOMTR-MCNC: 213 MG/DL (ref 70–130)
GLUCOSE BLDC GLUCOMTR-MCNC: 236 MG/DL (ref 70–130)
GLUCOSE BLDC GLUCOMTR-MCNC: 239 MG/DL (ref 70–130)
GLUCOSE BLDC GLUCOMTR-MCNC: 242 MG/DL (ref 70–130)
GLUCOSE BLDC GLUCOMTR-MCNC: 244 MG/DL (ref 70–130)
GLUCOSE BLDC GLUCOMTR-MCNC: 293 MG/DL (ref 70–130)
GLUCOSE SERPL-MCNC: 239 MG/DL (ref 65–99)
HCT VFR BLD AUTO: 13.1 % (ref 37.5–51)
HCT VFR BLD AUTO: 23.2 % (ref 37.5–51)
HCT VFR BLD AUTO: 23.3 % (ref 37.5–51)
HGB BLD-MCNC: 4.2 G/DL (ref 13–17.7)
HGB BLD-MCNC: 7.5 G/DL (ref 13–17.7)
HGB BLD-MCNC: 7.5 G/DL (ref 13–17.7)
LDH SERPL-CCNC: 165 U/L (ref 135–225)
LYMPHOCYTES # BLD AUTO: 0.38 10*3/MM3 (ref 0.7–3.1)
LYMPHOCYTES # BLD AUTO: 0.52 10*3/MM3 (ref 0.7–3.1)
LYMPHOCYTES # BLD AUTO: 0.57 10*3/MM3 (ref 0.7–3.1)
LYMPHOCYTES NFR BLD AUTO: 4.9 % (ref 19.6–45.3)
LYMPHOCYTES NFR BLD AUTO: 6.5 % (ref 19.6–45.3)
LYMPHOCYTES NFR BLD AUTO: 6.8 % (ref 19.6–45.3)
MCH RBC QN AUTO: 29.6 PG (ref 26.6–33)
MCH RBC QN AUTO: 30.2 PG (ref 26.6–33)
MCH RBC QN AUTO: 30.2 PG (ref 26.6–33)
MCHC RBC AUTO-ENTMCNC: 32.1 G/DL (ref 31.5–35.7)
MCHC RBC AUTO-ENTMCNC: 32.2 G/DL (ref 31.5–35.7)
MCHC RBC AUTO-ENTMCNC: 32.3 G/DL (ref 31.5–35.7)
MCV RBC AUTO: 92.3 FL (ref 79–97)
MCV RBC AUTO: 93.5 FL (ref 79–97)
MCV RBC AUTO: 94 FL (ref 79–97)
MONOCYTES # BLD AUTO: 0.61 10*3/MM3 (ref 0.1–0.9)
MONOCYTES # BLD AUTO: 0.82 10*3/MM3 (ref 0.1–0.9)
MONOCYTES # BLD AUTO: 1.35 10*3/MM3 (ref 0.1–0.9)
MONOCYTES NFR BLD AUTO: 10.3 % (ref 5–12)
MONOCYTES NFR BLD AUTO: 10.9 % (ref 5–12)
MONOCYTES NFR BLD AUTO: 11.7 % (ref 5–12)
NEUTROPHILS NFR BLD AUTO: 4.52 10*3/MM3 (ref 1.7–7)
NEUTROPHILS NFR BLD AUTO: 6.32 10*3/MM3 (ref 1.7–7)
NEUTROPHILS NFR BLD AUTO: 79.7 % (ref 42.7–76)
NEUTROPHILS NFR BLD AUTO: 80.7 % (ref 42.7–76)
NEUTROPHILS NFR BLD AUTO: 81.1 % (ref 42.7–76)
NEUTROPHILS NFR BLD AUTO: 9.38 10*3/MM3 (ref 1.7–7)
PLATELET # BLD AUTO: 17 10*3/MM3 (ref 140–450)
PLATELET # BLD AUTO: 29 10*3/MM3 (ref 140–450)
PLATELET # BLD AUTO: 30 10*3/MM3 (ref 140–450)
PLATELET # BLD AUTO: 30 10*3/MM3 (ref 140–450)
PLATELETS.RETICULATED NFR BLD AUTO: 22.4 % (ref 0.9–6.5)
PMV BLD AUTO: 12.6 FL (ref 6–12)
PMV BLD AUTO: 14.1 FL (ref 6–12)
PMV BLD AUTO: 14.6 FL (ref 6–12)
POTASSIUM SERPL-SCNC: 4.4 MMOL/L (ref 3.5–5.2)
RBC # BLD AUTO: 1.42 10*6/MM3 (ref 4.14–5.8)
RBC # BLD AUTO: 2.48 10*6/MM3 (ref 4.14–5.8)
RBC # BLD AUTO: 2.48 10*6/MM3 (ref 4.14–5.8)
RH BLD: POSITIVE
SARS-COV-2 RNA PNL SPEC NAA+PROBE: NOT DETECTED
SODIUM SERPL-SCNC: 136 MMOL/L (ref 136–145)
T&S EXPIRATION DATE: NORMAL
WBC NRBC COR # BLD: 11.56 10*3/MM3 (ref 3.4–10.8)
WBC NRBC COR # BLD: 5.6 10*3/MM3 (ref 3.4–10.8)
WBC NRBC COR # BLD: 7.94 10*3/MM3 (ref 3.4–10.8)

## 2022-03-08 PROCEDURE — 85025 COMPLETE CBC W/AUTO DIFF WBC: CPT | Performed by: HOSPITALIST

## 2022-03-08 PROCEDURE — 97162 PT EVAL MOD COMPLEX 30 MIN: CPT

## 2022-03-08 PROCEDURE — P9016 RBC LEUKOCYTES REDUCED: HCPCS

## 2022-03-08 PROCEDURE — 86850 RBC ANTIBODY SCREEN: CPT | Performed by: INTERNAL MEDICINE

## 2022-03-08 PROCEDURE — 82962 GLUCOSE BLOOD TEST: CPT

## 2022-03-08 PROCEDURE — 97535 SELF CARE MNGMENT TRAINING: CPT

## 2022-03-08 PROCEDURE — 80048 BASIC METABOLIC PNL TOTAL CA: CPT | Performed by: HOSPITALIST

## 2022-03-08 PROCEDURE — 97530 THERAPEUTIC ACTIVITIES: CPT

## 2022-03-08 PROCEDURE — 36430 TRANSFUSION BLD/BLD COMPNT: CPT

## 2022-03-08 PROCEDURE — 86901 BLOOD TYPING SEROLOGIC RH(D): CPT | Performed by: INTERNAL MEDICINE

## 2022-03-08 PROCEDURE — 87635 SARS-COV-2 COVID-19 AMP PRB: CPT | Performed by: SURGERY

## 2022-03-08 PROCEDURE — 82140 ASSAY OF AMMONIA: CPT | Performed by: INTERNAL MEDICINE

## 2022-03-08 PROCEDURE — 99232 SBSQ HOSP IP/OBS MODERATE 35: CPT | Performed by: INTERNAL MEDICINE

## 2022-03-08 PROCEDURE — 83615 LACTATE (LD) (LDH) ENZYME: CPT | Performed by: INTERNAL MEDICINE

## 2022-03-08 PROCEDURE — 85025 COMPLETE CBC W/AUTO DIFF WBC: CPT | Performed by: INTERNAL MEDICINE

## 2022-03-08 PROCEDURE — 86900 BLOOD TYPING SEROLOGIC ABO: CPT

## 2022-03-08 PROCEDURE — 86901 BLOOD TYPING SEROLOGIC RH(D): CPT

## 2022-03-08 PROCEDURE — 63710000001 INSULIN REGULAR HUMAN PER 5 UNITS: Performed by: INTERNAL MEDICINE

## 2022-03-08 PROCEDURE — 86923 COMPATIBILITY TEST ELECTRIC: CPT

## 2022-03-08 PROCEDURE — 86900 BLOOD TYPING SEROLOGIC ABO: CPT | Performed by: INTERNAL MEDICINE

## 2022-03-08 PROCEDURE — 63710000001 INSULIN LISPRO (HUMAN) PER 5 UNITS: Performed by: HOSPITALIST

## 2022-03-08 PROCEDURE — 25010000002 CEFTRIAXONE PER 250 MG: Performed by: HOSPITALIST

## 2022-03-08 PROCEDURE — 85055 RETICULATED PLATELET ASSAY: CPT | Performed by: INTERNAL MEDICINE

## 2022-03-08 PROCEDURE — 97166 OT EVAL MOD COMPLEX 45 MIN: CPT

## 2022-03-08 RX ORDER — MIDODRINE HYDROCHLORIDE 5 MG/1
10 TABLET ORAL
Status: DISCONTINUED | OUTPATIENT
Start: 2022-03-08 | End: 2022-03-18 | Stop reason: HOSPADM

## 2022-03-08 RX ORDER — SODIUM CHLORIDE 0.9 % (FLUSH) 0.9 %
10 SYRINGE (ML) INJECTION AS NEEDED
Status: DISCONTINUED | OUTPATIENT
Start: 2022-03-08 | End: 2022-03-18 | Stop reason: HOSPADM

## 2022-03-08 RX ORDER — SODIUM CHLORIDE 0.9 % (FLUSH) 0.9 %
10 SYRINGE (ML) INJECTION EVERY 12 HOURS SCHEDULED
Status: DISCONTINUED | OUTPATIENT
Start: 2022-03-08 | End: 2022-03-18 | Stop reason: HOSPADM

## 2022-03-08 RX ORDER — INSULIN LISPRO 100 [IU]/ML
10 INJECTION, SOLUTION INTRAVENOUS; SUBCUTANEOUS
Status: DISCONTINUED | OUTPATIENT
Start: 2022-03-08 | End: 2022-03-08

## 2022-03-08 RX ORDER — LIDOCAINE HYDROCHLORIDE 10 MG/ML
5 INJECTION, SOLUTION INFILTRATION; PERINEURAL ONCE
Status: DISCONTINUED | OUTPATIENT
Start: 2022-03-08 | End: 2022-03-18 | Stop reason: HOSPADM

## 2022-03-08 RX ORDER — NICOTINE POLACRILEX 4 MG
15 LOZENGE BUCCAL
Status: DISCONTINUED | OUTPATIENT
Start: 2022-03-08 | End: 2022-03-13

## 2022-03-08 RX ORDER — DEXTROSE MONOHYDRATE 25 G/50ML
25 INJECTION, SOLUTION INTRAVENOUS
Status: DISCONTINUED | OUTPATIENT
Start: 2022-03-08 | End: 2022-03-13

## 2022-03-08 RX ADMIN — CEFTRIAXONE 2 G: 2 INJECTION, POWDER, FOR SOLUTION INTRAMUSCULAR; INTRAVENOUS at 22:15

## 2022-03-08 RX ADMIN — SODIUM CHLORIDE 500 ML: 9 INJECTION, SOLUTION INTRAVENOUS at 09:42

## 2022-03-08 RX ADMIN — INSULIN LISPRO 4 UNITS: 100 INJECTION, SOLUTION INTRAVENOUS; SUBCUTANEOUS at 13:01

## 2022-03-08 RX ADMIN — INSULIN GLARGINE-YFGN 30 UNITS: 100 INJECTION, SOLUTION SUBCUTANEOUS at 22:15

## 2022-03-08 RX ADMIN — Medication 10 ML: at 21:04

## 2022-03-08 RX ADMIN — INSULIN HUMAN 5 UNITS: 100 INJECTION, SOLUTION PARENTERAL at 18:14

## 2022-03-08 RX ADMIN — INSULIN LISPRO 8 UNITS: 100 INJECTION, SOLUTION INTRAVENOUS; SUBCUTANEOUS at 13:01

## 2022-03-08 RX ADMIN — SEVELAMER CARBONATE 800 MG: 800 TABLET, FILM COATED ORAL at 13:02

## 2022-03-08 RX ADMIN — OXYCODONE HYDROCHLORIDE AND ACETAMINOPHEN 1 TABLET: 10; 325 TABLET ORAL at 03:51

## 2022-03-08 RX ADMIN — INSULIN LISPRO 8 UNITS: 100 INJECTION, SOLUTION INTRAVENOUS; SUBCUTANEOUS at 09:19

## 2022-03-08 RX ADMIN — INSULIN LISPRO 3 UNITS: 100 INJECTION, SOLUTION INTRAVENOUS; SUBCUTANEOUS at 09:19

## 2022-03-08 RX ADMIN — ACETAMINOPHEN 650 MG: 325 TABLET, FILM COATED ORAL at 19:04

## 2022-03-08 RX ADMIN — OXYCODONE HYDROCHLORIDE AND ACETAMINOPHEN 1 TABLET: 10; 325 TABLET ORAL at 10:38

## 2022-03-08 NOTE — PROGRESS NOTES
LOS: 4 days     Chief Complaint/ Reason for encounter: ESRD  Chief Complaint   Patient presents with   • Weakness - Generalized   • Needs dialysis         Subjective    More awake than yesterday on dialysis but still lethargic  Ill-appearing  Discussed with patient and his wife about need for dialysis catheter removal today.      Medical history reviewed:  History of Present Illness    Subjective    History taken from: Patient and chart    Vital Signs  Temp:  [96.9 °F (36.1 °C)-98.7 °F (37.1 °C)] 98.1 °F (36.7 °C)  Heart Rate:  [74-94] 74  Resp:  [16-22] 18  BP: ()/(54-77) 86/70       Wt Readings from Last 1 Encounters:   03/07/22 0906 74.4 kg (164 lb)       Objective    Objective:  General Appearance:  Comfortable, mildly confused, acute and chronically ill-appearing, in no acute distress and not in pain.  CVC in place right chest with no surrounding erythema at the exit site  HEENT: Mucous membranes moist, no injury, oropharynx clear  Lungs:  Normal effort and normal respiratory rate.  Breath sounds clear to auscultation.  No  respiratory distress.  No rales, decreased breath sounds or rhonchi.    Heart: Normal rate.  Regular rhythm.  S1 normal.  No murmur.   Abdomen: Abdomen is soft.  Bowel sounds are normal, no abdominal tenderness.  There is no rebound or guarding  Extremities: Normal range of motion.  Trace edema of bilateral lower extremities, distal pulses intact  Neurological: No focal motor or sensory deficits, pupils reactive  Skin:  Warm and dry.  No rash or cyanosis.       Results Review:    Intake/Output:     Intake/Output Summary (Last 24 hours) at 3/8/2022 1052  Last data filed at 3/8/2022 0954  Gross per 24 hour   Intake 280 ml   Output 1000 ml   Net -720 ml         DATA:  Radiology and Labs:  The following labs independently reviewed by me. Additional labs ordered for tomorrow a.m.  Interval notes, chart personally reviewed by me.   Old records independently reviewed showing ESRD on  dialysis  New problems include E. coli bacteremia, suspected infected dialysis catheter  Discussed with dialysis RNTemi    Risk/ complexity of medical care/ medical decision making moderate complexity      Labs:   Recent Results (from the past 24 hour(s))   Blood Culture - Blood, Blood, Central Line    Collection Time: 03/07/22 11:31 AM    Specimen: Blood, Central Line   Result Value Ref Range    Blood Culture Culture in progress     Gram Stain Anaerobic Bottle Gram negative bacilli (C)     Gram Stain Aerobic Bottle Gram negative bacilli (C)    Blood Culture - Blood, Blood, Central Line    Collection Time: 03/07/22 11:31 AM    Specimen: Blood, Central Line   Result Value Ref Range    Blood Culture Culture in progress     Gram Stain Anaerobic Bottle Gram negative bacilli (C)     Gram Stain Aerobic Bottle Gram negative bacilli (C)    Blood Culture ID, PCR - Blood, Blood, Central Line    Collection Time: 03/07/22 11:31 AM    Specimen: Blood, Central Line   Result Value Ref Range    BCID, PCR Eschericia coli. Identification by BCID2 PCR. (A) Negative by BCID PCR. Culture to Follow.    BOTTLE TYPE Anaerobic Bottle    POC Glucose Once    Collection Time: 03/07/22  3:12 PM    Specimen: Blood   Result Value Ref Range    Glucose 151 (H) 70 - 130 mg/dL   POC Glucose Once    Collection Time: 03/07/22  4:28 PM    Specimen: Blood   Result Value Ref Range    Glucose 191 (H) 70 - 130 mg/dL   POC Glucose Once    Collection Time: 03/07/22  9:46 PM    Specimen: Blood   Result Value Ref Range    Glucose 233 (H) 70 - 130 mg/dL   POC Glucose Once    Collection Time: 03/08/22  6:09 AM    Specimen: Blood   Result Value Ref Range    Glucose 236 (H) 70 - 130 mg/dL   CBC Auto Differential    Collection Time: 03/08/22  7:50 AM    Specimen: Blood   Result Value Ref Range    WBC 11.56 (H) 3.40 - 10.80 10*3/mm3    RBC 2.48 (L) 4.14 - 5.80 10*6/mm3    Hemoglobin 7.5 (L) 13.0 - 17.7 g/dL    Hematocrit 23.2 (L) 37.5 - 51.0 %    MCV 93.5 79.0 -  97.0 fL    MCH 30.2 26.6 - 33.0 pg    MCHC 32.3 31.5 - 35.7 g/dL    RDW 14.0 12.3 - 15.4 %    RDW-SD 47.7 37.0 - 54.0 fl    MPV 14.1 (H) 6.0 - 12.0 fL    Platelets 30 (C) 140 - 450 10*3/mm3    Neutrophil % 81.1 (H) 42.7 - 76.0 %    Lymphocyte % 4.9 (L) 19.6 - 45.3 %    Monocyte % 11.7 5.0 - 12.0 %    Eosinophil % 0.5 0.3 - 6.2 %    Basophil % 0.3 0.0 - 1.5 %    Neutrophils, Absolute 9.38 (H) 1.70 - 7.00 10*3/mm3    Lymphocytes, Absolute 0.57 (L) 0.70 - 3.10 10*3/mm3    Monocytes, Absolute 1.35 (H) 0.10 - 0.90 10*3/mm3    Eosinophils, Absolute 0.06 0.00 - 0.40 10*3/mm3    Basophils, Absolute 0.03 0.00 - 0.20 10*3/mm3   Basic Metabolic Panel    Collection Time: 03/08/22  7:50 AM    Specimen: Blood   Result Value Ref Range    Glucose 239 (H) 65 - 99 mg/dL    BUN 59 (H) 6 - 20 mg/dL    Creatinine 6.77 (H) 0.76 - 1.27 mg/dL    Sodium 136 136 - 145 mmol/L    Potassium 4.4 3.5 - 5.2 mmol/L    Chloride 96 (L) 98 - 107 mmol/L    CO2 24.1 22.0 - 29.0 mmol/L    Calcium 8.1 (L) 8.6 - 10.5 mg/dL    BUN/Creatinine Ratio 8.7 7.0 - 25.0    Anion Gap 15.9 (H) 5.0 - 15.0 mmol/L    eGFR 8.7 (L) >60.0 mL/min/1.73   Immature Platelet Fraction    Collection Time: 03/08/22  7:50 AM    Specimen: Blood   Result Value Ref Range    IPF 22.40 (H) 0.90 - 6.50 %    Platelets 30 (C) 140 - 450 10*3/mm3   Lactate Dehydrogenase    Collection Time: 03/08/22  7:50 AM    Specimen: Blood   Result Value Ref Range     135 - 225 U/L   Ammonia    Collection Time: 03/08/22  7:50 AM    Specimen: Blood   Result Value Ref Range    Ammonia 17 16 - 60 umol/L   POC Glucose Once    Collection Time: 03/08/22  9:25 AM    Specimen: Blood   Result Value Ref Range    Glucose 244 (H) 70 - 130 mg/dL       Radiology:  Imaging Results (Last 24 Hours)     Procedure Component Value Units Date/Time    CT Head Without Contrast [[id number]] Collected: 03/08/22 0012     Updated: 03/08/22 0020    Narrative:      CT HEAD WITHOUT CONTRAST     HISTORY: Confusion  thrombocytopenia     COMPARISON: 01/26/2022     TECHNIQUE: Axial CT imaging was obtained through the brain. No IV  contrast was administered.     FINDINGS:  No acute intracranial hemorrhage is seen. There is a large area of  encephalomalacia involving the right PCA territory. There is relatively  advanced atrophy for the age of 61. There is periventricular and deep  white matter microangiopathic change. There is no midline shift or mass  effect. The paranasal sinuses and mastoid air cells appear clear.       Impression:      No acute intracranial findings.     Radiation dose reduction techniques were utilized, including automated  exposure control and exposure modulation based on body size.     This report was finalized on 3/8/2022 12:17 AM by Dr. Alannah Hamm M.D.                Medications have been reviewed:  Current Facility-Administered Medications   Medication Dose Route Frequency Provider Last Rate Last Admin   • acetaminophen (TYLENOL) tablet 650 mg  650 mg Oral Q6H PRN Karl Swift MD   650 mg at 03/06/22 0917   • ALPRAZolam (XANAX) tablet 1 mg  1 mg Oral 4x Daily PRN Karl Swift MD   1 mg at 03/06/22 2100   • aspirin chewable tablet 81 mg  81 mg Oral Daily Karl Swift MD   81 mg at 03/08/22 0920   • atorvastatin (LIPITOR) tablet 10 mg  10 mg Oral Nightly Karl Swift MD   10 mg at 03/06/22 2046   • cefTRIAXone (ROCEPHIN) 2 g in sodium chloride 0.9 % 100 mL IVPB-VTB  2 g Intravenous Q24H Karl Swift  mL/hr at 03/07/22 2241 2 g at 03/07/22 2241   • epoetin real-epbx (RETACRIT) injection 10,000 Units  10,000 Units Intravenous Once per day on Mon Wed Fri Yohan Murrell MD   10,000 Units at 03/07/22 1252   • heparin (porcine) injection 3,800 Units  3,800 Units Intracatheter PRN Yohan Murrell MD   3,800 Units at 03/07/22 1251   • insulin glargine (LANTUS, SEMGLEE) injection 25 Units  25 Units Subcutaneous Nightly Karl Swift MD   25 Units at 03/07/22 2153   • insulin lispro  (ADMELOG) injection 0-7 Units  0-7 Units Subcutaneous 4x Daily With Meals & Nightly Karl Swift MD   3 Units at 03/08/22 0919   • insulin lispro (ADMELOG) injection 8 Units  8 Units Subcutaneous TID With Meals Karl Swift MD   8 Units at 03/08/22 0919   • lactobacillus acidophilus (RISAQUAD) capsule 1 capsule  1 capsule Oral Daily Karl Swift MD   1 capsule at 03/08/22 0920   • lidocaine (XYLOCAINE) 1 % injection 5 mL  5 mL Injection Once Ad Weber MD       • nitroglycerin (NITROSTAT) SL tablet 0.4 mg  0.4 mg Sublingual Q5 Min PRN Karl Swift MD       • oxyCODONE-acetaminophen (PERCOCET)  MG per tablet 1 tablet  1 tablet Oral Q6H PRN Karl Swift MD   1 tablet at 03/08/22 1038   • pantoprazole (PROTONIX) EC tablet 40 mg  40 mg Oral BID AC Karl Swift MD       • promethazine (PHENERGAN) tablet 12.5 mg  12.5 mg Oral Q4H PRN Karl Swift MD       • sevelamer (RENVELA) tablet 800 mg  800 mg Oral TID With Meals Karl Swift MD   800 mg at 03/08/22 0920   • sodium chloride 0.9 % flush 10 mL  10 mL Intravenous PRN Franklyn Cornejo MD       • tiZANidine (ZANAFLEX) tablet 4 mg  4 mg Oral Q8H PRN Karl Swift MD             ASSESSMENT:   Thrombocytopenia (HCC)     • Liver cirrhosis (HCC)     • Chronic hepatitis C without hepatic coma (HCC)     • End-stage renal disease on hemodialysis (HCC)     • B12 deficiency     • Anemia due to chronic renal failure treated with erythropoietin, stage 5 (HCC)          Assessment:   1. ESRD  2. Tachycardia  3. Thrombocytopenia, chronic issue but slightly worse than baseline  4.  Hyperkalemia  5.  Diabetes mellitus type 2  6.  Fevers  7.  Chronic noncompliance with outpatient dialysis.  Patient has been counseled multiple times about the importance of 100% attendance at outpatient dialysis      Plan:  He completed dialysis yesterday without difficulty  Blood cultures positive, suspected line infection  Consult vascular for line removal since we are currently  able to use his AV fistula  Continue HD Monday Wednesday Friday  Hesitant to start maintenance IV fluids since he is ESRD and makes very little urine.  We will give 500 cc normal saline bolus since he did screen positive for sepsis  IV antibiotics per medicine team  Epogen with dialysis for anemia  Patient is chronically ill in his recent noncompliance has exacerbated his chronic medical issues.  Appreciate vascular assistance with dialysis catheter removal, may need to have a catheter replaced if his fistula does not function well but I spoke with his outpatient dialysis unit yesterday and they assured me they were having no problems with 16-gauge needles      Yohan Murrell MD   [facility]   Office phone number: [phone]  Answering service phone number: [phone]    03/08/22  10:52 EST    Dictation performed using Dragon dictation software

## 2022-03-08 NOTE — NURSING NOTE
RN at bedside w/ vascular MD and nursing assistant. Nursing assistant attempting to draw blood sugar on the patient. Spouse is shouting at the nursing assistant that she is trying to the pull the patient's finger off. RN educated the family on the importance of intervention and the need for blood glucose checks.

## 2022-03-08 NOTE — PROGRESS NOTES
[facility] INPATIENT PROGRESS NOTE    Length of Stay:  4 days    CHIEF COMPLAINT: Thrombocytopenia, ESRD, chronic hepatitis C with cirrhosis, anemia, B12 deficiency      SUBJECTIVE: Patient with blood cultures positive for E. coli, appears to have evolving sepsis at this point and is being transferred currently to the CCU.  This is felt to be the etiology of his confusion that was beginning last night.  CT head was obtained that was negative for any acute changes or bleed.  Ammonia level was normal.  There has been discussion with vascular surgery regarding removal of the patient's dialysis catheter however that has currently been placed on hold until he is hemodynamically stabilized in the unit.  Currently he is intermittently somnolent, confused.  Systolic blood pressure in the  range currently.      ROS:  Review of Systems   A comprehensive review of systems was obtained with pertinent positive findings as noted in the interval history above.  All other systems negative.  Review of systems obtained from patient and his wife who is at the bedside (patient is confused).    OBJECTIVE:  Vitals:    03/07/22 1504 03/07/22 2147 03/07/22 2259 03/08/22 0708   BP: (!) 87/54 124/65 118/77 97/63   BP Location: Right arm Right arm Right arm Right arm   Patient Position: Lying Lying Lying Lying   Pulse: 84 94 91 74   Resp: 16 18 22 18   Temp:  98.6 °F (37 °C) 98.7 °F (37.1 °C) 98.1 °F (36.7 °C)   TempSrc:  Oral Oral Oral   SpO2: 94% 93% 95% 97%   Weight:       Height:             PHYSICAL EXAMINATION:  General: Mildly confused, intermittently somnolent  Chest/Lungs: Clear to auscultation bilaterally anteriorly  Heart: Regular rate and rhythm no murmurs gallops or rubs  Abdomen/GI: Soft nontender nondistended bowel sounds present  Extremities: No edema    Patient was examined today, unchanged from above    DIAGNOSTIC DATA:  Results Review:     I reviewed the patient's new clinical results.    Results from  last 7 days   Lab Units 03/07/22  0639 03/06/22  0616 03/05/22  0420   WBC 10*3/mm3 7.94 6.28 7.91   HEMOGLOBIN g/dL 7.9* 7.8* 8.4*   HEMATOCRIT % 23.9* 23.7* 26.6*   PLATELETS 10*3/mm3 38*  38* 41*  41* 43*      Results from last 7 days   Lab Units 03/07/22  0639 03/06/22  0616 03/05/22  0420 03/04/22  1800   SODIUM mmol/L 136 135* 137 134*   POTASSIUM mmol/L 5.7* 5.2 5.5* 4.8   CHLORIDE mmol/L 96* 94* 95* 94*   CO2 mmol/L 18.8* 19.0* 18.0* 17.0*   BUN mg/dL 114* 105* 113* 107*   CREATININE mg/dL 13.15* 11.05* 12.32* 13.43*   CALCIUM mg/dL 8.6 8.0* 7.9* 8.4*   BILIRUBIN mg/dL  --  0.9  --  0.8   ALK PHOS U/L  --  246*  --  242*   ALT (SGPT) U/L  --  84*  --  92*   AST (SGOT) U/L  --  109*  --  82*   GLUCOSE mg/dL 280* 291* 370* 427*      Lab Results   Component Value Date    NEUTROABS 6.90 03/07/2022     Results from last 7 days   Lab Units 03/04/22  1800   INR  1.21*     Results from last 7 days   Lab Units 03/04/22  1800   MAGNESIUM mg/dL 2.1         Assessment/Plan   ASSESSMENT/PLAN:  This is a 61 y.o. male with:     *Thrombocytopenia  · Patient with history of chronic mild thrombocytopenia followed in the outpatient setting by Dr. Aviles  · Felt to be related to underlying cirrhosis with splenomegaly  · Platelet count has been in the low 100,000 range in the past  · Platelet count in January 2022 had been in the mid to high 100,000 range at which time patient had COVID-19 infection  · Patient was off of dialysis for approximately 1 week before current hospital admission  · On admission 3/4/2022, platelet count was 51,000 and declined into the 40,000 range.  · Additional labs on 3/6/2022 with elevated IPF at 9%  · Peripheral blood smear reviewed on 3/5/2022, was unremarkable.  · B12 on 3/7/2022 greater than 2000  · Suspect that thrombocytopenia is related to underlying cirrhosis/splenomegaly, exacerbated by volume overload off dialysis prior to hospital admission in addition to consumption from  sepsis.  · Platelet count this morning was 30,000.  On recheck at 10 AM, platelet count was 17,000 however this was a dilute specimen and on recheck platelet count was stable at 29,000.  He is not experiencing any active bleeding issues.  There are however potential plans to remove his dialysis catheter once he is hemodynamically stable.  Certainly 1 unit platelets could be administered prior to the procedure if needed.  This will need to be performed in conjunction with nephrology in regards to volume issues with dialysis.    *ESRD  · Patient with chronic noncompliance with outpatient dialysis per nephrology  · Patient had been on Monday Wednesday Friday schedule  · Patient apparently had not been to dialysis for 1 week prior to hospital admission  · Patient is now back on dialysis, nephrology following.      *Sepsis with E. coli bacteremia  · Blood culture from 3/7/2022 + for E. Coli  · Patient initiated Rocephin IV 3/7/2022  · Patient with confusion, somnolence, borderline hypotension with evolving sepsis and is currently being transferred to CCU today.    *Chronic hepatitis C with cirrhosis  · Received previous treatment, details unclear  · Previous CT abdomen and pelvis 3/12/2018 with cirrhotic appearing liver and borderline splenomegaly to 13.5 cm  · Hepatitis C RNA pending from 3/7/2022    *Anemia  · Longstanding history of anemia followed by Dr. Aviles in the outpatient setting  · Anemia has been multifactorial related to CKD/ESRD, iron deficiency, B12 deficiency  · Patient had previously received Procrit related to anemia secondary to CKD.  Eventually ROMULO administration transition to nephrology once patient initiated hemodialysis around May 2021.  · Patient is receiving intermittent IV iron with dialysis per nephrology.  · Labs on 11/2/2021 with iron 172, ferritin 560, iron saturation 68%, TIBC 255.  B12 level 1017 (see below).  · Hemoglobin in January 2022 had been on the 8-9 range at time of COVID-19  infection  · Labs on 2/2/2022 with iron 89, ferritin 1514, iron saturation 35%, TIBC 256, folate 7.58  · During current admission, hemoglobin 3/4/2022 was 8.7 with subsequent declined into the high 7 range.  · Laboratory evaluation on 3/4/2022 with iron 131, ferritin 5988, iron saturation 79%, TIBC 165.  · No laboratory evidence to suggest hemolysis  · B12 on 3/7/2022 greater than 2000  · Additional labs pending from 3/6/2022 with copper and zinc  · Patient is continuing on Retacrit 10,000 units 3 times weekly with dialysis per nephrology  · Hemoglobin today has declined slightly to 7.5.  A repeat level was drawn this morning at 4.2 however this was incorrect due to a dilute specimen and on recheck was stable at 7.5.  Would plan to transfuse for hemoglobin less than 7.  Transfusion if needed will need to be coordinated with nephrology in regards to dialysis.    *B12 deficiency  · Patient was previously receiving oral B12 supplementation  · Labs on 11/2/2022 with B12 level 1017, patient remained off of oral B12 supplementation.  · B12 level on 3/7/2022 greater than 2000, no need for further B12 supplementation    *Right clear-cell renal cell carcinoma, stage I ([id number])  · Status post right partial nephrectomy on 7/6/2018 for a 2.2 cm clear-cell renal cell carcinoma, grade 3, margins could not be assessed, no lymphovascular invasion.  Notation of extensive fibrosis with hemosiderin deposition reflective of fibrosis and hemorrhage.    *Confusion/somnolence  · Secondary to E. coli bacteremia with evolving sepsis  · CT head 3/7/2022 negative for acute changes  · Ammonia level normal on 3/7/2022    Plan:  1. Patient with E. coli bacteremia and evolving sepsis, currently being transferred to CCU  2. Note plans by vascular surgery for possible dialysis catheter removal once patient is hemodynamically stable.  Could transfuse 1 unit platelets if needed prior to procedure.  3. Patient continuing on Retacrit 10,000 units  Monday Wednesday Friday with dialysis per nephrology  4. Consider transfusion for hemoglobin less than 7.0.  This will be coordinated with nephrology due to dialysis/volume issues.  5. CBC, CMP daily    Discussed with patient and wife at bedside.           Rios Pa MD

## 2022-03-08 NOTE — PLAN OF CARE
Goal Outcome Evaluation:  Plan of Care Reviewed With: patient           Outcome Evaluation: Pt is a 60 yo male admitted with weakness after missing multiple dialysis sessions. Pt with complex PMH including but not limited to CVA, CAD, ESRD, DM2, HTN and HLD. Pt lives with spouse and typically is IND at baseline with all functional mobility without AD use. upon arrival for PT eval, pt sitting EOB with OT staff. Pt demo impaired sitting balance as he requires max A while sitting EOB due to R and posterior lean. Pt completed multiple sit<>stands with min/mod A x2 and HHA. Pt demo R lateral lean in standing. Pt may benefit from skilled PT to address balance, strength, endurance and general functional mobility deficits as pt is below baseline level. Recommending d.c to SNF.

## 2022-03-08 NOTE — NURSING NOTE
Dr BISMARK Murrell updated on pt being transferred to CCU. Advised order for HD was supposed to be for tomorrow. Order updated.
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
## 2022-03-08 NOTE — PLAN OF CARE
Patient transferred to CCU this afternoon. Oriented to self and place. NSR. Soft blood pressures, but maintaining MAP >65. 2L NC. No UO, no BM.     Goal Outcome Evaluation:     Problem: Adult Inpatient Plan of Care  Goal: Plan of Care Review  Outcome: Ongoing, Not Progressing  Goal: Patient-Specific Goal (Individualized)  Outcome: Ongoing, Not Progressing  Goal: Absence of Hospital-Acquired Illness or Injury  Outcome: Ongoing, Not Progressing  Intervention: Identify and Manage Fall Risk  Recent Flowsheet Documentation  Taken 3/8/2022 1800 by Silke Valdez RN  Safety Promotion/Fall Prevention: safety round/check completed  Taken 3/8/2022 1700 by Silke Valdez RN  Safety Promotion/Fall Prevention: safety round/check completed  Taken 3/8/2022 1600 by Silke Valdez RN  Safety Promotion/Fall Prevention: safety round/check completed  Taken 3/8/2022 1500 by Silke Valdez RN  Safety Promotion/Fall Prevention: safety round/check completed  Intervention: Prevent Skin Injury  Recent Flowsheet Documentation  Taken 3/8/2022 1500 by Silke Valdez RN  Skin Protection:   adhesive use limited   incontinence pads utilized   transparent dressing maintained   tubing/devices free from skin contact  Intervention: Prevent and Manage VTE (venous thromboembolism) Risk  Recent Flowsheet Documentation  Taken 3/8/2022 1500 by Silke Valdez RN  VTE Prevention/Management:   bilateral   dorsiflexion/plantar flexion performed   sequential compression devices on   bleeding risk factor(s) identified  Intervention: Prevent Infection  Recent Flowsheet Documentation  Taken 3/8/2022 1800 by Silke Valdez RN  Infection Prevention: rest/sleep promoted  Taken 3/8/2022 1700 by Silke Valdez RN  Infection Prevention: rest/sleep promoted  Taken 3/8/2022 1600 by Silke Valdez RN  Infection Prevention: rest/sleep promoted  Taken 3/8/2022 1500 by Silke Valdez RN  Infection Prevention: rest/sleep promoted  Goal: Readiness for Transition of  Care  Outcome: Ongoing, Not Progressing  Goal: Plan of Care Review  Outcome: Ongoing, Not Progressing  Goal: Patient-Specific Goal (Individualized)  Outcome: Ongoing, Not Progressing  Goal: Absence of Hospital-Acquired Illness or Injury  Outcome: Ongoing, Not Progressing  Intervention: Identify and Manage Fall Risk  Recent Flowsheet Documentation  Taken 3/8/2022 1800 by Silke Valdez RN  Safety Promotion/Fall Prevention: safety round/check completed  Taken 3/8/2022 1700 by Silke Valdez RN  Safety Promotion/Fall Prevention: safety round/check completed  Taken 3/8/2022 1600 by Silke Valdez RN  Safety Promotion/Fall Prevention: safety round/check completed  Taken 3/8/2022 1500 by Silke Valdez RN  Safety Promotion/Fall Prevention: safety round/check completed  Intervention: Prevent Skin Injury  Recent Flowsheet Documentation  Taken 3/8/2022 1500 by Silke Valdez RN  Skin Protection:   adhesive use limited   incontinence pads utilized   transparent dressing maintained   tubing/devices free from skin contact  Intervention: Prevent and Manage VTE (Venous Thromboembolism) Risk  Recent Flowsheet Documentation  Taken 3/8/2022 1500 by Silke Valdez RN  VTE Prevention/Management:   bilateral   dorsiflexion/plantar flexion performed   sequential compression devices on   bleeding risk factor(s) identified  Range of Motion: ROM (range of motion) performed  Intervention: Prevent Infection  Recent Flowsheet Documentation  Taken 3/8/2022 1800 by Silke Valdez RN  Infection Prevention: rest/sleep promoted  Taken 3/8/2022 1700 by Silke Valdez RN  Infection Prevention: rest/sleep promoted  Taken 3/8/2022 1600 by Silke Valdez RN  Infection Prevention: rest/sleep promoted  Taken 3/8/2022 1500 by Silke Valdez RN  Infection Prevention: rest/sleep promoted  Goal: Optimal Comfort and Wellbeing  Outcome: Ongoing, Not Progressing  Intervention: Provide Person-Centered Care  Recent Flowsheet Documentation  Taken  3/8/2022 1500 by Silke Valdez RN  Trust Relationship/Rapport:   care explained   choices provided   emotional support provided   empathic listening provided   questions answered   questions encouraged   reassurance provided   thoughts/feelings acknowledged  Goal: Readiness for Transition of Care  Outcome: Ongoing, Not Progressing     Problem: Pain Acute  Goal: Acceptable Pain Control and Functional Ability  Outcome: Ongoing, Not Progressing  Intervention: Prevent or Manage Pain  Recent Flowsheet Documentation  Taken 3/8/2022 1800 by Silke Valdez RN  Medication Review/Management: medications reviewed  Taken 3/8/2022 1700 by Silke Valdez RN  Medication Review/Management: medications reviewed  Taken 3/8/2022 1600 by Silke Valdez RN  Medication Review/Management: medications reviewed  Taken 3/8/2022 1500 by Silke Valdez RN  Sleep/Rest Enhancement:   noise level reduced   therapeutic touch utilized   room darkened   relaxation techniques promoted  Medication Review/Management: medications reviewed  Intervention: Optimize Psychosocial Wellbeing  Recent Flowsheet Documentation  Taken 3/8/2022 1500 by Silke Valdez RN  Diversional Activities: television     Problem: Fall Injury Risk  Goal: Absence of Fall and Fall-Related Injury  Outcome: Ongoing, Not Progressing  Intervention: Identify and Manage Contributors  Recent Flowsheet Documentation  Taken 3/8/2022 1800 by Silke Valdez RN  Medication Review/Management: medications reviewed  Taken 3/8/2022 1700 by Silke Valdez RN  Medication Review/Management: medications reviewed  Taken 3/8/2022 1600 by Silke Valdez RN  Medication Review/Management: medications reviewed  Taken 3/8/2022 1500 by Silke Valdez RN  Medication Review/Management: medications reviewed  Intervention: Promote Injury-Free Environment  Recent Flowsheet Documentation  Taken 3/8/2022 1800 by Silke Valdez RN  Safety Promotion/Fall Prevention: safety round/check completed  Taken  3/8/2022 1700 by Silke Valdez RN  Safety Promotion/Fall Prevention: safety round/check completed  Taken 3/8/2022 1600 by Silke Valdez RN  Safety Promotion/Fall Prevention: safety round/check completed  Taken 3/8/2022 1500 by Silke Valdez RN  Safety Promotion/Fall Prevention: safety round/check completed     Problem: Skin Injury Risk Increased  Goal: Skin Health and Integrity  Outcome: Ongoing, Not Progressing  Intervention: Optimize Skin Protection  Recent Flowsheet Documentation  Taken 3/8/2022 1500 by Silke Valdez RN  Pressure Reduction Techniques:   weight shift assistance provided   sit time limited to 2 hours  Pressure Reduction Devices: specialty bed utilized  Skin Protection:   adhesive use limited   incontinence pads utilized   transparent dressing maintained   tubing/devices free from skin contact     Problem: Infection  Goal: Absence of Infection Signs and Symptoms  Outcome: Ongoing, Not Progressing

## 2022-03-08 NOTE — NURSING NOTE
Pt very confused and verbally abusive and refusing vitals, blood sugar check, and assessment. Wife at bedside trying to orient/calm patient down. Sent transporter away that was here to take pt to stat CT of head d/t pt refusing and yelling at staff. Will call transport back when pt more calm and cooperative.Call placed to Dr. Swift about behavioral issues and positive central line blood culture. Awaiting call back. Will CTM.

## 2022-03-08 NOTE — SIGNIFICANT NOTE
Dr. Murrell at bedside assessing pt. RN notified MD BP 86/70. Pt lethargic. Oriented to self. Positive blood cultures. Will give 500 ml bolus per order.
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
## 2022-03-08 NOTE — PLAN OF CARE
Goal Outcome Evaluation:  Plan of Care Reviewed With: patient        Progress: declining   Pt started out shift verbally abusive to staff and refusing all care-assessments/vitals/blood sugar check. Pt's wife arrived and pt allowed care. Pain pill given with relief. Gram neg bacilli growth in central line blood cx called to marla. Order for rocephin placed and given to pt. Stat CT of head was negative. Pt turned q2h. Refused SCDs. Pain pill given later in shift as well. Pt still very confused but will answer questions. Will CTM.
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
## 2022-03-08 NOTE — CONSULTS
IDENTIFYING INFORMATION: The patient is a 61-year-old white male admitted after missing several dialysis treatments.  He was seen by psychiatry related to some behavioral issues    CHIEF COMPLAINT: None given    INFORMANT: Patient wife and chart    RELIABILITY: Good    HISTORY OF PRESENT ILLNESS: The patient is a 61-year-old white male with a complex medical history including end-stage renal disease, hemodialysis dependent, history of CVA, coronary artery disease, hypertension, chronic anemia.  He was admitted after he had missed several hemodialysis treatments secondary to illness.  During his stay in the hospital the patient has been difficult with staff as has his wife.  The patient is followed by Dr. Angelo Roque at [facility] but is only prescribed sector medications alprazolam 1 mg 4 times daily as needed for anxiety.  When seen today the patient is pleasant cooperative and denies any suicidal or homicidal ideation.  He denies any psychotic symptoms.  He reports that he is sleeping well and denies any loss of appetite.    PAST PSYCHIATRIC HISTORY: As above    PAST MEDICAL HISTORY: Significant for anemia, arthritis, carpal tunnel syndrome, congestive heart failure, chronic back pain, coronary artery disease, diabetes mellitus, dialysis dependent, hypercholesterolemia, hepatitis C, history of MRSA, end-stage renal disease, history of MI, history of CVA    MEDICATIONS:   Current Facility-Administered Medications   Medication Dose Route Frequency Provider Last Rate Last Admin   • acetaminophen (TYLENOL) tablet 650 mg  650 mg Oral Q6H PRN Karl Swift MD   650 mg at 03/06/22 0917   • ALPRAZolam (XANAX) tablet 1 mg  1 mg Oral 4x Daily PRN Karl Swift MD   1 mg at 03/06/22 2100   • aspirin chewable tablet 81 mg  81 mg Oral Daily Karl Swift MD   81 mg at 03/06/22 1610   • atorvastatin (LIPITOR) tablet 10 mg  10 mg Oral Nightly Karl Swift MD   10 mg at 03/06/22 2046   • cefTRIAXone  (ROCEPHIN) 2 g in sodium chloride 0.9 % 100 mL IVPB-VTB  2 g Intravenous Q24H Karl Swift  mL/hr at 03/07/22 2241 2 g at 03/07/22 2241   • epoetin real-epbx (RETACRIT) injection 10,000 Units  10,000 Units Intravenous Once per day on Mon Wed Fri Yohan Murrell MD   10,000 Units at 03/07/22 1252   • heparin (porcine) injection 3,800 Units  3,800 Units Intracatheter PRN Yohan Murrell MD   3,800 Units at 03/07/22 1251   • insulin glargine (LANTUS, SEMGLEE) injection 25 Units  25 Units Subcutaneous Nightly Karl Swift MD   25 Units at 03/07/22 2153   • insulin lispro (ADMELOG) injection 0-7 Units  0-7 Units Subcutaneous 4x Daily With Meals & Nightly Karl Swift MD   3 Units at 03/08/22 0919   • insulin lispro (ADMELOG) injection 8 Units  8 Units Subcutaneous TID With Meals Karl Swift MD   8 Units at 03/08/22 0919   • lactobacillus acidophilus (RISAQUAD) capsule 1 capsule  1 capsule Oral Daily Karl Swift MD   1 capsule at 03/06/22 0917   • lidocaine (XYLOCAINE) 1 % injection 5 mL  5 mL Injection Once Ad Weber MD       • nitroglycerin (NITROSTAT) SL tablet 0.4 mg  0.4 mg Sublingual Q5 Min PRN Karl Swift MD       • oxyCODONE-acetaminophen (PERCOCET)  MG per tablet 1 tablet  1 tablet Oral Q6H PRN Karl Swift MD   1 tablet at 03/08/22 1038   • pantoprazole (PROTONIX) EC tablet 40 mg  40 mg Oral BID AC Karl Swift MD       • promethazine (PHENERGAN) tablet 12.5 mg  12.5 mg Oral Q4H PRN Karl Swift MD       • sevelamer (RENVELA) tablet 800 mg  800 mg Oral TID With Meals Karl Swift MD   800 mg at 03/06/22 1738   • sodium chloride 0.9 % flush 10 mL  10 mL Intravenous PRN Franklyn Cornejo MD       • tiZANidine (ZANAFLEX) tablet 4 mg  4 mg Oral Q8H PRN Karl Swift MD             ALLERGIES: The patient list diabetic allergies which can be obtained from the chart    FAMILY HISTORY: Noncontributory    SOCIAL HISTORY: The patient is disabled.  He denies abuse of any  psychoactive substances.    MENTAL STATUS EXAM: Patient is awake alert and oriented all spheres.  His mood is euthymic his affect congruent.  Speech is relevant and coherent.  No deficits memory or cognition noted.  Intelligence is judged to be in the average range based on fund of knowledge, the patient is cooperative with interview.  He denies current suicidal or homicidal ideation psychotic features.  Judgment insight intact.    ASSETS/LIABILITIES: Supportive family/multiple health issues    DIAGNOSTIC IMPRESSION: Adjustment disorder with depressed mood, anxiety disorder unspecified, medical problems described previously    PLAN: The patient and his wife are today gently but firmly confronted regarding the need for appropriate behavior while hospitalized.  I will not at this point make any changes in the Patient's current medication regimen but we will continue to follow with you.

## 2022-03-08 NOTE — PROGRESS NOTES
"Daily progress note    Chief complaint  Events noted  Doing same  No specific complaints  Family at bedside  Denies chest pain shortness of breath or palpitation    History of present illness  61-year white male with very complex past medical history including end-stage renal disease on hemodialysis CVA diabetes mellitus coronary artery disease hypertension chronic anemia presented to [facility] emergency room after missing hemodialysis with generalized weakness.  Patient is poor historian but denies any headache dizziness chest pain shortness of breath abdominal pain vomiting diarrhea.  Patient does have nausea and weak all over.  Patient has no cough fever night sweats weight loss.  Patient evaluated in ER found to have high potassium need hemodialysis admit for management.  Patient also found to have low platelets which is new.  Patient has no bleeding whatsoever.     REVIEW OF SYSTEMS  Unremarkable except generalized weakness     PHYSICAL EXAM  Blood pressure 92/59, pulse 71, temperature 97.6 °F (36.4 °C), temperature source Oral, resp. rate 16, height 172.7 cm (68\"), weight 74.4 kg (164 lb), SpO2 96 %.    GENERAL: 61-year-old chronically ill-appearing male in mild distress  HENT: NCAT, neck supple, trachea midline  EYES: no scleral icterus, PERRL, normal conjunctivae  CV: regular rhythm, regular rate, no murmur  RESPIRATORY: unlabored effort, mild Rales in bases bilaterally  ABDOMEN: soft, nontender, nondistended, bowel sounds present  MUSCULOSKELETAL: no gross deformity, no pedal edema, no calf tenderness  NEURO: Sleepy but easily arousable,  sensory and motor function of extremities intact, speech clear, mental status normal  SKIN: warm, dry, no rash  PSYCH: Flat affect     LAB RESULTS  Lab Results (last 24 hours)     Procedure Component Value Units Date/Time    CBC & Differential [[id number]]  (Abnormal) Collected: 03/08/22 1236    Specimen: Blood Updated: 03/08/22 1340    Narrative:      The " following orders were created for panel order CBC & Differential.  Procedure                               Abnormality         Status                     ---------                               -----------         ------                     CBC Auto Differential[[id number]]        Abnormal            Final result                 Please view results for these tests on the individual orders.    CBC Auto Differential [[id number]]  (Abnormal) Collected: 03/08/22 1236    Specimen: Blood Updated: 03/08/22 1340     WBC 7.94 10*3/mm3      RBC 2.48 10*6/mm3      Hemoglobin 7.5 g/dL      Hematocrit 23.3 %      MCV 94.0 fL      MCH 30.2 pg      MCHC 32.2 g/dL      RDW 13.8 %      RDW-SD 47.4 fl      MPV 14.6 fL      Platelets 29 10*3/mm3      Neutrophil % 79.7 %      Lymphocyte % 6.5 %      Monocyte % 10.3 %      Eosinophil % 2.1 %      Basophil % 0.3 %      Neutrophils, Absolute 6.32 10*3/mm3      Lymphocytes, Absolute 0.52 10*3/mm3      Monocytes, Absolute 0.82 10*3/mm3      Eosinophils, Absolute 0.17 10*3/mm3      Basophils, Absolute 0.02 10*3/mm3     POC Glucose Once [[id number]]  (Abnormal) Collected: 03/08/22 1153    Specimen: Blood Updated: 03/08/22 1200     Glucose 293 mg/dL      Comment: Meter: [id number] : [id number] Familia Shepherd ERICBISMARK       CBC & Differential [[id number]]  (Abnormal) Collected: 03/08/22 1037    Specimen: Blood Updated: 03/08/22 1131    Narrative:      The following orders were created for panel order CBC & Differential.  Procedure                               Abnormality         Status                     ---------                               -----------         ------                     CBC Auto Differential[[id number]]        Abnormal            Final result                 Please view results for these tests on the individual orders.    CBC Auto Differential [[id number]]  (Abnormal) Collected: 03/08/22 1037    Specimen: Blood Updated: 03/08/22 1131     WBC 5.60 10*3/mm3      RBC 1.42 10*6/mm3       Hemoglobin 4.2 g/dL      Hematocrit 13.1 %      MCV 92.3 fL      MCH 29.6 pg      MCHC 32.1 g/dL      RDW 14.1 %      RDW-SD 47.2 fl      MPV 12.6 fL      Platelets 17 10*3/mm3      Neutrophil % 80.7 %      Lymphocyte % 6.8 %      Monocyte % 10.9 %      Eosinophil % 0.5 %      Basophil % 0.2 %      Neutrophils, Absolute 4.52 10*3/mm3      Lymphocytes, Absolute 0.38 10*3/mm3      Monocytes, Absolute 0.61 10*3/mm3      Eosinophils, Absolute 0.03 10*3/mm3      Basophils, Absolute 0.01 10*3/mm3     POC Glucose Once [[id number]]  (Abnormal) Collected: 03/08/22 0925    Specimen: Blood Updated: 03/08/22 0926     Glucose 244 mg/dL      Comment: Meter: [id number] : [id number] Bethanie Velazquez CNA       Basic Metabolic Panel [[id number]]  (Abnormal) Collected: 03/08/22 0750    Specimen: Blood Updated: 03/08/22 0922     Glucose 239 mg/dL      BUN 59 mg/dL      Creatinine 6.77 mg/dL      Sodium 136 mmol/L      Potassium 4.4 mmol/L      Chloride 96 mmol/L      CO2 24.1 mmol/L      Calcium 8.1 mg/dL      BUN/Creatinine Ratio 8.7     Anion Gap 15.9 mmol/L      eGFR 8.7 mL/min/1.73      Comment: <15 Indicative of kidney failure       Narrative:      GFR Normal >60  Chronic Kidney Disease <60  Kidney Failure <15      Lactate Dehydrogenase [[id number]]  (Normal) Collected: 03/08/22 0750    Specimen: Blood Updated: 03/08/22 0915      U/L     CBC & Differential [[id number]]  (Abnormal) Collected: 03/08/22 0750    Specimen: Blood Updated: 03/08/22 0904    Narrative:      The following orders were created for panel order CBC & Differential.  Procedure                               Abnormality         Status                     ---------                               -----------         ------                     CBC Auto Differential[[id number]]        Abnormal            Final result                 Please view results for these tests on the individual orders.    CBC Auto Differential [[id number]]  (Abnormal) Collected: 03/08/22 0750     Specimen: Blood Updated: 03/08/22 0904     WBC 11.56 10*3/mm3      RBC 2.48 10*6/mm3      Hemoglobin 7.5 g/dL      Hematocrit 23.2 %      MCV 93.5 fL      MCH 30.2 pg      MCHC 32.3 g/dL      RDW 14.0 %      RDW-SD 47.7 fl      MPV 14.1 fL      Platelets 30 10*3/mm3      Neutrophil % 81.1 %      Lymphocyte % 4.9 %      Monocyte % 11.7 %      Eosinophil % 0.5 %      Basophil % 0.3 %      Neutrophils, Absolute 9.38 10*3/mm3      Lymphocytes, Absolute 0.57 10*3/mm3      Monocytes, Absolute 1.35 10*3/mm3      Eosinophils, Absolute 0.06 10*3/mm3      Basophils, Absolute 0.03 10*3/mm3     Immature Platelet Fraction [[id number]]  (Abnormal) Collected: 03/08/22 0750    Specimen: Blood Updated: 03/08/22 0904     IPF 22.40 %      Platelets 30 10*3/mm3     Ammonia [[id number]]  (Normal) Collected: 03/08/22 0750    Specimen: Blood Updated: 03/08/22 0856     Ammonia 17 umol/L     Blood Culture - Blood, Blood, Central Line [[id number]]  (Abnormal) Collected: 03/07/22 1131    Specimen: Blood, Central Line Updated: 03/08/22 0624     Blood Culture Culture in progress     Gram Stain Anaerobic Bottle Gram negative bacilli      Aerobic Bottle Gram negative bacilli    Blood Culture - Blood, Blood, Central Line [[id number]]  (Abnormal) Collected: 03/07/22 1131    Specimen: Blood, Central Line Updated: 03/08/22 0624     Blood Culture Culture in progress     Gram Stain Anaerobic Bottle Gram negative bacilli      Aerobic Bottle Gram negative bacilli    POC Glucose Once [[id number]]  (Abnormal) Collected: 03/08/22 0609    Specimen: Blood Updated: 03/08/22 0610     Glucose 236 mg/dL      Comment: Meter: [id number] : [id number] Serafin KIM       Blood Culture ID, PCR - Blood, Blood, Central Line [[id number]]  (Abnormal) Collected: 03/07/22 1131    Specimen: Blood, Central Line Updated: 03/07/22 2221     BCID, PCR Eschericia coli. Identification by BCID2 PCR.     BOTTLE TYPE Anaerobic Bottle    POC Glucose Once [[id number]]  (Abnormal)  Collected: 03/07/22 2146    Specimen: Blood Updated: 03/07/22 2147     Glucose 233 mg/dL      Comment: Meter: [id number] : [id number] Serafin Magaña NA       POC Glucose Once [[id number]]  (Abnormal) Collected: 03/07/22 1628    Specimen: Blood Updated: 03/07/22 1629     Glucose 191 mg/dL      Comment: Meter: [id number] : [id number] Mane Dennis NA       POC Glucose Once [[id number]]  (Abnormal) Collected: 03/07/22 1512    Specimen: Blood Updated: 03/07/22 1514     Glucose 151 mg/dL      Comment: Meter: [id number] : [id number] Wilson Indigo NA           Imaging Results (Last 24 Hours)     Procedure Component Value Units Date/Time    CT Head Without Contrast [[id number]] Collected: 03/08/22 0012     Updated: 03/08/22 0020    Narrative:      CT HEAD WITHOUT CONTRAST     HISTORY: Confusion thrombocytopenia     COMPARISON: 01/26/2022     TECHNIQUE: Axial CT imaging was obtained through the brain. No IV  contrast was administered.     FINDINGS:  No acute intracranial hemorrhage is seen. There is a large area of  encephalomalacia involving the right PCA territory. There is relatively  advanced atrophy for the age of 61. There is periventricular and deep  white matter microangiopathic change. There is no midline shift or mass  effect. The paranasal sinuses and mastoid air cells appear clear.       Impression:      No acute intracranial findings.     Radiation dose reduction techniques were utilized, including automated  exposure control and exposure modulation based on body size.     This report was finalized on 3/8/2022 12:17 AM by Dr. Alannah Hamm M.D.                ECG 12 Lead  Component   Ref Range & Units 3/5/22 0945 3/4/22 1825 1/26/22 1729 12/2/21 0309 4/28/21 1722 4/8/21 1341   QT Interval   ms 271 P  392  321  395  435  444              HEART RATE= 187  bpm  RR Interval= 320  ms  SC Interval= 132  ms  P Horizontal Axis= 90  deg  P Front Axis= 30  deg  QRSD Interval= 95  ms  QT Interval= 271  ms  QRS  Axis= 91  deg  T Wave Axis= -72  deg  - ABNORMAL ECG -  Supraventricular tachycardia  Borderline right axis deviation  Repolarization abnormality, prob rate related             Current Facility-Administered Medications:   •  acetaminophen (TYLENOL) tablet 650 mg, 650 mg, Oral, Q6H PRN, Karl Swift MD, 650 mg at 03/06/22 0917  •  ALPRAZolam (XANAX) tablet 1 mg, 1 mg, Oral, 4x Daily PRN, Karl Swift MD, 1 mg at 03/06/22 2100  •  aspirin chewable tablet 81 mg, 81 mg, Oral, Daily, Karl Swift MD, 81 mg at 03/06/22 1610  •  atorvastatin (LIPITOR) tablet 10 mg, 10 mg, Oral, Nightly, Karl Swift MD, 10 mg at 03/06/22 2046  •  cefTRIAXone (ROCEPHIN) 2 g in sodium chloride 0.9 % 100 mL IVPB-VTB, 2 g, Intravenous, Q24H, Karl Swift MD, Last Rate: 200 mL/hr at 03/07/22 2241, 2 g at 03/07/22 2241  •  epoetin real-epbx (RETACRIT) injection 10,000 Units, 10,000 Units, Intravenous, Once per day on Mon Wed Fri, Yohan Murrell MD, 10,000 Units at 03/07/22 1252  •  heparin (porcine) injection 3,800 Units, 3,800 Units, Intracatheter, PRN, Yohan Murrell MD, 3,800 Units at 03/07/22 1251  •  insulin glargine (LANTUS, SEMGLEE) injection 25 Units, 25 Units, Subcutaneous, Nightly, Karl Swift MD, 25 Units at 03/07/22 2153  •  insulin lispro (ADMELOG) injection 0-7 Units, 0-7 Units, Subcutaneous, 4x Daily With Meals & Nightly, Karl Swift MD, 4 Units at 03/08/22 1301  •  insulin lispro (ADMELOG) injection 8 Units, 8 Units, Subcutaneous, TID With Meals, Karl Swift MD, 8 Units at 03/08/22 1301  •  lactobacillus acidophilus (RISAQUAD) capsule 1 capsule, 1 capsule, Oral, Daily, Karl Swift MD, 1 capsule at 03/06/22 0917  •  lidocaine (XYLOCAINE) 1 % injection 5 mL, 5 mL, Injection, Once, Ad Weber MD  •  midodrine (PROAMATINE) tablet 10 mg, 10 mg, Oral, TID AC, Olayinka Duarte MD  •  nitroglycerin (NITROSTAT) SL tablet 0.4 mg, 0.4 mg, Sublingual, Q5 Min PRN, Karl Swift MD  •   oxyCODONE-acetaminophen (PERCOCET)  MG per tablet 1 tablet, 1 tablet, Oral, Q6H PRN, Ariel Swift MD, 1 tablet at 03/08/22 1038  •  pantoprazole (PROTONIX) EC tablet 40 mg, 40 mg, Oral, BID AC, Ariel Swift MD  •  promethazine (PHENERGAN) tablet 12.5 mg, 12.5 mg, Oral, Q4H PRN, Ariel Swift MD  •  sevelamer (RENVELA) tablet 800 mg, 800 mg, Oral, TID With Meals, Ariel Swift MD, 800 mg at 03/08/22 1302  •  [COMPLETED] Insert peripheral IV, , , Once **AND** sodium chloride 0.9 % flush 10 mL, 10 mL, Intravenous, PRN, Franklyn Cornejo MD  •  tiZANidine (ZANAFLEX) tablet 4 mg, 4 mg, Oral, Q8H PRN, Ariel Swift MD     ASSESSMENT  E. coli bacteremia with sepsis  Suspected line sepsis  End-stage renal disease with noncompliance with hemodialysis  Supraventricular tachycardia  Insulin-dependent diabetes mellitus  Hypertension  Hyperlipidemia  History of CVA  Chronic anemia and thrombocytopenia  Anxiety disorder  Gastroesophageal reflux disease    PLAN  CPM  IV antibiotics  Hemodialysis today  Infectious disease consult   Vascular surgery and nephrology to follow patient  Insulin dose adjusted  Adjust home medications  Stress ulcer DVT prophylaxis  Supportive care  PT OT  Discussed with nursing staff and wife  Follow closely and further recommendation according to hospital course    ARIEL SWIFT MD

## 2022-03-08 NOTE — PROGRESS NOTES
Came back to check on patient to see if bedside catheter removal was feasible.  As the obvious most cause of his sepsis I do believe it is important.  However, there was some confusion, disagreement, and concern because of his hypotension and platelet count.  Platelet count was redrawn at 16 but may have been done when meds IV fluids were infusing.  Repeat pending.    Patient remains confused.  Wife is more comfortable with his situation now though.  Blood pressure has ranged from 75-90 over the last hour.  He did get a fluid bolus.    I think he needs to be transferred to the unit for higher level of care.  Hopefully worsening can be avoided.  If he improves and his platelet counts okay then would consider bedside catheter removal.  Otherwise, would be lower risk in the operating room tomorrow.    It sounds like Dr. Olayinka Duarte may be receiving critical care patients.  I have asked him to call me but not spoken to him yet.

## 2022-03-08 NOTE — PLAN OF CARE
Goal Outcome Evaluation:              Outcome Evaluation: Pt unable to answer orentation questions appropriately. Pt oriented to self.  Pt lethargic and hypotensive this morning. Nephrology ordered 500 ml bolus. Pressure is now 92/59. RN called Dr. Murrell and updated on BP trends after bolus. PLT decreased this morning and treatment team is aware. Plan for transfer to the unit.
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
## 2022-03-08 NOTE — PLAN OF CARE
Goal Outcome Evaluation:  Plan of Care Reviewed With: patient        Progress: improving  Outcome Evaluation: Pt seen by OT this date for evaluation. Pt is a 60 y/o male admit to [facility] after missing Hemodialysis for weakness. PMHx includes end-stage renal disease on hemodialysis CVA diabetes mellitus coronary artery disease hypertension chronic anemia. Pt spouse at bedside this date to provide PLOF this date. Pt's spouse reports that pt lives at home with her and is independent with all ADLs at baseline and enjoys fishing. Pt's spouse also reports that pt does not use any AD for functional mobility at baseline. Upon arrival pt lying supine in bed, spouse at bedside, pt only oriented to self and very lethargic with difficulty staying awake, no c/o pain. Pt Mod A 1-2 for bed mobility this date with verbal cues. Once sitting EOB pt requiring Max A for static/dynamic sitting balance and verbal cues as pt unable to support self with BUEs. Pt engaged in feeding task with Min A and verbal cues as pt noted with difficulty stabilizing BUEs to completed feeding task. Pt Max A for LB dressing task sitting EOB with verbal cues. Pt completd grooming task sitting EOB with S/U and verbal cues. Pt performed sit>stand transition with PT with Min-Mod A x2 and Mod static standing balance this date. Pt returned to supine, Max A x2 for scooting in bed, needs in reach, alarm on. Pt to continue with skilled OT services as pt demonstrates decrease strength, functional mobility, decrease cognition, decrease ADL performance, decrease coordination. OT rec rehab at D/C. OT wore mask, gloves, glasses, hand hygiene performed.

## 2022-03-09 ENCOUNTER — ANESTHESIA (OUTPATIENT)
Dept: PERIOP | Facility: HOSPITAL | Age: 62
End: 2022-03-09

## 2022-03-09 ENCOUNTER — ANESTHESIA EVENT (OUTPATIENT)
Dept: PERIOP | Facility: HOSPITAL | Age: 62
End: 2022-03-09

## 2022-03-09 ENCOUNTER — APPOINTMENT (OUTPATIENT)
Dept: GENERAL RADIOLOGY | Facility: HOSPITAL | Age: 62
End: 2022-03-09

## 2022-03-09 LAB
ANION GAP SERPL CALCULATED.3IONS-SCNC: 17 MMOL/L (ref 5–15)
AORTIC DIMENSIONLESS INDEX: 0.8 (DI)
BASOPHILS # BLD AUTO: 0.01 10*3/MM3 (ref 0–0.2)
BASOPHILS NFR BLD AUTO: 0.1 % (ref 0–1.5)
BH BB BLOOD EXPIRATION DATE: NORMAL
BH BB BLOOD TYPE BARCODE: 6200
BH BB DISPENSE STATUS: NORMAL
BH BB PRODUCT CODE: NORMAL
BH BB UNIT NUMBER: NORMAL
BH CV ECHO MEAS - ACS: 1.92 CM
BH CV ECHO MEAS - AO MAX PG: 9.9 MMHG
BH CV ECHO MEAS - AO MEAN PG: 4.9 MMHG
BH CV ECHO MEAS - AO V2 MAX: 157.5 CM/SEC
BH CV ECHO MEAS - AO V2 VTI: 25.5 CM
BH CV ECHO MEAS - AVA(I,D): 2.48 CM2
BH CV ECHO MEAS - CONTRAST EF 4CH: 56 CM2
BH CV ECHO MEAS - EDV(CUBED): 145 ML
BH CV ECHO MEAS - EDV(MOD-SP2): 151 ML
BH CV ECHO MEAS - EDV(MOD-SP4): 155 ML
BH CV ECHO MEAS - EF(MOD-BP): 55.6 %
BH CV ECHO MEAS - EF(MOD-SP2): 54.3 %
BH CV ECHO MEAS - EF(MOD-SP4): 56.8 %
BH CV ECHO MEAS - ESV(CUBED): 26.6 ML
BH CV ECHO MEAS - ESV(MOD-SP2): 69 ML
BH CV ECHO MEAS - ESV(MOD-SP4): 67 ML
BH CV ECHO MEAS - FS: 43.2 %
BH CV ECHO MEAS - IVS/LVPW: 0.96 CM
BH CV ECHO MEAS - IVSD: 0.99 CM
BH CV ECHO MEAS - LAT PEAK E' VEL: 11.4 CM/SEC
BH CV ECHO MEAS - LV DIASTOLIC VOL/BSA (35-75): 82.5 CM2
BH CV ECHO MEAS - LV MASS(C)D: 200.2 GRAMS
BH CV ECHO MEAS - LV MAX PG: 5 MMHG
BH CV ECHO MEAS - LV MEAN PG: 2.38 MMHG
BH CV ECHO MEAS - LV SYSTOLIC VOL/BSA (12-30): 35.7 CM2
BH CV ECHO MEAS - LV V1 MAX: 111.4 CM/SEC
BH CV ECHO MEAS - LV V1 VTI: 19.5 CM
BH CV ECHO MEAS - LVIDD: 5.3 CM
BH CV ECHO MEAS - LVIDS: 3 CM
BH CV ECHO MEAS - LVOT AREA: 3.2 CM2
BH CV ECHO MEAS - LVOT DIAM: 2.03 CM
BH CV ECHO MEAS - LVPWD: 1.03 CM
BH CV ECHO MEAS - MED PEAK E' VEL: 7.5 CM/SEC
BH CV ECHO MEAS - MV A DUR: 0.1 SEC
BH CV ECHO MEAS - MV A MAX VEL: 97.3 CM/SEC
BH CV ECHO MEAS - MV DEC SLOPE: 338.2 CM/SEC2
BH CV ECHO MEAS - MV DEC TIME: 260 MSEC
BH CV ECHO MEAS - MV E MAX VEL: 90.6 CM/SEC
BH CV ECHO MEAS - MV E/A: 0.93
BH CV ECHO MEAS - MV MAX PG: 4.7 MMHG
BH CV ECHO MEAS - MV MEAN PG: 1.83 MMHG
BH CV ECHO MEAS - MV V2 VTI: 28.4 CM
BH CV ECHO MEAS - MVA(VTI): 2.22 CM2
BH CV ECHO MEAS - PA ACC TIME: 0.09 SEC
BH CV ECHO MEAS - PA PR(ACCEL): 38.6 MMHG
BH CV ECHO MEAS - PA V2 MAX: 118.1 CM/SEC
BH CV ECHO MEAS - PULM A REVS DUR: 0.13 SEC
BH CV ECHO MEAS - PULM A REVS VEL: 30.9 CM/SEC
BH CV ECHO MEAS - PULM DIAS VEL: 49.5 CM/SEC
BH CV ECHO MEAS - PULM SYS VEL: 34.3 CM/SEC
BH CV ECHO MEAS - RAP SYSTOLE: 3 MMHG
BH CV ECHO MEAS - RV MAX PG: 1.72 MMHG
BH CV ECHO MEAS - RV V1 MAX: 65.5 CM/SEC
BH CV ECHO MEAS - RV V1 VTI: 12.2 CM
BH CV ECHO MEAS - RVSP: 22.8 MMHG
BH CV ECHO MEAS - SI(MOD-SP2): 43.6 ML/M2
BH CV ECHO MEAS - SI(MOD-SP4): 46.8 ML/M2
BH CV ECHO MEAS - SV(LVOT): 63.2 ML
BH CV ECHO MEAS - SV(MOD-SP2): 82 ML
BH CV ECHO MEAS - SV(MOD-SP4): 88 ML
BH CV ECHO MEAS - TAPSE (>1.6): 1.1 CM
BH CV ECHO MEAS - TR MAX PG: 19.8 MMHG
BH CV ECHO MEAS - TR MAX VEL: 222.6 CM/SEC
BH CV ECHO MEASUREMENTS AVERAGE E/E' RATIO: 9.59
BH CV XLRA - RV BASE: 3.3 CM
BH CV XLRA - RV LENGTH: 6.7 CM
BH CV XLRA - RV MID: 3.2 CM
BH CV XLRA - TDI S': 11.9 CM/SEC
BUN SERPL-MCNC: 73 MG/DL (ref 6–20)
BUN/CREAT SERPL: 9.4 (ref 7–25)
CALCIUM SPEC-SCNC: 8.4 MG/DL (ref 8.6–10.5)
CHLORIDE SERPL-SCNC: 95 MMOL/L (ref 98–107)
CO2 SERPL-SCNC: 24 MMOL/L (ref 22–29)
COPPER SERPL-MCNC: 145 UG/DL (ref 69–132)
CREAT SERPL-MCNC: 7.77 MG/DL (ref 0.76–1.27)
CROSSMATCH INTERPRETATION: NORMAL
DEPRECATED RDW RBC AUTO: 51 FL (ref 37–54)
EGFRCR SERPLBLD CKD-EPI 2021: 7.4 ML/MIN/1.73
EOSINOPHIL # BLD AUTO: 0.16 10*3/MM3 (ref 0–0.4)
EOSINOPHIL NFR BLD AUTO: 1.7 % (ref 0.3–6.2)
ERYTHROCYTE [DISTWIDTH] IN BLOOD BY AUTOMATED COUNT: 15 % (ref 12.3–15.4)
GLUCOSE BLDC GLUCOMTR-MCNC: 118 MG/DL (ref 70–130)
GLUCOSE BLDC GLUCOMTR-MCNC: 125 MG/DL (ref 70–130)
GLUCOSE BLDC GLUCOMTR-MCNC: 133 MG/DL (ref 70–130)
GLUCOSE BLDC GLUCOMTR-MCNC: 166 MG/DL (ref 70–130)
GLUCOSE BLDC GLUCOMTR-MCNC: 196 MG/DL (ref 70–130)
GLUCOSE SERPL-MCNC: 181 MG/DL (ref 65–99)
HCT VFR BLD AUTO: 29.8 % (ref 37.5–51)
HCV RNA SERPL NAA+PROBE-ACNC: NORMAL IU/ML
HGB BLD-MCNC: 9.7 G/DL (ref 13–17.7)
LEFT ATRIUM VOLUME INDEX: 35.2 ML/M2
LYMPHOCYTES # BLD AUTO: 0.52 10*3/MM3 (ref 0.7–3.1)
LYMPHOCYTES NFR BLD AUTO: 5.6 % (ref 19.6–45.3)
MAXIMAL PREDICTED HEART RATE: 161 BPM
MCH RBC QN AUTO: 29.8 PG (ref 26.6–33)
MCHC RBC AUTO-ENTMCNC: 32.6 G/DL (ref 31.5–35.7)
MCV RBC AUTO: 91.7 FL (ref 79–97)
MONOCYTES # BLD AUTO: 0.79 10*3/MM3 (ref 0.1–0.9)
MONOCYTES NFR BLD AUTO: 8.5 % (ref 5–12)
NEUTROPHILS NFR BLD AUTO: 7.68 10*3/MM3 (ref 1.7–7)
NEUTROPHILS NFR BLD AUTO: 82.5 % (ref 42.7–76)
PLATELET # BLD AUTO: 36 10*3/MM3 (ref 140–450)
PLATELET # BLD AUTO: 36 10*3/MM3 (ref 140–450)
PLATELETS.RETICULATED NFR BLD AUTO: 19.8 % (ref 0.9–6.5)
PMV BLD AUTO: 14.6 FL (ref 6–12)
POTASSIUM SERPL-SCNC: 4.2 MMOL/L (ref 3.5–5.2)
RBC # BLD AUTO: 3.25 10*6/MM3 (ref 4.14–5.8)
SODIUM SERPL-SCNC: 136 MMOL/L (ref 136–145)
STRESS TARGET HR: 137 BPM
TEST INFORMATION: NORMAL
UNIT  ABO: NORMAL
UNIT  RH: NORMAL
WBC NRBC COR # BLD: 9.31 10*3/MM3 (ref 3.4–10.8)
ZINC SERPL-MCNC: 63 UG/DL (ref 44–115)

## 2022-03-09 PROCEDURE — 25010000002 CEFTRIAXONE PER 250 MG: Performed by: SURGERY

## 2022-03-09 PROCEDURE — 82962 GLUCOSE BLOOD TEST: CPT

## 2022-03-09 PROCEDURE — 63710000001 INSULIN REGULAR HUMAN PER 5 UNITS: Performed by: INTERNAL MEDICINE

## 2022-03-09 PROCEDURE — 02HV33Z INSERTION OF INFUSION DEVICE INTO SUPERIOR VENA CAVA, PERCUTANEOUS APPROACH: ICD-10-PCS | Performed by: SURGERY

## 2022-03-09 PROCEDURE — 25010000002 EPOETIN ALFA-EPBX 10000 UNIT/ML SOLUTION: Performed by: SURGERY

## 2022-03-09 PROCEDURE — 85055 RETICULATED PLATELET ASSAY: CPT | Performed by: INTERNAL MEDICINE

## 2022-03-09 PROCEDURE — 25010000002 HEPARIN (PORCINE) PER 1000 UNITS: Performed by: SURGERY

## 2022-03-09 PROCEDURE — 0 LIDOCAINE 1 % SOLUTION 20 ML VIAL: Performed by: SURGERY

## 2022-03-09 PROCEDURE — C1752 CATH,HEMODIALYSIS,SHORT-TERM: HCPCS | Performed by: SURGERY

## 2022-03-09 PROCEDURE — 25010000002 PHENYLEPHRINE 10 MG/ML SOLUTION: Performed by: ANESTHESIOLOGY

## 2022-03-09 PROCEDURE — 77001 FLUOROGUIDE FOR VEIN DEVICE: CPT

## 2022-03-09 PROCEDURE — 85025 COMPLETE CBC W/AUTO DIFF WBC: CPT | Performed by: INTERNAL MEDICINE

## 2022-03-09 PROCEDURE — 25010000002 PHENYLEPHRINE 10 MG/ML SOLUTION: Performed by: NURSE ANESTHETIST, CERTIFIED REGISTERED

## 2022-03-09 PROCEDURE — 0WP633Z REMOVAL OF INFUSION DEVICE FROM NECK, PERCUTANEOUS APPROACH: ICD-10-PCS | Performed by: SURGERY

## 2022-03-09 PROCEDURE — 25010000002 CEFTRIAXONE PER 250 MG: Performed by: HOSPITALIST

## 2022-03-09 PROCEDURE — 25010000002 PROPOFOL 10 MG/ML EMULSION: Performed by: NURSE ANESTHETIST, CERTIFIED REGISTERED

## 2022-03-09 PROCEDURE — 99232 SBSQ HOSP IP/OBS MODERATE 35: CPT | Performed by: INTERNAL MEDICINE

## 2022-03-09 PROCEDURE — 80048 BASIC METABOLIC PNL TOTAL CA: CPT | Performed by: INTERNAL MEDICINE

## 2022-03-09 PROCEDURE — 25010000002 PHENYLEPHRINE 10 MG/ML SOLUTION

## 2022-03-09 RX ORDER — PROPOFOL 10 MG/ML
VIAL (ML) INTRAVENOUS CONTINUOUS PRN
Status: DISCONTINUED | OUTPATIENT
Start: 2022-03-09 | End: 2022-03-09 | Stop reason: SURG

## 2022-03-09 RX ORDER — PHENYLEPHRINE HYDROCHLORIDE 10 MG/ML
INJECTION INTRAVENOUS AS NEEDED
Status: DISCONTINUED | OUTPATIENT
Start: 2022-03-09 | End: 2022-03-09 | Stop reason: SURG

## 2022-03-09 RX ORDER — PHENYLEPHRINE HCL IN 0.9% NACL 0.5 MG/5ML
SYRINGE (ML) INTRAVENOUS
Status: COMPLETED
Start: 2022-03-09 | End: 2022-03-09

## 2022-03-09 RX ORDER — FLUMAZENIL 0.1 MG/ML
0.2 INJECTION INTRAVENOUS AS NEEDED
Status: DISCONTINUED | OUTPATIENT
Start: 2022-03-09 | End: 2022-03-09 | Stop reason: HOSPADM

## 2022-03-09 RX ORDER — LABETALOL HYDROCHLORIDE 5 MG/ML
5 INJECTION, SOLUTION INTRAVENOUS
Status: DISCONTINUED | OUTPATIENT
Start: 2022-03-09 | End: 2022-03-09 | Stop reason: HOSPADM

## 2022-03-09 RX ORDER — OXYCODONE AND ACETAMINOPHEN 7.5; 325 MG/1; MG/1
1 TABLET ORAL EVERY 4 HOURS PRN
Status: DISCONTINUED | OUTPATIENT
Start: 2022-03-09 | End: 2022-03-09 | Stop reason: HOSPADM

## 2022-03-09 RX ORDER — IBUPROFEN 600 MG/1
600 TABLET ORAL ONCE AS NEEDED
Status: DISCONTINUED | OUTPATIENT
Start: 2022-03-09 | End: 2022-03-09 | Stop reason: HOSPADM

## 2022-03-09 RX ORDER — HYDROMORPHONE HYDROCHLORIDE 1 MG/ML
0.5 INJECTION, SOLUTION INTRAMUSCULAR; INTRAVENOUS; SUBCUTANEOUS
Status: DISCONTINUED | OUTPATIENT
Start: 2022-03-09 | End: 2022-03-09 | Stop reason: HOSPADM

## 2022-03-09 RX ORDER — HYDRALAZINE HYDROCHLORIDE 20 MG/ML
5 INJECTION INTRAMUSCULAR; INTRAVENOUS
Status: DISCONTINUED | OUTPATIENT
Start: 2022-03-09 | End: 2022-03-09 | Stop reason: HOSPADM

## 2022-03-09 RX ORDER — FENTANYL CITRATE 50 UG/ML
25 INJECTION, SOLUTION INTRAMUSCULAR; INTRAVENOUS
Status: DISCONTINUED | OUTPATIENT
Start: 2022-03-09 | End: 2022-03-11

## 2022-03-09 RX ORDER — HEPARIN SODIUM 1000 [USP'U]/ML
INJECTION, SOLUTION INTRAVENOUS; SUBCUTANEOUS AS NEEDED
Status: DISCONTINUED | OUTPATIENT
Start: 2022-03-09 | End: 2022-03-09 | Stop reason: HOSPADM

## 2022-03-09 RX ORDER — PHENYLEPHRINE HCL IN 0.9% NACL 0.5 MG/5ML
.5-3 SYRINGE (ML) INTRAVENOUS
Status: DISCONTINUED | OUTPATIENT
Start: 2022-03-09 | End: 2022-03-11

## 2022-03-09 RX ORDER — DIPHENHYDRAMINE HYDROCHLORIDE 50 MG/ML
12.5 INJECTION INTRAMUSCULAR; INTRAVENOUS
Status: DISCONTINUED | OUTPATIENT
Start: 2022-03-09 | End: 2022-03-09 | Stop reason: HOSPADM

## 2022-03-09 RX ORDER — LIDOCAINE HYDROCHLORIDE 20 MG/ML
INJECTION, SOLUTION INFILTRATION; PERINEURAL AS NEEDED
Status: DISCONTINUED | OUTPATIENT
Start: 2022-03-09 | End: 2022-03-09 | Stop reason: SURG

## 2022-03-09 RX ORDER — FENTANYL CITRATE 50 UG/ML
50 INJECTION, SOLUTION INTRAMUSCULAR; INTRAVENOUS
Status: DISCONTINUED | OUTPATIENT
Start: 2022-03-09 | End: 2022-03-09 | Stop reason: HOSPADM

## 2022-03-09 RX ORDER — HYDROCODONE BITARTRATE AND ACETAMINOPHEN 7.5; 325 MG/1; MG/1
1 TABLET ORAL ONCE AS NEEDED
Status: DISCONTINUED | OUTPATIENT
Start: 2022-03-09 | End: 2022-03-09 | Stop reason: HOSPADM

## 2022-03-09 RX ORDER — EPHEDRINE SULFATE 50 MG/ML
5 INJECTION, SOLUTION INTRAVENOUS ONCE AS NEEDED
Status: DISCONTINUED | OUTPATIENT
Start: 2022-03-09 | End: 2022-03-09 | Stop reason: HOSPADM

## 2022-03-09 RX ORDER — PROMETHAZINE HYDROCHLORIDE 25 MG/1
25 SUPPOSITORY RECTAL ONCE AS NEEDED
Status: DISCONTINUED | OUTPATIENT
Start: 2022-03-09 | End: 2022-03-09 | Stop reason: HOSPADM

## 2022-03-09 RX ORDER — DIPHENHYDRAMINE HCL 25 MG
25 CAPSULE ORAL
Status: DISCONTINUED | OUTPATIENT
Start: 2022-03-09 | End: 2022-03-09 | Stop reason: HOSPADM

## 2022-03-09 RX ORDER — ONDANSETRON 2 MG/ML
4 INJECTION INTRAMUSCULAR; INTRAVENOUS ONCE AS NEEDED
Status: DISCONTINUED | OUTPATIENT
Start: 2022-03-09 | End: 2022-03-09 | Stop reason: HOSPADM

## 2022-03-09 RX ORDER — PROPOFOL 10 MG/ML
VIAL (ML) INTRAVENOUS AS NEEDED
Status: DISCONTINUED | OUTPATIENT
Start: 2022-03-09 | End: 2022-03-09 | Stop reason: SURG

## 2022-03-09 RX ORDER — SODIUM CHLORIDE 9 MG/ML
INJECTION, SOLUTION INTRAVENOUS CONTINUOUS PRN
Status: DISCONTINUED | OUTPATIENT
Start: 2022-03-09 | End: 2022-03-09 | Stop reason: SURG

## 2022-03-09 RX ORDER — PROMETHAZINE HYDROCHLORIDE 25 MG/1
25 TABLET ORAL ONCE AS NEEDED
Status: DISCONTINUED | OUTPATIENT
Start: 2022-03-09 | End: 2022-03-09 | Stop reason: HOSPADM

## 2022-03-09 RX ORDER — NALOXONE HCL 0.4 MG/ML
0.2 VIAL (ML) INJECTION AS NEEDED
Status: DISCONTINUED | OUTPATIENT
Start: 2022-03-09 | End: 2022-03-09 | Stop reason: HOSPADM

## 2022-03-09 RX ADMIN — PHENYLEPHRINE HYDROCHLORIDE 200 MCG: 10 INJECTION, SOLUTION INTRAVENOUS at 12:46

## 2022-03-09 RX ADMIN — Medication 50 MCG/KG/MIN: at 12:20

## 2022-03-09 RX ADMIN — EPOETIN ALFA-EPBX 10000 UNITS: 10000 INJECTION, SOLUTION INTRAVENOUS; SUBCUTANEOUS at 15:54

## 2022-03-09 RX ADMIN — Medication 10 ML: at 10:51

## 2022-03-09 RX ADMIN — HEPARIN SODIUM 3800 UNITS: 1000 INJECTION INTRAVENOUS; SUBCUTANEOUS at 21:40

## 2022-03-09 RX ADMIN — PROPOFOL 30 MG: 10 INJECTION, EMULSION INTRAVENOUS at 12:20

## 2022-03-09 RX ADMIN — INSULIN HUMAN 3 UNITS: 100 INJECTION, SOLUTION PARENTERAL at 00:45

## 2022-03-09 RX ADMIN — CEFTRIAXONE 2 G: 2 INJECTION, POWDER, FOR SOLUTION INTRAMUSCULAR; INTRAVENOUS at 11:45

## 2022-03-09 RX ADMIN — PHENYLEPHRINE HYDROCHLORIDE 0.5 MCG/KG/MIN: 10 INJECTION INTRAVENOUS at 13:57

## 2022-03-09 RX ADMIN — PHENYLEPHRINE HYDROCHLORIDE 200 MCG: 10 INJECTION, SOLUTION INTRAVENOUS at 13:07

## 2022-03-09 RX ADMIN — PHENYLEPHRINE HYDROCHLORIDE 100 MCG: 10 INJECTION, SOLUTION INTRAVENOUS at 12:34

## 2022-03-09 RX ADMIN — PHENYLEPHRINE HYDROCHLORIDE 200 MCG: 10 INJECTION, SOLUTION INTRAVENOUS at 12:54

## 2022-03-09 RX ADMIN — INSULIN HUMAN 3 UNITS: 100 INJECTION, SOLUTION PARENTERAL at 06:33

## 2022-03-09 RX ADMIN — DEXMEDETOMIDINE HYDROCHLORIDE 0.2 MCG/KG/HR: 100 INJECTION, SOLUTION, CONCENTRATE INTRAVENOUS at 10:28

## 2022-03-09 RX ADMIN — CEFTRIAXONE 2 G: 2 INJECTION, POWDER, FOR SOLUTION INTRAMUSCULAR; INTRAVENOUS at 23:42

## 2022-03-09 RX ADMIN — PHENYLEPHRINE HYDROCHLORIDE 100 MCG: 10 INJECTION, SOLUTION INTRAVENOUS at 12:30

## 2022-03-09 RX ADMIN — PHENYLEPHRINE HYDROCHLORIDE 200 MCG: 10 INJECTION, SOLUTION INTRAVENOUS at 12:40

## 2022-03-09 RX ADMIN — PHENYLEPHRINE HYDROCHLORIDE 2 MCG/KG/MIN: 10 INJECTION INTRAVENOUS at 20:00

## 2022-03-09 RX ADMIN — PHENYLEPHRINE HYDROCHLORIDE 0.8 MCG/KG/MIN: 10 INJECTION INTRAVENOUS at 14:19

## 2022-03-09 RX ADMIN — LIDOCAINE HYDROCHLORIDE 80 MG: 20 INJECTION, SOLUTION INFILTRATION; PERINEURAL at 12:20

## 2022-03-09 NOTE — PROGRESS NOTES
The patient seems a bit more irritable and confused when seen today.  He has had to be transferred to CCU.  There have been no further management issues, however.

## 2022-03-09 NOTE — PROGRESS NOTES
"Adult Nutrition  Assessment/PES    Patient Name:  Angelo Brown  YOB: 1960  MRN: [id number]  Admit Date:  3/4/2022    Assessment Date:  3/9/2022    Comments:  Nutrition screen completed per RN admission screen.   Pt with hx of ESRD on hemodialysis. He has been with N/V/fever and unable to go to dialysis. Pt with gram negative bacteremic sepsis. Pt npo at this time. Will continue to follow clinical course and remain available as needed.      Reason for Assessment     Row Name 03/09/22 0816          Reason for Assessment    Reason For Assessment identified at risk by screening criteria     Diagnosis --  ESRD, on dialysis, E. coli bacteremia and evolving sepsis, CVA, CAD, HTN, anemia, hepatitis C with cirrhosis,     Identified At Risk by Screening Criteria MST SCORE 2+                Nutrition/Diet History     Row Name 03/09/22 0853          Nutrition/Diet History    Typical Intake (Food/Fluid/EN/PN) NPO                Anthropometrics     Row Name 03/09/22 0600          Anthropometrics    Height 172.7 cm (67.99\")     Weight 77.6 kg (171 lb 1.2 oz)  not weighed by RD     Additional Documentation --  BMI 26,             Ideal Body Weight (IBW)    Retired Ideal Body Weight (IBW) (kg) 70.87     Retired % Ideal Body Weight 109.5            Retired Body Mass Index (BMI)    Retired BMI (kg/m2) 26.07                Labs/Tests/Procedures/Meds     Row Name 03/09/22 0854          Labs/Procedures/Meds    Lab Results Reviewed reviewed     Lab Results Comments platelets, H/H, glu 181, BUN 73, cr 7.77            Diagnostic Tests/Procedures    Diagnostic Test/Procedure Reviewed reviewed            Medications    Pertinent Medications Reviewed reviewed     Pertinent Medications Comments asa, lipitor, rocephin, lantus, humulin, lactobacillus, protonix,                  Estimated/Assessed Needs - Anthropometrics     Row Name 03/09/22 0600          Anthropometrics    Height 172.7 cm (67.99\")     Weight 77.6 kg " (171 lb 1.2 oz)  not weighed by RD     Additional Documentation --  BMI 26,                 Nutrition Prescription Ordered     Row Name 03/09/22 0905          Nutrition Prescription PO    Current PO Diet NPO                       Problem/Interventions:   Problem 1     Row Name 03/09/22 0906          Nutrition Diagnoses Problem 1    Problem 1 Predicted Suboptimal Intake     Etiology (related to) Medical Diagnosis     Renal ESRD;Hemodialysis                      Intervention Goal     Row Name 03/09/22 0907          Intervention Goal    General Maintain nutrition;Disease management/therapy;Reduce/improve symptoms     PO Initiate feeding;Tolerate PO     Weight Maintain weight                Nutrition Intervention     Row Name 03/09/22 0908          Nutrition Intervention    RD/Tech Action Follow Tx progress;Care plan reviewd;Await begin PO                  Education/Evaluation     Row Name 03/09/22 0908          Education    Education Will Instruct as appropriate            Monitor/Evaluation    Monitor Per protocol                 Electronically signed by:  Debbie Blank RD  03/09/22 09:30 EST

## 2022-03-09 NOTE — ANESTHESIA POSTPROCEDURE EVALUATION
Patient: Angelo Brown    Procedure Summary     Date: 03/09/22 Room / Location:  [facility] OR [facility]    Anesthesia Start: 1210 Anesthesia Stop: 1307    Procedure: right IJ TUNNELED DIALYSIS CATHETER REMOVAL, right femoral hemodialysis catheter placement (N/A ) Diagnosis:     Surgeons: Ad Weber MD Provider: Terry Moe MD    Anesthesia Type: MAC ASA Status: 4          Anesthesia Type: MAC    Vitals  Vitals Value Taken Time   /39 03/09/22 1431   Temp     Pulse 66 03/09/22 1435   Resp 20 03/09/22 1420   SpO2 99 % 03/09/22 1435   Vitals shown include unvalidated device data.        Post Anesthesia Care and Evaluation    Patient location during evaluation: PACU  Patient participation: complete - patient participated  Level of consciousness: awake and alert  Pain management: adequate  Airway patency: patent  Anesthetic complications: No anesthetic complications  PONV Status: none  Cardiovascular status: acceptable and hemodynamically stable  Respiratory status: acceptable, nonlabored ventilation and spontaneous ventilation  Hydration status: acceptable

## 2022-03-09 NOTE — PROGRESS NOTES
"Daily progress note    Chief complaint  Events noted  Doing same  Remains confused   No specific complaints  Family at bedside    History of present illness  61-year white male with very complex past medical history including end-stage renal disease on hemodialysis CVA diabetes mellitus coronary artery disease hypertension chronic anemia presented to [facility] emergency room after missing hemodialysis with generalized weakness.  Patient is poor historian but denies any headache dizziness chest pain shortness of breath abdominal pain vomiting diarrhea.  Patient does have nausea and weak all over.  Patient has no cough fever night sweats weight loss.  Patient evaluated in ER found to have high potassium need hemodialysis admit for management.  Patient also found to have low platelets which is new.  Patient has no bleeding whatsoever.     REVIEW OF SYSTEMS  Unable to obtain     PHYSICAL EXAM  Blood pressure 115/48, pulse 68, temperature 97.7 °F (36.5 °C), temperature source Oral, resp. rate 16, height 172.7 cm (67.99\"), weight 77.6 kg (171 lb 1.2 oz), SpO2 97 %.    GENERAL: 61-year-old chronically ill-appearing male in mild distress  HENT: NCAT, neck supple, trachea midline  EYES: no scleral icterus, PERRL, normal conjunctivae  CV: regular rhythm, regular rate, no murmur  RESPIRATORY: unlabored effort, mild Rales in bases bilaterally  ABDOMEN: soft, nontender, nondistended, bowel sounds present  MUSCULOSKELETAL: no gross deformity, no pedal edema, no calf tenderness  NEURO: Sleepy but easily arousable,  sensory and motor function of extremities intact, speech clear, mental status normal  SKIN: warm, dry, no rash  PSYCH:  Look depressed     LAB RESULTS  Lab Results (last 24 hours)     Procedure Component Value Units Date/Time    POC Glucose Once [[id number]]  (Normal) Collected: 03/09/22 1325    Specimen: Blood Updated: 03/09/22 1326     Glucose 125 mg/dL      Comment: Meter: [id number] : [id number] " Rodríguez Benavidez RN       Hepatitis C RNA, Quantitative, PCR (graph) [[id number]] Collected: 03/06/22 0616    Specimen: Blood Updated: 03/09/22 0917     Hepatitis C Quantitation HCV Not Detected IU/mL      Test Information Comment     Comment: The quantitative range of this assay is 15 IU/mL to 100 million IU/mL.       Narrative:      Performed at:  [address] [place] [address]  [id number]  : Rafaela Ashby MD, Phone:  [phone]    Blood Culture - Blood, Blood, Central Line [[id number]]  (Abnormal) Collected: 03/07/22 1131    Specimen: Blood, Central Line Updated: 03/09/22 0819     Blood Culture Escherichia coli     Isolated from Aerobic and Anaerobic Bottles     Gram Stain Anaerobic Bottle Gram negative bacilli      Aerobic Bottle Gram negative bacilli    Blood Culture - Blood, Blood, Central Line [[id number]]  (Abnormal) Collected: 03/07/22 1131    Specimen: Blood, Central Line Updated: 03/09/22 0818     Blood Culture Escherichia coli     Isolated from Aerobic and Anaerobic Bottles     Gram Stain Anaerobic Bottle Gram negative bacilli      Aerobic Bottle Gram negative bacilli    POC Glucose Once [[id number]]  (Abnormal) Collected: 03/09/22 0630    Specimen: Blood Updated: 03/09/22 0631     Glucose 166 mg/dL      Comment: Meter: [id number] : CARMELO Muro RN       Basic Metabolic Panel [[id number]]  (Abnormal) Collected: 03/09/22 0412    Specimen: Blood Updated: 03/09/22 0454     Glucose 181 mg/dL      BUN 73 mg/dL      Creatinine 7.77 mg/dL      Sodium 136 mmol/L      Potassium 4.2 mmol/L      Comment: Slight hemolysis detected by analyzer. Results may be affected.        Chloride 95 mmol/L      CO2 24.0 mmol/L      Calcium 8.4 mg/dL      BUN/Creatinine Ratio 9.4     Anion Gap 17.0 mmol/L      eGFR 7.4 mL/min/1.73      Comment: <15 Indicative of kidney failure       Narrative:      GFR Normal >60  Chronic Kidney Disease <60  Kidney Failure <15      Immature  Platelet Fraction [[id number]]  (Abnormal) Collected: 03/09/22 0412    Specimen: Blood Updated: 03/09/22 0452     IPF 19.80 %      Platelets 36 10*3/mm3     CBC & Differential [[id number]]  (Abnormal) Collected: 03/09/22 0412    Specimen: Blood Updated: 03/09/22 0451    Narrative:      The following orders were created for panel order CBC & Differential.  Procedure                               Abnormality         Status                     ---------                               -----------         ------                     CBC Auto Differential[[id number]]        Abnormal            Final result                 Please view results for these tests on the individual orders.    CBC Auto Differential [[id number]]  (Abnormal) Collected: 03/09/22 0412    Specimen: Blood Updated: 03/09/22 0451     WBC 9.31 10*3/mm3      RBC 3.25 10*6/mm3      Hemoglobin 9.7 g/dL      Hematocrit 29.8 %      MCV 91.7 fL      MCH 29.8 pg      MCHC 32.6 g/dL      RDW 15.0 %      RDW-SD 51.0 fl      MPV 14.6 fL      Platelets 36 10*3/mm3      Neutrophil % 82.5 %      Lymphocyte % 5.6 %      Monocyte % 8.5 %      Eosinophil % 1.7 %      Basophil % 0.1 %      Neutrophils, Absolute 7.68 10*3/mm3      Lymphocytes, Absolute 0.52 10*3/mm3      Monocytes, Absolute 0.79 10*3/mm3      Eosinophils, Absolute 0.16 10*3/mm3      Basophils, Absolute 0.01 10*3/mm3     Copper, Serum [[id number]]  (Abnormal) Collected: 03/06/22 0615    Specimen: Blood Updated: 03/09/22 0337     Copper 145 ug/dL      Comment:                                 Detection Limit = 5       Narrative:      Test(s) 001586-Copper, Serum or Plasma  was developed and its performance characteristics determined  by [facility]. It has not been cleared or approved by the Food  and Drug Administration.  Performed at:  [address]  [id number]  : Rafaela Ashby MD, Phone:  [phone]    POC Glucose Once [[id number]]  (Abnormal) Collected: 03/09/22  0001    Specimen: Blood Updated: 03/09/22 0006     Glucose 196 mg/dL      Comment: Meter: [id number] : CARMELO Muro RN       POC Glucose Once [[id number]]  (Abnormal) Collected: 03/08/22 2205    Specimen: Blood Updated: 03/08/22 2209     Glucose 213 mg/dL      Comment: Meter: [id number] : CARMELO Muro RN       COVID PRE-OP / PRE-PROCEDURE SCREENING ORDER (NO ISOLATION) - Swab, Nasopharynx [[id number]]  (Normal) Collected: 03/08/22 1816    Specimen: Swab from Nasopharynx Updated: 03/08/22 2027    Narrative:      The following orders were created for panel order COVID PRE-OP / PRE-PROCEDURE SCREENING ORDER (NO ISOLATION) - Swab, Nasopharynx.  Procedure                               Abnormality         Status                     ---------                               -----------         ------                     COVID-19,[facility] IZZY IN-HOUSE...[[id number]]  Normal              Final result                 Please view results for these tests on the individual orders.    COVID-19,[facility] IN-HOUSE CEPHEID/NIKHIL NP SWAB IN TRANSPORT MEDIA 8-12 HR TAT - Swab, Nasopharynx [[id number]]  (Normal) Collected: 03/08/22 1816    Specimen: Swab from Nasopharynx Updated: 03/08/22 2027     COVID19 Not Detected    Narrative:      Fact sheet for providers: https://www.fda.gov/media/397893/download    Fact sheet for patients: https://www.fda.gov/media/950080/download    Test performed by PCR.    POC Glucose Once [[id number]]  (Abnormal) Collected: 03/08/22 1755    Specimen: Blood Updated: 03/08/22 1946     Glucose 242 mg/dL      Comment: Meter: [id number] : [id number] José Miguel Holloway RN       POC Glucose Once [[id number]]  (Abnormal) Collected: 03/08/22 1540    Specimen: Blood Updated: 03/08/22 1619     Glucose 239 mg/dL      Comment: Meter: [id number] : [id number] Ervin Yanez RN           Imaging Results (Last 24 Hours)     Procedure Component Value Units Date/Time    Arteriogram (Autofinalize)  [[id number]] Resulted: 03/09/22 1258     Updated: 03/09/22 1258    Narrative:      This procedure was auto-finalized with no dictation required.             ECG 12 Lead  Component   Ref Range & Units 3/5/22 0945 3/4/22 1825 1/26/22 1729 12/2/21 0309 4/28/21 1722 4/8/21 1341   QT Interval   ms 271 P  392  321  395  435  444              HEART RATE= 187  bpm  RR Interval= 320  ms  NC Interval= 132  ms  P Horizontal Axis= 90  deg  P Front Axis= 30  deg  QRSD Interval= 95  ms  QT Interval= 271  ms  QRS Axis= 91  deg  T Wave Axis= -72  deg  - ABNORMAL ECG -  Supraventricular tachycardia  Borderline right axis deviation  Repolarization abnormality, prob rate related             Current Facility-Administered Medications:   •  [MAR Hold] acetaminophen (TYLENOL) tablet 650 mg, 650 mg, Oral, Q6H PRN, Karl Swift MD, 650 mg at 03/08/22 1904  •  [MAR Hold] ALPRAZolam (XANAX) tablet 1 mg, 1 mg, Oral, 4x Daily PRN, Karl Swift MD, 1 mg at 03/06/22 2100  •  [MAR Hold] aspirin chewable tablet 81 mg, 81 mg, Oral, Daily, Karl Swift MD, 81 mg at 03/06/22 1610  •  [MAR Hold] atorvastatin (LIPITOR) tablet 10 mg, 10 mg, Oral, Nightly, Karl Swift MD, 10 mg at 03/06/22 2046  •  cefTRIAXone (ROCEPHIN) 2 g in sodium chloride 0.9 % 100 mL IVPB-VTB, 2 g, Intravenous, Q24H, Karl Swift MD, Stopped at 03/09/22 1251  •  dexmedetomidine (PRECEDEX) 400 mcg in 100 mL NS infusion, 0.2-1.5 mcg/kg/hr, Intravenous, Titrated, Mai Escoto MD, Last Rate: 7.8 mL/hr at 03/09/22 1210, 0.4 mcg/kg/hr at 03/09/22 1210  •  [MAR Hold] dextrose (D50W) (25 g/50 mL) IV injection 25 g, 25 g, Intravenous, Q15 Min PRN, Mai Escoto MD  •  [MAR Hold] dextrose (GLUTOSE) oral gel 15 g, 15 g, Oral, Q15 Min PRN, Mai Escoto MD  •  [MAR Hold] epoetin real-epbx (RETACRIT) injection 10,000 Units, 10,000 Units, Intravenous, Once per day on Mon Wed Fri, Yohan Murrell MD, 10,000 Units at 03/07/22 1252  •  [MAR Hold] glucagon (human recombinant)  (GLUCAGEN DIAGNOSTIC) injection 1 mg, 1 mg, Intramuscular, Q15 Min PRN, Mai Escoto MD  •  [MAR Hold] heparin (porcine) injection 3,800 Units, 3,800 Units, Intracatheter, PRN, Yohan Murrell MD, 3,800 Units at 03/07/22 1251  •  [MAR Hold] insulin glargine (LANTUS, SEMGLEE) injection 30 Units, 30 Units, Subcutaneous, Nightly, Karl Swift MD, 30 Units at 03/08/22 2215  •  [MAR Hold] insulin regular (humuLIN R,novoLIN R) injection 0-14 Units, 0-14 Units, Subcutaneous, Q6H, Mai Escoto MD, 3 Units at 03/09/22 0633  •  [MAR Hold] lactobacillus acidophilus (RISAQUAD) capsule 1 capsule, 1 capsule, Oral, Daily, Karl Swift MD, 1 capsule at 03/06/22 0917  •  [MAR Hold] lidocaine (XYLOCAINE) 1 % injection 5 mL, 5 mL, Injection, Once, Ad Weber MD  •  [MAR Hold] midodrine (PROAMATINE) tablet 10 mg, 10 mg, Oral, TID AC, Olayinka Duarte MD  •  [MAR Hold] pantoprazole (PROTONIX) EC tablet 40 mg, 40 mg, Oral, BID AC, Karl Swift MD  •  phenylephrine (KARY-SYNEPHRINE) 50 mg in 250 mL NS infusion, 0.5-3 mcg/kg/min, Intravenous, Titrated, Terry Moe MD, Last Rate: 18.62 mL/hr at 03/09/22 1419, 0.8 mcg/kg/min at 03/09/22 1419  •  [MAR Hold] promethazine (PHENERGAN) tablet 12.5 mg, 12.5 mg, Oral, Q4H PRN, Karl Swift MD  •  [MAR Hold] sevelamer (RENVELA) tablet 800 mg, 800 mg, Oral, TID With Meals, Karl Swift MD, 800 mg at 03/08/22 1302  •  [COMPLETED] Insert peripheral IV, , , Once **AND** [MAR Hold] sodium chloride 0.9 % flush 10 mL, 10 mL, Intravenous, PRN, Franklyn Cornejo MD  •  [MAR Hold] sodium chloride 0.9 % flush 10 mL, 10 mL, Intravenous, Q12H, Mai Escoto MD, 10 mL at 03/09/22 1051  •  [MAR Hold] sodium chloride 0.9 % flush 10 mL, 10 mL, Intravenous, PRN, Mai Escoto MD  •  [MAR Hold] tiZANidine (ZANAFLEX) tablet 4 mg, 4 mg, Oral, Q8H PRN, Karl Swift MD     ASSESSMENT  E. coli bacteremia with sepsis  Line sepsis  End-stage renal disease with noncompliance with  hemodialysis  Supraventricular tachycardia resolved  Insulin-dependent diabetes mellitus  History of hypertension with low blood pressure  Hyperlipidemia  History of CVA  Chronic anemia and thrombocytopenia  Anxiety disorder  Gastroesophageal reflux disease    PLAN  CPM  ICU care  IV antibiotics per infectious disease  Hemodialysis per nephrology  Need to remove hemodialysis catheter and place new one  Vascular surgery to follow patient  Insulin dose adjusted  Adjust home medications  Stress ulcer DVT prophylaxis  Supportive care  PT OT  Discussed with nursing staff and wife  Follow closely and further recommendation according to hospital course    ARIEL MCGOVERN MD

## 2022-03-09 NOTE — PROGRESS NOTES
[facility] CBC GROUP INPATIENT PROGRESS NOTE    Length of Stay:  5 days    CHIEF COMPLAINT: Thrombocytopenia, ESRD, chronic hepatitis C with cirrhosis, anemia, B12 deficiency      SUBJECTIVE: Patient was transferred to CCU yesterday.  Hypotension has improved following IV fluid support and transfusion 1 unit PRBC overnight.  Patient remains confused but is more awake currently.  He is agitated at present.      ROS:  Review of Systems   Limited review of systems was obtained due to patient's mental status with pertinent positive findings as noted above.  All other systems negative.  Additional information obtained from patient's nurse.      OBJECTIVE:  Vitals:    03/09/22 0500 03/09/22 0600 03/09/22 0700 03/09/22 0728   BP: 128/68 106/61 113/66    BP Location: Right arm Right arm Right arm    Patient Position: Lying Lying Lying    Pulse: 89 84 90    Resp: 14 16 16    Temp:  100.1 °F (37.8 °C)  99.1 °F (37.3 °C)   TempSrc:  Oral  Oral   SpO2: 97% 96% 90%    Weight:       Height:             PHYSICAL EXAMINATION:  General: Patient awake, confused, agitated  Chest/Lungs: Clear to auscultation bilaterally anteriorly  Heart: Regular rate and rhythm no murmurs gallops or rubs  Abdomen/GI: Soft nontender nondistended bowel sounds present  Extremities: No edema    DIAGNOSTIC DATA:  Results Review:     I reviewed the patient's new clinical results.    Results from last 7 days   Lab Units 03/09/22  0412 03/08/22  1236 03/08/22  1037   WBC 10*3/mm3 9.31 7.94 5.60   HEMOGLOBIN g/dL 9.7* 7.5* 4.2*   HEMATOCRIT % 29.8* 23.3* 13.1*   PLATELETS 10*3/mm3 36*  36* 29* 17*      Results from last 7 days   Lab Units 03/09/22  0412 03/08/22  0750 03/07/22  0639 03/06/22  0616 03/05/22  0420 03/04/22  1800   SODIUM mmol/L 136 136 136 135*   < > 134*   POTASSIUM mmol/L 4.2 4.4 5.7* 5.2   < > 4.8   CHLORIDE mmol/L 95* 96* 96* 94*   < > 94*   CO2 mmol/L 24.0 24.1 18.8* 19.0*   < > 17.0*   BUN mg/dL 73* 59* 114* 105*   < > 107*    CREATININE mg/dL 7.77* 6.77* 13.15* 11.05*   < > 13.43*   CALCIUM mg/dL 8.4* 8.1* 8.6 8.0*   < > 8.4*   BILIRUBIN mg/dL  --   --   --  0.9  --  0.8   ALK PHOS U/L  --   --   --  246*  --  242*   ALT (SGPT) U/L  --   --   --  84*  --  92*   AST (SGOT) U/L  --   --   --  109*  --  82*   GLUCOSE mg/dL 181* 239* 280* 291*   < > 427*    < > = values in this interval not displayed.      Lab Results   Component Value Date    NEUTROABS 7.68 (H) 03/09/2022     Results from last 7 days   Lab Units 03/04/22  1800   INR  1.21*     Results from last 7 days   Lab Units 03/04/22  1800   MAGNESIUM mg/dL 2.1         Assessment/Plan   ASSESSMENT/PLAN:  This is a 61 y.o. male with:     *Thrombocytopenia  · Patient with history of chronic mild thrombocytopenia followed in the outpatient setting by Dr. Aviles  · Felt to be related to underlying cirrhosis with splenomegaly  · Platelet count has been in the low 100,000 range in the past  · Platelet count in January 2022 had been in the mid to high 100,000 range at which time patient had COVID-19 infection  · Patient was off of dialysis for approximately 1 week before current hospital admission  · On admission 3/4/2022, platelet count was 51,000 and declined into the 40,000 range.  · Additional labs on 3/6/2022 with elevated IPF at 9%  · Peripheral blood smear reviewed on 3/5/2022, was unremarkable.  · B12 on 3/7/2022 greater than 2000  · Suspect that thrombocytopenia is related to underlying cirrhosis/splenomegaly, exacerbated by volume overload off dialysis prior to hospital admission in addition to consumption from sepsis.  · Platelet count today slightly increased at 36,000.  No current bleeding issues.  Note plans for potential removal of dialysis catheter today by vascular surgery.  Could transfuse 1 unit platelets prior to procedure if needed.    *ESRD  · Patient with chronic noncompliance with outpatient dialysis per nephrology  · Patient had been on Monday Wednesday Friday  schedule  · Patient apparently had not been to dialysis for 1 week prior to hospital admission  · Patient is now back on dialysis, nephrology following.    · Note plans as above per vascular surgery to potentially remove dialysis catheter today.    *Sepsis with E. coli bacteremia  · Blood culture from 3/7/2022 + for E. Coli  · Patient initiated Rocephin IV 3/7/2022  · Patient developed hypotension, transferred to CCU 3/8/2022  · Hypotension improved overnight with IV fluid and PRBC administration    *Chronic hepatitis C with cirrhosis  · Received previous treatment, details unclear  · Previous CT abdomen and pelvis 3/12/2018 with cirrhotic appearing liver and borderline splenomegaly to 13.5 cm  · Hepatitis C RNA 3/6/2022 was negative    *Anemia  · Longstanding history of anemia followed by Dr. Aviles in the outpatient setting  · Anemia has been multifactorial related to CKD/ESRD, iron deficiency, B12 deficiency  · Patient had previously received Procrit related to anemia secondary to CKD.  Eventually ROMULO administration transition to nephrology once patient initiated hemodialysis around May 2021.  · Patient is receiving intermittent IV iron with dialysis per nephrology.  · Labs on 11/2/2021 with iron 172, ferritin 560, iron saturation 68%, TIBC 255.  B12 level 1017 (see below).  · Hemoglobin in January 2022 had been on the 8-9 range at time of COVID-19 infection  · Labs on 2/2/2022 with iron 89, ferritin 1514, iron saturation 35%, TIBC 256, folate 7.58  · During current admission, hemoglobin 3/4/2022 was 8.7 with subsequent declined into the high 7 range.  · Laboratory evaluation on 3/4/2022 with iron 131, ferritin 5988, iron saturation 79%, TIBC 165.  · No laboratory evidence to suggest hemolysis  · B12 on 3/7/2022 greater than 2000  · Additional labs pending from 3/6/2022 with copper and zinc  · Patient is continuing on Retacrit 10,000 units 3 times weekly with dialysis per nephrology  · Hemoglobin on 3/8/2022  declined to 7.5.  A repeat level was drawn later that morning at 4.2 however this was incorrect due to a dilute specimen and on recheck was stable at 7.5.  · Patient developed hypotension, transferred to CCU with sepsis.  Received 1 unit PRBC transfusion on 3/8/2022.  · Hemoglobin today up to 9.7 after 1 unit PRBC transfusion yesterday.    *B12 deficiency  · Patient was previously receiving oral B12 supplementation  · Labs on 11/2/2022 with B12 level 1017, patient remained off of oral B12 supplementation.  · B12 level on 3/7/2022 greater than 2000, no need for further B12 supplementation    *Right clear-cell renal cell carcinoma, stage I ([id number])  · Status post right partial nephrectomy on 7/6/2018 for a 2.2 cm clear-cell renal cell carcinoma, grade 3, margins could not be assessed, no lymphovascular invasion.  Notation of extensive fibrosis with hemosiderin deposition reflective of fibrosis and hemorrhage.    *Confusion/somnolence  · Secondary to E. coli bacteremia with evolving sepsis  · CT head 3/7/2022 negative for acute changes  · Ammonia level normal on 3/7/2022  · Patient with increasing confusion and agitation today    Plan:  1. Patient continuing on Rocephin for E. coli bacteremia/sepsis  2. Note plans by vascular surgery for possible dialysis catheter removal today.  Could transfuse 1 unit platelets if needed prior to procedure.  3. Patient continuing on Retacrit 10,000 units Monday Wednesday Friday with dialysis per nephrology  4. CBC, CMP daily             Rios Pa MD

## 2022-03-09 NOTE — PROGRESS NOTES
"      [facility]         Dr Oneill Sayied   LOS: 5 days   Patient Care Team:  Yadiel Baxter III, MD as PCP - General (Family Medicine)  Jamie Aviles MD as Consulting Physician (Hematology and Oncology)  Yadiel Baxter III, MD as Referring Physician (Family Medicine)  Evelina Varela MD as Consulting Physician (Cardiology)  Yadiel Baxter III, MD (Family Medicine)    Chief Complaint: Hypotension in the setting of sepsis E. coli bacteremia altered mental status ESRD multiple medical issues    Interval History: This morning remains somewhat confused and mumbling a few words.  He is currently off pressors.  Blood pressure improved post transfusion    REVIEW OF SYSTEMS:   Unreliable confused    Ventilator/Non-Invasive Ventilation Settings (From admission, onward)            None            Vital Signs  Temp:  [97.6 °F (36.4 °C)-100.1 °F (37.8 °C)] 99.1 °F (37.3 °C)  Heart Rate:  [66-91] 90  Resp:  [10-16] 16  BP: ()/(48-87) 117/63    Intake/Output Summary (Last 24 hours) at 3/9/2022 0856  Last data filed at 3/8/2022 2300  Gross per 24 hour   Intake 480 ml   Output 0 ml   Net 480 ml     Flowsheet Rows    Flowsheet Row First Filed Value   Admission Height 172.7 cm (68\") Documented at 03/07/2022 0906   Admission Weight 74.4 kg (164 lb) Documented at 03/07/2022 0906          Physical Exam:  Patient is examined using the personal protective equipment as per guidelines from infection control for this particular patient as enacted.  Hand hygiene was performed before and after patient interaction.   General Appearance:   Sleepy confused  ENT Mallampati between 3 and 4 no nasal congestion  Neck midline trachea, no thyromegaly   Lungs:    Diminished breath sounds    Heart:    Regular rhythm and normal rate, normal S1 and S2, no            murmur, no gallop, no rub, no click   Chest Wall:   Tunnel catheter on the right side of the chest   Abdomen:     Normal " bowel sounds, no masses, no organomegaly, soft        nontender, nondistended, no guarding, no rebound                tenderness   Extremities:   Moves all extremities well, no edema, no cyanosis, no             redness  CNS confused moving all extremities  Skin no rashes no nodules  Musculoskeletal no cyanosis no clubbing normal range of motion     Results Review:        Results from last 7 days   Lab Units 03/09/22  0412 03/08/22  0750 03/07/22  0639   SODIUM mmol/L 136 136 136   POTASSIUM mmol/L 4.2 4.4 5.7*   CHLORIDE mmol/L 95* 96* 96*   CO2 mmol/L 24.0 24.1 18.8*   BUN mg/dL 73* 59* 114*   CREATININE mg/dL 7.77* 6.77* 13.15*   GLUCOSE mg/dL 181* 239* 280*   CALCIUM mg/dL 8.4* 8.1* 8.6     Results from last 7 days   Lab Units 03/04/22  1800   TROPONIN T ng/mL 0.036*     Results from last 7 days   Lab Units 03/09/22  0412 03/08/22  1236 03/08/22  1037   WBC 10*3/mm3 9.31 7.94 5.60   HEMOGLOBIN g/dL 9.7* 7.5* 4.2*   HEMATOCRIT % 29.8* 23.3* 13.1*   PLATELETS 10*3/mm3 36*  36* 29* 17*     Results from last 7 days   Lab Units 03/04/22  1800   INR  1.21*     Results from last 7 days   Lab Units 03/06/22  0616   CHOLESTEROL mg/dL 69     Results from last 7 days   Lab Units 03/04/22  1800   MAGNESIUM mg/dL 2.1     Results from last 7 days   Lab Units 03/06/22  0616   CHOLESTEROL mg/dL 69   TRIGLYCERIDES mg/dL 247*   HDL CHOL mg/dL 7*   LDL CHOL mg/dL 24           I reviewed the patient's new clinical results.  I personally viewed and interpreted the patient's chest x-ray.        Medication Review:   aspirin, 81 mg, Oral, Daily  atorvastatin, 10 mg, Oral, Nightly  cefTRIAXone, 2 g, Intravenous, Q24H  epoetin real/real-epbx, 10,000 Units, Intravenous, Once per day on Mon Wed Fri  insulin glargine, 30 Units, Subcutaneous, Nightly  insulin regular, 0-14 Units, Subcutaneous, Q6H  lactobacillus acidophilus, 1 capsule, Oral, Daily  lidocaine, 5 mL, Injection, Once  midodrine, 10 mg, Oral, TID AC  pantoprazole, 40 mg, Oral,  BID AC  sevelamer, 800 mg, Oral, TID With Meals  sodium chloride, 10 mL, Intravenous, Q12H             ASSESSMENT:   Hypotension  Sepsis concerns for possible infected catheter  E. coli bacteremia  Altered mental status  ESRD on dialysis  SVT  Thrombocytopenia  Anemia  Diabetes mellitus  Noncompliance with outpatient dialysis    PLAN:  Hemodynamically more stable now post IV fluids.  Currently off pressors doing better  ID managing antibiotics  Volume status difficult to  per nephrology.  MAP has improved greater than 65 with IV fluids no pressors  Possibly needs Shiley since fistula not working.  Vascular currently following may need Shiley access for dialysis  Mental status waxing and waning likely multifactorial with sepsis low blood pressure ESRD etc.  No focal neurological deficit at this time.  Platelet counts stable and improving.  Hematology currently following  Status post blood transfusion yesterday with improvement in blood pressure.  Continue to monitor clinical course      Mai Escoto MD  03/09/22  08:56 EST

## 2022-03-09 NOTE — OP NOTE
Operative Note  Location: [facility]  Date of Admission:  3/4/2022  OR Date: 3/9/2022    Pre-op Diagnosis: Bacteremia    Post-op Diagnosis: Same    Procedure:   1) ultrasound-guided vascular access right common femoral vein  2) placement of 24 cm nontunneled hemodialysis catheter in the right common femoral vein  3) removal of tunneled central venous line in the right IJ (infected)    Surgeon: Ad Weber MD    Assistant: Rena Tilley CST    Anesthesia: Monitored Anesthesia Care    Staff:   Circulator: Judy Garcia RN  Scrub Person: Dejuan Mike  Assistant: Rena Tilley CST  Vascular Radiology Technician: Brittney Fine  Other: Zaid Hutton RN    Estimated Blood Loss: minimal    Specimen: * No orders in the log *    Complications: None    Findings: Femoral catheter flushed easily.  No obvious purulence at cuff site in the right chest    Implants: Nothing was implanted during the procedure    Indications:    The patient is an 61 y.o. male seen for evaluation for sepsis felt to be related to a catheter related infection that occurred previous to his admission to the hospital they were unable to use his fistula yesterday after being able to use in the outpatient setting without difficulty.  For this reason, we are also placing a temporary femoral central line.       Procedure:    The patient was taken the operating and placed supine on the operating table.  Induction of IV sedation the right neck and chest as well as the right groin were prepped and draped with ChloraPrep.  Timeout was observed.  The right common femoral vein was evaluated under ultrasound and then accessed under direct ultrasound guidance.  Photographic documentation was recorded in the chart for the record.  Micropuncture sheath was placed and then an angled J-wire was threaded into the inferior vena cava without difficulty.  Sequential dilatation led to placement of a 24 cm straight nontunneled Mahurkar dialysis  catheter.  It flushed and aspirated easily.  Fluoroscopy was used to evaluate the tip and it was in the straight portion of the IVC.  It was secured in place and a sterile dressing was applied per hospital policy.    I then turned my attention to the right neck and chest.  The skin exit site was anesthetized and then the cuff was freed up with a combination of blunt and sharp dissection.  It was poorly incorporated.  This was removed with pressure held in the neck.  5 minutes of pressure was undertaken.  I irrigated the tract with Betadine.  The patient is thrombocytopenic so I considered whether or not I should place a suture here to aid with hemostasis.  However, because of the infection, I was concerned that this could lead to residual infection and elected not to do so because good hemostasis has been achieved.  Dressing was applied here.  Patient tolerated procedure well.      Active Hospital Problems    Diagnosis  POA   • Noncompliance of patient with renal dialysis (HCC) [Z91.15]  Not Applicable   • Thrombocytopenia (HCC) [D69.6]  Unknown   • Liver cirrhosis (HCC) [K74.60]  Unknown   • Chronic hepatitis C without hepatic coma (HCC) [B18.2]  Unknown   • End-stage renal disease on hemodialysis (HCC) [N18.6, Z99.2]  Not Applicable   • B12 deficiency [E53.8]  Yes   • Anemia due to chronic renal failure treated with erythropoietin, stage 5 (HCC) [N18.5, D63.1]  Unknown      Resolved Hospital Problems   No resolved problems to display.      Ad Weber MD     Date: 3/9/2022  Time: 12:53 EST

## 2022-03-09 NOTE — PROGRESS NOTES
"  Infectious Diseases Progress Note    Sabas Quick MD     [facility]  Los: 5 days  Patient Identification:  Name: Angelo Brown  Age: 61 y.o.  Sex: male  :  1960  MRN: [id number]         Primary Care Physician: Yadiel Baxter III, MD            Subjective: Complaining of pain is worsened with pain medications.  Going for surgery and removal of his dialysis catheter later today.  May require placement of temporary Shiley catheter.  Interval History: See consultation note.    Objective:    Scheduled Meds:aspirin, 81 mg, Oral, Daily  atorvastatin, 10 mg, Oral, Nightly  cefTRIAXone, 2 g, Intravenous, Q24H  epoetin real/real-epbx, 10,000 Units, Intravenous, Once per day on   insulin glargine, 30 Units, Subcutaneous, Nightly  insulin regular, 0-14 Units, Subcutaneous, Q6H  lactobacillus acidophilus, 1 capsule, Oral, Daily  lidocaine, 5 mL, Injection, Once  midodrine, 10 mg, Oral, TID AC  pantoprazole, 40 mg, Oral, BID AC  sevelamer, 800 mg, Oral, TID With Meals  sodium chloride, 10 mL, Intravenous, Q12H      Continuous Infusions:     Vital signs in last 24 hours:  Temp:  [97.6 °F (36.4 °C)-100.1 °F (37.8 °C)] 99.1 °F (37.3 °C)  Heart Rate:  [66-91] 90  Resp:  [10-16] 16  BP: ()/(48-87) 117/63    Intake/Output:    Intake/Output Summary (Last 24 hours) at 3/9/2022 0837  Last data filed at 3/8/2022 2300  Gross per 24 hour   Intake 480 ml   Output 0 ml   Net 480 ml       Exam:  /63   Pulse 90   Temp 99.1 °F (37.3 °C) (Oral)   Resp 16   Ht 172.7 cm (68\")   Wt 77.6 kg (171 lb 1.2 oz)   SpO2 92%   BMI 26.01 kg/m²   Patient is examined using the personal protective equipment as per guidelines from infection control for this particular patient as enacted.  Hand washing was performed before and after patient interaction.  General Appearance:  Comfortable chronically ill-appearing male                          Head:    Normocephalic, without obvious abnormality, " atraumatic                           Eyes:    PERRL, conjunctivae/corneas clear, EOM's intact, both eyes                         Throat:   Lips, tongue, gums normal; oral mucosa pink and moist                           Neck:   Supple, symmetrical, trachea midline, no JVD                         Lungs:    Clear to auscultation bilaterally, respirations unlabored                 Chest Wall:  Right IJ tunnel catheter in place                          Heart:    Regular rate and rhythm, S1 and S2 normal, no murmur, no rub                                         or gallop                  Abdomen:   Soft nontender                 Extremities:   Extremities normal, atraumatic, no cyanosis or edema                        Pulses:   Pulses palpable in all extremities                            Skin:   Skin is warm and dry,  no rashes or palpable lesions                  Neurologic: Grossly nonfocal       Data Review:    I reviewed the patient's new clinical results.  Results from last 7 days   Lab Units 03/09/22  0412 03/08/22  1236 03/08/22  1037 03/08/22  0750 03/07/22  0639 03/06/22  0616 03/05/22  0420   WBC 10*3/mm3 9.31 7.94 5.60 11.56* 7.94 6.28 7.91   HEMOGLOBIN g/dL 9.7* 7.5* 4.2* 7.5* 7.9* 7.8* 8.4*   PLATELETS 10*3/mm3 36*  36* 29* 17* 30*  30* 38*  38* 41*  41* 43*     Results from last 7 days   Lab Units 03/09/22  0412 03/08/22  0750 03/07/22  0639 03/06/22  0616 03/05/22  0420 03/04/22  1800   SODIUM mmol/L 136 136 136 135* 137 134*   POTASSIUM mmol/L 4.2 4.4 5.7* 5.2 5.5* 4.8   CHLORIDE mmol/L 95* 96* 96* 94* 95* 94*   CO2 mmol/L 24.0 24.1 18.8* 19.0* 18.0* 17.0*   BUN mg/dL 73* 59* 114* 105* 113* 107*   CREATININE mg/dL 7.77* 6.77* 13.15* 11.05* 12.32* 13.43*   CALCIUM mg/dL 8.4* 8.1* 8.6 8.0* 7.9* 8.4*   GLUCOSE mg/dL 181* 239* 280* 291* 370* 427*     Microbiology Results (last 10 days)     Procedure Component Value - Date/Time    COVID PRE-OP / PRE-PROCEDURE SCREENING ORDER (NO ISOLATION) - Swab,  Nasopharynx [[id number]]  (Normal) Collected: 03/08/22 1816    Lab Status: Final result Specimen: Swab from Nasopharynx Updated: 03/08/22 2027    Narrative:      The following orders were created for panel order COVID PRE-OP / PRE-PROCEDURE SCREENING ORDER (NO ISOLATION) - Swab, Nasopharynx.  Procedure                               Abnormality         Status                     ---------                               -----------         ------                     COVID-19,[facility] IN-HOUSE...[[id number]]  Normal              Final result                 Please view results for these tests on the individual orders.    COVID-19,[facility] IN-HOUSE CEPHEID/NIKHIL NP SWAB IN TRANSPORT MEDIA 8-12 HR TAT - Swab, Nasopharynx [[id number]]  (Normal) Collected: 03/08/22 1816    Lab Status: Final result Specimen: Swab from Nasopharynx Updated: 03/08/22 2027     COVID19 Not Detected    Narrative:      Fact sheet for providers: https://www.fda.gov/media/398063/download    Fact sheet for patients: https://www.fda.gov/media/960291/download    Test performed by PCR.    Blood Culture - Blood, Blood, Central Line [[id number]]  (Abnormal) Collected: 03/07/22 1131    Lab Status: Preliminary result Specimen: Blood, Central Line Updated: 03/09/22 0819     Blood Culture Escherichia coli     Isolated from Aerobic and Anaerobic Bottles     Gram Stain Anaerobic Bottle Gram negative bacilli      Aerobic Bottle Gram negative bacilli    Blood Culture - Blood, Blood, Central Line [[id number]]  (Abnormal) Collected: 03/07/22 1131    Lab Status: Preliminary result Specimen: Blood, Central Line Updated: 03/09/22 0818     Blood Culture Escherichia coli     Isolated from Aerobic and Anaerobic Bottles     Gram Stain Anaerobic Bottle Gram negative bacilli      Aerobic Bottle Gram negative bacilli    Blood Culture ID, PCR - Blood, Blood, Central Line [[id number]]  (Abnormal) Collected: 03/07/22 1131    Lab Status: Final result Specimen: Blood, Central Line Updated:  03/07/22 2221     BCID, PCR Eschericia coli. Identification by BCID2 PCR.     BOTTLE TYPE Anaerobic Bottle    COVID PRE-OP / PRE-PROCEDURE SCREENING ORDER (NO ISOLATION) - Swab, Nasopharynx [[id number]]  (Normal) Collected: 03/04/22 1801    Lab Status: Final result Specimen: Swab from Nasopharynx Updated: 03/04/22 1851    Narrative:      The following orders were created for panel order COVID PRE-OP / PRE-PROCEDURE SCREENING ORDER (NO ISOLATION) - Swab, Nasopharynx.  Procedure                               Abnormality         Status                     ---------                               -----------         ------                     COVID-19,[facility] IN-HOUSE...[[id number]]  Normal              Final result                 Please view results for these tests on the individual orders.    COVID-19,[facility] IN-HOUSE CEPHEID/NIKHIL NP SWAB IN TRANSPORT MEDIA 8-12 HR TAT - Swab, Nasopharynx [[id number]]  (Normal) Collected: 03/04/22 1801    Lab Status: Final result Specimen: Swab from Nasopharynx Updated: 03/04/22 1851     COVID19 Not Detected    Narrative:      Fact sheet for providers: https://www.fda.gov/media/492912/download     Fact sheet for patients: https://www.fda.gov/media/494113/download            Assessment:    Anemia due to chronic renal failure treated with erythropoietin, stage 5 (HCC)    B12 deficiency    End-stage renal disease on hemodialysis (HCC)    Thrombocytopenia (HCC)    Liver cirrhosis (HCC)    Chronic hepatitis C without hepatic coma (HCC)    Noncompliance of patient with renal dialysis (HCC)  1-systemic gram-negative bacteremic sepsis either due to              -Infected tunneled catheter versus              -Evolving intra-abdominal process with subsequent bacteremia and secondary seeding.  2-end-stage renal disease  3-history of cirrhosis of the liver due to hepatitis C  4-thrombocytopenia  5-other diagnosis per primary team.     Recommendations/Discussions:  · Continue present treatment with IV  Rocephin and follow-up on the sensitivity of the E. coli.  · Repeat blood cultures after the catheter is replaced.  · Once considered stable consider CT scan of the abdomen and pelvis    Sabas Quick MD  3/9/2022  08:37 EST    Much of this encounter note is an electronic transcription/translation of spoken language to printed text. The electronic translation of spoken language may permit erroneous, or at times, nonsensical words or phrases to be inadvertently transcribed; Although I have reviewed the note for such errors, some may still exist

## 2022-03-09 NOTE — ANESTHESIA PREPROCEDURE EVALUATION
Anesthesia Evaluation     Patient summary reviewed and Nursing notes reviewed                Airway   Mallampati: II  TM distance: >3 FB  Neck ROM: full  No difficulty expected  Dental    (+) poor dentition    Pulmonary - normal exam   Cardiovascular     ECG reviewed  Rhythm: regular  Rate: normal    (+) hypertension, past MI  >12 months, CAD, CABG >6 Months, CHF , hyperlipidemia,       Neuro/Psych  (+) seizures, CVA residual symptoms, headaches, syncope, numbness, psychiatric history Anxiety,      ROS Comment: Left weakness  GI/Hepatic/Renal/Endo    (+)  GERD,  hepatitis, liver disease cirrhosis, renal disease ESRD and dialysis, diabetes mellitus type 2 using insulin,     Musculoskeletal     Abdominal  - normal exam   Substance History      OB/GYN          Other   arthritis, blood dyscrasia thrombocytopenia,   history of cancer      Other Comment: Hx kidney CA      Phys Exam Other: Left weakness              Anesthesia Plan    ASA 4     MAC     intravenous induction     Anesthetic plan, all risks, benefits, and alternatives have been provided, discussed and informed consent has been obtained with: patient.    Plan discussed with CRNA.        CODE STATUS:    Level Of Support Discussed With: Patient  Code Status (Patient has no pulse and is not breathing): CPR (Attempt to Resuscitate)  Medical Interventions (Patient has pulse or is breathing): Full Support

## 2022-03-09 NOTE — PROGRESS NOTES
Name: Angelo Brown ADMIT: 3/4/2022   : 1960  PCP: Yadiel Baxter III, MD    MRN: [id number] LOS: 5 days   AGE/SEX: 61 y.o. male  ROOM:  [place] [address]     CC: Sepsis  Interval History: Patient remains confused.  Low-grade fever.  Blood pressure somewhat improved.  Subjective   Subjective     Review of Systems  Objective   Objective     Vitals:   Temp:  [97.6 °F (36.4 °C)-100.1 °F (37.8 °C)] 99.1 °F (37.3 °C)  Heart Rate:  [66-91] 90  Resp:  [10-16] 16  BP: ()/(48-87) 117/63    No intake/output data recorded.    Scheduled Meds:     aspirin, 81 mg, Oral, Daily  atorvastatin, 10 mg, Oral, Nightly  cefTRIAXone, 2 g, Intravenous, Q24H  epoetin real/real-epbx, 10,000 Units, Intravenous, Once per day on   insulin glargine, 30 Units, Subcutaneous, Nightly  insulin regular, 0-14 Units, Subcutaneous, Q6H  lactobacillus acidophilus, 1 capsule, Oral, Daily  lidocaine, 5 mL, Injection, Once  midodrine, 10 mg, Oral, TID AC  pantoprazole, 40 mg, Oral, BID AC  sevelamer, 800 mg, Oral, TID With Meals  sodium chloride, 10 mL, Intravenous, Q12H      IV Meds:        Physical Exam  Vascular: Palpable thrill.  Unable to use fistula.    Data Reviewed:  CBC    Results from last 7 days   Lab Units 22  0412 22  1236 22  1037 22  0750 22  0639 22  0616 22  0420   WBC 10*3/mm3 9.31 7.94 5.60 11.56* 7.94 6.28 7.91   HEMOGLOBIN g/dL 9.7* 7.5* 4.2* 7.5* 7.9* 7.8* 8.4*   PLATELETS 10*3/mm3 36*  36* 29* 17* 30*  30* 38*  38* 41*  41* 43*     BMP   Results from last 7 days   Lab Units 22  0412 22  0750 22  0639 22  0616 22  0420 22  1800   SODIUM mmol/L 136 136 136 135* 137 134*   POTASSIUM mmol/L 4.2 4.4 5.7* 5.2 5.5* 4.8   CHLORIDE mmol/L 95* 96* 96* 94* 95* 94*   CO2 mmol/L 24.0 24.1 18.8* 19.0* 18.0* 17.0*   BUN mg/dL 73* 59* 114* 105* 113* 107*   CREATININE mg/dL 7.77* 6.77* 13.15* 11.05* 12.32* 13.43*   GLUCOSE mg/dL  181* 239* 280* 291* 370* 427*   MAGNESIUM mg/dL  --   --   --   --   --  2.1   PHOSPHORUS mg/dL  --   --   --   --   --  5.6*       Most notable findings include: Platelet count 36    Active Hospital Problems:   Active Hospital Problems    Diagnosis  POA   • Noncompliance of patient with renal dialysis (HCC) [Z91.15]  Not Applicable   • Thrombocytopenia (HCC) [D69.6]  Unknown   • Liver cirrhosis (HCC) [K74.60]  Unknown   • Chronic hepatitis C without hepatic coma (HCC) [B18.2]  Unknown   • End-stage renal disease on hemodialysis (HCC) [N18.6, Z99.2]  Not Applicable   • B12 deficiency [E53.8]  Yes   • Anemia due to chronic renal failure treated with erythropoietin, stage 5 (HCC) [N18.5, D63.1]  Unknown      Resolved Hospital Problems   No resolved problems to display.      Assessment/Plan   Billing: OR today  Assessment / Plan     Bacteremia/sepsis: Patient presented with a catheter related infection.  On antibiotics.  Unfortunately, unable to use his fistula for dialysis so we will need to place a temporary femoral line when removing the infected tunneled dialysis catheter today.  Will plan to replace this with another tunneled catheter next week and or investigate source of problems with arm but not appropriate at this time.    Thrombocytopenia: Patient has chronic thrombocytopenia.  However, worsened by sepsis.  Known liver and spleen issues.  With platelet count now greater than 30, I think it is reasonable to remove his catheter without a transfusion of platelets.  Discussed with Dr. Pa.    Ad Weber MD  03/09/22  09:33 EST  Office Number [phone]

## 2022-03-09 NOTE — PROGRESS NOTES
"   LOS: 5 days     Chief Complaint/ Reason for encounter: ESRD  Chief Complaint   Patient presents with   • Weakness - Generalized   • Needs dialysis         Subjective    3/9: Seen and examined in the ICU, looks very ill.  BP remains on the low side but no need for pressors.  Remains confused and somewhat paranoid.  States \"they are trying to kill me\"  Discussed with Dr. Weber regarding catheter removal and replacement in OR today  Dialysis is planned for this afternoon      Medical history reviewed:  History of Present Illness    Subjective    History taken from: Patient and chart    Vital Signs  Temp:  [98.6 °F (37 °C)-100.1 °F (37.8 °C)] 99.1 °F (37.3 °C)  Heart Rate:  [67-95] 85  Resp:  [10-16] 16  BP: ()/(49-87) 110/74       Wt Readings from Last 1 Encounters:   03/09/22 0600 77.6 kg (171 lb 1.2 oz)   03/08/22 1431 77.6 kg (171 lb 1.2 oz)   03/07/22 0906 74.4 kg (164 lb)       Objective:  Vital signs: (most recent): Blood pressure 110/74, pulse 85, temperature 99.1 °F (37.3 °C), temperature source Oral, resp. rate 16, height 172.7 cm (67.99\"), weight 77.6 kg (171 lb 1.2 oz), SpO2 93 %.              Objective:  General Appearance:  Comfortable, remains confused, paranoid, acute and chronically ill-appearing, in no acute distress and not in pain.  CVC in place right chest with no surrounding erythema at the exit site  HEENT: Mucous membranes moist, no injury, oropharynx clear  Lungs:  Normal effort and normal respiratory rate.  Breath sounds clear to auscultation.  No  respiratory distress.  No rales, decreased breath sounds or rhonchi.    Heart: Normal rate.  Regular rhythm.  S1 normal.  No murmur.   Abdomen: Abdomen is soft.  Bowel sounds are normal, no abdominal tenderness.  There is no rebound or guarding  Extremities: Normal range of motion.  Trace edema of bilateral lower extremities, distal pulses intact  Neurological: No focal motor or sensory deficits, pupils reactive  Skin:  Warm and dry.  No " rash or cyanosis.       Results Review:    Intake/Output:     Intake/Output Summary (Last 24 hours) at 3/9/2022 1314  Last data filed at 3/8/2022 2300  Gross per 24 hour   Intake 300 ml   Output 0 ml   Net 300 ml         DATA:  Radiology and Labs:  The following labs independently reviewed by me. Additional labs ordered for tomorrow a.m.  Interval notes, chart personally reviewed by me.   Old records independently reviewed showing ESRD on dialysis  New problems include E. coli bacteremia, suspected infected dialysis catheter  Discussed with dialysis RN, Dr. Gus Membreno    Risk/ complexity of medical care/ medical decision making moderate complexity      Labs:   Recent Results (from the past 24 hour(s))   POC Glucose Once    Collection Time: 03/08/22  3:40 PM    Specimen: Blood   Result Value Ref Range    Glucose 239 (H) 70 - 130 mg/dL   Type & Screen    Collection Time: 03/08/22  5:08 PM    Specimen: Blood   Result Value Ref Range    ABO Type A     RH type Positive     Antibody Screen Negative     T&S Expiration Date 3/11/2022 11:59:59 PM    POC Glucose Once    Collection Time: 03/08/22  5:55 PM    Specimen: Blood   Result Value Ref Range    Glucose 242 (H) 70 - 130 mg/dL   COVID-19,[facility] IN-HOUSE CEPHEID/NIKHIL NP SWAB IN TRANSPORT MEDIA 8-12 HR TAT - Swab, Nasopharynx    Collection Time: 03/08/22  6:16 PM    Specimen: Nasopharynx; Swab   Result Value Ref Range    COVID19 Not Detected Not Detected - Ref. Range   Prepare RBC, 1 Units    Collection Time: 03/08/22  8:17 PM   Result Value Ref Range    Product Code S9585N86     Unit Number [id number]     UNIT  ABO A     UNIT  RH POS     Crossmatch Interpretation Compatible     Dispense Status IS     Blood Expiration Date [id number]     Blood Type Barcode 6200    POC Glucose Once    Collection Time: 03/08/22 10:05 PM    Specimen: Blood   Result Value Ref Range    Glucose 213 (H) 70 - 130 mg/dL   POC Glucose Once    Collection Time: 03/09/22 12:01 AM    Specimen:  Blood   Result Value Ref Range    Glucose 196 (H) 70 - 130 mg/dL   Immature Platelet Fraction    Collection Time: 03/09/22  4:12 AM    Specimen: Blood   Result Value Ref Range    IPF 19.80 (H) 0.90 - 6.50 %    Platelets 36 (C) 140 - 450 10*3/mm3   CBC Auto Differential    Collection Time: 03/09/22  4:12 AM    Specimen: Blood   Result Value Ref Range    WBC 9.31 3.40 - 10.80 10*3/mm3    RBC 3.25 (L) 4.14 - 5.80 10*6/mm3    Hemoglobin 9.7 (L) 13.0 - 17.7 g/dL    Hematocrit 29.8 (L) 37.5 - 51.0 %    MCV 91.7 79.0 - 97.0 fL    MCH 29.8 26.6 - 33.0 pg    MCHC 32.6 31.5 - 35.7 g/dL    RDW 15.0 12.3 - 15.4 %    RDW-SD 51.0 37.0 - 54.0 fl    MPV 14.6 (H) 6.0 - 12.0 fL    Platelets 36 (C) 140 - 450 10*3/mm3    Neutrophil % 82.5 (H) 42.7 - 76.0 %    Lymphocyte % 5.6 (L) 19.6 - 45.3 %    Monocyte % 8.5 5.0 - 12.0 %    Eosinophil % 1.7 0.3 - 6.2 %    Basophil % 0.1 0.0 - 1.5 %    Neutrophils, Absolute 7.68 (H) 1.70 - 7.00 10*3/mm3    Lymphocytes, Absolute 0.52 (L) 0.70 - 3.10 10*3/mm3    Monocytes, Absolute 0.79 0.10 - 0.90 10*3/mm3    Eosinophils, Absolute 0.16 0.00 - 0.40 10*3/mm3    Basophils, Absolute 0.01 0.00 - 0.20 10*3/mm3   Basic Metabolic Panel    Collection Time: 03/09/22  4:12 AM    Specimen: Blood   Result Value Ref Range    Glucose 181 (H) 65 - 99 mg/dL    BUN 73 (H) 6 - 20 mg/dL    Creatinine 7.77 (H) 0.76 - 1.27 mg/dL    Sodium 136 136 - 145 mmol/L    Potassium 4.2 3.5 - 5.2 mmol/L    Chloride 95 (L) 98 - 107 mmol/L    CO2 24.0 22.0 - 29.0 mmol/L    Calcium 8.4 (L) 8.6 - 10.5 mg/dL    BUN/Creatinine Ratio 9.4 7.0 - 25.0    Anion Gap 17.0 (H) 5.0 - 15.0 mmol/L    eGFR 7.4 (L) >60.0 mL/min/1.73   POC Glucose Once    Collection Time: 03/09/22  6:30 AM    Specimen: Blood   Result Value Ref Range    Glucose 166 (H) 70 - 130 mg/dL       Radiology:  Imaging Results (Last 24 Hours)     Procedure Component Value Units Date/Time    Arteriogram (Autofinalize) [[id number]] Resulted: 03/09/22 1258     Updated: 03/09/22 1258     Narrative:      This procedure was auto-finalized with no dictation required.             Medications have been reviewed:  Current Facility-Administered Medications   Medication Dose Route Frequency Provider Last Rate Last Admin   • [MAR Hold] acetaminophen (TYLENOL) tablet 650 mg  650 mg Oral Q6H PRN Karl Swift MD   650 mg at 03/08/22 1904   • [MAR Hold] ALPRAZolam (XANAX) tablet 1 mg  1 mg Oral 4x Daily PRN Karl Swift MD   1 mg at 03/06/22 2100   • [MAR Hold] aspirin chewable tablet 81 mg  81 mg Oral Daily Karl Switf MD   81 mg at 03/06/22 1610   • [MAR Hold] atorvastatin (LIPITOR) tablet 10 mg  10 mg Oral Nightly Karl Swift MD   10 mg at 03/06/22 2046   • cefTRIAXone (ROCEPHIN) 2 g in sodium chloride 0.9 % 100 mL IVPB-VTB  2 g Intravenous Q24H Karl Swift MD   Stopped at 03/09/22 1251   • dexmedetomidine (PRECEDEX) 400 mcg in 100 mL NS infusion  0.2-1.5 mcg/kg/hr Intravenous Titrated Mai Escoto MD 7.8 mL/hr at 03/09/22 1210 0.4 mcg/kg/hr at 03/09/22 1210   • [MAR Hold] dextrose (D50W) (25 g/50 mL) IV injection 25 g  25 g Intravenous Q15 Min PRN Mai Escoto MD       • [MAR Hold] dextrose (GLUTOSE) oral gel 15 g  15 g Oral Q15 Min PRN Mai Escoto MD       • diphenhydrAMINE (BENADRYL) capsule 25 mg  25 mg Oral Q30 Min PRN Winston Pretty CRNA       • diphenhydrAMINE (BENADRYL) injection 12.5 mg  12.5 mg Intravenous Q15 Min PRN Winston Pretty CRNA       • ePHEDrine injection 5 mg  5 mg Intravenous Once PRN Winston Pretty CRNA       • [MAR Hold] epoetin real-epbx (RETACRIT) injection 10,000 Units  10,000 Units Intravenous Once per day on Mon Wed Fri Yohan Murrell MD   10,000 Units at 03/07/22 1252   • fentaNYL citrate (PF) (SUBLIMAZE) injection 50 mcg  50 mcg Intravenous Q5 Min PRN Winston Pretty, CRNA       • flumazenil (ROMAZICON) injection 0.2 mg  0.2 mg Intravenous PRN Winston Pretty, GIO       • [MAR Hold] glucagon (human recombinant) (GLUCAGEN  DIAGNOSTIC) injection 1 mg  1 mg Intramuscular Q15 Min PRN Mai Escoto MD       • [MAR Hold] heparin (porcine) injection 3,800 Units  3,800 Units Intracatheter PRN Yohan Murrell MD   3,800 Units at 03/07/22 1251   • hydrALAZINE (APRESOLINE) injection 5 mg  5 mg Intravenous Q10 Min PRN Winston Pretty CRNA       • HYDROcodone-acetaminophen (NORCO) 7.5-325 MG per tablet 1 tablet  1 tablet Oral Once PRN Winston Pretty CRNA       • HYDROmorphone (DILAUDID) injection 0.5 mg  0.5 mg Intravenous Q5 Min PRN Winston Pretty CRNA       • ibuprofen (ADVIL,MOTRIN) tablet 600 mg  600 mg Oral Once PRN Winston Pretty CRNA       • [MAR Hold] insulin glargine (LANTUS, SEMGLEE) injection 30 Units  30 Units Subcutaneous Nightly Karl Swift MD   30 Units at 03/08/22 2215   • [MAR Hold] insulin regular (humuLIN R,novoLIN R) injection 0-14 Units  0-14 Units Subcutaneous Q6H Mai Escoto MD   3 Units at 03/09/22 0633   • labetalol (NORMODYNE,TRANDATE) injection 5 mg  5 mg Intravenous Q5 Min PRN Winston Pretty CRNA       • [MAR Hold] lactobacillus acidophilus (RISAQUAD) capsule 1 capsule  1 capsule Oral Daily Karl Swift MD   1 capsule at 03/06/22 0917   • [MAR Hold] lidocaine (XYLOCAINE) 1 % injection 5 mL  5 mL Injection Once Ad Weber MD       • [MAR Hold] midodrine (PROAMATINE) tablet 10 mg  10 mg Oral TID AC Olayinka Duarte MD       • naloxone (NARCAN) injection 0.2 mg  0.2 mg Intravenous PRN Winston Pretty CRNA       • ondansetron (ZOFRAN) injection 4 mg  4 mg Intravenous Once PRN Winston Pretty CRNA       • oxyCODONE-acetaminophen (PERCOCET) 7.5-325 MG per tablet 1 tablet  1 tablet Oral Q4H PRN Winston Pretty CRNA       • [MAR Hold] pantoprazole (PROTONIX) EC tablet 40 mg  40 mg Oral BID AC Karl Swift MD       • promethazine (PHENERGAN) suppository 25 mg  25 mg Rectal Once PRN Winston Pretty CRNA        Or   • promethazine (PHENERGAN) tablet 25 mg  25 mg  Oral Once PRN Winston Pretty CRNA       • [MAR Hold] promethazine (PHENERGAN) tablet 12.5 mg  12.5 mg Oral Q4H PRN Karl Swift MD       • [MAR Hold] sevelamer (RENVELA) tablet 800 mg  800 mg Oral TID With Meals Karl Swift MD   800 mg at 03/08/22 1302   • [MAR Hold] sodium chloride 0.9 % flush 10 mL  10 mL Intravenous PRN Franklyn Cornejo MD       • [MAR Hold] sodium chloride 0.9 % flush 10 mL  10 mL Intravenous Q12H Mai Escoto MD   10 mL at 03/09/22 1051   • [MAR Hold] sodium chloride 0.9 % flush 10 mL  10 mL Intravenous PRN Mai Escoto MD       • [MAR Hold] tiZANidine (ZANAFLEX) tablet 4 mg  4 mg Oral Q8H PRN Karl Swift MD             ASSESSMENT:   Thrombocytopenia (HCC)     • Liver cirrhosis (HCC)     • Chronic hepatitis C without hepatic coma (HCC)     • End-stage renal disease on hemodialysis (HCC)     • B12 deficiency     • Anemia due to chronic renal failure treated with erythropoietin, stage 5 (HCC)          Assessment:   1. ESRD  2. Tachycardia  3. Thrombocytopenia, chronic issue but slightly worse than baseline  4.  Hyperkalemia  5.  Diabetes mellitus type 2  6.  Fevers  7.  Chronic noncompliance with outpatient dialysis.  Patient has been counseled multiple times about the importance of 100% attendance at outpatient dialysis.  his noncompliance is directly related to his recurrent hospital admissions      Plan:  Discussed with Dr. Weber.  Plan for CVC removal in the OR today with placement of a nontunneled femoral dialysis catheter  HD later today and Monday Wednesday Friday schedule.  BP soft but appears hemodynamically stable for dialysis  Hopeful that he will improve with catheter removal  Unfortunately AV fistula was not able to be cannulated.  Will discuss further with Dr. Weber regarding additional interventions.  His dialysis unit reports no problem with recent cannulation using 16-gauge needles    Epogen with dialysis for anemia  Guarded prognosis.  No family at bedside  available today    Yohan Murrell MD   [facility]   Office phone number: [phone]  Answering service phone number: [phone]    03/09/22  13:14 EST    Dictation performed using Dragon dictation [employer]

## 2022-03-09 NOTE — NURSING NOTE
Unable to run due to poor acces flow. Pt cannulated with 16 G needles per MD  Orders. Got blood  Returned with much resistance, but cont alarming during the try. VS stable 110/60 98.0  16 RR 85 HR. Recommend vascular acces evaluation.

## 2022-03-10 LAB
ALBUMIN SERPL-MCNC: 2.6 G/DL (ref 3.5–5.2)
ALBUMIN/GLOB SERPL: 0.8 G/DL
ALP SERPL-CCNC: 320 U/L (ref 39–117)
ALT SERPL W P-5'-P-CCNC: 43 U/L (ref 1–41)
ANION GAP SERPL CALCULATED.3IONS-SCNC: 13 MMOL/L (ref 5–15)
AST SERPL-CCNC: 37 U/L (ref 1–40)
BACTERIA SPEC AEROBE CULT: ABNORMAL
BACTERIA SPEC AEROBE CULT: ABNORMAL
BASOPHILS # BLD MANUAL: 0.1 10*3/MM3 (ref 0–0.2)
BASOPHILS NFR BLD MANUAL: 1 % (ref 0–1.5)
BILIRUB SERPL-MCNC: 0.8 MG/DL (ref 0–1.2)
BUN SERPL-MCNC: 32 MG/DL (ref 6–20)
BUN/CREAT SERPL: 6.6 (ref 7–25)
CALCIUM SPEC-SCNC: 8.2 MG/DL (ref 8.6–10.5)
CHLORIDE SERPL-SCNC: 97 MMOL/L (ref 98–107)
CO2 SERPL-SCNC: 26 MMOL/L (ref 22–29)
CREAT SERPL-MCNC: 4.83 MG/DL (ref 0.76–1.27)
DEPRECATED RDW RBC AUTO: 50.3 FL (ref 37–54)
EGFRCR SERPLBLD CKD-EPI 2021: 13.1 ML/MIN/1.73
EOSINOPHIL # BLD MANUAL: 0.3 10*3/MM3 (ref 0–0.4)
EOSINOPHIL NFR BLD MANUAL: 3 % (ref 0.3–6.2)
ERYTHROCYTE [DISTWIDTH] IN BLOOD BY AUTOMATED COUNT: 15 % (ref 12.3–15.4)
GLOBULIN UR ELPH-MCNC: 3.3 GM/DL
GLUCOSE BLDC GLUCOMTR-MCNC: 114 MG/DL (ref 70–130)
GLUCOSE BLDC GLUCOMTR-MCNC: 120 MG/DL (ref 70–130)
GLUCOSE BLDC GLUCOMTR-MCNC: 136 MG/DL (ref 70–130)
GLUCOSE BLDC GLUCOMTR-MCNC: 156 MG/DL (ref 70–130)
GLUCOSE BLDC GLUCOMTR-MCNC: 170 MG/DL (ref 70–130)
GLUCOSE BLDC GLUCOMTR-MCNC: 183 MG/DL (ref 70–130)
GLUCOSE BLDC GLUCOMTR-MCNC: 250 MG/DL (ref 70–130)
GLUCOSE BLDC GLUCOMTR-MCNC: 283 MG/DL (ref 70–130)
GLUCOSE SERPL-MCNC: 132 MG/DL (ref 65–99)
GRAM STN SPEC: ABNORMAL
HCT VFR BLD AUTO: 28.1 % (ref 37.5–51)
HGB BLD-MCNC: 9 G/DL (ref 13–17.7)
HYPOCHROMIA BLD QL: ABNORMAL
ISOLATED FROM: ABNORMAL
ISOLATED FROM: ABNORMAL
LYMPHOCYTES # BLD MANUAL: 0.1 10*3/MM3 (ref 0.7–3.1)
LYMPHOCYTES NFR BLD MANUAL: 5 % (ref 5–12)
MCH RBC QN AUTO: 29.6 PG (ref 26.6–33)
MCHC RBC AUTO-ENTMCNC: 32 G/DL (ref 31.5–35.7)
MCV RBC AUTO: 92.4 FL (ref 79–97)
METAMYELOCYTES NFR BLD MANUAL: 1 % (ref 0–0)
MONOCYTES # BLD: 0.5 10*3/MM3 (ref 0.1–0.9)
NEUTROPHILS # BLD AUTO: 8.97 10*3/MM3 (ref 1.7–7)
NEUTROPHILS NFR BLD MANUAL: 89 % (ref 42.7–76)
OVALOCYTES BLD QL SMEAR: ABNORMAL
PLAT MORPH BLD: NORMAL
PLATELET # BLD AUTO: 40 10*3/MM3 (ref 140–450)
PLATELET # BLD AUTO: 40 10*3/MM3 (ref 140–450)
PLATELETS.RETICULATED NFR BLD AUTO: 22 % (ref 0.9–6.5)
PMV BLD AUTO: ABNORMAL FL
POTASSIUM SERPL-SCNC: 3.6 MMOL/L (ref 3.5–5.2)
PROT SERPL-MCNC: 5.9 G/DL (ref 6–8.5)
RBC # BLD AUTO: 3.04 10*6/MM3 (ref 4.14–5.8)
SODIUM SERPL-SCNC: 136 MMOL/L (ref 136–145)
VARIANT LYMPHS NFR BLD MANUAL: 1 % (ref 19.6–45.3)
WBC MORPH BLD: NORMAL
WBC NRBC COR # BLD: 10.08 10*3/MM3 (ref 3.4–10.8)

## 2022-03-10 PROCEDURE — 63710000001 INSULIN REGULAR HUMAN PER 5 UNITS: Performed by: SURGERY

## 2022-03-10 PROCEDURE — 25010000002 PHENYLEPHRINE 10 MG/ML SOLUTION: Performed by: ANESTHESIOLOGY

## 2022-03-10 PROCEDURE — 85025 COMPLETE CBC W/AUTO DIFF WBC: CPT | Performed by: SURGERY

## 2022-03-10 PROCEDURE — 99232 SBSQ HOSP IP/OBS MODERATE 35: CPT | Performed by: INTERNAL MEDICINE

## 2022-03-10 PROCEDURE — 85055 RETICULATED PLATELET ASSAY: CPT | Performed by: SURGERY

## 2022-03-10 PROCEDURE — 97530 THERAPEUTIC ACTIVITIES: CPT

## 2022-03-10 PROCEDURE — 80053 COMPREHEN METABOLIC PANEL: CPT | Performed by: HOSPITALIST

## 2022-03-10 PROCEDURE — 25010000002 CEFTRIAXONE PER 250 MG: Performed by: HOSPITALIST

## 2022-03-10 PROCEDURE — 25010000002 FENTANYL CITRATE (PF) 50 MCG/ML SOLUTION: Performed by: INTERNAL MEDICINE

## 2022-03-10 PROCEDURE — 97110 THERAPEUTIC EXERCISES: CPT

## 2022-03-10 PROCEDURE — 82962 GLUCOSE BLOOD TEST: CPT

## 2022-03-10 PROCEDURE — 85007 BL SMEAR W/DIFF WBC COUNT: CPT | Performed by: SURGERY

## 2022-03-10 RX ORDER — OXYCODONE HYDROCHLORIDE AND ACETAMINOPHEN 5; 325 MG/1; MG/1
1 TABLET ORAL EVERY 4 HOURS PRN
Status: DISCONTINUED | OUTPATIENT
Start: 2022-03-10 | End: 2022-03-12

## 2022-03-10 RX ADMIN — OXYCODONE AND ACETAMINOPHEN 1 TABLET: 5; 325 TABLET ORAL at 18:25

## 2022-03-10 RX ADMIN — INSULIN HUMAN 3 UNITS: 100 INJECTION, SOLUTION PARENTERAL at 12:11

## 2022-03-10 RX ADMIN — INSULIN HUMAN 3 UNITS: 100 INJECTION, SOLUTION PARENTERAL at 18:42

## 2022-03-10 RX ADMIN — MIDODRINE HYDROCHLORIDE 10 MG: 5 TABLET ORAL at 12:11

## 2022-03-10 RX ADMIN — ASPIRIN 81 MG: 81 TABLET, CHEWABLE ORAL at 08:04

## 2022-03-10 RX ADMIN — PANTOPRAZOLE SODIUM 40 MG: 40 TABLET, DELAYED RELEASE ORAL at 18:25

## 2022-03-10 RX ADMIN — MIDODRINE HYDROCHLORIDE 10 MG: 5 TABLET ORAL at 08:04

## 2022-03-10 RX ADMIN — OXYCODONE AND ACETAMINOPHEN 1 TABLET: 5; 325 TABLET ORAL at 13:05

## 2022-03-10 RX ADMIN — Medication 10 ML: at 21:30

## 2022-03-10 RX ADMIN — CEFTRIAXONE 2 G: 2 INJECTION, POWDER, FOR SOLUTION INTRAMUSCULAR; INTRAVENOUS at 21:47

## 2022-03-10 RX ADMIN — Medication 1 CAPSULE: at 08:05

## 2022-03-10 RX ADMIN — Medication 10 ML: at 08:05

## 2022-03-10 RX ADMIN — PHENYLEPHRINE HYDROCHLORIDE 1.6 MCG/KG/MIN: 10 INJECTION INTRAVENOUS at 04:00

## 2022-03-10 RX ADMIN — ALPRAZOLAM 1 MG: 0.5 TABLET ORAL at 21:30

## 2022-03-10 RX ADMIN — FENTANYL CITRATE 25 MCG: 50 INJECTION INTRAMUSCULAR; INTRAVENOUS at 06:12

## 2022-03-10 RX ADMIN — PANTOPRAZOLE SODIUM 40 MG: 40 TABLET, DELAYED RELEASE ORAL at 08:05

## 2022-03-10 RX ADMIN — ALPRAZOLAM 1 MG: 0.5 TABLET ORAL at 13:05

## 2022-03-10 RX ADMIN — SEVELAMER CARBONATE 800 MG: 800 TABLET, FILM COATED ORAL at 18:25

## 2022-03-10 RX ADMIN — SEVELAMER CARBONATE 800 MG: 800 TABLET, FILM COATED ORAL at 12:11

## 2022-03-10 RX ADMIN — FENTANYL CITRATE 25 MCG: 50 INJECTION INTRAMUSCULAR; INTRAVENOUS at 04:00

## 2022-03-10 RX ADMIN — SEVELAMER CARBONATE 800 MG: 800 TABLET, FILM COATED ORAL at 08:05

## 2022-03-10 RX ADMIN — FENTANYL CITRATE 25 MCG: 50 INJECTION INTRAMUSCULAR; INTRAVENOUS at 00:00

## 2022-03-10 RX ADMIN — INSULIN GLARGINE-YFGN 30 UNITS: 100 INJECTION, SOLUTION SUBCUTANEOUS at 21:48

## 2022-03-10 RX ADMIN — MIDODRINE HYDROCHLORIDE 10 MG: 5 TABLET ORAL at 18:25

## 2022-03-10 NOTE — SIGNIFICANT NOTE
03/10/22 1606   OTHER   Discipline physical therapist   Rehab Time/Intention   Session Not Performed patient/family declined treatment  (Pt declined PT this PM secondary to fatigue and being tired. Pt reports he will work with PT tomorrow.)   Recommendation   PT - Next Appointment 03/11/22

## 2022-03-10 NOTE — PROGRESS NOTES
"[facility]         Dr Oneill Sayied   LOS: 6 days   Patient Care Team:  Yadiel Baxter III, MD as PCP - General (Family Medicine)  Jamie Aviles MD as Consulting Physician (Hematology and Oncology)  Yadiel Baxter III, MD as Referring Physician (Family Medicine)  Evelina Varela MD as Consulting Physician (Cardiology)  Yadiel Baxter III, MD (Family Medicine)    Chief Complaint: Hypotension in the setting of sepsis E. coli bacteremia altered mental status ESRD multiple medical issues    Interval History: He is comfortable and more calmer this morning.  Still on Akshat-Synephrine drip.  Status post removal of tunneled dialysis catheter with placement of new dialysis catheter right femoral.  Tolerated procedure well.  No overnight issues as such reported.  Currently on Akshat-Synephrine drip.    REVIEW OF SYSTEMS:   Still somewhat confused not fully reliable    Ventilator/Non-Invasive Ventilation Settings (From admission, onward)            None            Vital Signs  Temp:  [97.7 °F (36.5 °C)-100.3 °F (37.9 °C)] 98.8 °F (37.1 °C)  Heart Rate:  [64-95] 90  Resp:  [14-24] 14  BP: ()/(25-85) 136/75    Intake/Output Summary (Last 24 hours) at 3/10/2022 0852  Last data filed at 3/10/2022 0817  Gross per 24 hour   Intake --   Output 100 ml   Net -100 ml     Flowsheet Rows    Flowsheet Row First Filed Value   Admission Height 172.7 cm (68\") Documented at 03/07/2022 0906   Admission Weight 74.4 kg (164 lb) Documented at 03/07/2022 0906          Physical Exam:  Patient is examined using the personal protective equipment as per guidelines from infection control for this particular patient as enacted.  Hand hygiene was performed before and after patient interaction.   General Appearance:   Awake following commands.  No distress  ENT Mallampati between 3 and 4 no nasal congestion  Neck midline trachea, no thyromegaly   Lungs:    Diminished breath sounds    " Heart:    Regular rhythm and normal rate, normal S1 and S2, no            murmur, no gallop, no rub, no click   Chest Wall:   Tunnel catheter on the right side of the chest removed   Abdomen:     Normal bowel sounds, no masses, no organomegaly, soft        nontender, nondistended, no guarding, no rebound                tenderness   Extremities:   Moves all extremities well, no edema, no cyanosis, no             redness  CNS more lucid following simple commands moving all extremities  Skin no rashes no nodules  Musculoskeletal no cyanosis no clubbing normal range of motion     Results Review:        Results from last 7 days   Lab Units 03/10/22  0417 03/09/22  0412 03/08/22  0750   SODIUM mmol/L 136 136 136   POTASSIUM mmol/L 3.6 4.2 4.4   CHLORIDE mmol/L 97* 95* 96*   CO2 mmol/L 26.0 24.0 24.1   BUN mg/dL 32* 73* 59*   CREATININE mg/dL 4.83* 7.77* 6.77*   GLUCOSE mg/dL 132* 181* 239*   CALCIUM mg/dL 8.2* 8.4* 8.1*     Results from last 7 days   Lab Units 03/04/22  1800   TROPONIN T ng/mL 0.036*     Results from last 7 days   Lab Units 03/10/22  0417 03/09/22  0412 03/08/22  1236   WBC 10*3/mm3 10.08 9.31 7.94   HEMOGLOBIN g/dL 9.0* 9.7* 7.5*   HEMATOCRIT % 28.1* 29.8* 23.3*   PLATELETS 10*3/mm3 40*  40* 36*  36* 29*     Results from last 7 days   Lab Units 03/04/22  1800   INR  1.21*     Results from last 7 days   Lab Units 03/06/22  0616   CHOLESTEROL mg/dL 69     Results from last 7 days   Lab Units 03/04/22  1800   MAGNESIUM mg/dL 2.1     Results from last 7 days   Lab Units 03/06/22  0616   CHOLESTEROL mg/dL 69   TRIGLYCERIDES mg/dL 247*   HDL CHOL mg/dL 7*   LDL CHOL mg/dL 24           I reviewed the patient's new clinical results.  I personally viewed and interpreted the patient's chest x-ray.        Medication Review:   aspirin, 81 mg, Oral, Daily  cefTRIAXone, 2 g, Intravenous, Q24H  epoetin real/rela-epbx, 10,000 Units, Intravenous, Once per day on Mon Wed Fri  insulin glargine, 30 Units, Subcutaneous,  Nightly  insulin regular, 0-14 Units, Subcutaneous, Q6H  lactobacillus acidophilus, 1 capsule, Oral, Daily  lidocaine, 5 mL, Injection, Once  midodrine, 10 mg, Oral, TID AC  pantoprazole, 40 mg, Oral, BID AC  sevelamer, 800 mg, Oral, TID With Meals  sodium chloride, 10 mL, Intravenous, Q12H        dexmedetomidine, 0.2-1.5 mcg/kg/hr, Last Rate: Stopped (03/09/22 1521)  phenylephrine, 0.5-3 mcg/kg/min, Last Rate: 0.8 mcg/kg/min (03/10/22 0822)        ASSESSMENT:   Hypotension  Sepsis concerns for possible infected catheter status post removal  E. coli bacteremia  Altered mental status  ESRD on dialysis  SVT  Thrombocytopenia  Anemia  Diabetes mellitus  Noncompliance with outpatient dialysis    PLAN:  Postprocedure now more stable and improving.  Still on some Akshat-Synephrine will wean to keep MAP greater than 65.  ID managing antibiotics.  Currently on Rocephin.  Suspected infected right tunneled catheter removed.  Blood culture was positive for E. coli.  Status post hemodialysis yesterday.  Further IV fluids per nephrology  Tunnel catheter removed by vascular surgery yesterday.  Tolerated procedure well.  Mental status improved this morning.  Suspected due to sepsis with ESRD etc.  No focal neurological deficit at this time.  Platelet counts stable and improving.  Hematology currently following  Status post blood transfusion yesterday with improvement in blood pressure.  Hemoglobin stable posttransfusion  ICU core measures  Transfer out of the ICU once off Akshat-Synephrine.      Mai Escoto MD  03/10/22  08:52 EST

## 2022-03-10 NOTE — PROGRESS NOTES
"Daily progress note    Chief complaint  Awake and alert  Doing little better  Remains on vasopressors  No specific complaints  Family at bedside    History of present illness  61-year white male with very complex past medical history including end-stage renal disease on hemodialysis CVA diabetes mellitus coronary artery disease hypertension chronic anemia presented to [facility] emergency room after missing hemodialysis with generalized weakness.  Patient is poor historian but denies any headache dizziness chest pain shortness of breath abdominal pain vomiting diarrhea.  Patient does have nausea and weak all over.  Patient has no cough fever night sweats weight loss.  Patient evaluated in ER found to have high potassium need hemodialysis admit for management.  Patient also found to have low platelets which is new.  Patient has no bleeding whatsoever.     REVIEW OF SYSTEMS  Unable to obtain     PHYSICAL EXAM  Blood pressure 110/64, pulse 85, temperature 98.6 °F (37 °C), temperature source Oral, resp. rate 14, height 172.7 cm (67.99\"), weight 77.6 kg (171 lb 1.2 oz), SpO2 94 %.    GENERAL: 61-year-old chronically ill-appearing male in mild distress  HENT: NCAT, neck supple, trachea midline  EYES: no scleral icterus, PERRL, normal conjunctivae  CV: regular rhythm, regular rate, no murmur  RESPIRATORY: unlabored effort, mild Rales in bases bilaterally  ABDOMEN: soft, nontender, nondistended, bowel sounds present  MUSCULOSKELETAL: no gross deformity, no pedal edema, no calf tenderness  NEURO: Sleepy but easily arousable,  sensory and motor function of extremities intact, speech clear, mental status normal  SKIN: warm, dry, no rash  PSYCH:  Look depressed     LAB RESULTS  Lab Results (last 24 hours)     Procedure Component Value Units Date/Time    POC Glucose Once [[id number]]  (Abnormal) Collected: 03/10/22 1510    Specimen: Blood Updated: 03/10/22 1511     Glucose 170 mg/dL      Comment: Meter: [id number] " : [id number] Donnie KIM       POC Glucose Once [[id number]]  (Abnormal) Collected: 03/10/22 1111    Specimen: Blood Updated: 03/10/22 1114     Glucose 156 mg/dL      Comment: Meter: [id number] : [id number] Donnie KIM       POC Glucose Once [[id number]]  (Abnormal) Collected: 03/10/22 0736    Specimen: Blood Updated: 03/10/22 0759     Glucose 136 mg/dL      Comment: Meter: [id number] : [id number] Aldo KIM       Blood Culture - Blood, Blood, Central Line [[id number]]  (Abnormal) Collected: 03/07/22 1131    Specimen: Blood, Central Line Updated: 03/10/22 0749     Blood Culture Escherichia coli     Isolated from Aerobic and Anaerobic Bottles     Gram Stain Anaerobic Bottle Gram negative bacilli      Aerobic Bottle Gram negative bacilli    Narrative:      Refer to previous blood culture collected on 3/7 for corbin's      Blood Culture - Blood, Blood, Central Line [[id number]]  (Abnormal)  (Susceptibility) Collected: 03/07/22 1131    Specimen: Blood, Central Line Updated: 03/10/22 0748     Blood Culture Escherichia coli     Isolated from Aerobic and Anaerobic Bottles     Gram Stain Anaerobic Bottle Gram negative bacilli      Aerobic Bottle Gram negative bacilli    Susceptibility      Escherichia coli      CORBIN      Ampicillin Susceptible      Ampicillin + Sulbactam Susceptible      Cefepime Susceptible      Ceftazidime Susceptible      Ceftriaxone Susceptible      Gentamicin Susceptible      Levofloxacin Susceptible      Piperacillin + Tazobactam Susceptible      Trimethoprim + Sulfamethoxazole Susceptible                    Linear View               Susceptibility Comments     Escherichia coli    Cefazolin sensitivity will not be reported for Enterobacteriaceae in non-urine isolates. If cefazolin is preferred, please call the microbiology lab to request an E-test.  With the exception of urinary-sourced infections, aminoglycosides should not be used as monotherapy.             Manual Differential  [[id number]]  (Abnormal) Collected: 03/10/22 0417    Specimen: Blood Updated: 03/10/22 0722     Neutrophil % 89.0 %      Lymphocyte % 1.0 %      Monocyte % 5.0 %      Eosinophil % 3.0 %      Basophil % 1.0 %      Metamyelocyte % 1.0 %      Neutrophils Absolute 8.97 10*3/mm3      Lymphocytes Absolute 0.10 10*3/mm3      Monocytes Absolute 0.50 10*3/mm3      Eosinophils Absolute 0.30 10*3/mm3      Basophils Absolute 0.10 10*3/mm3      Hypochromia Mod/2+     Ovalocytes Slight/1+     WBC Morphology Normal     Platelet Morphology Normal    POC Glucose Once [[id number]]  (Normal) Collected: 03/10/22 0635    Specimen: Blood Updated: 03/10/22 0637     Glucose 120 mg/dL      Comment: Meter: [id number] : [id number] [facility]       Comprehensive Metabolic Panel [[id number]]  (Abnormal) Collected: 03/10/22 0417    Specimen: Blood Updated: 03/10/22 0559     Glucose 132 mg/dL      BUN 32 mg/dL      Creatinine 4.83 mg/dL      Sodium 136 mmol/L      Potassium 3.6 mmol/L      Chloride 97 mmol/L      CO2 26.0 mmol/L      Calcium 8.2 mg/dL      Total Protein 5.9 g/dL      Albumin 2.60 g/dL      ALT (SGPT) 43 U/L      AST (SGOT) 37 U/L      Alkaline Phosphatase 320 U/L      Total Bilirubin 0.8 mg/dL      Globulin 3.3 gm/dL      A/G Ratio 0.8 g/dL      BUN/Creatinine Ratio 6.6     Anion Gap 13.0 mmol/L      eGFR 13.1 mL/min/1.73      Comment: <15 Indicative of kidney failure       Narrative:      GFR Normal >60  Chronic Kidney Disease <60  Kidney Failure <15      CBC & Differential [[id number]]  (Abnormal) Collected: 03/10/22 0417    Specimen: Blood Updated: 03/10/22 0538    Narrative:      The following orders were created for panel order CBC & Differential.  Procedure                               Abnormality         Status                     ---------                               -----------         ------                     CBC Auto Differential[[id number]]        Abnormal            Final result                 Please  view results for these tests on the individual orders.    CBC Auto Differential [[id number]]  (Abnormal) Collected: 03/10/22 0417    Specimen: Blood Updated: 03/10/22 0538     WBC 10.08 10*3/mm3      RBC 3.04 10*6/mm3      Hemoglobin 9.0 g/dL      Hematocrit 28.1 %      MCV 92.4 fL      MCH 29.6 pg      MCHC 32.0 g/dL      RDW 15.0 %      RDW-SD 50.3 fl      MPV --     Comment: Unable to calculate        Platelets 40 10*3/mm3     Immature Platelet Fraction [[id number]]  (Abnormal) Collected: 03/10/22 0417    Specimen: Blood Updated: 03/10/22 0537     IPF 22.00 %      Platelets 40 10*3/mm3     POC Glucose Once [[id number]]  (Normal) Collected: 03/09/22 2359    Specimen: Blood Updated: 03/10/22 0002     Glucose 114 mg/dL      Comment: Meter: [id number] : CARMELO Muro RN       Zinc [[id number]] Collected: 03/06/22 0615    Specimen: Blood Updated: 03/09/22 2107     Zinc 63 ug/dL      Comment:                                 Detection Limit = 5       Narrative:      Test(s) 001800-Zinc, Plasma or Serum  was developed and its performance characteristics determined  by [employer]. It has not been cleared or approved by the Food  and Drug Administration.  Performed at:   - [address]  [id number]  : Rafaela Ashby MD, Phone:  [phone]    POC Glucose Once [[id number]]  (Normal) Collected: 03/09/22 1824    Specimen: Blood Updated: 03/09/22 1825     Glucose 118 mg/dL      Comment: Meter: [id number] : [id number] Aldo KIM           Imaging Results (Last 24 Hours)     ** No results found for the last 24 hours. **             ECG 12 Lead  Component   Ref Range & Units 3/5/22 0945 3/4/22 1825 1/26/22 1729 12/2/21 0309 4/28/21 1722 4/8/21 1341   QT Interval   ms 271 P  392  321  395  435  444              HEART RATE= 187  bpm  RR Interval= 320  ms  TN Interval= 132  ms  P Horizontal Axis= 90  deg  P Front Axis= 30  deg  QRSD Interval= 95  ms  QT  Interval= 271  ms  QRS Axis= 91  deg  T Wave Axis= -72  deg  - ABNORMAL ECG -  Supraventricular tachycardia  Borderline right axis deviation  Repolarization abnormality, prob rate related             Current Facility-Administered Medications:   •  acetaminophen (TYLENOL) tablet 650 mg, 650 mg, Oral, Q6H PRN, Ad Weber MD, 650 mg at 03/08/22 1904  •  ALPRAZolam (XANAX) tablet 1 mg, 1 mg, Oral, 4x Daily PRN, Ad Weber MD, 1 mg at 03/10/22 1305  •  aspirin chewable tablet 81 mg, 81 mg, Oral, Daily, Ad Weber MD, 81 mg at 03/10/22 0804  •  cefTRIAXone (ROCEPHIN) 2 g in sodium chloride 0.9 % 100 mL IVPB-VTB, 2 g, Intravenous, Q24H, Ad Weber MD, Last Rate: 200 mL/hr at 03/09/22 2342, 2 g at 03/09/22 2342  •  dexmedetomidine (PRECEDEX) 400 mcg in 100 mL NS infusion, 0.2-1.5 mcg/kg/hr, Intravenous, Titrated, Ad Weber MD, Stopped at 03/09/22 1521  •  dextrose (D50W) (25 g/50 mL) IV injection 25 g, 25 g, Intravenous, Q15 Min PRN, Ad Weber MD  •  dextrose (GLUTOSE) oral gel 15 g, 15 g, Oral, Q15 Min PRN, Ad Weber MD  •  epoetin real-epbx (RETACRIT) injection 10,000 Units, 10,000 Units, Intravenous, Once per day on Mon Wed Fri, Ad Weber MD, 10,000 Units at 03/09/22 1554  •  fentaNYL citrate (PF) (SUBLIMAZE) injection 25 mcg, 25 mcg, Intravenous, Q2H PRN, Mai Escoto MD, 25 mcg at 03/10/22 0612  •  glucagon (human recombinant) (GLUCAGEN DIAGNOSTIC) injection 1 mg, 1 mg, Intramuscular, Q15 Min PRN, Ad Weber MD  •  heparin (porcine) injection 3,800 Units, 3,800 Units, Intracatheter, PRN, Ad Weber MD, 3,800 Units at 03/09/22 2140  •  insulin glargine (LANTUS, SEMGLEE) injection 30 Units, 30 Units, Subcutaneous, Nightly, Ad Weber MD, 30 Units at 03/08/22 2215  •  insulin regular (humuLIN R,novoLIN R) injection 0-14 Units, 0-14 Units, Subcutaneous, Q6H, Ad Weber MD, 3  Units at 03/10/22 1211  •  lactobacillus acidophilus (RISAQUAD) capsule 1 capsule, 1 capsule, Oral, Daily, Ad Weber MD, 1 capsule at 03/10/22 0805  •  lidocaine (XYLOCAINE) 1 % injection 5 mL, 5 mL, Injection, Once, Ad Weber MD  •  midodrine (PROAMATINE) tablet 10 mg, 10 mg, Oral, TID AC, Ad Weber MD, 10 mg at 03/10/22 1211  •  oxyCODONE-acetaminophen (PERCOCET) 5-325 MG per tablet 1 tablet, 1 tablet, Oral, Q4H PRN, Mai Escoto MD, 1 tablet at 03/10/22 1305  •  pantoprazole (PROTONIX) EC tablet 40 mg, 40 mg, Oral, BID AC, Ad Weber MD, 40 mg at 03/10/22 0805  •  phenylephrine (KARY-SYNEPHRINE) 50 mg in 250 mL NS infusion, 0.5-3 mcg/kg/min, Intravenous, Titrated, Terry Moe MD, Stopped at 03/10/22 1057  •  promethazine (PHENERGAN) tablet 12.5 mg, 12.5 mg, Oral, Q4H PRN, Ad Weber MD  •  sevelamer (RENVELA) tablet 800 mg, 800 mg, Oral, TID With Meals, Ad Weber MD, 800 mg at 03/10/22 1211  •  [COMPLETED] Insert peripheral IV, , , Once **AND** sodium chloride 0.9 % flush 10 mL, 10 mL, Intravenous, PRN, Ad Weber MD  •  sodium chloride 0.9 % flush 10 mL, 10 mL, Intravenous, Q12H, Ad Weber MD, 10 mL at 03/10/22 0805  •  sodium chloride 0.9 % flush 10 mL, 10 mL, Intravenous, PRN, Ad Weber MD  •  tiZANidine (ZANAFLEX) tablet 4 mg, 4 mg, Oral, Q8H PRN, Ad Weber MD     ASSESSMENT  E. coli bacteremia with sepsis  Line sepsis status post removal of old hemodialysis catheter and placement of new  End-stage renal disease with noncompliance with hemodialysis  Supraventricular tachycardia resolved  Insulin-dependent diabetes mellitus  History of hypertension with low blood pressure  Hyperlipidemia  History of CVA  Chronic anemia and thrombocytopenia  Anxiety disorder  Gastroesophageal reflux disease    PLAN  CPM  ICU care  IV antibiotics per infectious disease  Wean off  vasopressors  Hemodialysis per nephrology  Vascular surgery to follow patient  Insulin dose adjusted  Adjust home medications  Stress ulcer DVT prophylaxis  Supportive care  PT OT  Transfer out from the unit if okay with all and off vasopressors  Discussed with nursing staff and wife  Follow closely and further recommendation according to hospital course    ARIEL MCGOVERN MD

## 2022-03-10 NOTE — PROGRESS NOTES
"   LOS: 6 days     Chief Complaint/ Reason for encounter: ESRD  Chief Complaint   Patient presents with   • Weakness - Generalized   • Needs dialysis         Subjective    3/9: Seen and examined in the ICU, looks very ill.  BP remains on the low side but no need for pressors.  Remains confused and somewhat paranoid.  States \"they are trying to kill me\"  Discussed with Dr. Weber regarding catheter removal and replacement in OR today  Dialysis is planned for this afternoon    3/10: Seems to be doing better today.  BP has improved, more alert but still confused, somewhat paranoid  Nuys any shortness of breath or chest pain, no fevers or chills      Medical history reviewed:  Weakness - Generalized        Subjective    History taken from: Patient and chart    Vital Signs  Temp:  [98.1 °F (36.7 °C)-100.3 °F (37.9 °C)] 98.6 °F (37 °C)  Heart Rate:  [64-90] 85  Resp:  [14-20] 14  BP: ()/(55-85) 110/64       Wt Readings from Last 1 Encounters:   03/09/22 0600 77.6 kg (171 lb 1.2 oz)   03/08/22 1431 77.6 kg (171 lb 1.2 oz)   03/07/22 0906 74.4 kg (164 lb)       Objective:  Vital signs: (most recent): Blood pressure 110/64, pulse 85, temperature 98.6 °F (37 °C), temperature source Oral, resp. rate 14, height 172.7 cm (67.99\"), weight 77.6 kg (171 lb 1.2 oz), SpO2 94 %.              Objective:  General Appearance:  Comfortable, less confused, paranoid, acute and chronically ill-appearing, in no acute distress and not in pain.  Prior chest CVC site clean dry and intact  HEENT: Mucous membranes moist, no injury, oropharynx clear  Lungs:  Normal effort and normal respiratory rate.  Breath sounds clear to auscultation.  No  respiratory distress.  No rales, decreased breath sounds or rhonchi.    Heart: Normal rate.  Regular rhythm.  S1 normal.  No murmur.   Abdomen: Abdomen is soft.  Bowel sounds are normal, no abdominal tenderness.  There is no rebound or guarding  Extremities: Normal range of motion.  Trace edema of " bilateral lower extremities, distal pulses intact  Neurological: No focal motor or sensory deficits, pupils reactive  Skin:  Warm and dry.  No rash or cyanosis.       Results Review:    Intake/Output:     Intake/Output Summary (Last 24 hours) at 3/10/2022 1526  Last data filed at 3/10/2022 1400  Gross per 24 hour   Intake 360 ml   Output 100 ml   Net 260 ml         DATA:  Radiology and Labs:  The following labs independently reviewed by me. Additional labs ordered for tomorrow a.m.  Interval notes, chart personally reviewed by me.   Old records independently reviewed showing ESRD on dialysis    Risk/ complexity of medical care/ medical decision making moderate complexity      Labs:   Recent Results (from the past 24 hour(s))   Prepare RBC, 1 Units    Collection Time: 03/09/22  6:00 PM   Result Value Ref Range    Product Code K1648V69     Unit Number [id number]     UNIT  ABO A     UNIT  RH POS     Crossmatch Interpretation Compatible     Dispense Status PT     Blood Expiration Date [id number]     Blood Type Barcode 6200    POC Glucose Once    Collection Time: 03/09/22  6:24 PM    Specimen: Blood   Result Value Ref Range    Glucose 118 70 - 130 mg/dL   POC Glucose Once    Collection Time: 03/09/22 11:59 PM    Specimen: Blood   Result Value Ref Range    Glucose 114 70 - 130 mg/dL   Immature Platelet Fraction    Collection Time: 03/10/22  4:17 AM    Specimen: Blood   Result Value Ref Range    IPF 22.00 (H) 0.90 - 6.50 %    Platelets 40 (C) 140 - 450 10*3/mm3   Comprehensive Metabolic Panel    Collection Time: 03/10/22  4:17 AM    Specimen: Blood   Result Value Ref Range    Glucose 132 (H) 65 - 99 mg/dL    BUN 32 (H) 6 - 20 mg/dL    Creatinine 4.83 (H) 0.76 - 1.27 mg/dL    Sodium 136 136 - 145 mmol/L    Potassium 3.6 3.5 - 5.2 mmol/L    Chloride 97 (L) 98 - 107 mmol/L    CO2 26.0 22.0 - 29.0 mmol/L    Calcium 8.2 (L) 8.6 - 10.5 mg/dL    Total Protein 5.9 (L) 6.0 - 8.5 g/dL    Albumin 2.60 (L) 3.50 - 5.20 g/dL     ALT (SGPT) 43 (H) 1 - 41 U/L    AST (SGOT) 37 1 - 40 U/L    Alkaline Phosphatase 320 (H) 39 - 117 U/L    Total Bilirubin 0.8 0.0 - 1.2 mg/dL    Globulin 3.3 gm/dL    A/G Ratio 0.8 g/dL    BUN/Creatinine Ratio 6.6 (L) 7.0 - 25.0    Anion Gap 13.0 5.0 - 15.0 mmol/L    eGFR 13.1 (L) >60.0 mL/min/1.73   CBC Auto Differential    Collection Time: 03/10/22  4:17 AM    Specimen: Blood   Result Value Ref Range    WBC 10.08 3.40 - 10.80 10*3/mm3    RBC 3.04 (L) 4.14 - 5.80 10*6/mm3    Hemoglobin 9.0 (L) 13.0 - 17.7 g/dL    Hematocrit 28.1 (L) 37.5 - 51.0 %    MCV 92.4 79.0 - 97.0 fL    MCH 29.6 26.6 - 33.0 pg    MCHC 32.0 31.5 - 35.7 g/dL    RDW 15.0 12.3 - 15.4 %    RDW-SD 50.3 37.0 - 54.0 fl    MPV      Platelets 40 (C) 140 - 450 10*3/mm3   Manual Differential    Collection Time: 03/10/22  4:17 AM    Specimen: Blood   Result Value Ref Range    Neutrophil % 89.0 (H) 42.7 - 76.0 %    Lymphocyte % 1.0 (L) 19.6 - 45.3 %    Monocyte % 5.0 5.0 - 12.0 %    Eosinophil % 3.0 0.3 - 6.2 %    Basophil % 1.0 0.0 - 1.5 %    Metamyelocyte % 1.0 (H) 0.0 - 0.0 %    Neutrophils Absolute 8.97 (H) 1.70 - 7.00 10*3/mm3    Lymphocytes Absolute 0.10 (L) 0.70 - 3.10 10*3/mm3    Monocytes Absolute 0.50 0.10 - 0.90 10*3/mm3    Eosinophils Absolute 0.30 0.00 - 0.40 10*3/mm3    Basophils Absolute 0.10 0.00 - 0.20 10*3/mm3    Hypochromia Mod/2+ None Seen    Ovalocytes Slight/1+ None Seen    WBC Morphology Normal Normal    Platelet Morphology Normal Normal   POC Glucose Once    Collection Time: 03/10/22  6:35 AM    Specimen: Blood   Result Value Ref Range    Glucose 120 70 - 130 mg/dL   POC Glucose Once    Collection Time: 03/10/22  7:36 AM    Specimen: Blood   Result Value Ref Range    Glucose 136 (H) 70 - 130 mg/dL   POC Glucose Once    Collection Time: 03/10/22 11:11 AM    Specimen: Blood   Result Value Ref Range    Glucose 156 (H) 70 - 130 mg/dL   POC Glucose Once    Collection Time: 03/10/22  3:10 PM    Specimen: Blood   Result Value Ref Range     Glucose 170 (H) 70 - 130 mg/dL       Radiology:  Imaging Results (Last 24 Hours)     ** No results found for the last 24 hours. **             Medications have been reviewed:  Current Facility-Administered Medications   Medication Dose Route Frequency Provider Last Rate Last Admin   • acetaminophen (TYLENOL) tablet 650 mg  650 mg Oral Q6H PRN Ad Weber MD   650 mg at 03/08/22 1904   • ALPRAZolam (XANAX) tablet 1 mg  1 mg Oral 4x Daily PRN Ad Weber MD   1 mg at 03/10/22 1305   • aspirin chewable tablet 81 mg  81 mg Oral Daily Ad Weber MD   81 mg at 03/10/22 0804   • cefTRIAXone (ROCEPHIN) 2 g in sodium chloride 0.9 % 100 mL IVPB-VTB  2 g Intravenous Q24H Karl Swift MD       • dexmedetomidine (PRECEDEX) 400 mcg in 100 mL NS infusion  0.2-1.5 mcg/kg/hr Intravenous Titrated Ad Weber MD   Stopped at 03/09/22 1521   • dextrose (D50W) (25 g/50 mL) IV injection 25 g  25 g Intravenous Q15 Min PRN Ad Weber MD       • dextrose (GLUTOSE) oral gel 15 g  15 g Oral Q15 Min PRN Ad Weber MD       • epoetin real-epbx (RETACRIT) injection 10,000 Units  10,000 Units Intravenous Once per day on Mon Wed Fri Ad Weber MD   10,000 Units at 03/09/22 1554   • fentaNYL citrate (PF) (SUBLIMAZE) injection 25 mcg  25 mcg Intravenous Q2H PRN Mai Escoto MD   25 mcg at 03/10/22 0612   • glucagon (human recombinant) (GLUCAGEN DIAGNOSTIC) injection 1 mg  1 mg Intramuscular Q15 Min PRN Ad Weber MD       • heparin (porcine) injection 3,800 Units  3,800 Units Intracatheter PRN Ad Weber MD   3,800 Units at 03/09/22 2140   • insulin glargine (LANTUS, SEMGLEE) injection 30 Units  30 Units Subcutaneous Nightly Ad Weber MD   30 Units at 03/08/22 2215   • insulin regular (humuLIN R,novoLIN R) injection 0-14 Units  0-14 Units Subcutaneous Q6H Ad Weber MD   3 Units at 03/10/22 1211   • lactobacillus  acidophilus (RISAQUAD) capsule 1 capsule  1 capsule Oral Daily Ad Weber MD   1 capsule at 03/10/22 0805   • lidocaine (XYLOCAINE) 1 % injection 5 mL  5 mL Injection Once Ad Weber MD       • midodrine (PROAMATINE) tablet 10 mg  10 mg Oral TID AC Ad Weber MD   10 mg at 03/10/22 1211   • oxyCODONE-acetaminophen (PERCOCET) 5-325 MG per tablet 1 tablet  1 tablet Oral Q4H PRN Mai Escoto MD   1 tablet at 03/10/22 1305   • pantoprazole (PROTONIX) EC tablet 40 mg  40 mg Oral BID AC Ad Weber MD   40 mg at 03/10/22 0805   • phenylephrine (KARY-SYNEPHRINE) 50 mg in 250 mL NS infusion  0.5-3 mcg/kg/min Intravenous Titrated Terry Moe MD   Stopped at 03/10/22 1057   • promethazine (PHENERGAN) tablet 12.5 mg  12.5 mg Oral Q4H PRN Ad Weber MD       • sevelamer (RENVELA) tablet 800 mg  800 mg Oral TID With Meals Ad Weber MD   800 mg at 03/10/22 1211   • sodium chloride 0.9 % flush 10 mL  10 mL Intravenous PRN Ad Weber MD       • sodium chloride 0.9 % flush 10 mL  10 mL Intravenous Q12H Ad Weber MD   10 mL at 03/10/22 0805   • sodium chloride 0.9 % flush 10 mL  10 mL Intravenous PRN Ad Weber MD       • tiZANidine (ZANAFLEX) tablet 4 mg  4 mg Oral Q8H PRN Ad Weber MD             ASSESSMENT:   Thrombocytopenia (HCC)     • Liver cirrhosis (HCC)     • Chronic hepatitis C without hepatic coma (HCC)     • End-stage renal disease on hemodialysis (HCC)     • B12 deficiency     • Anemia due to chronic renal failure treated with erythropoietin, stage 5 (HCC)          Assessment:   1. ESRD  2. Tachycardia  3. Thrombocytopenia, chronic issue but slightly worse than baseline  4.  Hyperkalemia  5.  Diabetes mellitus type 2  6.  Fevers  7.  Chronic noncompliance with outpatient dialysis.  Patient has been counseled multiple times about the importance of 100% attendance at outpatient dialysis.  his  noncompliance is directly related to his recurrent hospital admissions      Plan:  Patient looks much better after removal of infected dialysis catheter  HD tomorrow and keep on MWF schedule, gentle UF as tolerated  Eventual plans to address AV fistula dysfunction per Dr. Weber  Phosphorus binders with meals.  Midodrine for BP support  IV antibiotics and follow-up on culture results  Epogen with dialysis for anemia of CKD      Yohan Murrell MD   [facility]   Office phone number: [phone]  Answering service phone number: [phone]    03/10/22  15:26 EST    Dictation performed using Dragon dictation software

## 2022-03-10 NOTE — PROGRESS NOTES
The patient seems less irritable when seen today but is complaining of significant anxiety.  I have encouraged him to avail himself of prescribed as needed Xanax which should control his symptoms of anxiety

## 2022-03-10 NOTE — PLAN OF CARE
Goal Outcome Evaluation:   Completed a round of HD treatment via new Right Femoral vascular access without any complication last night. Remains on Akshat gtt for BP support. Medicated for pain per MAR.

## 2022-03-10 NOTE — PROGRESS NOTES
Plan to use nontunneled femoral catheter for the next several days.  After that, will need to reevaluate if replacement of tunneled catheter is necessary or if his AV fistula can be used again.  Apparently, this was being used without difficulty in the outpatient setting.  May be able to be better used once his overall mental status improves.

## 2022-03-10 NOTE — NURSING NOTE
josue hd well. No required titration of charity gtt. No uf as ordered. Cath performance wnl. bp post 154/65
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
## 2022-03-10 NOTE — PROGRESS NOTES
[facility] GROUP INPATIENT PROGRESS NOTE    Length of Stay:  6 days    CHIEF COMPLAINT: Thrombocytopenia, ESRD, chronic hepatitis C with cirrhosis, anemia, B12 deficiency      SUBJECTIVE: Patient underwent removal of right chest tunneled catheter and placement of right femoral dialysis catheter yesterday without bleeding complication.  He has required addition of pressor support overnight with Akshat-Synephrine.  He is awake but confused currently      ROS:  Review of Systems   Limited review of systems obtained from patient due to confusion, pertinent positive findings as noted in the interval history above, all other systems negative.      OBJECTIVE:  Vitals:    03/10/22 0600 03/10/22 0630 03/10/22 0700 03/10/22 0737   BP: 136/85 133/74 142/74    BP Location: Right arm  Right arm    Patient Position: Lying  Lying    Pulse: 86 85 85    Resp: 16  14    Temp: 100.3 °F (37.9 °C)   98.8 °F (37.1 °C)   TempSrc: Oral   Oral   SpO2: 98% 98% 98%    Weight:       Height:             PHYSICAL EXAMINATION:  General: Patient awake, confused  Chest/Lungs: Clear to auscultation bilaterally anteriorly.  Right chest wall previous catheter site with dressing overlying, no bleeding  Heart: Regular rate and rhythm no murmurs gallops or rubs  Abdomen/GI: Soft nontender nondistended bowel sounds present  Extremities: No edema.  Right femoral catheter site without bleeding    DIAGNOSTIC DATA:  Results Review:     I reviewed the patient's new clinical results.    Results from last 7 days   Lab Units 03/10/22  0417 03/09/22  0412 03/08/22  1236   WBC 10*3/mm3 10.08 9.31 7.94   HEMOGLOBIN g/dL 9.0* 9.7* 7.5*   HEMATOCRIT % 28.1* 29.8* 23.3*   PLATELETS 10*3/mm3 40*  40* 36*  36* 29*      Results from last 7 days   Lab Units 03/10/22  0417 03/09/22  0412 03/08/22  0750 03/07/22  0639 03/06/22  0616 03/05/22  0420 03/04/22  1800   SODIUM mmol/L 136 136 136   < > 135*   < > 134*   POTASSIUM mmol/L 3.6 4.2 4.4   < > 5.2   < > 4.8    CHLORIDE mmol/L 97* 95* 96*   < > 94*   < > 94*   CO2 mmol/L 26.0 24.0 24.1   < > 19.0*   < > 17.0*   BUN mg/dL 32* 73* 59*   < > 105*   < > 107*   CREATININE mg/dL 4.83* 7.77* 6.77*   < > 11.05*   < > 13.43*   CALCIUM mg/dL 8.2* 8.4* 8.1*   < > 8.0*   < > 8.4*   BILIRUBIN mg/dL 0.8  --   --   --  0.9  --  0.8   ALK PHOS U/L 320*  --   --   --  246*  --  242*   ALT (SGPT) U/L 43*  --   --   --  84*  --  92*   AST (SGOT) U/L 37  --   --   --  109*  --  82*   GLUCOSE mg/dL 132* 181* 239*   < > 291*   < > 427*    < > = values in this interval not displayed.      Lab Results   Component Value Date    NEUTROABS 8.97 (H) 03/10/2022     Results from last 7 days   Lab Units 03/04/22  1800   INR  1.21*     Results from last 7 days   Lab Units 03/04/22  1800   MAGNESIUM mg/dL 2.1         Assessment/Plan   ASSESSMENT/PLAN:  This is a 61 y.o. male with:     *Thrombocytopenia  · Patient with history of chronic mild thrombocytopenia followed in the outpatient setting by Dr. Aviles  · Felt to be related to underlying cirrhosis with splenomegaly  · Platelet count has been in the low 100,000 range in the past  · Platelet count in January 2022 had been in the mid to high 100,000 range at which time patient had COVID-19 infection  · Patient was off of dialysis for approximately 1 week before current hospital admission  · On admission 3/4/2022, platelet count was 51,000 and declined into the 40,000 range.  · Additional labs on 3/6/2022 with elevated IPF at 9%  · Peripheral blood smear reviewed on 3/5/2022, was unremarkable.  · B12 on 3/7/2022 greater than 2000  · Suspect that thrombocytopenia is related to underlying cirrhosis/splenomegaly, exacerbated by volume overload off dialysis prior to hospital admission in addition to consumption from sepsis.  · Platelet count today slightly increased at 40,000.  IPF remains elevated at 22% indicative of peripheral destructive process from consumption.  Anticipate that platelet count will  continue to improve up to patient's prior baseline level (low 100,000 range) with treatment of underlying sepsis.  No current bleeding issues.  He does have underlying cirrhosis/splenomegaly contributing to his thrombocytopenia as noted above.  Tunneled dialysis catheter right chest wall was removed with placement of right femoral dialysis catheter yesterday without any bleeding complications.  No current indication for transfusion.  Continue to monitor platelets daily.    *ESRD  · Patient with chronic noncompliance with outpatient dialysis per nephrology  · Patient had been on Monday Wednesday Friday schedule  · Patient apparently had not been to dialysis for 1 week prior to hospital admission  · Patient is now back on dialysis, nephrology following.    · On 3/9/2022, right chest tunneled dialysis catheter was removed and placement of right femoral dialysis catheter without bleeding complications.    *Sepsis with E. coli bacteremia  · Blood culture from 3/7/2022 + for E. Coli  · Patient initiated Rocephin IV 3/7/2022  · Patient developed hypotension, transferred to CCU 3/8/2022  · Right chest wall tunneled dialysis catheter removed with placement of new right femoral dialysis catheter on 3/9/2022  · Patient required addition of pressor support with Akshat-Synephrine overnight    *Chronic hepatitis C with cirrhosis  · Received previous treatment, details unclear  · Previous CT abdomen and pelvis 3/12/2018 with cirrhotic appearing liver and borderline splenomegaly to 13.5 cm  · Hepatitis C RNA 3/6/2022 was negative    *Anemia  · Longstanding history of anemia followed by Dr. Aviles in the outpatient setting  · Anemia has been multifactorial related to CKD/ESRD, iron deficiency, B12 deficiency  · Patient had previously received Procrit related to anemia secondary to CKD.  Eventually ROMULO administration transition to nephrology once patient initiated hemodialysis around May 2021.  · Patient is receiving intermittent IV  iron with dialysis per nephrology.  · Labs on 11/2/2021 with iron 172, ferritin 560, iron saturation 68%, TIBC 255.  B12 level 1017 (see below).  · Hemoglobin in January 2022 had been on the 8-9 range at time of COVID-19 infection  · Labs on 2/2/2022 with iron 89, ferritin 1514, iron saturation 35%, TIBC 256, folate 7.58  · During current admission, hemoglobin 3/4/2022 was 8.7 with subsequent declined into the high 7 range.  · Laboratory evaluation on 3/4/2022 with iron 131, ferritin 5988, iron saturation 79%, TIBC 165.  · No laboratory evidence to suggest hemolysis  · B12 on 3/7/2022 greater than 2000  · Additional labs pending from 3/6/2022 with copper and zinc  · Patient is continuing on Retacrit 10,000 units 3 times weekly with dialysis per nephrology  · Hemoglobin on 3/8/2022 declined to 7.5.  A repeat level was drawn later that morning at 4.2 however this was incorrect due to a dilute specimen and on recheck was stable at 7.5.  · Patient developed hypotension, transferred to CCU with sepsis.  Received 1 unit PRBC transfusion on 3/8/2022.  · Hemoglobin today has declined to 9.0    *B12 deficiency  · Patient was previously receiving oral B12 supplementation  · Labs on 11/2/2022 with B12 level 1017, patient remained off of oral B12 supplementation.  · B12 level on 3/7/2022 greater than 2000, no need for further B12 supplementation    *Right clear-cell renal cell carcinoma, stage I ([id number])  · Status post right partial nephrectomy on 7/6/2018 for a 2.2 cm clear-cell renal cell carcinoma, grade 3, margins could not be assessed, no lymphovascular invasion.  Notation of extensive fibrosis with hemosiderin deposition reflective of fibrosis and hemorrhage.    *Confusion/somnolence  · Secondary to E. coli bacteremia with evolving sepsis  · CT head 3/7/2022 negative for acute changes  · Ammonia level normal on 3/7/2022  · Patient with ongoing confusion likely related to sepsis with metabolic  encephalopathy    Plan:  1. No current indication for PRBC or platelet transfusion  2. Patient continuing on Rocephin for E. coli bacteremia/sepsis  3. Patient continuing on Retacrit 10,000 units Monday Wednesday Friday with dialysis per nephrology  4. CBC, CMP daily             Rios Pa MD

## 2022-03-10 NOTE — PLAN OF CARE
Goal Outcome Evaluation:  Plan of Care Reviewed With: patient           Outcome Evaluation: Pt seen today by OT in CCU.  Pt is awake, alert and out of restraints.  Pt requires encouragment to move to sit at EOB and for UE exercises and grooming.  Pt with generalized weakess and requires assist with ADL.        Patient was not  placed in a face mask during this therapy encounter. Therapist used appropriate personal protective equipment including surgical mask, eye shield and gloves during the entire therapy session. Hand hygiene was completed before and after therapy session. Patient is not in enhanced droplet precautions.

## 2022-03-10 NOTE — PROGRESS NOTES
"  Infectious Diseases Progress Note    Sabas Quick MD     [facility]  Los: 6 days  Patient Identification:  Name: Angelo Brown  Age: 61 y.o.  Sex: male  :  1960  MRN: [id number]         Primary Care Physician: Yadiel Baxter III, MD            Subjective: Positive but comfortable and denies any complaints.  Interval History: See consultation note.    Objective:    Scheduled Meds:aspirin, 81 mg, Oral, Daily  cefTRIAXone, 2 g, Intravenous, Q24H  epoetin rael/real-epbx, 10,000 Units, Intravenous, Once per day on   insulin glargine, 30 Units, Subcutaneous, Nightly  insulin regular, 0-14 Units, Subcutaneous, Q6H  lactobacillus acidophilus, 1 capsule, Oral, Daily  lidocaine, 5 mL, Injection, Once  midodrine, 10 mg, Oral, TID AC  pantoprazole, 40 mg, Oral, BID AC  sevelamer, 800 mg, Oral, TID With Meals  sodium chloride, 10 mL, Intravenous, Q12H      Continuous Infusions:dexmedetomidine, 0.2-1.5 mcg/kg/hr, Last Rate: Stopped (22 1521)  phenylephrine, 0.5-3 mcg/kg/min, Last Rate: 0.8 mcg/kg/min (03/10/22 0822)        Vital signs in last 24 hours:  Temp:  [97.7 °F (36.5 °C)-100.3 °F (37.9 °C)] 98.8 °F (37.1 °C)  Heart Rate:  [64-95] 87  Resp:  [14-24] 14  BP: ()/(25-85) 133/74    Intake/Output:    Intake/Output Summary (Last 24 hours) at 3/10/2022 0930  Last data filed at 3/10/2022 0817  Gross per 24 hour   Intake --   Output 100 ml   Net -100 ml       Exam:  /74   Pulse 87   Temp 98.8 °F (37.1 °C) (Oral)   Resp 14   Ht 172.7 cm (67.99\")   Wt 77.6 kg (171 lb 1.2 oz) Comment: not weighed by RD  SpO2 96%   BMI 26.02 kg/m²   Patient is examined using the personal protective equipment as per guidelines from infection control for this particular patient as enacted.  Hand washing was performed before and after patient interaction.  General Appearance:  Comfortable chronically ill-appearing male                          Head:    Normocephalic, without obvious " abnormality, atraumatic                           Eyes:    PERRL, conjunctivae/corneas clear, EOM's intact, both eyes                         Throat:   Lips, tongue, gums normal; oral mucosa pink and moist                           Neck:   Supple, symmetrical, trachea midline, no JVD                         Lungs:    Clear to auscultation bilaterally, respirations unlabored                 Chest Wall:  Right IJ tunnel catheter in place                          Heart:    Regular rate and rhythm, S1 and S2 normal, no murmur, no rub                                         or gallop                  Abdomen:   Soft nontender                 Extremities:   Extremities normal, atraumatic, no cyanosis or edema                        Pulses:   Pulses palpable in all extremities                            Skin:   Skin is warm and dry,  no rashes or palpable lesions                  Neurologic: Grossly nonfocal       Data Review:    I reviewed the patient's new clinical results.  Results from last 7 days   Lab Units 03/10/22  0417 03/09/22  0412 03/08/22  1236 03/08/22  1037 03/08/22  0750 03/07/22  0639 03/06/22  0616   WBC 10*3/mm3 10.08 9.31 7.94 5.60 11.56* 7.94 6.28   HEMOGLOBIN g/dL 9.0* 9.7* 7.5* 4.2* 7.5* 7.9* 7.8*   PLATELETS 10*3/mm3 40*  40* 36*  36* 29* 17* 30*  30* 38*  38* 41*  41*     Results from last 7 days   Lab Units 03/10/22  0417 03/09/22  0412 03/08/22  0750 03/07/22  0639 03/06/22  0616 03/05/22  0420 03/04/22  1800   SODIUM mmol/L 136 136 136 136 135* 137 134*   POTASSIUM mmol/L 3.6 4.2 4.4 5.7* 5.2 5.5* 4.8   CHLORIDE mmol/L 97* 95* 96* 96* 94* 95* 94*   CO2 mmol/L 26.0 24.0 24.1 18.8* 19.0* 18.0* 17.0*   BUN mg/dL 32* 73* 59* 114* 105* 113* 107*   CREATININE mg/dL 4.83* 7.77* 6.77* 13.15* 11.05* 12.32* 13.43*   CALCIUM mg/dL 8.2* 8.4* 8.1* 8.6 8.0* 7.9* 8.4*   GLUCOSE mg/dL 132* 181* 239* 280* 291* 370* 427*     Microbiology Results (last 10 days)     Procedure Component Value - Date/Time     COVID PRE-OP / PRE-PROCEDURE SCREENING ORDER (NO ISOLATION) - Swab, Nasopharynx [[id number]]  (Normal) Collected: 03/08/22 1816    Lab Status: Final result Specimen: Swab from Nasopharynx Updated: 03/08/22 2027    Narrative:      The following orders were created for panel order COVID PRE-OP / PRE-PROCEDURE SCREENING ORDER (NO ISOLATION) - Swab, Nasopharynx.  Procedure                               Abnormality         Status                     ---------                               -----------         ------                     COVID-19,[facility] IN-HOUSE...[[id number]]  Normal              Final result                 Please view results for these tests on the individual orders.    COVID-19,[facility] IN-HOUSE CEPHEID/NIKHIL NP SWAB IN TRANSPORT MEDIA 8-12 HR TAT - Swab, Nasopharynx [[id number]]  (Normal) Collected: 03/08/22 1816    Lab Status: Final result Specimen: Swab from Nasopharynx Updated: 03/08/22 2027     COVID19 Not Detected    Narrative:      Fact sheet for providers: https://www.fda.gov/media/540809/download    Fact sheet for patients: https://www.fda.gov/media/039660/download    Test performed by PCR.    Blood Culture - Blood, Blood, Central Line [[id number]]  (Abnormal) Collected: 03/07/22 1131    Lab Status: Final result Specimen: Blood, Central Line Updated: 03/10/22 0749     Blood Culture Escherichia coli     Isolated from Aerobic and Anaerobic Bottles     Gram Stain Anaerobic Bottle Gram negative bacilli      Aerobic Bottle Gram negative bacilli    Narrative:      Refer to previous blood culture collected on 3/7 for [facility]'s      Blood Culture - Blood, Blood, Central Line [[id number]]  (Abnormal)  (Susceptibility) Collected: 03/07/22 1131    Lab Status: Final result Specimen: Blood, Central Line Updated: 03/10/22 0748     Blood Culture Escherichia coli     Isolated from Aerobic and Anaerobic Bottles     Gram Stain Anaerobic Bottle Gram negative bacilli      Aerobic Bottle Gram negative bacilli    Susceptibility       Escherichia coli      WALLY      Ampicillin Susceptible      Ampicillin + Sulbactam Susceptible      Cefepime Susceptible      Ceftazidime Susceptible      Ceftriaxone Susceptible      Gentamicin Susceptible      Levofloxacin Susceptible      Piperacillin + Tazobactam Susceptible      Trimethoprim + Sulfamethoxazole Susceptible                       Susceptibility Comments     Escherichia coli    Cefazolin sensitivity will not be reported for Enterobacteriaceae in non-urine isolates. If cefazolin is preferred, please call the microbiology lab to request an E-test.  With the exception of urinary-sourced infections, aminoglycosides should not be used as monotherapy.             Blood Culture ID, PCR - Blood, Blood, Central Line [[id number]]  (Abnormal) Collected: 03/07/22 1131    Lab Status: Final result Specimen: Blood, Central Line Updated: 03/07/22 2221     BCID, PCR Eschericia coli. Identification by BCID2 PCR.     BOTTLE TYPE Anaerobic Bottle    COVID PRE-OP / PRE-PROCEDURE SCREENING ORDER (NO ISOLATION) - Swab, Nasopharynx [[id number]]  (Normal) Collected: 03/04/22 1801    Lab Status: Final result Specimen: Swab from Nasopharynx Updated: 03/04/22 1851    Narrative:      The following orders were created for panel order COVID PRE-OP / PRE-PROCEDURE SCREENING ORDER (NO ISOLATION) - Swab, Nasopharynx.  Procedure                               Abnormality         Status                     ---------                               -----------         ------                     COVID-19,[facility] IN-HOUSE...[[id number]]  Normal              Final result                 Please view results for these tests on the individual orders.    COVID-19,[facility] IN-HOUSE CEPHEID/NIKHIL NP SWAB IN TRANSPORT MEDIA 8-12 HR TAT - Swab, Nasopharynx [[id number]]  (Normal) Collected: 03/04/22 1801    Lab Status: Final result Specimen: Swab from Nasopharynx Updated: 03/04/22 1851     COVID19 Not Detected    Narrative:      Fact sheet for  providers: https://www.fda.gov/media/192721/download     Fact sheet for patients: https://www.fda.gov/media/305295/download            Assessment:    Anemia due to chronic renal failure treated with erythropoietin, stage 5 (HCC)    B12 deficiency    End-stage renal disease on hemodialysis (HCC)    Thrombocytopenia (HCC)    Liver cirrhosis (HCC)    Chronic hepatitis C without hepatic coma (HCC)    Noncompliance of patient with renal dialysis (HCC)  1-systemic gram-negative bacteremic sepsis either due to              -Infected tunneled catheter versus              -Evolving intra-abdominal process with subsequent bacteremia and secondary seeding.  2-end-stage renal disease  3-history of cirrhosis of the liver due to hepatitis C  4-thrombocytopenia  5-other diagnosis per primary team.     Recommendations/Discussions:  · Continue present treatment with IV Rocephin and follow-up on the sensitivity of the E. coli.  · Repeat blood cultures after the catheter is replaced.  · Once considered stable consider CT scan of the abdomen and pelvis    Sabas Quick MD  3/10/2022  09:30 EST    Much of this encounter note is an electronic transcription/translation of spoken language to printed text. The electronic translation of spoken language may permit erroneous, or at times, nonsensical words or phrases to be inadvertently transcribed; Although I have reviewed the note for such errors, some may still exist

## 2022-03-11 LAB
ANION GAP SERPL CALCULATED.3IONS-SCNC: 11.9 MMOL/L (ref 5–15)
BASOPHILS # BLD AUTO: 0.03 10*3/MM3 (ref 0–0.2)
BASOPHILS NFR BLD AUTO: 0.5 % (ref 0–1.5)
BUN SERPL-MCNC: 51 MG/DL (ref 6–20)
BUN/CREAT SERPL: 7.8 (ref 7–25)
CALCIUM SPEC-SCNC: 8.3 MG/DL (ref 8.6–10.5)
CHLORIDE SERPL-SCNC: 98 MMOL/L (ref 98–107)
CO2 SERPL-SCNC: 25.1 MMOL/L (ref 22–29)
CREAT SERPL-MCNC: 6.52 MG/DL (ref 0.76–1.27)
DEPRECATED RDW RBC AUTO: 50.4 FL (ref 37–54)
EGFRCR SERPLBLD CKD-EPI 2021: 9.1 ML/MIN/1.73
EOSINOPHIL # BLD AUTO: 0.12 10*3/MM3 (ref 0–0.4)
EOSINOPHIL NFR BLD AUTO: 1.8 % (ref 0.3–6.2)
ERYTHROCYTE [DISTWIDTH] IN BLOOD BY AUTOMATED COUNT: 14.9 % (ref 12.3–15.4)
GLUCOSE BLDC GLUCOMTR-MCNC: 121 MG/DL (ref 70–130)
GLUCOSE BLDC GLUCOMTR-MCNC: 175 MG/DL (ref 70–130)
GLUCOSE BLDC GLUCOMTR-MCNC: 181 MG/DL (ref 70–130)
GLUCOSE SERPL-MCNC: 178 MG/DL (ref 65–99)
HCT VFR BLD AUTO: 25.5 % (ref 37.5–51)
HGB BLD-MCNC: 8.1 G/DL (ref 13–17.7)
LYMPHOCYTES # BLD AUTO: 0.71 10*3/MM3 (ref 0.7–3.1)
LYMPHOCYTES NFR BLD AUTO: 10.7 % (ref 19.6–45.3)
MCH RBC QN AUTO: 29.7 PG (ref 26.6–33)
MCHC RBC AUTO-ENTMCNC: 31.8 G/DL (ref 31.5–35.7)
MCV RBC AUTO: 93.4 FL (ref 79–97)
MONOCYTES # BLD AUTO: 0.76 10*3/MM3 (ref 0.1–0.9)
MONOCYTES NFR BLD AUTO: 11.4 % (ref 5–12)
NEUTROPHILS NFR BLD AUTO: 4.97 10*3/MM3 (ref 1.7–7)
NEUTROPHILS NFR BLD AUTO: 74.8 % (ref 42.7–76)
PLATELET # BLD AUTO: 36 10*3/MM3 (ref 140–450)
PLATELET # BLD AUTO: 36 10*3/MM3 (ref 140–450)
PLATELETS.RETICULATED NFR BLD AUTO: 18.8 % (ref 0.9–6.5)
PMV BLD AUTO: 13.2 FL (ref 6–12)
POTASSIUM SERPL-SCNC: 3.6 MMOL/L (ref 3.5–5.2)
RBC # BLD AUTO: 2.73 10*6/MM3 (ref 4.14–5.8)
SODIUM SERPL-SCNC: 135 MMOL/L (ref 136–145)
WBC NRBC COR # BLD: 6.64 10*3/MM3 (ref 3.4–10.8)

## 2022-03-11 PROCEDURE — 25010000002 CEFTRIAXONE PER 250 MG: Performed by: HOSPITALIST

## 2022-03-11 PROCEDURE — 99232 SBSQ HOSP IP/OBS MODERATE 35: CPT | Performed by: INTERNAL MEDICINE

## 2022-03-11 PROCEDURE — 85025 COMPLETE CBC W/AUTO DIFF WBC: CPT | Performed by: SURGERY

## 2022-03-11 PROCEDURE — 82962 GLUCOSE BLOOD TEST: CPT

## 2022-03-11 PROCEDURE — 85055 RETICULATED PLATELET ASSAY: CPT | Performed by: SURGERY

## 2022-03-11 PROCEDURE — 80048 BASIC METABOLIC PNL TOTAL CA: CPT | Performed by: HOSPITALIST

## 2022-03-11 PROCEDURE — 63710000001 INSULIN REGULAR HUMAN PER 5 UNITS: Performed by: SURGERY

## 2022-03-11 PROCEDURE — 25010000002 EPOETIN ALFA-EPBX 10000 UNIT/ML SOLUTION: Performed by: SURGERY

## 2022-03-11 RX ADMIN — PANTOPRAZOLE SODIUM 40 MG: 40 TABLET, DELAYED RELEASE ORAL at 06:50

## 2022-03-11 RX ADMIN — SEVELAMER CARBONATE 800 MG: 800 TABLET, FILM COATED ORAL at 12:54

## 2022-03-11 RX ADMIN — OXYCODONE AND ACETAMINOPHEN 1 TABLET: 5; 325 TABLET ORAL at 17:41

## 2022-03-11 RX ADMIN — INSULIN HUMAN 8 UNITS: 100 INJECTION, SOLUTION PARENTERAL at 00:13

## 2022-03-11 RX ADMIN — ALPRAZOLAM 1 MG: 0.5 TABLET ORAL at 17:41

## 2022-03-11 RX ADMIN — INSULIN HUMAN 3 UNITS: 100 INJECTION, SOLUTION PARENTERAL at 06:50

## 2022-03-11 RX ADMIN — Medication 1 CAPSULE: at 12:54

## 2022-03-11 RX ADMIN — Medication 10 ML: at 21:58

## 2022-03-11 RX ADMIN — MIDODRINE HYDROCHLORIDE 10 MG: 5 TABLET ORAL at 12:53

## 2022-03-11 RX ADMIN — Medication 10 ML: at 12:58

## 2022-03-11 RX ADMIN — SEVELAMER CARBONATE 800 MG: 800 TABLET, FILM COATED ORAL at 17:41

## 2022-03-11 RX ADMIN — PANTOPRAZOLE SODIUM 40 MG: 40 TABLET, DELAYED RELEASE ORAL at 17:41

## 2022-03-11 RX ADMIN — OXYCODONE AND ACETAMINOPHEN 1 TABLET: 5; 325 TABLET ORAL at 06:50

## 2022-03-11 RX ADMIN — EPOETIN ALFA-EPBX 10000 UNITS: 10000 INJECTION, SOLUTION INTRAVENOUS; SUBCUTANEOUS at 09:30

## 2022-03-11 RX ADMIN — MIDODRINE HYDROCHLORIDE 10 MG: 5 TABLET ORAL at 17:41

## 2022-03-11 RX ADMIN — CEFTRIAXONE 2 G: 2 INJECTION, POWDER, FOR SOLUTION INTRAMUSCULAR; INTRAVENOUS at 22:58

## 2022-03-11 RX ADMIN — ALPRAZOLAM 1 MG: 0.5 TABLET ORAL at 04:25

## 2022-03-11 NOTE — NURSING NOTE
Notified Dr. Murphy That pt had a critical plt count of 36, MD responded that okay as long as no signs of bleeding. None were reported from night shift or noted on rounds prior to transporting pt to dialysis. Will monitor pt for bleeding once transported back from dialysis.

## 2022-03-11 NOTE — PLAN OF CARE
Goal Outcome Evaluation:  Plan of Care Reviewed With: patient           Outcome Evaluation: Pt went to dialysis, 1L removed. Became uncooperative w/ staff after returning, attempting to get up without assistance. Refused xanax and help from staff. Wife now @ bedside @ 1800. Pt agreed to take xanax, pain meds and rest of scheduled meds. VSS stable.

## 2022-03-11 NOTE — PROGRESS NOTES
"      [facility]         Dr Oneill Sayied   LOS: 7 days   Patient Care Team:  Yadiel Baxter III, MD as PCP - General (Family Medicine)  Jamie Aviles MD as Consulting Physician (Hematology and Oncology)  Yadiel Baxter III, MD as Referring Physician (Family Medicine)  Evelina Varela MD as Consulting Physician (Cardiology)  Yadiel Baxter III, MD (Family Medicine)    Chief Complaint: Hypotension in the setting of sepsis E. coli bacteremia altered mental status ESRD multiple medical issues    Interval History: Patient seen during dialysis.  Resting comfortably blood pressure much more stable now.    REVIEW OF SYSTEMS:   Still somewhat confused not fully reliable    Ventilator/Non-Invasive Ventilation Settings (From admission, onward)            None            Vital Signs  Temp:  [97.5 °F (36.4 °C)-98.8 °F (37.1 °C)] 98.8 °F (37.1 °C)  Heart Rate:  [70-87] 80  Resp:  [16] 16  BP: ()/(60-75) 128/75    Intake/Output Summary (Last 24 hours) at 3/11/2022 1153  Last data filed at 3/10/2022 2010  Gross per 24 hour   Intake 480 ml   Output 100 ml   Net 380 ml     Flowsheet Rows    Flowsheet Row First Filed Value   Admission Height 172.7 cm (68\") Documented at 03/07/2022 0906   Admission Weight 74.4 kg (164 lb) Documented at 03/07/2022 0906          Physical Exam:  Patient is examined using the personal protective equipment as per guidelines from infection control for this particular patient as enacted.  Hand hygiene was performed before and after patient interaction.   General Appearance:   Awake following commands.  No distress  ENT Mallampati between 3 and 4 no nasal congestion  Neck midline trachea, no thyromegaly   Lungs:    Diminished breath sounds    Heart:    Regular rhythm and normal rate, normal S1 and S2, no            murmur, no gallop, no rub, no click   Chest Wall:   Tunnel catheter on the right side of the chest removed   Abdomen:     Normal " bowel sounds, no masses, no organomegaly, soft        nontender, nondistended, no guarding, no rebound                tenderness   Extremities:   Moves all extremities well, no edema, no cyanosis, no             redness  CNS more lucid following simple commands moving all extremities  Skin no rashes no nodules  Musculoskeletal no cyanosis no clubbing normal range of motion     Results Review:        Results from last 7 days   Lab Units 03/11/22  0439 03/10/22  0417 03/09/22  0412   SODIUM mmol/L 135* 136 136   POTASSIUM mmol/L 3.6 3.6 4.2   CHLORIDE mmol/L 98 97* 95*   CO2 mmol/L 25.1 26.0 24.0   BUN mg/dL 51* 32* 73*   CREATININE mg/dL 6.52* 4.83* 7.77*   GLUCOSE mg/dL 178* 132* 181*   CALCIUM mg/dL 8.3* 8.2* 8.4*     Results from last 7 days   Lab Units 03/04/22  1800   TROPONIN T ng/mL 0.036*     Results from last 7 days   Lab Units 03/11/22  0439 03/10/22  0417 03/09/22  0412   WBC 10*3/mm3 6.64 10.08 9.31   HEMOGLOBIN g/dL 8.1* 9.0* 9.7*   HEMATOCRIT % 25.5* 28.1* 29.8*   PLATELETS 10*3/mm3 36*  36* 40*  40* 36*  36*     Results from last 7 days   Lab Units 03/04/22  1800   INR  1.21*     Results from last 7 days   Lab Units 03/06/22  0616   CHOLESTEROL mg/dL 69     Results from last 7 days   Lab Units 03/04/22  1800   MAGNESIUM mg/dL 2.1     Results from last 7 days   Lab Units 03/06/22  0616   CHOLESTEROL mg/dL 69   TRIGLYCERIDES mg/dL 247*   HDL CHOL mg/dL 7*   LDL CHOL mg/dL 24           I reviewed the patient's new clinical results.  I personally viewed and interpreted the patient's chest x-ray.        Medication Review:   aspirin, 81 mg, Oral, Daily  cefTRIAXone, 2 g, Intravenous, Q24H  epoetin real/real-epbx, 10,000 Units, Intravenous, Once per day on Mon Wed Fri  insulin glargine, 30 Units, Subcutaneous, Nightly  insulin regular, 0-14 Units, Subcutaneous, Q6H  lactobacillus acidophilus, 1 capsule, Oral, Daily  lidocaine, 5 mL, Injection, Once  midodrine, 10 mg, Oral, TID AC  pantoprazole, 40 mg,  Oral, BID AC  sevelamer, 800 mg, Oral, TID With Meals  sodium chloride, 10 mL, Intravenous, Q12H             ASSESSMENT:   Hypotension  Sepsis concerns for possible infected catheter status post removal  E. coli bacteremia  Altered mental status  ESRD on dialysis  SVT  Thrombocytopenia  Anemia  Diabetes mellitus  Noncompliance with outpatient dialysis    PLAN:  Hemodynamically stable post removal of suspected infected tunneled catheter.  ID managing antibiotics.  Currently on Rocephin.  Suspected infected right tunneled catheter removed.  Blood culture was positive for E. coli.  Dialysis per nephrology.  Mental status back to baseline.  Suspected due to sepsis with ESRD etc.  No focal neurological deficit at this time.  Platelet counts stable and improving.  Hematology currently following  Status post blood transfusion yesterday with improvement in blood pressure.  Hemoglobin needs to be watched closely  Hospitalist following on the floor  Nothing further to add we will sign off      Mai Escoto MD  03/11/22  11:53 EST

## 2022-03-11 NOTE — NURSING NOTE
Dialysis complete without complications, removed 1 liter with treatment and vital signs are in epic.

## 2022-03-11 NOTE — CASE MANAGEMENT/SOCIAL WORK
Continued Stay Note  [facility]     Patient Name: Angelo Brown  MRN: [id number]  Today's Date: 3/11/2022    Admit Date: 3/4/2022     Discharge Plan     Row Name 03/11/22 1302       Plan    Plan Home with spouse vs SNF pending PT progress.    Plan Comments PT notes reviewed. S/W pt's spouse Samantha ([phone]) to discuss DC options.  Pt would want to return home upon DC, but she states he needs to be walking at least a little bit in order for her to be able to take care of him at home.  CCP discussed facilities w/ on-site HD.  Samantha is in agreement with referrals, but is hopeful pt's mobility will improve enough to not need rehab.  Referrals called to [facility], [facility] and [facility].  Would need precert. ..............Betty HOLLAND/ CCP               Discharge Codes    No documentation.               Expected Discharge Date and Time     Expected Discharge Date Expected Discharge Time    Mar 14, 2022             Betty Hermosillo RN

## 2022-03-11 NOTE — PROGRESS NOTES
"   LOS: 7 days     Chief Complaint/ Reason for encounter: ESRD  Chief Complaint   Patient presents with   • Weakness - Generalized   • Needs dialysis         Subjective    3/9: Seen and examined in the ICU, looks very ill.  BP remains on the low side but no need for pressors.  Remains confused and somewhat paranoid.  States \"they are trying to kill me\"  Discussed with Dr. Weber regarding catheter removal and replacement in OR today  Dialysis is planned for this afternoon    3/10: Seems to be doing better today.  BP has improved, more alert but still confused, somewhat paranoid  Nuys any shortness of breath or chest pain, no fevers or chills    3/11:Patient was seen on hemodialysis  Treatment orders discussed with the dialysis RNFranco  Arterial pressure/ Venous pressure: 120/100  Blood flow rate/Dialysate flow rate: 250/600, increase blood flow rate of 350 as tolerated  Blood pressure: 120s over 60s  Potassium bath/Fluid removal goal: 3K, 1 L  No changes other than blood flow rate      Medical history reviewed:  Weakness - Generalized        Subjective    History taken from: Patient and chart    Vital Signs  Temp:  [97.5 °F (36.4 °C)-98.9 °F (37.2 °C)] 98.8 °F (37.1 °C)  Heart Rate:  [70-88] 80  Resp:  [16] 16  BP: ()/(60-75) 128/75       Wt Readings from Last 1 Encounters:   03/09/22 0600 77.6 kg (171 lb 1.2 oz)   03/08/22 1431 77.6 kg (171 lb 1.2 oz)   03/07/22 0906 74.4 kg (164 lb)       Objective:  Vital signs: (most recent): Blood pressure 128/75, pulse 80, temperature 98.8 °F (37.1 °C), temperature source Oral, resp. rate 16, height 172.7 cm (67.99\"), weight 77.6 kg (171 lb 1.2 oz), SpO2 92 %.              Objective:  General Appearance:  Comfortable, less confused, chronically ill-appearing, in no acute distress and not in pain.  Prior chest CVC site clean dry and intact  HEENT: Mucous membranes moist, no injury, oropharynx clear  Lungs:  Normal effort and normal respiratory rate.  Breath sounds clear " to auscultation.  No  respiratory distress.  No rales, decreased breath sounds or rhonchi.    Heart: Normal rate.  Regular rhythm.  S1 normal.  No murmur.   Abdomen: Abdomen is soft.  Bowel sounds are normal, no abdominal tenderness.  There is no rebound or guarding  Extremities: Normal range of motion.  Trace edema of bilateral lower extremities, distal pulses intact  Neurological: No focal motor or sensory deficits, pupils reactive  Skin:  Warm and dry.  No rash or cyanosis.       Results Review:    Intake/Output:     Intake/Output Summary (Last 24 hours) at 3/11/2022 1031  Last data filed at 3/10/2022 2010  Gross per 24 hour   Intake 480 ml   Output 200 ml   Net 280 ml         DATA:  Radiology and Labs:  The following labs independently reviewed by me. Additional labs ordered for tomorrow a.m.  Interval notes, chart personally reviewed by me.   Old records independently reviewed showing ESRD on dialysis    Risk/ complexity of medical care/ medical decision making moderate complexity      Labs:   Recent Results (from the past 24 hour(s))   POC Glucose Once    Collection Time: 03/10/22 11:11 AM    Specimen: Blood   Result Value Ref Range    Glucose 156 (H) 70 - 130 mg/dL   POC Glucose Once    Collection Time: 03/10/22  3:10 PM    Specimen: Blood   Result Value Ref Range    Glucose 170 (H) 70 - 130 mg/dL   POC Glucose Once    Collection Time: 03/10/22  6:36 PM    Specimen: Blood   Result Value Ref Range    Glucose 183 (H) 70 - 130 mg/dL   POC Glucose Once    Collection Time: 03/10/22  9:42 PM    Specimen: Blood   Result Value Ref Range    Glucose 283 (H) 70 - 130 mg/dL   POC Glucose Once    Collection Time: 03/10/22 11:35 PM    Specimen: Blood   Result Value Ref Range    Glucose 250 (H) 70 - 130 mg/dL   Immature Platelet Fraction    Collection Time: 03/11/22  4:39 AM    Specimen: Blood   Result Value Ref Range    IPF 18.80 (H) 0.90 - 6.50 %    Platelets 36 (C) 140 - 450 10*3/mm3   Basic Metabolic Panel    Collection  Time: 03/11/22  4:39 AM    Specimen: Blood   Result Value Ref Range    Glucose 178 (H) 65 - 99 mg/dL    BUN 51 (H) 6 - 20 mg/dL    Creatinine 6.52 (H) 0.76 - 1.27 mg/dL    Sodium 135 (L) 136 - 145 mmol/L    Potassium 3.6 3.5 - 5.2 mmol/L    Chloride 98 98 - 107 mmol/L    CO2 25.1 22.0 - 29.0 mmol/L    Calcium 8.3 (L) 8.6 - 10.5 mg/dL    BUN/Creatinine Ratio 7.8 7.0 - 25.0    Anion Gap 11.9 5.0 - 15.0 mmol/L    eGFR 9.1 (L) >60.0 mL/min/1.73   CBC Auto Differential    Collection Time: 03/11/22  4:39 AM    Specimen: Blood   Result Value Ref Range    WBC 6.64 3.40 - 10.80 10*3/mm3    RBC 2.73 (L) 4.14 - 5.80 10*6/mm3    Hemoglobin 8.1 (L) 13.0 - 17.7 g/dL    Hematocrit 25.5 (L) 37.5 - 51.0 %    MCV 93.4 79.0 - 97.0 fL    MCH 29.7 26.6 - 33.0 pg    MCHC 31.8 31.5 - 35.7 g/dL    RDW 14.9 12.3 - 15.4 %    RDW-SD 50.4 37.0 - 54.0 fl    MPV 13.2 (H) 6.0 - 12.0 fL    Platelets 36 (C) 140 - 450 10*3/mm3    Neutrophil % 74.8 42.7 - 76.0 %    Lymphocyte % 10.7 (L) 19.6 - 45.3 %    Monocyte % 11.4 5.0 - 12.0 %    Eosinophil % 1.8 0.3 - 6.2 %    Basophil % 0.5 0.0 - 1.5 %    Neutrophils, Absolute 4.97 1.70 - 7.00 10*3/mm3    Lymphocytes, Absolute 0.71 0.70 - 3.10 10*3/mm3    Monocytes, Absolute 0.76 0.10 - 0.90 10*3/mm3    Eosinophils, Absolute 0.12 0.00 - 0.40 10*3/mm3    Basophils, Absolute 0.03 0.00 - 0.20 10*3/mm3   POC Glucose Once    Collection Time: 03/11/22  6:45 AM    Specimen: Blood   Result Value Ref Range    Glucose 181 (H) 70 - 130 mg/dL       Radiology:  Imaging Results (Last 24 Hours)     ** No results found for the last 24 hours. **             Medications have been reviewed:  Current Facility-Administered Medications   Medication Dose Route Frequency Provider Last Rate Last Admin   • acetaminophen (TYLENOL) tablet 650 mg  650 mg Oral Q6H PRN Ad Weber MD   650 mg at 03/08/22 1904   • ALPRAZolam (XANAX) tablet 1 mg  1 mg Oral 4x Daily PRN dA Weber MD   1 mg at 03/11/22 6019   • aspirin  chewable tablet 81 mg  81 mg Oral Daily Ad Weber MD   81 mg at 03/10/22 0804   • cefTRIAXone (ROCEPHIN) 2 g in sodium chloride 0.9 % 100 mL IVPB-VTB  2 g Intravenous Q24H Karl Swift  mL/hr at 03/10/22 2147 2 g at 03/10/22 2147   • dextrose (D50W) (25 g/50 mL) IV injection 25 g  25 g Intravenous Q15 Min PRN Ad Weber MD       • dextrose (GLUTOSE) oral gel 15 g  15 g Oral Q15 Min PRN Ad Weber MD       • epoetin real-epbx (RETACRIT) injection 10,000 Units  10,000 Units Intravenous Once per day on Mon Wed Fri Ad Weber MD   10,000 Units at 03/11/22 0930   • glucagon (human recombinant) (GLUCAGEN DIAGNOSTIC) injection 1 mg  1 mg Intramuscular Q15 Min PRN Ad Weber MD       • heparin (porcine) injection 3,800 Units  3,800 Units Intracatheter PRN Ad Weber MD   3,800 Units at 03/09/22 2140   • insulin glargine (LANTUS, SEMGLEE) injection 30 Units  30 Units Subcutaneous Nightly Ad Weber MD   30 Units at 03/10/22 2148   • insulin regular (humuLIN R,novoLIN R) injection 0-14 Units  0-14 Units Subcutaneous Q6H Ad Weber MD   3 Units at 03/11/22 0650   • lactobacillus acidophilus (RISAQUAD) capsule 1 capsule  1 capsule Oral Daily Ad Weber MD   1 capsule at 03/10/22 0805   • lidocaine (XYLOCAINE) 1 % injection 5 mL  5 mL Injection Once Ad Weber MD       • midodrine (PROAMATINE) tablet 10 mg  10 mg Oral TID AC Ad Weber MD   10 mg at 03/10/22 1825   • oxyCODONE-acetaminophen (PERCOCET) 5-325 MG per tablet 1 tablet  1 tablet Oral Q4H PRN Mai Escoto MD   1 tablet at 03/11/22 0650   • pantoprazole (PROTONIX) EC tablet 40 mg  40 mg Oral BID AC Ad Weber MD   40 mg at 03/11/22 0650   • promethazine (PHENERGAN) tablet 12.5 mg  12.5 mg Oral Q4H PRN Ad Weber MD       • sevelamer (RENVELA) tablet 800 mg  800 mg Oral TID With Meals Ad Weber,  MD   800 mg at 03/10/22 1825   • sodium chloride 0.9 % flush 10 mL  10 mL Intravenous PRN Ad Weber MD       • sodium chloride 0.9 % flush 10 mL  10 mL Intravenous Q12H Ad Weber MD   10 mL at 03/10/22 2130   • sodium chloride 0.9 % flush 10 mL  10 mL Intravenous PRN Ad Weber MD       • tiZANidine (ZANAFLEX) tablet 4 mg  4 mg Oral Q8H PRN Ad Weber MD             ASSESSMENT:   Thrombocytopenia (HCC)     • Liver cirrhosis (HCC)     • Chronic hepatitis C without hepatic coma (HCC)     • End-stage renal disease on hemodialysis (HCC)     • B12 deficiency     • Anemia due to chronic renal failure treated with erythropoietin, stage 5 (HCC)          Assessment:   1. ESRD, HD Monday Wednesday Friday  2. Tachycardia, likely was due to sepsis and has improved  3. Thrombocytopenia,   4.  Hyperkalemia, stabilizing  5.  Diabetes mellitus type 2  6.  Fevers/sepsis syndrome related to infected dialysis catheter  7.  Chronic noncompliance with outpatient dialysis.  Patient has been counseled multiple times about the importance of 100% attendance at outpatient dialysis.  his noncompliance is directly related to his recurrent hospital admissions      Plan:  Dialysis today and Monday Wednesday Friday, 1 L UF as tolerated  No heparin with dialysis due to low platelets   AV fistula dysfunction per Dr. Weber, using nontunneled catheter for access  Phosphorus binders with meals.  Midodrine for BP support  IV antibiotics and follow-up on culture results  Epogen with dialysis for anemia of CKD  Hopefully can avoid needing to replace his tunnel catheter if AV fistula is able to be cannulated with HD next week      Yohan Murrell MD   [facility]   Office phone number: [phone]  Answering service phone number: [phone]    03/11/22  10:31 EST    Dictation performed using Dragon dictation software

## 2022-03-11 NOTE — DISCHARGE PLACEMENT REQUEST
"Selvin Tian (61 y.o. Male)             Date of Birth   1960    Social Security Number       Address   [address] [place], [place] [address]    Home Phone   [phone]    MRN   [id number]       Latter-day   None    Marital Status                               Admission Date   3/4/22    Admission Type   Emergency    Admitting Provider   Karl Swift MD    Attending Provider   Karl Swift MD    Department, Room/Bed   [address], S413/1       Discharge Date       Discharge Disposition       Discharge Destination                               Attending Provider: Karl Swift MD    Allergies: Lipitor [Atorvastatin Calcium], Morphine, Eggs Or Egg-derived Products, Gabapentin, Adhesive Tape, Fentanyl, Fentanyl, Ibuprofen, Penicillins    Isolation: None   Infection: MRSA/History Only (12/20/17), COVID (History) (01/27/22)   Code Status: CPR   Advance Care Planning Activity    Ht: 172.7 cm (67.99\")   Wt: 77.6 kg (171 lb 1.2 oz)    Admission Cmt: None   Principal Problem: None                Active Insurance as of 3/4/2022     Primary Coverage     Payor Plan Insurance Group Employer/Plan Group    ANTHEM MEDICARE REPLACEMENT ANTHEM MEDICARE ADVANTAGE [id number]     Payor Plan Address Payor Plan Phone Number Payor Plan Fax Number Effective Dates    [address] [phone]  1/1/2022 - None Entered    [facility] [address]       Subscriber Name Subscriber Birth Date Member ID       SELVIN TIAN 1960 [id number]           Secondary Coverage     Payor Plan Insurance Group Employer/Plan Group    [id number]      Payor Plan Address Payor Plan Phone Number Payor Plan Fax Number Effective Dates    [address] 2/2/2022 - None Entered    [facility] [id number]       Subscriber Name Subscriber Birth Date Member ID       SELVIN TIAN 1960 [id number]                 Emergency Contacts      (Rel.) Home Phone Work Phone Mobile " Samantha Parada (Spouse) [phone] -- --

## 2022-03-11 NOTE — PROGRESS NOTES
"DAILY PROGRESS NOTE  [facility]    Patient Identification:  Name: Angelo Brown  Age: 61 y.o.  Sex: male  :  1960  MRN: [id number]         Primary Care Physician: Yadiel Baxter III, MD    Subjective:patient is improving; family is at the bedside  Interval History: follow up for sepis, gram negative bacteremia, esrd     Objective:    Scheduled Meds:aspirin, 81 mg, Oral, Daily  cefTRIAXone, 2 g, Intravenous, Q24H  epoetin real/real-epbx, 10,000 Units, Intravenous, Once per day on   insulin glargine, 30 Units, Subcutaneous, Nightly  insulin regular, 0-14 Units, Subcutaneous, Q6H  lactobacillus acidophilus, 1 capsule, Oral, Daily  lidocaine, 5 mL, Injection, Once  midodrine, 10 mg, Oral, TID AC  pantoprazole, 40 mg, Oral, BID AC  sevelamer, 800 mg, Oral, TID With Meals  sodium chloride, 10 mL, Intravenous, Q12H      Continuous Infusions:     Vital signs in last 24 hours:  Temp:  [97.5 °F (36.4 °C)-99.2 °F (37.3 °C)] 99 °F (37.2 °C)  Heart Rate:  [70-97] 97  Resp:  [16-20] 20  BP: ()/(64-86) 172/86    Intake/Output:    Intake/Output Summary (Last 24 hours) at 3/11/2022 1819  Last data filed at 3/11/2022 1212  Gross per 24 hour   Intake 120 ml   Output 1000 ml   Net -880 ml       Exam:  /86 (BP Location: Right arm, Patient Position: Lying)   Pulse 97   Temp 99 °F (37.2 °C)   Resp 20   Ht 172.7 cm (67.99\")   Wt 77.6 kg (171 lb 1.2 oz) Comment: not weighed by RD  SpO2 97%   BMI 26.02 kg/m²     General Appearance:    Alert, cooperative, no distress, AAOx3                          Head:    Normocephalic, without obvious abnormality, atraumatic                           Eyes:    PERRL, conjunctivae/corneas clear, EOM's intact, both eyes                         Throat:   Lips, tongue, gums normal; oral mucosa pink and moist                           Neck:   Supple, symmetrical, trachea midline, no JVD                         Lungs:    Decreased breath sounds " bilaterally, respirations unlabored                 Chest Wall:    No tenderness or deformity                          Heart:    Regular rate and rhythm, S1 and S2 normal, no murmur,no  rub or gallop                  Abdomen:     Soft, nontender, bowel sounds active, no masses, no organomegaly                  Extremities:   Extremities normal, atraumatic, no cyanosis or edema                        Pulses:   Pulses palpable in all extremities                            Skin:   Skin is warm and dry,  no rashes or palpable lesions                  Neurologic:   CNII-XII intact, motor strength grossly intact, sensation grossly intact to light touch, no focal deficits noted       Data Review:  Labs in chart were reviewed.  WBC   Date Value Ref Range Status   03/11/2022 6.64 3.40 - 10.80 10*3/mm3 Final     Hemoglobin   Date Value Ref Range Status   03/11/2022 8.1 (L) 13.0 - 17.7 g/dL Final     Hematocrit   Date Value Ref Range Status   03/11/2022 25.5 (L) 37.5 - 51.0 % Final     Platelets   Date Value Ref Range Status   03/11/2022 36 (C) 140 - 450 10*3/mm3 Final   03/11/2022 36 (C) 140 - 450 10*3/mm3 Final     Sodium   Date Value Ref Range Status   03/11/2022 135 (L) 136 - 145 mmol/L Final     Potassium   Date Value Ref Range Status   03/11/2022 3.6 3.5 - 5.2 mmol/L Final     Chloride   Date Value Ref Range Status   03/11/2022 98 98 - 107 mmol/L Final     CO2   Date Value Ref Range Status   03/11/2022 25.1 22.0 - 29.0 mmol/L Final     BUN   Date Value Ref Range Status   03/11/2022 51 (H) 6 - 20 mg/dL Final     Creatinine   Date Value Ref Range Status   03/11/2022 6.52 (H) 0.76 - 1.27 mg/dL Final     Glucose   Date Value Ref Range Status   03/11/2022 178 (H) 65 - 99 mg/dL Final     Calcium   Date Value Ref Range Status   03/11/2022 8.3 (L) 8.6 - 10.5 mg/dL Final     AST (SGOT)   Date Value Ref Range Status   03/10/2022 37 1 - 40 U/L Final     ALT (SGPT)   Date Value Ref Range Status   03/10/2022 43 (H) 1 - 41 U/L Final      Alkaline Phosphatase   Date Value Ref Range Status   03/10/2022 320 (H) 39 - 117 U/L Final     Patient Active Problem List   Diagnosis Code   • Acute CVA (cerebrovascular accident) (HCC) I63.9   • Proteinuria R80.9   • Anemia due to chronic renal failure treated with erythropoietin, stage 5 (HCC) N18.5, D63.1   • Thrombocytopenia (HCC) D69.6   • Pancytopenia, acquired (HCC) D61.818   • History of hepatitis C virus infection Z86.19   • B12 deficiency E53.8   • Hyperkalemia E87.5   • Cancer of kidney parenchyma, right (HCC) C64.1   • Umbilical hernia without obstruction and without gangrene K42.9   • Anemia of chronic renal failure, stage 3 (moderate) (HCC) N18.30, D63.1   • Chronic kidney disease, stage III (moderate) (HCC) N18.30   • Acute renal failure superimposed on chronic kidney disease (HCC) N17.9, N18.9   • Toxic metabolic encephalopathy G92.8   • Solitary kidney [id number]   • Acute metabolic encephalopathy G93.41   • Adverse effect of iron T45.4X5A   • Iron deficiency anemia D50.9   • Acute renal failure with acute tubular necrosis superimposed on stage 4 chronic kidney disease (HCC) N17.0, N18.4   • Hemodialysis catheter dysfunction (HCC) T82.41XA   • ESRD (end stage renal disease) (HCC) N18.6   • Dependence on renal dialysis ([facility]) Z99.2   • Complication of vascular access for dialysis T82.9XXA   • History of CVA (cerebrovascular accident) Z86.73   • CAD (coronary artery disease) I25.10   • Type 2 diabetes mellitus with hyperglycemia, without long-term current use of insulin ([facility]) E11.65   • GERD (gastroesophageal reflux disease) K21.9   • HLD (hyperlipidemia) E78.5   • HTN (hypertension) I10   • ESRD (end stage renal disease) on dialysis (HCC) N18.6, Z99.2   • Witnessed seizure-like activity ([facility]) R56.9   • Hyponatremia E87.1   • ESRD (end stage renal disease) (HCC) N18.6   • Type 2 diabetes mellitus with hyperglycemia (HCC) E11.65   • CAD (coronary artery disease) I25.10   • HTN (hypertension) I10   •  Leukocytosis D72.829   • Anemia, chronic disease D63.8   • Syncopal seizure (HCC) R55, R56.9   • End-stage renal disease on hemodialysis (HCC) N18.6, Z99.2   • Thrombocytopenia (HCC) D69.6   • Liver cirrhosis (HCC) K74.60   • Chronic hepatitis C without hepatic coma (HCC) B18.2   • Noncompliance of patient with renal dialysis (HCC) Z91.15       Assessment:  Active Hospital Problems    Diagnosis  POA   • Noncompliance of patient with renal dialysis (HCC) [Z91.15]  Not Applicable   • Thrombocytopenia (HCC) [D69.6]  Unknown   • Liver cirrhosis (HCC) [K74.60]  Unknown   • Chronic hepatitis C without hepatic coma (HCC) [B18.2]  Unknown   • End-stage renal disease on hemodialysis (HCC) [N18.6, Z99.2]  Not Applicable   • B12 deficiency [E53.8]  Yes   • Anemia due to chronic renal failure treated with erythropoietin, stage 5 (HCC) [N18.5, D63.1]  Unknown      Resolved Hospital Problems   No resolved problems to display.       Plan:  Will continue antibiotics  Dialysis per nephrology  Confusion has resolved  Notes and labs reviewed  Medium risk    Hailey Alvarado MD  3/11/2022  18:19 EST

## 2022-03-11 NOTE — PLAN OF CARE
Goal Outcome Evaluation:  Plan of Care Reviewed With: patient        Progress: improving  Outcome Evaluation: PT transfer from CCU. alert and oriented x4, confused at times. q2 turns. plan for HD today. BP running low most of night. xanex and pain medication given prn. will ctm    Lab called about critical plt count. Page to hematology.

## 2022-03-11 NOTE — SIGNIFICANT NOTE
03/11/22 1434   OTHER   Discipline physical therapist   Rehab Time/Intention   Session Not Performed other (see comments);patient/family declined treatment  (pt declines PT this pm. discussed w RN. will follow up over weekend as able.)   Recommendation   PT - Next Appointment 03/12/22

## 2022-03-11 NOTE — PROGRESS NOTES
[facility] GROUP INPATIENT PROGRESS NOTE    Length of Stay:  7 days    CHIEF COMPLAINT: Thrombocytopenia, ESRD, chronic hepatitis C with cirrhosis, anemia, B12 deficiency      SUBJECTIVE: Patient has no new complaints today.  He is awake and alert, mildly confused.  He was able to transfer out of CCU to monitored bed.      ROS:  Review of Systems   A comprehensive review of systems was obtained with pertinent positive findings as noted in the interval history above.  All other systems negative.      OBJECTIVE:  Vitals:    03/10/22 2336 03/11/22 0025 03/11/22 0420 03/11/22 0731   BP: 95/64 97/67 137/74 128/75   BP Location: Right arm Right arm  Right arm   Patient Position: Lying Lying  Lying   Pulse: 70   80   Resp: 16   16   Temp: 97.5 °F (36.4 °C)   98.8 °F (37.1 °C)   TempSrc: Oral   Oral   SpO2: 93%   92%   Weight:       Height:             PHYSICAL EXAMINATION:  General: Patient awake and alert, mild confusion  Chest/Lungs: Clear to auscultation bilaterally anteriorly.  Right chest wall previous catheter site with dressing overlying, no bleeding  Heart: Regular rate and rhythm no murmurs gallops or rubs  Abdomen/GI: Soft nontender nondistended bowel sounds present  Extremities: No edema.  Right femoral catheter site without bleeding        DIAGNOSTIC DATA:  Results Review:     I reviewed the patient's new clinical results.    Results from last 7 days   Lab Units 03/11/22  0439 03/10/22  0417 03/09/22  0412   WBC 10*3/mm3 6.64 10.08 9.31   HEMOGLOBIN g/dL 8.1* 9.0* 9.7*   HEMATOCRIT % 25.5* 28.1* 29.8*   PLATELETS 10*3/mm3 36*  36* 40*  40* 36*  36*      Results from last 7 days   Lab Units 03/11/22  0439 03/10/22  0417 03/09/22  0412 03/07/22  0639 03/06/22  0616 03/05/22  0420 03/04/22  1800   SODIUM mmol/L 135* 136 136   < > 135*   < > 134*   POTASSIUM mmol/L 3.6 3.6 4.2   < > 5.2   < > 4.8   CHLORIDE mmol/L 98 97* 95*   < > 94*   < > 94*   CO2 mmol/L 25.1 26.0 24.0   < > 19.0*   < > 17.0*   BUN  · Reports CPAP noncompliance due to intolerance mg/dL 51* 32* 73*   < > 105*   < > 107*   CREATININE mg/dL 6.52* 4.83* 7.77*   < > 11.05*   < > 13.43*   CALCIUM mg/dL 8.3* 8.2* 8.4*   < > 8.0*   < > 8.4*   BILIRUBIN mg/dL  --  0.8  --   --  0.9  --  0.8   ALK PHOS U/L  --  320*  --   --  246*  --  242*   ALT (SGPT) U/L  --  43*  --   --  84*  --  92*   AST (SGOT) U/L  --  37  --   --  109*  --  82*   GLUCOSE mg/dL 178* 132* 181*   < > 291*   < > 427*    < > = values in this interval not displayed.      Lab Results   Component Value Date    NEUTROABS 4.97 03/11/2022     Results from last 7 days   Lab Units 03/04/22  1800   INR  1.21*     Results from last 7 days   Lab Units 03/04/22  1800   MAGNESIUM mg/dL 2.1         Assessment/Plan   ASSESSMENT/PLAN:  This is a 61 y.o. male with:     *Thrombocytopenia  · Patient with history of chronic mild thrombocytopenia followed in the outpatient setting by Dr. Aviles  · Felt to be related to underlying cirrhosis with splenomegaly  · Platelet count has been in the low 100,000 range in the past  · Platelet count in January 2022 had been in the mid to high 100,000 range at which time patient had COVID-19 infection  · Patient was off of dialysis for approximately 1 week before current hospital admission  · On admission 3/4/2022, platelet count was 51,000 and declined into the 40,000 range.  · Additional labs on 3/6/2022 with elevated IPF at 9%  · Peripheral blood smear reviewed on 3/5/2022, was unremarkable.  · B12 on 3/7/2022 greater than 2000  · Suspect that thrombocytopenia is related to underlying cirrhosis/splenomegaly, exacerbated by volume overload off dialysis prior to hospital admission in addition to consumption from sepsis.  · Platelet count today slightly decreased at 36,000.  IPF remains elevated at 18.8% % indicative of peripheral destructive process from consumption.  Anticipate that platelet count will continue to improve up to patient's prior baseline level (low 100,000 range) with treatment of underlying  sepsis.  No current bleeding issues.  He does have underlying cirrhosis/splenomegaly contributing to his thrombocytopenia as noted above.     *ESRD  · Patient with chronic noncompliance with outpatient dialysis per nephrology  · Patient had been on Monday Wednesday Friday schedule  · Patient apparently had not been to dialysis for 1 week prior to hospital admission  · Patient is now back on dialysis, nephrology following.    · On 3/9/2022, right chest tunneled dialysis catheter was removed and placement of right femoral dialysis catheter without bleeding complications.    *Sepsis with E. coli bacteremia  · Blood culture from 3/7/2022 + for E. Coli  · Patient initiated Rocephin IV 3/7/2022  · Patient developed hypotension, transferred to CCU 3/8/2022  · Right chest wall tunneled dialysis catheter removed with placement of new right femoral dialysis catheter on 3/9/2022  · Patient did require pressor support, subsequently discontinued and patient was able to be transferred out of CCU to monitored bed    *Chronic hepatitis C with cirrhosis  · Received previous treatment, details unclear  · Previous CT abdomen and pelvis 3/12/2018 with cirrhotic appearing liver and borderline splenomegaly to 13.5 cm  · Hepatitis C RNA 3/6/2022 was negative    *Anemia  · Longstanding history of anemia followed by Dr. Aviles in the outpatient setting  · Anemia has been multifactorial related to CKD/ESRD, iron deficiency, B12 deficiency  · Patient had previously received Procrit related to anemia secondary to CKD.  Eventually ROMULO administration transition to nephrology once patient initiated hemodialysis around May 2021.  · Patient is receiving intermittent IV iron with dialysis per nephrology.  · Labs on 11/2/2021 with iron 172, ferritin 560, iron saturation 68%, TIBC 255.  B12 level 1017 (see below).  · Hemoglobin in January 2022 had been on the 8-9 range at time of COVID-19 infection  · Labs on 2/2/2022 with iron 89, ferritin 1514, iron  saturation 35%, TIBC 256, folate 7.58  · During current admission, hemoglobin 3/4/2022 was 8.7 with subsequent declined into the high 7 range.  · Laboratory evaluation on 3/4/2022 with iron 131, ferritin 5988, iron saturation 79%, TIBC 165.  · No laboratory evidence to suggest hemolysis  · B12 on 3/7/2022 greater than 2000  · Additional labs pending from 3/6/2022 with copper and zinc  · Patient is continuing on Retacrit 10,000 units 3 times weekly with dialysis per nephrology  · Hemoglobin on 3/8/2022 declined to 7.5.  A repeat level was drawn later that morning at 4.2 however this was incorrect due to a dilute specimen and on recheck was stable at 7.5.  · Patient developed hypotension, transferred to CCU with sepsis.  Received 1 unit PRBC transfusion on 3/8/2022.  · Hemoglobin today has declined to 8.1    *B12 deficiency  · Patient was previously receiving oral B12 supplementation  · Labs on 11/2/2022 with B12 level 1017, patient remained off of oral B12 supplementation.  · B12 level on 3/7/2022 greater than 2000, no need for further B12 supplementation    *Right clear-cell renal cell carcinoma, stage I ([id number])  · Status post right partial nephrectomy on 7/6/2018 for a 2.2 cm clear-cell renal cell carcinoma, grade 3, margins could not be assessed, no lymphovascular invasion.  Notation of extensive fibrosis with hemosiderin deposition reflective of fibrosis and hemorrhage.    *Confusion/somnolence  · Secondary to E. coli bacteremia with evolving sepsis  · CT head 3/7/2022 negative for acute changes  · Ammonia level normal on 3/7/2022  · Patient with ongoing confusion likely related to sepsis with metabolic encephalopathy    Plan:  1. No current indication for PRBC or platelet transfusion  2. Patient continuing on Rocephin for E. coli bacteremia/sepsis  3. Patient continuing on Retacrit 10,000 units Monday Wednesday Friday with dialysis per nephrology  4. CBC, CMP daily    Discussed with patient and wife at  bedside.         Rios Pa MD

## 2022-03-11 NOTE — PROGRESS NOTES
Name: Angelo Brown ADMIT: 3/4/2022   : 1960  PCP: Yadiel Baxter III, MD    MRN: [id number] LOS: 7 days   AGE/SEX: 61 y.o. male  ROOM:  [facility] S4/1     CC: Sepsis  Interval History: Patient moved out of ICU yesterday.  Subjective   Subjective     Review of Systems  Objective   Objective     Vitals:   Temp:  [97.5 °F (36.4 °C)-98.9 °F (37.2 °C)] 97.5 °F (36.4 °C)  Heart Rate:  [70-90] 70  Resp:  [14-16] 16  BP: ()/(60-78) 137/74    No intake/output data recorded.    Scheduled Meds:     aspirin, 81 mg, Oral, Daily  cefTRIAXone, 2 g, Intravenous, Q24H  epoetin real/real-epbx, 10,000 Units, Intravenous, Once per day on   insulin glargine, 30 Units, Subcutaneous, Nightly  insulin regular, 0-14 Units, Subcutaneous, Q6H  lactobacillus acidophilus, 1 capsule, Oral, Daily  lidocaine, 5 mL, Injection, Once  midodrine, 10 mg, Oral, TID AC  pantoprazole, 40 mg, Oral, BID AC  sevelamer, 800 mg, Oral, TID With Meals  sodium chloride, 10 mL, Intravenous, Q12H      IV Meds:        Physical Exam  Vascular: Right chest site looks okay.  Right groin catheter in place.  Palpable thrill in AV fistula.  Not pulsatile.    Data Reviewed:  CBC    Results from last 7 days   Lab Units 03/11/22  0439 03/10/22  0417 22  0412 22  1236 22  1037 22  0750 22  0639   WBC 10*3/mm3 6.64 10.08 9.31 7.94 5.60 11.56* 7.94   HEMOGLOBIN g/dL 8.1* 9.0* 9.7* 7.5* 4.2* 7.5* 7.9*   PLATELETS 10*3/mm3 36*  36* 40*  40* 36*  36* 29* 17* 30*  30* 38*  38*     BMP   Results from last 7 days   Lab Units 03/11/22  0439 03/10/22  0417 22  0412 22  0750 22  0639 22  0616 22  0420 22  1800   SODIUM mmol/L 135* 136 136 136 136 135* 137 134*   POTASSIUM mmol/L 3.6 3.6 4.2 4.4 5.7* 5.2 5.5* 4.8   CHLORIDE mmol/L 98 97* 95* 96* 96* 94* 95* 94*   CO2 mmol/L 25.1 26.0 24.0 24.1 18.8* 19.0* 18.0* 17.0*   BUN mg/dL 51* 32* 73* 59* 114* 105* 113* 107*  "  CREATININE mg/dL 6.52* 4.83* 7.77* 6.77* 13.15* 11.05* 12.32* 13.43*   GLUCOSE mg/dL 178* 132* 181* 239* 280* 291* 370* 427*   MAGNESIUM mg/dL  --   --   --   --   --   --   --  2.1   PHOSPHORUS mg/dL  --   --   --   --   --   --   --  5.6*       Most notable findings include: Platelet count 36    Active Hospital Problems:   Active Hospital Problems    Diagnosis  POA   • Noncompliance of patient with renal dialysis (HCC) [Z91.15]  Not Applicable   • Thrombocytopenia (HCC) [D69.6]  Unknown   • Liver cirrhosis (HCC) [K74.60]  Unknown   • Chronic hepatitis C without hepatic coma (HCC) [B18.2]  Unknown   • End-stage renal disease on hemodialysis (HCC) [N18.6, Z99.2]  Not Applicable   • B12 deficiency [E53.8]  Yes   • Anemia due to chronic renal failure treated with erythropoietin, stage 5 (HCC) [N18.5, D63.1]  Unknown      Resolved Hospital Problems   No resolved problems to display.      Assessment/Plan   Billin, Post Op Global  Assessment / Plan     Sepsis: E. coli bacteremia status post tunneled dialysis catheter removal.  Sepsis complicated by encephalopathy but slowly improving.  Transferred out of the unit yesterday.  Currently has a nontunneled dialysis catheter in the groin.  Plan to use this for now with attempt to reuse the arm when appropriate.  If not able to use the arm, will need another tunneled dialysis catheter when okay with ID.    Chest site from cath removal site looks fine.  I just put a Band-Aid over this.  This can be changed as needed.  Left arm has a palpable thrill.  Vein diameter is not large but seems adequate.  Is not clear to me why he had \"high venous pressures\".  Suspect it could be just his inability to cooperate with the procedure.  Probably will try to use the arm if platelet count improves.    Orders for Precedex, fentanyl, etc. still on the MAR.  I DC'd them.    Ad Weber MD  22  07:26 EST  Office Number [phone]              "

## 2022-03-11 NOTE — PROGRESS NOTES
"  Infectious Diseases Progress Note    Sabas Quick MD     [facility]  Los: 7 days  Patient Identification:  Name: Angelo Brown  Age: 61 y.o.  Sex: male  :  1960  MRN: [id number]         Primary Care Physician: Yadiel Baxter III, MD            Subjective: No new complaints.  Interval History: See consultation note.    Objective:    Scheduled Meds:aspirin, 81 mg, Oral, Daily  cefTRIAXone, 2 g, Intravenous, Q24H  epoetin real/real-epbx, 10,000 Units, Intravenous, Once per day on   insulin glargine, 30 Units, Subcutaneous, Nightly  insulin regular, 0-14 Units, Subcutaneous, Q6H  lactobacillus acidophilus, 1 capsule, Oral, Daily  lidocaine, 5 mL, Injection, Once  midodrine, 10 mg, Oral, TID AC  pantoprazole, 40 mg, Oral, BID AC  sevelamer, 800 mg, Oral, TID With Meals  sodium chloride, 10 mL, Intravenous, Q12H      Continuous Infusions:     Vital signs in last 24 hours:  Temp:  [97.5 °F (36.4 °C)-98.8 °F (37.1 °C)] 98.8 °F (37.1 °C)  Heart Rate:  [70-88] 80  Resp:  [16] 16  BP: ()/(60-75) 128/75    Intake/Output:    Intake/Output Summary (Last 24 hours) at 3/11/2022 1120  Last data filed at 3/10/2022 2010  Gross per 24 hour   Intake 480 ml   Output 100 ml   Net 380 ml       Exam:  /75 (BP Location: Right arm, Patient Position: Lying)   Pulse 80   Temp 98.8 °F (37.1 °C) (Oral)   Resp 16   Ht 172.7 cm (67.99\")   Wt 77.6 kg (171 lb 1.2 oz) Comment: not weighed by RD  SpO2 92%   BMI 26.02 kg/m²   Patient is examined using the personal protective equipment as per guidelines from infection control for this particular patient as enacted.  Hand washing was performed before and after patient interaction.  General Appearance:  Comfortable chronically ill-appearing male                          Head:    Normocephalic, without obvious abnormality, atraumatic                           Eyes:    PERRL, conjunctivae/corneas clear, EOM's intact, both eyes                  "        Throat:   Lips, tongue, gums normal; oral mucosa pink and moist                           Neck:   Supple, symmetrical, trachea midline, no JVD                         Lungs:    Clear to auscultation bilaterally, respirations unlabored                 Chest Wall:  Right IJ tunnel catheter in place                          Heart:    Regular rate and rhythm, S1 and S2 normal, no murmur, no rub                                         or gallop                  Abdomen:   Soft nontender                 Extremities:   Extremities normal, atraumatic, no cyanosis or edema                        Pulses:   Pulses palpable in all extremities                            Skin:   Skin is warm and dry,  no rashes or palpable lesions                  Neurologic: Grossly nonfocal       Data Review:    I reviewed the patient's new clinical results.  Results from last 7 days   Lab Units 03/11/22  0439 03/10/22  0417 03/09/22  0412 03/08/22  1236 03/08/22  1037 03/08/22  0750 03/07/22  0639   WBC 10*3/mm3 6.64 10.08 9.31 7.94 5.60 11.56* 7.94   HEMOGLOBIN g/dL 8.1* 9.0* 9.7* 7.5* 4.2* 7.5* 7.9*   PLATELETS 10*3/mm3 36*  36* 40*  40* 36*  36* 29* 17* 30*  30* 38*  38*     Results from last 7 days   Lab Units 03/11/22  0439 03/10/22  0417 03/09/22  0412 03/08/22  0750 03/07/22  0639 03/06/22  0616 03/05/22  0420   SODIUM mmol/L 135* 136 136 136 136 135* 137   POTASSIUM mmol/L 3.6 3.6 4.2 4.4 5.7* 5.2 5.5*   CHLORIDE mmol/L 98 97* 95* 96* 96* 94* 95*   CO2 mmol/L 25.1 26.0 24.0 24.1 18.8* 19.0* 18.0*   BUN mg/dL 51* 32* 73* 59* 114* 105* 113*   CREATININE mg/dL 6.52* 4.83* 7.77* 6.77* 13.15* 11.05* 12.32*   CALCIUM mg/dL 8.3* 8.2* 8.4* 8.1* 8.6 8.0* 7.9*   GLUCOSE mg/dL 178* 132* 181* 239* 280* 291* 370*     Microbiology Results (last 10 days)     Procedure Component Value - Date/Time    COVID PRE-OP / PRE-PROCEDURE SCREENING ORDER (NO ISOLATION) - Swab, Nasopharynx [[id number]]  (Normal) Collected: 03/08/22 1816    Lab Status:  Final result Specimen: Swab from Nasopharynx Updated: 03/08/22 2027    Narrative:      The following orders were created for panel order COVID PRE-OP / PRE-PROCEDURE SCREENING ORDER (NO ISOLATION) - Swab, Nasopharynx.  Procedure                               Abnormality         Status                     ---------                               -----------         ------                     COVID-19,[facility] IN-HOUSE...[[id number]]  Normal              Final result                 Please view results for these tests on the individual orders.    COVID-19,[facility] IN-HOUSE CEPHEID/NIKHIL NP SWAB IN TRANSPORT MEDIA 8-12 HR TAT - Swab, Nasopharynx [[id number]]  (Normal) Collected: 03/08/22 1816    Lab Status: Final result Specimen: Swab from Nasopharynx Updated: 03/08/22 2027     COVID19 Not Detected    Narrative:      Fact sheet for providers: https://www.fda.gov/media/994304/download    Fact sheet for patients: https://www.fda.gov/media/453376/download    Test performed by PCR.    Blood Culture - Blood, Blood, Central Line [[id number]]  (Abnormal) Collected: 03/07/22 1131    Lab Status: Final result Specimen: Blood, Central Line Updated: 03/10/22 0749     Blood Culture Escherichia coli     Isolated from Aerobic and Anaerobic Bottles     Gram Stain Anaerobic Bottle Gram negative bacilli      Aerobic Bottle Gram negative bacilli    Narrative:      Refer to previous blood culture collected on 3/7 for corbin's      Blood Culture - Blood, Blood, Central Line [[id number]]  (Abnormal)  (Susceptibility) Collected: 03/07/22 1131    Lab Status: Final result Specimen: Blood, Central Line Updated: 03/10/22 0748     Blood Culture Escherichia coli     Isolated from Aerobic and Anaerobic Bottles     Gram Stain Anaerobic Bottle Gram negative bacilli      Aerobic Bottle Gram negative bacilli    Susceptibility      Escherichia coli      CORBIN      Ampicillin Susceptible      Ampicillin + Sulbactam Susceptible      Cefepime Susceptible      Ceftazidime  Susceptible      Ceftriaxone Susceptible      Gentamicin Susceptible      Levofloxacin Susceptible      Piperacillin + Tazobactam Susceptible      Trimethoprim + Sulfamethoxazole Susceptible                       Susceptibility Comments     Escherichia coli    Cefazolin sensitivity will not be reported for Enterobacteriaceae in non-urine isolates. If cefazolin is preferred, please call the microbiology lab to request an E-test.  With the exception of urinary-sourced infections, aminoglycosides should not be used as monotherapy.             Blood Culture ID, PCR - Blood, Blood, Central Line [[id number]]  (Abnormal) Collected: 03/07/22 1131    Lab Status: Final result Specimen: Blood, Central Line Updated: 03/07/22 2221     BCID, PCR Eschericia coli. Identification by BCID2 PCR.     BOTTLE TYPE Anaerobic Bottle    COVID PRE-OP / PRE-PROCEDURE SCREENING ORDER (NO ISOLATION) - Swab, Nasopharynx [[id number]]  (Normal) Collected: 03/04/22 1801    Lab Status: Final result Specimen: Swab from Nasopharynx Updated: 03/04/22 1851    Narrative:      The following orders were created for panel order COVID PRE-OP / PRE-PROCEDURE SCREENING ORDER (NO ISOLATION) - Swab, Nasopharynx.  Procedure                               Abnormality         Status                     ---------                               -----------         ------                     COVID-19,[facility] IN-HOUSE...[[id number]]  Normal              Final result                 Please view results for these tests on the individual orders.    COVID-19,[facility] IN-HOUSE CEPHEID/NIKHIL NP SWAB IN TRANSPORT MEDIA 8-12 HR TAT - Swab, Nasopharynx [[id number]]  (Normal) Collected: 03/04/22 1801    Lab Status: Final result Specimen: Swab from Nasopharynx Updated: 03/04/22 1851     COVID19 Not Detected    Narrative:      Fact sheet for providers: https://www.fda.gov/media/193253/download     Fact sheet for patients: https://www.fda.gov/media/193215/download            Assessment:     Anemia due to chronic renal failure treated with erythropoietin, stage 5 (HCC)    B12 deficiency    End-stage renal disease on hemodialysis (HCC)    Thrombocytopenia (HCC)    Liver cirrhosis (HCC)    Chronic hepatitis C without hepatic coma (HCC)    Noncompliance of patient with renal dialysis (HCC)  1-systemic gram-negative bacteremic sepsis either due to              -Infected tunneled catheter versus              -Evolving intra-abdominal process with subsequent bacteremia and secondary seeding.  2-end-stage renal disease  3-history of cirrhosis of the liver due to hepatitis C  4-thrombocytopenia  5-other diagnosis per primary team.     Recommendations/Discussions:  · Continue present treatment with IV Rocephin and follow-up on the sensitivity of the E. coli.  · Follow-up repeat blood cultures after the catheter is replaced.  · Once considered stable consider CT scan of the abdomen and pelvis    Sabas Quick MD  3/11/2022  11:20 EST    Much of this encounter note is an electronic transcription/translation of spoken language to printed text. The electronic translation of spoken language may permit erroneous, or at times, nonsensical words or phrases to be inadvertently transcribed; Although I have reviewed the note for such errors, some may still exist

## 2022-03-12 LAB
DEPRECATED RDW RBC AUTO: 51.8 FL (ref 37–54)
ERYTHROCYTE [DISTWIDTH] IN BLOOD BY AUTOMATED COUNT: 14.6 % (ref 12.3–15.4)
GLUCOSE BLDC GLUCOMTR-MCNC: 158 MG/DL (ref 70–130)
GLUCOSE BLDC GLUCOMTR-MCNC: 184 MG/DL (ref 70–130)
GLUCOSE BLDC GLUCOMTR-MCNC: 203 MG/DL (ref 70–130)
GLUCOSE BLDC GLUCOMTR-MCNC: 222 MG/DL (ref 70–130)
GLUCOSE BLDC GLUCOMTR-MCNC: 299 MG/DL (ref 70–130)
HCT VFR BLD AUTO: 26.4 % (ref 37.5–51)
HGB BLD-MCNC: 8.1 G/DL (ref 13–17.7)
HYPOCHROMIA BLD QL: ABNORMAL
LYMPHOCYTES # BLD MANUAL: 0.46 10*3/MM3 (ref 0.7–3.1)
LYMPHOCYTES NFR BLD MANUAL: 6.1 % (ref 5–12)
MCH RBC QN AUTO: 29.1 PG (ref 26.6–33)
MCHC RBC AUTO-ENTMCNC: 30.7 G/DL (ref 31.5–35.7)
MCV RBC AUTO: 95 FL (ref 79–97)
MICROCYTES BLD QL: ABNORMAL
MONOCYTES # BLD: 0.46 10*3/MM3 (ref 0.1–0.9)
NEUTROPHILS # BLD AUTO: 6.66 10*3/MM3 (ref 1.7–7)
NEUTROPHILS NFR BLD MANUAL: 87.8 % (ref 42.7–76)
PLAT MORPH BLD: NORMAL
PLATELET # BLD AUTO: 44 10*3/MM3 (ref 140–450)
PLATELET # BLD AUTO: 44 10*3/MM3 (ref 140–450)
PLATELETS.RETICULATED NFR BLD AUTO: 21.6 % (ref 0.9–6.5)
PMV BLD AUTO: 13.7 FL (ref 6–12)
POLYCHROMASIA BLD QL SMEAR: ABNORMAL
RBC # BLD AUTO: 2.78 10*6/MM3 (ref 4.14–5.8)
VARIANT LYMPHS NFR BLD MANUAL: 6.1 % (ref 19.6–45.3)
WBC MORPH BLD: NORMAL
WBC NRBC COR # BLD: 7.58 10*3/MM3 (ref 3.4–10.8)

## 2022-03-12 PROCEDURE — 63710000001 INSULIN LISPRO (HUMAN) PER 5 UNITS: Performed by: HOSPITALIST

## 2022-03-12 PROCEDURE — 63710000001 INSULIN REGULAR HUMAN PER 5 UNITS: Performed by: SURGERY

## 2022-03-12 PROCEDURE — 99232 SBSQ HOSP IP/OBS MODERATE 35: CPT | Performed by: INTERNAL MEDICINE

## 2022-03-12 PROCEDURE — 82962 GLUCOSE BLOOD TEST: CPT

## 2022-03-12 PROCEDURE — 85055 RETICULATED PLATELET ASSAY: CPT | Performed by: SURGERY

## 2022-03-12 PROCEDURE — 87040 BLOOD CULTURE FOR BACTERIA: CPT | Performed by: HOSPITALIST

## 2022-03-12 PROCEDURE — 85025 COMPLETE CBC W/AUTO DIFF WBC: CPT | Performed by: SURGERY

## 2022-03-12 PROCEDURE — 85007 BL SMEAR W/DIFF WBC COUNT: CPT | Performed by: SURGERY

## 2022-03-12 PROCEDURE — 25010000002 CEFTRIAXONE PER 250 MG: Performed by: HOSPITALIST

## 2022-03-12 RX ORDER — INSULIN LISPRO 100 [IU]/ML
0-7 INJECTION, SOLUTION INTRAVENOUS; SUBCUTANEOUS
Status: DISCONTINUED | OUTPATIENT
Start: 2022-03-12 | End: 2022-03-12

## 2022-03-12 RX ORDER — INSULIN LISPRO 100 [IU]/ML
5 INJECTION, SOLUTION INTRAVENOUS; SUBCUTANEOUS
Status: DISCONTINUED | OUTPATIENT
Start: 2022-03-12 | End: 2022-03-13

## 2022-03-12 RX ORDER — OXYCODONE AND ACETAMINOPHEN 10; 325 MG/1; MG/1
1 TABLET ORAL EVERY 4 HOURS PRN
Status: DISCONTINUED | OUTPATIENT
Start: 2022-03-12 | End: 2022-03-18 | Stop reason: HOSPADM

## 2022-03-12 RX ORDER — INSULIN LISPRO 100 [IU]/ML
0-7 INJECTION, SOLUTION INTRAVENOUS; SUBCUTANEOUS
Status: DISCONTINUED | OUTPATIENT
Start: 2022-03-12 | End: 2022-03-15 | Stop reason: SDUPTHER

## 2022-03-12 RX ADMIN — PANTOPRAZOLE SODIUM 40 MG: 40 TABLET, DELAYED RELEASE ORAL at 08:13

## 2022-03-12 RX ADMIN — OXYCODONE HYDROCHLORIDE AND ACETAMINOPHEN 1 TABLET: 10; 325 TABLET ORAL at 15:59

## 2022-03-12 RX ADMIN — INSULIN LISPRO 2 UNITS: 100 INJECTION, SOLUTION INTRAVENOUS; SUBCUTANEOUS at 17:06

## 2022-03-12 RX ADMIN — PANTOPRAZOLE SODIUM 40 MG: 40 TABLET, DELAYED RELEASE ORAL at 17:07

## 2022-03-12 RX ADMIN — INSULIN LISPRO 2 UNITS: 100 INJECTION, SOLUTION INTRAVENOUS; SUBCUTANEOUS at 20:49

## 2022-03-12 RX ADMIN — MIDODRINE HYDROCHLORIDE 10 MG: 5 TABLET ORAL at 08:12

## 2022-03-12 RX ADMIN — SEVELAMER CARBONATE 800 MG: 800 TABLET, FILM COATED ORAL at 17:07

## 2022-03-12 RX ADMIN — CEFTRIAXONE 2 G: 2 INJECTION, POWDER, FOR SOLUTION INTRAMUSCULAR; INTRAVENOUS at 22:33

## 2022-03-12 RX ADMIN — Medication 1 CAPSULE: at 08:12

## 2022-03-12 RX ADMIN — INSULIN HUMAN 5 UNITS: 100 INJECTION, SOLUTION PARENTERAL at 08:12

## 2022-03-12 RX ADMIN — OXYCODONE AND ACETAMINOPHEN 1 TABLET: 5; 325 TABLET ORAL at 15:42

## 2022-03-12 RX ADMIN — ALPRAZOLAM 1 MG: 0.5 TABLET ORAL at 17:05

## 2022-03-12 RX ADMIN — ALPRAZOLAM 1 MG: 0.5 TABLET ORAL at 11:31

## 2022-03-12 RX ADMIN — Medication 10 ML: at 20:47

## 2022-03-12 RX ADMIN — Medication 10 ML: at 08:13

## 2022-03-12 RX ADMIN — INSULIN GLARGINE-YFGN 15 UNITS: 100 INJECTION, SOLUTION SUBCUTANEOUS at 20:50

## 2022-03-12 RX ADMIN — SEVELAMER CARBONATE 800 MG: 800 TABLET, FILM COATED ORAL at 11:27

## 2022-03-12 RX ADMIN — INSULIN LISPRO 5 UNITS: 100 INJECTION, SOLUTION INTRAVENOUS; SUBCUTANEOUS at 17:06

## 2022-03-12 RX ADMIN — INSULIN HUMAN 5 UNITS: 100 INJECTION, SOLUTION PARENTERAL at 11:27

## 2022-03-12 RX ADMIN — ALPRAZOLAM 1 MG: 0.5 TABLET ORAL at 22:33

## 2022-03-12 RX ADMIN — ASPIRIN 81 MG: 81 TABLET, CHEWABLE ORAL at 08:11

## 2022-03-12 RX ADMIN — OXYCODONE HYDROCHLORIDE AND ACETAMINOPHEN 1 TABLET: 10; 325 TABLET ORAL at 22:33

## 2022-03-12 RX ADMIN — INSULIN HUMAN 8 UNITS: 100 INJECTION, SOLUTION PARENTERAL at 02:21

## 2022-03-12 RX ADMIN — MIDODRINE HYDROCHLORIDE 10 MG: 5 TABLET ORAL at 17:07

## 2022-03-12 RX ADMIN — OXYCODONE AND ACETAMINOPHEN 1 TABLET: 5; 325 TABLET ORAL at 11:31

## 2022-03-12 RX ADMIN — MIDODRINE HYDROCHLORIDE 10 MG: 5 TABLET ORAL at 11:27

## 2022-03-12 RX ADMIN — SEVELAMER CARBONATE 800 MG: 800 TABLET, FILM COATED ORAL at 08:12

## 2022-03-12 NOTE — PROGRESS NOTES
Name: Angelo Brown ADMIT: 3/4/2022   : 1960  PCP: Yadiel Baxter III, MD    MRN: [id number] LOS: 8 days   AGE/SEX: 61 y.o. male  ROOM:  [place] [address]     CC: Sepsis  Interval History: Had dialysis yesterday.  Mental status has improved but remains uncooperative with staff.  Subjective   Subjective     Review of Systems  Objective   Objective     Vitals:   Temp:  [98.1 °F (36.7 °C)-99 °F (37.2 °C)] 98.5 °F (36.9 °C)  Heart Rate:  [68-97] 72  Resp:  [18-20] 18  BP: (103-172)/(63-86) 120/63    No intake/output data recorded.    Scheduled Meds:     aspirin, 81 mg, Oral, Daily  cefTRIAXone, 2 g, Intravenous, Q24H  epoetin real/real-epbx, 10,000 Units, Intravenous, Once per day on   insulin glargine, 30 Units, Subcutaneous, Nightly  insulin regular, 0-14 Units, Subcutaneous, Q6H  lactobacillus acidophilus, 1 capsule, Oral, Daily  lidocaine, 5 mL, Injection, Once  midodrine, 10 mg, Oral, TID AC  pantoprazole, 40 mg, Oral, BID AC  sevelamer, 800 mg, Oral, TID With Meals  sodium chloride, 10 mL, Intravenous, Q12H      IV Meds:        Physical Exam  Vascular: Palpable thrill    Data Reviewed:  CBC    Results from last 7 days   Lab Units 03/11/22  0439 03/10/22  0417 22  0412 22  1236 22  1037 22  0750 22  0639   WBC 10*3/mm3 6.64 10.08 9.31 7.94 5.60 11.56* 7.94   HEMOGLOBIN g/dL 8.1* 9.0* 9.7* 7.5* 4.2* 7.5* 7.9*   PLATELETS 10*3/mm3 36*  36* 40*  40* 36*  36* 29* 17* 30*  30* 38*  38*     BMP   Results from last 7 days   Lab Units 22  0439 03/10/22  0417 22  0412 22  0750 22  0639 22  0616   SODIUM mmol/L 135* 136 136 136 136 135*   POTASSIUM mmol/L 3.6 3.6 4.2 4.4 5.7* 5.2   CHLORIDE mmol/L 98 97* 95* 96* 96* 94*   CO2 mmol/L 25.1 26.0 24.0 24.1 18.8* 19.0*   BUN mg/dL 51* 32* 73* 59* 114* 105*   CREATININE mg/dL 6.52* 4.83* 7.77* 6.77* 13.15* 11.05*   GLUCOSE mg/dL 178* 132* 181* 239* 280* 291*       Most notable  findings include: Platelet count 36 yesterday    Active Hospital Problems:   Active Hospital Problems    Diagnosis  POA   • Noncompliance of patient with renal dialysis (HCC) [Z91.15]  Not Applicable   • Thrombocytopenia (HCC) [D69.6]  Unknown   • Liver cirrhosis (HCC) [K74.60]  Unknown   • Chronic hepatitis C without hepatic coma (HCC) [B18.2]  Unknown   • End-stage renal disease on hemodialysis (HCC) [N18.6, Z99.2]  Not Applicable   • B12 deficiency [E53.8]  Yes   • Anemia due to chronic renal failure treated with erythropoietin, stage 5 (HCC) [N18.5, D63.1]  Unknown      Resolved Hospital Problems   No resolved problems to display.      Assessment/Plan   Billin, Post Op Global  Assessment / Plan     Sepsis: E. coli bacteremia status post tunneled dialysis catheter removal. Currently has a nontunneled dialysis catheter in the groin.  Plan to use this for now with attempt to reuse the arm when appropriate.  He is now not encephalopathic so that alone will help but platelet count remains quite low.    Would recommend maybe waiting till platelet count is greater than 50 before accessing AV fistula.  Hopefully can avoid a new tunneled dialysis catheter.    Ad Weber MD  22  09:11 EST  Office Number [phone]

## 2022-03-12 NOTE — PROGRESS NOTES
"Daily progress note    Chief complaint  Doing better  No specific complaints  Family at bedside  Denies chest pain shortness of breath palpitation  Still have some difficulty with swallowing    History of present illness  61-year white male with very complex past medical history including end-stage renal disease on hemodialysis CVA diabetes mellitus coronary artery disease hypertension chronic anemia presented to [facility] emergency room after missing hemodialysis with generalized weakness.  Patient is poor historian but denies any headache dizziness chest pain shortness of breath abdominal pain vomiting diarrhea.  Patient does have nausea and weak all over.  Patient has no cough fever night sweats weight loss.  Patient evaluated in ER found to have high potassium need hemodialysis admit for management.  Patient also found to have low platelets which is new.  Patient has no bleeding whatsoever.     REVIEW OF SYSTEMS  Unremarkable except generalized weakness     PHYSICAL EXAM  Blood pressure 126/71, pulse 70, temperature 98.2 °F (36.8 °C), temperature source Oral, resp. rate 18, height 172.7 cm (67.99\"), weight 73.5 kg (162 lb 0.6 oz), SpO2 97 %.    GENERAL: 61-year-old chronically ill-appearing male in mild distress  HENT: NCAT, neck supple, trachea midline  EYES: no scleral icterus, PERRL, normal conjunctivae  CV: regular rhythm, regular rate, no murmur  RESPIRATORY: unlabored effort, mild Rales in bases bilaterally  ABDOMEN: soft, nontender, nondistended, bowel sounds present  MUSCULOSKELETAL: no gross deformity, no pedal edema, no calf tenderness  NEURO: Sleepy but easily arousable,  sensory and motor function of extremities intact, speech clear, mental status normal  SKIN: warm, dry, no rash  PSYCH:  Look depressed     LAB RESULTS  Lab Results (last 24 hours)     Procedure Component Value Units Date/Time    Blood Culture - Blood, Hand, Right [[id number]] Collected: 03/12/22 1118    Specimen: Blood " from Hand, Right Updated: 03/12/22 1139    POC Glucose Once [[id number]]  (Abnormal) Collected: 03/12/22 1105    Specimen: Blood Updated: 03/12/22 1107     Glucose 222 mg/dL      Comment: Meter: [id number] : [id number] Og Renee JULIO       Blood Culture - Blood, Arm, Right [[id number]] Collected: 03/12/22 1023    Specimen: Blood from Arm, Right Updated: 03/12/22 1031    Manual Differential [[id number]]  (Abnormal) Collected: 03/12/22 0830    Specimen: Blood Updated: 03/12/22 0916     Neutrophil % 87.8 %      Lymphocyte % 6.1 %      Monocyte % 6.1 %      Neutrophils Absolute 6.66 10*3/mm3      Lymphocytes Absolute 0.46 10*3/mm3      Monocytes Absolute 0.46 10*3/mm3      Hypochromia Slight/1+     Microcytes Slight/1+     Polychromasia Slight/1+     WBC Morphology Normal     Platelet Morphology Normal    CBC & Differential [[id number]]  (Abnormal) Collected: 03/12/22 0830    Specimen: Blood Updated: 03/12/22 0916    Narrative:      The following orders were created for panel order CBC & Differential.  Procedure                               Abnormality         Status                     ---------                               -----------         ------                     CBC Auto Differential[[id number]]        Abnormal            Final result                 Please view results for these tests on the individual orders.    CBC Auto Differential [[id number]]  (Abnormal) Collected: 03/12/22 0830    Specimen: Blood Updated: 03/12/22 0916     WBC 7.58 10*3/mm3      RBC 2.78 10*6/mm3      Hemoglobin 8.1 g/dL      Hematocrit 26.4 %      MCV 95.0 fL      MCH 29.1 pg      MCHC 30.7 g/dL      RDW 14.6 %      RDW-SD 51.8 fl      MPV 13.7 fL      Platelets 44 10*3/mm3     Immature Platelet Fraction [[id number]]  (Abnormal) Collected: 03/12/22 0830    Specimen: Blood Updated: 03/12/22 0916     IPF 21.60 %      Platelets 44 10*3/mm3     POC Glucose Once [[id number]]  (Abnormal) Collected: 03/12/22 0647    Specimen: Blood Updated:  03/12/22 0648     Glucose 203 mg/dL      Comment: Meter: [id number] : [id number] Cadena Karma PCA       POC Glucose Once [[id number]]  (Abnormal) Collected: 03/12/22 0157    Specimen: Blood Updated: 03/12/22 0159     Glucose 299 mg/dL      Comment: Meter: [id number] : [id number] Cadena Karma PCA       POC Glucose Once [[id number]]  (Abnormal) Collected: 03/11/22 2118    Specimen: Blood Updated: 03/11/22 2119     Glucose 175 mg/dL      Comment: Meter: [id number] : [id number] Talasim Farida NA           Imaging Results (Last 24 Hours)     ** No results found for the last 24 hours. **             ECG 12 Lead  Component   Ref Range & Units 3/5/22 0945 3/4/22 1825 1/26/22 1729 12/2/21 0309 4/28/21 1722 4/8/21 1341   QT Interval   ms 271 P  392  321  395  435  444              HEART RATE= 187  bpm  RR Interval= 320  ms  MA Interval= 132  ms  P Horizontal Axis= 90  deg  P Front Axis= 30  deg  QRSD Interval= 95  ms  QT Interval= 271  ms  QRS Axis= 91  deg  T Wave Axis= -72  deg  - ABNORMAL ECG -  Supraventricular tachycardia  Borderline right axis deviation  Repolarization abnormality, prob rate related             Current Facility-Administered Medications:   •  acetaminophen (TYLENOL) tablet 650 mg, 650 mg, Oral, Q6H PRN, Ad Weber MD, 650 mg at 03/08/22 1904  •  ALPRAZolam (XANAX) tablet 1 mg, 1 mg, Oral, 4x Daily PRN, Ad Weber MD, 1 mg at 03/12/22 1131  •  aspirin chewable tablet 81 mg, 81 mg, Oral, Daily, Ad Weber MD, 81 mg at 03/12/22 0811  •  cefTRIAXone (ROCEPHIN) 2 g in sodium chloride 0.9 % 100 mL IVPB-VTB, 2 g, Intravenous, Q24H, Karl Swift MD, Last Rate: 200 mL/hr at 03/11/22 2258, 2 g at 03/11/22 2258  •  dextrose (D50W) (25 g/50 mL) IV injection 25 g, 25 g, Intravenous, Q15 Min PRN, Ad Weber MD  •  dextrose (GLUTOSE) oral gel 15 g, 15 g, Oral, Q15 Min PRN, Ad Weber MD  •  epoetin real-epbx (RETACRIT) injection 10,000 Units,  10,000 Units, Intravenous, Once per day on Mon Wed Fri, Ad Weber MD, 10,000 Units at 03/11/22 0930  •  glucagon (human recombinant) (GLUCAGEN DIAGNOSTIC) injection 1 mg, 1 mg, Intramuscular, Q15 Min PRN, Ad Weber MD  •  heparin (porcine) injection 3,800 Units, 3,800 Units, Intracatheter, PRN, Ad Weber MD, 3,800 Units at 03/09/22 2140  •  insulin glargine (LANTUS, SEMGLEE) injection 30 Units, 30 Units, Subcutaneous, Nightly, Ad Weber MD, 30 Units at 03/10/22 2148  •  insulin regular (humuLIN R,novoLIN R) injection 0-14 Units, 0-14 Units, Subcutaneous, Q6H, Ad Weber MD, 5 Units at 03/12/22 1127  •  lactobacillus acidophilus (RISAQUAD) capsule 1 capsule, 1 capsule, Oral, Daily, Ad Weber MD, 1 capsule at 03/12/22 0812  •  lidocaine (XYLOCAINE) 1 % injection 5 mL, 5 mL, Injection, Once, Ad Weber MD  •  midodrine (PROAMATINE) tablet 10 mg, 10 mg, Oral, TID AC, Ad Weber MD, 10 mg at 03/12/22 1127  •  oxyCODONE-acetaminophen (PERCOCET) 5-325 MG per tablet 1 tablet, 1 tablet, Oral, Q4H PRN, Mai Escoto MD, 1 tablet at 03/12/22 1131  •  pantoprazole (PROTONIX) EC tablet 40 mg, 40 mg, Oral, BID AC, Ad Weber MD, 40 mg at 03/12/22 0813  •  promethazine (PHENERGAN) tablet 12.5 mg, 12.5 mg, Oral, Q4H PRN, Ad Weber MD  •  sevelamer (RENVELA) tablet 800 mg, 800 mg, Oral, TID With Meals, Ad Weber MD, 800 mg at 03/12/22 1127  •  [COMPLETED] Insert peripheral IV, , , Once **AND** sodium chloride 0.9 % flush 10 mL, 10 mL, Intravenous, PRN, Ad Weber MD  •  sodium chloride 0.9 % flush 10 mL, 10 mL, Intravenous, Q12H, Ad Weber MD, 10 mL at 03/12/22 0813  •  sodium chloride 0.9 % flush 10 mL, 10 mL, Intravenous, PRN, Ad Weber MD  •  tiZANidine (ZANAFLEX) tablet 4 mg, 4 mg, Oral, Q8H PRN, Ad Weber MD     ASSESSMENT  E. coli  bacteremia with sepsis  Line sepsis status post removal of old hemodialysis catheter and placement of new  End-stage renal disease with noncompliance with hemodialysis  Supraventricular tachycardia resolved  Insulin-dependent diabetes mellitus  History of hypertension with low blood pressure  Hyperlipidemia  History of CVA  Chronic anemia and thrombocytopenia  Anxiety disorder  Gastroesophageal reflux disease    PLAN  CPM  IV antibiotics   Swallow eval  Hemodialysis per nephrology  Vascular surgery to follow patient  Insulin dose adjusted  Adjust home medications  Stress ulcer DVT prophylaxis  Supportive care  PT OT  Discussed with nursing staff and wife  Follow closely and further recommendation according to hospital course    ARIEL MCGOVERN MD

## 2022-03-12 NOTE — PROGRESS NOTES
: The chart reviewed.  Patient with ESRD admitted with sepsis mental status changes improving now had dialysis yesterday.    Vital Signs  Vitals:    03/12/22 1500   BP:    Pulse: 69   Resp:    Temp:    SpO2: 96%     I/O this shift:  In: 240 [P.O.:240]  Out: -   I/O last 3 completed shifts:  In: 220 [P.O.:120; IV Piggyback:100]  Out: 1000 [Other:1000]      Physical Exam:    General: In no acute distress  HEENT:  Normocephalic, Atraumatic, EOMI, PERRLA, NO icterus,  Neck:  Supple, No JVD, No Carotid artery bruit, No lymphadenopathy  Chest: Clear to auscultation bilaterally,  Cardiovascular: Regular rate and rhythm. Positive S1 & S2, No rub, murmur or gallop.  Abdominal: Soft, nontender, no palpable organomegaly, no abdominal bruit, positive bowel sounds  Musculoskeletal: No joint tenderness or swelling, good range of motion, Adequate muscle strength on both sides, no cyanosis, clubbing or edema on lower extremities.  Neuro: Comfortable responding to questions      Review of Systems:   CNS: Denied headaches, blurred vision, tingling or numbness in any part of body. No weakness or any impaired speech.  CVS: No chest pain or shortness of breath, No orthopnea or PND or exertional dyspnea,  Pulmonary: Denies shortness of breath, coughs, hematemesis, night sweats or weight loss.  GI: Denies diarrhea, nausea, vomiting. Denies weight loss, hematemesis, melena.  : Denies dysuria, frequency or hesitancy of urination  Vascular: Denies claudication, resting pain, tingling numbness or weakness in any part of the body.  Musculoskeletal: Denies Joint tenderness or pain, denies stiffness in joints, denies fever or weight loss, or skin rashes.    Current Labs  [unfilled]  Lab Results (last 24 hours)     Procedure Component Value Units Date/Time    POC Glucose Once [[id number]]  (Abnormal) Collected: 03/12/22 1639    Specimen: Blood Updated: 03/12/22 1640     Glucose 184 mg/dL      Comment: Meter: [id number] : [id number]  Og KIM       Blood Culture - Blood, Hand, Right [[id number]] Collected: 03/12/22 1118    Specimen: Blood from Hand, Right Updated: 03/12/22 1139    POC Glucose Once [[id number]]  (Abnormal) Collected: 03/12/22 1105    Specimen: Blood Updated: 03/12/22 1107     Glucose 222 mg/dL      Comment: Meter: [id number] : [id number] Og Renee JULIO       Blood Culture - Blood, Arm, Right [[id number]] Collected: 03/12/22 1023    Specimen: Blood from Arm, Right Updated: 03/12/22 1031    Manual Differential [[id number]]  (Abnormal) Collected: 03/12/22 0830    Specimen: Blood Updated: 03/12/22 0916     Neutrophil % 87.8 %      Lymphocyte % 6.1 %      Monocyte % 6.1 %      Neutrophils Absolute 6.66 10*3/mm3      Lymphocytes Absolute 0.46 10*3/mm3      Monocytes Absolute 0.46 10*3/mm3      Hypochromia Slight/1+     Microcytes Slight/1+     Polychromasia Slight/1+     WBC Morphology Normal     Platelet Morphology Normal    CBC & Differential [[id number]]  (Abnormal) Collected: 03/12/22 0830    Specimen: Blood Updated: 03/12/22 0916    Narrative:      The following orders were created for panel order CBC & Differential.  Procedure                               Abnormality         Status                     ---------                               -----------         ------                     CBC Auto Differential[[id number]]        Abnormal            Final result                 Please view results for these tests on the individual orders.    CBC Auto Differential [[id number]]  (Abnormal) Collected: 03/12/22 0830    Specimen: Blood Updated: 03/12/22 0916     WBC 7.58 10*3/mm3      RBC 2.78 10*6/mm3      Hemoglobin 8.1 g/dL      Hematocrit 26.4 %      MCV 95.0 fL      MCH 29.1 pg      MCHC 30.7 g/dL      RDW 14.6 %      RDW-SD 51.8 fl      MPV 13.7 fL      Platelets 44 10*3/mm3     Immature Platelet Fraction [[id number]]  (Abnormal) Collected: 03/12/22 0830    Specimen: Blood Updated: 03/12/22 0916     IPF 21.60 %       Platelets 44 10*3/mm3     POC Glucose Once [[id number]]  (Abnormal) Collected: 03/12/22 0647    Specimen: Blood Updated: 03/12/22 0648     Glucose 203 mg/dL      Comment: Meter: [id number] : [id number] Cadena Karma PCA       POC Glucose Once [[id number]]  (Abnormal) Collected: 03/12/22 0157    Specimen: Blood Updated: 03/12/22 0159     Glucose 299 mg/dL      Comment: Meter: [id number] : [id number] Cadena Karma PCA       POC Glucose Once [[id number]]  (Abnormal) Collected: 03/11/22 2118    Specimen: Blood Updated: 03/11/22 2119     Glucose 175 mg/dL      Comment: Meter: [id number] : [id number] Adarshivana Iqbal JULIO           Current Radiology  Imaging Results (Last 24 Hours)     ** No results found for the last 24 hours. **        Current Meds    Current Facility-Administered Medications:   •  acetaminophen (TYLENOL) tablet 650 mg, 650 mg, Oral, Q6H PRN, Mai Escoto MD, 650 mg at 03/08/22 1904  •  ALPRAZolam (XANAX) tablet 1 mg, 1 mg, Oral, 4x Daily PRN, Mai Escoto MD, 1 mg at 03/12/22 1705  •  aspirin chewable tablet 81 mg, 81 mg, Oral, Daily, Mai Escoto MD, 81 mg at 03/12/22 0811  •  cefTRIAXone (ROCEPHIN) 2 g in sodium chloride 0.9 % 100 mL IVPB-VTB, 2 g, Intravenous, Q24H, Karl Swift MD, Last Rate: 200 mL/hr at 03/11/22 2258, 2 g at 03/11/22 2258  •  dextrose (D50W) (25 g/50 mL) IV injection 25 g, 25 g, Intravenous, Q15 Min PRN, Mai Escoto MD  •  dextrose (GLUTOSE) oral gel 15 g, 15 g, Oral, Q15 Min PRN, Mai Escoto MD  •  epoetin real-epbx (RETACRIT) injection 10,000 Units, 10,000 Units, Intravenous, Once per day on Mon Wed Fri, Mai Escoto MD, 10,000 Units at 03/11/22 0930  •  glucagon (human recombinant) (GLUCAGEN DIAGNOSTIC) injection 1 mg, 1 mg, Intramuscular, Q15 Min PRN, Mai Escoto MD  •  heparin (porcine) injection 3,800 Units, 3,800 Units, Intracatheter, PRN, Mai Escoto MD, 3,800 Units at 03/09/22 2140  •  insulin glargine (LANTUS, SEMGLEE) injection 15  Units, 15 Units, Subcutaneous, Nightly, Karl Swift MD  •  insulin lispro (ADMELOG) injection 0-7 Units, 0-7 Units, Subcutaneous, 4x Daily With Meals & Nightly, Karl Swift MD, 2 Units at 03/12/22 1706  •  insulin lispro (ADMELOG) injection 5 Units, 5 Units, Subcutaneous, TID With Meals, Karl Swift MD, 5 Units at 03/12/22 1706  •  lactobacillus acidophilus (RISAQUAD) capsule 1 capsule, 1 capsule, Oral, Daily, Mai Escoto MD, 1 capsule at 03/12/22 0812  •  lidocaine (XYLOCAINE) 1 % injection 5 mL, 5 mL, Injection, Once, Mai Escoto MD  •  midodrine (PROAMATINE) tablet 10 mg, 10 mg, Oral, TID AC, Mai Escoto MD, 10 mg at 03/12/22 1707  •  oxyCODONE-acetaminophen (PERCOCET)  MG per tablet 1 tablet, 1 tablet, Oral, Q4H PRN, Karl Swift MD, 1 tablet at 03/12/22 1559  •  pantoprazole (PROTONIX) EC tablet 40 mg, 40 mg, Oral, BID AC, Mai Escoto MD, 40 mg at 03/12/22 1707  •  promethazine (PHENERGAN) tablet 12.5 mg, 12.5 mg, Oral, Q4H PRN, Mai Escoto MD  •  sevelamer (RENVELA) tablet 800 mg, 800 mg, Oral, TID With Meals, Mai Escoto MD, 800 mg at 03/12/22 1707  •  [COMPLETED] Insert peripheral IV, , , Once **AND** sodium chloride 0.9 % flush 10 mL, 10 mL, Intravenous, PRN, Mai Escoto MD  •  sodium chloride 0.9 % flush 10 mL, 10 mL, Intravenous, Q12H, Mai Escoto MD, 10 mL at 03/12/22 0813  •  sodium chloride 0.9 % flush 10 mL, 10 mL, Intravenous, PRN, Mai Escoto MD  •  tiZANidine (ZANAFLEX) tablet 4 mg, 4 mg, Oral, Q8H PRN, Mai Escoto MD              Patient Active Problem List   Diagnosis   • Acute CVA (cerebrovascular accident) (HCC)   • Proteinuria   • Anemia due to chronic renal failure treated with erythropoietin, stage 5 (HCC)   • Thrombocytopenia (HCC)   • Pancytopenia, acquired (HCC)   • History of hepatitis C virus infection   • B12 deficiency   • Hyperkalemia   • Cancer of kidney parenchyma, right (HCC)   • Umbilical hernia without obstruction and without  gangrene   • Anemia of chronic renal failure, stage 3 (moderate) (HCC)   • Chronic kidney disease, stage III (moderate) (HCC)   • Acute renal failure superimposed on chronic kidney disease (HCC)   • Toxic metabolic encephalopathy   • Solitary kidney   • Acute metabolic encephalopathy   • Adverse effect of iron   • Iron deficiency anemia   • Acute renal failure with acute tubular necrosis superimposed on stage 4 chronic kidney disease (HCC)   • Hemodialysis catheter dysfunction (HCC)   • ESRD (end stage renal disease) (HCC)   • Dependence on renal dialysis (HCC)   • Complication of vascular access for dialysis   • History of CVA (cerebrovascular accident)   • CAD (coronary artery disease)   • Type 2 diabetes mellitus with hyperglycemia, without long-term current use of insulin (HCC)   • GERD (gastroesophageal reflux disease)   • HLD (hyperlipidemia)   • HTN (hypertension)   • ESRD (end stage renal disease) on dialysis (HCC)   • Witnessed seizure-like activity (HCC)   • Hyponatremia   • ESRD (end stage renal disease) (HCC)   • Type 2 diabetes mellitus with hyperglycemia (HCC)   • CAD (coronary artery disease)   • HTN (hypertension)   • Leukocytosis   • Anemia, chronic disease   • Syncopal seizure (HCC)   • End-stage renal disease on hemodialysis (HCC)   • Thrombocytopenia (HCC)   • Liver cirrhosis (HCC)   • Chronic hepatitis C without hepatic coma (HCC)   • Noncompliance of patient with renal dialysis (HCC)   End-stage renal disease continue more dialysis Monday Wednesday Friday  Chronic pain syndrome continue pain management per primary  Noncompliance with dialysis counseled the family  Anemia with ESRD check iron profile and continue Epogen on dialysis  Cirrhosis of liver        João Angel MD FACP, FASN.  03/12/22  18:14 EST

## 2022-03-12 NOTE — PROGRESS NOTES
[facility] CBC GROUP INPATIENT PROGRESS NOTE    Length of Stay:  8 days    CHIEF COMPLAINT: Thrombocytopenia, ESRD, chronic hepatitis C with cirrhosis, anemia, B12 deficiency      SUBJECTIVE: The patient has no specific complaints today.  He is a little less confused.  No bleeding complications.      ROS:  Review of Systems   A comprehensive review of systems was obtained with pertinent positive findings as noted in the interval history above.  All other systems negative.      OBJECTIVE:  Vitals:    03/12/22 0022 03/12/22 0523 03/12/22 0700 03/12/22 0720   BP: 108/66   120/63   BP Location: Right arm   Right arm   Patient Position: Lying   Lying   Pulse: 68   72   Resp: 18   18   Temp: 98.1 °F (36.7 °C)   98.5 °F (36.9 °C)   TempSrc: Oral   Oral   SpO2: 96%  100% 98%   Weight:  73.5 kg (162 lb 0.6 oz)     Height:             PHYSICAL EXAMINATION:  General: Patient awake and alert, mild confusion  Chest/Lungs: Clear to auscultation bilaterally anteriorly.  Right chest wall previous catheter site with dressing overlying, no bleeding  Heart: Regular rate and rhythm no murmurs gallops or rubs  Abdomen/GI: Soft nontender nondistended bowel sounds present  Extremities: No edema.  Right femoral catheter site without bleeding    Patient was examined today, unchanged from above    DIAGNOSTIC DATA:  Results Review:     I reviewed the patient's new clinical results.    Results from last 7 days   Lab Units 03/11/22  0439 03/10/22  0417 03/09/22  0412   WBC 10*3/mm3 6.64 10.08 9.31   HEMOGLOBIN g/dL 8.1* 9.0* 9.7*   HEMATOCRIT % 25.5* 28.1* 29.8*   PLATELETS 10*3/mm3 36*  36* 40*  40* 36*  36*      Results from last 7 days   Lab Units 03/11/22  0439 03/10/22  0417 03/09/22  0412 03/07/22  0639 03/06/22  0616   SODIUM mmol/L 135* 136 136   < > 135*   POTASSIUM mmol/L 3.6 3.6 4.2   < > 5.2   CHLORIDE mmol/L 98 97* 95*   < > 94*   CO2 mmol/L 25.1 26.0 24.0   < > 19.0*   BUN mg/dL 51* 32* 73*   < > 105*   CREATININE mg/dL  6.52* 4.83* 7.77*   < > 11.05*   CALCIUM mg/dL 8.3* 8.2* 8.4*   < > 8.0*   BILIRUBIN mg/dL  --  0.8  --   --  0.9   ALK PHOS U/L  --  320*  --   --  246*   ALT (SGPT) U/L  --  43*  --   --  84*   AST (SGOT) U/L  --  37  --   --  109*   GLUCOSE mg/dL 178* 132* 181*   < > 291*    < > = values in this interval not displayed.      Lab Results   Component Value Date    NEUTROABS 4.97 03/11/2022                 Assessment/Plan   ASSESSMENT/PLAN:  This is a 61 y.o. male with:     *Thrombocytopenia  · Patient with history of chronic mild thrombocytopenia followed in the outpatient setting by Dr. Aviles  · Felt to be related to underlying cirrhosis with splenomegaly  · Platelet count has been in the low 100,000 range in the past  · Platelet count in January 2022 had been in the mid to high 100,000 range at which time patient had COVID-19 infection  · Patient was off of dialysis for approximately 1 week before current hospital admission  · On admission 3/4/2022, platelet count was 51,000 and declined into the 40,000 range.  · Additional labs on 3/6/2022 with elevated IPF at 9%  · Peripheral blood smear reviewed on 3/5/2022, was unremarkable.  · B12 on 3/7/2022 greater than 2000  · Suspect that thrombocytopenia is related to underlying cirrhosis/splenomegaly, exacerbated by volume overload off dialysis prior to hospital admission in addition to consumption from sepsis.  · Platelet count today has increased up to 44,000.  IPF remains elevated at 21.6% % % indicative of peripheral destructive process from consumption.  Anticipate that platelet count will continue to improve up to patient's prior baseline level (low 100,000 range) with treatment of underlying sepsis.  No current bleeding issues.  He does have underlying cirrhosis/splenomegaly contributing to his thrombocytopenia as noted above.     *ESRD  · Patient with chronic noncompliance with outpatient dialysis per nephrology  · Patient had been on Monday Wednesday Friday  schedule  · Patient apparently had not been to dialysis for 1 week prior to hospital admission  · Patient is now back on dialysis, nephrology following.    · On 3/9/2022, right chest tunneled dialysis catheter was removed and placement of right femoral dialysis catheter without bleeding complications.    *Sepsis with E. coli bacteremia  · Blood culture from 3/7/2022 + for E. Coli  · Patient initiated Rocephin IV 3/7/2022  · Patient developed hypotension, transferred to CCU 3/8/2022  · Right chest wall tunneled dialysis catheter removed with placement of new right femoral dialysis catheter on 3/9/2022  · Patient did require pressor support, subsequently discontinued     *Chronic hepatitis C with cirrhosis  · Received previous treatment, details unclear  · Previous CT abdomen and pelvis 3/12/2018 with cirrhotic appearing liver and borderline splenomegaly to 13.5 cm  · Hepatitis C RNA 3/6/2022 was negative    *Anemia  · Longstanding history of anemia followed by Dr. Aviles in the outpatient setting  · Anemia has been multifactorial related to CKD/ESRD, iron deficiency, B12 deficiency  · Patient had previously received Procrit related to anemia secondary to CKD.  Eventually ROMULO administration transition to nephrology once patient initiated hemodialysis around May 2021.  · Patient is receiving intermittent IV iron with dialysis per nephrology.  · Labs on 11/2/2021 with iron 172, ferritin 560, iron saturation 68%, TIBC 255.  B12 level 1017 (see below).  · Hemoglobin in January 2022 had been on the 8-9 range at time of COVID-19 infection  · Labs on 2/2/2022 with iron 89, ferritin 1514, iron saturation 35%, TIBC 256, folate 7.58  · During current admission, hemoglobin 3/4/2022 was 8.7 with subsequent declined into the high 7 range.  · Laboratory evaluation on 3/4/2022 with iron 131, ferritin 5988, iron saturation 79%, TIBC 165.  · No laboratory evidence to suggest hemolysis  · B12 on 3/7/2022 greater than 2000  · Additional  labs pending from 3/6/2022 with copper and zinc  · Patient is continuing on Retacrit 10,000 units 3 times weekly with dialysis per nephrology  · Hemoglobin on 3/8/2022 declined to 7.5.  A repeat level was drawn later that morning at 4.2 however this was incorrect due to a dilute specimen and on recheck was stable at 7.5.  · Patient developed hypotension, transferred to CCU with sepsis.  Received 1 unit PRBC transfusion on 3/8/2022.  · Hemoglobin today stable at 8.1    *B12 deficiency  · Patient was previously receiving oral B12 supplementation  · Labs on 11/2/2022 with B12 level 1017, patient remained off of oral B12 supplementation.  · B12 level on 3/7/2022 greater than 2000, no need for further B12 supplementation    *Right clear-cell renal cell carcinoma, stage I ([id number])  · Status post right partial nephrectomy on 7/6/2018 for a 2.2 cm clear-cell renal cell carcinoma, grade 3, margins could not be assessed, no lymphovascular invasion.  Notation of extensive fibrosis with hemosiderin deposition reflective of fibrosis and hemorrhage.    *Confusion/somnolence  · Secondary to E. coli bacteremia with evolving sepsis  · CT head 3/7/2022 negative for acute changes  · Ammonia level normal on 3/7/2022  · Patient with confusion likely related to sepsis with metabolic encephalopathy    Plan:  1. Patient continues with no indication for PRBC or platelet transfusion  2. Patient continuing on Rocephin for E. coli bacteremia/sepsis  3. Patient continuing on Retacrit 10,000 units Monday Wednesday Friday with dialysis per nephrology  4. We will continue to monitor the patient's platelet count  5. CBC, CMP daily    Discussed with patient and wife at bedside.         Rios Pa MD

## 2022-03-12 NOTE — PLAN OF CARE
Problem: Adult Inpatient Plan of Care  Goal: Absence of Hospital-Acquired Illness or Injury  Intervention: Identify and Manage Fall Risk  Recent Flowsheet Documentation  Taken 3/12/2022 0811 by Neha Pendleton RN  Safety Promotion/Fall Prevention:   activity supervised   nonskid shoes/slippers when out of bed   room organization consistent   safety round/check completed     Problem: Adult Inpatient Plan of Care  Goal: Absence of Hospital-Acquired Illness or Injury  Intervention: Prevent and Manage VTE (Venous Thromboembolism) Risk  Recent Flowsheet Documentation  Taken 3/12/2022 1406 by Neha Pendleton RN  VTE Prevention/Management:   bilateral   dorsiflexion/plantar flexion performed  Range of Motion: active ROM (range of motion) encouraged  Taken 3/12/2022 0811 by Neha Pendleton RN  Activity Management: activity adjusted per tolerance     Problem: Adult Inpatient Plan of Care  Goal: Optimal Comfort and Wellbeing  Intervention: Provide Person-Centered Care  Recent Flowsheet Documentation  Taken 3/12/2022 1406 by Neha Pendleton RN  Trust Relationship/Rapport: care explained  Taken 3/12/2022 0811 by Neha Pendleton RN  Trust Relationship/Rapport: care explained   Goal Outcome Evaluation:           Progress: improving  Outcome Evaluation: Patient AOx3 reoriented in time. today increased pain medication as he take at home, continue with IV antibiotic due to tunnel catheter infected good appetite wife in the room with patient all the time assisted with feeding notify MD patient having difficulty swallow food change diet to ground meat and take medication with applesauce to assit with swallow patient state it help the applesauce.

## 2022-03-13 LAB
ANION GAP SERPL CALCULATED.3IONS-SCNC: 12.7 MMOL/L (ref 5–15)
BASOPHILS # BLD AUTO: 0.06 10*3/MM3 (ref 0–0.2)
BASOPHILS NFR BLD AUTO: 0.6 % (ref 0–1.5)
BUN SERPL-MCNC: 38 MG/DL (ref 6–20)
BUN/CREAT SERPL: 5.9 (ref 7–25)
CALCIUM SPEC-SCNC: 8.1 MG/DL (ref 8.6–10.5)
CHLORIDE SERPL-SCNC: 97 MMOL/L (ref 98–107)
CO2 SERPL-SCNC: 22.3 MMOL/L (ref 22–29)
CREAT SERPL-MCNC: 6.43 MG/DL (ref 0.76–1.27)
DEPRECATED RDW RBC AUTO: 49.8 FL (ref 37–54)
EGFRCR SERPLBLD CKD-EPI 2021: 9.3 ML/MIN/1.73
EOSINOPHIL # BLD AUTO: 0.17 10*3/MM3 (ref 0–0.4)
EOSINOPHIL NFR BLD AUTO: 1.6 % (ref 0.3–6.2)
ERYTHROCYTE [DISTWIDTH] IN BLOOD BY AUTOMATED COUNT: 14.5 % (ref 12.3–15.4)
GLUCOSE BLDC GLUCOMTR-MCNC: 131 MG/DL (ref 70–130)
GLUCOSE BLDC GLUCOMTR-MCNC: 159 MG/DL (ref 70–130)
GLUCOSE BLDC GLUCOMTR-MCNC: 181 MG/DL (ref 70–130)
GLUCOSE BLDC GLUCOMTR-MCNC: 73 MG/DL (ref 70–130)
GLUCOSE SERPL-MCNC: 203 MG/DL (ref 65–99)
HCT VFR BLD AUTO: 26.2 % (ref 37.5–51)
HGB BLD-MCNC: 8.1 G/DL (ref 13–17.7)
IMM GRANULOCYTES # BLD AUTO: 0.13 10*3/MM3 (ref 0–0.05)
IMM GRANULOCYTES NFR BLD AUTO: 1.2 % (ref 0–0.5)
LYMPHOCYTES # BLD AUTO: 1.08 10*3/MM3 (ref 0.7–3.1)
LYMPHOCYTES NFR BLD AUTO: 10.3 % (ref 19.6–45.3)
MCH RBC QN AUTO: 29.2 PG (ref 26.6–33)
MCHC RBC AUTO-ENTMCNC: 30.9 G/DL (ref 31.5–35.7)
MCV RBC AUTO: 94.6 FL (ref 79–97)
MONOCYTES # BLD AUTO: 0.99 10*3/MM3 (ref 0.1–0.9)
MONOCYTES NFR BLD AUTO: 9.4 % (ref 5–12)
NEUTROPHILS NFR BLD AUTO: 76.9 % (ref 42.7–76)
NEUTROPHILS NFR BLD AUTO: 8.07 10*3/MM3 (ref 1.7–7)
NRBC BLD AUTO-RTO: 0 /100 WBC (ref 0–0.2)
PLATELET # BLD AUTO: 51 10*3/MM3 (ref 140–450)
PLATELET # BLD AUTO: 51 10*3/MM3 (ref 140–450)
PLATELETS.RETICULATED NFR BLD AUTO: 20.9 % (ref 0.9–6.5)
PMV BLD AUTO: 14 FL (ref 6–12)
POTASSIUM SERPL-SCNC: 4.1 MMOL/L (ref 3.5–5.2)
RBC # BLD AUTO: 2.77 10*6/MM3 (ref 4.14–5.8)
SODIUM SERPL-SCNC: 132 MMOL/L (ref 136–145)
WBC NRBC COR # BLD: 10.5 10*3/MM3 (ref 3.4–10.8)

## 2022-03-13 PROCEDURE — 80048 BASIC METABOLIC PNL TOTAL CA: CPT | Performed by: HOSPITALIST

## 2022-03-13 PROCEDURE — 85055 RETICULATED PLATELET ASSAY: CPT | Performed by: INTERNAL MEDICINE

## 2022-03-13 PROCEDURE — 63710000001 INSULIN LISPRO (HUMAN) PER 5 UNITS: Performed by: HOSPITALIST

## 2022-03-13 PROCEDURE — 99232 SBSQ HOSP IP/OBS MODERATE 35: CPT | Performed by: INTERNAL MEDICINE

## 2022-03-13 PROCEDURE — 85025 COMPLETE CBC W/AUTO DIFF WBC: CPT | Performed by: INTERNAL MEDICINE

## 2022-03-13 PROCEDURE — 82962 GLUCOSE BLOOD TEST: CPT

## 2022-03-13 PROCEDURE — 25010000002 CEFTRIAXONE PER 250 MG: Performed by: HOSPITALIST

## 2022-03-13 RX ORDER — INSULIN LISPRO 100 [IU]/ML
7 INJECTION, SOLUTION INTRAVENOUS; SUBCUTANEOUS
Status: DISCONTINUED | OUTPATIENT
Start: 2022-03-13 | End: 2022-03-15

## 2022-03-13 RX ADMIN — Medication 10 ML: at 22:31

## 2022-03-13 RX ADMIN — MIDODRINE HYDROCHLORIDE 10 MG: 5 TABLET ORAL at 08:12

## 2022-03-13 RX ADMIN — ASPIRIN 81 MG: 81 TABLET, CHEWABLE ORAL at 08:12

## 2022-03-13 RX ADMIN — OXYCODONE HYDROCHLORIDE AND ACETAMINOPHEN 1 TABLET: 10; 325 TABLET ORAL at 08:12

## 2022-03-13 RX ADMIN — ALPRAZOLAM 1 MG: 0.5 TABLET ORAL at 08:11

## 2022-03-13 RX ADMIN — PANTOPRAZOLE SODIUM 40 MG: 40 TABLET, DELAYED RELEASE ORAL at 08:11

## 2022-03-13 RX ADMIN — MIDODRINE HYDROCHLORIDE 10 MG: 5 TABLET ORAL at 17:15

## 2022-03-13 RX ADMIN — ALPRAZOLAM 1 MG: 0.5 TABLET ORAL at 17:15

## 2022-03-13 RX ADMIN — INSULIN LISPRO 5 UNITS: 100 INJECTION, SOLUTION INTRAVENOUS; SUBCUTANEOUS at 12:05

## 2022-03-13 RX ADMIN — INSULIN LISPRO 2 UNITS: 100 INJECTION, SOLUTION INTRAVENOUS; SUBCUTANEOUS at 12:06

## 2022-03-13 RX ADMIN — OXYCODONE HYDROCHLORIDE AND ACETAMINOPHEN 1 TABLET: 10; 325 TABLET ORAL at 12:05

## 2022-03-13 RX ADMIN — Medication 1 CAPSULE: at 08:12

## 2022-03-13 RX ADMIN — OXYCODONE HYDROCHLORIDE AND ACETAMINOPHEN 1 TABLET: 10; 325 TABLET ORAL at 17:15

## 2022-03-13 RX ADMIN — INSULIN GLARGINE-YFGN 20 UNITS: 100 INJECTION, SOLUTION SUBCUTANEOUS at 22:04

## 2022-03-13 RX ADMIN — INSULIN LISPRO 2 UNITS: 100 INJECTION, SOLUTION INTRAVENOUS; SUBCUTANEOUS at 08:11

## 2022-03-13 RX ADMIN — SEVELAMER CARBONATE 800 MG: 800 TABLET, FILM COATED ORAL at 12:05

## 2022-03-13 RX ADMIN — INSULIN LISPRO 5 UNITS: 100 INJECTION, SOLUTION INTRAVENOUS; SUBCUTANEOUS at 08:11

## 2022-03-13 RX ADMIN — CEFTRIAXONE 2 G: 2 INJECTION, POWDER, FOR SOLUTION INTRAMUSCULAR; INTRAVENOUS at 22:04

## 2022-03-13 RX ADMIN — Medication 10 ML: at 08:11

## 2022-03-13 RX ADMIN — MIDODRINE HYDROCHLORIDE 10 MG: 5 TABLET ORAL at 12:05

## 2022-03-13 RX ADMIN — PANTOPRAZOLE SODIUM 40 MG: 40 TABLET, DELAYED RELEASE ORAL at 17:15

## 2022-03-13 RX ADMIN — OXYCODONE HYDROCHLORIDE AND ACETAMINOPHEN 1 TABLET: 10; 325 TABLET ORAL at 03:07

## 2022-03-13 RX ADMIN — OXYCODONE HYDROCHLORIDE AND ACETAMINOPHEN 1 TABLET: 10; 325 TABLET ORAL at 22:30

## 2022-03-13 RX ADMIN — SEVELAMER CARBONATE 800 MG: 800 TABLET, FILM COATED ORAL at 08:11

## 2022-03-13 RX ADMIN — SEVELAMER CARBONATE 800 MG: 800 TABLET, FILM COATED ORAL at 17:15

## 2022-03-13 NOTE — PROGRESS NOTES
"  Infectious Diseases Progress Note    Sabas Quick MD     [facility]  [place]: 8 days  Patient Identification:  Name: Angelo Brown  Age: 61 y.o.  Sex: male  :  1960  MRN: [id number]         Primary Care Physician: Yadiel Baxter III, MD            Subjective: No complaints.  Interval History: See consultation note.    Objective:    Scheduled Meds:aspirin, 81 mg, Oral, Daily  cefTRIAXone, 2 g, Intravenous, Q24H  epoetin real/real-epbx, 10,000 Units, Intravenous, Once per day on   insulin glargine, 15 Units, Subcutaneous, Nightly  insulin lispro, 0-7 Units, Subcutaneous, 4x Daily With Meals & Nightly  insulin lispro, 5 Units, Subcutaneous, TID With Meals  lactobacillus acidophilus, 1 capsule, Oral, Daily  lidocaine, 5 mL, Injection, Once  midodrine, 10 mg, Oral, TID AC  pantoprazole, 40 mg, Oral, BID AC  sevelamer, 800 mg, Oral, TID With Meals  sodium chloride, 10 mL, Intravenous, Q12H      Continuous Infusions:     Vital signs in last 24 hours:  Temp:  [98.1 °F (36.7 °C)-98.5 °F (36.9 °C)] 98.1 °F (36.7 °C)  Heart Rate:  [68-77] 68  Resp:  [18] 18  BP: (108-126)/(50-71) 119/50    Intake/Output:    Intake/Output Summary (Last 24 hours) at 3/12/2022 2125  Last data filed at 3/12/2022 1200  Gross per 24 hour   Intake 340 ml   Output --   Net 340 ml       Exam:  /50 (BP Location: Right arm, Patient Position: Lying)   Pulse 68   Temp 98.1 °F (36.7 °C) (Oral)   Resp 18   Ht 172.7 cm (67.99\")   Wt 73.5 kg (162 lb 0.6 oz)   SpO2 96%   BMI 24.64 kg/m²   Patient is examined using the personal protective equipment as per guidelines from infection control for this particular patient as enacted.  Hand washing was performed before and after patient interaction.  General Appearance:  Comfortable chronically ill-appearing male                          Head:    Normocephalic, without obvious abnormality, atraumatic                           Eyes:    PERRL, conjunctivae/corneas " clear, EOM's intact, both eyes                         Throat:   Lips, tongue, gums normal; oral mucosa pink and moist                           Neck:   Supple, symmetrical, trachea midline, no JVD                         Lungs:    Clear to auscultation bilaterally, respirations unlabored                 Chest Wall:  Right IJ tunnel catheter in place                          Heart:    Regular rate and rhythm, S1 and S2 normal                  Abdomen:   Soft nontender                 Extremities: Right groin dialysis catheter in place                        Pulses:   Pulses palpable in all extremities                            Skin:   Skin is warm and dry,  no rashes or palpable lesions                  Neurologic: Grossly nonfocal       Data Review:    I reviewed the patient's new clinical results.  Results from last 7 days   Lab Units 03/12/22  0830 03/11/22  0439 03/10/22  0417 03/09/22  0412 03/08/22  1236 03/08/22  1037 03/08/22  0750   WBC 10*3/mm3 7.58 6.64 10.08 9.31 7.94 5.60 11.56*   HEMOGLOBIN g/dL 8.1* 8.1* 9.0* 9.7* 7.5* 4.2* 7.5*   PLATELETS 10*3/mm3 44*  44* 36*  36* 40*  40* 36*  36* 29* 17* 30*  30*     Results from last 7 days   Lab Units 03/11/22  0439 03/10/22  0417 03/09/22  0412 03/08/22  0750 03/07/22  0639 03/06/22  0616   SODIUM mmol/L 135* 136 136 136 136 135*   POTASSIUM mmol/L 3.6 3.6 4.2 4.4 5.7* 5.2   CHLORIDE mmol/L 98 97* 95* 96* 96* 94*   CO2 mmol/L 25.1 26.0 24.0 24.1 18.8* 19.0*   BUN mg/dL 51* 32* 73* 59* 114* 105*   CREATININE mg/dL 6.52* 4.83* 7.77* 6.77* 13.15* 11.05*   CALCIUM mg/dL 8.3* 8.2* 8.4* 8.1* 8.6 8.0*   GLUCOSE mg/dL 178* 132* 181* 239* 280* 291*     Microbiology Results (last 10 days)     Procedure Component Value - Date/Time    COVID PRE-OP / PRE-PROCEDURE SCREENING ORDER (NO ISOLATION) - Swab, Nasopharynx [[id number]]  (Normal) Collected: 03/08/22 1816    Lab Status: Final result Specimen: Swab from Nasopharynx Updated: 03/08/22 2027    Narrative:      The  following orders were created for panel order COVID PRE-OP / PRE-PROCEDURE SCREENING ORDER (NO ISOLATION) - Swab, Nasopharynx.  Procedure                               Abnormality         Status                     ---------                               -----------         ------                     COVID-19,[facility] IN-HOUSE...[[id number]]  Normal              Final result                 Please view results for these tests on the individual orders.    COVID-19,[facility] IN-HOUSE CEPHEID/NIKHIL NP SWAB IN TRANSPORT MEDIA 8-12 HR TAT - Swab, Nasopharynx [[id number]]  (Normal) Collected: 03/08/22 1816    Lab Status: Final result Specimen: Swab from Nasopharynx Updated: 03/08/22 2027     COVID19 Not Detected    Narrative:      Fact sheet for providers: https://www.fda.gov/media/521798/download    Fact sheet for patients: https://www.fda.gov/media/837146/download    Test performed by PCR.    Blood Culture - Blood, Blood, Central Line [[id number]]  (Abnormal) Collected: 03/07/22 1131    Lab Status: Final result Specimen: Blood, Central Line Updated: 03/10/22 0749     Blood Culture Escherichia coli     Isolated from Aerobic and Anaerobic Bottles     Gram Stain Anaerobic Bottle Gram negative bacilli      Aerobic Bottle Gram negative bacilli    Narrative:      Refer to previous blood culture collected on 3/7 for corbin's      Blood Culture - Blood, Blood, Central Line [[id number]]  (Abnormal)  (Susceptibility) Collected: 03/07/22 1131    Lab Status: Final result Specimen: Blood, Central Line Updated: 03/10/22 0748     Blood Culture Escherichia coli     Isolated from Aerobic and Anaerobic Bottles     Gram Stain Anaerobic Bottle Gram negative bacilli      Aerobic Bottle Gram negative bacilli    Susceptibility      Escherichia coli      CORBIN      Ampicillin Susceptible      Ampicillin + Sulbactam Susceptible      Cefepime Susceptible      Ceftazidime Susceptible      Ceftriaxone Susceptible      Gentamicin Susceptible      Levofloxacin  Susceptible      Piperacillin + Tazobactam Susceptible      Trimethoprim + Sulfamethoxazole Susceptible                       Susceptibility Comments     Escherichia coli    Cefazolin sensitivity will not be reported for Enterobacteriaceae in non-urine isolates. If cefazolin is preferred, please call the microbiology lab to request an E-test.  With the exception of urinary-sourced infections, aminoglycosides should not be used as monotherapy.             Blood Culture ID, PCR - Blood, Blood, Central Line [[id number]]  (Abnormal) Collected: 03/07/22 1131    Lab Status: Final result Specimen: Blood, Central Line Updated: 03/07/22 2221     BCID, PCR Eschericia coli. Identification by BCID2 PCR.     BOTTLE TYPE Anaerobic Bottle    COVID PRE-OP / PRE-PROCEDURE SCREENING ORDER (NO ISOLATION) - Swab, Nasopharynx [[id number]]  (Normal) Collected: 03/04/22 1801    Lab Status: Final result Specimen: Swab from Nasopharynx Updated: 03/04/22 1851    Narrative:      The following orders were created for panel order COVID PRE-OP / PRE-PROCEDURE SCREENING ORDER (NO ISOLATION) - Swab, Nasopharynx.  Procedure                               Abnormality         Status                     ---------                               -----------         ------                     COVID-19,[facility] IN-HOUSE...[[id number]]  Normal              Final result                 Please view results for these tests on the individual orders.    COVID-19,[facility] IN-HOUSE CEPHEID/NIKHIL NP SWAB IN TRANSPORT MEDIA 8-12 HR TAT - Swab, Nasopharynx [[id number]]  (Normal) Collected: 03/04/22 1801    Lab Status: Final result Specimen: Swab from Nasopharynx Updated: 03/04/22 1851     COVID19 Not Detected    Narrative:      Fact sheet for providers: https://www.fda.gov/media/334830/download     Fact sheet for patients: https://www.fda.gov/media/349747/download            Assessment:    Anemia due to chronic renal failure treated with erythropoietin, stage 5 (HCC)    B12  deficiency    End-stage renal disease on hemodialysis (HCC)    Thrombocytopenia (HCC)    Liver cirrhosis (HCC)    Chronic hepatitis C without hepatic coma (HCC)    Noncompliance of patient with renal dialysis (HCC)  1-systemic gram-negative bacteremic sepsis either due to              -Infected tunneled catheter versus              -Evolving intra-abdominal process with subsequent bacteremia and secondary seeding.  2-end-stage renal disease  3-history of cirrhosis of the liver due to hepatitis C  4-thrombocytopenia  5-other diagnosis per primary team.     Recommendations/Discussions:  · Continue present treatment with IV Rocephin and follow-up on the sensitivity of the E. coli.  · Follow-up repeat blood cultures after the catheter is replaced.  · Once considered stable consider CT scan of the abdomen and pelvis    Sabas Quick MD  3/12/2022  21:25 EST    Much of this encounter note is an electronic transcription/translation of spoken language to printed text. The electronic translation of spoken language may permit erroneous, or at times, nonsensical words or phrases to be inadvertently transcribed; Although I have reviewed the note for such errors, some may still exist

## 2022-03-13 NOTE — PLAN OF CARE
Goal Outcome Evaluation:              Outcome Evaluation: Patient seem reoriented x4,continued IV abx, pain med.

## 2022-03-13 NOTE — PLAN OF CARE
Problem: Adult Inpatient Plan of Care  Goal: Absence of Hospital-Acquired Illness or Injury  Intervention: Identify and Manage Fall Risk  Recent Flowsheet Documentation  Taken 3/13/2022 1430 by Neha Pendleton RN  Safety Promotion/Fall Prevention: activity supervised  Taken 3/13/2022 0817 by Neha Pendleton RN  Safety Promotion/Fall Prevention: activity supervised     Problem: Adult Inpatient Plan of Care  Goal: Absence of Hospital-Acquired Illness or Injury  Intervention: Prevent Skin Injury  Recent Flowsheet Documentation  Taken 3/13/2022 1430 by Neha Pendleton RN  Body Position:   turned   side-lying  Taken 3/13/2022 0817 by Neha Pendleton RN  Body Position: side-lying     Problem: Adult Inpatient Plan of Care  Goal: Absence of Hospital-Acquired Illness or Injury  Intervention: Prevent and Manage VTE (venous thromboembolism) Risk  Recent Flowsheet Documentation  Taken 3/13/2022 1430 by Neha Pendleton RN  VTE Prevention/Management:   bilateral   dorsiflexion/plantar flexion performed  Taken 3/13/2022 0817 by Neha Pendleton RN  VTE Prevention/Management:   bilateral   dorsiflexion/plantar flexion performed   Goal Outcome Evaluation:           Progress: improving  Outcome Evaluation: patient AOx3 forgetful encourage getting up to the chair tolerated fairly for 5 minutes . Need evaluation per speech therapy due to cough and difficulty swallowing pending evaluation take medication whole with applesauce without difficulty

## 2022-03-13 NOTE — PROGRESS NOTES
[facility] INPATIENT PROGRESS NOTE    Length of Stay:  9 days    CHIEF COMPLAINT: Thrombocytopenia, ESRD, chronic hepatitis C with cirrhosis, anemia, B12 deficiency      SUBJECTIVE: The patient is awake and alert today.  He is minimally confused.  He is very tangential in conversation discussing issues with his children and his potential care at home.  He was not agreeable to working with physical therapy earlier today.  He has no specific new complaints currently.      ROS:  Review of Systems   A comprehensive review of systems was obtained with pertinent positive findings as noted in the interval history above.  All other systems negative.      OBJECTIVE:  Vitals:    03/12/22 1922 03/12/22 2337 03/13/22 0539 03/13/22 0745   BP: 119/50 120/71  114/70   BP Location: Right arm Right arm  Right arm   Patient Position: Lying Lying  Lying   Pulse: 68 77  78   Resp: 18 16  18   Temp: 98.1 °F (36.7 °C) 98.4 °F (36.9 °C)  97.9 °F (36.6 °C)   TempSrc: Oral Oral  Oral   SpO2: 96%   94%   Weight:   75 kg (165 lb 5.5 oz)    Height:             PHYSICAL EXAMINATION:  General: Patient awake and alert, mild confusion  Chest/Lungs: Clear to auscultation bilaterally anteriorly.  Right chest wall previous catheter site with dressing overlying, no bleeding  Heart: Regular rate and rhythm no murmurs gallops or rubs  Abdomen/GI: Soft nontender nondistended bowel sounds present  Extremities: No edema.  Right femoral catheter site without bleeding    Patient was examined today, unchanged from above    DIAGNOSTIC DATA:  Results Review:     I reviewed the patient's new clinical results.    Results from last 7 days   Lab Units 03/13/22 0445 03/12/22 0830 03/11/22 0439   WBC 10*3/mm3 10.50 7.58 6.64   HEMOGLOBIN g/dL 8.1* 8.1* 8.1*   HEMATOCRIT % 26.2* 26.4* 25.5*   PLATELETS 10*3/mm3 51*  51* 44*  44* 36*  36*      Results from last 7 days   Lab Units 03/13/22 0445 03/11/22  0439 03/10/22  0417   SODIUM mmol/L 132* 135*  136   POTASSIUM mmol/L 4.1 3.6 3.6   CHLORIDE mmol/L 97* 98 97*   CO2 mmol/L 22.3 25.1 26.0   BUN mg/dL 38* 51* 32*   CREATININE mg/dL 6.43* 6.52* 4.83*   CALCIUM mg/dL 8.1* 8.3* 8.2*   BILIRUBIN mg/dL  --   --  0.8   ALK PHOS U/L  --   --  320*   ALT (SGPT) U/L  --   --  43*   AST (SGOT) U/L  --   --  37   GLUCOSE mg/dL 203* 178* 132*      Lab Results   Component Value Date    NEUTROABS 8.07 (H) 03/13/2022                 Assessment/Plan   ASSESSMENT/PLAN:  This is a 61 y.o. male with:     *Thrombocytopenia  · Patient with history of chronic mild thrombocytopenia followed in the outpatient setting by Dr. Aviles  · Felt to be related to underlying cirrhosis with splenomegaly  · Platelet count has been in the low 100,000 range in the past  · Platelet count in January 2022 had been in the mid to high 100,000 range at which time patient had COVID-19 infection  · Patient was off of dialysis for approximately 1 week before current hospital admission  · On admission 3/4/2022, platelet count was 51,000 and declined into the 40,000 range.  · Additional labs on 3/6/2022 with elevated IPF at 9%  · Peripheral blood smear reviewed on 3/5/2022, was unremarkable.  · B12 on 3/7/2022 greater than 2000  · Suspect that thrombocytopenia is related to underlying cirrhosis/splenomegaly, exacerbated by volume overload off dialysis prior to hospital admission in addition to consumption from sepsis.  · Platelet count today has increased up to 51,000.  IPF remains elevated at 20.9% indicative of peripheral destructive process from consumption.  Anticipate that platelet count will continue to improve up to patient's prior baseline level (low 100,000 range) with treatment of underlying sepsis.  No current bleeding issues.  He does have underlying cirrhosis/splenomegaly contributing to his thrombocytopenia as noted above.     *ESRD  · Patient with chronic noncompliance with outpatient dialysis per nephrology  · Patient had been on Monday  Wednesday Friday schedule  · Patient apparently had not been to dialysis for 1 week prior to hospital admission  · Patient is now back on dialysis, nephrology following.    · On 3/9/2022, right chest tunneled dialysis catheter was removed and placement of right femoral dialysis catheter without bleeding complications.    *Sepsis with E. coli bacteremia  · Blood culture from 3/7/2022 + for E. Coli  · Patient initiated Rocephin IV 3/7/2022  · Patient developed hypotension, transferred to CCU 3/8/2022  · Right chest wall tunneled dialysis catheter removed with placement of new right femoral dialysis catheter on 3/9/2022  · Patient did require pressor support, subsequently discontinued     *Chronic hepatitis C with cirrhosis  · Received previous treatment, details unclear  · Previous CT abdomen and pelvis 3/12/2018 with cirrhotic appearing liver and borderline splenomegaly to 13.5 cm  · Hepatitis C RNA 3/6/2022 was negative    *Anemia  · Longstanding history of anemia followed by Dr. Aviles in the outpatient setting  · Anemia has been multifactorial related to CKD/ESRD, iron deficiency, B12 deficiency  · Patient had previously received Procrit related to anemia secondary to CKD.  Eventually ROMULO administration transition to nephrology once patient initiated hemodialysis around May 2021.  · Patient is receiving intermittent IV iron with dialysis per nephrology.  · Labs on 11/2/2021 with iron 172, ferritin 560, iron saturation 68%, TIBC 255.  B12 level 1017 (see below).  · Hemoglobin in January 2022 had been on the 8-9 range at time of COVID-19 infection  · Labs on 2/2/2022 with iron 89, ferritin 1514, iron saturation 35%, TIBC 256, folate 7.58  · During current admission, hemoglobin 3/4/2022 was 8.7 with subsequent declined into the high 7 range.  · Laboratory evaluation on 3/4/2022 with iron 131, ferritin 5988, iron saturation 79%, TIBC 165.  · No laboratory evidence to suggest hemolysis  · B12 on 3/7/2022 greater than  2000  · Additional labs pending from 3/6/2022 with copper and zinc  · Patient is continuing on Retacrit 10,000 units 3 times weekly with dialysis per nephrology  · Hemoglobin on 3/8/2022 declined to 7.5.  A repeat level was drawn later that morning at 4.2 however this was incorrect due to a dilute specimen and on recheck was stable at 7.5.  · Patient developed hypotension, transferred to CCU with sepsis.  Received 1 unit PRBC transfusion on 3/8/2022.  · Hemoglobin today stable at 8.1    *B12 deficiency  · Patient was previously receiving oral B12 supplementation  · Labs on 11/2/2022 with B12 level 1017, patient remained off of oral B12 supplementation.  · B12 level on 3/7/2022 greater than 2000, no need for further B12 supplementation    *Right clear-cell renal cell carcinoma, stage I ([id number])  · Status post right partial nephrectomy on 7/6/2018 for a 2.2 cm clear-cell renal cell carcinoma, grade 3, margins could not be assessed, no lymphovascular invasion.  Notation of extensive fibrosis with hemosiderin deposition reflective of fibrosis and hemorrhage.    *Confusion/somnolence  · Secondary to E. coli bacteremia with evolving sepsis  · CT head 3/7/2022 negative for acute changes  · Ammonia level normal on 3/7/2022  · Patient with confusion likely related to sepsis with metabolic encephalopathy    Plan:  1. Patient continues with no indication for PRBC or platelet transfusion  2. Patient continuing on Rocephin for E. coli bacteremia/sepsis  3. Patient continuing on Retacrit 10,000 units Monday Wednesday Friday with dialysis per nephrology  4. We will continue to monitor the patient's platelet count for now  5. CBC, CMP daily    Discussed with patient and wife at bedside.         Rios Pa MD

## 2022-03-13 NOTE — SIGNIFICANT NOTE
03/13/22 1156   OTHER   Discipline physical therapist   Rehab Time/Intention   Session Not Performed patient/family declined treatment  (Pt declined treatment despite education on progression of functional mobility and returning to PLOF. Will check back if time allows.)   Recommendation   PT - Next Appointment 03/14/22

## 2022-03-13 NOTE — PROGRESS NOTES
: The patient alert denies complaints    Vital Signs  Vitals:    03/13/22 1439   BP: 106/65   Pulse: 77   Resp: 18   Temp: 98 °F (36.7 °C)   SpO2: 94%     I/O this shift:  In: 360 [P.O.:360]  Out: -   I/O last 3 completed shifts:  In: 340 [P.O.:240; IV Piggyback:100]  Out: -       Physical Exam:    General: In no acute distress  HEENT:  Normocephalic, Atraumatic, EOMI, PERRLA, NO icterus,  Neck:  Supple, No JVD, No Carotid artery bruit, No lymphadenopathy  Chest: Clear to auscultation bilaterally,   Cardiovascular: Regular rate and rhythm. Positive S1 & S2, No rub, murmur or gallop.  Abdominal: Soft, nontender, no palpable organomegaly, no abdominal bruit, positive bowel sounds  Musculoskeletal: No joint tenderness or swelling, good range of motion, Adequate muscle strength on both sides, no cyanosis, clubbing or edema on lower extremities.  Neuro: Alert oriented x3, CN1 - 12 intact, No focal sensory or motor deficit.      Review of Systems:   CNS: Denied headaches, blurred vision, tingling or numbness in any part of body. No weakness or any impaired speech.  CVS: No chest pain or shortness of breath, No orthopnea or PND or exertional dyspnea,  Pulmonary: Denies shortness of breath, coughs, hematemesis, night sweats or weight loss.  GI: Denies diarrhea, nausea, vomiting. Denies weight loss, hematemesis, melena.  : Denies dysuria, frequency or hesitancy of urination  Vascular: Denies claudication, resting pain, tingling numbness or weakness in any part of the body.  Musculoskeletal: Denies Joint tenderness or pain, denies stiffness in joints, denies fever or weight loss, or skin rashes.    Current Labs  [unfilled]  Lab Results (last 24 hours)     Procedure Component Value Units Date/Time    POC Glucose Once [[id number]]  (Normal) Collected: 03/13/22 1604    Specimen: Blood Updated: 03/13/22 1606     Glucose 73 mg/dL      Comment: Meter: [id number] : [id number] Og KIM       Blood Culture - Blood,  Hand, Right [[id number]]  (Normal) Collected: 03/12/22 1118    Specimen: Blood from Hand, Right Updated: 03/13/22 1247     Blood Culture No growth at 24 hours    Blood Culture - Blood, Arm, Right [[id number]]  (Normal) Collected: 03/12/22 1023    Specimen: Blood from Arm, Right Updated: 03/13/22 1147     Blood Culture No growth at 24 hours    POC Glucose Once [[id number]]  (Abnormal) Collected: 03/13/22 1028    Specimen: Blood Updated: 03/13/22 1029     Glucose 159 mg/dL      Comment: Meter: [id number] : [id number] Og KIM       POC Glucose Once [[id number]]  (Abnormal) Collected: 03/13/22 0627    Specimen: Blood Updated: 03/13/22 0640     Glucose 181 mg/dL      Comment: Meter: [id number] : [id number] William Fletcherahradha KIM       Basic Metabolic Panel [[id number]]  (Abnormal) Collected: 03/13/22 0445    Specimen: Blood Updated: 03/13/22 0620     Glucose 203 mg/dL      BUN 38 mg/dL      Creatinine 6.43 mg/dL      Sodium 132 mmol/L      Potassium 4.1 mmol/L      Comment: Slight hemolysis detected by analyzer. Results may be affected.        Chloride 97 mmol/L      CO2 22.3 mmol/L      Calcium 8.1 mg/dL      BUN/Creatinine Ratio 5.9     Anion Gap 12.7 mmol/L      eGFR 9.3 mL/min/1.73      Comment: <15 Indicative of kidney failure       Narrative:      GFR Normal >60  Chronic Kidney Disease <60  Kidney Failure <15      CBC & Differential [[id number]]  (Abnormal) Collected: 03/13/22 0445    Specimen: Blood Updated: 03/13/22 0551    Narrative:      The following orders were created for panel order CBC & Differential.  Procedure                               Abnormality         Status                     ---------                               -----------         ------                     CBC Auto Differential[[id number]]        Abnormal            Final result                 Please view results for these tests on the individual orders.    CBC Auto Differential [[id number]]  (Abnormal) Collected: 03/13/22 0445     Specimen: Blood Updated: 03/13/22 0551     WBC 10.50 10*3/mm3      RBC 2.77 10*6/mm3      Hemoglobin 8.1 g/dL      Hematocrit 26.2 %      MCV 94.6 fL      MCH 29.2 pg      MCHC 30.9 g/dL      RDW 14.5 %      RDW-SD 49.8 fl      MPV 14.0 fL      Platelets 51 10*3/mm3      Neutrophil % 76.9 %      Lymphocyte % 10.3 %      Monocyte % 9.4 %      Eosinophil % 1.6 %      Basophil % 0.6 %      Immature Grans % 1.2 %      Neutrophils, Absolute 8.07 10*3/mm3      Lymphocytes, Absolute 1.08 10*3/mm3      Monocytes, Absolute 0.99 10*3/mm3      Eosinophils, Absolute 0.17 10*3/mm3      Basophils, Absolute 0.06 10*3/mm3      Immature Grans, Absolute 0.13 10*3/mm3      nRBC 0.0 /100 WBC     Immature Platelet Fraction [[id number]]  (Abnormal) Collected: 03/13/22 0445    Specimen: Blood Updated: 03/13/22 0550     IPF 20.90 %      Platelets 51 10*3/mm3     POC Glucose Once [[id number]]  (Abnormal) Collected: 03/12/22 2042    Specimen: Blood Updated: 03/12/22 2049     Glucose 158 mg/dL      Comment: Meter: [id number] : [id number] William Fletcherahradha KIM           Current Radiology  Imaging Results (Last 24 Hours)     ** No results found for the last 24 hours. **        Current Meds    Current Facility-Administered Medications:   •  acetaminophen (TYLENOL) tablet 650 mg, 650 mg, Oral, Q6H PRN, Mai Escoto MD, 650 mg at 03/08/22 1904  •  ALPRAZolam (XANAX) tablet 1 mg, 1 mg, Oral, 4x Daily PRN, Mai Escoto MD, 1 mg at 03/13/22 1715  •  aspirin chewable tablet 81 mg, 81 mg, Oral, Daily, Mai Escoto MD, 81 mg at 03/13/22 0812  •  cefTRIAXone (ROCEPHIN) 2 g in sodium chloride 0.9 % 100 mL IVPB-VTB, 2 g, Intravenous, Q24H, Karl Swift MD, Last Rate: 200 mL/hr at 03/12/22 2233, 2 g at 03/12/22 2233  •  epoetin real-epbx (RETACRIT) injection 10,000 Units, 10,000 Units, Intravenous, Once per day on Mon Wed Fri, Mai Escoto MD, 10,000 Units at 03/11/22 0930  •  heparin (porcine) injection 3,800 Units, 3,800 Units, Intracatheter,  PRN, Mai Escoto MD, 3,800 Units at 03/09/22 2140  •  insulin glargine (LANTUS, SEMGLEE) injection 20 Units, 20 Units, Subcutaneous, Nightly, Karl Swift MD  •  insulin lispro (ADMELOG) injection 0-7 Units, 0-7 Units, Subcutaneous, 4x Daily With Meals & Nightly, Karl Swift MD, 2 Units at 03/13/22 1206  •  insulin lispro (ADMELOG) injection 7 Units, 7 Units, Subcutaneous, TID With Meals, Karl Swift MD  •  lactobacillus acidophilus (RISAQUAD) capsule 1 capsule, 1 capsule, Oral, Daily, Mai Escoto MD, 1 capsule at 03/13/22 0812  •  lidocaine (XYLOCAINE) 1 % injection 5 mL, 5 mL, Injection, Once, Mai Escoto MD  •  midodrine (PROAMATINE) tablet 10 mg, 10 mg, Oral, TID AC, Mia Escoto MD, 10 mg at 03/13/22 1715  •  oxyCODONE-acetaminophen (PERCOCET)  MG per tablet 1 tablet, 1 tablet, Oral, Q4H PRN, Karl Swift MD, 1 tablet at 03/13/22 1715  •  pantoprazole (PROTONIX) EC tablet 40 mg, 40 mg, Oral, BID AC, Mai Escoto MD, 40 mg at 03/13/22 1715  •  promethazine (PHENERGAN) tablet 12.5 mg, 12.5 mg, Oral, Q4H PRN, Mai Escoto MD  •  sevelamer (RENVELA) tablet 800 mg, 800 mg, Oral, TID With Meals, Mai Escoto MD, 800 mg at 03/13/22 1715  •  [COMPLETED] Insert peripheral IV, , , Once **AND** sodium chloride 0.9 % flush 10 mL, 10 mL, Intravenous, PRN, Mai Escoto MD  •  sodium chloride 0.9 % flush 10 mL, 10 mL, Intravenous, Q12H, Mai Escoto MD, 10 mL at 03/13/22 0811  •  sodium chloride 0.9 % flush 10 mL, 10 mL, Intravenous, PRN, Mai Escoto MD  •  tiZANidine (ZANAFLEX) tablet 4 mg, 4 mg, Oral, Q8H PRN, Mai Escoto MD              Patient Active Problem List   Diagnosis   • Acute CVA (cerebrovascular accident) (HCC)   • Proteinuria   • Anemia due to chronic renal failure treated with erythropoietin, stage 5 (HCC)   • Thrombocytopenia (HCC)   • Pancytopenia, acquired (HCC)   • History of hepatitis C virus infection   • B12 deficiency   • Hyperkalemia   • Cancer of  kidney parenchyma, right (HCC)   • Umbilical hernia without obstruction and without gangrene   • Anemia of chronic renal failure, stage 3 (moderate) (HCC)   • Chronic kidney disease, stage III (moderate) (HCC)   • Acute renal failure superimposed on chronic kidney disease (HCC)   • Toxic metabolic encephalopathy   • Solitary kidney   • Acute metabolic encephalopathy   • Adverse effect of iron   • Iron deficiency anemia   • Acute renal failure with acute tubular necrosis superimposed on stage 4 chronic kidney disease (HCC)   • Hemodialysis catheter dysfunction (HCC)   • ESRD (end stage renal disease) (HCC)   • Dependence on renal dialysis (HCC)   • Complication of vascular access for dialysis   • History of CVA (cerebrovascular accident)   • CAD (coronary artery disease)   • Type 2 diabetes mellitus with hyperglycemia, without long-term current use of insulin (HCC)   • GERD (gastroesophageal reflux disease)   • HLD (hyperlipidemia)   • HTN (hypertension)   • ESRD (end stage renal disease) on dialysis (HCC)   • Witnessed seizure-like activity (HCC)   • Hyponatremia   • ESRD (end stage renal disease) (HCC)   • Type 2 diabetes mellitus with hyperglycemia (HCC)   • CAD (coronary artery disease)   • HTN (hypertension)   • Leukocytosis   • Anemia, chronic disease   • Syncopal seizure (HCC)   • End-stage renal disease on hemodialysis (HCC)   • Thrombocytopenia (HCC)   • Liver cirrhosis (HCC)   • Chronic hepatitis C without hepatic coma (HCC)   • Noncompliance of patient with renal dialysis (HCC)   End-stage renal disease continue more dialysis Monday Wednesday and Friday ultrafiltration as tolerated  Anemia continue Po check iron studies  Noncompliance with dialysis counseled to be compliant  Cirrhosis of liver secondary to alcohol abuse in the past        João Angel MD FACP, FASN.  03/13/22  18:58 EDT

## 2022-03-13 NOTE — PROGRESS NOTES
Name: Angelo Brown ADMIT: 3/4/2022   : 1960  PCP: Yadiel Baxter III, MD    MRN: [id number] LOS: 9 days   AGE/SEX: 61 y.o. male  ROOM:  [place] [id number]     CC: Sepsis  Interval History: No issues overnight.  Looks much better every day.  Subjective   Subjective     Review of Systems  Objective   Objective     Vitals:   Temp:  [97.9 °F (36.6 °C)-98.4 °F (36.9 °C)] 97.9 °F (36.6 °C)  Heart Rate:  [68-78] 78  Resp:  [16-18] 18  BP: (114-126)/(50-71) 114/70    I/O this shift:  In: 120 [P.O.:120]  Out: -     Scheduled Meds:     aspirin, 81 mg, Oral, Daily  cefTRIAXone, 2 g, Intravenous, Q24H  epoetin real/real-epbx, 10,000 Units, Intravenous, Once per day on   insulin glargine, 15 Units, Subcutaneous, Nightly  insulin lispro, 0-7 Units, Subcutaneous, 4x Daily With Meals & Nightly  insulin lispro, 5 Units, Subcutaneous, TID With Meals  lactobacillus acidophilus, 1 capsule, Oral, Daily  lidocaine, 5 mL, Injection, Once  midodrine, 10 mg, Oral, TID AC  pantoprazole, 40 mg, Oral, BID AC  sevelamer, 800 mg, Oral, TID With Meals  sodium chloride, 10 mL, Intravenous, Q12H      IV Meds:        Physical Exam  Vascular: Palpable thrill    Data Reviewed:  CBC    Results from last 7 days   Lab Units 22  0445 22  0830 22  0439 03/10/22  0417 22  0412 22  1236 22  1037   WBC 10*3/mm3 10.50 7.58 6.64 10.08 9.31 7.94 5.60   HEMOGLOBIN g/dL 8.1* 8.1* 8.1* 9.0* 9.7* 7.5* 4.2*   PLATELETS 10*3/mm3 51*  51* 44*  44* 36*  36* 40*  40* 36*  36* 29* 17*     BMP   Results from last 7 days   Lab Units 22  0445 22  0439 03/10/22  0417 22  0412 22  0750 22  0639   SODIUM mmol/L 132* 135* 136 136 136 136   POTASSIUM mmol/L 4.1 3.6 3.6 4.2 4.4 5.7*   CHLORIDE mmol/L 97* 98 97* 95* 96* 96*   CO2 mmol/L 22.3 25.1 26.0 24.0 24.1 18.8*   BUN mg/dL 38* 51* 32* 73* 59* 114*   CREATININE mg/dL 6.43* 6.52* 4.83* 7.77* 6.77* 13.15*   GLUCOSE mg/dL 203*  178* 132* 181* 239* 280*       Most notable findings include: Platelet count 36 yesterday    Active Hospital Problems:   Active Hospital Problems    Diagnosis  POA   • Noncompliance of patient with renal dialysis (HCC) [Z91.15]  Not Applicable   • Thrombocytopenia (HCC) [D69.6]  Unknown   • Liver cirrhosis (HCC) [K74.60]  Unknown   • Chronic hepatitis C without hepatic coma (HCC) [B18.2]  Unknown   • End-stage renal disease on hemodialysis (HCC) [N18.6, Z99.2]  Not Applicable   • B12 deficiency [E53.8]  Yes   • Anemia due to chronic renal failure treated with erythropoietin, stage 5 (HCC) [N18.5, D63.1]  Unknown      Resolved Hospital Problems   No resolved problems to display.      Assessment/Plan   [place]illin, Post Op Global  Assessment / Plan     Sepsis: E. coli bacteremia status post tunneled dialysis catheter removal. Currently has a nontunneled dialysis catheter in the groin.  Platelet count now greater than 50.  Consider attempting arm access with plans for femoral catheter removal if able to use.    Ad Weber MD  22  10:02 EDT  Office Number [phone]

## 2022-03-13 NOTE — PROGRESS NOTES
"Daily progress note    Chief complaint  Doing better  No specific complaints  Family at bedside  Still have some difficulty with swallowing    History of present illness  61-year white male with very complex past medical history including end-stage renal disease on hemodialysis CVA diabetes mellitus coronary artery disease hypertension chronic anemia presented to [facility] emergency room after missing hemodialysis with generalized weakness.  Patient is poor historian but denies any headache dizziness chest pain shortness of breath abdominal pain vomiting diarrhea.  Patient does have nausea and weak all over.  Patient has no cough fever night sweats weight loss.  Patient evaluated in ER found to have high potassium need hemodialysis admit for management.  Patient also found to have low platelets which is new.  Patient has no bleeding whatsoever.     REVIEW OF SYSTEMS  Unremarkable except generalized weakness     PHYSICAL EXAM  Blood pressure 106/65, pulse 77, temperature 98 °F (36.7 °C), temperature source Oral, resp. rate 18, height 172.7 cm (67.99\"), weight 75 kg (165 lb 5.5 oz), SpO2 94 %.    GENERAL: 61-year-old chronically ill-appearing male in mild distress  HENT: NCAT, neck supple, trachea midline  EYES: no scleral icterus, PERRL, normal conjunctivae  CV: regular rhythm, regular rate, no murmur  RESPIRATORY: unlabored effort, mild Rales in bases bilaterally  ABDOMEN: soft, nontender, nondistended, bowel sounds present  MUSCULOSKELETAL: no gross deformity, no pedal edema, no calf tenderness  NEURO: Sleepy but easily arousable,  sensory and motor function of extremities intact, speech clear, mental status normal  SKIN: warm, dry, no rash  PSYCH:  Look depressed     LAB RESULTS  Lab Results (last 24 hours)     Procedure Component Value Units Date/Time    Blood Culture - Blood, Hand, Right [[id number]]  (Normal) Collected: 03/12/22 1118    Specimen: Blood from Hand, Right Updated: 03/13/22 1247     " Blood Culture No growth at 24 hours    Blood Culture - Blood, Arm, Right [[id number]]  (Normal) Collected: 03/12/22 1023    Specimen: Blood from Arm, Right Updated: 03/13/22 1147     Blood Culture No growth at 24 hours    POC Glucose Once [[id number]]  (Abnormal) Collected: 03/13/22 1028    Specimen: Blood Updated: 03/13/22 1029     Glucose 159 mg/dL      Comment: Meter: [id number] : [id number] Og KIM       POC Glucose Once [[id number]]  (Abnormal) Collected: 03/13/22 0627    Specimen: Blood Updated: 03/13/22 0640     Glucose 181 mg/dL      Comment: Meter: [id number] : [id number] William Maryjane JULIO       Basic Metabolic Panel [[id number]]  (Abnormal) Collected: 03/13/22 0445    Specimen: Blood Updated: 03/13/22 0620     Glucose 203 mg/dL      BUN 38 mg/dL      Creatinine 6.43 mg/dL      Sodium 132 mmol/L      Potassium 4.1 mmol/L      Comment: Slight hemolysis detected by analyzer. Results may be affected.        Chloride 97 mmol/L      CO2 22.3 mmol/L      Calcium 8.1 mg/dL      BUN/Creatinine Ratio 5.9     Anion Gap 12.7 mmol/L      eGFR 9.3 mL/min/1.73      Comment: <15 Indicative of kidney failure       Narrative:      GFR Normal >60  Chronic Kidney Disease <60  Kidney Failure <15      CBC & Differential [[id number]]  (Abnormal) Collected: 03/13/22 0445    Specimen: Blood Updated: 03/13/22 0551    Narrative:      The following orders were created for panel order CBC & Differential.  Procedure                               Abnormality         Status                     ---------                               -----------         ------                     CBC Auto Differential[[id number]]        Abnormal            Final result                 Please view results for these tests on the individual orders.    CBC Auto Differential [[id number]]  (Abnormal) Collected: 03/13/22 0445    Specimen: Blood Updated: 03/13/22 0551     WBC 10.50 10*3/mm3      RBC 2.77 10*6/mm3      Hemoglobin 8.1 g/dL      Hematocrit  26.2 %      MCV 94.6 fL      MCH 29.2 pg      MCHC 30.9 g/dL      RDW 14.5 %      RDW-SD 49.8 fl      MPV 14.0 fL      Platelets 51 10*3/mm3      Neutrophil % 76.9 %      Lymphocyte % 10.3 %      Monocyte % 9.4 %      Eosinophil % 1.6 %      Basophil % 0.6 %      Immature Grans % 1.2 %      Neutrophils, Absolute 8.07 10*3/mm3      Lymphocytes, Absolute 1.08 10*3/mm3      Monocytes, Absolute 0.99 10*3/mm3      Eosinophils, Absolute 0.17 10*3/mm3      Basophils, Absolute 0.06 10*3/mm3      Immature Grans, Absolute 0.13 10*3/mm3      nRBC 0.0 /100 WBC     Immature Platelet Fraction [[id number]]  (Abnormal) Collected: 03/13/22 0445    Specimen: Blood Updated: 03/13/22 0550     IPF 20.90 %      Platelets 51 10*3/mm3     POC Glucose Once [[id number]]  (Abnormal) Collected: 03/12/22 2042    Specimen: Blood Updated: 03/12/22 2049     Glucose 158 mg/dL      Comment: Meter: [id number] : [id number] William KIM       POC Glucose Once [[id number]]  (Abnormal) Collected: 03/12/22 1639    Specimen: Blood Updated: 03/12/22 1640     Glucose 184 mg/dL      Comment: Meter: [id number] : [id number] Og KIM           Imaging Results (Last 24 Hours)     ** No results found for the last 24 hours. **             ECG 12 Lead  Component   Ref Range & Units 3/5/22 0945 3/4/22 1825 1/26/22 1729 12/2/21 0309 4/28/21 1722 4/8/21 1341   QT Interval   ms 271 P  392  321  395  435  444              HEART RATE= 187  bpm  RR Interval= 320  ms  MI Interval= 132  ms  P Horizontal Axis= 90  deg  P Front Axis= 30  deg  QRSD Interval= 95  ms  QT Interval= 271  ms  QRS Axis= 91  deg  T Wave Axis= -72  deg  - ABNORMAL ECG -  Supraventricular tachycardia  Borderline right axis deviation  Repolarization abnormality, prob rate related             Current Facility-Administered Medications:   •  acetaminophen (TYLENOL) tablet 650 mg, 650 mg, Oral, Q6H PRN, Mai Escoto MD, 650 mg at 03/08/22 1907  •  ALPRAZolam (XANAX) tablet 1 mg, 1 mg,  Oral, 4x Daily PRN, Mai Escoto MD, 1 mg at 03/13/22 0811  •  aspirin chewable tablet 81 mg, 81 mg, Oral, Daily, Mai Escoto MD, 81 mg at 03/13/22 0812  •  cefTRIAXone (ROCEPHIN) 2 g in sodium chloride 0.9 % 100 mL IVPB-VTB, 2 g, Intravenous, Q24H, Karl Swift MD, Last Rate: 200 mL/hr at 03/12/22 2233, 2 g at 03/12/22 2233  •  dextrose (D50W) (25 g/50 mL) IV injection 25 g, 25 g, Intravenous, Q15 Min PRN, Mai Escoto MD  •  dextrose (GLUTOSE) oral gel 15 g, 15 g, Oral, Q15 Min PRN, Mai Escoto MD  •  epoetin real-epbx (RETACRIT) injection 10,000 Units, 10,000 Units, Intravenous, Once per day on Mon Wed Fri, Mai Escoto MD, 10,000 Units at 03/11/22 0930  •  glucagon (human recombinant) (GLUCAGEN DIAGNOSTIC) injection 1 mg, 1 mg, Intramuscular, Q15 Min PRN, Mai Escoto MD  •  heparin (porcine) injection 3,800 Units, 3,800 Units, Intracatheter, PRN, Mai Escoto MD, 3,800 Units at 03/09/22 2140  •  insulin glargine (LANTUS, SEMGLEE) injection 15 Units, 15 Units, Subcutaneous, Nightly, Karl Swift MD, 15 Units at 03/12/22 2050  •  insulin lispro (ADMELOG) injection 0-7 Units, 0-7 Units, Subcutaneous, 4x Daily With Meals & Nightly, Karl Swift MD, 2 Units at 03/13/22 1206  •  insulin lispro (ADMELOG) injection 5 Units, 5 Units, Subcutaneous, TID With Meals, Karl Swift MD, 5 Units at 03/13/22 1205  •  lactobacillus acidophilus (RISAQUAD) capsule 1 capsule, 1 capsule, Oral, Daily, Mai Escoto MD, 1 capsule at 03/13/22 0812  •  lidocaine (XYLOCAINE) 1 % injection 5 mL, 5 mL, Injection, Once, Mai Escoto MD  •  midodrine (PROAMATINE) tablet 10 mg, 10 mg, Oral, TID Tigist SOLIS Tausif, MD, 10 mg at 03/13/22 1205  •  oxyCODONE-acetaminophen (PERCOCET)  MG per tablet 1 tablet, 1 tablet, Oral, Q4H PRN, Karl Swift MD, 1 tablet at 03/13/22 1205  •  pantoprazole (PROTONIX) EC tablet 40 mg, 40 mg, Oral, BID Tigist SOLIS Tausif, MD, 40 mg at 03/13/22 0811  •  promethazine (PHENERGAN)  tablet 12.5 mg, 12.5 mg, Oral, Q4H PRN, Mai Escoto MD  •  sevelamer (RENVELA) tablet 800 mg, 800 mg, Oral, TID With Meals, Mai Escoto MD, 800 mg at 03/13/22 1205  •  [COMPLETED] Insert peripheral IV, , , Once **AND** sodium chloride 0.9 % flush 10 mL, 10 mL, Intravenous, PRN, Mai Escoto MD  •  sodium chloride 0.9 % flush 10 mL, 10 mL, Intravenous, Q12H, Mai Escoto MD, 10 mL at 03/13/22 0811  •  sodium chloride 0.9 % flush 10 mL, 10 mL, Intravenous, PRN, Mai Escoto MD  •  tiZANidine (ZANAFLEX) tablet 4 mg, 4 mg, Oral, Q8H PRN, Mai Escoto MD     ASSESSMENT  E. coli bacteremia with sepsis  Line sepsis status post removal of old hemodialysis catheter and placement of new  End-stage renal disease with noncompliance with hemodialysis  Supraventricular tachycardia resolved  Insulin-dependent diabetes mellitus  History of hypertension with low blood pressure  Hyperlipidemia  History of CVA  Chronic anemia and thrombocytopenia  Anxiety disorder  Gastroesophageal reflux disease    PLAN  CPM  Continue IV antibiotics   Swallow eval  Hemodialysis per nephrology  Vascular surgery to follow patient  Insulin dose adjusted  Adjust home medications  Stress ulcer DVT prophylaxis  Supportive care  PT OT  Discussed with nursing staff and wife  Follow closely and further recommendation according to hospital course    ARIEL MCGOVERN MD

## 2022-03-13 NOTE — PROGRESS NOTES
The patient is in much brighter spirits when seen today and reports that his symptoms of anxiety and pain are much better controlled at this point.  From a psychiatric standpoint he appears to be stable and I will sign off.

## 2022-03-14 LAB
BASOPHILS # BLD AUTO: 0.04 10*3/MM3 (ref 0–0.2)
BASOPHILS NFR BLD AUTO: 0.4 % (ref 0–1.5)
DEPRECATED RDW RBC AUTO: 51.1 FL (ref 37–54)
EOSINOPHIL # BLD AUTO: 0.15 10*3/MM3 (ref 0–0.4)
EOSINOPHIL NFR BLD AUTO: 1.6 % (ref 0.3–6.2)
ERYTHROCYTE [DISTWIDTH] IN BLOOD BY AUTOMATED COUNT: 14.8 % (ref 12.3–15.4)
GLUCOSE BLDC GLUCOMTR-MCNC: 131 MG/DL (ref 70–130)
GLUCOSE BLDC GLUCOMTR-MCNC: 165 MG/DL (ref 70–130)
GLUCOSE BLDC GLUCOMTR-MCNC: 179 MG/DL (ref 70–130)
GLUCOSE BLDC GLUCOMTR-MCNC: 83 MG/DL (ref 70–130)
HCT VFR BLD AUTO: 27.1 % (ref 37.5–51)
HGB BLD-MCNC: 8.5 G/DL (ref 13–17.7)
IMM GRANULOCYTES # BLD AUTO: 0.08 10*3/MM3 (ref 0–0.05)
IMM GRANULOCYTES NFR BLD AUTO: 0.8 % (ref 0–0.5)
LYMPHOCYTES # BLD AUTO: 1.02 10*3/MM3 (ref 0.7–3.1)
LYMPHOCYTES NFR BLD AUTO: 10.5 % (ref 19.6–45.3)
MCH RBC QN AUTO: 29.9 PG (ref 26.6–33)
MCHC RBC AUTO-ENTMCNC: 31.4 G/DL (ref 31.5–35.7)
MCV RBC AUTO: 95.4 FL (ref 79–97)
MONOCYTES # BLD AUTO: 0.91 10*3/MM3 (ref 0.1–0.9)
MONOCYTES NFR BLD AUTO: 9.4 % (ref 5–12)
NEUTROPHILS NFR BLD AUTO: 7.47 10*3/MM3 (ref 1.7–7)
NEUTROPHILS NFR BLD AUTO: 77.3 % (ref 42.7–76)
NRBC BLD AUTO-RTO: 0 /100 WBC (ref 0–0.2)
PLATELET # BLD AUTO: 71 10*3/MM3 (ref 140–450)
PLATELET # BLD AUTO: 71 10*3/MM3 (ref 140–450)
PLATELETS.RETICULATED NFR BLD AUTO: 16.4 % (ref 0.9–6.5)
PMV BLD AUTO: 12.9 FL (ref 6–12)
RBC # BLD AUTO: 2.84 10*6/MM3 (ref 4.14–5.8)
WBC NRBC COR # BLD: 9.67 10*3/MM3 (ref 3.4–10.8)

## 2022-03-14 PROCEDURE — 97530 THERAPEUTIC ACTIVITIES: CPT

## 2022-03-14 PROCEDURE — 25010000002 CEFTRIAXONE PER 250 MG: Performed by: HOSPITALIST

## 2022-03-14 PROCEDURE — 85055 RETICULATED PLATELET ASSAY: CPT | Performed by: INTERNAL MEDICINE

## 2022-03-14 PROCEDURE — 25010000002 HEPARIN (PORCINE) PER 1000 UNITS: Performed by: INTERNAL MEDICINE

## 2022-03-14 PROCEDURE — 92610 EVALUATE SWALLOWING FUNCTION: CPT | Performed by: SPEECH-LANGUAGE PATHOLOGIST

## 2022-03-14 PROCEDURE — 25010000002 EPOETIN ALFA-EPBX 10000 UNIT/ML SOLUTION: Performed by: INTERNAL MEDICINE

## 2022-03-14 PROCEDURE — 82962 GLUCOSE BLOOD TEST: CPT

## 2022-03-14 PROCEDURE — 99233 SBSQ HOSP IP/OBS HIGH 50: CPT | Performed by: INTERNAL MEDICINE

## 2022-03-14 PROCEDURE — 85025 COMPLETE CBC W/AUTO DIFF WBC: CPT | Performed by: INTERNAL MEDICINE

## 2022-03-14 PROCEDURE — 63710000001 INSULIN LISPRO (HUMAN) PER 5 UNITS: Performed by: HOSPITALIST

## 2022-03-14 RX ORDER — ONDANSETRON 2 MG/ML
4 INJECTION INTRAMUSCULAR; INTRAVENOUS EVERY 6 HOURS PRN
Status: DISCONTINUED | OUTPATIENT
Start: 2022-03-14 | End: 2022-03-18 | Stop reason: HOSPADM

## 2022-03-14 RX ADMIN — INSULIN LISPRO 7 UNITS: 100 INJECTION, SOLUTION INTRAVENOUS; SUBCUTANEOUS at 17:51

## 2022-03-14 RX ADMIN — HEPARIN SODIUM 3800 UNITS: 1000 INJECTION INTRAVENOUS; SUBCUTANEOUS at 10:08

## 2022-03-14 RX ADMIN — ALPRAZOLAM 1 MG: 0.5 TABLET ORAL at 13:02

## 2022-03-14 RX ADMIN — INSULIN GLARGINE-YFGN 20 UNITS: 100 INJECTION, SOLUTION SUBCUTANEOUS at 20:56

## 2022-03-14 RX ADMIN — PANTOPRAZOLE SODIUM 40 MG: 40 TABLET, DELAYED RELEASE ORAL at 17:52

## 2022-03-14 RX ADMIN — Medication 10 ML: at 20:54

## 2022-03-14 RX ADMIN — SEVELAMER CARBONATE 800 MG: 800 TABLET, FILM COATED ORAL at 13:02

## 2022-03-14 RX ADMIN — CEFTRIAXONE 2 G: 2 INJECTION, POWDER, FOR SOLUTION INTRAMUSCULAR; INTRAVENOUS at 22:19

## 2022-03-14 RX ADMIN — INSULIN LISPRO 2 UNITS: 100 INJECTION, SOLUTION INTRAVENOUS; SUBCUTANEOUS at 20:55

## 2022-03-14 RX ADMIN — MIDODRINE HYDROCHLORIDE 10 MG: 5 TABLET ORAL at 13:02

## 2022-03-14 RX ADMIN — OXYCODONE HYDROCHLORIDE AND ACETAMINOPHEN 1 TABLET: 10; 325 TABLET ORAL at 18:35

## 2022-03-14 RX ADMIN — SEVELAMER CARBONATE 800 MG: 800 TABLET, FILM COATED ORAL at 17:52

## 2022-03-14 RX ADMIN — MIDODRINE HYDROCHLORIDE 10 MG: 5 TABLET ORAL at 17:52

## 2022-03-14 RX ADMIN — ALPRAZOLAM 1 MG: 0.5 TABLET ORAL at 20:55

## 2022-03-14 RX ADMIN — OXYCODONE HYDROCHLORIDE AND ACETAMINOPHEN 1 TABLET: 10; 325 TABLET ORAL at 13:02

## 2022-03-14 RX ADMIN — EPOETIN ALFA-EPBX 10000 UNITS: 10000 INJECTION, SOLUTION INTRAVENOUS; SUBCUTANEOUS at 10:08

## 2022-03-14 RX ADMIN — INSULIN LISPRO 2 UNITS: 100 INJECTION, SOLUTION INTRAVENOUS; SUBCUTANEOUS at 17:52

## 2022-03-14 NOTE — PLAN OF CARE
Goal Outcome Evaluation:  Plan of Care Reviewed With: patient           Outcome Evaluation: Clinical swallow eval completed.  Pt demonstrated no overt s/s aspiration with thin liquids, puree, soft/hard solids, or mixed consistencies.  Slow, but adequate mastication.  REC continue soft diet with chopped meats, thin liquids.  Meds as tolerated.  Upright with all po.  Add extra sauce/gravy to trays to moisten foods/meats.  Encourage not to talk while chewing/swallowing.

## 2022-03-14 NOTE — PROGRESS NOTES
"  Infectious Diseases Progress Note    Sabas Quick MD     [facility]  [place]: 10 days  Patient Identification:  Name: Angelo Brown  Age: 61 y.o.  Sex: male  :  1960  MRN: [id number]         Primary Care Physician: Yadiel Baxter III, MD            Subjective: Feeling better appetite is improving.  Interval History: See consultation note.    Objective:    Scheduled Meds:aspirin, 81 mg, Oral, Daily  cefTRIAXone, 2 g, Intravenous, Q24H  epoetin real/real-epbx, 10,000 Units, Intravenous, Once per day on   insulin glargine, 20 Units, Subcutaneous, Nightly  insulin lispro, 0-7 Units, Subcutaneous, 4x Daily With Meals & Nightly  insulin lispro, 7 Units, Subcutaneous, TID With Meals  lactobacillus acidophilus, 1 capsule, Oral, Daily  lidocaine, 5 mL, Injection, Once  midodrine, 10 mg, Oral, TID AC  pantoprazole, 40 mg, Oral, BID AC  sevelamer, 800 mg, Oral, TID With Meals  sodium chloride, 10 mL, Intravenous, Q12H      Continuous Infusions:     Vital signs in last 24 hours:  Temp:  [98 °F (36.7 °C)-98.7 °F (37.1 °C)] 98.7 °F (37.1 °C)  Heart Rate:  [69-82] 69  Resp:  [17-18] 17  BP: (106-159)/(65-86) 126/75    Intake/Output:    Intake/Output Summary (Last 24 hours) at 3/14/2022 1154  Last data filed at 3/13/2022 1500  Gross per 24 hour   Intake 240 ml   Output --   Net 240 ml       Exam:  /75 (BP Location: Right arm, Patient Position: Lying)   Pulse 69   Temp 98.7 °F (37.1 °C) (Oral)   Resp 17   Ht 172.7 cm (67.99\")   Wt 75.3 kg (166 lb 0.1 oz)   SpO2 95%   BMI 25.25 kg/m²   Patient is examined using the personal protective equipment as per guidelines from infection control for this particular patient as enacted.  Hand washing was performed before and after patient interaction.  General Appearance:  Comfortable chronically ill-appearing male                          Head:    Normocephalic, without obvious abnormality, atraumatic                           Eyes:    " PERRL, conjunctivae/corneas clear, EOM's intact, both eyes                         Throat:   Lips, tongue, gums normal; oral mucosa pink and moist                           Neck:   Supple, symmetrical, trachea midline, no JVD                         Lungs:    Clear to auscultation bilaterally, respirations unlabored                 Chest Wall:  Right IJ tunnel catheter in place                          Heart:    Regular rate and rhythm, S1 and S2 normal                  Abdomen:   Soft nontender                 Extremities: Right groin dialysis catheter in place                        Pulses:   Pulses palpable in all extremities                            Skin:   Skin is warm and dry,  no rashes or palpable lesions                  Neurologic: Grossly nonfocal       Data Review:    I reviewed the patient's new clinical results.  Results from last 7 days   Lab Units 03/14/22  0356 03/13/22 0445 03/12/22  0830 03/11/22  0439 03/10/22  0417 03/09/22  0412 03/08/22  1236   WBC 10*3/mm3 9.67 10.50 7.58 6.64 10.08 9.31 7.94   HEMOGLOBIN g/dL 8.5* 8.1* 8.1* 8.1* 9.0* 9.7* 7.5*   PLATELETS 10*3/mm3 71*  71* 51*  51* 44*  44* 36*  36* 40*  40* 36*  36* 29*     Results from last 7 days   Lab Units 03/13/22  0445 03/11/22  0439 03/10/22  0417 03/09/22  0412 03/08/22  0750   SODIUM mmol/L 132* 135* 136 136 136   POTASSIUM mmol/L 4.1 3.6 3.6 4.2 4.4   CHLORIDE mmol/L 97* 98 97* 95* 96*   CO2 mmol/L 22.3 25.1 26.0 24.0 24.1   BUN mg/dL 38* 51* 32* 73* 59*   CREATININE mg/dL 6.43* 6.52* 4.83* 7.77* 6.77*   CALCIUM mg/dL 8.1* 8.3* 8.2* 8.4* 8.1*   GLUCOSE mg/dL 203* 178* 132* 181* 239*     Microbiology Results (last 10 days)     Procedure Component Value - Date/Time    Blood Culture - Blood, Hand, Right [[id number]]  (Normal) Collected: 03/12/22 1118    Lab Status: Preliminary result Specimen: Blood from Hand, Right Updated: 03/13/22 1247     Blood Culture No growth at 24 hours    Blood Culture - Blood, Arm, Right [[id number]]   (Normal) Collected: 03/12/22 1023    Lab Status: Preliminary result Specimen: Blood from Arm, Right Updated: 03/14/22 1145     Blood Culture No growth at 2 days    COVID PRE-OP / PRE-PROCEDURE SCREENING ORDER (NO ISOLATION) - Swab, Nasopharynx [[id number]]  (Normal) Collected: 03/08/22 1816    Lab Status: Final result Specimen: Swab from Nasopharynx Updated: 03/08/22 2027    Narrative:      The following orders were created for panel order COVID PRE-OP / PRE-PROCEDURE SCREENING ORDER (NO ISOLATION) - Swab, Nasopharynx.  Procedure                               Abnormality         Status                     ---------                               -----------         ------                     COVID-19,[facility] IN-HOUSE...[[id number]]  Normal              Final result                 Please view results for these tests on the individual orders.    COVID-19,[facility] IN-HOUSE CEPHEID/NIKHIL NP SWAB IN TRANSPORT MEDIA 8-12 HR TAT - Swab, Nasopharynx [[id number]]  (Normal) Collected: 03/08/22 1816    Lab Status: Final result Specimen: Swab from Nasopharynx Updated: 03/08/22 2027     COVID19 Not Detected    Narrative:      Fact sheet for providers: https://www.fda.gov/media/345273/download    Fact sheet for patients: https://www.fda.gov/media/951897/download    Test performed by PCR.    Blood Culture - Blood, Blood, Central Line [[id number]]  (Abnormal) Collected: 03/07/22 1131    Lab Status: Final result Specimen: Blood, Central Line Updated: 03/10/22 0749     Blood Culture Escherichia coli     Isolated from Aerobic and Anaerobic Bottles     Gram Stain Anaerobic Bottle Gram negative bacilli      Aerobic Bottle Gram negative bacilli    Narrative:      Refer to previous blood culture collected on 3/7 for corbin's      Blood Culture - Blood, Blood, Central Line [[id number]]  (Abnormal)  (Susceptibility) Collected: 03/07/22 1131    Lab Status: Final result Specimen: Blood, Central Line Updated: 03/10/22 0748     Blood Culture Escherichia  coli     Isolated from Aerobic and Anaerobic Bottles     Gram Stain Anaerobic Bottle Gram negative bacilli      Aerobic Bottle Gram negative bacilli    Susceptibility      Escherichia coli      WALLY      Ampicillin Susceptible      Ampicillin + Sulbactam Susceptible      Cefepime Susceptible      Ceftazidime Susceptible      Ceftriaxone Susceptible      Gentamicin Susceptible      Levofloxacin Susceptible      Piperacillin + Tazobactam Susceptible      Trimethoprim + Sulfamethoxazole Susceptible                       Susceptibility Comments     Escherichia coli    Cefazolin sensitivity will not be reported for Enterobacteriaceae in non-urine isolates. If cefazolin is preferred, please call the microbiology lab to request an E-test.  With the exception of urinary-sourced infections, aminoglycosides should not be used as monotherapy.             Blood Culture ID, PCR - Blood, Blood, Central Line [[id number]]  (Abnormal) Collected: 03/07/22 1131    Lab Status: Final result Specimen: Blood, Central Line Updated: 03/07/22 2221     BCID, PCR Eschericia coli. Identification by BCID2 PCR.     BOTTLE TYPE Anaerobic Bottle    COVID PRE-OP / PRE-PROCEDURE SCREENING ORDER (NO ISOLATION) - Swab, Nasopharynx [[id number]]  (Normal) Collected: 03/04/22 1801    Lab Status: Final result Specimen: Swab from Nasopharynx Updated: 03/04/22 1851    Narrative:      The following orders were created for panel order COVID PRE-OP / PRE-PROCEDURE SCREENING ORDER (NO ISOLATION) - Swab, Nasopharynx.  Procedure                               Abnormality         Status                     ---------                               -----------         ------                     COVID-19,[facility]-HOUSE...[[id number]]  Normal              Final result                 Please view results for these tests on the individual orders.    COVID-19,[facility]-HOUSE CEPHEID/NIKHIL NP SWAB IN TRANSPORT MEDIA 8-12 HR TAT - Swab, Nasopharynx [[id number]]  (Normal)  Collected: 03/04/22 1801    Lab Status: Final result Specimen: Swab from Nasopharynx Updated: 03/04/22 1851     COVID19 Not Detected    Narrative:      Fact sheet for providers: https://www.fda.gov/media/777841/download     Fact sheet for patients: https://www.fda.gov/media/005126/download            Assessment:    Anemia due to chronic renal failure treated with erythropoietin, stage 5 (HCC)    B12 deficiency    End-stage renal disease on hemodialysis (HCC)    Thrombocytopenia (HCC)    Liver cirrhosis (HCC)    Chronic hepatitis C without hepatic coma (HCC)    Noncompliance of patient with renal dialysis (HCC)  1-systemic gram-negative bacteremic sepsis either due to              -Infected tunneled catheter versus              -Evolving intra-abdominal process with subsequent bacteremia and secondary seeding.  2-end-stage renal disease  3-history of cirrhosis of the liver due to hepatitis C  4-thrombocytopenia  5-other diagnosis per primary team.     Recommendations/Discussions:  · Continue present treatment with IV Rocephin .  · Follow-up repeat blood cultures after the catheter is replaced.  · Once considered stable consider CT scan of the abdomen and pelvis    Sabas Quick MD  3/14/2022  11:54 EDT    Much of this encounter note is an electronic transcription/translation of spoken language to printed text. The electronic translation of spoken language may permit erroneous, or at times, nonsensical words or phrases to be inadvertently transcribed; Although I have reviewed the note for such errors, some may still exist

## 2022-03-14 NOTE — PROGRESS NOTES
"[facility] INPATIENT PROGRESS NOTE    Length of Stay:  10 days    CHIEF COMPLAINT: Thrombocytopenia, ESRD, chronic hepatitis C with cirrhosis, anemia, B12 deficiency      SUBJECTIVE:   3/13/22: The patient is awake and alert today.  He is minimally confused.  He is very tangential in conversation discussing issues with his children and his potential care at home.  He was not agreeable to working with physical therapy earlier today.  He has no specific new complaints currently.    3/14/22: Is resting comfortably in bed. Daughter at bedside. Says he had rough time during dialysis today. Denies any pain, N/V or any other acute symptoms. Appears oriented but tangential speech pattern. No bleeding or bruising.    ROS:  Review of Systems   A comprehensive review of systems was obtained with pertinent positive findings as noted in the interval history above.  All other systems negative.      OBJECTIVE:  Vitals:    03/14/22 0500 03/14/22 0807 03/14/22 1340 03/14/22 1442   BP:   95/44    BP Location:   Right arm    Patient Position:   Sitting    Pulse:       Resp:  17 17    Temp:   98.2 °F (36.8 °C)    TempSrc:  Oral Oral    SpO2:   92%    Weight: 75.3 kg (166 lb 0.1 oz)   75.3 kg (166 lb 0.1 oz)   Height:    172.7 cm (67.99\")         PHYSICAL EXAMINATION:  General: Patient awake and alert, mild confusion  Chest/Lungs: Clear to auscultation bilaterally anteriorly.  Right chest wall previous catheter site with dressing overlying, no bleeding  Heart: Regular rate and rhythm no murmurs gallops or rubs  Abdomen/GI: Soft nontender nondistended bowel sounds present  Extremities: No edema.  Right femoral catheter site without bleeding  Neuro: Alert & awake. Moving all 4 extremities.      Patient was examined today, unchanged from above    DIAGNOSTIC DATA:  Results Review:     I reviewed the patient's new clinical results.    Results from last 7 days   Lab Units 03/14/22  0356 03/13/22  0445 03/12/22  0830   WBC " 10*3/mm3 9.67 10.50 7.58   HEMOGLOBIN g/dL 8.5* 8.1* 8.1*   HEMATOCRIT % 27.1* 26.2* 26.4*   PLATELETS 10*3/mm3 71*  71* 51*  51* 44*  44*      Results from last 7 days   Lab Units 03/13/22  0445 03/11/22  0439 03/10/22  0417   SODIUM mmol/L 132* 135* 136   POTASSIUM mmol/L 4.1 3.6 3.6   CHLORIDE mmol/L 97* 98 97*   CO2 mmol/L 22.3 25.1 26.0   BUN mg/dL 38* 51* 32*   CREATININE mg/dL 6.43* 6.52* 4.83*   CALCIUM mg/dL 8.1* 8.3* 8.2*   BILIRUBIN mg/dL  --   --  0.8   ALK PHOS U/L  --   --  320*   ALT (SGPT) U/L  --   --  43*   AST (SGOT) U/L  --   --  37   GLUCOSE mg/dL 203* 178* 132*      Lab Results   Component Value Date    NEUTROABS 7.47 (H) 03/14/2022                 Assessment/Plan   ASSESSMENT/PLAN:  This is a 61 y.o. male with:     *Thrombocytopenia  · Patient with history of chronic mild thrombocytopenia followed in the outpatient setting by Dr. Aviles  · Felt to be related to underlying cirrhosis with splenomegaly  · Platelet count has been in the low 100,000 range in the past  · Platelet count in January 2022 had been in the mid to high 100,000 range at which time patient had COVID-19 infection  · Patient was off of dialysis for approximately 1 week before current hospital admission  · On admission 3/4/2022, platelet count was 51,000 and declined into the 40,000 range.  · Additional labs on 3/6/2022 with elevated IPF at 9%  · Peripheral blood smear reviewed on 3/5/2022, was unremarkable.  · B12 on 3/7/2022 greater than 2000  · Suspect that thrombocytopenia is related to underlying cirrhosis/splenomegaly, exacerbated by volume overload off dialysis prior to hospital admission in addition to consumption from sepsis.  · Platelet count today has increased up to 71,000 (3/14/22).  IPF remains elevated at 20.9% indicative of peripheral destructive process from consumption.  Anticipate that platelet count will continue to improve up to patient's prior baseline level (low 100,000 range) with treatment of  underlying sepsis.  No current bleeding issues.  He does have underlying cirrhosis/splenomegaly contributing to his thrombocytopenia as noted above.     *ESRD  · Patient with chronic noncompliance with outpatient dialysis per nephrology  · Patient had been on Monday Wednesday Friday schedule  · Patient apparently had not been to dialysis for 1 week prior to hospital admission  · Patient is now back on dialysis, nephrology following.    · On 3/9/2022, right chest tunneled dialysis catheter was removed and placement of right femoral dialysis catheter without bleeding complications.    *Sepsis with E. coli bacteremia  · Blood culture from 3/7/2022 + for E. Coli  · Patient initiated Rocephin IV 3/7/2022  · Patient developed hypotension, transferred to CCU 3/8/2022  · Right chest wall tunneled dialysis catheter removed with placement of new right femoral dialysis catheter on 3/9/2022  · Patient did require pressor support, subsequently discontinued     *Chronic hepatitis C with cirrhosis  · Received previous treatment, details unclear  · Previous CT abdomen and pelvis 3/12/2018 with cirrhotic appearing liver and borderline splenomegaly to 13.5 cm  · Hepatitis C RNA 3/6/2022 was negative    *Anemia  · Longstanding history of anemia followed by Dr. Aviles in the outpatient setting  · Anemia has been multifactorial related to CKD/ESRD, iron deficiency, B12 deficiency  · Patient had previously received Procrit related to anemia secondary to CKD.  Eventually ROMULO administration transition to nephrology once patient initiated hemodialysis around May 2021.  · Patient is receiving intermittent IV iron with dialysis per nephrology.  · Labs on 11/2/2021 with iron 172, ferritin 560, iron saturation 68%, TIBC 255.  B12 level 1017 (see below).  · Hemoglobin in January 2022 had been on the 8-9 range at time of COVID-19 infection  · Labs on 2/2/2022 with iron 89, ferritin 1514, iron saturation 35%, TIBC 256, folate 7.58  · During  current admission, hemoglobin 3/4/2022 was 8.7 with subsequent declined into the high 7 range.  · Laboratory evaluation on 3/4/2022 with iron 131, ferritin 5988, iron saturation 79%, TIBC 165.  · No laboratory evidence to suggest hemolysis  · B12 on 3/7/2022 greater than 2000  · Additional labs pending from 3/6/2022 with copper and zinc  · Patient is continuing on Retacrit 10,000 units 3 times weekly with dialysis per nephrology  · Hemoglobin on 3/8/2022 declined to 7.5.  A repeat level was drawn later that morning at 4.2 however this was incorrect due to a dilute specimen and on recheck was stable at 7.5.  · Patient developed hypotension, transferred to CCU with sepsis.  Received 1 unit PRBC transfusion on 3/8/2022.  · Hemoglobin today stable at 8.5    *B12 deficiency  · Patient was previously receiving oral B12 supplementation  · Labs on 11/2/2022 with B12 level 1017, patient remained off of oral B12 supplementation.  · B12 level on 3/7/2022 greater than 2000, no need for further B12 supplementation    *Right clear-cell renal cell carcinoma, stage I ([id number])  · Status post right partial nephrectomy on 7/6/2018 for a 2.2 cm clear-cell renal cell carcinoma, grade 3, margins could not be assessed, no lymphovascular invasion.  Notation of extensive fibrosis with hemosiderin deposition reflective of fibrosis and hemorrhage.    *Confusion/somnolence  · Secondary to E. coli bacteremia with evolving sepsis  · CT head 3/7/2022 negative for acute changes  · Ammonia level normal on 3/7/2022  · Patient with confusion likely related to sepsis with metabolic encephalopathy vs fentanyl/opioids    Plan:  1. Patient continues with no indication for PRBC or platelet transfusion  2. Patient continuing on Rocephin for E. coli bacteremia/sepsis  3. Patient continuing on Retacrit 10,000 units Monday Wednesday Friday with dialysis per nephrology  4. We will continue to monitor the patient's platelet count for now  5. CBC, CMP  daily    Discussed with patient and daughter at bedside. Will continue to follow  All issues new to me.        Nicola Monique MD

## 2022-03-14 NOTE — PROGRESS NOTES
"Daily progress note    Chief complaint  Doing same  No specific complaints  Getting hemodialysis    History of present illness  61-year white male with very complex past medical history including end-stage renal disease on hemodialysis CVA diabetes mellitus coronary artery disease hypertension chronic anemia presented to [facility] emergency room after missing hemodialysis with generalized weakness.  Patient is poor historian but denies any headache dizziness chest pain shortness of breath abdominal pain vomiting diarrhea.  Patient does have nausea and weak all over.  Patient has no cough fever night sweats weight loss.  Patient evaluated in ER found to have high potassium need hemodialysis admit for management.  Patient also found to have low platelets which is new.  Patient has no bleeding whatsoever.     REVIEW OF SYSTEMS  Unremarkable except generalized weakness     PHYSICAL EXAM  Blood pressure 95/44, pulse 69, temperature 98.2 °F (36.8 °C), temperature source Oral, resp. rate 17, height 172.7 cm (67.99\"), weight 75.3 kg (166 lb 0.1 oz), SpO2 92 %.    GENERAL: 61-year-old chronically ill-appearing male in mild distress  HENT: NCAT, neck supple, trachea midline  EYES: no scleral icterus, PERRL, normal conjunctivae  CV: regular rhythm, regular rate, no murmur  RESPIRATORY: unlabored effort, mild Rales in bases bilaterally  ABDOMEN: soft, nontender, nondistended, bowel sounds present  MUSCULOSKELETAL: no gross deformity, no pedal edema, no calf tenderness  NEURO: Sleepy but easily arousable,  sensory and motor function of extremities intact, speech clear, mental status normal  SKIN: warm, dry, no rash  PSYCH:  Look depressed     LAB RESULTS  Lab Results (last 24 hours)     Procedure Component Value Units Date/Time    POC Glucose Once [[id number]]  (Normal) Collected: 03/14/22 1254    Specimen: Blood Updated: 03/14/22 1255     Glucose 83 mg/dL      Comment: Meter: [id number] : [id number] Yarelis Obando " RN       Blood Culture - Blood, Hand, Right [[id number]]  (Normal) Collected: 03/12/22 1118    Specimen: Blood from Hand, Right Updated: 03/14/22 1245     Blood Culture No growth at 2 days    Blood Culture - Blood, Arm, Right [[id number]]  (Normal) Collected: 03/12/22 1023    Specimen: Blood from Arm, Right Updated: 03/14/22 1145     Blood Culture No growth at 2 days    POC Glucose Once [[id number]]  (Abnormal) Collected: 03/14/22 0651    Specimen: Blood Updated: 03/14/22 0653     Glucose 131 mg/dL      Comment: Meter: [id number] : [id number] Alexei KIM       Immature Platelet Fraction [[id number]]  (Abnormal) Collected: 03/14/22 0356    Specimen: Blood Updated: 03/14/22 0443     IPF 16.40 %      Platelets 71 10*3/mm3     CBC & Differential [[id number]]  (Abnormal) Collected: 03/14/22 0356    Specimen: Blood Updated: 03/14/22 0443    Narrative:      The following orders were created for panel order CBC & Differential.  Procedure                               Abnormality         Status                     ---------                               -----------         ------                     CBC Auto Differential[[id number]]        Abnormal            Final result                 Please view results for these tests on the individual orders.    CBC Auto Differential [[id number]]  (Abnormal) Collected: 03/14/22 0356    Specimen: Blood Updated: 03/14/22 0443     WBC 9.67 10*3/mm3      RBC 2.84 10*6/mm3      Hemoglobin 8.5 g/dL      Hematocrit 27.1 %      MCV 95.4 fL      MCH 29.9 pg      MCHC 31.4 g/dL      RDW 14.8 %      RDW-SD 51.1 fl      MPV 12.9 fL      Platelets 71 10*3/mm3      Neutrophil % 77.3 %      Lymphocyte % 10.5 %      Monocyte % 9.4 %      Eosinophil % 1.6 %      Basophil % 0.4 %      Immature Grans % 0.8 %      Neutrophils, Absolute 7.47 10*3/mm3      Lymphocytes, Absolute 1.02 10*3/mm3      Monocytes, Absolute 0.91 10*3/mm3      Eosinophils, Absolute 0.15 10*3/mm3      Basophils, Absolute 0.04  10*3/mm3      Immature Grans, Absolute 0.08 10*3/mm3      nRBC 0.0 /100 WBC     POC Glucose Once [[id number]]  (Abnormal) Collected: 03/13/22 2126    Specimen: Blood Updated: 03/13/22 2127     Glucose 131 mg/dL      Comment: Meter: [id number] : [id number] Alexei KIM       POC Glucose Once [[id number]]  (Normal) Collected: 03/13/22 1604    Specimen: Blood Updated: 03/13/22 1606     Glucose 73 mg/dL      Comment: Meter: [id number] : [id number] Og KIM           Imaging Results (Last 24 Hours)     ** No results found for the last 24 hours. **             ECG 12 Lead  Component   Ref Range & Units 3/5/22 0945 3/4/22 1825 1/26/22 1729 12/2/21 0309 4/28/21 1722 4/8/21 1341   QT Interval   ms 271 P  392  321  395  435  444              HEART RATE= 187  bpm  RR Interval= 320  ms  [place] Interval= 132  ms  P Horizontal Axis= 90  deg  P Front Axis= 30  deg  QRSD Interval= 95  ms  QT Interval= 271  ms  QRS Axis= 91  deg  T Wave Axis= -72  deg  - ABNORMAL ECG -  Supraventricular tachycardia  Borderline right axis deviation  Repolarization abnormality, prob rate related             Current Facility-Administered Medications:   •  acetaminophen (TYLENOL) tablet 650 mg, 650 mg, Oral, Q6H PRN, Mai Escoto MD, 650 mg at 03/08/22 1904  •  ALPRAZolam (XANAX) tablet 1 mg, 1 mg, Oral, 4x Daily PRN, Mai Escoto MD, 1 mg at 03/14/22 1302  •  aspirin chewable tablet 81 mg, 81 mg, Oral, Daily, Mai Escoto MD, 81 mg at 03/13/22 0812  •  cefTRIAXone (ROCEPHIN) 2 g in sodium chloride 0.9 % 100 mL IVPB-VTB, 2 g, Intravenous, Q24H, Karl Swift MD, Last Rate: 200 mL/hr at 03/13/22 2204, 2 g at 03/13/22 2204  •  epoetin real-epbx (RETACRIT) injection 10,000 Units, 10,000 Units, Intravenous, Once per day on Mon Wed Fri, Mai Escoto MD, 10,000 Units at 03/14/22 1008  •  heparin (porcine) injection 3,800 Units, 3,800 Units, Intracatheter, PRN, Mai Escoto MD, 3,800 Units at 03/14/22 1008  •  insulin glargine  (LANTUS, SEMGLEE) injection 20 Units, 20 Units, Subcutaneous, Nightly, Karl Swift MD, 20 Units at 03/13/22 2204  •  insulin lispro (ADMELOG) injection 0-7 Units, 0-7 Units, Subcutaneous, 4x Daily With Meals & Nightly, aKrl Swift MD, 2 Units at 03/13/22 1206  •  insulin lispro (ADMELOG) injection 7 Units, 7 Units, Subcutaneous, TID With Meals, Karl Swift MD  •  lactobacillus acidophilus (RISAQUAD) capsule 1 capsule, 1 capsule, Oral, Daily, Mai Escoto MD, 1 capsule at 03/13/22 0812  •  lidocaine (XYLOCAINE) 1 % injection 5 mL, 5 mL, Injection, Once, Mai Escoto MD  •  midodrine (PROAMATINE) tablet 10 mg, 10 mg, Oral, TID AC, Mai Escoto MD, 10 mg at 03/14/22 1302  •  oxyCODONE-acetaminophen (PERCOCET)  MG per tablet 1 tablet, 1 tablet, Oral, Q4H PRN, Karl Swift MD, 1 tablet at 03/14/22 1302  •  pantoprazole (PROTONIX) EC tablet 40 mg, 40 mg, Oral, BID AC, Mai Escoto MD, 40 mg at 03/13/22 1715  •  promethazine (PHENERGAN) tablet 12.5 mg, 12.5 mg, Oral, Q4H PRN, Mai Escoto MD  •  sevelamer (RENVELA) tablet 800 mg, 800 mg, Oral, TID With Meals, Mai Escoto MD, 800 mg at 03/14/22 1302  •  [COMPLETED] Insert peripheral IV, , , Once **AND** sodium chloride 0.9 % flush 10 mL, 10 mL, Intravenous, PRN, Mai Escoto MD  •  sodium chloride 0.9 % flush 10 mL, 10 mL, Intravenous, Q12H, Mai Escoto MD, 10 mL at 03/13/22 2231  •  sodium chloride 0.9 % flush 10 mL, 10 mL, Intravenous, PRN, aMi Escoto MD  •  tiZANidine (ZANAFLEX) tablet 4 mg, 4 mg, Oral, Q8H PRN, Mai Escoto MD     ASSESSMENT  E. coli bacteremia with sepsis  Line sepsis status post removal of old hemodialysis catheter and placement of new  End-stage renal disease with noncompliance with hemodialysis  Supraventricular tachycardia resolved  Insulin-dependent diabetes mellitus  History of hypertension with low blood pressure  Hyperlipidemia  History of CVA  Chronic anemia and thrombocytopenia  Anxiety  disorder  Gastroesophageal reflux disease    PLAN  CPM  Continue IV antibiotics   Swallow eval pending  Hemodialysis today  Vascular surgery to follow patient  Insulin dose adjusted  Adjust home medications  Stress ulcer DVT prophylaxis  Supportive care  PT OT  Discussed with nursing staff and wife  Follow closely and further recommendation according to hospital course    ARIEL MCGOVERN MD
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
## 2022-03-14 NOTE — PROGRESS NOTES
Discussed with Axel in the dialysis unit today.  We will try to access AV fistula with small needles.  Platelet count up to the 70s.

## 2022-03-14 NOTE — PROGRESS NOTES
"   LOS: 10 days     Chief Complaint/ Reason for encounter: ESRD  Chief Complaint   Patient presents with   • Weakness - Generalized   • Needs dialysis         Subjective    3/9: Seen and examined in the ICU, looks very ill.  BP remains on the low side but no need for pressors.  Remains confused and somewhat paranoid.  States \"they are trying to kill me\"  Discussed with Dr. Weber regarding catheter removal and replacement in OR today  Dialysis is planned for this afternoon    3/10: Seems to be doing better today.  BP has improved, more alert but still confused, somewhat paranoid  Nuys any shortness of breath or chest pain, no fevers or chills    3/11:Patient was seen on hemodialysis  Treatment orders discussed with the dialysis RNFranco  Arterial pressure/ Venous pressure: 120/100  Blood flow rate/Dialysate flow rate: 250/600, increase blood flow rate of 350 as tolerated  Blood pressure: 120s over 60s  Potassium bath/Fluid removal goal: 3K, 1 L  No changes other than blood flow rate    3/14 seen in the dialysis unit, unfortunately had to come off treatment early due to blood pressure issues  On a positive note his fistula functioned well and no problems with cannulation  Had some agitation with dialysis  No shortness of breath or chest pain, no nausea or vomiting      Medical history reviewed:  Weakness - Generalized        Subjective    History taken from: Patient and chart    Vital Signs  Temp:  [98.2 °F (36.8 °C)-98.7 °F (37.1 °C)] 98.2 °F (36.8 °C)  Heart Rate:  [69-82] 69  Resp:  [17-18] 17  BP: ()/(44-86) 95/44       Wt Readings from Last 1 Encounters:   03/14/22 1442 75.3 kg (166 lb 0.1 oz)   03/14/22 0500 75.3 kg (166 lb 0.1 oz)   03/13/22 0539 75 kg (165 lb 5.5 oz)   03/12/22 0523 73.5 kg (162 lb 0.6 oz)   03/09/22 0600 77.6 kg (171 lb 1.2 oz)   03/08/22 1431 77.6 kg (171 lb 1.2 oz)   03/07/22 0906 74.4 kg (164 lb)       Objective:  Vital signs: (most recent): Blood pressure 95/44, pulse 69, " "temperature 98.2 °F (36.8 °C), temperature source Oral, resp. rate 17, height 172.7 cm (67.99\"), weight 75.3 kg (166 lb 0.1 oz), SpO2 92 %.              Objective:  General Appearance:  Comfortable, less confused, chronically ill-appearing, in no acute distress and not in pain.  Prior chest CVC site clean dry and intact  HEENT: Mucous membranes moist, no injury, oropharynx clear  Lungs:  Normal effort and normal respiratory rate.  Breath sounds clear to auscultation.  No  respiratory distress.  No rales, decreased breath sounds or rhonchi.    Heart: Normal rate.  Regular rhythm.  S1 normal.  No murmur.   Abdomen: Abdomen is soft.  Bowel sounds are normal, no abdominal tenderness.  There is no rebound or guarding  Extremities: Normal range of motion.  Trace edema of bilateral lower extremities, distal pulses intact  Neurological: No focal motor or sensory deficits, pupils reactive  Skin:  Warm and dry.  No rash or cyanosis.       Results Review:    Intake/Output:     Intake/Output Summary (Last 24 hours) at 3/14/2022 1453  Last data filed at 3/14/2022 1302  Gross per 24 hour   Intake 600 ml   Output 143 ml   Net 457 ml         DATA:  Radiology and Labs:  The following labs independently reviewed by me. Additional labs ordered for tomorrow a.m.  Interval notes, chart personally reviewed by me.   Old records independently reviewed showing ESRD on dialysis    Risk/ complexity of medical care/ medical decision making moderate complexity      Labs:   Recent Results (from the past 24 hour(s))   POC Glucose Once    Collection Time: 03/13/22  4:04 PM    Specimen: Blood   Result Value Ref Range    Glucose 73 70 - 130 mg/dL   POC Glucose Once    Collection Time: 03/13/22  9:26 PM    Specimen: Blood   Result Value Ref Range    Glucose 131 (H) 70 - 130 mg/dL   Immature Platelet Fraction    Collection Time: 03/14/22  3:56 AM    Specimen: Blood   Result Value Ref Range    IPF 16.40 (H) 0.90 - 6.50 %    Platelets 71 (L) 140 - 450 " 10*3/mm3   CBC Auto Differential    Collection Time: 03/14/22  3:56 AM    Specimen: Blood   Result Value Ref Range    WBC 9.67 3.40 - 10.80 10*3/mm3    RBC 2.84 (L) 4.14 - 5.80 10*6/mm3    Hemoglobin 8.5 (L) 13.0 - 17.7 g/dL    Hematocrit 27.1 (L) 37.5 - 51.0 %    MCV 95.4 79.0 - 97.0 fL    MCH 29.9 26.6 - 33.0 pg    MCHC 31.4 (L) 31.5 - 35.7 g/dL    RDW 14.8 12.3 - 15.4 %    RDW-SD 51.1 37.0 - 54.0 fl    MPV 12.9 (H) 6.0 - 12.0 fL    Platelets 71 (L) 140 - 450 10*3/mm3    Neutrophil % 77.3 (H) 42.7 - 76.0 %    Lymphocyte % 10.5 (L) 19.6 - 45.3 %    Monocyte % 9.4 5.0 - 12.0 %    Eosinophil % 1.6 0.3 - 6.2 %    Basophil % 0.4 0.0 - 1.5 %    Immature Grans % 0.8 (H) 0.0 - 0.5 %    Neutrophils, Absolute 7.47 (H) 1.70 - 7.00 10*3/mm3    Lymphocytes, Absolute 1.02 0.70 - 3.10 10*3/mm3    Monocytes, Absolute 0.91 (H) 0.10 - 0.90 10*3/mm3    Eosinophils, Absolute 0.15 0.00 - 0.40 10*3/mm3    Basophils, Absolute 0.04 0.00 - 0.20 10*3/mm3    Immature Grans, Absolute 0.08 (H) 0.00 - 0.05 10*3/mm3    nRBC 0.0 0.0 - 0.2 /100 WBC   POC Glucose Once    Collection Time: 03/14/22  6:51 AM    Specimen: Blood   Result Value Ref Range    Glucose 131 (H) 70 - 130 mg/dL   POC Glucose Once    Collection Time: 03/14/22 12:54 PM    Specimen: Blood   Result Value Ref Range    Glucose 83 70 - 130 mg/dL       Radiology:  Imaging Results (Last 24 Hours)     ** No results found for the last 24 hours. **             Medications have been reviewed:  Current Facility-Administered Medications   Medication Dose Route Frequency Provider Last Rate Last Admin   • acetaminophen (TYLENOL) tablet 650 mg  650 mg Oral Q6H PRN Mai Escoto MD   650 mg at 03/08/22 1904   • ALPRAZolam (XANAX) tablet 1 mg  1 mg Oral 4x Daily PRN Mai Escoto MD   1 mg at 03/14/22 1302   • aspirin chewable tablet 81 mg  81 mg Oral Daily Mai Escoto MD   81 mg at 03/13/22 0812   • cefTRIAXone (ROCEPHIN) 2 g in sodium chloride 0.9 % 100 mL IVPB-VTB  2 g Intravenous Q24H  Karl Swift  mL/hr at 03/13/22 2204 2 g at 03/13/22 2204   • epoetin real-epbx (RETACRIT) injection 10,000 Units  10,000 Units Intravenous Once per day on Mon Wed Fri Mai Escoto MD   10,000 Units at 03/14/22 1008   • heparin (porcine) injection 3,800 Units  3,800 Units Intracatheter PRN Mai Escoto MD   3,800 Units at 03/14/22 1008   • insulin glargine (LANTUS, SEMGLEE) injection 20 Units  20 Units Subcutaneous Nightly Karl Swift MD   20 Units at 03/13/22 2204   • insulin lispro (ADMELOG) injection 0-7 Units  0-7 Units Subcutaneous 4x Daily With Meals & Nightly Karl Swift MD   2 Units at 03/13/22 1206   • insulin lispro (ADMELOG) injection 7 Units  7 Units Subcutaneous TID With Meals Karl Swift MD       • lactobacillus acidophilus (RISAQUAD) capsule 1 capsule  1 capsule Oral Daily Mai Escoto MD   1 capsule at 03/13/22 0812   • lidocaine (XYLOCAINE) 1 % injection 5 mL  5 mL Injection Once Mai Escoto MD       • midodrine (PROAMATINE) tablet 10 mg  10 mg Oral TID AC Mai Escoto MD   10 mg at 03/14/22 1302   • oxyCODONE-acetaminophen (PERCOCET)  MG per tablet 1 tablet  1 tablet Oral Q4H PRN Karl Swift MD   1 tablet at 03/14/22 1302   • pantoprazole (PROTONIX) EC tablet 40 mg  40 mg Oral BID AC Mai Escoto MD   40 mg at 03/13/22 1715   • promethazine (PHENERGAN) tablet 12.5 mg  12.5 mg Oral Q4H PRN Mai Escoto MD       • sevelamer (RENVELA) tablet 800 mg  800 mg Oral TID With Meals Mai Escoto MD   800 mg at 03/14/22 1302   • sodium chloride 0.9 % flush 10 mL  10 mL Intravenous PRN Mai Escoto MD       • sodium chloride 0.9 % flush 10 mL  10 mL Intravenous Q12H Mai Escoto MD   10 mL at 03/13/22 2231   • sodium chloride 0.9 % flush 10 mL  10 mL Intravenous PRN Mai Escoto MD       • tiZANidine (ZANAFLEX) tablet 4 mg  4 mg Oral Q8H PRN Mai Escoto MD             ASSESSMENT:   Thrombocytopenia (HCC)     • Liver cirrhosis (HCC)     • Chronic  hepatitis C without hepatic coma (HCC)     • End-stage renal disease on hemodialysis (HCC)     • B12 deficiency     • Anemia due to chronic renal failure treated with erythropoietin, stage 5 (HCC)          Assessment:   1. ESRD, HD Monday Wednesday Friday  2. Tachycardia, likely was due to sepsis and has improved  3. Thrombocytopenia,   4.  Hyperkalemia, stabilizing  5.  Diabetes mellitus type 2  6.  Fevers/sepsis syndrome related to infected dialysis catheter  7.  Chronic noncompliance with outpatient dialysis.  Patient has been counseled multiple times about the importance of 100% attendance at outpatient dialysis.  his noncompliance is directly related to his recurrent hospital admissions      Plan:  Did not tolerate dialysis well yesterday with hypotension and agitation  No issues with AV fistula cannulation however  Will try dialysis again tomorrow at a lower blood flow rate, no UF  If no issues with dialysis tomorrow and if okay with vascular will remove femoral Shiley  Continue midodrine for BP support and binders with meals  Epogen with dialysis for anemia of CKD      Yohan Murrell MD   [facility]   Office phone number: [phone]  Answering service phone number: [phone]    03/14/22  14:53 EDT    Dictation performed using Dragon dictation software

## 2022-03-14 NOTE — PLAN OF CARE
Goal Outcome Evaluation:              Outcome Evaluation: Orders received, chart reviewed.  Pt currently off floor in dialysis.  Will f/u for swallow eval.

## 2022-03-14 NOTE — CONSULTS
"Adult Nutrition  Assessment/PES    Patient Name:  Angelo Brown  YOB: 1960  MRN: [id number]  Admit Date:  3/4/2022    Assessment Date:  3/14/2022    Comments:  Inpatient nutrition consult per nurse admission screen. Pt admit with noncompliance with renal dialysis, chronic hep c, cirrhosis, decreased platelet count, chronic kidney failure, hx: anemia, vitamin B12 deficiency. Pt on soft texture; cho[[ed; thin; cardiac, consistent carbohydrate, renal diet. 0-50% intake. Pt said he would be open to trying Novasource Renal in [place] - ordered BID. Continue to follow.     Reason for Assessment     Row Name 03/14/22 1439          Reason for Assessment    Reason For Assessment nurse/nurse practitioner consult     Diagnosis other (see comments)  Pt admit with: noncompliance with renal dialysis, chronic hep c, cirrhosis, decreased platelet count, chronic kidney failure, hx: anemia, vitamin b12 deficiency     Identified At Risk by Screening Criteria MST SCORE 2+                Nutrition/Diet History     Row Name 03/14/22 1440          Nutrition/Diet History    Typical Intake (Food/Fluid/EN/PN) Pt reported he would be open to trying Novasource Renal in strawberry.                Anthropometrics     Row Name 03/14/22 1442          Anthropometrics    Height 172.7 cm (67.99\")     Weight 75.3 kg (166 lb 0.1 oz)  not weighed by RD     Additional Documentation Ideal Body Weight (IBW) (Group)  BMI: 25.2            Ideal Body Weight (IBW)    Ideal Body Weight 154#     Retired Ideal Body Weight (IBW) (kg) 70.87     Retired % Ideal Body Weight 106.25            Retired Body Mass Index (BMI)    Retired BMI (kg/m2) 25.3                Labs/Tests/Procedures/Meds     Row Name 03/14/22 1441          Labs/Procedures/Meds    Lab Results Reviewed reviewed     Lab Results Comments Na (low), BUN (high), platelets (low), Cl (low), creatinine (high), ALT (high), alb (low)            Diagnostic Tests/Procedures    Diagnostic " "Test/Procedure Reviewed reviewed            Medications    Pertinent Medications Reviewed reviewed     Pertinent Medications Comments rocephin, retacrit, insulin, risaquad, midodrine, pantoprazole, renvela, sodium chloride, prn: xanax, heparin, percocet                  Estimated/Assessed Needs - Anthropometrics     Row Name 03/14/22 1449          Anthropometrics    Height 172.7 cm (67.99\")     Weight 75.3 kg (166 lb 0.1 oz)  not weighed by RD     Additional Documentation Ideal Body Weight (IBW) (Group)  BMI: 25.2            Ideal Body Weight (IBW)    Ideal Body Weight 154#                Nutrition Prescription Ordered     Row Name 03/14/22 1443          Nutrition Prescription PO    Current PO Diet Soft Texture     Texture Chopped     Fluid Consistency Thin     Common Modifiers Cardiac;Consistent Carbohydrate;Renal                Evaluation of Received Nutrient/Fluid Intake     Row Name 03/14/22 1445          PO Evaluation    % PO Intake 0-50%                     Problem/Interventions:           Intervention Goal     Row Name 03/14/22 1443          Intervention Goal    General Maintain nutrition;Meet nutritional needs for age/condition;Disease management/therapy     PO Tolerate PO;Meet estimated needs;Increase intake     Weight Maintain weight                Nutrition Intervention     Row Name 03/14/22 1444          Nutrition Intervention    RD/Tech Action Care plan reviewd;Follow Tx progress;Recommend/ordered;Encourage intake     Recommended/Ordered Supplement                Nutrition Prescription     Row Name 03/14/22 1441          Nutrition Prescription PO    PO Prescription Begin/change supplement     Supplement Nova Renal     Supplement Frequency 2 times a day     New PO Prescription Ordered? Yes                Education/Evaluation     Row Name 03/14/22 1448          Education    Education Will Instruct as appropriate            Monitor/Evaluation    Monitor Per protocol;I&O;PO intake;Supplement " intake;Pertinent labs;Skin status;Symptoms;Weight                 Electronically signed by:  Apoorva Escudero RD  03/14/22 14:46 EDT

## 2022-03-14 NOTE — NURSING NOTE
tx terminated after diminishing bp. uf adj to josue. Pt became nauseous and restless. No emesis. Requested off ama. sbp in 70s. Midodrine held this am for sbp in 180s. Pt stabilized post blood return.remained following commands , but lethargic. avf used per dr Leo. no issues except pt restless with disregard for needle safety. aaox3 but voice remains poorly audible. bp post tx 121/61 hr 116. Low grade temp at start of tx 99.5

## 2022-03-14 NOTE — PROGRESS NOTES
"  Infectious Diseases Progress Note    Sabas Quick MD     [facility]  Los: 9 days  Patient Identification:  Name: Angelo Brown  Age: 61 y.o.  Sex: male  :  1960  MRN: [id number]         Primary Care Physician: Yadiel Baxter III, MD            Subjective: Feeling better and denies any specific complaints.  Interval History: See consultation note.    Objective:    Scheduled Meds:aspirin, 81 mg, Oral, Daily  cefTRIAXone, 2 g, Intravenous, Q24H  epoetin real/real-epbx, 10,000 Units, Intravenous, Once per day on   insulin glargine, 20 Units, Subcutaneous, Nightly  insulin lispro, 0-7 Units, Subcutaneous, 4x Daily With Meals & Nightly  insulin lispro, 7 Units, Subcutaneous, TID With Meals  lactobacillus acidophilus, 1 capsule, Oral, Daily  lidocaine, 5 mL, Injection, Once  midodrine, 10 mg, Oral, TID AC  pantoprazole, 40 mg, Oral, BID AC  sevelamer, 800 mg, Oral, TID With Meals  sodium chloride, 10 mL, Intravenous, Q12H      Continuous Infusions:     Vital signs in last 24 hours:  Temp:  [97.9 °F (36.6 °C)-98.4 °F (36.9 °C)] 98 °F (36.7 °C)  Heart Rate:  [77-78] 77  Resp:  [16-18] 18  BP: (106-120)/(65-71) 106/65    Intake/Output:    Intake/Output Summary (Last 24 hours) at 3/13/2022 2114  Last data filed at 3/13/2022 1500  Gross per 24 hour   Intake 360 ml   Output --   Net 360 ml       Exam:  /65 (BP Location: Right arm, Patient Position: Lying)   Pulse 77   Temp 98 °F (36.7 °C) (Oral)   Resp 18   Ht 172.7 cm (67.99\")   Wt 75 kg (165 lb 5.5 oz)   SpO2 94%   BMI 25.15 kg/m²   Patient is examined using the personal protective equipment as per guidelines from infection control for this particular patient as enacted.  Hand washing was performed before and after patient interaction.  General Appearance:  Comfortable chronically ill-appearing male                          Head:    Normocephalic, without obvious abnormality, atraumatic                           Eyes: "    PERRL, conjunctivae/corneas clear, EOM's intact, both eyes                         Throat:   Lips, tongue, gums normal; oral mucosa pink and moist                           Neck:   Supple, symmetrical, trachea midline, no JVD                         Lungs:    Clear to auscultation bilaterally, respirations unlabored                 Chest Wall:  Right IJ tunnel catheter in place                          Heart:    Regular rate and rhythm, S1 and S2 normal                  Abdomen:   Soft nontender                 Extremities: Right groin dialysis catheter in place                        Pulses:   Pulses palpable in all extremities                            Skin:   Skin is warm and dry,  no rashes or palpable lesions                  Neurologic: Grossly nonfocal       Data Review:    I reviewed the patient's new clinical results.  Results from last 7 days   Lab Units 03/13/22 0445 03/12/22  0830 03/11/22  0439 03/10/22  0417 03/09/22  0412 03/08/22  1236 03/08/22  1037   WBC 10*3/mm3 10.50 7.58 6.64 10.08 9.31 7.94 5.60   HEMOGLOBIN g/dL 8.1* 8.1* 8.1* 9.0* 9.7* 7.5* 4.2*   PLATELETS 10*3/mm3 51*  51* 44*  44* 36*  36* 40*  40* 36*  36* 29* 17*     Results from last 7 days   Lab Units 03/13/22  0445 03/11/22  0439 03/10/22  0417 03/09/22  0412 03/08/22  0750 03/07/22  0639   SODIUM mmol/L 132* 135* 136 136 136 136   POTASSIUM mmol/L 4.1 3.6 3.6 4.2 4.4 5.7*   CHLORIDE mmol/L 97* 98 97* 95* 96* 96*   CO2 mmol/L 22.3 25.1 26.0 24.0 24.1 18.8*   BUN mg/dL 38* 51* 32* 73* 59* 114*   CREATININE mg/dL 6.43* 6.52* 4.83* 7.77* 6.77* 13.15*   CALCIUM mg/dL 8.1* 8.3* 8.2* 8.4* 8.1* 8.6   GLUCOSE mg/dL 203* 178* 132* 181* 239* 280*     Microbiology Results (last 10 days)     Procedure Component Value - Date/Time    Blood Culture - Blood, Hand, Right [[id number]]  (Normal) Collected: 03/12/22 1118    Lab Status: Preliminary result Specimen: Blood from Hand, Right Updated: 03/13/22 1247     Blood Culture No growth at 24  hours    Blood Culture - Blood, Arm, Right [[id number]]  (Normal) Collected: 03/12/22 1023    Lab Status: Preliminary result Specimen: Blood from Arm, Right Updated: 03/13/22 1147     Blood Culture No growth at 24 hours    COVID PRE-OP / PRE-PROCEDURE SCREENING ORDER (NO ISOLATION) - Swab, Nasopharynx [[id number]]  (Normal) Collected: 03/08/22 1816    Lab Status: Final result Specimen: Swab from Nasopharynx Updated: 03/08/22 2027    Narrative:      The following orders were created for panel order COVID PRE-OP / PRE-PROCEDURE SCREENING ORDER (NO ISOLATION) - Swab, Nasopharynx.  Procedure                               Abnormality         Status                     ---------                               -----------         ------                     COVID-19,[facility] IN-HOUSE...[[id number]]  Normal              Final result                 Please view results for these tests on the individual orders.    COVID-19,[facility] IN-HOUSE CEPHEID/NIKHIL NP SWAB IN TRANSPORT MEDIA 8-12 HR TAT - Swab, Nasopharynx [[id number]]  (Normal) Collected: 03/08/22 1816    Lab Status: Final result Specimen: Swab from Nasopharynx Updated: 03/08/22 2027     COVID19 Not Detected    Narrative:      Fact sheet for providers: https://www.fda.gov/media/010200/download    Fact sheet for patients: https://www.fda.gov/media/853186/download    Test performed by PCR.    Blood Culture - Blood, Blood, Central Line [[id number]]  (Abnormal) Collected: 03/07/22 1131    Lab Status: Final result Specimen: Blood, Central Line Updated: 03/10/22 0749     Blood Culture Escherichia coli     Isolated from Aerobic and Anaerobic Bottles     Gram Stain Anaerobic Bottle Gram negative bacilli      Aerobic Bottle Gram negative bacilli    Narrative:      Refer to previous blood culture collected on 3/7 for corbin's      Blood Culture - Blood, Blood, Central Line [[id number]]  (Abnormal)  (Susceptibility) Collected: 03/07/22 1131    Lab Status: Final result Specimen: Blood, Central  Line Updated: 03/10/22 0748     Blood Culture Escherichia coli     Isolated from Aerobic and Anaerobic Bottles     Gram Stain Anaerobic Bottle Gram negative bacilli      Aerobic Bottle Gram negative bacilli    Susceptibility      Escherichia coli      WALLY      Ampicillin Susceptible      Ampicillin + Sulbactam Susceptible      Cefepime Susceptible      Ceftazidime Susceptible      Ceftriaxone Susceptible      Gentamicin Susceptible      Levofloxacin Susceptible      Piperacillin + Tazobactam Susceptible      Trimethoprim + Sulfamethoxazole Susceptible                       Susceptibility Comments     Escherichia coli    Cefazolin sensitivity will not be reported for Enterobacteriaceae in non-urine isolates. If cefazolin is preferred, please call the microbiology lab to request an E-test.  With the exception of urinary-sourced infections, aminoglycosides should not be used as monotherapy.             Blood Culture ID, PCR - Blood, Blood, Central Line [[id number]]  (Abnormal) Collected: 03/07/22 1131    Lab Status: Final result Specimen: Blood, Central Line Updated: 03/07/22 2221     BCID, PCR Eschericia coli. Identification by BCID2 PCR.     BOTTLE TYPE Anaerobic Bottle    COVID PRE-OP / PRE-PROCEDURE SCREENING ORDER (NO ISOLATION) - Swab, Nasopharynx [[id number]]  (Normal) Collected: 03/04/22 1801    Lab Status: Final result Specimen: Swab from Nasopharynx Updated: 03/04/22 1851    Narrative:      The following orders were created for panel order COVID PRE-OP / PRE-PROCEDURE SCREENING ORDER (NO ISOLATION) - Swab, Nasopharynx.  Procedure                               Abnormality         Status                     ---------                               -----------         ------                     COVID-19,[facility] IN-HOUSE...[[id number]]  Normal              Final result                 Please view results for these tests on the individual orders.    COVID-19NEEL IN-HOUSE CEPHEID/NIKHIL NP SWAB IN TRANSPORT MEDIA 8-12  HR TAT - Swab, Nasopharynx [[id number]]  (Normal) Collected: 03/04/22 1801    Lab Status: Final result Specimen: Swab from Nasopharynx Updated: 03/04/22 1851     COVID19 Not Detected    Narrative:      Fact sheet for providers: https://www.fda.gov/media/352017/download     Fact sheet for patients: https://www.fda.gov/media/579929/download            Assessment:    Anemia due to chronic renal failure treated with erythropoietin, stage 5 (HCC)    B12 deficiency    End-stage renal disease on hemodialysis (HCC)    Thrombocytopenia (HCC)    Liver cirrhosis (HCC)    Chronic hepatitis C without hepatic coma (HCC)    Noncompliance of patient with renal dialysis (HCC)  1-systemic gram-negative bacteremic sepsis either due to              -Infected tunneled catheter versus              -Evolving intra-abdominal process with subsequent bacteremia and secondary seeding.  2-end-stage renal disease  3-history of cirrhosis of the liver due to hepatitis C  4-thrombocytopenia  5-other diagnosis per primary team.     Recommendations/Discussions:  · Continue present treatment with IV Rocephin and follow-up on the sensitivity of the E. coli.  · Follow-up repeat blood cultures after the catheter is replaced.  · Once considered stable consider CT scan of the abdomen and pelvis    Sabas Quick MD  3/13/2022  21:14 EDT    Much of this encounter note is an electronic transcription/translation of spoken language to printed text. The electronic translation of spoken language may permit erroneous, or at times, nonsensical words or phrases to be inadvertently transcribed; Although I have reviewed the note for such errors, some may still exist

## 2022-03-14 NOTE — PLAN OF CARE
Goal Outcome Evaluation:  Plan of Care Reviewed With: (P) patient, son        Progress: (P) no change  Outcome Evaluation: (P) Pt seen this PM for PT - was alert and required max encouragement to participate in bed mobility to allow for fixing bed sheets. Pt performed bed mobility modA<>maxA x 2. Pt reported marked increases in fatigue attributed to HD. Pt did not want to exit bed today - pt and pt's son-in-law educated on importance of ambulation and HEP, verbalized understanding and reported would perform exercise in bed later. Pt will continue to benefit from skilled therapy.

## 2022-03-14 NOTE — PLAN OF CARE
Problem: Adult Inpatient Plan of Care  Goal: Plan of Care Review  Outcome: Ongoing, Progressing  Flowsheets (Taken 3/14/2022 0542)  Progress: improving  Plan of Care Reviewed With: patient  Outcome Evaluation: VSS. forgetful. IV antibiotics. resting well throughout shift. SCDs in place.  Goal: Patient-Specific Goal (Individualized)  Outcome: Ongoing, Progressing  Goal: Absence of Hospital-Acquired Illness or Injury  Outcome: Ongoing, Progressing  Intervention: Identify and Manage Fall Risk  Recent Flowsheet Documentation  Taken 3/14/2022 0438 by Lindsay Castaneda RN  Safety Promotion/Fall Prevention:   safety round/check completed   room organization consistent   clutter free environment maintained   assistive device/personal items within reach  Taken 3/14/2022 0220 by Lindsay Castaneda RN  Safety Promotion/Fall Prevention:   safety round/check completed   room organization consistent   clutter free environment maintained   assistive device/personal items within reach  Taken 3/14/2022 0058 by Lindsay Castaneda RN  Safety Promotion/Fall Prevention:   safety round/check completed   room organization consistent   clutter free environment maintained   assistive device/personal items within reach  Taken 3/13/2022 2247 by Lindsay Castaneda RN  Safety Promotion/Fall Prevention:   safety round/check completed   room organization consistent   clutter free environment maintained   assistive device/personal items within reach  Taken 3/13/2022 2150 by Lindsay Castaneda RN  Safety Promotion/Fall Prevention:   safety round/check completed   room organization consistent   clutter free environment maintained   assistive device/personal items within reach  Intervention: Prevent Skin Injury  Recent Flowsheet Documentation  Taken 3/14/2022 0220 by Lindsay Castaneda RN  Body Position:   position changed independently   supine  Taken 3/14/2022 0058 by Lindsay Castaneda RN  Body Position:   turned   side-lying   left  Taken  3/13/2022 2247 by Lindsay Castaneda RN  Body Position:   position changed independently   side-lying   right  Taken 3/13/2022 2150 by Lindsay Castaneda RN  Body Position:   position changed independently   supine  Skin Protection:   incontinence pads utilized   tubing/devices free from skin contact  Intervention: Prevent and Manage VTE (venous thromboembolism) Risk  Recent Flowsheet Documentation  Taken 3/14/2022 0058 by Lindsay Castaneda RN  VTE Prevention/Management: sequential compression devices on  Taken 3/13/2022 2150 by Lindsay Castaneda RN  VTE Prevention/Management: sequential compression devices on  Intervention: Prevent Infection  Recent Flowsheet Documentation  Taken 3/14/2022 0438 by Lindsay Castaneda RN  Infection Prevention: rest/sleep promoted  Taken 3/14/2022 0220 by Lindsay Castaneda RN  Infection Prevention: rest/sleep promoted  Taken 3/14/2022 0058 by Lindsay Castaneda RN  Infection Prevention: rest/sleep promoted  Taken 3/13/2022 2247 by Lindsay Castaneda RN  Infection Prevention: rest/sleep promoted  Taken 3/13/2022 2150 by Lindsay Castaneda RN  Infection Prevention: rest/sleep promoted  Goal: Readiness for Transition of Care  Outcome: Ongoing, Progressing  Goal: Plan of Care Review  Outcome: Ongoing, Progressing  Flowsheets (Taken 3/14/2022 0557)  Progress: improving  Plan of Care Reviewed With: patient  Outcome Evaluation: VSS. forgetful. IV antibiotics. resting well throughout shift. SCDs in place.  Goal: Patient-Specific Goal (Individualized)  Outcome: Ongoing, Progressing  Goal: Absence of Hospital-Acquired Illness or Injury  Outcome: Ongoing, Progressing  Intervention: Identify and Manage Fall Risk  Recent Flowsheet Documentation  Taken 3/14/2022 0438 by Lindsay Castaneda RN  Safety Promotion/Fall Prevention:   safety round/check completed   room organization consistent   clutter free environment maintained   assistive device/personal items within reach  Taken 3/14/2022 0220 by  Castaneda, Lindsay, RN  Safety Promotion/Fall Prevention:   safety round/check completed   room organization consistent   clutter free environment maintained   assistive device/personal items within reach  Taken 3/14/2022 0058 by Lindsay Castaneda RN  Safety Promotion/Fall Prevention:   safety round/check completed   room organization consistent   clutter free environment maintained   assistive device/personal items within reach  Taken 3/13/2022 2247 by Lindsay Castaneda RN  Safety Promotion/Fall Prevention:   safety round/check completed   room organization consistent   clutter free environment maintained   assistive device/personal items within reach  Taken 3/13/2022 2150 by Lindsay Castaneda RN  Safety Promotion/Fall Prevention:   safety round/check completed   room organization consistent   clutter free environment maintained   assistive device/personal items within reach  Intervention: Prevent Skin Injury  Recent Flowsheet Documentation  Taken 3/14/2022 0220 by Lindsay Castaneda RN  Body Position:   position changed independently   supine  Taken 3/14/2022 0058 by Lindsya Castaneda RN  Body Position:   turned   side-lying   left  Taken 3/13/2022 2247 by Lindsay Castaneda RN  Body Position:   position changed independently   side-lying   right  Taken 3/13/2022 2150 by Lindsay Castaneda RN  Body Position:   position changed independently   supine  Skin Protection:   incontinence pads utilized   tubing/devices free from skin contact  Intervention: Prevent and Manage VTE (Venous Thromboembolism) Risk  Recent Flowsheet Documentation  Taken 3/14/2022 0058 by Lindsay Castaneda RN  VTE Prevention/Management: sequential compression devices on  Taken 3/13/2022 2150 by Lindsay Castaneda RN  VTE Prevention/Management: sequential compression devices on  Intervention: Prevent Infection  Recent Flowsheet Documentation  Taken 3/14/2022 0438 by Lindsay Castaneda RN  Infection Prevention: rest/sleep promoted  Taken  3/14/2022 0220 by Lindsay Castaneda RN  Infection Prevention: rest/sleep promoted  Taken 3/14/2022 0058 by Lindsay Castaneda RN  Infection Prevention: rest/sleep promoted  Taken 3/13/2022 2247 by Lindsay Castaneda RN  Infection Prevention: rest/sleep promoted  Taken 3/13/2022 2150 by Lindsay Castaneda RN  Infection Prevention: rest/sleep promoted  Goal: Optimal Comfort and Wellbeing  Outcome: Ongoing, Progressing  Intervention: Monitor Pain and Promote Comfort  Recent Flowsheet Documentation  Taken 3/14/2022 0058 by Lindsay Castaneda RN  Pain Management Interventions:   position adjusted   pillow support provided   quiet environment facilitated  Taken 3/13/2022 2150 by Lindsay Castaneda RN  Pain Management Interventions:   see MAR   position adjusted   pillow support provided  Intervention: Provide Person-Centered Care  Recent Flowsheet Documentation  Taken 3/14/2022 0058 by Lindsay Castaneda RN  Trust Relationship/Rapport:   care explained   emotional support provided   empathic listening provided  Taken 3/13/2022 2150 by Lindsay Castaneda RN  Trust Relationship/Rapport:   care explained   emotional support provided   empathic listening provided  Goal: Readiness for Transition of Care  Outcome: Ongoing, Progressing     Problem: Pain Acute  Goal: Acceptable Pain Control and Functional Ability  Outcome: Ongoing, Progressing  Intervention: Prevent or Manage Pain  Recent Flowsheet Documentation  Taken 3/13/2022 2150 by Lindsay Castaneda RN  Medication Review/Management: medications reviewed  Intervention: Develop Pain Management Plan  Recent Flowsheet Documentation  Taken 3/14/2022 0058 by Lindsay Castaneda RN  Pain Management Interventions:   position adjusted   pillow support provided   quiet environment facilitated  Taken 3/13/2022 2150 by Lindsay Castaneda RN  Pain Management Interventions:   see MAR   position adjusted   pillow support provided  Intervention: Optimize Psychosocial Wellbeing  Recent  Flowsheet Documentation  Taken 3/13/2022 2150 by Lindsay Castaneda RN  Diversional Activities: television     Problem: Fall Injury Risk  Goal: Absence of Fall and Fall-Related Injury  Outcome: Ongoing, Progressing  Intervention: Identify and Manage Contributors  Recent Flowsheet Documentation  Taken 3/13/2022 2150 by Lindsay Castaneda RN  Medication Review/Management: medications reviewed  Intervention: Promote Injury-Free Environment  Recent Flowsheet Documentation  Taken 3/14/2022 0438 by Lindsay Castaneda RN  Safety Promotion/Fall Prevention:   safety round/check completed   room organization consistent   clutter free environment maintained   assistive device/personal items within reach  Taken 3/14/2022 0220 by Lindsay Castaneda RN  Safety Promotion/Fall Prevention:   safety round/check completed   room organization consistent   clutter free environment maintained   assistive device/personal items within reach  Taken 3/14/2022 0058 by Lindsay Castaneda RN  Safety Promotion/Fall Prevention:   safety round/check completed   room organization consistent   clutter free environment maintained   assistive device/personal items within reach  Taken 3/13/2022 2247 by Lindsay Castaneda RN  Safety Promotion/Fall Prevention:   safety round/check completed   room organization consistent   clutter free environment maintained   assistive device/personal items within reach  Taken 3/13/2022 2150 by Lindsay Castaneda RN  Safety Promotion/Fall Prevention:   safety round/check completed   room organization consistent   clutter free environment maintained   assistive device/personal items within reach     Problem: Skin Injury Risk Increased  Goal: Skin Health and Integrity  Outcome: Ongoing, Progressing  Intervention: Optimize Skin Protection  Recent Flowsheet Documentation  Taken 3/14/2022 0220 by Lindsay Castaneda RN  Head of Bed (HOB) Positioning: HOB at 30-45 degrees  Taken 3/14/2022 0058 by Lindsay Castaneda RN  Head of  Bed (HOB) Positioning: HOB at 30-45 degrees  Taken 3/13/2022 2247 by Lindsay Castaneda RN  Head of Bed (HOB) Positioning: HOB at 30-45 degrees  Taken 3/13/2022 2150 by Lindsay Castaneda RN  Pressure Reduction Techniques:   frequent weight shift encouraged   weight shift assistance provided  Head of Bed (HOB) Positioning: HOB at 30-45 degrees  Pressure Reduction Devices: pressure-redistributing mattress utilized  Skin Protection:   incontinence pads utilized   tubing/devices free from skin contact     Problem: Infection  Goal: Absence of Infection Signs and Symptoms  Outcome: Ongoing, Progressing     Problem: Restraint, Nonbehavioral (Nonviolent)  Goal: Absence of Harm or Injury  Outcome: Ongoing, Progressing  Intervention: Implement Least Restrictive Safety Strategies  Recent Flowsheet Documentation  Taken 3/13/2022 2150 by Lindsay Castaneda RN  Diversional Activities: television  Intervention: Protect Dignity, Rights, and Personal Wellbeing  Recent Flowsheet Documentation  Taken 3/14/2022 0058 by Lindsay Castaneda RN  Trust Relationship/Rapport:   care explained   emotional support provided   empathic listening provided  Taken 3/13/2022 2150 by Lindsay Castaneda RN  Trust Relationship/Rapport:   care explained   emotional support provided   empathic listening provided  Intervention: Protect Skin and Joint Integrity  Recent Flowsheet Documentation  Taken 3/14/2022 0220 by Lindsay Castaneda RN  Body Position:   position changed independently   supine  Taken 3/14/2022 0058 by Lindsay Castaneda RN  Body Position:   turned   side-lying   left  Taken 3/13/2022 2247 by Lindsay Castaneda RN  Body Position:   position changed independently   side-lying   right  Taken 3/13/2022 2150 by Lindsay Castaneda RN  Body Position:   position changed independently   supine   Goal Outcome Evaluation:  Plan of Care Reviewed With: patient        Progress: improving  Outcome Evaluation: VSS. forgetful. IV antibiotics. resting  well throughout shift. SCDs in place.

## 2022-03-15 LAB
ALBUMIN SERPL-MCNC: 2.4 G/DL (ref 3.5–5.2)
ANION GAP SERPL CALCULATED.3IONS-SCNC: 15.4 MMOL/L (ref 5–15)
BASOPHILS # BLD AUTO: 0.05 10*3/MM3 (ref 0–0.2)
BASOPHILS NFR BLD AUTO: 0.5 % (ref 0–1.5)
BUN SERPL-MCNC: 36 MG/DL (ref 6–20)
BUN/CREAT SERPL: 5.2 (ref 7–25)
CALCIUM SPEC-SCNC: 8.4 MG/DL (ref 8.6–10.5)
CHLORIDE SERPL-SCNC: 96 MMOL/L (ref 98–107)
CO2 SERPL-SCNC: 24.6 MMOL/L (ref 22–29)
CREAT SERPL-MCNC: 6.86 MG/DL (ref 0.76–1.27)
DEPRECATED RDW RBC AUTO: 49.1 FL (ref 37–54)
EGFRCR SERPLBLD CKD-EPI 2021: 8.6 ML/MIN/1.73
EOSINOPHIL # BLD AUTO: 0.12 10*3/MM3 (ref 0–0.4)
EOSINOPHIL NFR BLD AUTO: 1.2 % (ref 0.3–6.2)
ERYTHROCYTE [DISTWIDTH] IN BLOOD BY AUTOMATED COUNT: 14.5 % (ref 12.3–15.4)
GLUCOSE BLDC GLUCOMTR-MCNC: 109 MG/DL (ref 70–130)
GLUCOSE BLDC GLUCOMTR-MCNC: 146 MG/DL (ref 70–130)
GLUCOSE BLDC GLUCOMTR-MCNC: 179 MG/DL (ref 70–130)
GLUCOSE BLDC GLUCOMTR-MCNC: 237 MG/DL (ref 70–130)
GLUCOSE SERPL-MCNC: 171 MG/DL (ref 65–99)
HCT VFR BLD AUTO: 27.2 % (ref 37.5–51)
HGB BLD-MCNC: 8.4 G/DL (ref 13–17.7)
IMM GRANULOCYTES # BLD AUTO: 0.08 10*3/MM3 (ref 0–0.05)
IMM GRANULOCYTES NFR BLD AUTO: 0.8 % (ref 0–0.5)
LYMPHOCYTES # BLD AUTO: 1.07 10*3/MM3 (ref 0.7–3.1)
LYMPHOCYTES NFR BLD AUTO: 10.8 % (ref 19.6–45.3)
MCH RBC QN AUTO: 28.8 PG (ref 26.6–33)
MCHC RBC AUTO-ENTMCNC: 30.9 G/DL (ref 31.5–35.7)
MCV RBC AUTO: 93.2 FL (ref 79–97)
MONOCYTES # BLD AUTO: 0.8 10*3/MM3 (ref 0.1–0.9)
MONOCYTES NFR BLD AUTO: 8.1 % (ref 5–12)
NEUTROPHILS NFR BLD AUTO: 7.77 10*3/MM3 (ref 1.7–7)
NEUTROPHILS NFR BLD AUTO: 78.6 % (ref 42.7–76)
NRBC BLD AUTO-RTO: 0 /100 WBC (ref 0–0.2)
PHOSPHATE SERPL-MCNC: 7.8 MG/DL (ref 2.5–4.5)
PLATELET # BLD AUTO: 76 10*3/MM3 (ref 140–450)
PLATELET # BLD AUTO: 76 10*3/MM3 (ref 140–450)
PLATELETS.RETICULATED NFR BLD AUTO: 18.4 % (ref 0.9–6.5)
PMV BLD AUTO: 12.9 FL (ref 6–12)
POTASSIUM SERPL-SCNC: 4.4 MMOL/L (ref 3.5–5.2)
RBC # BLD AUTO: 2.92 10*6/MM3 (ref 4.14–5.8)
SODIUM SERPL-SCNC: 136 MMOL/L (ref 136–145)
WBC NRBC COR # BLD: 9.89 10*3/MM3 (ref 3.4–10.8)

## 2022-03-15 PROCEDURE — 85055 RETICULATED PLATELET ASSAY: CPT | Performed by: INTERNAL MEDICINE

## 2022-03-15 PROCEDURE — 97530 THERAPEUTIC ACTIVITIES: CPT

## 2022-03-15 PROCEDURE — 99232 SBSQ HOSP IP/OBS MODERATE 35: CPT | Performed by: INTERNAL MEDICINE

## 2022-03-15 PROCEDURE — 82962 GLUCOSE BLOOD TEST: CPT

## 2022-03-15 PROCEDURE — 63710000001 INSULIN LISPRO (HUMAN) PER 5 UNITS: Performed by: HOSPITALIST

## 2022-03-15 PROCEDURE — 80069 RENAL FUNCTION PANEL: CPT | Performed by: INTERNAL MEDICINE

## 2022-03-15 PROCEDURE — 97535 SELF CARE MNGMENT TRAINING: CPT

## 2022-03-15 PROCEDURE — 25010000002 CEFTRIAXONE PER 250 MG: Performed by: HOSPITALIST

## 2022-03-15 PROCEDURE — 85025 COMPLETE CBC W/AUTO DIFF WBC: CPT | Performed by: INTERNAL MEDICINE

## 2022-03-15 RX ORDER — LORAZEPAM 0.5 MG/1
0.5 TABLET ORAL DAILY PRN
Status: DISCONTINUED | OUTPATIENT
Start: 2022-03-15 | End: 2022-03-16

## 2022-03-15 RX ORDER — INSULIN LISPRO 100 [IU]/ML
8 INJECTION, SOLUTION INTRAVENOUS; SUBCUTANEOUS
Status: DISCONTINUED | OUTPATIENT
Start: 2022-03-15 | End: 2022-03-18 | Stop reason: HOSPADM

## 2022-03-15 RX ADMIN — MIDODRINE HYDROCHLORIDE 10 MG: 5 TABLET ORAL at 10:37

## 2022-03-15 RX ADMIN — SEVELAMER CARBONATE 800 MG: 800 TABLET, FILM COATED ORAL at 11:49

## 2022-03-15 RX ADMIN — MIDODRINE HYDROCHLORIDE 10 MG: 5 TABLET ORAL at 17:29

## 2022-03-15 RX ADMIN — PANTOPRAZOLE SODIUM 40 MG: 40 TABLET, DELAYED RELEASE ORAL at 06:33

## 2022-03-15 RX ADMIN — INSULIN LISPRO 2 UNITS: 100 INJECTION, SOLUTION INTRAVENOUS; SUBCUTANEOUS at 09:23

## 2022-03-15 RX ADMIN — MIDODRINE HYDROCHLORIDE 10 MG: 5 TABLET ORAL at 06:33

## 2022-03-15 RX ADMIN — Medication 10 ML: at 20:16

## 2022-03-15 RX ADMIN — INSULIN LISPRO 7 UNITS: 100 INJECTION, SOLUTION INTRAVENOUS; SUBCUTANEOUS at 09:23

## 2022-03-15 RX ADMIN — INSULIN LISPRO 7 UNITS: 100 INJECTION, SOLUTION INTRAVENOUS; SUBCUTANEOUS at 11:49

## 2022-03-15 RX ADMIN — Medication 1 CAPSULE: at 09:22

## 2022-03-15 RX ADMIN — ALPRAZOLAM 1 MG: 0.5 TABLET ORAL at 21:54

## 2022-03-15 RX ADMIN — OXYCODONE HYDROCHLORIDE AND ACETAMINOPHEN 1 TABLET: 10; 325 TABLET ORAL at 05:53

## 2022-03-15 RX ADMIN — PANTOPRAZOLE SODIUM 40 MG: 40 TABLET, DELAYED RELEASE ORAL at 17:29

## 2022-03-15 RX ADMIN — INSULIN LISPRO 3 UNITS: 100 INJECTION, SOLUTION INTRAVENOUS; SUBCUTANEOUS at 11:49

## 2022-03-15 RX ADMIN — Medication 10 ML: at 09:23

## 2022-03-15 RX ADMIN — SEVELAMER CARBONATE 800 MG: 800 TABLET, FILM COATED ORAL at 17:29

## 2022-03-15 RX ADMIN — OXYCODONE HYDROCHLORIDE AND ACETAMINOPHEN 1 TABLET: 10; 325 TABLET ORAL at 21:59

## 2022-03-15 RX ADMIN — ASPIRIN 81 MG: 81 TABLET, CHEWABLE ORAL at 09:22

## 2022-03-15 RX ADMIN — INSULIN GLARGINE-YFGN 25 UNITS: 100 INJECTION, SOLUTION SUBCUTANEOUS at 20:15

## 2022-03-15 RX ADMIN — ALPRAZOLAM 1 MG: 0.5 TABLET ORAL at 05:53

## 2022-03-15 RX ADMIN — OXYCODONE HYDROCHLORIDE AND ACETAMINOPHEN 1 TABLET: 10; 325 TABLET ORAL at 10:40

## 2022-03-15 RX ADMIN — SEVELAMER CARBONATE 800 MG: 800 TABLET, FILM COATED ORAL at 09:22

## 2022-03-15 RX ADMIN — CEFTRIAXONE 2 G: 2 INJECTION, POWDER, FOR SOLUTION INTRAMUSCULAR; INTRAVENOUS at 22:00

## 2022-03-15 NOTE — PROGRESS NOTES
[facility] INPATIENT PROGRESS NOTE    Length of Stay:  11 days    CHIEF COMPLAINT: Thrombocytopenia, ESRD, chronic hepatitis C with cirrhosis, anemia, B12 deficiency      SUBJECTIVE:   3/13/22: The patient is awake and alert today.  He is minimally confused.  He is very tangential in conversation discussing issues with his children and his potential care at home.  He was not agreeable to working with physical therapy earlier today.  He has no specific new complaints currently.    3/14/22: Is resting comfortably in bed. Daughter at bedside. Says he had rough time during dialysis today. Denies any pain, N/V or any other acute symptoms. Appears oriented but tangential speech pattern. No bleeding or bruising.  3/15/22: Saw him during HD. Doing well. Denies any acute issues overnight.     ROS:  Review of Systems   A comprehensive review of systems was obtained with pertinent positive findings as noted in the interval history above.  All other systems negative.      OBJECTIVE:  Vitals:    03/15/22 0532 03/15/22 0547 03/15/22 0720 03/15/22 1021   BP:  121/76 99/79 113/64   BP Location:   Right arm Right arm   Patient Position:   Lying Lying   Pulse:  80 70 71   Resp:   18 18   Temp:       TempSrc:       SpO2:  95% 93% 93%   Weight: 72.5 kg (159 lb 12.8 oz)      Height:             PHYSICAL EXAMINATION:  General: Patient awake and alert, mild confusion  Chest/Lungs: Clear to auscultation bilaterally anteriorly.  Right chest wall previous catheter site with dressing overlying, no bleeding  Heart: Regular rate and rhythm no murmurs gallops or rubs  Abdomen/GI: Soft nontender nondistended bowel sounds present  Extremities: No edema.  Right femoral catheter site without bleeding  Neuro: Alert & awake. Moving all 4 extremities.      Patient was examined today, unchanged from above    DIAGNOSTIC DATA:  Results Review:     I reviewed the patient's new clinical results.    Results from last 7 days   Lab Units  03/15/22  0528 03/14/22  0356 03/13/22  0445   WBC 10*3/mm3 9.89 9.67 10.50   HEMOGLOBIN g/dL 8.4* 8.5* 8.1*   HEMATOCRIT % 27.2* 27.1* 26.2*   PLATELETS 10*3/mm3 76*  76* 71*  71* 51*  51*      Results from last 7 days   Lab Units 03/15/22  0528 03/13/22  0445 03/11/22  0439 03/10/22  0417   SODIUM mmol/L 136 132* 135* 136   POTASSIUM mmol/L 4.4 4.1 3.6 3.6   CHLORIDE mmol/L 96* 97* 98 97*   CO2 mmol/L 24.6 22.3 25.1 26.0   BUN mg/dL 36* 38* 51* 32*   CREATININE mg/dL 6.86* 6.43* 6.52* 4.83*   CALCIUM mg/dL 8.4* 8.1* 8.3* 8.2*   BILIRUBIN mg/dL  --   --   --  0.8   ALK PHOS U/L  --   --   --  320*   ALT (SGPT) U/L  --   --   --  43*   AST (SGOT) U/L  --   --   --  37   GLUCOSE mg/dL 171* 203* 178* 132*      Lab Results   Component Value Date    NEUTROABS 7.77 (H) 03/15/2022                 Assessment/Plan   ASSESSMENT/PLAN:  This is a 61 y.o. male with:     *Thrombocytopenia  · Patient with history of chronic mild thrombocytopenia followed in the outpatient setting by Dr. Aviles  · Felt to be related to underlying cirrhosis with splenomegaly  · Platelet count has been in the low 100,000 range in the past  · Platelet count in January 2022 had been in the mid to high 100,000 range at which time patient had COVID-19 infection  · Patient was off of dialysis for approximately 1 week before current hospital admission  · On admission 3/4/2022, platelet count was 51,000 and declined into the 40,000 range.  · Additional labs on 3/6/2022 with elevated IPF at 9%  · Peripheral blood smear reviewed on 3/5/2022, was unremarkable.  · B12 on 3/7/2022 greater than 2000  · Suspect that thrombocytopenia is related to underlying cirrhosis/splenomegaly, exacerbated by volume overload off dialysis prior to hospital admission in addition to consumption from sepsis.  · Platelet count today has increased up to 76,000 (3/15/22). Anticipate that platelet count will continue to improve up to patient's prior baseline level (low 100,000  range) with treatment of underlying sepsis.  No current bleeding issues.  He does have underlying cirrhosis/splenomegaly contributing to his thrombocytopenia as noted above.     *ESRD  · Patient with chronic noncompliance with outpatient dialysis per nephrology  · Patient had been on Monday Wednesday Friday schedule  · Patient apparently had not been to dialysis for 1 week prior to hospital admission  · Patient is now back on dialysis, nephrology following.    · On 3/9/2022, right chest tunneled dialysis catheter was removed and placement of right femoral dialysis catheter without bleeding complications.    *Sepsis with E. coli bacteremia  · Blood culture from 3/7/2022 + for E. Coli  · Patient initiated Rocephin IV 3/7/2022  · Patient developed hypotension, transferred to CCU 3/8/2022  · Right chest wall tunneled dialysis catheter removed with placement of new right femoral dialysis catheter on 3/9/2022  · Patient did require pressor support, subsequently discontinued     *Chronic hepatitis C with cirrhosis  · Received previous treatment, details unclear  · Previous CT abdomen and pelvis 3/12/2018 with cirrhotic appearing liver and borderline splenomegaly to 13.5 cm  · Hepatitis C RNA 3/6/2022 was negative    *Anemia  · Longstanding history of anemia followed by Dr. Aviles in the outpatient setting  · Anemia has been multifactorial related to CKD/ESRD, iron deficiency, B12 deficiency  · Patient had previously received Procrit related to anemia secondary to CKD.  Eventually ORMULO administration transition to nephrology once patient initiated hemodialysis around May 2021.  · Patient is receiving intermittent IV iron with dialysis per nephrology.  · Labs on 11/2/2021 with iron 172, ferritin 560, iron saturation 68%, TIBC 255.  B12 level 1017 (see below).  · Hemoglobin in January 2022 had been on the 8-9 range at time of COVID-19 infection  · Labs on 2/2/2022 with iron 89, ferritin 1514, iron saturation 35%, TIBC 256,  folate 7.58  · During current admission, hemoglobin 3/4/2022 was 8.7 with subsequent declined into the high 7 range.  · Laboratory evaluation on 3/4/2022 with iron 131, ferritin 5988, iron saturation 79%, TIBC 165.  · No laboratory evidence to suggest hemolysis  · B12 on 3/7/2022 greater than 2000  · Additional labs pending from 3/6/2022 with copper and zinc  · Patient is continuing on Retacrit 10,000 units 3 times weekly with dialysis per nephrology  · Hemoglobin on 3/8/2022 declined to 7.5.  A repeat level was drawn later that morning at 4.2 however this was incorrect due to a dilute specimen and on recheck was stable at 7.5.  · Patient developed hypotension, transferred to CCU with sepsis.  Received 1 unit PRBC transfusion on 3/8/2022.  · Hemoglobin today stable at 8.4    *B12 deficiency  · Patient was previously receiving oral B12 supplementation  · Labs on 11/2/2022 with B12 level 1017, patient remained off of oral B12 supplementation.  · B12 level on 3/7/2022 greater than 2000, no need for further B12 supplementation    *Right clear-cell renal cell carcinoma, stage I ([id number])  · Status post right partial nephrectomy on 7/6/2018 for a 2.2 cm clear-cell renal cell carcinoma, grade 3, margins could not be assessed, no lymphovascular invasion.  Notation of extensive fibrosis with hemosiderin deposition reflective of fibrosis and hemorrhage.    *Confusion/somnolence  · Secondary to E. coli bacteremia with evolving sepsis  · CT head 3/7/2022 negative for acute changes  · Ammonia level normal on 3/7/2022  · Patient with confusion likely related to sepsis with metabolic encephalopathy vs fentanyl/opioids    Plan:  1. Platelets continue to improve. 76,000 today (baseline ~ 100,000)  2. Patient continues with no indication for PRBC or platelet transfusion  3. Patient continuing on Rocephin for E. coli bacteremia/sepsis  4. Patient continuing on Retacrit 10,000 units Monday Wednesday Friday with dialysis per  nephrology  5. We will continue to monitor the patient's platelet count for now  6. CBC, CMP daily    Discussed with patient. Will continue to follow       Nicola Monique MD
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
## 2022-03-15 NOTE — PROGRESS NOTES
"  Infectious Diseases Progress Note    Sabas Quick MD     [facility]  [place]: 11 days  Patient Identification:  Name: Angelo Brown  Age: 61 y.o.  Sex: male  :  1960  MRN: [id number]         Primary Care Physician: Yadiel Baxter III, MD            Subjective: Feeling better denies any specific complaints.  Interval History: See consultation note.    Objective:    Scheduled Meds:aspirin, 81 mg, Oral, Daily  cefTRIAXone, 2 g, Intravenous, Q24H  epoetin real/real-epbx, 10,000 Units, Intravenous, Once per day on   insulin glargine, 25 Units, Subcutaneous, Nightly  insulin lispro, 8 Units, Subcutaneous, TID With Meals  lactobacillus acidophilus, 1 capsule, Oral, Daily  lidocaine, 5 mL, Injection, Once  midodrine, 10 mg, Oral, TID AC  pantoprazole, 40 mg, Oral, BID AC  sevelamer, 800 mg, Oral, TID With Meals  sodium chloride, 10 mL, Intravenous, Q12H      Continuous Infusions:     Vital signs in last 24 hours:  Temp:  [97.6 °F (36.4 °C)-98.1 °F (36.7 °C)] 97.9 °F (36.6 °C)  Heart Rate:  [59-80] 77  Resp:  [18] 18  BP: ()/(50-80) 128/67    Intake/Output:    Intake/Output Summary (Last 24 hours) at 3/15/2022 1816  Last data filed at 3/15/2022 1730  Gross per 24 hour   Intake 880 ml   Output --   Net 880 ml       Exam:  /67 (BP Location: Right arm, Patient Position: Lying)   Pulse 77   Temp 97.9 °F (36.6 °C) (Oral)   Resp 18   Ht 172.7 cm (67.99\")   Wt 72.5 kg (159 lb 12.8 oz)   SpO2 93%   BMI 24.30 kg/m²   Patient is examined using the personal protective equipment as per guidelines from infection control for this particular patient as enacted.  Hand washing was performed before and after patient interaction.  General Appearance:  Comfortable chronically ill-appearing male                          Head:    Normocephalic, without obvious abnormality, atraumatic                           Eyes:    PERRL, conjunctivae/corneas clear, EOM's intact, both eyes           "               Throat:   Lips, tongue, gums normal; oral mucosa pink and moist                           Neck:   Supple, symmetrical, trachea midline, no JVD                         Lungs:    Clear to auscultation bilaterally, respirations unlabored                 Chest Wall:  Right IJ tunnel catheter in place                          Heart:    Regular rate and rhythm, S1 and S2 normal                  Abdomen:   Soft nontender                 Extremities: Right groin dialysis catheter in place                        Pulses:   Pulses palpable in all extremities                            Skin:   Skin is warm and dry,  no rashes or palpable lesions                  Neurologic: Grossly nonfocal       Data Review:    I reviewed the patient's new clinical results.  Results from last 7 days   Lab Units 03/15/22  0528 03/14/22  0356 03/13/22 0445 03/12/22  0830 03/11/22  0439 03/10/22  0417 03/09/22  0412   WBC 10*3/mm3 9.89 9.67 10.50 7.58 6.64 10.08 9.31   HEMOGLOBIN g/dL 8.4* 8.5* 8.1* 8.1* 8.1* 9.0* 9.7*   PLATELETS 10*3/mm3 76*  76* 71*  71* 51*  51* 44*  44* 36*  36* 40*  40* 36*  36*     Results from last 7 days   Lab Units 03/15/22  0528 03/13/22  0445 03/11/22  0439 03/10/22  0417 03/09/22  0412   SODIUM mmol/L 136 132* 135* 136 136   POTASSIUM mmol/L 4.4 4.1 3.6 3.6 4.2   CHLORIDE mmol/L 96* 97* 98 97* 95*   CO2 mmol/L 24.6 22.3 25.1 26.0 24.0   BUN mg/dL 36* 38* 51* 32* 73*   CREATININE mg/dL 6.86* 6.43* 6.52* 4.83* 7.77*   CALCIUM mg/dL 8.4* 8.1* 8.3* 8.2* 8.4*   GLUCOSE mg/dL 171* 203* 178* 132* 181*     Microbiology Results (last 10 days)     Procedure Component Value - Date/Time    Blood Culture - Blood, Hand, Right [[id number]]  (Normal) Collected: 03/12/22 1118    Lab Status: Preliminary result Specimen: Blood from Hand, Right Updated: 03/15/22 1246     Blood Culture No growth at 3 days    Blood Culture - Blood, Arm, Right [[id number]]  (Normal) Collected: 03/12/22 1023    Lab Status: Preliminary  result Specimen: Blood from Arm, Right Updated: 03/15/22 1147     Blood Culture No growth at 3 days    COVID PRE-OP / PRE-PROCEDURE SCREENING ORDER (NO ISOLATION) - Swab, Nasopharynx [[id number]]  (Normal) Collected: 03/08/22 1816    Lab Status: Final result Specimen: Swab from Nasopharynx Updated: 03/08/22 2027    Narrative:      The following orders were created for panel order COVID PRE-OP / PRE-PROCEDURE SCREENING ORDER (NO ISOLATION) - Swab, Nasopharynx.  Procedure                               Abnormality         Status                     ---------                               -----------         ------                     COVID-19,[facility] IZZY IN-HOUSE...[[id number]]  Normal              Final result                 Please view results for these tests on the individual orders.    COVID-19,[facility] IZZ[facility] IN-HOUSE CEPHEID/NIKHIL NP SWAB IN TRANSPORT MEDIA 8-12 HR TAT - Swab, Nasopharynx [[id number]]  (Normal) Collected: 03/08/22 1816    Lab Status: Final result Specimen: Swab from Nasopharynx Updated: 03/08/22 2027     COVID19 Not Detected    Narrative:      Fact sheet for providers: https://www.fda.gov/media/401717/download    Fact sheet for patients: https://www.fda.gov/media/975540/download    Test performed by PCR.    Blood Culture - Blood, Blood, Central Line [[id number]]  (Abnormal) Collected: 03/07/22 1131    Lab Status: Final result Specimen: Blood, Central Line Updated: 03/10/22 0749     Blood Culture Escherichia coli     Isolated from Aerobic and Anaerobic Bottles     Gram Stain Anaerobic Bottle Gram negative bacilli      Aerobic Bottle Gram negative bacilli    Narrative:      Refer to previous blood culture collected on 3/7 for corbin's      Blood Culture - Blood, Blood, Central Line [[id number]]  (Abnormal)  (Susceptibility) Collected: 03/07/22 1131    Lab Status: Final result Specimen: Blood, Central Line Updated: 03/10/22 0748     Blood Culture Escherichia coli     Isolated from Aerobic and Anaerobic Bottles     Gram  Stain Anaerobic Bottle Gram negative bacilli      Aerobic Bottle Gram negative bacilli    Susceptibility      Escherichia coli      WALLY      Ampicillin Susceptible      Ampicillin + Sulbactam Susceptible      Cefepime Susceptible      Ceftazidime Susceptible      Ceftriaxone Susceptible      Gentamicin Susceptible      Levofloxacin Susceptible      Piperacillin + Tazobactam Susceptible      Trimethoprim + Sulfamethoxazole Susceptible                       Susceptibility Comments     Escherichia coli    Cefazolin sensitivity will not be reported for Enterobacteriaceae in non-urine isolates. If cefazolin is preferred, please call the microbiology lab to request an E-test.  With the exception of urinary-sourced infections, aminoglycosides should not be used as monotherapy.             Blood Culture ID, PCR - Blood, Blood, Central Line [[id number]]  (Abnormal) Collected: 03/07/22 1131    Lab Status: Final result Specimen: Blood, Central Line Updated: 03/07/22 2221     BCID, PCR Eschericia coli. Identification by BCID2 PCR.     BOTTLE TYPE Anaerobic Bottle            Assessment:    Anemia due to chronic renal failure treated with erythropoietin, stage 5 (HCC)    B12 deficiency    End-stage renal disease on hemodialysis (HCC)    Thrombocytopenia (HCC)    Liver cirrhosis (HCC)    Chronic hepatitis C without hepatic coma (HCC)    Noncompliance of patient with renal dialysis (HCC)  1-systemic gram-negative bacteremic sepsis either due to              -Infected tunneled catheter versus              -Evolving intra-abdominal process with subsequent bacteremia and secondary seeding.  2-end-stage renal disease  3-history of cirrhosis of the liver due to hepatitis C  4-thrombocytopenia  5-other diagnosis per primary team.     Recommendations/Discussions:  · Continue present treatment with IV Rocephin .  · Follow-up repeat blood cultures after the catheter is replaced.  · Once considered stable consider CT scan of the abdomen and  pelvis    Sabas Quick MD  3/15/2022  18:16 EDT    Much of this encounter note is an electronic transcription/translation of spoken language to printed text. The electronic translation of spoken language may permit erroneous, or at times, nonsensical words or phrases to be inadvertently transcribed; Although I have reviewed the note for such errors, some may still exist

## 2022-03-15 NOTE — PLAN OF CARE
Goal Outcome Evaluation:  Plan of Care Reviewed With: patient        Progress: improving  Outcome Evaluation: Pt seen today for OT, he requires signficantly less assist today for all transfers, bed mobility, and able to progress functional mobility sink side for ADLs with use of rwx. Pt does have confusion with bADL engagement and often preseverates on tasks and needs cues to terminate and move on to washing all body parts during bathing. He is able to complete grooming seated at sink SBA. Pt agreeable to getting up to chair at end of encounter. Continue to recommend SNF at [place].    OT wore a mask, eye protection, gloves, and completed hand hygiene. Pt wore a mask.

## 2022-03-15 NOTE — PROGRESS NOTES
"Daily progress note    Chief complaint  Doing same  No specific complaints  Denies any chest pain shortness of breath palpitation    History of present illness  61-year white male with very complex past medical history including end-stage renal disease on hemodialysis CVA diabetes mellitus coronary artery disease hypertension chronic anemia [facility] room after missing hemodialysis with generalized weakness.  Patient is poor historian but denies any headache dizziness chest pain shortness of breath abdominal pain vomiting diarrhea.  Patient does have nausea and weak all over.  Patient has no cough fever night sweats weight loss.  Patient evaluated in ER found to have high potassium need hemodialysis admit for management.  Patient also found to have low platelets which is new.  Patient has no bleeding whatsoever.     REVIEW OF SYSTEMS  Unremarkable except generalized weakness     PHYSICAL EXAM  Blood pressure 113/64, pulse 71, temperature 97.6 °F (36.4 °C), temperature source Oral, resp. rate 18, height 172.7 cm (67.99\"), weight 72.5 kg (159 lb 12.8 oz), SpO2 93 %.    GENERAL: 61-year-old chronically ill-appearing male in mild distress  HENT: NCAT, neck supple, trachea midline  EYES: no scleral icterus, PERRL, normal conjunctivae  CV: regular rhythm, regular rate, no murmur  RESPIRATORY: unlabored effort, mild Rales in bases bilaterally  ABDOMEN: soft, nontender, nondistended, bowel sounds present  MUSCULOSKELETAL: no gross deformity, no pedal edema, no calf tenderness  NEURO: Sleepy but easily arousable,  sensory and motor function of extremities intact, speech clear, mental status normal  SKIN: warm, dry, no rash  PSYCH:  Look depressed     LAB RESULTS  Lab Results (last 24 hours)     Procedure Component Value Units Date/Time    Blood Culture - Blood, Hand, Right [[id number]]  (Normal) Collected: 03/12/22 1118    Specimen: Blood from Hand, Right Updated: 03/15/22 1246     Blood " Culture No growth at 3 days    Blood Culture - Blood, Arm, Right [[id number]]  (Normal) Collected: 03/12/22 1023    Specimen: Blood from Arm, Right Updated: 03/15/22 1147     Blood Culture No growth at 3 days    POC Glucose Once [[id number]]  (Abnormal) Collected: 03/15/22 1034    Specimen: Blood Updated: 03/15/22 1035     Glucose 237 mg/dL      Comment: Meter: [id number] : [id number] Sarabjit KIM       Renal Function Panel [[id number]]  (Abnormal) Collected: 03/15/22 0528    Specimen: Blood Updated: 03/15/22 0723     Glucose 171 mg/dL      BUN 36 mg/dL      Creatinine 6.86 mg/dL      Sodium 136 mmol/L      Potassium 4.4 mmol/L      Chloride 96 mmol/L      CO2 24.6 mmol/L      Calcium 8.4 mg/dL      Albumin 2.40 g/dL      Phosphorus 7.8 mg/dL      Anion Gap 15.4 mmol/L      BUN/Creatinine Ratio 5.2     eGFR 8.6 mL/min/1.73      Comment: <15 Indicative of kidney failure       Narrative:      GFR Normal >60  Chronic Kidney Disease <60  Kidney Failure <15      Immature Platelet Fraction [[id number]]  (Abnormal) Collected: 03/15/22 0528    Specimen: Blood Updated: 03/15/22 0707     IPF 18.40 %      Platelets 76 10*3/mm3     CBC & Differential [[id number]]  (Abnormal) Collected: 03/15/22 0528    Specimen: Blood Updated: 03/15/22 0705    Narrative:      The following orders were created for panel order CBC & Differential.  Procedure                               Abnormality         Status                     ---------                               -----------         ------                     CBC Auto Differential[[id number]]        Abnormal            Final result                 Please view results for these tests on the individual orders.    CBC Auto Differential [[id number]]  (Abnormal) Collected: 03/15/22 0528    Specimen: Blood Updated: 03/15/22 0705     WBC 9.89 10*3/mm3      RBC 2.92 10*6/mm3      Hemoglobin 8.4 g/dL      Hematocrit 27.2 %      MCV 93.2 fL      MCH 28.8 pg      MCHC 30.9 g/dL      RDW 14.5 %       RDW-SD 49.1 fl      MPV 12.9 fL      Platelets 76 10*3/mm3      Neutrophil % 78.6 %      Lymphocyte % 10.8 %      Monocyte % 8.1 %      Eosinophil % 1.2 %      Basophil % 0.5 %      Immature Grans % 0.8 %      Neutrophils, Absolute 7.77 10*3/mm3      Lymphocytes, Absolute 1.07 10*3/mm3      Monocytes, Absolute 0.80 10*3/mm3      Eosinophils, Absolute 0.12 10*3/mm3      Basophils, Absolute 0.05 10*3/mm3      Immature Grans, Absolute 0.08 10*3/mm3      nRBC 0.0 /100 WBC     POC Glucose Once [[id number]]  (Abnormal) Collected: 03/15/22 0555    Specimen: Blood Updated: 03/15/22 0556     Glucose 179 mg/dL      Comment: Meter: [id number] : [id number] Moovly PCA       POC Glucose Once [[id number]]  (Abnormal) Collected: 03/14/22 2040    Specimen: Blood Updated: 03/14/22 2042     Glucose 165 mg/dL      Comment: Meter: [id number] : [id number] AgraQuestey PCA       POC Glucose Once [[id number]]  (Abnormal) Collected: 03/14/22 1538    Specimen: Blood Updated: 03/14/22 1539     Glucose 179 mg/dL      Comment: Meter: [id number] : [id number] Zoltan Copeland CNA           Imaging Results (Last 24 Hours)     ** No results found for the last 24 hours. **             ECG 12 Lead  Component   Ref Range & Units 3/5/22 0945 3/4/22 1825 1/26/22 1729 12/2/21 0309 4/28/21 1722 4/8/21 1341   QT Interval   ms 271 P  392  321  395  435  444              HEART RATE= 187  bpm  RR Interval= 320  ms  CA Interval= 132  ms  P Horizontal Axis= 90  deg  P Front Axis= 30  deg  QRSD Interval= 95  ms  QT Interval= 271  ms  QRS Axis= 91  deg  T Wave Axis= -72  deg  - ABNORMAL ECG -  Supraventricular tachycardia  Borderline right axis deviation  Repolarization abnormality, prob rate related             Current Facility-Administered Medications:   •  acetaminophen (TYLENOL) tablet 650 mg, 650 mg, Oral, Q6H PRN, Mai Escoto MD, 650 mg at 03/08/22 1904  •  ALPRAZolam (XANAX) tablet 1 mg, 1 mg, Oral, 4x Daily PRN, Mai Escoto MD, 1 mg  at 03/15/22 0553  •  aspirin chewable tablet 81 mg, 81 mg, Oral, Daily, Mai Escoto MD, 81 mg at 03/15/22 0922  •  cefTRIAXone (ROCEPHIN) 2 g in sodium chloride 0.9 % 100 mL IVPB-VTB, 2 g, Intravenous, Q24H, Karl Swift MD, Last Rate: 200 mL/hr at 03/14/22 2219, 2 g at 03/14/22 2219  •  epoetin real-epbx (RETACRIT) injection 10,000 Units, 10,000 Units, Intravenous, Once per day on Mon Wed Fri, Mai Escoto MD, 10,000 Units at 03/14/22 1008  •  heparin (porcine) injection 3,800 Units, 3,800 Units, Intracatheter, PRN, Mai Escoto MD, 3,800 Units at 03/14/22 1008  •  insulin glargine (LANTUS, SEMGLEE) injection 20 Units, 20 Units, Subcutaneous, Nightly, Karl Swift MD, 20 Units at 03/14/22 2056  •  insulin lispro (ADMELOG) injection 0-7 Units, 0-7 Units, Subcutaneous, 4x Daily With Meals & Nightly, Karl Swift MD, 3 Units at 03/15/22 1149  •  insulin lispro (ADMELOG) injection 7 Units, 7 Units, Subcutaneous, TID With Meals, Karl Swift MD, 7 Units at 03/15/22 1149  •  lactobacillus acidophilus (RISAQUAD) capsule 1 capsule, 1 capsule, Oral, Daily, Mai Escoto MD, 1 capsule at 03/15/22 0922  •  lidocaine (XYLOCAINE) 1 % injection 5 mL, 5 mL, Injection, Once, Mai Escoto MD  •  midodrine (PROAMATINE) tablet 10 mg, 10 mg, Oral, TID AC, Mai Escoto MD, 10 mg at 03/15/22 1037  •  ondansetron (ZOFRAN) injection 4 mg, 4 mg, Intravenous, Q6H PRN, Karl Swift MD  •  oxyCODONE-acetaminophen (PERCOCET)  MG per tablet 1 tablet, 1 tablet, Oral, Q4H PRN, Karl Swift MD, 1 tablet at 03/15/22 1040  •  pantoprazole (PROTONIX) EC tablet 40 mg, 40 mg, Oral, BID AC, Mai Escoto MD, 40 mg at 03/15/22 0633  •  sevelamer (RENVELA) tablet 800 mg, 800 mg, Oral, TID With Meals, Mai Escoto MD, 800 mg at 03/15/22 1149  •  [COMPLETED] Insert peripheral IV, , , Once **AND** sodium chloride 0.9 % flush 10 mL, 10 mL, Intravenous, PRN, Mai Ecsoto MD  •  sodium chloride 0.9 % flush 10 mL, 10 mL,  Intravenous, Q12H, Mai Escoto MD, 10 mL at 03/15/22 0923  •  sodium chloride 0.9 % flush 10 mL, 10 mL, Intravenous, PRN, Mai Escoto MD  •  tiZANidine (ZANAFLEX) tablet 4 mg, 4 mg, Oral, Q8H PRN, Mai Escoto MD     ASSESSMENT  E. coli bacteremia with sepsis  Line sepsis status post removal of old hemodialysis catheter and placement of new  End-stage renal disease with noncompliance with hemodialysis  Supraventricular tachycardia resolved  Insulin-dependent diabetes mellitus  History of hypertension with low blood pressure  Hyperlipidemia  History of CVA  Chronic anemia and thrombocytopenia  Anxiety disorder  Gastroesophageal reflux disease    PLAN  CPM  Continue IV antibiotics   Start using AV fistula  Remove tunneled hemodialysis catheter in a.m.  Hemodialysis TIW  Vascular surgery to follow patient  Insulin dose adjusted  Adjust home medications  Stress ulcer DVT prophylaxis  Supportive care  PT OT  Discussed with nursing staff and wife  Follow closely and further recommendation according to hospital course    ARIEL MCGOVERN MD

## 2022-03-15 NOTE — PROGRESS NOTES
Name: Angelo Brown ADMIT: 3/4/2022   : 1960  PCP: Yadiel Baxter III, MD    MRN: [id number] LOS: 11 days   AGE/SEX: 61 y.o. male  ROOM:  [place] [id number]/     CC: Sepsis  Interval History: Had dialysis yesterday but was poorly tolerant of it because of low blood pressure.  Remains somewhat difficult from a behavior standpoint    Subjective   Subjective     Review of Systems  Objective   Objective     Vitals:   Temp:  [97.6 °F (36.4 °C)-98.2 °F (36.8 °C)] 97.6 °F (36.4 °C)  Heart Rate:  [59-80] 80  Resp:  [17-18] 18  BP: ()/(44-80) 121/76    No intake/output data recorded.    Scheduled Meds:     aspirin, 81 mg, Oral, Daily  cefTRIAXone, 2 g, Intravenous, Q24H  epoetin real/real-epbx, 10,000 Units, Intravenous, Once per day on   insulin glargine, 20 Units, Subcutaneous, Nightly  insulin lispro, 0-7 Units, Subcutaneous, 4x Daily With Meals & Nightly  insulin lispro, 7 Units, Subcutaneous, TID With Meals  lactobacillus acidophilus, 1 capsule, Oral, Daily  lidocaine, 5 mL, Injection, Once  midodrine, 10 mg, Oral, TID AC  pantoprazole, 40 mg, Oral, BID AC  sevelamer, 800 mg, Oral, TID With Meals  sodium chloride, 10 mL, Intravenous, Q12H      IV Meds:        Physical Exam  Vascular: Palpable thrill    Data Reviewed:  CBC    Results from last 7 days   Lab Units 03/15/22  0528 22  0356 22  0445 22  0830 22  0439 03/10/22  0417 22  0412   WBC 10*3/mm3 9.89 9.67 10.50 7.58 6.64 10.08 9.31   HEMOGLOBIN g/dL 8.4* 8.5* 8.1* 8.1* 8.1* 9.0* 9.7*   PLATELETS 10*3/mm3 76*  76* 71*  71* 51*  51* 44*  44* 36*  36* 40*  40* 36*  36*     BMP   Results from last 7 days   Lab Units 03/15/22  0528 22  0445 22  0439 03/10/22  0417 22  0412 22  0750   SODIUM mmol/L 136 132* 135* 136 136 136   POTASSIUM mmol/L 4.4 4.1 3.6 3.6 4.2 4.4   CHLORIDE mmol/L 96* 97* 98 97* 95* 96*   CO2 mmol/L 24.6 22.3 25.1 26.0 24.0 24.1   BUN mg/dL 36* 38* 51* 32*  73* 59*   CREATININE mg/dL 6.86* 6.43* 6.52* 4.83* 7.77* 6.77*   GLUCOSE mg/dL 171* 203* 178* 132* 181* 239*   PHOSPHORUS mg/dL 7.8*  --   --   --   --   --        Most notable findings include: Platelet count up to 76    Active Hospital Problems:   Active Hospital Problems    Diagnosis  POA   • Noncompliance of patient with renal dialysis (HCC) [Z91.15]  Not Applicable   • Thrombocytopenia (HCC) [D69.6]  Unknown   • Liver cirrhosis (HCC) [K74.60]  Unknown   • Chronic hepatitis C without hepatic coma (HCC) [B18.2]  Unknown   • End-stage renal disease on hemodialysis (HCC) [N18.6, Z99.2]  Not Applicable   • B12 deficiency [E53.8]  Yes   • Anemia due to chronic renal failure treated with erythropoietin, stage 5 (HCC) [N18.5, D63.1]  Unknown      Resolved Hospital Problems   No resolved problems to display.      Assessment/Plan   Billin-24, Subsequent Hospital Care, Thrombocytopenia  Assessment / Plan     Sepsis: (Resolved) E. coli bacteremia status post tunneled dialysis catheter removal.     HD Acccess: Currently has a nontunneled dialysis catheter in the groin.  He was able to use his arm for access without major issue yesterday but unfortunately was poorly tolerant of dialysis due to hypotension and had to be taken off early.  Also continues to be difficult from a behavior standpoint.  Poor regard for needle safety but no mechanical issues with the access.  Platelet count has also significantly improved up to over 70.  Would plan to use AV fistula for dialysis today and then I am okay with removing tunneled catheter.    Follow-up with us as needed in the future.    Ad Weber MD  03/15/22  07:51 EDT  Office Number [phone]

## 2022-03-15 NOTE — PROGRESS NOTES
"   LOS: 11 days     Chief Complaint/ Reason for encounter: ESRD  Chief Complaint   Patient presents with   • Weakness - Generalized   • Needs dialysis         Subjective    3/9: Seen and examined in the ICU, looks very ill.  BP remains on the low side but no need for pressors.  Remains confused and somewhat paranoid.  States \"they are trying to kill me\"  Discussed with Dr. Weber regarding catheter removal and replacement in OR today  Dialysis is planned for this afternoon    3/10: Seems to be doing better today.  BP has improved, more alert but still confused, somewhat paranoid  Nuys any shortness of breath or chest pain, no fevers or chills    3/11:Patient was seen on hemodialysis  Treatment orders discussed with the dialysis RNFranco  Arterial pressure/ Venous pressure: 120/100  Blood flow rate/Dialysate flow rate: 250/600, increase blood flow rate of 350 as tolerated  Blood pressure: 120s over 60s  Potassium bath/Fluid removal goal: 3K, 1 L  No changes other than blood flow rate    3/14 seen in the dialysis unit, unfortunately had to come off treatment early due to blood pressure issues  On a positive note his fistula functioned well and no problems with cannulation  Had some agitation with dialysis  No shortness of breath or chest pain, no nausea or vomiting    3/15 doing well, resting, no new complaints  Scheduled for dialysis today.  I just spoke with the dialysis nurse and she states that there was no issue cannulating his AV fistula and he is currently running well      Medical history reviewed:  Weakness - Generalized        Subjective    History taken from: Patient and chart    Vital Signs  Temp:  [97.6 °F (36.4 °C)-98.1 °F (36.7 °C)] 98.1 °F (36.7 °C)  Heart Rate:  [59-80] 75  Resp:  [18] 18  BP: ()/(50-80) 99/58       Wt Readings from Last 1 Encounters:   03/15/22 0532 72.5 kg (159 lb 12.8 oz)   03/14/22 1442 75.3 kg (166 lb 0.1 oz)   03/14/22 0500 75.3 kg (166 lb 0.1 oz)   03/13/22 0539 75 kg " "(165 lb 5.5 oz)   03/12/22 0523 73.5 kg (162 lb 0.6 oz)   03/09/22 0600 77.6 kg (171 lb 1.2 oz)   03/08/22 1431 77.6 kg (171 lb 1.2 oz)   03/07/22 0906 74.4 kg (164 lb)       Objective:  Vital signs: (most recent): Blood pressure 99/58, pulse 75, temperature 98.1 °F (36.7 °C), temperature source Oral, resp. rate 18, height 172.7 cm (67.99\"), weight 72.5 kg (159 lb 12.8 oz), SpO2 93 %.              Objective:  General Appearance:  Comfortable, less confused, chronically ill-appearing, in no acute distress and not in pain.  Prior chest CVC site clean dry and intact  HEENT: Mucous membranes moist, no injury, oropharynx clear  Lungs:  Normal effort and normal respiratory rate.  Breath sounds clear to auscultation.  No  respiratory distress.  No rales, decreased breath sounds or rhonchi.    Heart: Normal rate.  Regular rhythm.  S1 normal.  No murmur.   Abdomen: Abdomen is soft.  Bowel sounds are normal, no abdominal tenderness.  There is no rebound or guarding  Extremities: Normal range of motion.  Trace edema of bilateral lower extremities, distal pulses intact  Neurological: No focal motor or sensory deficits, pupils reactive  Skin:  Warm and dry.  No rash or cyanosis.       Results Review:    Intake/Output:     Intake/Output Summary (Last 24 hours) at 3/15/2022 1435  Last data filed at 3/15/2022 1230  Gross per 24 hour   Intake 640 ml   Output --   Net 640 ml         DATA:  Radiology and Labs:  The following labs independently reviewed by me. Additional labs ordered for tomorrow a.m.  Interval notes, chart personally reviewed by me.   Old records independently reviewed showing ESRD on dialysis    Risk/ complexity of medical care/ medical decision making moderate complexity      Labs:   Recent Results (from the past 24 hour(s))   POC Glucose Once    Collection Time: 03/14/22  3:38 PM    Specimen: Blood   Result Value Ref Range    Glucose 179 (H) 70 - 130 mg/dL   POC Glucose Once    Collection Time: 03/14/22  8:40 PM    " Specimen: Blood   Result Value Ref Range    Glucose 165 (H) 70 - 130 mg/dL   Immature Platelet Fraction    Collection Time: 03/15/22  5:28 AM    Specimen: Blood   Result Value Ref Range    IPF 18.40 (H) 0.90 - 6.50 %    Platelets 76 (L) 140 - 450 10*3/mm3   Renal Function Panel    Collection Time: 03/15/22  5:28 AM    Specimen: Blood   Result Value Ref Range    Glucose 171 (H) 65 - 99 mg/dL    BUN 36 (H) 6 - 20 mg/dL    Creatinine 6.86 (H) 0.76 - 1.27 mg/dL    Sodium 136 136 - 145 mmol/L    Potassium 4.4 3.5 - 5.2 mmol/L    Chloride 96 (L) 98 - 107 mmol/L    CO2 24.6 22.0 - 29.0 mmol/L    Calcium 8.4 (L) 8.6 - 10.5 mg/dL    Albumin 2.40 (L) 3.50 - 5.20 g/dL    Phosphorus 7.8 (H) 2.5 - 4.5 mg/dL    Anion Gap 15.4 (H) 5.0 - 15.0 mmol/L    BUN/Creatinine Ratio 5.2 (L) 7.0 - 25.0    eGFR 8.6 (L) >60.0 mL/min/1.73   CBC Auto Differential    Collection Time: 03/15/22  5:28 AM    Specimen: Blood   Result Value Ref Range    WBC 9.89 3.40 - 10.80 10*3/mm3    RBC 2.92 (L) 4.14 - 5.80 10*6/mm3    Hemoglobin 8.4 (L) 13.0 - 17.7 g/dL    Hematocrit 27.2 (L) 37.5 - 51.0 %    MCV 93.2 79.0 - 97.0 fL    MCH 28.8 26.6 - 33.0 pg    MCHC 30.9 (L) 31.5 - 35.7 g/dL    RDW 14.5 12.3 - 15.4 %    RDW-SD 49.1 37.0 - 54.0 fl    MPV 12.9 (H) 6.0 - 12.0 fL    Platelets 76 (L) 140 - 450 10*3/mm3    Neutrophil % 78.6 (H) 42.7 - 76.0 %    Lymphocyte % 10.8 (L) 19.6 - 45.3 %    Monocyte % 8.1 5.0 - 12.0 %    Eosinophil % 1.2 0.3 - 6.2 %    Basophil % 0.5 0.0 - 1.5 %    Immature Grans % 0.8 (H) 0.0 - 0.5 %    Neutrophils, Absolute 7.77 (H) 1.70 - 7.00 10*3/mm3    Lymphocytes, Absolute 1.07 0.70 - 3.10 10*3/mm3    Monocytes, Absolute 0.80 0.10 - 0.90 10*3/mm3    Eosinophils, Absolute 0.12 0.00 - 0.40 10*3/mm3    Basophils, Absolute 0.05 0.00 - 0.20 10*3/mm3    Immature Grans, Absolute 0.08 (H) 0.00 - 0.05 10*3/mm3    nRBC 0.0 0.0 - 0.2 /100 WBC   POC Glucose Once    Collection Time: 03/15/22  5:55 AM    Specimen: Blood   Result Value Ref Range     Glucose 179 (H) 70 - 130 mg/dL   POC Glucose Once    Collection Time: 03/15/22 10:34 AM    Specimen: Blood   Result Value Ref Range    Glucose 237 (H) 70 - 130 mg/dL       Radiology:  Imaging Results (Last 24 Hours)     ** No results found for the last 24 hours. **             Medications have been reviewed:  Current Facility-Administered Medications   Medication Dose Route Frequency Provider Last Rate Last Admin   • acetaminophen (TYLENOL) tablet 650 mg  650 mg Oral Q6H PRN Mai Escoto MD   650 mg at 03/08/22 1904   • ALPRAZolam (XANAX) tablet 1 mg  1 mg Oral 4x Daily PRN Mai Escoto MD   1 mg at 03/15/22 0553   • aspirin chewable tablet 81 mg  81 mg Oral Daily Mai Escoto MD   81 mg at 03/15/22 0922   • cefTRIAXone (ROCEPHIN) 2 g in sodium chloride 0.9 % 100 mL IVPB-VTB  2 g Intravenous Q24H Karl Swift MD       • epoetin real-epbx (RETACRIT) injection 10,000 Units  10,000 Units Intravenous Once per day on Mon Wed Fri Mai Escoto MD   10,000 Units at 03/14/22 1008   • heparin (porcine) injection 3,800 Units  3,800 Units Intracatheter PRN Mai Escoto MD   3,800 Units at 03/14/22 1008   • insulin glargine (LANTUS, SEMGLEE) injection 25 Units  25 Units Subcutaneous Nightly Karl Swift MD       • insulin lispro (ADMELOG) injection 8 Units  8 Units Subcutaneous TID With Meals Karl Swift MD       • lactobacillus acidophilus (RISAQUAD) capsule 1 capsule  1 capsule Oral Daily Mai Escoto MD   1 capsule at 03/15/22 0922   • lidocaine (XYLOCAINE) 1 % injection 5 mL  5 mL Injection Once Mai Escoto MD       • LORazepam (ATIVAN) tablet 0.5 mg  0.5 mg Oral Daily PRN Yohan Murrell MD       • midodrine (PROAMATINE) tablet 10 mg  10 mg Oral TID AC Mai Escoto MD   10 mg at 03/15/22 1037   • ondansetron (ZOFRAN) injection 4 mg  4 mg Intravenous Q6H PRN Karl Swift MD       • oxyCODONE-acetaminophen (PERCOCET)  MG per tablet 1 tablet  1 tablet Oral Q4H PRN Karl Swift MD   1  tablet at 03/15/22 1040   • pantoprazole (PROTONIX) EC tablet 40 mg  40 mg Oral BID AC Mai Escoto MD   40 mg at 03/15/22 0633   • sevelamer (RENVELA) tablet 800 mg  800 mg Oral TID With Meals Mai Escoto MD   800 mg at 03/15/22 1149   • sodium chloride 0.9 % flush 10 mL  10 mL Intravenous PRN Mai Escoto MD       • sodium chloride 0.9 % flush 10 mL  10 mL Intravenous Q12H Mai Escoto MD   10 mL at 03/15/22 0923   • sodium chloride 0.9 % flush 10 mL  10 mL Intravenous PRN Mai Escoto MD       • tiZANidine (ZANAFLEX) tablet 4 mg  4 mg Oral Q8H PRN Mai Escoto MD             ASSESSMENT:   Thrombocytopenia (HCC)     • Liver cirrhosis (HCC)     • Chronic hepatitis C without hepatic coma (HCC)     • End-stage renal disease on hemodialysis (HCC)     • B12 deficiency     • Anemia due to chronic renal failure treated with erythropoietin, stage 5 (HCC)          Assessment:   1. ESRD, HD Monday Wednesday Friday  2. Tachycardia, likely was due to sepsis and has improved  3. Thrombocytopenia,   4.  Hyperkalemia, stabilizing  5.  Diabetes mellitus type 2  6.  Fevers/sepsis syndrome related to infected dialysis catheter  7.  Chronic noncompliance with outpatient dialysis.  Patient has been counseled multiple times about the importance of 100% attendance at outpatient dialysis.  his noncompliance is directly related to his recurrent hospital admissions      Plan:  Did not tolerate dialysis well Monday with hypotension and agitation  HD seems to be going much better today.  No UF today, BP stable  Ativan with dialysis as needed if he becomes agitated  No issues with AV fistula cannulation again today  We will discuss with vascular about removing his groin Shiley    Continue midodrine for BP support and binders with meals  Epogen with dialysis for anemia of CKD      Yohan Murrell MD   [facility]   Office phone number: [phone]  Answering service phone number:  [phone]    03/15/22  14:35 EDT    Dictation performed using Dragon dictation software

## 2022-03-15 NOTE — PLAN OF CARE
Goal Outcome Evaluation:  Plan of Care Reviewed With: (P) patient        Progress: (P) improving  Outcome Evaluation: (P) Pt seen this AM for OT PT co-treat - was alert and agreeable to treatment. Pt performed all mobility with less assistance needed. Pt tolerated progression of activity with no complaints. Pt had improved gait distance, transfer mechanics, and sitting & standing balance. Pt will continue to benefit from skilled therapy.
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
## 2022-03-15 NOTE — PLAN OF CARE
Goal Outcome Evaluation:           Progress: improving  Outcome Evaluation: Patient AO x2-3, medicated with percocet for pain, patient down to dialysis today. VSS, on room air. Up with PT today. Will continue to monitor.

## 2022-03-15 NOTE — PROGRESS NOTES
Continued Stay Note  [facility]     Patient Name: Angelo Brown  MRN: [id number]  Today's Date: 3/15/2022    Admit Date: 3/4/2022     Discharge Plan     Row Name 03/15/22 1501       Plan    Plan Home with spouse    Plan Comments Some improvement noted with therapy. Plan remains home with spouse. Pt currently off unit in HD. CCP will f/u with pt family tomorrow.               Discharge Codes    No documentation.               Expected Discharge Date and Time     Expected Discharge Date Expected Discharge Time    Mar 17, 2022             Lacey Lamar RN

## 2022-03-16 LAB
ALBUMIN SERPL-MCNC: 2.5 G/DL (ref 3.5–5.2)
ANION GAP SERPL CALCULATED.3IONS-SCNC: 13 MMOL/L (ref 5–15)
BASOPHILS # BLD AUTO: 0.07 10*3/MM3 (ref 0–0.2)
BASOPHILS NFR BLD AUTO: 0.5 % (ref 0–1.5)
BUN SERPL-MCNC: 25 MG/DL (ref 6–20)
BUN/CREAT SERPL: 4.1 (ref 7–25)
CALCIUM SPEC-SCNC: 8.3 MG/DL (ref 8.6–10.5)
CHLORIDE SERPL-SCNC: 98 MMOL/L (ref 98–107)
CO2 SERPL-SCNC: 25 MMOL/L (ref 22–29)
CREAT SERPL-MCNC: 6.08 MG/DL (ref 0.76–1.27)
DEPRECATED RDW RBC AUTO: 49.7 FL (ref 37–54)
EGFRCR SERPLBLD CKD-EPI 2021: 9.9 ML/MIN/1.73
EOSINOPHIL # BLD AUTO: 0.09 10*3/MM3 (ref 0–0.4)
EOSINOPHIL NFR BLD AUTO: 0.7 % (ref 0.3–6.2)
ERYTHROCYTE [DISTWIDTH] IN BLOOD BY AUTOMATED COUNT: 14.5 % (ref 12.3–15.4)
GLUCOSE BLDC GLUCOMTR-MCNC: 126 MG/DL (ref 70–130)
GLUCOSE BLDC GLUCOMTR-MCNC: 147 MG/DL (ref 70–130)
GLUCOSE BLDC GLUCOMTR-MCNC: 80 MG/DL (ref 70–130)
GLUCOSE SERPL-MCNC: 122 MG/DL (ref 65–99)
HCT VFR BLD AUTO: 27.1 % (ref 37.5–51)
HGB BLD-MCNC: 8.4 G/DL (ref 13–17.7)
IMM GRANULOCYTES # BLD AUTO: 0.1 10*3/MM3 (ref 0–0.05)
IMM GRANULOCYTES NFR BLD AUTO: 0.7 % (ref 0–0.5)
LYMPHOCYTES # BLD AUTO: 1.36 10*3/MM3 (ref 0.7–3.1)
LYMPHOCYTES NFR BLD AUTO: 10 % (ref 19.6–45.3)
MCH RBC QN AUTO: 29.1 PG (ref 26.6–33)
MCHC RBC AUTO-ENTMCNC: 31 G/DL (ref 31.5–35.7)
MCV RBC AUTO: 93.8 FL (ref 79–97)
MONOCYTES # BLD AUTO: 1.35 10*3/MM3 (ref 0.1–0.9)
MONOCYTES NFR BLD AUTO: 9.9 % (ref 5–12)
NEUTROPHILS NFR BLD AUTO: 10.69 10*3/MM3 (ref 1.7–7)
NEUTROPHILS NFR BLD AUTO: 78.2 % (ref 42.7–76)
NRBC BLD AUTO-RTO: 0 /100 WBC (ref 0–0.2)
PHOSPHATE SERPL-MCNC: 4.6 MG/DL (ref 2.5–4.5)
PLATELET # BLD AUTO: 101 10*3/MM3 (ref 140–450)
PLATELET # BLD AUTO: 101 10*3/MM3 (ref 140–450)
PLATELETS.RETICULATED NFR BLD AUTO: 11.8 % (ref 0.9–6.5)
PMV BLD AUTO: 12.5 FL (ref 6–12)
POTASSIUM SERPL-SCNC: 4.8 MMOL/L (ref 3.5–5.2)
RBC # BLD AUTO: 2.89 10*6/MM3 (ref 4.14–5.8)
SODIUM SERPL-SCNC: 136 MMOL/L (ref 136–145)
WBC NRBC COR # BLD: 13.66 10*3/MM3 (ref 3.4–10.8)

## 2022-03-16 PROCEDURE — 90791 PSYCH DIAGNOSTIC EVALUATION: CPT

## 2022-03-16 PROCEDURE — 97116 GAIT TRAINING THERAPY: CPT

## 2022-03-16 PROCEDURE — 97110 THERAPEUTIC EXERCISES: CPT

## 2022-03-16 PROCEDURE — 63710000001 INSULIN LISPRO (HUMAN) PER 5 UNITS: Performed by: HOSPITALIST

## 2022-03-16 PROCEDURE — 85025 COMPLETE CBC W/AUTO DIFF WBC: CPT | Performed by: INTERNAL MEDICINE

## 2022-03-16 PROCEDURE — 82962 GLUCOSE BLOOD TEST: CPT

## 2022-03-16 PROCEDURE — 99233 SBSQ HOSP IP/OBS HIGH 50: CPT | Performed by: INTERNAL MEDICINE

## 2022-03-16 PROCEDURE — 80069 RENAL FUNCTION PANEL: CPT | Performed by: INTERNAL MEDICINE

## 2022-03-16 PROCEDURE — 85055 RETICULATED PLATELET ASSAY: CPT | Performed by: INTERNAL MEDICINE

## 2022-03-16 RX ORDER — HALOPERIDOL 5 MG/ML
5 INJECTION INTRAMUSCULAR EVERY 6 HOURS PRN
Status: DISCONTINUED | OUTPATIENT
Start: 2022-03-16 | End: 2022-03-18 | Stop reason: HOSPADM

## 2022-03-16 RX ORDER — INSULIN LISPRO 100 [IU]/ML
0-7 INJECTION, SOLUTION INTRAVENOUS; SUBCUTANEOUS
Status: DISCONTINUED | OUTPATIENT
Start: 2022-03-16 | End: 2022-03-18 | Stop reason: HOSPADM

## 2022-03-16 RX ADMIN — SEVELAMER CARBONATE 800 MG: 800 TABLET, FILM COATED ORAL at 11:44

## 2022-03-16 RX ADMIN — Medication 10 ML: at 21:06

## 2022-03-16 RX ADMIN — OXYCODONE HYDROCHLORIDE AND ACETAMINOPHEN 0.5 TABLET: 10; 325 TABLET ORAL at 21:11

## 2022-03-16 RX ADMIN — OXYCODONE HYDROCHLORIDE AND ACETAMINOPHEN 1 TABLET: 10; 325 TABLET ORAL at 11:43

## 2022-03-16 RX ADMIN — Medication 10 ML: at 11:51

## 2022-03-16 RX ADMIN — OXYCODONE HYDROCHLORIDE AND ACETAMINOPHEN 1 TABLET: 10; 325 TABLET ORAL at 17:06

## 2022-03-16 RX ADMIN — MIDODRINE HYDROCHLORIDE 10 MG: 5 TABLET ORAL at 17:06

## 2022-03-16 RX ADMIN — INSULIN GLARGINE-YFGN 25 UNITS: 100 INJECTION, SOLUTION SUBCUTANEOUS at 21:06

## 2022-03-16 RX ADMIN — MIDODRINE HYDROCHLORIDE 10 MG: 5 TABLET ORAL at 11:44

## 2022-03-16 RX ADMIN — INSULIN LISPRO 8 UNITS: 100 INJECTION, SOLUTION INTRAVENOUS; SUBCUTANEOUS at 11:48

## 2022-03-16 RX ADMIN — ASPIRIN 81 MG: 81 TABLET, CHEWABLE ORAL at 11:43

## 2022-03-16 RX ADMIN — Medication 1 CAPSULE: at 11:44

## 2022-03-16 RX ADMIN — PANTOPRAZOLE SODIUM 40 MG: 40 TABLET, DELAYED RELEASE ORAL at 17:06

## 2022-03-16 RX ADMIN — ALPRAZOLAM 1 MG: 0.5 TABLET ORAL at 17:06

## 2022-03-16 RX ADMIN — SEVELAMER CARBONATE 800 MG: 800 TABLET, FILM COATED ORAL at 17:06

## 2022-03-16 NOTE — PROGRESS NOTES
"  Infectious Diseases Progress Note    Sabas Quick MD     [facility]  [place]: 12 days  Patient Identification:  Name: Angelo Brown  Age: 61 y.o.  Sex: male  :  1960  MRN: [id number]         Primary Care Physician: Yadiel Baxter III, MD            Subjective: Continues to do well denies any fever and chills.  Interval History: See consultation note.    Objective:    Scheduled Meds:aspirin, 81 mg, Oral, Daily  cefTRIAXone, 2 g, Intravenous, Q24H  epoetin real/real-epbx, 10,000 Units, Intravenous, Once per day on   insulin glargine, 25 Units, Subcutaneous, Nightly  insulin lispro, 0-7 Units, Subcutaneous, 4x Daily With Meals & Nightly  insulin lispro, 8 Units, Subcutaneous, TID With Meals  lactobacillus acidophilus, 1 capsule, Oral, Daily  lidocaine, 5 mL, Injection, Once  midodrine, 10 mg, Oral, TID AC  pantoprazole, 40 mg, Oral, BID AC  sevelamer, 800 mg, Oral, TID With Meals  sodium chloride, 10 mL, Intravenous, Q12H      Continuous Infusions:     Vital signs in last 24 hours:  Temp:  [97.5 °F (36.4 °C)-98.7 °F (37.1 °C)] 98.6 °F (37 °C)  Heart Rate:  [71-97] 85  Resp:  [16-18] 18  BP: ()/(58-85) 161/85    Intake/Output:    Intake/Output Summary (Last 24 hours) at 3/16/2022 1015  Last data filed at 3/15/2022 1730  Gross per 24 hour   Intake 480 ml   Output --   Net 480 ml       Exam:  /85 (BP Location: Right arm, Patient Position: Lying)   Pulse 85   Temp 98.6 °F (37 °C) (Oral)   Resp 18   Ht 172.7 cm (67.99\")   Wt 73.9 kg (162 lb 14.7 oz)   SpO2 99%   BMI 24.78 kg/m²   Patient is examined using the personal protective equipment as per guidelines from infection control for this particular patient as enacted.  Hand washing was performed before and after patient interaction.  General Appearance:  Comfortable chronically ill-appearing male                          Head:    Normocephalic, without obvious abnormality, atraumatic                           " Eyes:    PERRL, conjunctivae/corneas clear, EOM's intact, both eyes                         Throat:   Lips, tongue, gums normal; oral mucosa pink and moist                           Neck:   Supple, symmetrical, trachea midline, no JVD                         Lungs:    Clear to auscultation bilaterally, respirations unlabored                 Chest Wall:  Right IJ tunnel catheter in place                          Heart:    Regular rate and rhythm, S1 and S2 normal                  Abdomen:   Soft nontender                 Extremities: Right groin dialysis catheter in place                        Pulses:   Pulses palpable in all extremities                            Skin:   Skin is warm and dry,  no rashes or palpable lesions                  Neurologic: Grossly nonfocal       Data Review:    I reviewed the patient's new clinical results.  Results from last 7 days   Lab Units 03/16/22  0614 03/15/22  0528 03/14/22  0356 03/13/22 0445 03/12/22  0830 03/11/22  0439 03/10/22  0417   WBC 10*3/mm3 13.66* 9.89 9.67 10.50 7.58 6.64 10.08   HEMOGLOBIN g/dL 8.4* 8.4* 8.5* 8.1* 8.1* 8.1* 9.0*   PLATELETS 10*3/mm3 101*  101* 76*  76* 71*  71* 51*  51* 44*  44* 36*  36* 40*  40*     Results from last 7 days   Lab Units 03/16/22  0614 03/15/22  0528 03/13/22  0445 03/11/22  0439 03/10/22  0417   SODIUM mmol/L 136 136 132* 135* 136   POTASSIUM mmol/L 4.8 4.4 4.1 3.6 3.6   CHLORIDE mmol/L 98 96* 97* 98 97*   CO2 mmol/L 25.0 24.6 22.3 25.1 26.0   BUN mg/dL 25* 36* 38* 51* 32*   CREATININE mg/dL 6.08* 6.86* 6.43* 6.52* 4.83*   CALCIUM mg/dL 8.3* 8.4* 8.1* 8.3* 8.2*   GLUCOSE mg/dL 122* 171* 203* 178* 132*     Microbiology Results (last 10 days)     Procedure Component Value - Date/Time    Blood Culture - Blood, Hand, Right [[id number]]  (Normal) Collected: 03/12/22 1118    Lab Status: Preliminary result Specimen: Blood from Hand, Right Updated: 03/15/22 1246     Blood Culture No growth at 3 days    Blood Culture - Blood, Arm,  Right [[id number]]  (Normal) Collected: 03/12/22 1023    Lab Status: Preliminary result Specimen: Blood from Arm, Right Updated: 03/15/22 1147     Blood Culture No growth at 3 days    COVID PRE-OP / PRE-PROCEDURE SCREENING ORDER (NO ISOLATION) - Swab, Nasopharynx [[id number]]  (Normal) Collected: 03/08/22 1816    Lab Status: Final result Specimen: Swab from Nasopharynx Updated: 03/08/22 2027    Narrative:      The following orders were created for panel order COVID PRE-OP / PRE-PROCEDURE SCREENING ORDER (NO ISOLATION) - Swab, Nasopharynx.  Procedure                               Abnormality         Status                     ---------                               -----------         ------                     COVID-19,[facility] IN-HOUSE...[[id number]]  Normal              Final result                 Please view results for these tests on the individual orders.    COVID-19,[facility] IN-HOUSE CEPHEID/NIKHIL NP SWAB IN TRANSPORT MEDIA 8-12 HR TAT - Swab, Nasopharynx [[id number]]  (Normal) Collected: 03/08/22 1816    Lab Status: Final result Specimen: Swab from Nasopharynx Updated: 03/08/22 2027     COVID19 Not Detected    Narrative:      Fact sheet for providers: https://www.fda.gov/media/832658/download    Fact sheet for patients: https://www.fda.gov/media/926132/download    Test performed by PCR.    Blood Culture - Blood, Blood, Central Line [[id number]]  (Abnormal) Collected: 03/07/22 1131    Lab Status: Final result Specimen: Blood, Central Line Updated: 03/10/22 0749     Blood Culture Escherichia coli     Isolated from Aerobic and Anaerobic Bottles     Gram Stain Anaerobic Bottle Gram negative bacilli      Aerobic Bottle Gram negative bacilli    Narrative:      Refer to previous blood culture collected on 3/7 for corbin's      Blood Culture - Blood, Blood, Central Line [[id number]]  (Abnormal)  (Susceptibility) Collected: 03/07/22 1131    Lab Status: Final result Specimen: Blood, Central Line Updated: 03/10/22 0748     Blood  Culture Escherichia coli     Isolated from Aerobic and Anaerobic Bottles     Gram Stain Anaerobic Bottle Gram negative bacilli      Aerobic Bottle Gram negative bacilli    Susceptibility      Escherichia coli      WALLY      Ampicillin Susceptible      Ampicillin + Sulbactam Susceptible      Cefepime Susceptible      Ceftazidime Susceptible      Ceftriaxone Susceptible      Gentamicin Susceptible      Levofloxacin Susceptible      Piperacillin + Tazobactam Susceptible      Trimethoprim + Sulfamethoxazole Susceptible                       Susceptibility Comments     Escherichia coli    Cefazolin sensitivity will not be reported for Enterobacteriaceae in non-urine isolates. If cefazolin is preferred, please call the microbiology lab to request an E-test.  With the exception of urinary-sourced infections, aminoglycosides should not be used as monotherapy.             Blood Culture ID, PCR - Blood, Blood, Central Line [[id number]]  (Abnormal) Collected: 03/07/22 1131    Lab Status: Final result Specimen: Blood, Central Line Updated: 03/07/22 2221     BCID, PCR Eschericia coli. Identification by BCID2 PCR.     BOTTLE TYPE Anaerobic Bottle            Assessment:    Anemia due to chronic renal failure treated with erythropoietin, stage 5 (HCC)    B12 deficiency    End-stage renal disease on hemodialysis (HCC)    Thrombocytopenia (HCC)    Liver cirrhosis (HCC)    Chronic hepatitis C without hepatic coma (HCC)    Noncompliance of patient with renal dialysis (HCC)  1-systemic gram-negative bacteremic sepsis either due to              -Infected tunneled catheter versus              -Evolving intra-abdominal process with subsequent bacteremia and secondary seeding.  2-end-stage renal disease  3-history of cirrhosis of the liver due to hepatitis C  4-thrombocytopenia  5-other diagnosis per primary team.     Recommendations/Discussions:  · Continue present treatment with IV Rocephin .  · Follow-up repeat blood cultures after the  catheter is replaced.  · Once considered stable consider CT scan of the abdomen and pelvis    Sabas Quick MD  3/16/2022  10:15 EDT    Much of this encounter note is an electronic transcription/translation of spoken language to printed text. The electronic translation of spoken language may permit erroneous, or at times, nonsensical words or phrases to be inadvertently transcribed; Although I have reviewed the note for such errors, some may still exist

## 2022-03-16 NOTE — PLAN OF CARE
Goal Outcome Evaluation:  Plan of Care Reviewed With: patient        Progress: improving  Outcome Evaluation: Pt tolerated treatment well this date. Increased gait distance to 40ft w/ Rw and CGA-min A x2 for safety. Pt was very pleasant throughout and in a good mood.
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
## 2022-03-16 NOTE — PLAN OF CARE
Goal Outcome Evaluation:           Progress: improving  Outcome Evaluation: Patient VSS continues on RA, AOx2-3. Medicated with percocet x2 for chronic pain,. xanax given x1 for anxiety. Femoral dressing changed. Dialysis orders called for tomorrow. Wife called and updated. Will continue to monitor.

## 2022-03-16 NOTE — NURSING NOTE
"Beginning of shift pt was agitated attempting to get out of bed several times and demanding he gets out of here and leaves with wife. Spoke to Dr. Swift regarding this and received orders to speak with psych. Agitation, anxiety, and, irritability grew worse. Another call placed to Dr. Swift no new orders at this time. Spoke to access center on call nurse who stated there is no orders to be given at this time. Pt's behaviors continued to progress. During this time he was refusing care, threatening staff, very argumentative, and verbally abusive. He took off his heart monitor threw it across the room while yelling \"Im not sick, I don't need to be here!\" Writer attempted to redirect pt, and educate that he is still receiving IV antibiotics that is important. Wife and sister were at bedside at this time trying to redirect him as well. Pt remained uncooperative and fixated on leaving. Another call placed to Jeniffer with orders for restraints given. Call placed to security anticipating need for assistance. Security arrived and pt's behaviors escalated. He refused to lay down and was yelling at staff. Pt swung at security and security placed pt back in bed and applied restraints. During this time wife was on speaker phone with son who was yelling and making threats to staff. Security stated no visitors would be allowed if pt's behaviors were going to continue and threats were going to be made. Wife and sister were asked to leave being it was a hour past visiting hours and neither were staying. Prior to them leaving pt agreed to take his xanax and percocet. Behaviors continued for most of night. Pt became paranoid, talking to himself and rambling on. He made several threats to writer stating \"I have people monitoring your every move, they know your description, and Im going to have you shot in the head.\" He also asked writer if I was aware of the [place] [employer] saying bad things are going to happen. Shortly after access " center nurse made rounds and said she will have doctor Danya come in the morning. Pt eventually dozed off and went to sleep. Spoke to wife around 0200 and informed her that pt is finally resting. Wife said she will be here in the morning. Pt remains in restraints will continue to monitor.

## 2022-03-16 NOTE — PLAN OF CARE
Goal Outcome Evaluation:  Plan of Care Reviewed With: patient        Progress: declining  Outcome Evaluation: Bp soft. On room air. Bm x 1. Continues IV abx. Prn xanax given once. Slept well throughout night. Son remains at bedside. Will continue to monitor.

## 2022-03-16 NOTE — PROGRESS NOTES
Events of last night noted.  The patient seems more paranoid and confused when seen today.  The cause for his deteriorating mentation is elusive.  The patient does have as needed haloperidol 5 mg intramuscular for agitation ordered and I would recommend that this medication be given if the patient's escalates again.  I will continue to follow with you

## 2022-03-16 NOTE — PROGRESS NOTES
[facility] INPATIENT PROGRESS NOTE    Length of Stay:  12 days    CHIEF COMPLAINT: Thrombocytopenia, ESRD, chronic hepatitis C with cirrhosis, anemia, B12 deficiency      SUBJECTIVE:   3/13/22: The patient is awake and alert today.  He is minimally confused.  He is very tangential in conversation discussing issues with his children and his potential care at home.  He was not agreeable to working with physical therapy earlier today.  He has no specific new complaints currently.    3/14/22: Is resting comfortably in bed. Daughter at bedside. Says he had rough time during dialysis today. Denies any pain, N/V or any other acute symptoms. Appears oriented but tangential speech pattern. No bleeding or bruising.  3/15/22: Saw him during HD. Doing well. Denies any acute issues overnight.   3/16/22: Per report, pt was agitated last night. Had to be put in restraints. Is more calm this AM.Is confused.  Says he wants to go home.     ROS:  Review of Systems   A comprehensive review of systems was obtained with pertinent positive findings as noted in the interval history above.  All other systems negative.      OBJECTIVE:  Vitals:    03/15/22 2332 03/16/22 0420 03/16/22 0523 03/16/22 0751   BP: 155/79 125/84  161/85   BP Location: Right arm Right arm  Right arm   Patient Position: Lying Lying  Lying   Pulse: 95 84  85   Resp: 16 16  18   Temp: 97.5 °F (36.4 °C) 98.6 °F (37 °C)     TempSrc: Axillary Oral     SpO2: 100% 99%     Weight:   73.9 kg (162 lb 14.7 oz)    Height:             PHYSICAL EXAMINATION:  General: Patient awake and alert, mild confusion  Chest/Lungs: Good respiratory effort.  Right chest wall previous catheter site with dressing overlying, no bleeding  Heart: Normal heart rate  Abdomen/GI: Soft nontender nondistended bowel sounds present  Extremities: No edema.  Right femoral catheter site without bleeding  Neuro: Alert & awake. Moving all 4 extremities.      Patient was examined today, unchanged  from above    DIAGNOSTIC DATA:  Results Review:     I reviewed the patient's new clinical results.    Results from last 7 days   Lab Units 03/16/22  0614 03/15/22  0528 03/14/22  0356   WBC 10*3/mm3 13.66* 9.89 9.67   HEMOGLOBIN g/dL 8.4* 8.4* 8.5*   HEMATOCRIT % 27.1* 27.2* 27.1*   PLATELETS 10*3/mm3 101*  101* 76*  76* 71*  71*      Results from last 7 days   Lab Units 03/16/22  0614 03/15/22  0528 03/13/22  0445 03/11/22  0439 03/10/22  0417   SODIUM mmol/L 136 136 132*   < > 136   POTASSIUM mmol/L 4.8 4.4 4.1   < > 3.6   CHLORIDE mmol/L 98 96* 97*   < > 97*   CO2 mmol/L 25.0 24.6 22.3   < > 26.0   BUN mg/dL 25* 36* 38*   < > 32*   CREATININE mg/dL 6.08* 6.86* 6.43*   < > 4.83*   CALCIUM mg/dL 8.3* 8.4* 8.1*   < > 8.2*   BILIRUBIN mg/dL  --   --   --   --  0.8   ALK PHOS U/L  --   --   --   --  320*   ALT (SGPT) U/L  --   --   --   --  43*   AST (SGOT) U/L  --   --   --   --  37   GLUCOSE mg/dL 122* 171* 203*   < > 132*    < > = values in this interval not displayed.      Lab Results   Component Value Date    NEUTROABS 10.69 (H) 03/16/2022                 Assessment/Plan   ASSESSMENT/PLAN:  This is a 61 y.o. male with:     *Thrombocytopenia  · Patient with history of chronic mild thrombocytopenia followed in the outpatient setting by Dr. Aviles  · Felt to be related to underlying cirrhosis with splenomegaly  · Platelet count has been in the low 100,000 range in the past  · Platelet count in January 2022 had been in the mid to high 100,000 range at which time patient had COVID-19 infection  · Patient was off of dialysis for approximately 1 week before current hospital admission  · On admission 3/4/2022, platelet count was 51,000 and declined into the 40,000 range.  · Additional labs on 3/6/2022 with elevated IPF at 9%  · Peripheral blood smear reviewed on 3/5/2022, was unremarkable.  · B12 on 3/7/2022 greater than 2000  · Suspect that thrombocytopenia is related to underlying cirrhosis/splenomegaly, exacerbated  by volume overload off dialysis prior to hospital admission in addition to consumption from sepsis.  · Platelet count today has increased up to 76,000 (3/15/22). Anticipate that platelet count will continue to improve up to patient's prior baseline level (low 100,000 range) with treatment of underlying sepsis.  No current bleeding issues.  He does have underlying cirrhosis/splenomegaly contributing to his thrombocytopenia as noted above.     *ESRD  · Patient with chronic noncompliance with outpatient dialysis per nephrology  · Patient had been on Monday Wednesday Friday schedule  · Patient apparently had not been to dialysis for 1 week prior to hospital admission  · Patient is now back on dialysis, nephrology following.    · On 3/9/2022, right chest tunneled dialysis catheter was removed and placement of right femoral dialysis catheter without bleeding complications.    *Sepsis with E. coli bacteremia  · Blood culture from 3/7/2022 + for E. Coli  · Patient initiated Rocephin IV 3/7/2022  · Patient developed hypotension, transferred to CCU 3/8/2022  · Right chest wall tunneled dialysis catheter removed with placement of new right femoral dialysis catheter on 3/9/2022  · Patient did require pressor support, subsequently discontinued     *Chronic hepatitis C with cirrhosis  · Received previous treatment, details unclear  · Previous CT abdomen and pelvis 3/12/2018 with cirrhotic appearing liver and borderline splenomegaly to 13.5 cm  · Hepatitis C RNA 3/6/2022 was negative    *Anemia  · Longstanding history of anemia followed by Dr. Aviles in the outpatient setting  · Anemia has been multifactorial related to CKD/ESRD, iron deficiency, B12 deficiency  · Patient had previously received Procrit related to anemia secondary to CKD.  Eventually ROMULO administration transition to nephrology once patient initiated hemodialysis around May 2021.  · Patient is receiving intermittent IV iron with dialysis per nephrology.  · Labs on  11/2/2021 with iron 172, ferritin 560, iron saturation 68%, TIBC 255.  B12 level 1017 (see below).  · Hemoglobin in January 2022 had been on the 8-9 range at time of COVID-19 infection  · Labs on 2/2/2022 with iron 89, ferritin 1514, iron saturation 35%, TIBC 256, folate 7.58  · During current admission, hemoglobin 3/4/2022 was 8.7 with subsequent declined into the high 7 range.  · Laboratory evaluation on 3/4/2022 with iron 131, ferritin 5988, iron saturation 79%, TIBC 165.  · No laboratory evidence to suggest hemolysis  · B12 on 3/7/2022 greater than 2000  · Additional labs pending from 3/6/2022 with copper and zinc  · Patient is continuing on Retacrit 10,000 units 3 times weekly with dialysis per nephrology  · Hemoglobin on 3/8/2022 declined to 7.5.  A repeat level was drawn later that morning at 4.2 however this was incorrect due to a dilute specimen and on recheck was stable at 7.5.  · Patient developed hypotension, transferred to CCU with sepsis.  Received 1 unit PRBC transfusion on 3/8/2022.  · Hemoglobin today stable at 8.4    *B12 deficiency  · Patient was previously receiving oral B12 supplementation  · Labs on 11/2/2022 with B12 level 1017, patient remained off of oral B12 supplementation.  · B12 level on 3/7/2022 greater than 2000, no need for further B12 supplementation    *Right clear-cell renal cell carcinoma, stage I ([id number])  · Status post right partial nephrectomy on 7/6/2018 for a 2.2 cm clear-cell renal cell carcinoma, grade 3, margins could not be assessed, no lymphovascular invasion.  Notation of extensive fibrosis with hemosiderin deposition reflective of fibrosis and hemorrhage.    # Delirium: Likely hospitalization vs infection related. Management per primary    Plan:  1. Platelets continue to improve. 101,000 today (baseline ~ 100,000)  2. Patient continues with no indication for PRBC or platelet transfusion  3. Patient continuing on Rocephin for E. coli bacteremia/sepsis  4. Patient  continuing on Retacrit 10,000 units Monday Wednesday Friday with dialysis per nephrology  5. Delirium management per primary  6. Will sign off. Please call us with any questions.     Nicola Monique MD

## 2022-03-16 NOTE — CONSULTS
Access Center consulted regarding behavioral issues.  He was apparently agitated during the night and required restraints.  He was evaluated alone in room 410.  He is awake RIB, is a bit groggy and slow to respond but is able to answer questions appropriately.  He is calm and cooperative.  No restraints at this time.  He is alert and oriented x 3.  He is eating supper.      He is a 60 yo M/W/M, lives with his wife, Samantha, and one son; he has 4 adult children.  He reports he used to work as a  and was in the [employer] for two years.  He enjoys fishing.    He reports following with a psychiatrist, Dr. Aldo Roque for twenty years for anxiety.  He also has a therapist.  He is prescribed Xanax PRN QID; he usually takes it QID.  He cannot give cause to his anxiety saying it occurs for no reason.  He reports he can become easily angered when anxious but Xanax does help.  He denies IP psych admissions.  He denies past/current SI, denies HI and A/V hallucinations.  He denies issues with sleep and appetite.  He rated current depression and anxiety at a 6.  He denies tobacco, alcohol, and illicit drug use.      Access Center will follow.  Dr. Hagan is also following.  Patient has PRN Haldol ordered as well as his Xanax.  Patient declined need for other outpatient mental health resources as he is content with his current provider.

## 2022-03-16 NOTE — PROGRESS NOTES
"   LOS: 12 days     Chief Complaint/ Reason for encounter: ESRD  Chief Complaint   Patient presents with   • Weakness - Generalized   • Needs dialysis         Subjective    3/9: Seen and examined in the ICU, looks very ill.  BP remains on the low side but no need for pressors.  Remains confused and somewhat paranoid.  States \"they are trying to kill me\"  Discussed with Dr. Weber regarding catheter removal and replacement in OR today  Dialysis is planned for this afternoon    3/10: Seems to be doing better today.  BP has improved, more alert but still confused, somewhat paranoid  Nuys any shortness of breath or chest pain, no fevers or chills    3/11:Patient was seen on hemodialysis  Treatment orders discussed with the dialysis RNFranco  Arterial pressure/ Venous pressure: 120/100  Blood flow rate/Dialysate flow rate: 250/600, increase blood flow rate of 350 as tolerated  Blood pressure: 120s over 60s  Potassium bath/Fluid removal goal: 3K, 1 L  No changes other than blood flow rate    3/14 seen in the dialysis unit, unfortunately had to come off treatment early due to blood pressure issues  On a positive note his fistula functioned well and no problems with cannulation  Had some agitation with dialysis  No shortness of breath or chest pain, no nausea or vomiting    3/15 doing well, resting, no new complaints  Scheduled for dialysis today.  I just spoke with the dialysis nurse and she states that there was no issue cannulating his AV fistula and he is currently running well    3/16 patient very agitated and paranoid overnight. Needed to be placed on restraints. Much better now. Denies sob or chest pain, off restraints.    Medical history reviewed:  Weakness - Generalized        Subjective    History taken from: Patient and chart    Vital Signs  Temp:  [97.5 °F (36.4 °C)-98.7 °F (37.1 °C)] 98.4 °F (36.9 °C)  Heart Rate:  [77-97] 85  Resp:  [16-18] 18  BP: (125-161)/(67-85) 153/83       Wt Readings from Last 1 " "Encounters:   03/16/22 0523 73.9 kg (162 lb 14.7 oz)   03/15/22 0532 72.5 kg (159 lb 12.8 oz)   03/14/22 1442 75.3 kg (166 lb 0.1 oz)   03/14/22 0500 75.3 kg (166 lb 0.1 oz)   03/13/22 0539 75 kg (165 lb 5.5 oz)   03/12/22 0523 73.5 kg (162 lb 0.6 oz)   03/09/22 0600 77.6 kg (171 lb 1.2 oz)   03/08/22 1431 77.6 kg (171 lb 1.2 oz)   03/07/22 0906 74.4 kg (164 lb)       Objective:  Vital signs: (most recent): Blood pressure 153/83, pulse 85, temperature 98.4 °F (36.9 °C), temperature source Oral, resp. rate 18, height 172.7 cm (67.99\"), weight 73.9 kg (162 lb 14.7 oz), SpO2 99 %.              Objective:  General Appearance:  Comfortable, less confused, chronically ill-appearing, in no acute distress and not in pain.  Prior chest CVC site clean dry and intact  HEENT: Mucous membranes moist, no injury, oropharynx clear  Lungs:  Normal effort and normal respiratory rate.  Breath sounds clear to auscultation.  No  respiratory distress.  No rales, decreased breath sounds or rhonchi.    Heart: Normal rate.  Regular rhythm.  S1 normal.  No murmur.   Abdomen: Abdomen is soft.  Bowel sounds are normal, no abdominal tenderness.  There is no rebound or guarding  Extremities: Normal range of motion.  Trace edema of bilateral lower extremities, distal pulses intact  Neurological: No focal motor or sensory deficits, pupils reactive  Skin:  Warm and dry.  No rash or cyanosis.       Results Review:    Intake/Output:     Intake/Output Summary (Last 24 hours) at 3/16/2022 1439  Last data filed at 3/16/2022 1200  Gross per 24 hour   Intake 480 ml   Output --   Net 480 ml         DATA:  Radiology and Labs:  The following labs independently reviewed by me. Additional labs ordered for tomorrow a.m.  Interval notes, chart personally reviewed by me.   Old records independently reviewed showing ESRD on dialysis    Risk/ complexity of medical care/ medical decision making moderate complexity      Labs:   Recent Results (from the past 24 " hour(s))   POC Glucose Once    Collection Time: 03/15/22  5:18 PM    Specimen: Blood   Result Value Ref Range    Glucose 109 70 - 130 mg/dL   POC Glucose Once    Collection Time: 03/15/22  8:08 PM    Specimen: Blood   Result Value Ref Range    Glucose 146 (H) 70 - 130 mg/dL   Immature Platelet Fraction    Collection Time: 03/16/22  6:14 AM    Specimen: Blood   Result Value Ref Range    IPF 11.80 (H) 0.90 - 6.50 %    Platelets 101 (L) 140 - 450 10*3/mm3   Renal Function Panel    Collection Time: 03/16/22  6:14 AM    Specimen: Blood   Result Value Ref Range    Glucose 122 (H) 65 - 99 mg/dL    BUN 25 (H) 6 - 20 mg/dL    Creatinine 6.08 (H) 0.76 - 1.27 mg/dL    Sodium 136 136 - 145 mmol/L    Potassium 4.8 3.5 - 5.2 mmol/L    Chloride 98 98 - 107 mmol/L    CO2 25.0 22.0 - 29.0 mmol/L    Calcium 8.3 (L) 8.6 - 10.5 mg/dL    Albumin 2.50 (L) 3.50 - 5.20 g/dL    Phosphorus 4.6 (H) 2.5 - 4.5 mg/dL    Anion Gap 13.0 5.0 - 15.0 mmol/L    BUN/Creatinine Ratio 4.1 (L) 7.0 - 25.0    eGFR 9.9 (L) >60.0 mL/min/1.73   CBC Auto Differential    Collection Time: 03/16/22  6:14 AM    Specimen: Blood   Result Value Ref Range    WBC 13.66 (H) 3.40 - 10.80 10*3/mm3    RBC 2.89 (L) 4.14 - 5.80 10*6/mm3    Hemoglobin 8.4 (L) 13.0 - 17.7 g/dL    Hematocrit 27.1 (L) 37.5 - 51.0 %    MCV 93.8 79.0 - 97.0 fL    MCH 29.1 26.6 - 33.0 pg    MCHC 31.0 (L) 31.5 - 35.7 g/dL    RDW 14.5 12.3 - 15.4 %    RDW-SD 49.7 37.0 - 54.0 fl    MPV 12.5 (H) 6.0 - 12.0 fL    Platelets 101 (L) 140 - 450 10*3/mm3    Neutrophil % 78.2 (H) 42.7 - 76.0 %    Lymphocyte % 10.0 (L) 19.6 - 45.3 %    Monocyte % 9.9 5.0 - 12.0 %    Eosinophil % 0.7 0.3 - 6.2 %    Basophil % 0.5 0.0 - 1.5 %    Immature Grans % 0.7 (H) 0.0 - 0.5 %    Neutrophils, Absolute 10.69 (H) 1.70 - 7.00 10*3/mm3    Lymphocytes, Absolute 1.36 0.70 - 3.10 10*3/mm3    Monocytes, Absolute 1.35 (H) 0.10 - 0.90 10*3/mm3    Eosinophils, Absolute 0.09 0.00 - 0.40 10*3/mm3    Basophils, Absolute 0.07 0.00 - 0.20  10*3/mm3    Immature Grans, Absolute 0.10 (H) 0.00 - 0.05 10*3/mm3    nRBC 0.0 0.0 - 0.2 /100 WBC   POC Glucose Once    Collection Time: 03/16/22 11:06 AM    Specimen: Blood   Result Value Ref Range    Glucose 126 70 - 130 mg/dL       Radiology:  Imaging Results (Last 24 Hours)     ** No results found for the last 24 hours. **             Medications have been reviewed:  Current Facility-Administered Medications   Medication Dose Route Frequency Provider Last Rate Last Admin   • acetaminophen (TYLENOL) tablet 650 mg  650 mg Oral Q6H PRN Mai Escoto MD   650 mg at 03/08/22 1904   • ALPRAZolam (XANAX) tablet 1 mg  1 mg Oral 4x Daily PRN Mai Escoto MD   1 mg at 03/15/22 2154   • aspirin chewable tablet 81 mg  81 mg Oral Daily Mai Escoto MD   81 mg at 03/16/22 1143   • cefTRIAXone (ROCEPHIN) 2 g in sodium chloride 0.9 % 100 mL IVPB-VTB  2 g Intravenous Q24H Karl Swift  mL/hr at 03/15/22 2200 2 g at 03/15/22 2200   • epoetin real-epbx (RETACRIT) injection 10,000 Units  10,000 Units Intravenous Once per day on Mon Wed Fri Mai Escoto MD   10,000 Units at 03/14/22 1008   • haloperidol lactate (HALDOL) injection 5 mg  5 mg Intramuscular Q6H PRN Berto Hagan III, MD       • heparin (porcine) injection 3,800 Units  3,800 Units Intracatheter PRN Mai Escoto MD   3,800 Units at 03/14/22 1008   • insulin glargine (LANTUS, SEMGLEE) injection 25 Units  25 Units Subcutaneous Nightly Karl Swift MD   25 Units at 03/15/22 2015   • insulin lispro (ADMELOG) injection 0-7 Units  0-7 Units Subcutaneous 4x Daily With Meals & Nightly Karl Swift MD       • insulin lispro (ADMELOG) injection 8 Units  8 Units Subcutaneous TID With Meals Karl Swift MD   8 Units at 03/16/22 1148   • lactobacillus acidophilus (RISAQUAD) capsule 1 capsule  1 capsule Oral Daily Mai Escoto MD   1 capsule at 03/16/22 1144   • lidocaine (XYLOCAINE) 1 % injection 5 mL  5 mL Injection Once Mai Escoto MD        • midodrine (PROAMATINE) tablet 10 mg  10 mg Oral TID AC Mai Escoto MD   10 mg at 03/16/22 1144   • ondansetron (ZOFRAN) injection 4 mg  4 mg Intravenous Q6H PRN Karl Swift MD       • oxyCODONE-acetaminophen (PERCOCET)  MG per tablet 1 tablet  1 tablet Oral Q4H PRN Karl Swift MD   1 tablet at 03/16/22 1143   • pantoprazole (PROTONIX) EC tablet 40 mg  40 mg Oral BID AC Mai Escoto MD   40 mg at 03/15/22 1729   • sevelamer (RENVELA) tablet 800 mg  800 mg Oral TID With Meals Mai Escoto MD   800 mg at 03/16/22 1144   • sodium chloride 0.9 % flush 10 mL  10 mL Intravenous PRN Mai Escoto MD       • sodium chloride 0.9 % flush 10 mL  10 mL Intravenous Q12H Mai Escoto MD   10 mL at 03/16/22 1151   • sodium chloride 0.9 % flush 10 mL  10 mL Intravenous PRN Mai Escoto MD       • tiZANidine (ZANAFLEX) tablet 4 mg  4 mg Oral Q8H PRN Mai Escoto MD             ASSESSMENT:   Thrombocytopenia (HCC)     • Liver cirrhosis (HCC)     • Chronic hepatitis C without hepatic coma (HCC)     • End-stage renal disease on hemodialysis (HCC)     • B12 deficiency     • Anemia due to chronic renal failure treated with erythropoietin, stage 5 (HCC)          Assessment:   1. ESRD, HD Monday Wednesday Friday  2. Tachycardia, likely was due to sepsis and has improved  3. Thrombocytopenia,   4.  Hyperkalemia, stabilizing  5.  Diabetes mellitus type 2  6.  Fevers/sepsis syndrome related to infected dialysis catheter  7.  Chronic noncompliance with outpatient dialysis.  Patient has been counseled multiple times about the importance of 100% attendance at outpatient dialysis.  his noncompliance is directly related to his recurrent hospital admissions  8. Encephalopathy     Plan:  No issues using AVF yesterday.   Will plan for HD tomorrow again, if AVF runs well, will ask surgery to remove femoral catheter   Ativan with dialysis as needed if he becomes agitated    Continue midodrine for BP support and binders  with meals  Epogen with dialysis for anemia of CKD      Norma Delacruz MD  [facility]   Office phone number: [phone]  Answering service phone number: [phone]    03/16/22  14:39 EDT

## 2022-03-16 NOTE — NURSING NOTE
NM and DONChantal, called patient's wife this morning to discuss patient's behavior last night and events that occurred.  During conversation, wife was told that due to patient's agitated behavior due to family presence that visitation would be restricted on this patient.  Wife was okay with this decision and verbalized understanding.  Wife will be called and updated on patient condition in the afternoons. CL

## 2022-03-16 NOTE — PROGRESS NOTES
"Daily progress note    Chief complaint  Events noted  Doing same  No specific complaints  Denies any chest pain shortness of breath palpitation    History of present illness  61-year white male with very complex past medical history including end-stage renal disease on hemodialysis CVA diabetes mellitus coronary artery disease hypertension chronic anemia presented to [facility] emergency room after missing hemodialysis with generalized weakness.  Patient is poor historian but denies any headache dizziness chest pain shortness of breath abdominal pain vomiting diarrhea.  Patient does have nausea and weak all over.  Patient has no cough fever night sweats weight loss.  Patient evaluated in ER found to have high potassium need hemodialysis admit for management.  Patient also found to have low platelets which is new.  Patient has no bleeding whatsoever.     REVIEW OF SYSTEMS  Unremarkable except generalized weakness     PHYSICAL EXAM  Blood pressure 153/83, pulse 85, temperature 98.4 °F (36.9 °C), temperature source Oral, resp. rate 18, height 172.7 cm (67.99\"), weight 73.9 kg (162 lb 14.7 oz), SpO2 99 %.    GENERAL: 61-year-old chronically ill-appearing male in mild distress  HENT: NCAT, neck supple, trachea midline  EYES: no scleral icterus, PERRL, normal conjunctivae  CV: regular rhythm, regular rate, no murmur  RESPIRATORY: unlabored effort, mild Rales in bases bilaterally  ABDOMEN: soft, nontender, nondistended, bowel sounds present  MUSCULOSKELETAL: no gross deformity, no pedal edema, no calf tenderness  NEURO: Sleepy but easily arousable,  sensory and motor function of extremities intact, speech clear, mental status normal  SKIN: warm, dry, no rash  PSYCH:  Look depressed     LAB RESULTS  Lab Results (last 24 hours)     Procedure Component Value Units Date/Time    Blood Culture - Blood, Hand, Right [[id number]]  (Normal) Collected: 03/12/22 1118    Specimen: Blood from Hand, Right Updated: 03/16/22 1247 "     Blood Culture No growth at 4 days    Blood Culture - Blood, Arm, Right [[id number]]  (Normal) Collected: 03/12/22 1023    Specimen: Blood from Arm, Right Updated: 03/16/22 1147     Blood Culture No growth at 4 days    POC Glucose Once [[id number]]  (Normal) Collected: 03/16/22 1106    Specimen: Blood Updated: 03/16/22 1108     Glucose 126 mg/dL      Comment: Meter: [id number] : [id number] Lilian KIM       Renal Function Panel [[id number]]  (Abnormal) Collected: 03/16/22 0614    Specimen: Blood Updated: 03/16/22 0736     Glucose 122 mg/dL      BUN 25 mg/dL      Creatinine 6.08 mg/dL      Sodium 136 mmol/L      Potassium 4.8 mmol/L      Comment: Slight hemolysis detected by analyzer. Results may be affected.        Chloride 98 mmol/L      CO2 25.0 mmol/L      Calcium 8.3 mg/dL      Albumin 2.50 g/dL      Phosphorus 4.6 mg/dL      Anion Gap 13.0 mmol/L      BUN/Creatinine Ratio 4.1     eGFR 9.9 mL/min/1.73      Comment: <15 Indicative of kidney failure       Narrative:      GFR Normal >60  Chronic Kidney Disease <60  Kidney Failure <15      CBC & Differential [[id number]]  (Abnormal) Collected: 03/16/22 0614    Specimen: Blood Updated: 03/16/22 0713    Narrative:      The following orders were created for panel order CBC & Differential.  Procedure                               Abnormality         Status                     ---------                               -----------         ------                     CBC Auto Differential[[id number]]        Abnormal            Final result                 Please view results for these tests on the individual orders.    Immature Platelet Fraction [[id number]]  (Abnormal) Collected: 03/16/22 0614    Specimen: Blood Updated: 03/16/22 0713     IPF 11.80 %      Platelets 101 10*3/mm3     CBC Auto Differential [[id number]]  (Abnormal) Collected: 03/16/22 0614    Specimen: Blood Updated: 03/16/22 0713     WBC 13.66 10*3/mm3      RBC 2.89 10*6/mm3      Hemoglobin 8.4 g/dL       Hematocrit 27.1 %      MCV 93.8 fL      MCH 29.1 pg      MCHC 31.0 g/dL      RDW 14.5 %      RDW-SD 49.7 fl      MPV 12.5 fL      Platelets 101 10*3/mm3      Neutrophil % 78.2 %      Lymphocyte % 10.0 %      Monocyte % 9.9 %      Eosinophil % 0.7 %      Basophil % 0.5 %      Immature Grans % 0.7 %      Neutrophils, Absolute 10.69 10*3/mm3      Lymphocytes, Absolute 1.36 10*3/mm3      Monocytes, Absolute 1.35 10*3/mm3      Eosinophils, Absolute 0.09 10*3/mm3      Basophils, Absolute 0.07 10*3/mm3      Immature Grans, Absolute 0.10 10*3/mm3      nRBC 0.0 /100 WBC     POC Glucose Once [[id number]]  (Abnormal) Collected: 03/15/22 2008    Specimen: Blood Updated: 03/15/22 2009     Glucose 146 mg/dL      Comment: Meter: [id number] : [id number] Alexei KIM       POC Glucose Once [[id number]]  (Normal) Collected: 03/15/22 1718    Specimen: Blood Updated: 03/15/22 1720     Glucose 109 mg/dL      Comment: Meter: [id number] : [id number] Waleska Trent RN           Imaging Results (Last 24 Hours)     ** No results found for the last 24 hours. **             ECG 12 Lead  Component   Ref Range & Units 3/5/22 0945 3/4/22 1825 1/26/22 1729 12/2/21 0309 4/28/21 1722 4/8/21 1341   QT Interval   ms 271 P  392  321  395  435  444              HEART RATE= 187  bpm  RR Interval= 320  ms  LA Interval= 132  ms  P Horizontal Axis= 90  deg  P Front Axis= 30  deg  QRSD Interval= 95  ms  QT Interval= 271  ms  QRS Axis= 91  deg  T Wave Axis= -72  deg  - ABNORMAL ECG -  Supraventricular tachycardia  Borderline right axis deviation  Repolarization abnormality, prob rate related             Current Facility-Administered Medications:   •  acetaminophen (TYLENOL) tablet 650 mg, 650 mg, Oral, Q6H PRN, Mai Escoto MD, 650 mg at 03/08/22 1904  •  ALPRAZolam (XANAX) tablet 1 mg, 1 mg, Oral, 4x Daily PRN, Mai Escoto MD, 1 mg at 03/15/22 6051  •  aspirin chewable tablet 81 mg, 81 mg, Oral, Daily, Mai Escoto MD, 81 mg at 03/16/22  1143  •  cefTRIAXone (ROCEPHIN) 2 g in sodium chloride 0.9 % 100 mL IVPB-VTB, 2 g, Intravenous, Q24H, Karl Swift MD, Last Rate: 200 mL/hr at 03/15/22 2200, 2 g at 03/15/22 2200  •  epoetin real-epbx (RETACRIT) injection 10,000 Units, 10,000 Units, Intravenous, Once per day on Mon Wed Fri, Mai Escoto MD, 10,000 Units at 03/14/22 1008  •  haloperidol lactate (HALDOL) injection 5 mg, 5 mg, Intramuscular, Q6H PRN, Berto Hagan III, MD  •  heparin (porcine) injection 3,800 Units, 3,800 Units, Intracatheter, PRN, Mai Escoto MD, 3,800 Units at 03/14/22 1008  •  insulin glargine (LANTUS, SEMGLEE) injection 25 Units, 25 Units, Subcutaneous, Nightly, Karl Swift MD, 25 Units at 03/15/22 2015  •  insulin lispro (ADMELOG) injection 0-7 Units, 0-7 Units, Subcutaneous, 4x Daily With Meals & Nightly, Karl Swift MD  •  insulin lispro (ADMELOG) injection 8 Units, 8 Units, Subcutaneous, TID With Meals, Karl Swift MD, 8 Units at 03/16/22 1148  •  lactobacillus acidophilus (RISAQUAD) capsule 1 capsule, 1 capsule, Oral, Daily, Mai Escoto MD, 1 capsule at 03/16/22 1144  •  lidocaine (XYLOCAINE) 1 % injection 5 mL, 5 mL, Injection, Once, Mai Escoto MD  •  midodrine (PROAMATINE) tablet 10 mg, 10 mg, Oral, TID AC, Mai Escoto MD, 10 mg at 03/16/22 1144  •  ondansetron (ZOFRAN) injection 4 mg, 4 mg, Intravenous, Q6H PRN, Karl Swift MD  •  oxyCODONE-acetaminophen (PERCOCET)  MG per tablet 1 tablet, 1 tablet, Oral, Q4H PRN, Karl Swift MD, 1 tablet at 03/16/22 1143  •  pantoprazole (PROTONIX) EC tablet 40 mg, 40 mg, Oral, BID AC, Mai Escoto MD, 40 mg at 03/15/22 1729  •  sevelamer (RENVELA) tablet 800 mg, 800 mg, Oral, TID With Meals, Mai Escoto MD, 800 mg at 03/16/22 1144  •  [COMPLETED] Insert peripheral IV, , , Once **AND** sodium chloride 0.9 % flush 10 mL, 10 mL, Intravenous, PRN, Mai Escoto MD  •  sodium chloride 0.9 % flush 10 mL, 10 mL, Intravenous, Q12H,  Mai Escoto MD, 10 mL at 03/16/22 1151  •  sodium chloride 0.9 % flush 10 mL, 10 mL, Intravenous, PRN, Mai Escoto MD  •  tiZANidine (ZANAFLEX) tablet 4 mg, 4 mg, Oral, Q8H PRN, Mai Escoto MD     ASSESSMENT  E. coli bacteremia with sepsis  Line sepsis status post removal of old hemodialysis catheter and placement of new  End-stage renal disease with noncompliance with hemodialysis  Supraventricular tachycardia resolved  Insulin-dependent diabetes mellitus  History of hypertension with low blood pressure  Hyperlipidemia  History of CVA  Chronic anemia and thrombocytopenia  Anxiety disorder  Agitation per psychiatry  Gastroesophageal reflux disease    PLAN  CPM  Continue IV antibiotics   Using AV fistula  Remove tunneled hemodialysis catheter per vascular surgery  Hemodialysis TIW  Psych to follow patient  Insulin dose adjusted  Adjust home medications  Stress ulcer DVT prophylaxis  Supportive care  PT OT  Discussed with nursing staff and wife  Follow closely and further recommendation according to hospital course    ARIEL MCGOVERN MD

## 2022-03-17 ENCOUNTER — APPOINTMENT (OUTPATIENT)
Dept: GENERAL RADIOLOGY | Facility: HOSPITAL | Age: 62
End: 2022-03-17

## 2022-03-17 LAB
ALBUMIN SERPL-MCNC: 2.4 G/DL (ref 3.5–5.2)
ANION GAP SERPL CALCULATED.3IONS-SCNC: 15 MMOL/L (ref 5–15)
BACTERIA SPEC AEROBE CULT: NORMAL
BACTERIA SPEC AEROBE CULT: NORMAL
BASOPHILS # BLD AUTO: 0.1 10*3/MM3 (ref 0–0.2)
BASOPHILS NFR BLD AUTO: 0.8 % (ref 0–1.5)
BUN SERPL-MCNC: 36 MG/DL (ref 6–20)
BUN/CREAT SERPL: 4.8 (ref 7–25)
CALCIUM SPEC-SCNC: 8.4 MG/DL (ref 8.6–10.5)
CHLORIDE SERPL-SCNC: 96 MMOL/L (ref 98–107)
CO2 SERPL-SCNC: 25 MMOL/L (ref 22–29)
CREAT SERPL-MCNC: 7.49 MG/DL (ref 0.76–1.27)
DEPRECATED RDW RBC AUTO: 48.2 FL (ref 37–54)
EGFRCR SERPLBLD CKD-EPI 2021: 7.7 ML/MIN/1.73
EOSINOPHIL # BLD AUTO: 0.11 10*3/MM3 (ref 0–0.4)
EOSINOPHIL NFR BLD AUTO: 0.8 % (ref 0.3–6.2)
ERYTHROCYTE [DISTWIDTH] IN BLOOD BY AUTOMATED COUNT: 14.4 % (ref 12.3–15.4)
GLUCOSE BLDC GLUCOMTR-MCNC: 134 MG/DL (ref 70–130)
GLUCOSE BLDC GLUCOMTR-MCNC: 274 MG/DL (ref 70–130)
GLUCOSE BLDC GLUCOMTR-MCNC: 70 MG/DL (ref 70–130)
GLUCOSE BLDC GLUCOMTR-MCNC: 76 MG/DL (ref 70–130)
GLUCOSE SERPL-MCNC: 107 MG/DL (ref 65–99)
HCT VFR BLD AUTO: 26.7 % (ref 37.5–51)
HGB BLD-MCNC: 8.4 G/DL (ref 13–17.7)
IMM GRANULOCYTES # BLD AUTO: 0.08 10*3/MM3 (ref 0–0.05)
IMM GRANULOCYTES NFR BLD AUTO: 0.6 % (ref 0–0.5)
LYMPHOCYTES # BLD AUTO: 1.29 10*3/MM3 (ref 0.7–3.1)
LYMPHOCYTES NFR BLD AUTO: 9.7 % (ref 19.6–45.3)
MCH RBC QN AUTO: 29 PG (ref 26.6–33)
MCHC RBC AUTO-ENTMCNC: 31.5 G/DL (ref 31.5–35.7)
MCV RBC AUTO: 92.1 FL (ref 79–97)
MONOCYTES # BLD AUTO: 1 10*3/MM3 (ref 0.1–0.9)
MONOCYTES NFR BLD AUTO: 7.5 % (ref 5–12)
NEUTROPHILS NFR BLD AUTO: 10.7 10*3/MM3 (ref 1.7–7)
NEUTROPHILS NFR BLD AUTO: 80.6 % (ref 42.7–76)
NRBC BLD AUTO-RTO: 0 /100 WBC (ref 0–0.2)
PHOSPHATE SERPL-MCNC: 7.1 MG/DL (ref 2.5–4.5)
PLATELET # BLD AUTO: 123 10*3/MM3 (ref 140–450)
PLATELET # BLD AUTO: 123 10*3/MM3 (ref 140–450)
PLATELETS.RETICULATED NFR BLD AUTO: 11.8 % (ref 0.9–6.5)
PMV BLD AUTO: 11.6 FL (ref 6–12)
POTASSIUM SERPL-SCNC: 5 MMOL/L (ref 3.5–5.2)
RBC # BLD AUTO: 2.9 10*6/MM3 (ref 4.14–5.8)
SODIUM SERPL-SCNC: 136 MMOL/L (ref 136–145)
WBC NRBC COR # BLD: 13.28 10*3/MM3 (ref 3.4–10.8)

## 2022-03-17 PROCEDURE — 85055 RETICULATED PLATELET ASSAY: CPT | Performed by: INTERNAL MEDICINE

## 2022-03-17 PROCEDURE — 71045 X-RAY EXAM CHEST 1 VIEW: CPT

## 2022-03-17 PROCEDURE — 63710000001 INSULIN LISPRO (HUMAN) PER 5 UNITS: Performed by: HOSPITALIST

## 2022-03-17 PROCEDURE — 97116 GAIT TRAINING THERAPY: CPT

## 2022-03-17 PROCEDURE — 80069 RENAL FUNCTION PANEL: CPT | Performed by: INTERNAL MEDICINE

## 2022-03-17 PROCEDURE — 85025 COMPLETE CBC W/AUTO DIFF WBC: CPT | Performed by: INTERNAL MEDICINE

## 2022-03-17 PROCEDURE — 82962 GLUCOSE BLOOD TEST: CPT

## 2022-03-17 PROCEDURE — 97110 THERAPEUTIC EXERCISES: CPT

## 2022-03-17 PROCEDURE — 25010000002 CEFTRIAXONE PER 250 MG: Performed by: HOSPITALIST

## 2022-03-17 RX ORDER — ALBUMIN (HUMAN) 12.5 G/50ML
12.5 SOLUTION INTRAVENOUS AS NEEDED
Status: DISCONTINUED | OUTPATIENT
Start: 2022-03-17 | End: 2022-03-18 | Stop reason: HOSPADM

## 2022-03-17 RX ADMIN — INSULIN LISPRO 8 UNITS: 100 INJECTION, SOLUTION INTRAVENOUS; SUBCUTANEOUS at 12:57

## 2022-03-17 RX ADMIN — MIDODRINE HYDROCHLORIDE 10 MG: 5 TABLET ORAL at 17:17

## 2022-03-17 RX ADMIN — SEVELAMER CARBONATE 800 MG: 800 TABLET, FILM COATED ORAL at 12:56

## 2022-03-17 RX ADMIN — Medication 10 ML: at 20:20

## 2022-03-17 RX ADMIN — MIDODRINE HYDROCHLORIDE 10 MG: 5 TABLET ORAL at 12:57

## 2022-03-17 RX ADMIN — SEVELAMER CARBONATE 800 MG: 800 TABLET, FILM COATED ORAL at 17:17

## 2022-03-17 RX ADMIN — INSULIN LISPRO 8 UNITS: 100 INJECTION, SOLUTION INTRAVENOUS; SUBCUTANEOUS at 17:17

## 2022-03-17 RX ADMIN — SEVELAMER CARBONATE 800 MG: 800 TABLET, FILM COATED ORAL at 07:46

## 2022-03-17 RX ADMIN — CEFTRIAXONE 2 G: 2 INJECTION, POWDER, FOR SOLUTION INTRAMUSCULAR; INTRAVENOUS at 00:33

## 2022-03-17 RX ADMIN — OXYCODONE HYDROCHLORIDE AND ACETAMINOPHEN 1 TABLET: 10; 325 TABLET ORAL at 20:20

## 2022-03-17 RX ADMIN — ASPIRIN 81 MG: 81 TABLET, CHEWABLE ORAL at 12:56

## 2022-03-17 RX ADMIN — ALPRAZOLAM 1 MG: 0.5 TABLET ORAL at 14:21

## 2022-03-17 RX ADMIN — PANTOPRAZOLE SODIUM 40 MG: 40 TABLET, DELAYED RELEASE ORAL at 06:33

## 2022-03-17 RX ADMIN — Medication 1 CAPSULE: at 12:57

## 2022-03-17 RX ADMIN — INSULIN LISPRO 4 UNITS: 100 INJECTION, SOLUTION INTRAVENOUS; SUBCUTANEOUS at 12:57

## 2022-03-17 RX ADMIN — PANTOPRAZOLE SODIUM 40 MG: 40 TABLET, DELAYED RELEASE ORAL at 17:17

## 2022-03-17 RX ADMIN — ALPRAZOLAM 1 MG: 0.5 TABLET ORAL at 00:35

## 2022-03-17 RX ADMIN — OXYCODONE HYDROCHLORIDE AND ACETAMINOPHEN 1 TABLET: 10; 325 TABLET ORAL at 14:21

## 2022-03-17 RX ADMIN — CEFTRIAXONE 2 G: 2 INJECTION, POWDER, FOR SOLUTION INTRAMUSCULAR; INTRAVENOUS at 22:10

## 2022-03-17 NOTE — PROGRESS NOTES
"Daily progress note    Chief complaint  Doing same  No specific complaints  Wants to go home  Denies any chest pain shortness of breath palpitation    History of present illness  61-year white male with very complex past medical history including end-stage renal disease on hemodialysis CVA diabetes mellitus coronary artery disease hypertension chronic anemia presented to [facility] emergency room after missing hemodialysis with generalized weakness.  Patient is poor historian but denies any headache dizziness chest pain shortness of breath abdominal pain vomiting diarrhea.  Patient does have nausea and weak all over.  Patient has no cough fever night sweats weight loss.  Patient evaluated in ER found to have high potassium need hemodialysis admit for management.  Patient also found to have low platelets which is new.  Patient has no bleeding whatsoever.     REVIEW OF SYSTEMS  Unremarkable except generalized weakness     PHYSICAL EXAM  Blood pressure 119/62, pulse 82, temperature 98.5 °F (36.9 °C), temperature source Oral, resp. rate 20, height 172.7 cm (67.99\"), weight 73.6 kg (162 lb 3.2 oz), SpO2 94 %.    GENERAL: 61-year-old chronically ill-appearing male in mild distress  HENT: NCAT, neck supple, trachea midline  EYES: no scleral icterus, PERRL, normal conjunctivae  CV: regular rhythm, regular rate, no murmur  RESPIRATORY: unlabored effort, mild Rales in bases bilaterally  ABDOMEN: soft, nontender, nondistended, bowel sounds present  MUSCULOSKELETAL: no gross deformity, no pedal edema, no calf tenderness  NEURO: Sleepy but easily arousable,  sensory and motor function of extremities intact, speech clear, mental status normal  SKIN: warm, dry, no rash  PSYCH:  Look depressed     LAB RESULTS  Lab Results (last 24 hours)     Procedure Component Value Units Date/Time    Blood Culture - Blood, Hand, Right [[id number]]  (Normal) Collected: 03/12/22 1118    Specimen: Blood from Hand, Right Updated: 03/17/22 " 1247     Blood Culture No growth at 5 days    Blood Culture - Blood, Arm, Right [[id number]]  (Normal) Collected: 03/12/22 1023    Specimen: Blood from Arm, Right Updated: 03/17/22 1147     Blood Culture No growth at 5 days    POC Glucose Once [[id number]]  (Abnormal) Collected: 03/17/22 1133    Specimen: Blood Updated: 03/17/22 1134     Glucose 274 mg/dL      Comment: Meter: [id number] : [id number] Maxwell Stone CNA       POC Glucose Once [[id number]]  (Abnormal) Collected: 03/17/22 0626    Specimen: Blood Updated: 03/17/22 0627     Glucose 134 mg/dL      Comment: Meter: [id number] : [id number] Surinder DOYLE       Renal Function Panel [[id number]]  (Abnormal) Collected: 03/17/22 0438    Specimen: Blood Updated: 03/17/22 0558     Glucose 107 mg/dL      BUN 36 mg/dL      Creatinine 7.49 mg/dL      Sodium 136 mmol/L      Potassium 5.0 mmol/L      Chloride 96 mmol/L      CO2 25.0 mmol/L      Calcium 8.4 mg/dL      Albumin 2.40 g/dL      Phosphorus 7.1 mg/dL      Anion Gap 15.0 mmol/L      BUN/Creatinine Ratio 4.8     eGFR 7.7 mL/min/1.73      Comment: <15 Indicative of kidney failure       Narrative:      GFR Normal >60  Chronic Kidney Disease <60  Kidney Failure <15      Immature Platelet Fraction [[id number]]  (Abnormal) Collected: 03/17/22 0438    Specimen: Blood Updated: 03/17/22 0537     IPF 11.80 %      Platelets 123 10*3/mm3     CBC & Differential [[id number]]  (Abnormal) Collected: 03/17/22 0438    Specimen: Blood Updated: 03/17/22 0537    Narrative:      The following orders were created for panel order CBC & Differential.  Procedure                               Abnormality         Status                     ---------                               -----------         ------                     CBC Auto Differential[[id number]]        Abnormal            Final result                 Please view results for these tests on the individual orders.    CBC Auto Differential [[id number]]  (Abnormal) Collected:  03/17/22 0438    Specimen: Blood Updated: 03/17/22 0537     WBC 13.28 10*3/mm3      RBC 2.90 10*6/mm3      Hemoglobin 8.4 g/dL      Hematocrit 26.7 %      MCV 92.1 fL      MCH 29.0 pg      MCHC 31.5 g/dL      RDW 14.4 %      RDW-SD 48.2 fl      MPV 11.6 fL      Platelets 123 10*3/mm3      Neutrophil % 80.6 %      Lymphocyte % 9.7 %      Monocyte % 7.5 %      Eosinophil % 0.8 %      Basophil % 0.8 %      Immature Grans % 0.6 %      Neutrophils, Absolute 10.70 10*3/mm3      Lymphocytes, Absolute 1.29 10*3/mm3      Monocytes, Absolute 1.00 10*3/mm3      Eosinophils, Absolute 0.11 10*3/mm3      Basophils, Absolute 0.10 10*3/mm3      Immature Grans, Absolute 0.08 10*3/mm3      nRBC 0.0 /100 WBC     POC Glucose Once [[id number]]  (Abnormal) Collected: 03/16/22 2009    Specimen: Blood Updated: 03/16/22 2011     Glucose 147 mg/dL      Comment: Meter: [id number] : [id number] Surinder Driverey PCA       POC Glucose Once [[id number]]  (Normal) Collected: 03/16/22 1603    Specimen: Blood Updated: 03/16/22 1604     Glucose 80 mg/dL      Comment: Meter: [id number] : [id number] Darya KIM           Imaging Results (Last 24 Hours)     ** No results found for the last 24 hours. **             ECG 12 Lead  Component   Ref Range & Units 3/5/22 0945 3/4/22 1825 1/26/22 1729 12/2/21 0309 4/28/21 1722 4/8/21 1341   QT Interval   ms 271 P  392  321  395  435  444              HEART RATE= 187  bpm  RR Interval= 320  ms  NE Interval= 132  ms  P Horizontal Axis= 90  deg  P Front Axis= 30  deg  QRSD Interval= 95  ms  QT Interval= 271  ms  QRS Axis= 91  deg  T Wave Axis= -72  deg  - ABNORMAL ECG -  Supraventricular tachycardia  Borderline right axis deviation  Repolarization abnormality, prob rate related             Current Facility-Administered Medications:   •  acetaminophen (TYLENOL) tablet 650 mg, 650 mg, Oral, Q6H PRN, Mai Escoto MD, 650 mg at 03/08/22 1900  •  albumin human 25 % IV SOLN 12.5 g, 12.5 g, Intravenous, PRN,  João Angel MD  •  ALPRAZolam (XANAX) tablet 1 mg, 1 mg, Oral, 4x Daily PRN, Mai Escoto MD, 1 mg at 03/17/22 0035  •  aspirin chewable tablet 81 mg, 81 mg, Oral, Daily, Mai Escoto MD, 81 mg at 03/17/22 1256  •  cefTRIAXone (ROCEPHIN) 2 g in sodium chloride 0.9 % 100 mL IVPB-VTB, 2 g, Intravenous, Q24H, Karl Swift MD, Last Rate: 200 mL/hr at 03/17/22 0033, 2 g at 03/17/22 0033  •  epoetin real-epbx (RETACRIT) injection 10,000 Units, 10,000 Units, Intravenous, Once per day on Mon Wed Fri, Mai Escoto MD, 10,000 Units at 03/14/22 1008  •  haloperidol lactate (HALDOL) injection 5 mg, 5 mg, Intramuscular, Q6H PRN, Berto Hagan III, MD  •  heparin (porcine) injection 3,800 Units, 3,800 Units, Intracatheter, PRN, Mai Escoto MD, 3,800 Units at 03/14/22 1008  •  insulin glargine (LANTUS, SEMGLEE) injection 25 Units, 25 Units, Subcutaneous, Nightly, Karl Swift MD, 25 Units at 03/16/22 2106  •  insulin lispro (ADMELOG) injection 0-7 Units, 0-7 Units, Subcutaneous, 4x Daily With Meals & Nightly, Karl Swift MD, 4 Units at 03/17/22 1257  •  insulin lispro (ADMELOG) injection 8 Units, 8 Units, Subcutaneous, TID With Meals, Karl Swift MD, 8 Units at 03/17/22 1257  •  lactobacillus acidophilus (RISAQUAD) capsule 1 capsule, 1 capsule, Oral, Daily, Mai Escoto MD, 1 capsule at 03/17/22 1257  •  lidocaine (XYLOCAINE) 1 % injection 5 mL, 5 mL, Injection, Once, Mai Escoto MD  •  midodrine (PROAMATINE) tablet 10 mg, 10 mg, Oral, TID AC, Sayied, Tausif, MD, 10 mg at 03/17/22 1257  •  ondansetron (ZOFRAN) injection 4 mg, 4 mg, Intravenous, Q6H PRN, Karl Swift MD  •  oxyCODONE-acetaminophen (PERCOCET)  MG per tablet 1 tablet, 1 tablet, Oral, Q4H PRN, Karl Swift MD, 0.5 tablet at 03/16/22 2111  •  pantoprazole (PROTONIX) EC tablet 40 mg, 40 mg, Oral, BID Tigist SOLIS Tausif, MD, 40 mg at 03/17/22 0633  •  sevelamer (RENVELA) tablet 800 mg, 800 mg, Oral, TID With Meals,  Mai Escoto MD, 800 mg at 03/17/22 1256  •  [COMPLETED] Insert peripheral IV, , , Once **AND** sodium chloride 0.9 % flush 10 mL, 10 mL, Intravenous, PRN, Mai Escoto MD  •  sodium chloride 0.9 % flush 10 mL, 10 mL, Intravenous, Q12H, Mai Escoto MD, 10 mL at 03/16/22 2106  •  sodium chloride 0.9 % flush 10 mL, 10 mL, Intravenous, PRN, Mai Escoto MD  •  tiZANidine (ZANAFLEX) tablet 4 mg, 4 mg, Oral, Q8H PRN, Mai Escoto MD     ASSESSMENT  E. coli bacteremia with sepsis  Line sepsis status post removal of old hemodialysis catheter and placement of new  End-stage renal disease with noncompliance with hemodialysis  Supraventricular tachycardia resolved  Insulin-dependent diabetes mellitus  History of hypertension with low blood pressure  Hyperlipidemia  History of CVA  Chronic anemia and thrombocytopenia  Anxiety disorder  Agitation per psychiatry  Gastroesophageal reflux disease    PLAN  CPM  Continue IV antibiotics   Using AV fistula  Remove tunneled hemodialysis catheter per vascular surgery  Hemodialysis TIW  Psych to follow patient  Insulin dose adjusted  Adjust home medications  Stress ulcer DVT prophylaxis  Supportive care  PT OT  Discussed with nursing staff and wife  Discharge planning    ARIEL MCGOVERN MD

## 2022-03-17 NOTE — NURSING NOTE
Dialysis complete, trouble with access (arterial site)  Slow blood flow, patient actually picked up 400 cc of fluid with this treatment. Dr. Murrell is aware and vital signs are in epic.

## 2022-03-17 NOTE — CONSULTS
" responded to spiritual care consult. Concluded visitation by having prayer with the patient (per his request).    [employer] Spiritual Assessment Tool    Lorena  *  Pt states a belief in God, and Bandar. Pt did not report attending a Holiness/Temple, and reported that he prayed often.      Importance  * Foundational for pt's meaning making, \"I want to get back with my wife, and my kids\". Pt also reflected on his relationship with his mother, and the significance it has on his life. His desire is to get well so that he can leave the hospital and return to his family.    Community  *  Pt stated that he has several siblings. Pt also reports limited opportunity for community while hospitalized and said \"the nurses won't let me use the telephone to call my family\". Pt spoke of two different churches in the local area where he knew the  and felt supported.    Assessment  * Pt appears depressed and made several statements that sounded ficticious. Pt is experiencing significant emotional/spiritual distress. Pt could benefit from repeated visitation.           "

## 2022-03-17 NOTE — NURSING NOTE
CWOCN- consult for buttock. Patient has a discoloration/irregular shaped partial thickness wound on the right buttock. This is not over a bony prominence and is likely more related to friction especially after reviewing patient's chart. He is ambulatory with assist here, can move himself and can sit on the side of the bed. He has been drowsy at times but working with therapy this week. I would recommend to place a silicone border dressing on the buttocks, which staff has done.

## 2022-03-17 NOTE — PLAN OF CARE
Problem: Adult Inpatient Plan of Care  Goal: Absence of Hospital-Acquired Illness or Injury  Intervention: Identify and Manage Fall Risk  Recent Flowsheet Documentation  Taken 3/17/2022 1706 by Neha Pendleton RN  Safety Promotion/Fall Prevention:   activity supervised   room organization consistent   safety round/check completed  Taken 3/17/2022 1200 by Neha Pendleton RN  Safety Promotion/Fall Prevention:   activity supervised   room organization consistent   safety round/check completed   clutter free environment maintained  Taken 3/17/2022 0851 by Neha Pendleton RN  Safety Promotion/Fall Prevention: patient off unit  Taken 3/17/2022 0746 by Neha Pendleton RN  Safety Promotion/Fall Prevention: activity supervised   Goal Outcome Evaluation:           Progress: improving  Outcome Evaluation: patient Alert oriented x 3 disoriented in time today received hemodialysis and removed shiley that was located on right groin are no bleeding noted place bvaseline and dry dressing with clear dressing in place continue monitor able to work with physial therapy and eat himself.

## 2022-03-17 NOTE — NURSING NOTE
Access Center note.  Patient off unit for dialysis.  No behaviors per RN, patient slept about 4 hours.    AC following.

## 2022-03-17 NOTE — PROGRESS NOTES
"   LOS: 13 days     Chief Complaint/ Reason for encounter: ESRD  Chief Complaint   Patient presents with   • Weakness - Generalized   • Needs dialysis         Subjective    3/9: Seen and examined in the ICU, looks very ill.  BP remains on the low side but no need for pressors.  Remains confused and somewhat paranoid.  States \"they are trying to kill me\"  Discussed with Dr. Weber regarding catheter removal and replacement in OR today  Dialysis is planned for this afternoon    3/10: Seems to be doing better today.  BP has improved, more alert but still confused, somewhat paranoid  Nuys any shortness of breath or chest pain, no fevers or chills    3/11:Patient was seen on hemodialysis  Treatment orders discussed with the dialysis RNFranco  Arterial pressure/ Venous pressure: 120/100  Blood flow rate/Dialysate flow rate: 250/600, increase blood flow rate of 350 as tolerated  Blood pressure: 120s over 60s  Potassium bath/Fluid removal goal: 3K, 1 L  No changes other than blood flow rate    3/14 seen in the dialysis unit, unfortunately had to come off treatment early due to blood pressure issues  On a positive note his fistula functioned well and no problems with cannulation  Had some agitation with dialysis  No shortness of breath or chest pain, no nausea or vomiting    3/15 doing well, resting, no new complaints  Scheduled for dialysis today.  I just spoke with the dialysis nurse and she states that there was no issue cannulating his AV fistula and he is currently running well    3/17 to come off dialysis early today due to some access issues, blood return, no UF.      Medical history reviewed:  Weakness - Generalized        Subjective    History taken from: Patient and chart    Vital Signs  Temp:  [97.9 °F (36.6 °C)-98.5 °F (36.9 °C)] 98.5 °F (36.9 °C)  Heart Rate:  [64-95] 85  Resp:  [18-20] 20  BP: (119-161)/() 119/62       Wt Readings from Last 1 Encounters:   03/17/22 0441 73.6 kg (162 lb 3.2 oz)   03/16/22 " "0523 73.9 kg (162 lb 14.7 oz)   03/15/22 0532 72.5 kg (159 lb 12.8 oz)   03/14/22 1442 75.3 kg (166 lb 0.1 oz)   03/14/22 0500 75.3 kg (166 lb 0.1 oz)   03/13/22 0539 75 kg (165 lb 5.5 oz)   03/12/22 0523 73.5 kg (162 lb 0.6 oz)   03/09/22 0600 77.6 kg (171 lb 1.2 oz)   03/08/22 1431 77.6 kg (171 lb 1.2 oz)   03/07/22 0906 74.4 kg (164 lb)       Objective:  Vital signs: (most recent): Blood pressure 119/62, pulse 85, temperature 98.5 °F (36.9 °C), temperature source Oral, resp. rate 20, height 172.7 cm (67.99\"), weight 73.6 kg (162 lb 3.2 oz), SpO2 92 %.              Objective:  General Appearance:  Comfortable, less confused, chronically ill-appearing, in no acute distress and not in pain.  Prior chest CVC site clean dry and intact  HEENT: Mucous membranes moist, no injury, oropharynx clear  Lungs:  Normal effort and normal respiratory rate.  Breath sounds clear to auscultation.  No  respiratory distress.  No rales, decreased breath sounds or rhonchi.    Heart: Normal rate.  Regular rhythm.  S1 normal.  No murmur.   Abdomen: Abdomen is soft.  Bowel sounds are normal, no abdominal tenderness.  There is no rebound or guarding  Extremities: Normal range of motion.  Trace edema of bilateral lower extremities, distal pulses intact  Neurological: No focal motor or sensory deficits, pupils reactive  Skin:  Warm and dry.  No rash or cyanosis.       Results Review:    Intake/Output:     Intake/Output Summary (Last 24 hours) at 3/17/2022 1647  Last data filed at 3/17/2022 1200  Gross per 24 hour   Intake 220 ml   Output -400 ml   Net 620 ml         DATA:  Radiology and Labs:  The following labs independently reviewed by me. Additional labs ordered for tomorrow a.m.  Interval notes, chart personally reviewed by me.   Old records independently reviewed showing ESRD on dialysis    Risk/ complexity of medical care/ medical decision making moderate complexity      Labs:   Recent Results (from the past 24 hour(s))   POC Glucose " Once    Collection Time: 03/16/22  8:09 PM    Specimen: Blood   Result Value Ref Range    Glucose 147 (H) 70 - 130 mg/dL   Immature Platelet Fraction    Collection Time: 03/17/22  4:38 AM    Specimen: Blood   Result Value Ref Range    IPF 11.80 (H) 0.90 - 6.50 %    Platelets 123 (L) 140 - 450 10*3/mm3   Renal Function Panel    Collection Time: 03/17/22  4:38 AM    Specimen: Blood   Result Value Ref Range    Glucose 107 (H) 65 - 99 mg/dL    BUN 36 (H) 6 - 20 mg/dL    Creatinine 7.49 (H) 0.76 - 1.27 mg/dL    Sodium 136 136 - 145 mmol/L    Potassium 5.0 3.5 - 5.2 mmol/L    Chloride 96 (L) 98 - 107 mmol/L    CO2 25.0 22.0 - 29.0 mmol/L    Calcium 8.4 (L) 8.6 - 10.5 mg/dL    Albumin 2.40 (L) 3.50 - 5.20 g/dL    Phosphorus 7.1 (H) 2.5 - 4.5 mg/dL    Anion Gap 15.0 5.0 - 15.0 mmol/L    BUN/Creatinine Ratio 4.8 (L) 7.0 - 25.0    eGFR 7.7 (L) >60.0 mL/min/1.73   CBC Auto Differential    Collection Time: 03/17/22  4:38 AM    Specimen: Blood   Result Value Ref Range    WBC 13.28 (H) 3.40 - 10.80 10*3/mm3    RBC 2.90 (L) 4.14 - 5.80 10*6/mm3    Hemoglobin 8.4 (L) 13.0 - 17.7 g/dL    Hematocrit 26.7 (L) 37.5 - 51.0 %    MCV 92.1 79.0 - 97.0 fL    MCH 29.0 26.6 - 33.0 pg    MCHC 31.5 31.5 - 35.7 g/dL    RDW 14.4 12.3 - 15.4 %    RDW-SD 48.2 37.0 - 54.0 fl    MPV 11.6 6.0 - 12.0 fL    Platelets 123 (L) 140 - 450 10*3/mm3    Neutrophil % 80.6 (H) 42.7 - 76.0 %    Lymphocyte % 9.7 (L) 19.6 - 45.3 %    Monocyte % 7.5 5.0 - 12.0 %    Eosinophil % 0.8 0.3 - 6.2 %    Basophil % 0.8 0.0 - 1.5 %    Immature Grans % 0.6 (H) 0.0 - 0.5 %    Neutrophils, Absolute 10.70 (H) 1.70 - 7.00 10*3/mm3    Lymphocytes, Absolute 1.29 0.70 - 3.10 10*3/mm3    Monocytes, Absolute 1.00 (H) 0.10 - 0.90 10*3/mm3    Eosinophils, Absolute 0.11 0.00 - 0.40 10*3/mm3    Basophils, Absolute 0.10 0.00 - 0.20 10*3/mm3    Immature Grans, Absolute 0.08 (H) 0.00 - 0.05 10*3/mm3    nRBC 0.0 0.0 - 0.2 /100 WBC   POC Glucose Once    Collection Time: 03/17/22  6:26 AM     Specimen: Blood   Result Value Ref Range    Glucose 134 (H) 70 - 130 mg/dL   POC Glucose Once    Collection Time: 03/17/22 11:33 AM    Specimen: Blood   Result Value Ref Range    Glucose 274 (H) 70 - 130 mg/dL   POC Glucose Once    Collection Time: 03/17/22  4:03 PM    Specimen: Blood   Result Value Ref Range    Glucose 70 70 - 130 mg/dL       Radiology:  Imaging Results (Last 24 Hours)     ** No results found for the last 24 hours. **             Medications have been reviewed:  Current Facility-Administered Medications   Medication Dose Route Frequency Provider Last Rate Last Admin   • acetaminophen (TYLENOL) tablet 650 mg  650 mg Oral Q6H PRN Mai Escoto MD   650 mg at 03/08/22 1904   • albumin human 25 % IV SOLN 12.5 g  12.5 g Intravenous PRN João Angel MD       • ALPRAZolam (XANAX) tablet 1 mg  1 mg Oral 4x Daily PRN Mai Escoto MD   1 mg at 03/17/22 1421   • aspirin chewable tablet 81 mg  81 mg Oral Daily Mai Escoto MD   81 mg at 03/17/22 1256   • cefTRIAXone (ROCEPHIN) 2 g in sodium chloride 0.9 % 100 mL IVPB-VTB  2 g Intravenous Q24H Karl Swift  mL/hr at 03/17/22 0033 2 g at 03/17/22 0033   • epoetin real-epbx (RETACRIT) injection 10,000 Units  10,000 Units Intravenous Once per day on Mon Wed Fri Mai Escoto MD   10,000 Units at 03/14/22 1008   • haloperidol lactate (HALDOL) injection 5 mg  5 mg Intramuscular Q6H PRN Berto Hagan III, MD       • heparin (porcine) injection 3,800 Units  3,800 Units Intracatheter PRN Mai Escoto MD   3,800 Units at 03/14/22 1008   • insulin glargine (LANTUS, SEMGLEE) injection 25 Units  25 Units Subcutaneous Nightly Karl Swift MD   25 Units at 03/16/22 2106   • insulin lispro (ADMELOG) injection 0-7 Units  0-7 Units Subcutaneous 4x Daily With Meals & Nightly Karl Swift MD   4 Units at 03/17/22 1257   • insulin lispro (ADMELOG) injection 8 Units  8 Units Subcutaneous TID With Meals Karl Swift MD   8 Units at 03/17/22 1257    • lactobacillus acidophilus (RISAQUAD) capsule 1 capsule  1 capsule Oral Daily Mai Escoto MD   1 capsule at 03/17/22 1257   • lidocaine (XYLOCAINE) 1 % injection 5 mL  5 mL Injection Once Mai Escoto MD       • midodrine (PROAMATINE) tablet 10 mg  10 mg Oral TID AC Mai Escoto MD   10 mg at 03/17/22 1257   • ondansetron (ZOFRAN) injection 4 mg  4 mg Intravenous Q6H PRN Karl Swift MD       • oxyCODONE-acetaminophen (PERCOCET)  MG per tablet 1 tablet  1 tablet Oral Q4H PRN Karl Swift MD   1 tablet at 03/17/22 1421   • pantoprazole (PROTONIX) EC tablet 40 mg  40 mg Oral BID AC Mai Escoto MD   40 mg at 03/17/22 0633   • sevelamer (RENVELA) tablet 800 mg  800 mg Oral TID With Meals Mai Escoto MD   800 mg at 03/17/22 1256   • sodium chloride 0.9 % flush 10 mL  10 mL Intravenous PRN Mai Escoto MD       • sodium chloride 0.9 % flush 10 mL  10 mL Intravenous Q12H Mai Escoto MD   10 mL at 03/16/22 2106   • sodium chloride 0.9 % flush 10 mL  10 mL Intravenous PRN Mai Escoto MD       • tiZANidine (ZANAFLEX) tablet 4 mg  4 mg Oral Q8H PRN Mai Escoto MD             ASSESSMENT:   Thrombocytopenia (HCC)     • Liver cirrhosis (HCC)     • Chronic hepatitis C without hepatic coma (HCC)     • End-stage renal disease on hemodialysis (HCC)     • B12 deficiency     • Anemia due to chronic renal failure treated with erythropoietin, stage 5 (HCC)          Assessment:   1. ESRD, HD Monday Wednesday Friday  2. Tachycardia, likely was due to sepsis and has improved  3. Thrombocytopenia,   4.  Hyperkalemia, stabilizing  5.  Diabetes mellitus type 2  6.  Fevers/sepsis syndrome related to infected dialysis catheter  7.  Chronic noncompliance with outpatient dialysis.  Patient has been counseled multiple times about the importance of 100% attendance at outpatient dialysis.  his noncompliance is directly related to his recurrent hospital admissions      Plan:  Problems with AV fistula  cannulation today, had to come off treatment early  Potassium 5.0 today and he only got half of his dialysis treatment  We will try dialysis again using his AV fistula  Really need to make sure that his fistula is functioning well prior to discharge  Sparkle Sarabia in place but that needs to be removed tomorrow if no issues with his fistula    Continue midodrine for BP support and binders with meals  Epogen with dialysis for anemia of CKD      Yohan Murrell MD   [facility]   Office phone number: [phone]  Answering service phone number: [phone]    03/17/22  16:47 EDT    Dictation performed using Dragon dictation software
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
## 2022-03-17 NOTE — PROGRESS NOTES
The patient is returned from dialysis.  He apparently had a better night last evening but today he is tearful and reports that this physician that his mother  last evening and that he needs to leave the hospital to attend to her well and other p.o. arrangements.  I have assured him that such arrangements do not need immediate attention.  I am uncertain as to the veracity of the patient's claim of his mother's death as this may be an attempt to leave the hospital.  I have asked staff to have social work see if they can confirm his mother's passing.  If in fact we find that this is the case.  A visit from the  could be beneficial for him.

## 2022-03-17 NOTE — PROGRESS NOTES
Continued Stay Note  [facility]     Patient Name: Angelo Brown  MRN: [id number]  Today's Date: 3/17/2022    Admit Date: 3/4/2022     Discharge Plan     Row Name 03/17/22 1345       Plan    Plan Home with spouse    Plan Comments Plan remains to dc home with spouse and adult son.  OP HD is [facility], spouse provides transportation to HD.               Discharge Codes    No documentation.               Expected Discharge Date and Time     Expected Discharge Date Expected Discharge Time    Mar 18, 2022             Lacey Lamar RN

## 2022-03-17 NOTE — SIGNIFICANT NOTE
03/17/22 1456   OTHER   Discipline occupational therapist   Rehab Time/Intention   Session Not Performed other (see comments)   Recommendation   OT - Next Appointment 03/18/22   Pt with recent removal of dialysis cath from groin, on temporary bedrest. Will follow-up tomorrow.

## 2022-03-17 NOTE — PLAN OF CARE
Goal Outcome Evaluation:  Plan of Care Reviewed With: patient        Progress: improving  Outcome Evaluation: Pt tolerated treatment fair this date. Upon entering room, bed alarm was sounding and pt was sitting on EOB w/ nsg responding to alarm. Pt stated he needed to move and get OOB d/t back pain. Pt increased gait distance, ambulating 50ft w/ Rw and CGA-min A, w/ noted slight improvement in pain. Pt was left up in chair w/ alarm set and RN present in room at the time.

## 2022-03-17 NOTE — PROGRESS NOTES
"  Infectious Diseases Progress Note    Sabas Quick MD     [facility]  [place]: 13 days  Patient Identification:  Name: Angelo Brown  Age: 61 y.o.  Sex: male  :  1960  MRN: [id number]         Primary Care Physician: Yadiel Baxter III, MD            Subjective: Sleepy and lethargic but denies any specific complaint.  Interval History: See consultation note.    Objective:    Scheduled Meds:aspirin, 81 mg, Oral, Daily  cefTRIAXone, 2 g, Intravenous, Q24H  epoetin real/real-epbx, 10,000 Units, Intravenous, Once per day on   insulin glargine, 25 Units, Subcutaneous, Nightly  insulin lispro, 0-7 Units, Subcutaneous, 4x Daily With Meals & Nightly  insulin lispro, 8 Units, Subcutaneous, TID With Meals  lactobacillus acidophilus, 1 capsule, Oral, Daily  lidocaine, 5 mL, Injection, Once  midodrine, 10 mg, Oral, TID AC  pantoprazole, 40 mg, Oral, BID AC  sevelamer, 800 mg, Oral, TID With Meals  sodium chloride, 10 mL, Intravenous, Q12H      Continuous Infusions:     Vital signs in last 24 hours:  Temp:  [97.9 °F (36.6 °C)-98.5 °F (36.9 °C)] 98.5 °F (36.9 °C)  Heart Rate:  [64-95] 65  Resp:  [18-20] 20  BP: (119-161)/() 119/62    Intake/Output:    Intake/Output Summary (Last 24 hours) at 3/17/2022 1852  Last data filed at 3/17/2022 1200  Gross per 24 hour   Intake 220 ml   Output -400 ml   Net 620 ml       Exam:  /62 (BP Location: Right arm, Patient Position: Lying)   Pulse 65   Temp 98.5 °F (36.9 °C) (Oral)   Resp 20   Ht 172.7 cm (67.99\")   Wt 73.6 kg (162 lb 3.2 oz)   SpO2 93%   BMI 24.67 kg/m²   Patient is examined using the personal protective equipment as per guidelines from infection control for this particular patient as enacted.  Hand washing was performed before and after patient interaction.  General Appearance:  Comfortable chronically ill-appearing male                          Head:    Normocephalic, without obvious abnormality, atraumatic               "             Eyes:    PERRL, conjunctivae/corneas clear, EOM's intact, both eyes                         Throat:   Lips, tongue, gums normal; oral mucosa pink and moist                           Neck:   Supple, symmetrical, trachea midline, no JVD                         Lungs:    Clear to auscultation bilaterally, respirations unlabored                 Chest Wall:  Right IJ tunnel catheter in place                          Heart:    Regular rate and rhythm, S1 and S2 normal                  Abdomen:   Soft nontender                 Extremities: Right groin dialysis catheter in place                        Pulses:   Pulses palpable in all extremities                            Skin:   Skin is warm and dry,  no rashes or palpable lesions                  Neurologic: Sleepy and lethargic       Data Review:    I reviewed the patient's new clinical results.  Results from last 7 days   Lab Units 03/17/22  0438 03/16/22  0614 03/15/22  0528 03/14/22  0356 03/13/22  0445 03/12/22  0830 03/11/22  0439   WBC 10*3/mm3 13.28* 13.66* 9.89 9.67 10.50 7.58 6.64   HEMOGLOBIN g/dL 8.4* 8.4* 8.4* 8.5* 8.1* 8.1* 8.1*   PLATELETS 10*3/mm3 123*  123* 101*  101* 76*  76* 71*  71* 51*  51* 44*  44* 36*  36*     Results from last 7 days   Lab Units 03/17/22  0438 03/16/22  0614 03/15/22  0528 03/13/22  0445 03/11/22  0439   SODIUM mmol/L 136 136 136 132* 135*   POTASSIUM mmol/L 5.0 4.8 4.4 4.1 3.6   CHLORIDE mmol/L 96* 98 96* 97* 98   CO2 mmol/L 25.0 25.0 24.6 22.3 25.1   BUN mg/dL 36* 25* 36* 38* 51*   CREATININE mg/dL 7.49* 6.08* 6.86* 6.43* 6.52*   CALCIUM mg/dL 8.4* 8.3* 8.4* 8.1* 8.3*   GLUCOSE mg/dL 107* 122* 171* 203* 178*     Microbiology Results (last 10 days)     Procedure Component Value - Date/Time    Blood Culture - Blood, Hand, Right [[id number]]  (Normal) Collected: 03/12/22 1118    Lab Status: Final result Specimen: Blood from Hand, Right Updated: 03/17/22 1247     Blood Culture No growth at 5 days    Blood Culture -  Blood, Arm, Right [[id number]]  (Normal) Collected: 03/12/22 1023    Lab Status: Final result Specimen: Blood from Arm, Right Updated: 03/17/22 1147     Blood Culture No growth at 5 days    COVID PRE-OP / PRE-PROCEDURE SCREENING ORDER (NO ISOLATION) - Swab, Nasopharynx [[id number]]  (Normal) Collected: 03/08/22 1816    Lab Status: Final result Specimen: Swab from Nasopharynx Updated: 03/08/22 2027    Narrative:      The following orders were created for panel order COVID PRE-OP / PRE-PROCEDURE SCREENING ORDER (NO ISOLATION) - Swab, Nasopharynx.  Procedure                               Abnormality         Status                     ---------                               -----------         ------                     COVID-19,[facility] IZZY IN-HOUSE...[[id number]]  Normal              Final result                 Please view results for these tests on the individual orders.    COVID-19,[facility] IZZY IN-HOUSE CEPHEID/NIKHIL NP SWAB IN TRANSPORT MEDIA 8-12 HR TAT - Swab, Nasopharynx [[id number]]  (Normal) Collected: 03/08/22 1816    Lab Status: Final result Specimen: Swab from Nasopharynx Updated: 03/08/22 2027     COVID19 Not Detected    Narrative:      Fact sheet for providers: https://www.fda.gov/media/930934/download    Fact sheet for patients: https://www.fda.gov/media/488299/download    Test performed by PCR.            Assessment:    Anemia due to chronic renal failure treated with erythropoietin, stage 5 (HCC)    B12 deficiency    End-stage renal disease on hemodialysis (HCC)    Thrombocytopenia (HCC)    Liver cirrhosis (HCC)    Chronic hepatitis C without hepatic coma (HCC)    Noncompliance of patient with renal dialysis (HCC)  1-systemic gram-negative bacteremic sepsis either due to              -Infected tunneled catheter versus              -Evolving intra-abdominal process with subsequent bacteremia and secondary seeding.  2-end-stage renal disease  3-history of cirrhosis of the liver due to hepatitis  C  4-thrombocytopenia  5-other diagnosis per primary team.     Recommendations/Discussions:  · Continue present treatment with IV Rocephin .  · Follow-up repeat blood cultures after the catheter is replaced.  · Once considered stable consider CT scan of the abdomen and pelvis   · His rising white blood cell count with lethargy while on specific treatment is a concern.  · Check chest x-ray and provide him with aspiration precaution.  · Discussed with patient's caring nurse.    Sabas Quick MD  3/17/2022  18:52 EDT    Much of this encounter note is an electronic transcription/translation of spoken language to printed text. The electronic translation of spoken language may permit erroneous, or at times, nonsensical words or phrases to be inadvertently transcribed; Although I have reviewed the note for such errors, some may still exist

## 2022-03-18 VITALS
WEIGHT: 160.5 LBS | SYSTOLIC BLOOD PRESSURE: 151 MMHG | OXYGEN SATURATION: 93 % | HEIGHT: 68 IN | DIASTOLIC BLOOD PRESSURE: 76 MMHG | RESPIRATION RATE: 16 BRPM | HEART RATE: 88 BPM | TEMPERATURE: 98.4 F | BODY MASS INDEX: 24.32 KG/M2

## 2022-03-18 LAB — GLUCOSE BLDC GLUCOMTR-MCNC: 113 MG/DL (ref 70–130)

## 2022-03-18 PROCEDURE — 82962 GLUCOSE BLOOD TEST: CPT

## 2022-03-18 PROCEDURE — 63710000001 INSULIN LISPRO (HUMAN) PER 5 UNITS: Performed by: HOSPITALIST

## 2022-03-18 PROCEDURE — 25010000002 EPOETIN ALFA-EPBX 10000 UNIT/ML SOLUTION: Performed by: INTERNAL MEDICINE

## 2022-03-18 RX ADMIN — Medication 10 ML: at 07:51

## 2022-03-18 RX ADMIN — SEVELAMER CARBONATE 800 MG: 800 TABLET, FILM COATED ORAL at 07:46

## 2022-03-18 RX ADMIN — MIDODRINE HYDROCHLORIDE 10 MG: 5 TABLET ORAL at 07:46

## 2022-03-18 RX ADMIN — OXYCODONE HYDROCHLORIDE AND ACETAMINOPHEN 1 TABLET: 10; 325 TABLET ORAL at 13:31

## 2022-03-18 RX ADMIN — SEVELAMER CARBONATE 800 MG: 800 TABLET, FILM COATED ORAL at 13:31

## 2022-03-18 RX ADMIN — ASPIRIN 81 MG: 81 TABLET, CHEWABLE ORAL at 07:45

## 2022-03-18 RX ADMIN — Medication 1 CAPSULE: at 07:46

## 2022-03-18 RX ADMIN — ALPRAZOLAM 1 MG: 0.5 TABLET ORAL at 00:41

## 2022-03-18 RX ADMIN — PANTOPRAZOLE SODIUM 40 MG: 40 TABLET, DELAYED RELEASE ORAL at 06:45

## 2022-03-18 RX ADMIN — EPOETIN ALFA-EPBX 10000 UNITS: 10000 INJECTION, SOLUTION INTRAVENOUS; SUBCUTANEOUS at 10:00

## 2022-03-18 RX ADMIN — MIDODRINE HYDROCHLORIDE 10 MG: 5 TABLET ORAL at 13:31

## 2022-03-18 RX ADMIN — ALPRAZOLAM 1 MG: 0.5 TABLET ORAL at 13:31

## 2022-03-18 RX ADMIN — INSULIN LISPRO 8 UNITS: 100 INJECTION, SOLUTION INTRAVENOUS; SUBCUTANEOUS at 07:46

## 2022-03-18 NOTE — PROGRESS NOTES
"   LOS: 14 days     Chief Complaint/ Reason for encounter: ESRD  Chief Complaint   Patient presents with   • Weakness - Generalized   • Needs dialysis         Subjective    3/9: Seen and examined in the ICU, looks very ill.  BP remains on the low side but no need for pressors.  Remains confused and somewhat paranoid.  States \"they are trying to kill me\"  Discussed with Dr. Weber regarding catheter removal and replacement in OR today  Dialysis is planned for this afternoon    3/10: Seems to be doing better today.  BP has improved, more alert but still confused, somewhat paranoid  Nuys any shortness of breath or chest pain, no fevers or chills    3/11:Patient was seen on hemodialysis  Treatment orders discussed with the dialysis RNFranco  Arterial pressure/ Venous pressure: 120/100  Blood flow rate/Dialysate flow rate: 250/600, increase blood flow rate of 350 as tolerated  Blood pressure: 120s over 60s  Potassium bath/Fluid removal goal: 3K, 1 L  No changes other than blood flow rate    3/14 seen in the dialysis unit, unfortunately had to come off treatment early due to blood pressure issues  On a positive note his fistula functioned well and no problems with cannulation  Had some agitation with dialysis  No shortness of breath or chest pain, no nausea or vomiting    3/15 doing well, resting, no new complaints  Scheduled for dialysis today.  I just spoke with the dialysis nurse and she states that there was no issue cannulating his AV fistula and he is currently running well    3/17 to come off dialysis early today due to some access issues, blood return, no UF.    3/18: Seen on dialysis discussed with HD RN Nima.  Arterial pressure 160, venous pressure 230 set for 1 L UF on a 2K bath.  AV fistula in use with 17-gauge needles, achieving prescribed blood flow rate of 250 with a dialysate flow rate of 500  Amyin No removed last night      Medical history reviewed:  Weakness - " "Generalized        Subjective    History taken from: Patient and chart    Vital Signs  Temp:  [97.5 °F (36.4 °C)-98.8 °F (37.1 °C)] 98.4 °F (36.9 °C)  Heart Rate:  [65-88] 88  Resp:  [16-20] 16  BP: (119-151)/(62-98) 151/76       Wt Readings from Last 1 Encounters:   03/18/22 1100 72.8 kg (160 lb 7.9 oz)   03/18/22 0513 73.9 kg (162 lb 14.4 oz)   03/17/22 0441 73.6 kg (162 lb 3.2 oz)   03/16/22 0523 73.9 kg (162 lb 14.7 oz)   03/15/22 0532 72.5 kg (159 lb 12.8 oz)   03/14/22 1442 75.3 kg (166 lb 0.1 oz)   03/14/22 0500 75.3 kg (166 lb 0.1 oz)   03/13/22 0539 75 kg (165 lb 5.5 oz)   03/12/22 0523 73.5 kg (162 lb 0.6 oz)   03/09/22 0600 77.6 kg (171 lb 1.2 oz)   03/08/22 1431 77.6 kg (171 lb 1.2 oz)   03/07/22 0906 74.4 kg (164 lb)       Objective:  Vital signs: (most recent): Blood pressure 151/76, pulse 88, temperature 98.4 °F (36.9 °C), temperature source Temporal, resp. rate 16, height 172.7 cm (67.99\"), weight 72.8 kg (160 lb 7.9 oz), SpO2 93 %.              Objective:  General Appearance:  Comfortable, less confused, chronically ill-appearing, in no acute distress and not in pain.  Prior chest CVC site clean dry and intact  HEENT: Mucous membranes moist, no injury, oropharynx clear  Lungs:  Normal effort and normal respiratory rate.  Breath sounds clear to auscultation.  No  respiratory distress.  No rales, decreased breath sounds or rhonchi.    Heart: Normal rate.  Regular rhythm.  S1 normal.  No murmur.   Abdomen: Abdomen is soft.  Bowel sounds are normal, no abdominal tenderness.  There is no rebound or guarding  Extremities: Normal range of motion.  Trace edema of bilateral lower extremities, distal pulses intact  Neurological: No focal motor or sensory deficits, pupils reactive  Skin:  Warm and dry.  No rash or cyanosis.       Results Review:    Intake/Output:     Intake/Output Summary (Last 24 hours) at 3/18/2022 1209  Last data filed at 3/18/2022 0707  Gross per 24 hour   Intake 120 ml   Output 1000 ml "   Net -880 ml         DATA:  Radiology and Labs:  The following labs independently reviewed by me. Additional labs ordered for tomorrow a.m.  Interval notes, chart personally reviewed by me.   Old records independently reviewed showing ESRD on dialysis    Risk/ complexity of medical care/ medical decision making moderate complexity      Labs:   Recent Results (from the past 24 hour(s))   POC Glucose Once    Collection Time: 03/17/22  4:03 PM    Specimen: Blood   Result Value Ref Range    Glucose 70 70 - 130 mg/dL   POC Glucose Once    Collection Time: 03/17/22  9:02 PM    Specimen: Blood   Result Value Ref Range    Glucose 76 70 - 130 mg/dL   POC Glucose Once    Collection Time: 03/18/22  6:30 AM    Specimen: Blood   Result Value Ref Range    Glucose 113 70 - 130 mg/dL       Radiology:  Imaging Results (Last 24 Hours)     Procedure Component Value Units Date/Time    XR Chest 1 View [[id number]] Collected: 03/17/22 2350     Updated: 03/18/22 0719    Narrative:      PORTABLE CHEST     HISTORY: Elevated white count.     COMPARISON: 03/04/2022.     FINDINGS: The dual-lumen central line which was present previously has  been removed. A small area of atelectasis/infiltrate involving the  suprahilar region on the right is appreciated which is new. New small  area of platelike atelectasis is appreciated involving the right lung  base medially. There is atelectasis/infiltrate at the left lung base  which is more prominent as compared to the prior examination. There is  no evidence of congestive failure or of gross effusion.       Impression:      Increasing infiltrate at the left lung base and new areas of  atelectasis/infiltrate involving the suprahilar region on the right and  at the right lung base medially.     This report was finalized on 3/18/2022 7:16 AM by Dr. Oral Wright M.D.                Medications have been reviewed:  Current Facility-Administered Medications   Medication Dose Route Frequency Provider Last  Rate Last Admin   • acetaminophen (TYLENOL) tablet 650 mg  650 mg Oral Q6H PRN Mai Escoto MD   650 mg at 03/08/22 1904   • albumin human 25 % IV SOLN 12.5 g  12.5 g Intravenous PRN João Angel MD       • ALPRAZolam (XANAX) tablet 1 mg  1 mg Oral 4x Daily PRN Mai Escoto MD   1 mg at 03/18/22 0041   • aspirin chewable tablet 81 mg  81 mg Oral Daily Mai Escoto MD   81 mg at 03/18/22 0745   • cefTRIAXone (ROCEPHIN) 2 g in sodium chloride 0.9 % 100 mL IVPB-VTB  2 g Intravenous Q24H Karl Swift  mL/hr at 03/17/22 2210 2 g at 03/17/22 2210   • epoetin real-epbx (RETACRIT) injection 10,000 Units  10,000 Units Intravenous Once per day on Mon Wed Fri Mai Escoto MD   10,000 Units at 03/18/22 1000   • haloperidol lactate (HALDOL) injection 5 mg  5 mg Intramuscular Q6H PRN Berto Hagan III, MD       • heparin (porcine) injection 3,800 Units  3,800 Units Intracatheter PRN Mai Escoto MD   3,800 Units at 03/14/22 1008   • insulin glargine (LANTUS, SEMGLEE) injection 25 Units  25 Units Subcutaneous Nightly Karl Swift MD   25 Units at 03/16/22 2106   • insulin lispro (ADMELOG) injection 0-7 Units  0-7 Units Subcutaneous 4x Daily With Meals & Nightly Karl Swift MD   4 Units at 03/17/22 1257   • insulin lispro (ADMELOG) injection 8 Units  8 Units Subcutaneous TID With Meals Karl Swift MD   8 Units at 03/18/22 0746   • lactobacillus acidophilus (RISAQUAD) capsule 1 capsule  1 capsule Oral Daily Mai Escoto MD   1 capsule at 03/18/22 0746   • lidocaine (XYLOCAINE) 1 % injection 5 mL  5 mL Injection Once Mai Escoto MD       • midodrine (PROAMATINE) tablet 10 mg  10 mg Oral TID AC Mai Escoto MD   10 mg at 03/18/22 0746   • ondansetron (ZOFRAN) injection 4 mg  4 mg Intravenous Q6H PRN Karl Swift MD       • oxyCODONE-acetaminophen (PERCOCET)  MG per tablet 1 tablet  1 tablet Oral Q4H PRN Karl Swift MD   1 tablet at 03/17/22 2020   • pantoprazole (PROTONIX)  EC tablet 40 mg  40 mg Oral BID AC Mai Escoto MD   40 mg at 03/18/22 0645   • sevelamer (RENVELA) tablet 800 mg  800 mg Oral TID With Meals Mai Escoto MD   800 mg at 03/18/22 0746   • sodium chloride 0.9 % flush 10 mL  10 mL Intravenous PRN Mai Escoto MD       • sodium chloride 0.9 % flush 10 mL  10 mL Intravenous Q12H Mai Escoto MD   10 mL at 03/18/22 0751   • sodium chloride 0.9 % flush 10 mL  10 mL Intravenous PRN Mai Escoto MD       • tiZANidine (ZANAFLEX) tablet 4 mg  4 mg Oral Q8H PRN Mai Escoto MD             ASSESSMENT:   Thrombocytopenia (HCC)     • Liver cirrhosis (HCC)     • Chronic hepatitis C without hepatic coma (HCC)     • End-stage renal disease on hemodialysis (HCC)     • B12 deficiency     • Anemia due to chronic renal failure treated with erythropoietin, stage 5 (HCC)          Assessment:   1. ESRD, HD Monday Wednesday Friday  2. Tachycardia, likely was due to sepsis and has improved  3. Thrombocytopenia,   4.  Hyperkalemia, stabilizing  5.  Diabetes mellitus type 2  6.  Fevers/sepsis syndrome related to infected dialysis catheter  7.  Chronic noncompliance with outpatient dialysis.  Patient has been counseled multiple times about the importance of 100% attendance at outpatient dialysis.  his noncompliance is directly related to his recurrent hospital admissions      Plan:  Tolerated dialysis very well today  AV fistula was able to be cannulated with no difficulties using 17-gauge needles  Groin Shiley catheter removed last night  Electrolytes and volume stable  Continue midodrine for BP support and binders with meals  Epogen with dialysis for anemia of CKD    Stable for discharge at any time from a renal standpoint.  If he goes home today then he should receive outpatient dialysis tomorrow at his usual facility, continue TTS outpatient schedule      Yohan Murrell MD   [facility]   Office phone number: [phone]  Answering service phone number:  [phone]    03/18/22  12:09 EDT    Dictation performed using Dragon dictation software

## 2022-03-18 NOTE — NURSING NOTE
"Patient coming back from dialysis and refused take his medication and vitals state \"I am going home state away from me you don't know what are capable to do to you\" notify manager who went to speak with patient but he still disoriented and confused and refused lab work. encourage take his medication and still refused.  "

## 2022-03-18 NOTE — NURSING NOTE
Routine Chest X-ray ordered per MD Abdelrahman due to elevated WBC. If pt temp elevates to 100.5 order blood culture to rule out pneumonia. Will continue to monitor.

## 2022-03-18 NOTE — NURSING NOTE
Patient refused blood draw for lab test . And refused any other intervention want to go home. Wife at bedside state will take him to another hospital .Patient sign Form Against Medical advice. And waiting for his nephew to transport him.

## 2022-03-18 NOTE — NURSING NOTE
[facility] Follow Up    Patient resting quietly in bed, reviewed chart, spoke with nurse. Per patient's nurse patient rested well throughout the night, patient experiencing confusion and was speaking about his mother's death as if it just happened when she passed in 2009 and was expressing depression about it, experiencing slight anxiety. Access will continue to follow up.

## 2022-03-18 NOTE — NURSING NOTE
Patient living the unit in wheelchair in company of Nurse, Aide and wife son driving state going to take him to another hospital.Staff assisted transfer to the car.

## 2022-03-18 NOTE — PROGRESS NOTES
"Daily progress note    Chief complaint  Doing same  No specific complaints  Wants to go home  Denies any chest pain shortness of breath palpitation    History of present illness  61-year white male with very complex past medical history including end-stage renal disease on hemodialysis CVA diabetes mellitus coronary artery disease hypertension chronic anemia presented to [facility] emergency room after missing hemodialysis with generalized weakness.  Patient is poor historian but denies any headache dizziness chest pain shortness of breath abdominal pain vomiting diarrhea.  Patient does have nausea and weak all over.  Patient has no cough fever night sweats weight loss.  Patient evaluated in ER found to have high potassium need hemodialysis admit for management.  Patient also found to have low platelets which is new.  Patient has no bleeding whatsoever.     REVIEW OF SYSTEMS  Unremarkable except generalized weakness     PHYSICAL EXAM  Blood pressure 151/76, pulse 88, temperature 98.4 °F (36.9 °C), temperature source Temporal, resp. rate 16, height 172.7 cm (67.99\"), weight 72.8 kg (160 lb 7.9 oz), SpO2 93 %.    GENERAL: 61-year-old chronically ill-appearing male in mild distress  HENT: NCAT, neck supple, trachea midline  EYES: no scleral icterus, PERRL, normal conjunctivae  CV: regular rhythm, regular rate, no murmur  RESPIRATORY: unlabored effort, mild Rales in bases bilaterally  ABDOMEN: soft, nontender, nondistended, bowel sounds present  MUSCULOSKELETAL: no gross deformity, no pedal edema, no calf tenderness  NEURO: Sleepy but easily arousable,  sensory and motor function of extremities intact, speech clear, mental status normal  SKIN: warm, dry, no rash  PSYCH:  Look depressed     LAB RESULTS  Lab Results (last 24 hours)     Procedure Component Value Units Date/Time    POC Glucose Once [[id number]]  (Normal) Collected: 03/18/22 0630    Specimen: Blood Updated: 03/18/22 0631     Glucose 113 mg/dL      " Comment: Meter: [id number] : [id number] Cadena Karma PCA       POC Glucose Once [[id number]]  (Normal) Collected: 03/17/22 2102    Specimen: Blood Updated: 03/17/22 2104     Glucose 76 mg/dL      Comment: Meter: [id number] : [id number] Cadena Karma PCA       POC Glucose Once [[id number]]  (Normal) Collected: 03/17/22 1603    Specimen: Blood Updated: 03/17/22 1604     Glucose 70 mg/dL      Comment: Meter: [id number] : [id number] Maxwell Stone CNA       Blood Culture - Blood, Hand, Right [[id number]]  (Normal) Collected: 03/12/22 1118    Specimen: Blood from Hand, Right Updated: 03/17/22 1247     Blood Culture No growth at 5 days        Imaging Results (Last 24 Hours)     Procedure Component Value Units Date/Time    XR Chest 1 View [[id number]] Collected: 03/17/22 2350     Updated: 03/18/22 0719    Narrative:      PORTABLE CHEST     HISTORY: Elevated white count.     COMPARISON: 03/04/2022.     FINDINGS: The dual-lumen central line which was present previously has  been removed. A small area of atelectasis/infiltrate involving the  suprahilar region on the right is appreciated which is new. New small  area of platelike atelectasis is appreciated involving the right lung  base medially. There is atelectasis/infiltrate at the left lung base  which is more prominent as compared to the prior examination. There is  no evidence of congestive failure or of gross effusion.       Impression:      Increasing infiltrate at the left lung base and new areas of  atelectasis/infiltrate involving the suprahilar region on the right and  at the right lung base medially.     This report was finalized on 3/18/2022 7:16 AM by Dr. Oral Wright M.D.                ECG 12 Lead  Component   Ref Range & Units 3/5/22 0945 3/4/22 1825 1/26/22 1729 12/2/21 0309 4/28/21 1722 4/8/21 1341   QT Interval   ms 271 P  392  321  395  435  444              HEART RATE= 187  bpm  RR Interval= 320  ms  MD Interval= 132  ms  P Horizontal Axis= 90   deg  P Front Axis= 30  deg  QRSD Interval= 95  ms  QT Interval= 271  ms  QRS Axis= 91  deg  T Wave Axis= -72  deg  - ABNORMAL ECG -  Supraventricular tachycardia  Borderline right axis deviation  Repolarization abnormality, prob rate related             Current Facility-Administered Medications:   •  acetaminophen (TYLENOL) tablet 650 mg, 650 mg, Oral, Q6H PRN, Mai Escoto MD, 650 mg at 03/08/22 1904  •  albumin human 25 % IV SOLN 12.5 g, 12.5 g, Intravenous, PRN, João Angel MD  •  ALPRAZolam (XANAX) tablet 1 mg, 1 mg, Oral, 4x Daily PRN, Mai Escoto MD, 1 mg at 03/18/22 0041  •  aspirin chewable tablet 81 mg, 81 mg, Oral, Daily, Mai Escoto MD, 81 mg at 03/18/22 0745  •  cefTRIAXone (ROCEPHIN) 2 g in sodium chloride 0.9 % 100 mL IVPB-VTB, 2 g, Intravenous, Q24H, Karl Swift MD, Last Rate: 200 mL/hr at 03/17/22 2210, 2 g at 03/17/22 2210  •  epoetin real-epbx (RETACRIT) injection 10,000 Units, 10,000 Units, Intravenous, Once per day on Mon Wed Fri, Mai Escoto MD, 10,000 Units at 03/18/22 1000  •  haloperidol lactate (HALDOL) injection 5 mg, 5 mg, Intramuscular, Q6H PRN, Berto Hagan III, MD  •  heparin (porcine) injection 3,800 Units, 3,800 Units, Intracatheter, PRN, Mai Escoto MD, 3,800 Units at 03/14/22 1008  •  insulin glargine (LANTUS, SEMGLEE) injection 25 Units, 25 Units, Subcutaneous, Nightly, Karl Swift MD, 25 Units at 03/16/22 2106  •  insulin lispro (ADMELOG) injection 0-7 Units, 0-7 Units, Subcutaneous, 4x Daily With Meals & Nightly, Karl Swift MD, 4 Units at 03/17/22 1257  •  insulin lispro (ADMELOG) injection 8 Units, 8 Units, Subcutaneous, TID With Meals, Karl Swift MD, 8 Units at 03/18/22 0746  •  lactobacillus acidophilus (RISAQUAD) capsule 1 capsule, 1 capsule, Oral, Daily, Mai Escoto MD, 1 capsule at 03/18/22 0746  •  lidocaine (XYLOCAINE) 1 % injection 5 mL, 5 mL, Injection, Once, Mai Escoto MD  •  midodrine (PROAMATINE) tablet 10 mg, 10  mg, Oral, TID AC, Mai Escoto MD, 10 mg at 03/18/22 0746  •  ondansetron (ZOFRAN) injection 4 mg, 4 mg, Intravenous, Q6H PRN, Ariel Swift MD  •  oxyCODONE-acetaminophen (PERCOCET)  MG per tablet 1 tablet, 1 tablet, Oral, Q4H PRN, Ariel Swift MD, 1 tablet at 03/17/22 2020  •  pantoprazole (PROTONIX) EC tablet 40 mg, 40 mg, Oral, BID Tigist SOLIS Tausif, MD, 40 mg at 03/18/22 0645  •  sevelamer (RENVELA) tablet 800 mg, 800 mg, Oral, TID With Meals, Mai Escoto MD, 800 mg at 03/18/22 0746  •  [COMPLETED] Insert peripheral IV, , , Once **AND** sodium chloride 0.9 % flush 10 mL, 10 mL, Intravenous, PRN, Mai Escoto MD  •  sodium chloride 0.9 % flush 10 mL, 10 mL, Intravenous, Q12H, Mai Escoto MD, 10 mL at 03/18/22 0751  •  sodium chloride 0.9 % flush 10 mL, 10 mL, Intravenous, PRN, Mai Escoto MD  •  tiZANidine (ZANAFLEX) tablet 4 mg, 4 mg, Oral, Q8H PRN, Mai Escoto MD     ASSESSMENT  Status post removal of tunneled hemodialysis catheter  E. coli bacteremia with sepsis  Line sepsis status post removal of old hemodialysis catheter and placement of new  End-stage renal disease with noncompliance with hemodialysis  Supraventricular tachycardia resolved  Insulin-dependent diabetes mellitus  History of hypertension with low blood pressure  Hyperlipidemia  History of CVA  Chronic anemia and thrombocytopenia  Anxiety disorder  Agitation per psychiatry  Gastroesophageal reflux disease    PLAN  CPM  Continue IV antibiotics   Hemodialysis today  Labs pending  Psych to follow patient  Insulin dose adjusted  Adjust home medications  Stress ulcer DVT prophylaxis  Supportive care  PT OT  Discussed with nursing staff   Discharge once okay with all    ARIEL SWIFT MD

## 2022-03-18 NOTE — PROGRESS NOTES
The patient is unavailable for interview today, receiving hemodialysis.  I have noted Mr. Sparks's note which indicates that the patient's mother actually passed away in .  It is worth noting that the patient very clearly stated to me yesterday that his mother had  the previous evening and that he needed to attend to the will and  proceedings.

## 2022-03-18 NOTE — PROGRESS NOTES
"  Infectious Diseases Progress Note    Sabas Quick MD     [facility]  [place]: 14 days  Patient Identification:  Name: Angelo Brown  Age: 61 y.o.  Sex: male  :  1960  MRN: [id number]         Primary Care Physician: Yadiel Baxter III, MD            Subjective: More awake interactive denies any specific complaints.  Interval History: See consultation note.    Objective:    Scheduled Meds:aspirin, 81 mg, Oral, Daily  cefTRIAXone, 2 g, Intravenous, Q24H  epoetin real/real-epbx, 10,000 Units, Intravenous, Once per day on   insulin glargine, 25 Units, Subcutaneous, Nightly  insulin lispro, 0-7 Units, Subcutaneous, 4x Daily With Meals & Nightly  insulin lispro, 8 Units, Subcutaneous, TID With Meals  lactobacillus acidophilus, 1 capsule, Oral, Daily  lidocaine, 5 mL, Injection, Once  midodrine, 10 mg, Oral, TID AC  pantoprazole, 40 mg, Oral, BID AC  sevelamer, 800 mg, Oral, TID With Meals  sodium chloride, 10 mL, Intravenous, Q12H      Continuous Infusions:     Vital signs in last 24 hours:  Temp:  [97.5 °F (36.4 °C)-98.8 °F (37.1 °C)] 98.7 °F (37.1 °C)  Heart Rate:  [65-85] 78  Resp:  [16-20] 16  BP: (119-146)/(62-98) 145/83    Intake/Output:    Intake/Output Summary (Last 24 hours) at 3/18/2022 1010  Last data filed at 3/18/2022 0745  Gross per 24 hour   Intake 220 ml   Output -400 ml   Net 620 ml       Exam:  /83   Pulse 78   Temp 98.7 °F (37.1 °C) (Temporal)   Resp 16   Ht 172.7 cm (67.99\")   Wt 73.9 kg (162 lb 14.4 oz)   SpO2 93%   BMI 24.77 kg/m²   Patient is examined using the personal protective equipment as per guidelines from infection control for this particular patient as enacted.  Hand washing was performed before and after patient interaction.  General Appearance:  Comfortable chronically ill-appearing male                          Head:    Normocephalic, without obvious abnormality, atraumatic                           Eyes:    PERRL, " conjunctivae/corneas clear, EOM's intact, both eyes                         Throat:   Lips, tongue, gums normal; oral mucosa pink and moist                           Neck:   Supple, symmetrical, trachea midline, no JVD                         Lungs:    Clear to auscultation bilaterally, respirations unlabored                 Chest Wall:  Right IJ tunnel catheter in place                          Heart:    Regular rate and rhythm, S1 and S2 normal                  Abdomen:   Soft nontender                 Extremities: Right groin dialysis catheter in place                        Pulses:   Pulses palpable in all extremities                            Skin:   Skin is warm and dry,  no rashes or palpable lesions                  Neurologic: Sleepy and lethargic       Data Review:    I reviewed the patient's new clinical results.  Results from last 7 days   Lab Units 03/17/22  0438 03/16/22  0614 03/15/22  0528 03/14/22  0356 03/13/22  0445 03/12/22  0830   WBC 10*3/mm3 13.28* 13.66* 9.89 9.67 10.50 7.58   HEMOGLOBIN g/dL 8.4* 8.4* 8.4* 8.5* 8.1* 8.1*   PLATELETS 10*3/mm3 123*  123* 101*  101* 76*  76* 71*  71* 51*  51* 44*  44*     Results from last 7 days   Lab Units 03/17/22  0438 03/16/22  0614 03/15/22  0528 03/13/22  0445   SODIUM mmol/L 136 136 136 132*   POTASSIUM mmol/L 5.0 4.8 4.4 4.1   CHLORIDE mmol/L 96* 98 96* 97*   CO2 mmol/L 25.0 25.0 24.6 22.3   BUN mg/dL 36* 25* 36* 38*   CREATININE mg/dL 7.49* 6.08* 6.86* 6.43*   CALCIUM mg/dL 8.4* 8.3* 8.4* 8.1*   GLUCOSE mg/dL 107* 122* 171* 203*     Microbiology Results (last 10 days)     Procedure Component Value - Date/Time    Blood Culture - Blood, Hand, Right [[id number]]  (Normal) Collected: 03/12/22 1118    Lab Status: Final result Specimen: Blood from Hand, Right Updated: 03/17/22 1247     Blood Culture No growth at 5 days    Blood Culture - Blood, Arm, Right [[id number]]  (Normal) Collected: 03/12/22 1023    Lab Status: Final result Specimen: Blood from  Arm, Right Updated: 03/17/22 1147     Blood Culture No growth at 5 days    COVID PRE-OP / PRE-PROCEDURE SCREENING ORDER (NO ISOLATION) - Swab, Nasopharynx [[id number]]  (Normal) Collected: 03/08/22 1816    Lab Status: Final result Specimen: Swab from Nasopharynx Updated: 03/08/22 2027    Narrative:      The following orders were created for panel order COVID PRE-OP / PRE-PROCEDURE SCREENING ORDER (NO ISOLATION) - Swab, Nasopharynx.  Procedure                               Abnormality         Status                     ---------                               -----------         ------                     COVID-19,[facility] IN-HOUSE...[[id number]]  Normal              Final result                 Please view results for these tests on the individual orders.    COVID-19,[facility] IN-HOUSE CEPHEID/NIKHIL NP SWAB IN TRANSPORT MEDIA 8-12 HR TAT - Swab, Nasopharynx [[id number]]  (Normal) Collected: 03/08/22 1816    Lab Status: Final result Specimen: Swab from Nasopharynx Updated: 03/08/22 2027     COVID19 Not Detected    Narrative:      Fact sheet for providers: https://www.fda.gov/media/216123/download    Fact sheet for patients: https://www.fda.gov/media/201372/download    Test performed by PCR.            Assessment:    Anemia due to chronic renal failure treated with erythropoietin, stage 5 (HCC)    B12 deficiency    End-stage renal disease on hemodialysis (HCC)    Thrombocytopenia (HCC)    Liver cirrhosis (HCC)    Chronic hepatitis C without hepatic coma (HCC)    Noncompliance of patient with renal dialysis (HCC)  1-systemic gram-negative bacteremic sepsis either due to              -Infected tunneled catheter versus              -Evolving intra-abdominal process with subsequent bacteremia and secondary seeding.  2-end-stage renal disease  3-history of cirrhosis of the liver due to hepatitis C  4-thrombocytopenia  5-other diagnosis per primary team.     Recommendations/Discussions:  · Continue present treatment.    · Monitor his  white blood cell count.    · Further management as per internal medicine service.    Sabas Quick MD  3/18/2022  10:10 EDT    Much of this encounter note is an electronic transcription/translation of spoken language to printed text. The electronic translation of spoken language may permit erroneous, or at times, nonsensical words or phrases to be inadvertently transcribed; Although I have reviewed the note for such errors, some may still exist

## 2022-03-18 NOTE — NURSING NOTE
Wife came to see patient and talk patient to take jis medication, still want to be discharge calling MD

## 2022-03-18 NOTE — CASE MANAGEMENT/SOCIAL WORK
Case Management Discharge Note      Final Note: Home    Provided Post Acute Provider List?: Refused  Provided Post Acute Provider Quality & Resource List?: Refused    Selected Continued Care - Discharged on 3/18/2022 Admission date: 3/4/2022 - Discharge disposition: Left Against Medical Advice    Destination    No services have been selected for the patient.              Durable Medical Equipment    No services have been selected for the patient.              Dialysis/Infusion    No services have been selected for the patient.              Home Medical Care    No services have been selected for the patient.              Therapy    No services have been selected for the patient.              Community Resources    No services have been selected for the patient.              Community & DME    No services have been selected for the patient.                  Transportation Services  Private: Car    Final Discharge Disposition Code: 01 - home or self-care

## 2022-03-30 ENCOUNTER — APPOINTMENT (OUTPATIENT)
Dept: LAB | Facility: HOSPITAL | Age: 62
End: 2022-03-30

## 2022-03-30 ENCOUNTER — TRANSCRIBE ORDERS (OUTPATIENT)
Dept: ADMINISTRATIVE | Facility: HOSPITAL | Age: 62
End: 2022-03-30

## 2022-03-30 DIAGNOSIS — D64.9 ANEMIA, UNSPECIFIED TYPE: Primary | ICD-10-CM

## 2022-03-31 ENCOUNTER — HOSPITAL ENCOUNTER (OUTPATIENT)
Dept: INFUSION THERAPY | Facility: HOSPITAL | Age: 62
Setting detail: INFUSION SERIES
Discharge: HOME | End: 2022-03-31

## 2022-03-31 VITALS
RESPIRATION RATE: 20 BRPM | OXYGEN SATURATION: 97 % | TEMPERATURE: 97.8 F | SYSTOLIC BLOOD PRESSURE: 129 MMHG | HEART RATE: 74 BPM | DIASTOLIC BLOOD PRESSURE: 75 MMHG

## 2022-03-31 DIAGNOSIS — N18.4 ANEMIA DUE TO STAGE 4 CHRONIC KIDNEY DISEASE: Primary | ICD-10-CM

## 2022-03-31 DIAGNOSIS — D63.1 ANEMIA DUE TO STAGE 4 CHRONIC KIDNEY DISEASE: Primary | ICD-10-CM

## 2022-03-31 LAB
ABO GROUP BLD: NORMAL
BLD GP AB SCN SERPL QL: NEGATIVE
RH BLD: POSITIVE
T&S EXPIRATION DATE: NORMAL

## 2022-03-31 PROCEDURE — P9016 RBC LEUKOCYTES REDUCED: HCPCS

## 2022-03-31 PROCEDURE — 86900 BLOOD TYPING SEROLOGIC ABO: CPT

## 2022-03-31 PROCEDURE — 36415 COLL VENOUS BLD VENIPUNCTURE: CPT

## 2022-03-31 PROCEDURE — 86901 BLOOD TYPING SEROLOGIC RH(D): CPT | Performed by: INTERNAL MEDICINE

## 2022-03-31 PROCEDURE — 86923 COMPATIBILITY TEST ELECTRIC: CPT

## 2022-03-31 PROCEDURE — 86850 RBC ANTIBODY SCREEN: CPT | Performed by: INTERNAL MEDICINE

## 2022-03-31 PROCEDURE — 86900 BLOOD TYPING SEROLOGIC ABO: CPT | Performed by: INTERNAL MEDICINE

## 2022-03-31 PROCEDURE — 36430 TRANSFUSION BLD/BLD COMPNT: CPT

## 2022-03-31 RX ORDER — TIZANIDINE HYDROCHLORIDE 4 MG/1
CAPSULE, GELATIN COATED ORAL
COMMUNITY
End: 2022-03-31 | Stop reason: SDUPTHER

## 2022-04-01 LAB
BH BB BLOOD EXPIRATION DATE: NORMAL
BH BB BLOOD TYPE BARCODE: 6200
BH BB DISPENSE STATUS: NORMAL
BH BB PRODUCT CODE: NORMAL
BH BB UNIT NUMBER: NORMAL
CROSSMATCH INTERPRETATION: NORMAL
UNIT  ABO: NORMAL
UNIT  RH: NORMAL

## 2022-04-12 ENCOUNTER — APPOINTMENT (OUTPATIENT)
Dept: SLEEP MEDICINE | Facility: HOSPITAL | Age: 62
End: 2022-04-12

## 2022-05-10 ENCOUNTER — INPATIENT HOSPITAL (OUTPATIENT)
Dept: URBAN - METROPOLITAN AREA HOSPITAL 107 | Facility: HOSPITAL | Age: 62
End: 2022-05-10
Payer: COMMERCIAL

## 2022-05-10 DIAGNOSIS — D50.0 IRON DEFICIENCY ANEMIA SECONDARY TO BLOOD LOSS (CHRONIC): ICD-10-CM

## 2022-05-10 DIAGNOSIS — K92.1 MELENA: ICD-10-CM

## 2022-05-10 DIAGNOSIS — R41.82 ALTERED MENTAL STATUS, UNSPECIFIED: ICD-10-CM

## 2022-05-10 PROCEDURE — 99223 1ST HOSP IP/OBS HIGH 75: CPT | Performed by: INTERNAL MEDICINE

## 2022-05-11 ENCOUNTER — INPATIENT HOSPITAL (OUTPATIENT)
Dept: URBAN - METROPOLITAN AREA HOSPITAL 107 | Facility: HOSPITAL | Age: 62
End: 2022-05-11
Payer: COMMERCIAL

## 2022-05-11 DIAGNOSIS — K92.1 MELENA: ICD-10-CM

## 2022-05-11 DIAGNOSIS — K31.811 ANGIODYSPLASIA OF STOMACH AND DUODENUM WITH BLEEDING: ICD-10-CM

## 2022-05-11 PROCEDURE — 43255 EGD CONTROL BLEEDING ANY: CPT | Performed by: INTERNAL MEDICINE

## 2022-05-12 ENCOUNTER — INPATIENT HOSPITAL (OUTPATIENT)
Dept: URBAN - METROPOLITAN AREA HOSPITAL 107 | Facility: HOSPITAL | Age: 62
End: 2022-05-12
Payer: COMMERCIAL

## 2022-05-12 DIAGNOSIS — D50.0 IRON DEFICIENCY ANEMIA SECONDARY TO BLOOD LOSS (CHRONIC): ICD-10-CM

## 2022-05-12 DIAGNOSIS — K92.1 MELENA: ICD-10-CM

## 2022-05-12 DIAGNOSIS — N18.6 END STAGE RENAL DISEASE: ICD-10-CM

## 2022-05-12 PROCEDURE — 99232 SBSQ HOSP IP/OBS MODERATE 35: CPT | Performed by: PHYSICIAN ASSISTANT

## 2022-06-29 NOTE — PROGRESS NOTES
Case Management Discharge Note    Final Note: Home;self-care. LINUS Blakely CSW     Destination     No service coordination in this encounter.      Durable Medical Equipment     No service coordination in this encounter.      Dialysis/Infusion     No service coordination in this encounter.      Home Medical Care     No service coordination in this encounter.      Social Care     No service coordination in this encounter.        Other:  (private auto )        High Dose Vitamin A Pregnancy And Lactation Text: High dose vitamin A therapy is contraindicated during pregnancy and breast feeding.

## 2022-08-19 ENCOUNTER — APPOINTMENT (OUTPATIENT)
Dept: CT IMAGING | Facility: HOSPITAL | Age: 62
End: 2022-08-19

## 2022-08-19 ENCOUNTER — HOSPITAL ENCOUNTER (OUTPATIENT)
Facility: HOSPITAL | Age: 62
Setting detail: OBSERVATION
LOS: 2 days | Discharge: HOME | End: 2022-08-21
Attending: EMERGENCY MEDICINE | Admitting: INTERNAL MEDICINE

## 2022-08-19 DIAGNOSIS — K81.1 CHRONIC CHOLECYSTITIS: ICD-10-CM

## 2022-08-19 DIAGNOSIS — L02.211 ABSCESS OF SKIN OF ABDOMEN: Primary | ICD-10-CM

## 2022-08-19 DIAGNOSIS — M46.40 DISCITIS, UNSPECIFIED SPINAL REGION: ICD-10-CM

## 2022-08-19 LAB
ALBUMIN SERPL-MCNC: 3.2 G/DL (ref 3.5–5.2)
ALBUMIN/GLOB SERPL: 0.8 G/DL
ALP SERPL-CCNC: 122 U/L (ref 39–117)
ALT SERPL W P-5'-P-CCNC: 7 U/L (ref 1–41)
ANION GAP SERPL CALCULATED.3IONS-SCNC: 13 MMOL/L (ref 5–15)
AST SERPL-CCNC: 20 U/L (ref 1–40)
BASOPHILS # BLD AUTO: 0.03 10*3/MM3 (ref 0–0.2)
BASOPHILS NFR BLD AUTO: 0.5 % (ref 0–1.5)
BILIRUB SERPL-MCNC: 0.4 MG/DL (ref 0–1.2)
BUN SERPL-MCNC: 31 MG/DL (ref 8–23)
BUN/CREAT SERPL: 7 (ref 7–25)
CALCIUM SPEC-SCNC: 8.9 MG/DL (ref 8.6–10.5)
CHLORIDE SERPL-SCNC: 105 MMOL/L (ref 98–107)
CO2 SERPL-SCNC: 25 MMOL/L (ref 22–29)
CREAT SERPL-MCNC: 4.4 MG/DL (ref 0.76–1.27)
D-LACTATE SERPL-SCNC: 0.9 MMOL/L (ref 0.5–2)
D-LACTATE SERPL-SCNC: 2.3 MMOL/L (ref 0.5–2)
DEPRECATED RDW RBC AUTO: 45.4 FL (ref 37–54)
EGFRCR SERPLBLD CKD-EPI 2021: 14.6 ML/MIN/1.73
EOSINOPHIL # BLD AUTO: 0.19 10*3/MM3 (ref 0–0.4)
EOSINOPHIL NFR BLD AUTO: 3.2 % (ref 0.3–6.2)
ERYTHROCYTE [DISTWIDTH] IN BLOOD BY AUTOMATED COUNT: 13.5 % (ref 12.3–15.4)
GLOBULIN UR ELPH-MCNC: 3.8 GM/DL
GLUCOSE BLDC GLUCOMTR-MCNC: 118 MG/DL (ref 70–130)
GLUCOSE SERPL-MCNC: 114 MG/DL (ref 65–99)
HCT VFR BLD AUTO: 33.2 % (ref 37.5–51)
HGB BLD-MCNC: 10.6 G/DL (ref 13–17.7)
IMM GRANULOCYTES # BLD AUTO: 0.02 10*3/MM3 (ref 0–0.05)
IMM GRANULOCYTES NFR BLD AUTO: 0.3 % (ref 0–0.5)
LIPASE SERPL-CCNC: 28 U/L (ref 13–60)
LYMPHOCYTES # BLD AUTO: 0.89 10*3/MM3 (ref 0.7–3.1)
LYMPHOCYTES NFR BLD AUTO: 14.9 % (ref 19.6–45.3)
MCH RBC QN AUTO: 29.5 PG (ref 26.6–33)
MCHC RBC AUTO-ENTMCNC: 31.9 G/DL (ref 31.5–35.7)
MCV RBC AUTO: 92.5 FL (ref 79–97)
MONOCYTES # BLD AUTO: 0.33 10*3/MM3 (ref 0.1–0.9)
MONOCYTES NFR BLD AUTO: 5.5 % (ref 5–12)
NEUTROPHILS NFR BLD AUTO: 4.51 10*3/MM3 (ref 1.7–7)
NEUTROPHILS NFR BLD AUTO: 75.6 % (ref 42.7–76)
NRBC BLD AUTO-RTO: 0 /100 WBC (ref 0–0.2)
PLATELET # BLD AUTO: 171 10*3/MM3 (ref 140–450)
PMV BLD AUTO: 9.8 FL (ref 6–12)
POTASSIUM SERPL-SCNC: 3.8 MMOL/L (ref 3.5–5.2)
PROT SERPL-MCNC: 7 G/DL (ref 6–8.5)
RBC # BLD AUTO: 3.59 10*6/MM3 (ref 4.14–5.8)
SARS-COV-2 RNA RESP QL NAA+PROBE: NOT DETECTED
SODIUM SERPL-SCNC: 143 MMOL/L (ref 136–145)
WBC NRBC COR # BLD: 5.97 10*3/MM3 (ref 3.4–10.8)

## 2022-08-19 PROCEDURE — 99204 OFFICE O/P NEW MOD 45 MIN: CPT | Performed by: SURGERY

## 2022-08-19 PROCEDURE — 99284 EMERGENCY DEPT VISIT MOD MDM: CPT

## 2022-08-19 PROCEDURE — 83605 ASSAY OF LACTIC ACID: CPT | Performed by: EMERGENCY MEDICINE

## 2022-08-19 PROCEDURE — G0378 HOSPITAL OBSERVATION PER HR: HCPCS

## 2022-08-19 PROCEDURE — 96365 THER/PROPH/DIAG IV INF INIT: CPT

## 2022-08-19 PROCEDURE — 96375 TX/PRO/DX INJ NEW DRUG ADDON: CPT

## 2022-08-19 PROCEDURE — 96367 TX/PROPH/DG ADDL SEQ IV INF: CPT

## 2022-08-19 PROCEDURE — 87040 BLOOD CULTURE FOR BACTERIA: CPT | Performed by: EMERGENCY MEDICINE

## 2022-08-19 PROCEDURE — 85025 COMPLETE CBC W/AUTO DIFF WBC: CPT | Performed by: EMERGENCY MEDICINE

## 2022-08-19 PROCEDURE — 82962 GLUCOSE BLOOD TEST: CPT

## 2022-08-19 PROCEDURE — 80053 COMPREHEN METABOLIC PANEL: CPT | Performed by: EMERGENCY MEDICINE

## 2022-08-19 PROCEDURE — 36415 COLL VENOUS BLD VENIPUNCTURE: CPT | Performed by: EMERGENCY MEDICINE

## 2022-08-19 PROCEDURE — U0003 INFECTIOUS AGENT DETECTION BY NUCLEIC ACID (DNA OR RNA); SEVERE ACUTE RESPIRATORY SYNDROME CORONAVIRUS 2 (SARS-COV-2) (CORONAVIRUS DISEASE [COVID-19]), AMPLIFIED PROBE TECHNIQUE, MAKING USE OF HIGH THROUGHPUT TECHNOLOGIES AS DESCRIBED BY CMS-2020-01-R: HCPCS | Performed by: INTERNAL MEDICINE

## 2022-08-19 PROCEDURE — 83690 ASSAY OF LIPASE: CPT | Performed by: EMERGENCY MEDICINE

## 2022-08-19 PROCEDURE — 74176 CT ABD & PELVIS W/O CONTRAST: CPT

## 2022-08-19 PROCEDURE — C9803 HOPD COVID-19 SPEC COLLECT: HCPCS

## 2022-08-19 PROCEDURE — 25010000002 CEFTRIAXONE PER 250 MG: Performed by: EMERGENCY MEDICINE

## 2022-08-19 RX ORDER — ASCORBIC ACID 500 MG
500 TABLET ORAL 2 TIMES DAILY
Status: DISCONTINUED | OUTPATIENT
Start: 2022-08-19 | End: 2022-08-21 | Stop reason: HOSPADM

## 2022-08-19 RX ORDER — ALPRAZOLAM 1 MG/1
1 TABLET ORAL 4 TIMES DAILY PRN
Status: DISCONTINUED | OUTPATIENT
Start: 2022-08-19 | End: 2022-08-21 | Stop reason: HOSPADM

## 2022-08-19 RX ORDER — PANTOPRAZOLE SODIUM 40 MG/1
40 TABLET, DELAYED RELEASE ORAL DAILY
Status: DISCONTINUED | OUTPATIENT
Start: 2022-08-20 | End: 2022-08-21 | Stop reason: HOSPADM

## 2022-08-19 RX ORDER — LABETALOL HYDROCHLORIDE 5 MG/ML
10 INJECTION, SOLUTION INTRAVENOUS ONCE
Status: COMPLETED | OUTPATIENT
Start: 2022-08-19 | End: 2022-08-19

## 2022-08-19 RX ORDER — CARVEDILOL 3.12 MG/1
3.12 TABLET ORAL 2 TIMES DAILY WITH MEALS
Status: DISCONTINUED | OUTPATIENT
Start: 2022-08-19 | End: 2022-08-21 | Stop reason: HOSPADM

## 2022-08-19 RX ORDER — ONDANSETRON 4 MG/1
4 TABLET, FILM COATED ORAL EVERY 6 HOURS PRN
Status: DISCONTINUED | OUTPATIENT
Start: 2022-08-19 | End: 2022-08-21 | Stop reason: HOSPADM

## 2022-08-19 RX ORDER — ASPIRIN 81 MG/1
81 TABLET ORAL DAILY
Status: DISCONTINUED | OUTPATIENT
Start: 2022-08-20 | End: 2022-08-21 | Stop reason: HOSPADM

## 2022-08-19 RX ORDER — UREA 10 %
3 LOTION (ML) TOPICAL NIGHTLY PRN
Status: DISCONTINUED | OUTPATIENT
Start: 2022-08-19 | End: 2022-08-21 | Stop reason: HOSPADM

## 2022-08-19 RX ORDER — INSULIN LISPRO 100 [IU]/ML
0-9 INJECTION, SOLUTION INTRAVENOUS; SUBCUTANEOUS
Status: DISCONTINUED | OUTPATIENT
Start: 2022-08-20 | End: 2022-08-21 | Stop reason: HOSPADM

## 2022-08-19 RX ORDER — NICOTINE POLACRILEX 4 MG
15 LOZENGE BUCCAL
Status: DISCONTINUED | OUTPATIENT
Start: 2022-08-19 | End: 2022-08-21 | Stop reason: HOSPADM

## 2022-08-19 RX ORDER — SIMVASTATIN 40 MG
40 TABLET ORAL DAILY
Status: DISCONTINUED | OUTPATIENT
Start: 2022-08-20 | End: 2022-08-21 | Stop reason: HOSPADM

## 2022-08-19 RX ORDER — DEXTROSE MONOHYDRATE 25 G/50ML
25 INJECTION, SOLUTION INTRAVENOUS
Status: DISCONTINUED | OUTPATIENT
Start: 2022-08-19 | End: 2022-08-21 | Stop reason: HOSPADM

## 2022-08-19 RX ORDER — CHOLECALCIFEROL (VITAMIN D3) 125 MCG
1000 CAPSULE ORAL DAILY
Status: DISCONTINUED | OUTPATIENT
Start: 2022-08-20 | End: 2022-08-21 | Stop reason: HOSPADM

## 2022-08-19 RX ORDER — ZINC SULFATE 50(220)MG
220 CAPSULE ORAL DAILY
Status: DISCONTINUED | OUTPATIENT
Start: 2022-08-20 | End: 2022-08-21 | Stop reason: HOSPADM

## 2022-08-19 RX ORDER — SODIUM CHLORIDE 0.9 % (FLUSH) 0.9 %
10 SYRINGE (ML) INJECTION AS NEEDED
Status: DISCONTINUED | OUTPATIENT
Start: 2022-08-19 | End: 2022-08-21 | Stop reason: HOSPADM

## 2022-08-19 RX ORDER — ACETAMINOPHEN 325 MG/1
650 TABLET ORAL EVERY 4 HOURS PRN
Status: DISCONTINUED | OUTPATIENT
Start: 2022-08-19 | End: 2022-08-21 | Stop reason: HOSPADM

## 2022-08-19 RX ORDER — HYDRALAZINE HYDROCHLORIDE 50 MG/1
50 TABLET, FILM COATED ORAL EVERY 8 HOURS SCHEDULED
Status: COMPLETED | OUTPATIENT
Start: 2022-08-20 | End: 2022-08-20

## 2022-08-19 RX ORDER — ONDANSETRON 2 MG/ML
4 INJECTION INTRAMUSCULAR; INTRAVENOUS EVERY 6 HOURS PRN
Status: DISCONTINUED | OUTPATIENT
Start: 2022-08-19 | End: 2022-08-21 | Stop reason: HOSPADM

## 2022-08-19 RX ORDER — NITROGLYCERIN 0.4 MG/1
0.4 TABLET SUBLINGUAL
Status: DISCONTINUED | OUTPATIENT
Start: 2022-08-19 | End: 2022-08-21 | Stop reason: HOSPADM

## 2022-08-19 RX ORDER — LIDOCAINE HYDROCHLORIDE AND EPINEPHRINE 10; 10 MG/ML; UG/ML
10 INJECTION, SOLUTION INFILTRATION; PERINEURAL ONCE
Status: DISCONTINUED | OUTPATIENT
Start: 2022-08-19 | End: 2022-08-21 | Stop reason: HOSPADM

## 2022-08-19 RX ORDER — METRONIDAZOLE 500 MG/100ML
500 INJECTION, SOLUTION INTRAVENOUS ONCE
Status: COMPLETED | OUTPATIENT
Start: 2022-08-19 | End: 2022-08-19

## 2022-08-19 RX ADMIN — LABETALOL HYDROCHLORIDE 10 MG: 5 INJECTION, SOLUTION INTRAVENOUS at 19:28

## 2022-08-19 RX ADMIN — OXYCODONE HYDROCHLORIDE AND ACETAMINOPHEN 500 MG: 500 TABLET ORAL at 23:25

## 2022-08-19 RX ADMIN — CEFTRIAXONE SODIUM 1 G: 1 INJECTION, POWDER, FOR SOLUTION INTRAMUSCULAR; INTRAVENOUS at 18:19

## 2022-08-19 RX ADMIN — INSULIN GLARGINE-YFGN 10 UNITS: 100 INJECTION, SOLUTION SUBCUTANEOUS at 23:26

## 2022-08-19 RX ADMIN — CARVEDILOL 3.12 MG: 3.12 TABLET, FILM COATED ORAL at 23:25

## 2022-08-19 RX ADMIN — SODIUM CHLORIDE, POTASSIUM CHLORIDE, SODIUM LACTATE AND CALCIUM CHLORIDE 1000 ML: 600; 310; 30; 20 INJECTION, SOLUTION INTRAVENOUS at 16:37

## 2022-08-19 RX ADMIN — METRONIDAZOLE 500 MG: 500 INJECTION, SOLUTION INTRAVENOUS at 18:55

## 2022-08-19 NOTE — ED TRIAGE NOTES
Pt reports a wound to his right upper abd that has become swollen. Pt reports he had had a drain removed last week.      Pt was wearing a mask during assessment.  This RN wore appropriate PPE
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
## 2022-08-19 NOTE — ED NOTES
Nursing report ED to floor  Angelo Brown  62 y.o.  male    HPI :   Chief Complaint   Patient presents with   • Wound Check       Admitting doctor:   Hailey Alvarado MD    Admitting diagnosis:   The primary encounter diagnosis was Abscess of skin of abdomen. Diagnoses of Chronic cholecystitis and Discitis, unspecified spinal region were also pertinent to this visit.    Code status:   Current Code Status     Date Active Code Status Order ID Comments User Context       8/19/2022 1741 CPR (Attempt to Resuscitate) [id number]  Hailey Alvarado MD ED     Advance Care Planning Activity      Questions for Current Code Status     Question Answer    Code Status (Patient has no pulse and is not breathing) CPR (Attempt to Resuscitate)    Medical Interventions (Patient has pulse or is breathing) Full          Allergies:   Lipitor [atorvastatin calcium], Morphine, Eggs or egg-derived products, Gabapentin, Adhesive tape, Fentanyl, Fentanyl, Ibuprofen, and Penicillins    Intake and Output  No intake or output data in the 24 hours ending 08/19/22 1829    Weight:   There were no vitals filed for this visit.    Most recent vitals:   Vitals:    08/19/22 1353 08/19/22 1502 08/19/22 1531 08/19/22 1655   BP:  (!) 208/102 (!) 183/96    Pulse: 81 69 66 70   Resp: 16      Temp: 97.6 °F (36.4 °C)      TempSrc: Tympanic      SpO2: 96% 97% 98% 96%       Active LDAs/IV Access:   Lines, Drains & Airways     Active LDAs     Name Placement date Placement time Site Days    Peripheral IV 08/19/22 1635 Right Forearm 08/19/22  1635  Forearm  less than 1                Labs (abnormal labs have a star):   Labs Reviewed   COMPREHENSIVE METABOLIC PANEL - Abnormal; Notable for the following components:       Result Value    Glucose 114 (*)     BUN 31 (*)     Creatinine 4.40 (*)     Albumin 3.20 (*)     Alkaline Phosphatase 122 (*)     eGFR 14.6 (*)     All other components within normal limits    Narrative:     GFR Normal >60  Chronic Kidney  Disease <60  Kidney Failure <15     CBC WITH AUTO DIFFERENTIAL - Abnormal; Notable for the following components:    RBC 3.59 (*)     Hemoglobin 10.6 (*)     Hematocrit 33.2 (*)     Lymphocyte % 14.9 (*)     All other components within normal limits   LIPASE - Normal   BLOOD CULTURE   BLOOD CULTURE   LACTIC ACID, PLASMA   CBC AND DIFFERENTIAL    Narrative:     The following orders were created for panel order CBC & Differential.  Procedure                               Abnormality         Status                     ---------                               -----------         ------                     CBC Auto Differential[[id number]]        Abnormal            Final result                 Please view results for these tests on the individual orders.       EKG:   No orders to display       Meds given in ED:   Medications   sodium chloride 0.9 % flush 10 mL (has no administration in time range)   lidocaine 1% - EPINEPHrine 1:612027 (XYLOCAINE W/EPI) 1 %-1:373964 injection 10 mL (0 mL Injection Hold 8/19/22 1655)   sodium chloride 0.9 % flush 10 mL (has no administration in time range)   cefTRIAXone (ROCEPHIN) 1 g in sodium chloride 0.9 % 100 mL IVPB-VTB (1 g Intravenous New Bag 8/19/22 1819)   metroNIDAZOLE (FLAGYL) IVPB 500 mg (has no administration in time range)   nitroglycerin (NITROSTAT) SL tablet 0.4 mg (has no administration in time range)   acetaminophen (TYLENOL) tablet 650 mg (has no administration in time range)   ondansetron (ZOFRAN) tablet 4 mg (has no administration in time range)     Or   ondansetron (ZOFRAN) injection 4 mg (has no administration in time range)   melatonin tablet 3 mg (has no administration in time range)   lactated ringers bolus 1,000 mL (0 mL Intravenous Stopped 8/19/22 1752)       Imaging results:  No radiology results for the last day    Ambulatory status:   - BEDREST    Social issues:   Social History     Socioeconomic History   • Marital status:      Spouse name: Samantha   •  Years of education: High school   Tobacco Use   • Smoking status: Never Smoker   • Smokeless tobacco: Never Used   Vaping Use   • Vaping Use: Never used   Substance and Sexual Activity   • Alcohol use: Not Currently     Comment: NONE SINCE 1997 (quit)   • Drug use: Never     Comment: HAD A DEPENDENCY ON FENTANYL; HASN'T USED SINCE 2011   • Sexual activity: Yes     Partners: Female       NIH Stroke Scale:        Nursing report ED to floor:

## 2022-08-20 PROBLEM — I16.0 HYPERTENSIVE URGENCY: Status: ACTIVE | Noted: 2022-08-20

## 2022-08-20 PROBLEM — M46.26 OSTEOMYELITIS OF LUMBAR SPINE (HCC): Status: ACTIVE | Noted: 2022-08-20

## 2022-08-20 LAB
ANION GAP SERPL CALCULATED.3IONS-SCNC: 12.3 MMOL/L (ref 5–15)
BUN SERPL-MCNC: 37 MG/DL (ref 8–23)
BUN/CREAT SERPL: 8.2 (ref 7–25)
CALCIUM SPEC-SCNC: 8.9 MG/DL (ref 8.6–10.5)
CHLORIDE SERPL-SCNC: 103 MMOL/L (ref 98–107)
CO2 SERPL-SCNC: 25.7 MMOL/L (ref 22–29)
CREAT SERPL-MCNC: 4.51 MG/DL (ref 0.76–1.27)
DEPRECATED RDW RBC AUTO: 46.5 FL (ref 37–54)
EGFRCR SERPLBLD CKD-EPI 2021: 14.1 ML/MIN/1.73
ERYTHROCYTE [DISTWIDTH] IN BLOOD BY AUTOMATED COUNT: 13.4 % (ref 12.3–15.4)
GLUCOSE BLDC GLUCOMTR-MCNC: 120 MG/DL (ref 70–130)
GLUCOSE BLDC GLUCOMTR-MCNC: 169 MG/DL (ref 70–130)
GLUCOSE BLDC GLUCOMTR-MCNC: 185 MG/DL (ref 70–130)
GLUCOSE BLDC GLUCOMTR-MCNC: 190 MG/DL (ref 70–130)
GLUCOSE SERPL-MCNC: 193 MG/DL (ref 65–99)
HBA1C MFR BLD: 6 % (ref 4.8–5.6)
HBV SURFACE AB SER RIA-ACNC: NORMAL
HBV SURFACE AG SERPL QL IA: NORMAL
HCT VFR BLD AUTO: 31.8 % (ref 37.5–51)
HGB BLD-MCNC: 9.9 G/DL (ref 13–17.7)
MCH RBC QN AUTO: 29.8 PG (ref 26.6–33)
MCHC RBC AUTO-ENTMCNC: 31.1 G/DL (ref 31.5–35.7)
MCV RBC AUTO: 95.8 FL (ref 79–97)
PLATELET # BLD AUTO: 151 10*3/MM3 (ref 140–450)
PMV BLD AUTO: 10 FL (ref 6–12)
POTASSIUM SERPL-SCNC: 3.8 MMOL/L (ref 3.5–5.2)
RBC # BLD AUTO: 3.32 10*6/MM3 (ref 4.14–5.8)
SODIUM SERPL-SCNC: 141 MMOL/L (ref 136–145)
WBC NRBC COR # BLD: 5.41 10*3/MM3 (ref 3.4–10.8)

## 2022-08-20 PROCEDURE — G0257 UNSCHED DIALYSIS ESRD PT HOS: HCPCS

## 2022-08-20 PROCEDURE — 85027 COMPLETE CBC AUTOMATED: CPT | Performed by: INTERNAL MEDICINE

## 2022-08-20 PROCEDURE — 80048 BASIC METABOLIC PNL TOTAL CA: CPT | Performed by: INTERNAL MEDICINE

## 2022-08-20 PROCEDURE — 99212 OFFICE O/P EST SF 10 MIN: CPT | Performed by: SURGERY

## 2022-08-20 PROCEDURE — 99213 OFFICE O/P EST LOW 20 MIN: CPT | Performed by: NURSE PRACTITIONER

## 2022-08-20 PROCEDURE — 97530 THERAPEUTIC ACTIVITIES: CPT

## 2022-08-20 PROCEDURE — 82962 GLUCOSE BLOOD TEST: CPT

## 2022-08-20 PROCEDURE — 86706 HEP B SURFACE ANTIBODY: CPT | Performed by: INTERNAL MEDICINE

## 2022-08-20 PROCEDURE — 83036 HEMOGLOBIN GLYCOSYLATED A1C: CPT | Performed by: INTERNAL MEDICINE

## 2022-08-20 PROCEDURE — 63710000001 INSULIN LISPRO (HUMAN) PER 5 UNITS: Performed by: INTERNAL MEDICINE

## 2022-08-20 PROCEDURE — 99223 1ST HOSP IP/OBS HIGH 75: CPT | Performed by: INTERNAL MEDICINE

## 2022-08-20 PROCEDURE — 87340 HEPATITIS B SURFACE AG IA: CPT | Performed by: INTERNAL MEDICINE

## 2022-08-20 PROCEDURE — 97161 PT EVAL LOW COMPLEX 20 MIN: CPT

## 2022-08-20 RX ORDER — OXYCODONE AND ACETAMINOPHEN 10; 325 MG/1; MG/1
1 TABLET ORAL EVERY 6 HOURS PRN
Status: DISCONTINUED | OUTPATIENT
Start: 2022-08-20 | End: 2022-08-21 | Stop reason: HOSPADM

## 2022-08-20 RX ADMIN — HYDRALAZINE HYDROCHLORIDE 50 MG: 50 TABLET, FILM COATED ORAL at 00:11

## 2022-08-20 RX ADMIN — INSULIN LISPRO 2 UNITS: 100 INJECTION, SOLUTION INTRAVENOUS; SUBCUTANEOUS at 08:56

## 2022-08-20 RX ADMIN — OXYCODONE HYDROCHLORIDE AND ACETAMINOPHEN 500 MG: 500 TABLET ORAL at 08:57

## 2022-08-20 RX ADMIN — HYDRALAZINE HYDROCHLORIDE 50 MG: 50 TABLET, FILM COATED ORAL at 06:59

## 2022-08-20 RX ADMIN — Medication 1000 MCG: at 08:57

## 2022-08-20 RX ADMIN — INSULIN GLARGINE-YFGN 10 UNITS: 100 INJECTION, SOLUTION SUBCUTANEOUS at 21:08

## 2022-08-20 RX ADMIN — ASPIRIN 81 MG: 81 TABLET, COATED ORAL at 08:57

## 2022-08-20 RX ADMIN — Medication 220 MG: at 08:58

## 2022-08-20 RX ADMIN — CARVEDILOL 3.12 MG: 3.12 TABLET, FILM COATED ORAL at 08:57

## 2022-08-20 RX ADMIN — OXYCODONE AND ACETAMINOPHEN 1 TABLET: 325; 10 TABLET ORAL at 11:55

## 2022-08-20 RX ADMIN — INSULIN LISPRO 2 UNITS: 100 INJECTION, SOLUTION INTRAVENOUS; SUBCUTANEOUS at 17:44

## 2022-08-20 RX ADMIN — CARVEDILOL 3.12 MG: 3.12 TABLET, FILM COATED ORAL at 17:47

## 2022-08-20 RX ADMIN — Medication 10 ML: at 21:07

## 2022-08-20 RX ADMIN — INSULIN LISPRO 2 UNITS: 100 INJECTION, SOLUTION INTRAVENOUS; SUBCUTANEOUS at 11:55

## 2022-08-20 RX ADMIN — ALPRAZOLAM 1 MG: 1 TABLET ORAL at 09:10

## 2022-08-20 RX ADMIN — PANTOPRAZOLE SODIUM 40 MG: 40 TABLET, DELAYED RELEASE ORAL at 09:10

## 2022-08-20 RX ADMIN — OXYCODONE HYDROCHLORIDE AND ACETAMINOPHEN 500 MG: 500 TABLET ORAL at 21:07

## 2022-08-20 NOTE — PROGRESS NOTES
Name: Angelo Brown ADMIT: 2022   : 1960  PCP: Yadiel Baxter III, MD    MRN: [id number] LOS: 1 days   AGE/SEX: 62 y.o. male  ROOM: [facility]     Subjective   Subjective   CC: abdominal wound  No acute events. Patient overall feels much better. Pain is well controlled. Taking PO. Denies CP/dyspnea/f/c/n/v/d.    Objective   Objective   Vital Signs  Temp:  [98.2 °F (36.8 °C)-98.6 °F (37 °C)] 98.2 °F (36.8 °C)  Heart Rate:  [72-86] 72  Resp:  [18] 18  BP: (195-237)/() 195/110  SpO2:  [93 %-98 %] 98 %  on   ;   Device (Oxygen Therapy): room air  Body mass index is 21.78 kg/m².  Physical Exam  Vitals and nursing note reviewed.   Constitutional:       General: He is not in acute distress.     Appearance: He is not toxic-appearing or diaphoretic.   HENT:      Head: Normocephalic and atraumatic.      Nose: Nose normal.      Mouth/Throat:      Mouth: Mucous membranes are moist.      Pharynx: Oropharynx is clear.   Eyes:      Conjunctiva/sclera: Conjunctivae normal.      Pupils: Pupils are equal, round, and reactive to light.   Cardiovascular:      Rate and Rhythm: Normal rate and regular rhythm.      Pulses: Normal pulses.   Pulmonary:      Effort: Pulmonary effort is normal.      Breath sounds: Normal breath sounds.   Abdominal:      General: Bowel sounds are normal.      Palpations: Abdomen is soft.      Tenderness: There is no abdominal tenderness.   Musculoskeletal:         General: No swelling or tenderness.      Cervical back: Normal range of motion and neck supple.   Skin:     General: Skin is warm and dry.      Capillary Refill: Capillary refill takes less than 2 seconds.      Comments: RUQ lesion unroofed with mild serosanguinous srainage   Neurological:      General: No focal deficit present.      Mental Status: He is alert and oriented to person, place, and time.   Psychiatric:         Mood and Affect: Mood normal.         Behavior: Behavior normal.         Results Review     I  reviewed the patient's new clinical results.  I reviewed the patient's telemetry  I reviewed the patient's CT abdomen/pelvis  Results from last 7 days   Lab Units 08/20/22  0827 08/19/22  1635   WBC 10*3/mm3 5.41 5.97   HEMOGLOBIN g/dL 9.9* 10.6*   PLATELETS 10*3/mm3 151 171     Results from last 7 days   Lab Units 08/20/22  0827 08/19/22  1517   SODIUM mmol/L 141 143   POTASSIUM mmol/L 3.8 3.8   CHLORIDE mmol/L 103 105   CO2 mmol/L 25.7 25.0   BUN mg/dL 37* 31*   CREATININE mg/dL 4.51* 4.40*   GLUCOSE mg/dL 193* 114*   EGFR mL/min/1.73 14.1* 14.6*     Results from last 7 days   Lab Units 08/19/22  1517   ALBUMIN g/dL 3.20*   BILIRUBIN mg/dL 0.4   ALK PHOS U/L 122*   AST (SGOT) U/L 20   ALT (SGPT) U/L 7     Results from last 7 days   Lab Units 08/20/22  0827 08/19/22  1517   CALCIUM mg/dL 8.9 8.9   ALBUMIN g/dL  --  3.20*     Results from last 7 days   Lab Units 08/19/22  2108 08/19/22  1811   LACTATE mmol/L 0.9 2.3*     Hemoglobin A1C   Date/Time Value Ref Range Status   08/20/2022 0827 6.00 (H) 4.80 - 5.60 % Final     Glucose   Date/Time Value Ref Range Status   08/20/2022 1539 185 (H) 70 - 130 mg/dL Final     Comment:     [id number] [id number]   08/20/2022 1039 190 (H) 70 - 130 mg/dL Final     Comment:     Meter: [id number] : [id number] [facility]   08/20/2022 0541 169 (H) 70 - 130 mg/dL Final     Comment:     Meter: [id number] : [id number] [facility] DONAHUE   08/19/2022 2058 118 70 - 130 mg/dL Final     Comment:     Meter: [id number] : [id number] [facility] DONAHUE       No radiology results for the last day  Scheduled Medications  ascorbic acid, 500 mg, Oral, BID  aspirin, 81 mg, Oral, Daily  carvedilol, 3.125 mg, Oral, BID With Meals  insulin glargine, 10 Units, Subcutaneous, Nightly  insulin lispro, 0-9 Units, Subcutaneous, TID With Meals  lidocaine 1% - EPINEPHrine [id number], 10 mL, Injection, Once  pantoprazole, 40 mg, Oral, Daily  simvastatin, 40 mg, Oral,  Daily  vitamin B-12, 1,000 mcg, Oral, Daily  zinc sulfate, 220 mg, Oral, Daily    Infusions   Diet  Diet Regular; Cardiac, GI Soft, Renal, Consistent Carbohydrate       Assessment/Plan     Active Hospital Problems    Diagnosis  POA   • Hypertensive urgency [I16.0]  Yes   • Osteomyelitis of lumbar spine (HCC) [M46.26]  Yes   • Abscess of skin of abdomen [L02.211]  Yes   • Liver cirrhosis (HCC) [K74.60]  Yes   • Type 2 diabetes mellitus with hyperglycemia, without long-term current use of insulin (HCC) [E11.65]  Yes   • CAD (coronary artery disease) [I25.10]  Yes      Resolved Hospital Problems   No resolved problems to display.   Abdominal Wound  - at site of prior cholecystostomy tube   - no evidence of abscess or cellulitis  - unroofed at bedside, continue local wound care  - appreciate general surgery recs    Hx of Lumbar Spine Osteomyelitis/Epidural Abscess  - completed abx-no clear evidence of ongoing infection  - appreciate ID and MOMO evaluation  - no surgical intervention or ongoing abx recommended at this time  - follow-up with Dr. Tyson as scheduled at the end of the month    Hypertensive Urgency  - patient missed dialysis yesterday, should improve with dialysis  - continue his home antihypertensive regimen  - HD planned today    ESRD  - HD MWF-missed this past Friday  - HD today as above, then resume normal schedule  - appreciate nephrology recs    Type 2 DM  - continue lantus 10 units nightly  - cover with ssi/hypoglycemia protocol      SCDs for DVT prophylaxis.  Full code.  Discussed with patient, family and nursing staff.  Anticipate discharge home tomorrow.      Kyle Naik MD  [facility]  08/20/22  17:17 EDT

## 2022-08-20 NOTE — PLAN OF CARE
Pt AAXO3, NAD, c/o chronic back pain gave percocet, BP better later in shift, [facility] notified of HD today. Spoke with MRI screening form completed.      Problem: Adult Inpatient Plan of Care  Goal: Plan of Care Review  Outcome: Ongoing, Progressing  Goal: Patient-Specific Goal (Individualized)  Outcome: Ongoing, Progressing  Goal: Absence of Hospital-Acquired Illness or Injury  Outcome: Ongoing, Progressing  Intervention: Identify and Manage Fall Risk  Recent Flowsheet Documentation  Taken 8/20/2022 1400 by Laure Mack RN  Safety Promotion/Fall Prevention:   activity supervised   assistive device/personal items within reach   clutter free environment maintained   fall prevention program maintained   nonskid shoes/slippers when out of bed   room organization consistent   safety round/check completed  Taken 8/20/2022 0856 by Laure Mack RN  Safety Promotion/Fall Prevention:   activity supervised   assistive device/personal items within reach   clutter free environment maintained   fall prevention program maintained   nonskid shoes/slippers when out of bed   room organization consistent   safety round/check completed  Intervention: Prevent Skin Injury  Recent Flowsheet Documentation  Taken 8/20/2022 1800 by Laure Mack RN  Body Position: position changed independently  Taken 8/20/2022 1600 by Laure Mack RN  Body Position: position changed independently  Taken 8/20/2022 1400 by Laure Mack RN  Body Position: position changed independently  Taken 8/20/2022 0856 by Laure Mack RN  Body Position: sitting up in bed  Skin Protection: adhesive use limited  Intervention: Prevent and Manage VTE (Venous Thromboembolism) Risk  Recent Flowsheet Documentation  Taken 8/20/2022 1400 by Laure Mack RN  Activity Management: activity adjusted per tolerance  Taken 8/20/2022 0856 by Laure Mack RN  Activity Management: activity adjusted per tolerance  Intervention: Prevent Infection  Recent  Flowsheet Documentation  Taken 8/20/2022 1400 by Laure Mack RN  Infection Prevention:   rest/sleep promoted   single patient room provided  Taken 8/20/2022 1200 by Laure Mack RN  Infection Prevention:   single patient room provided   rest/sleep promoted  Taken 8/20/2022 0856 by Laure Mack RN  Infection Prevention:   rest/sleep promoted   single patient room provided  Goal: Optimal Comfort and Wellbeing  Outcome: Ongoing, Progressing  Intervention: Monitor Pain and Promote Comfort  Recent Flowsheet Documentation  Taken 8/20/2022 0856 by Laure Mack RN  Pain Management Interventions: (MD notified) other (see comments)  Intervention: Provide Person-Centered Care  Recent Flowsheet Documentation  Taken 8/20/2022 1400 by Laure Mack RN  Trust Relationship/Rapport: care explained  Taken 8/20/2022 0856 by Laure Mack RN  Trust Relationship/Rapport:   care explained   thoughts/feelings acknowledged   reassurance provided   questions encouraged   questions answered   emotional support provided  Goal: Readiness for Transition of Care  Outcome: Ongoing, Progressing     Problem: Fall Injury Risk  Goal: Absence of Fall and Fall-Related Injury  Outcome: Ongoing, Progressing  Intervention: Identify and Manage Contributors  Recent Flowsheet Documentation  Taken 8/20/2022 1400 by Laure Mack RN  Medication Review/Management: medications reviewed  Self-Care Promotion:   independence encouraged   BADL personal objects within reach   BADL personal routines maintained  Taken 8/20/2022 0856 by Laure Mack RN  Medication Review/Management: medications reviewed  Self-Care Promotion:   independence encouraged   BADL personal objects within reach   BADL personal routines maintained  Intervention: Promote Injury-Free Environment  Recent Flowsheet Documentation  Taken 8/20/2022 1400 by Laure Mack RN  Safety Promotion/Fall Prevention:   activity supervised   assistive device/personal items within  reach   clutter free environment maintained   fall prevention program maintained   nonskid shoes/slippers when out of bed   room organization consistent   safety round/check completed  Taken 8/20/2022 0856 by Laure Mack RN  Safety Promotion/Fall Prevention:   activity supervised   assistive device/personal items within reach   clutter free environment maintained   fall prevention program maintained   nonskid shoes/slippers when out of bed   room organization consistent   safety round/check completed     Problem: Skin Injury Risk Increased  Goal: Skin Health and Integrity  Outcome: Ongoing, Progressing  Intervention: Optimize Skin Protection  Recent Flowsheet Documentation  Taken 8/20/2022 1800 by Laure Mack RN  Head of Bed (HOB) Positioning: HOB elevated  Taken 8/20/2022 1600 by Laure Mack RN  Head of Bed (HOB) Positioning: HOB elevated  Taken 8/20/2022 1400 by Laure Mack RN  Head of Bed (HOB) Positioning: HOB elevated  Taken 8/20/2022 1200 by Laure Mack RN  Head of Bed (HOB) Positioning: HOB elevated  Taken 8/20/2022 0856 by Laure Mack RN  Pressure Reduction Techniques: frequent weight shift encouraged  Head of Bed (HOB) Positioning: HOB elevated  Pressure Reduction Devices: pressure-redistributing mattress utilized  Skin Protection: adhesive use limited     Problem: Pain Acute  Goal: Acceptable Pain Control and Functional Ability  Outcome: Ongoing, Progressing  Intervention: Prevent or Manage Pain  Recent Flowsheet Documentation  Taken 8/20/2022 1400 by Laure Mack RN  Medication Review/Management: medications reviewed  Taken 8/20/2022 0856 by Laure Mack RN  Medication Review/Management: medications reviewed  Intervention: Develop Pain Management Plan  Recent Flowsheet Documentation  Taken 8/20/2022 0856 by Laure Mack RN  Pain Management Interventions: (MD notified) other (see comments)   Goal Outcome Evaluation:

## 2022-08-20 NOTE — PLAN OF CARE
Goal Outcome Evaluation:  Plan of Care Reviewed With: patient           Outcome Evaluation: Patient is a 62y.o male who presents to [facility] with abdominal pain and an abdominal wound. Patient pmhx includes CVA and osteomyelitis. Patient had a recent hospital admission for about 1 month in June.  Patient AOx3 today. Patient appeared mildly confused at time. Wife present at bedside to assist with hx. Patient lives at home with his wife with 1 small step to enter. Patient uses a rollator for ambulation. Patient typically only ambulates short household distances. Patients wife assists with ADLs. Today patient sat up to EOB with Teresa and VCs for sequencing. Patient required Teresa and VCs for STS today. Patient ambulated 35ft with rwx and CGA. Patient required VCs to stay close to rwx. Patient demonstrates decreased strength, balance and activity endurance this date. Patient would continue to benefit from skilled PT to address deficits in mobility. PT recommends home with assist and HHPT vs outpatient PT at d/c. PT will continue to monitor.
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
## 2022-08-20 NOTE — NURSING NOTE
Paged Byron COSTELLO to report pt's B/P still elevated and request any new orders.    Pt received labetalol 10 mg IV once in ED at 1928 for B/P 224/107 and B/P has still been in the 200's/100's since then as of 2253.  Pt reports having had elevated B/P for a few months like this.  Pt's wife stated he was on hydralazine but that his B/P would get too low so MD riggs/arik'd it.      Pt has a hx of CVA with some cognitive and visual deficits per pt and pt's wife.  Pt's osteomyelitis is reportedly the cause of his immobility but he is reportedly at baseline as far as physical capabilities and is unable to walk.    Gus COSTELLO called back and I reported the above, TRANG will input orders for hydralazine.
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
## 2022-08-20 NOTE — PROGRESS NOTES
Chief Complaint:    Abdominal wall abscess    Subjective:    The patient is feeling well with no complaints.  The abdominal wall lesion ruptured last night and has been draining purulent type fluid.    Objective:    Temp:  [98.2 °F (36.8 °C)-98.6 °F (37 °C)] 98.2 °F (36.8 °C)  Heart Rate:  [72-77] 72  Resp:  [18] 18  BP: (176-237)/() 176/98    Physical Exam  Constitutional:       Appearance: He is ill-appearing. He is not toxic-appearing.   Abdominal:      Palpations: Abdomen is soft.      Tenderness: There is no abdominal tenderness.      Comments: The right upper quadrant lesion has opened with purulent drainage.  There is no surrounding cellulitis.   Neurological:      Mental Status: He is alert.   Psychiatric:         Behavior: Behavior is cooperative.         Results:    WBC is 5.41.  H/H is 9.9/31.8.    Assessment/Plan:    The patient has a draining tract from the previous cholecystostomy tube in the right upper quadrant.  There is no surrounding cellulitis and there is no need for antibiotics.  We will perform dressing changes to manage.  There should be no need for surgical intervention.    Harshad Cotto Jr., M.D.
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
## 2022-08-21 ENCOUNTER — READMISSION MANAGEMENT (OUTPATIENT)
Dept: CALL CENTER | Facility: HOSPITAL | Age: 62
End: 2022-08-21

## 2022-08-21 VITALS
RESPIRATION RATE: 16 BRPM | BODY MASS INDEX: 21.55 KG/M2 | OXYGEN SATURATION: 96 % | SYSTOLIC BLOOD PRESSURE: 165 MMHG | DIASTOLIC BLOOD PRESSURE: 86 MMHG | TEMPERATURE: 98 F | WEIGHT: 145.5 LBS | HEIGHT: 69 IN | HEART RATE: 75 BPM

## 2022-08-21 LAB
GLUCOSE BLDC GLUCOMTR-MCNC: 115 MG/DL (ref 70–130)
GLUCOSE BLDC GLUCOMTR-MCNC: 193 MG/DL (ref 70–130)
MAGNESIUM SERPL-MCNC: 1.8 MG/DL (ref 1.6–2.4)

## 2022-08-21 PROCEDURE — 83735 ASSAY OF MAGNESIUM: CPT

## 2022-08-21 PROCEDURE — 82962 GLUCOSE BLOOD TEST: CPT

## 2022-08-21 PROCEDURE — G0378 HOSPITAL OBSERVATION PER HR: HCPCS

## 2022-08-21 PROCEDURE — 63710000001 INSULIN LISPRO (HUMAN) PER 5 UNITS: Performed by: INTERNAL MEDICINE

## 2022-08-21 RX ORDER — HYDRALAZINE HYDROCHLORIDE 50 MG/1
50 TABLET, FILM COATED ORAL 3 TIMES DAILY
Qty: 90 TABLET | Refills: 0 | Status: SHIPPED | OUTPATIENT
Start: 2022-08-21 | End: 2022-12-26

## 2022-08-21 RX ORDER — HYDRALAZINE HYDROCHLORIDE 50 MG/1
50 TABLET, FILM COATED ORAL 3 TIMES DAILY
Status: DISCONTINUED | OUTPATIENT
Start: 2022-08-21 | End: 2022-08-21 | Stop reason: HOSPADM

## 2022-08-21 RX ORDER — HYDRALAZINE HYDROCHLORIDE 50 MG/1
50 TABLET, FILM COATED ORAL ONCE
Status: COMPLETED | OUTPATIENT
Start: 2022-08-21 | End: 2022-08-21

## 2022-08-21 RX ADMIN — CARVEDILOL 3.12 MG: 3.12 TABLET, FILM COATED ORAL at 09:52

## 2022-08-21 RX ADMIN — ASPIRIN 81 MG: 81 TABLET, COATED ORAL at 09:52

## 2022-08-21 RX ADMIN — HYDRALAZINE HYDROCHLORIDE 50 MG: 50 TABLET, FILM COATED ORAL at 01:47

## 2022-08-21 RX ADMIN — OXYCODONE AND ACETAMINOPHEN 1 TABLET: 325; 10 TABLET ORAL at 09:52

## 2022-08-21 RX ADMIN — OXYCODONE HYDROCHLORIDE AND ACETAMINOPHEN 500 MG: 500 TABLET ORAL at 09:52

## 2022-08-21 RX ADMIN — PANTOPRAZOLE SODIUM 40 MG: 40 TABLET, DELAYED RELEASE ORAL at 09:52

## 2022-08-21 RX ADMIN — HYDRALAZINE HYDROCHLORIDE 50 MG: 50 TABLET, FILM COATED ORAL at 09:52

## 2022-08-21 RX ADMIN — Medication 220 MG: at 09:52

## 2022-08-21 RX ADMIN — INSULIN LISPRO 2 UNITS: 100 INJECTION, SOLUTION INTRAVENOUS; SUBCUTANEOUS at 12:15

## 2022-08-21 RX ADMIN — Medication 1000 MCG: at 09:52

## 2022-08-21 RX ADMIN — OXYCODONE AND ACETAMINOPHEN 1 TABLET: 325; 10 TABLET ORAL at 01:46

## 2022-08-21 NOTE — PLAN OF CARE
Goal Outcome Evaluation:         Pt A&Ox4, R/A, NSR, pain treated once this shift with Percocet; HD this shift, 3L removed, B/P was still elevated post HD and was treated with hydralazine; B/P improved.  Abscess draining, plan for d/c today and no surgical intervention per MD notes.        Problem: Adult Inpatient Plan of Care  Goal: Plan of Care Review  Outcome: Ongoing, Progressing  Goal: Optimal Comfort and Wellbeing  Outcome: Ongoing, Progressing  Intervention: Monitor Pain and Promote Comfort  Description: Assess pain level, treatment efficacy and patient response at regular intervals using a consistent pain scale.  Consider the presence and impact of preexisting chronic pain.  Encourage patient and caregiver involvement in pain assessment, interventions and safety measures.  Recent Flowsheet Documentation  Taken 8/21/2022 0042 by Georgiana Mondragon, RN  Pain Management Interventions:   care clustered   diversional activity provided   pillow support provided   position adjusted   quiet environment facilitated  Taken 8/20/2022 2107 by Georgiana Mondragon, RN  Pain Management Interventions:   care clustered   diversional activity provided   pillow support provided   position adjusted   medication offered but refused   quiet environment facilitated  Intervention: Provide Person-Centered Care  Description: Use a family-focused approach to care.  Develop trust and rapport by proactively providing information, encouraging questions, addressing concerns and offering reassurance.  Acknowledge emotional response to hospitalization.  Recognize and utilize personal coping strategies.  Honor spiritual and cultural preferences.  Recent Flowsheet Documentation  Taken 8/21/2022 0042 by Georgiana Mondragon, RN  Trust Relationship/Rapport:   care explained   choices provided   questions answered   reassurance provided   thoughts/feelings acknowledged   questions encouraged   empathic listening provided  Taken 8/20/2022 2107 by Giancarlo  MORENA Stoner  Trust Relationship/Rapport:   care explained   choices provided   empathic listening provided   reassurance provided   thoughts/feelings acknowledged   questions answered   questions encouraged  Goal: Readiness for Transition of Care  Outcome: Ongoing, Progressing     Problem: Fall Injury Risk  Goal: Absence of Fall and Fall-Related Injury  Outcome: Ongoing, Progressing  Intervention: Identify and Manage Contributors  Description: Develop a fall prevention plan with the patient and caregiver/family.  Provide reorientation, appropriate sensory stimulation and routines with changes in mental status to decrease risk of fall.  Promote use of personal vision and auditory aids.  Assess assistance level required for safe and effective self-care; provide support as needed, such as toileting, mobilization. For age 65 and older, implement timed toileting with assistance.  Encourage physical activity, such as performance of mobility and self-care at highest level of patient ability, multicomponent exercise program and provision of appropriate assistive devices.  If fall occurs, assess the severity of injury; implement fall injury protocol. Determine the cause and revise fall injury prevention plan.  Regularly review medication contribution to fall risk; adjust medication administration times to minimize risk of falling.  Consider risk related to polypharmacy and age.  Balance adequate pain management with potential for oversedation.  Recent Flowsheet Documentation  Taken 8/21/2022 0436 by Georgiana Mondragon, RN  Medication Review/Management:   medications reviewed   high-risk medications identified  Taken 8/21/2022 0042 by Georgiana Mondragon, RN  Medication Review/Management:   medications reviewed   high-risk medications identified  Self-Care Promotion:   independence encouraged   BADL personal objects within reach   safe use of adaptive equipment encouraged  Taken 8/20/2022 4950 by Georgiana Mondragon, RN  Medication  Review/Management: medications reviewed  Taken 8/20/2022 2107 by Georgiana Mondragon, RN  Medication Review/Management:   medications reviewed   high-risk medications identified  Self-Care Promotion:   independence encouraged   BADL personal objects within reach   safe use of adaptive equipment encouraged  Intervention: Promote Injury-Free Environment  Description: Provide a safe, barrier-free environment that encourages independent activity.  Keep care area uncluttered and well-lighted.  Determine need for increased observation or monitoring.  Avoid use of devices that minimize mobility, such as restraints or indwelling urinary catheter.  Recent Flowsheet Documentation  Taken 8/21/2022 0436 by Georgiana Mondragon, RN  Safety Promotion/Fall Prevention: safety round/check completed  Taken 8/21/2022 0042 by Georgiana Mondragon RN  Safety Promotion/Fall Prevention: safety round/check completed  Taken 8/20/2022 2248 by Georgiana Mondragon, RN  Safety Promotion/Fall Prevention: safety round/check completed  Taken 8/20/2022 2107 by Georgiana Mondragon RN  Safety Promotion/Fall Prevention: safety round/check completed     Problem: Skin Injury Risk Increased  Goal: Skin Health and Integrity  Outcome: Ongoing, Progressing  Intervention: Promote and Optimize Oral Intake  Description: Perform a nutrition assessment; include a nutrition-focused physical exam.  Determine calorie, protein, vitamin, mineral and fluid requirements.  Assess for micronutrient deficiencies; supplement if depleted.  Assess need and assist with meal set-up and feeding.  Adjust diet or meal schedule based on preferences and tolerance.  Offer oral supplemental food or drinks to enhance calorie and protein intake.  Establish bowel elimination program to increase comfort and appetite.  Minimize unnecessary dietary restrictions to increase oral intake.  Provide and encourage oral hygiene to enhance desire to eat.  Consider enteral nutrition support if oral intake remains  inadequate; provide parenteral nutrition if enteral is contraindicated.  Recent Flowsheet Documentation  Taken 8/21/2022 0042 by Georgiana Mondragon, RN  Oral Nutrition Promotion:   safe use of adaptive equipment encouraged   rest periods promoted  Taken 8/20/2022 2107 by Georgiana Mondragon, RN  Oral Nutrition Promotion:   rest periods promoted   safe use of adaptive equipment encouraged  Intervention: Optimize Skin Protection  Description: Perform a full pressure injury risk assessment, as indicated by screening, upon admission to care unit.  Reassess skin (injury risk, full inspection) frequently (e.g., scheduled interval, with change in condition) to provide optimal early detection and prevention.  Maintain adequate tissue perfusion (e.g., encourage fluid balance; avoid crossing legs, constrictive clothing or devices) to promote tissue oxygenation.  Maintain head of bed at lowest degree of elevation tolerated, considering medical condition and other restrictions.  Avoid positioning onto an area that remains reddened.  Minimize incontinence and moisture (e.g., toileting schedule; moisture-wicking pad, diaper or incontinence collection device; skin moisture barrier).  Cleanse skin promptly and gently when soiled utilizing a pH-balanced cleanser.  Relieve and redistribute pressure (e.g., scheduled position changes, weight shifts, use of support surface, medical device repositioning, protective dressing application, use of positioning device, microclimate control, use of pressure-injury-monitor  Encourage increased activity, such as sitting in a chair at the bedside or early mobilization, when able to tolerate.  Recent Flowsheet Documentation  Taken 8/21/2022 0436 by Georgiana Mondragon, RN  Head of Bed (HOB) Positioning: HOB elevated  Taken 8/21/2022 0042 by Georgiana Mondragon, RN  Pressure Reduction Techniques: frequent weight shift encouraged  Head of Bed (HOB) Positioning: HOB elevated  Pressure Reduction Devices:    pressure-redistributing mattress utilized   positioning supports utilized  Skin Protection:   adhesive use limited   transparent dressing maintained   tubing/devices free from skin contact  Taken 8/20/2022 2248 by Georgiana Mondragon, RN  Head of Bed (HOB) Positioning: HOB elevated  Taken 8/20/2022 2107 by Georgiana Mondragon, RN  Pressure Reduction Techniques:   frequent weight shift encouraged   weight shift assistance provided  Head of Bed (HOB) Positioning: HOB elevated  Pressure Reduction Devices:   pressure-redistributing mattress utilized   positioning supports utilized  Skin Protection:   adhesive use limited   incontinence pads utilized   transparent dressing maintained   tubing/devices free from skin contact     Problem: Pain Acute  Goal: Acceptable Pain Control and Functional Ability  Outcome: Ongoing, Progressing  Intervention: Prevent or Manage Pain  Description: Evaluate pain level, effect of treatment and patient response at regular intervals.  Minimize painful stimuli; coordinate care and adjust environment (e.g., light, noise, unnecessary movement); promote sleep/rest.  Match pharmacologic analgesia to severity and type of pain mechanism (e.g., neuropathic, muscle, inflammatory); consider multimodal approach (e.g., nonopioid, opioid, adjuvant).  Provide medication at regular intervals; titrate to patient response; premedicate for painful procedures.  Manage breakthrough pain with additional doses; consider rotation or switching medication.  Monitor for signs of substance tolerance (increased dose to reach desired effect, decreased effect with same dose).  Manage medication-induced effects, such as constipation, nausea, pruritus, urinary retention, somnolence and dizziness.  Provide multimodal interventions, such as as physical activity, therapeutic exercise, yoga, TENS (transcutaneous electrical nerve stimulation) and manual therapy.  Train in functional activity modifications, such as body mechanics,  posture, ergonomics, energy conservation and activity pacing.  Consider addition of complementary or alternative therapy, such as acupuncture, hypnosis or therapeutic touch.  Recent Flowsheet Documentation  Taken 8/21/2022 0436 by Georgiana Mondragon RN  Medication Review/Management:   medications reviewed   high-risk medications identified  Taken 8/21/2022 0042 by Georgiana Mondragon RN  Sleep/Rest Enhancement:   awakenings minimized   consistent schedule promoted   room darkened  Medication Review/Management:   medications reviewed   high-risk medications identified  Taken 8/20/2022 2248 by Georgiana Mondragon RN  Medication Review/Management: medications reviewed  Taken 8/20/2022 2107 by Georgiana Mondragon RN  Sensory Stimulation Regulation:   care clustered   television on  Bowel Elimination Promotion: adequate fluid intake promoted  Sleep/Rest Enhancement:   awakenings minimized   consistent schedule promoted  Medication Review/Management:   medications reviewed   high-risk medications identified  Intervention: Develop Pain Management Plan  Description: Acknowledge patient as the expert in pain self-management.  Use a consistent, validated tool for pain assessment; include function and quality of life.  Evaluate risk for opioid use and dependence.  Set pain management goals; determine acceptable level of discomfort to allow for maximal functioning.  Determine [id number] pain management plan, including both pharmacologic and nonpharmacologic measures; integrate management of chronic (persistent) pain.  Identify and integrate past successful treatment measures, if able.  Encourage patient and caregiver involvement in pain assessment, interventions and safety measures.  Re-evaluate plan regularly.  Recent Flowsheet Documentation  Taken 8/21/2022 0042 by Georgiana Mondragon RN  Pain Management Interventions:   care clustered   diversional activity provided   pillow support provided   position adjusted   quiet  environment facilitated  Taken 8/20/2022 2107 by Georgiana Mondragon, RN  Pain Management Interventions:   care clustered   diversional activity provided   pillow support provided   position adjusted   medication offered but refused   quiet environment facilitated  Intervention: Optimize Psychosocial Wellbeing  Description: Facilitate patient’s self-control over pain by providing pain information and allowing choices in treatment.  Consider and address emotional response to pain.  Explore and promote use of coping strategies; address barriers to successful coping.  Evaluate and assist with psychosocial, cultural and spiritual factors impacting pain.  Modify pain perception using techniques, such as distraction, mindfulness, guided imagery, meditation or music.  Assess for risk factors for developing chronic pain, such as depression, fear, pain avoidance and pain catastrophizing.  Consider referral for ongoing coping support, such as education, relaxation training and role of thoughts.  Recent Flowsheet Documentation  Taken 8/21/2022 0042 by Georgiana Mondragon, RN  Supportive Measures:   active listening utilized   decision-making supported   verbalization of feelings encouraged   self-responsibility promoted  Diversional Activities: television  Taken 8/20/2022 2107 by Georgiana Mondragon, RN  Supportive Measures:   active listening utilized   decision-making supported   verbalization of feelings encouraged  Diversional Activities: television

## 2022-08-21 NOTE — NURSING NOTE
Paged [facility] to report pt had a 16-beat run of SVT at approx 2230.  Denies any heart symptoms this admission.    Received call back from TRANG Guerrero and reported the above as well as the fact that pt had HD tonight and his B/P are elevated again but I paged nephrology for this as pt has them on board also.  Gus COSTELLO will add a Mag lab for AM draw and a once dose of hydralazine and let nephrology decide further.

## 2022-08-21 NOTE — NURSING NOTE
HD nurse reported pt's B/P 209/119, pt's HD complete and 3L were removed.    Paged nephrology to Dr. Angel.  Last night, APRN had ordered hydralazine 50 mg PO Q8hrs but there were a limited # of doses ordered.  Need to know if any B/P meds are indicated at this time.    Repaged Dr. Angel and reported the above.  Received order for Hydralazine 50 mg PO TID.  Repeated and verified.

## 2022-08-21 NOTE — NURSING NOTE
HD completed as ordered; no complications.  3L net UF.  VSS; Pre-/81, Post-/119.  Needles removed; hemostasis achieved.  Report given to primary RN; discussed post-tx HTN.

## 2022-08-21 NOTE — PLAN OF CARE
Pt AAOX3, vs stable BP better today, NAD Pt to call wife for transport.      Problem: Adult Inpatient Plan of Care  Goal: Plan of Care Review  Outcome: Met  Goal: Patient-Specific Goal (Individualized)  Outcome: Met  Goal: Absence of Hospital-Acquired Illness or Injury  Outcome: Met  Intervention: Identify and Manage Fall Risk  Recent Flowsheet Documentation  Taken 8/21/2022 0950 by Laure Mack RN  Safety Promotion/Fall Prevention:   activity supervised   assistive device/personal items within reach   clutter free environment maintained   fall prevention program maintained   nonskid shoes/slippers when out of bed   room organization consistent   safety round/check completed  Intervention: Prevent Skin Injury  Recent Flowsheet Documentation  Taken 8/21/2022 1001 by Laure Mack RN  Body Position:   tilted   left  Taken 8/21/2022 0950 by Laure Mack RN  Body Position: position changed independently  Skin Protection: adhesive use limited  Taken 8/21/2022 0800 by Lauer Mack RN  Body Position: position changed independently  Intervention: Prevent and Manage VTE (Venous Thromboembolism) Risk  Recent Flowsheet Documentation  Taken 8/21/2022 0950 by Laure Mack RN  Activity Management: activity adjusted per tolerance  Intervention: Prevent Infection  Recent Flowsheet Documentation  Taken 8/21/2022 0950 by Laure Mack RN  Infection Prevention:   single patient room provided   rest/sleep promoted  Goal: Optimal Comfort and Wellbeing  Outcome: Met  Intervention: Monitor Pain and Promote Comfort  Recent Flowsheet Documentation  Taken 8/21/2022 0950 by Laure Mack RN  Pain Management Interventions: see MAR  Intervention: Provide Person-Centered Care  Recent Flowsheet Documentation  Taken 8/21/2022 0950 by Laure Mack RN  Trust Relationship/Rapport:   care explained   thoughts/feelings acknowledged   reassurance provided   questions encouraged   questions answered   emotional support  provided  Goal: Readiness for Transition of Care  Outcome: Met     Problem: Fall Injury Risk  Goal: Absence of Fall and Fall-Related Injury  Outcome: Met  Intervention: Identify and Manage Contributors  Recent Flowsheet Documentation  Taken 8/21/2022 0950 by Laure Mack RN  Medication Review/Management: medications reviewed  Self-Care Promotion:   independence encouraged   BADL personal objects within reach   BADL personal routines maintained  Intervention: Promote Injury-Free Environment  Recent Flowsheet Documentation  Taken 8/21/2022 0950 by Laure Mack RN  Safety Promotion/Fall Prevention:   activity supervised   assistive device/personal items within reach   clutter free environment maintained   fall prevention program maintained   nonskid shoes/slippers when out of bed   room organization consistent   safety round/check completed     Problem: Skin Injury Risk Increased  Goal: Skin Health and Integrity  Outcome: Met  Intervention: Optimize Skin Protection  Recent Flowsheet Documentation  Taken 8/21/2022 1200 by Laure Mack RN  Head of Bed (HOB) Positioning: HOB elevated  Taken 8/21/2022 1001 by Laure Mack RN  Head of Bed (HOB) Positioning: HOB elevated  Taken 8/21/2022 0950 by Laure Mack RN  Pressure Reduction Techniques: frequent weight shift encouraged  Head of Bed (HOB) Positioning: HOB elevated  Pressure Reduction Devices: pressure-redistributing mattress utilized  Skin Protection: adhesive use limited  Taken 8/21/2022 0800 by Laure Mack RN  Head of Bed (HOB) Positioning: HOB elevated     Problem: Pain Acute  Goal: Acceptable Pain Control and Functional Ability  Outcome: Met  Intervention: Prevent or Manage Pain  Recent Flowsheet Documentation  Taken 8/21/2022 0950 by Laure Mack RN  Medication Review/Management: medications reviewed  Intervention: Develop Pain Management Plan  Recent Flowsheet Documentation  Taken 8/21/2022 0950 by Laure Mack RN  Pain Management  Interventions: see MAR  Intervention: Optimize Psychosocial Wellbeing  Recent Flowsheet Documentation  Taken 8/21/2022 0950 by Laure Mack, RN  Diversional Activities: television   Goal Outcome Evaluation:

## 2022-08-21 NOTE — DISCHARGE SUMMARY
Date of Admission: 8/19/2022  Date of Discharge:  8/21/2022  Primary Care Physician: Yadiel Baxter III, MD     Discharge Diagnosis:  Active Hospital Problems    Diagnosis  POA   • Hypertensive urgency [I16.0]  Yes   • Osteomyelitis of lumbar spine (HCC) [M46.26]  Yes   • Abscess of skin of abdomen [L02.211]  Yes   • Liver cirrhosis (HCC) [K74.60]  Yes   • Type 2 diabetes mellitus with hyperglycemia, without long-term current use of insulin (HCC) [E11.65]  Yes   • CAD (coronary artery disease) [I25.10]  Yes      Resolved Hospital Problems   No resolved problems to display.       Presenting Problem/History of Present Illness:  Chronic cholecystitis [K81.1]  Abscess of skin of abdomen [L02.211]  Discitis, unspecified spinal region [M46.40]     Hospital Course:  The patient is a 62 y.o. male with a history of HTN, Type 2 DM, liver cirrhosis, ESRD, and recent hospitalization at [facility] where he was treated for lumbar discitis/osteomyelitis/epidural abscess who presented with a right upper quadrant wound that was swelling. This was at the site where he had had a cholecystostomy tube. Please see admission H&P for further details. He was evaluated by general surgery and and his wound was unroofed at bedside, after which he had satisfactory drainage. He had no evidence of cellulitis or abscess. His wound has noticeably improved without antibiotics. He was also evaluated by infectious diseases and neurosurgery. There was no evidence of ongoing infection and no antibiotics or procedural interventions were recommended. He has follow up with [facility] neurosurgery. He was also quite hypertensive but this was mostly due to the fact that he missed his Friday dialysis due to the above issues. He underwent dialysis on Saturday 8/20 with improvement in his blood pressure. Nephrology evaluated him and added hydralazine to his regimen.   He is medically stable and will be discharged home. I discussed with the patient as well as  "his wife over the phone.    Exam Today:  Blood pressure 165/86, pulse 75, temperature 98 °F (36.7 °C), temperature source Oral, resp. rate 16, height 175.3 cm (69\"), weight 66 kg (145 lb 8.1 oz), SpO2 96 %.  Vitals and nursing note reviewed.   Constitutional:       General: He is not in acute distress.     Appearance: He is not toxic-appearing or diaphoretic.   HENT:      Head: Normocephalic and atraumatic.      Nose: Nose normal.      Mouth/Throat:      Mouth: Mucous membranes are moist.      Pharynx: Oropharynx is clear.   Eyes:      Conjunctiva/sclera: Conjunctivae normal.      Pupils: Pupils are equal, round, and reactive to light.   Cardiovascular:      Rate and Rhythm: Normal rate and regular rhythm.      Pulses: Normal pulses.   Pulmonary:      Effort: Pulmonary effort is normal.      Breath sounds: Normal breath sounds.   Abdominal:      General: Bowel sounds are normal.      Palpations: Abdomen is soft.      Tenderness: There is no abdominal tenderness.   Musculoskeletal:         General: No swelling or tenderness.      Cervical back: Normal range of motion and neck supple.   Skin:     General: Skin is warm and dry.      Capillary Refill: Capillary refill takes less than 2 seconds.      Comments: RUQ lesion unroofed with minimal serous drainage.No surrounding erythema or fluctuance  Neurological:      General: No focal deficit present.      Mental Status: He is alert and oriented to person, place, and time.   Psychiatric:         Mood and Affect: Mood normal.         Behavior: Behavior normal.     Procedures Performed:  CT SCAN OF THE ABDOMEN AND PELVIS WITHOUT CONTRAST     HISTORY: Cirrhosis. Has had recent removal of a cholecystostomy drain  with drainage from the percutaneous site.     The CT scan was performed as an emergency procedure through the abdomen  and pelvis without contrast. It is compared to a previous CT scan  performed here and dated 03/12/2018. There are no outside CT scans  available for " comparison. The following findings are present:  1. There is a cirrhotic morphology of the liver combined with  enlargement of the spleen and there is now ascites scattered throughout  the abdomen and extending into the pelvis. The ascites is new since  2018. There is slight further enlargement of the spleen.  2. There is generalized thickening of the gallbladder wall and the  previous percutaneous drain tract is visualized extending anterior to  the gallbladder and to the skin surface and consistent with the area of  clinical drainage. The thickened gallbladder measures up to 4.1 x 8.4  cm. There is a suspicion of several very small stones in the adjacent  common bile duct as seen on images 50 through 53. There is also some  haziness of the fat planes surrounding the thickened gallbladder and  some mild generalized haziness and thickening of the mesentery and I  cannot exclude the possibility of an element of peritonitis and clinical  correlation is required.  3. There is a moderately large right pleural effusion layering  posteriorly associated with some adjacent localized compressive  atelectasis and possible component of pneumonia. The left lung base is  clear.  4. There are no focal lesions identified within the liver or spleen. The  pancreas is not enlarged. The adrenal glands and both kidneys are  unremarkable without intravenous contrast. There is no aortic aneurysm.  5. The large and small bowel loops are normal in caliber. No abnormality  is seen in the pelvis.  6. At bone windows, there are extensive changes at L3-4 and L4-5 disc  spaces with erosive changes of the endplates and surrounding phlegmonous  change at both levels and highly suspicious for changes of discitis and  osteomyelitis. These findings are new since 03/12/2018.      Consults:   Consults     Date and Time Order Name Status Description    8/19/2022  8:10 PM Inpatient Nephrology Consult      8/19/2022  8:10 PM Inpatient Infectious Diseases  Consult      8/19/2022  8:10 PM Inpatient Neurosurgery Consult Completed     8/19/2022  5:08 PM [facility] (on-call MD unless specified) Details      8/19/2022  4:54 PM Surgery (on-call MD unless specified) Completed            Discharge Disposition:  Home or Self Care    Discharge Medications:     Discharge Medications      Changes to Medications      Instructions Start Date   lisinopril 40 MG tablet  Commonly known as: PRINIVIL,ZESTRIL  What changed: when to take this   40 mg, Oral, Every 24 Hours Scheduled         Continue These Medications      Instructions Start Date   ALPRAZolam 1 MG tablet  Commonly known as: XANAX   1 mg, Oral, 4 Times Daily PRN      ascorbic acid 500 MG tablet  Commonly known as: VITAMIN C   500 mg, Oral, 2 Times Daily      aspirin 81 MG EC tablet   81 mg, Oral, Daily      Auryxia 1  MG(Fe) tablet  Generic drug: Ferric Citrate   Oral, 3 Times Daily      carvedilol 3.125 MG tablet  Commonly known as: COREG   3.125 mg, Oral, 2 Times Daily With Meals      carvedilol 3.125 MG tablet  Commonly known as: COREG   3.125 mg, Oral, Every 12 Hours Scheduled      cetirizine 10 MG tablet  Commonly known as: zyrTEC   10 mg, Oral, Daily      Diclofenac Sodium 1 % gel gel  Commonly known as: VOLTAREN   4 g, Topical, 4 Times Daily PRN      epoetin real-epbx 92498 UNIT/ML injection  Commonly known as: RETACRIT   10,000 Units, Subcutaneous, 3 Times Weekly      fish oil 1000 MG capsule capsule   1,000 mg, Oral, Daily With Breakfast      FreeStyle Triston 14 Day Sensor misc   No dose, route, or frequency recorded.      insulin aspart 100 UNIT/ML injection  Commonly known as: novoLOG   Subcutaneous, 3 Times Daily Before Meals      insulin glargine 100 UNIT/ML injection  Commonly known as: LANTUS, SEMGLEE   10 Units, Subcutaneous, Nightly, Wife adjusts depending on what sugars are and if he is eating      Insulin Glargine 100 UNIT/ML injection pen  Commonly known as: LANTUS SOLOSTAR   15-50 Units, Subcutaneous,  Daily      Insulin Lispro (1 Unit Dial) 100 UNIT/ML solution pen-injector  Commonly known as: HUMALOG   10 Units, Subcutaneous, 3 Times Daily, Sliding scale 10-15 3x daily      lactobacillus tablet caplet   1 tablet, Oral, 3 Times Daily      Melatonin 1 MG capsule   1 mg, Oral, Daily      nitroglycerin 0.4 MG SL tablet  Commonly known as: NITROSTAT   0.4 mg, Sublingual, Every 5 Minutes PRN, Take no more than 3 doses in 15 minutes.       oxyCODONE-acetaminophen  MG per tablet  Commonly known as: PERCOCET   1 tablet, Oral, Every 6 Hours PRN      pantoprazole 40 MG EC tablet  Commonly known as: PROTONIX   40 mg, Oral, Daily      glucose blood test strip   Prodigy Voice Glucose Meter kit      Prodigy No Coding Blood Gluc test strip  Generic drug: glucose blood   Use 1 each 4 (four) times daily for blood glucose.      Prodigy No Coding Blood Gluc test strip  Generic drug: glucose blood   No dose, route, or frequency recorded.      Prodigy Twist Top Lancets 28G misc   Prodigy Twist Top Lancet 28 gauge      Prodigy Voice Blood Glucose w/Device kit   1 each, Does not apply, 4 Times Daily      promethazine 25 MG tablet  Commonly known as: PHENERGAN   No dose, route, or frequency recorded.      simvastatin 40 MG tablet  Commonly known as: ZOCOR   40 mg, Oral, Nightly      tiZANidine 4 MG tablet  Commonly known as: ZANAFLEX   4 mg, Oral, Every 8 Hours PRN, Takes capsule form      vitamin B-12 1000 MCG tablet  Commonly known as: CYANOCOBALAMIN   1,000 mcg, Oral, Daily      vitamin D3 125 MCG (5000 UT) capsule capsule   5,000 Units, Oral, Weekly, Takes on saturday       cholecalciferol 1.25 MG (57019 UT) capsule  Commonly known as: VITAMIN D3   1 capsule, Oral, Weekly, Takes on Saturday      Zinc Sulfate 220 (50 Zn) MG tablet   1 tablet, Oral, Daily         ASK your doctor about these medications      Instructions Start Date   hydrALAZINE 100 MG tablet  Commonly known as: APRESOLINE  Ask about: Which instructions should I  use?   100 mg, Oral, Every 8 Hours Scheduled      hydrALAZINE 50 MG tablet  Commonly known as: APRESOLINE  Ask about: Which instructions should I use?   50 mg, Oral, 3 Times Daily             Discharge Diet:   Diet Instructions     Diet: Consistent Carbohydrate, Renal, Regular, Soft Texture; Thin Liquids, No Restrictions; Whole      Discharge Diet:  Consistent Carbohydrate  Renal  Regular  Soft Texture       Fluid Consistency: Thin Liquids, No Restrictions    Soft Options: Whole          Activity at Discharge:   Activity Instructions     Activity as Tolerated            Follow-up Appointments:  No future appointments.  Additional Instructions for the Follow-ups that You Need to Schedule     Discharge Follow-up with PCP   As directed       Currently Documented PCP:    Yadiel Baxter III, MD    PCP Phone Number:    [phone]     Follow Up Details: 1 week               Test Results Pending at Discharge:  Pending Labs     Order Current Status    Blood Culture - Blood, Arm, Right Preliminary result    Blood Culture - Blood, Arm, Right Preliminary result           Kyle Naik MD  08/21/22  12:39 EDT    Time Spent on Discharge Activities: Greater than 30 minutes.

## 2022-08-21 NOTE — PROGRESS NOTES
Neurosurgery and general surgery note reviewed.  No indications for antibiotics.  Blood cultures remain negative to date.  ID will sign off.  Please do not hesitate to call us with further questions or concerns

## 2022-08-21 NOTE — PROGRESS NOTES
[facility]     Progress Note    Patient Name: Angelo Brown  : 1960  MRN: [id number]  Primary Care Physician:  Yadiel Baxter III, MD  Date of admission: 2022    Subjective   Subjective     Chief Complaint: ESRD    History of Present Illness  Patient with ESRD on hd m/w/. Admitted with abdominal wall abscess    Review of Systems    Objective   Objective     Vitals:   Temp:  [97.3 °F (36.3 °C)-98.4 °F (36.9 °C)] 98 °F (36.7 °C)  Heart Rate:  [69-81] 75  Resp:  [16-18] 16  BP: (127-209)/() 165/86    Physical Exam   General Appearance:  In no acute distress.    HEENT: Normal HEENT exam.     Extremities: .  There is no deformity, effusion or dependent edema.    Neurological: Patient is alert     Result Review    Result Review:  I have personally reviewed the results from the time of this admission to 2022 08:53 EDT and agree with these findings:  []  Laboratory list / accordion  []  Microbiology  []  Radiology  []  EKG/Telemetry   []  Cardiology/Vascular   []  Pathology  []  Old records  []  Other:      Assessment & Plan   Assessment / Plan     Brief Patient Summary:  Angelo Brown is a 62 y.o. male who has ESRD on hd m//      Active Hospital Problems:  Active Hospital Problems    Diagnosis    • Hypertensive urgency    • Osteomyelitis of lumbar spine (HCC)    • Abscess of skin of abdomen    • Liver cirrhosis (HCC)    • Type 2 diabetes mellitus with hyperglycemia, without long-term current use of insulin (HCC)    • CAD (coronary artery disease)      Plan:   1. ESRD  2. Abdominal wall abscess  3. Osteomyelitis  4. Hypertension    Patient seen and examined. Without complaints today. Labs and chart reviewed. S/p hd yesterday.usually m/w/f.  Will continue hd tomorrow    Jo Finnegan MD

## 2022-08-22 NOTE — CASE MANAGEMENT/SOCIAL WORK
Case Management Discharge Note      Final Note: Pt discharged home on 8/21.  LISE Adams RN         Selected Continued Care - Discharged on 8/21/2022 Admission date: 8/19/2022 - Discharge disposition: Home or Self Care    Destination    No services have been selected for the patient.              Durable Medical Equipment    No services have been selected for the patient.              Dialysis/Infusion    No services have been selected for the patient.              Home Medical Care    No services have been selected for the patient.              Therapy    No services have been selected for the patient.              Community Resources    No services have been selected for the patient.              Community & DME    No services have been selected for the patient.                  Transportation Services  Private: Car    Final Discharge Disposition Code: 01 - home or self-care

## 2022-08-22 NOTE — OUTREACH NOTE
Prep Survey    Flowsheet Row Responses   [facility] facility patient discharged from? [facility]   Is LACE score < 7 ? No   Emergency Room discharge w/ pulse ox? No   Eligibility Readm Mgmt   Discharge diagnosis Hypertensive urgency, abd wound   Does the patient have one of the following disease processes/diagnoses(primary or secondary)? Other   Does the patient have Home health ordered? No   Is there a DME ordered? No   Prep survey completed? Yes          BART DO - Registered Nurse

## 2022-08-24 ENCOUNTER — APPOINTMENT (OUTPATIENT)
Dept: CT IMAGING | Facility: HOSPITAL | Age: 62
End: 2022-08-24

## 2022-08-24 ENCOUNTER — HOSPITAL ENCOUNTER (EMERGENCY)
Facility: HOSPITAL | Age: 62
Discharge: HOME | End: 2022-08-25
Attending: EMERGENCY MEDICINE | Admitting: EMERGENCY MEDICINE

## 2022-08-24 DIAGNOSIS — N18.6 ESRD ON DIALYSIS: ICD-10-CM

## 2022-08-24 DIAGNOSIS — Z86.39 HISTORY OF DIABETES MELLITUS: ICD-10-CM

## 2022-08-24 DIAGNOSIS — Z87.19 HISTORY OF CIRRHOSIS: ICD-10-CM

## 2022-08-24 DIAGNOSIS — K81.1 CHRONIC CHOLECYSTITIS: ICD-10-CM

## 2022-08-24 DIAGNOSIS — L24.A9 WOUND DRAINAGE: Primary | ICD-10-CM

## 2022-08-24 DIAGNOSIS — Z99.2 ESRD ON DIALYSIS: ICD-10-CM

## 2022-08-24 LAB
ALBUMIN SERPL-MCNC: 3.4 G/DL (ref 3.5–5.2)
ALBUMIN/GLOB SERPL: 0.9 G/DL
ALP SERPL-CCNC: 303 U/L (ref 39–117)
ALT SERPL W P-5'-P-CCNC: 21 U/L (ref 1–41)
ANION GAP SERPL CALCULATED.3IONS-SCNC: 10 MMOL/L (ref 5–15)
AST SERPL-CCNC: 69 U/L (ref 1–40)
BACTERIA SPEC AEROBE CULT: NORMAL
BACTERIA SPEC AEROBE CULT: NORMAL
BASOPHILS # BLD AUTO: 0.03 10*3/MM3 (ref 0–0.2)
BASOPHILS NFR BLD AUTO: 0.5 % (ref 0–1.5)
BILIRUB SERPL-MCNC: 0.4 MG/DL (ref 0–1.2)
BUN SERPL-MCNC: 12 MG/DL (ref 8–23)
BUN/CREAT SERPL: 5 (ref 7–25)
CALCIUM SPEC-SCNC: 8.5 MG/DL (ref 8.6–10.5)
CHLORIDE SERPL-SCNC: 101 MMOL/L (ref 98–107)
CO2 SERPL-SCNC: 27 MMOL/L (ref 22–29)
CREAT SERPL-MCNC: 2.41 MG/DL (ref 0.76–1.27)
DEPRECATED RDW RBC AUTO: 48.7 FL (ref 37–54)
EGFRCR SERPLBLD CKD-EPI 2021: 30 ML/MIN/1.73
EOSINOPHIL # BLD AUTO: 0.17 10*3/MM3 (ref 0–0.4)
EOSINOPHIL NFR BLD AUTO: 3.1 % (ref 0.3–6.2)
ERYTHROCYTE [DISTWIDTH] IN BLOOD BY AUTOMATED COUNT: 14.2 % (ref 12.3–15.4)
GLOBULIN UR ELPH-MCNC: 3.8 GM/DL
GLUCOSE SERPL-MCNC: 184 MG/DL (ref 65–99)
HCT VFR BLD AUTO: 27.6 % (ref 37.5–51)
HGB BLD-MCNC: 8.7 G/DL (ref 13–17.7)
LIPASE SERPL-CCNC: 42 U/L (ref 13–60)
LYMPHOCYTES # BLD AUTO: 0.66 10*3/MM3 (ref 0.7–3.1)
LYMPHOCYTES NFR BLD AUTO: 11.9 % (ref 19.6–45.3)
MCH RBC QN AUTO: 30.2 PG (ref 26.6–33)
MCHC RBC AUTO-ENTMCNC: 31.5 G/DL (ref 31.5–35.7)
MCV RBC AUTO: 95.8 FL (ref 79–97)
MONOCYTES # BLD AUTO: 0.45 10*3/MM3 (ref 0.1–0.9)
MONOCYTES NFR BLD AUTO: 8.1 % (ref 5–12)
NEUTROPHILS NFR BLD AUTO: 4.2 10*3/MM3 (ref 1.7–7)
NEUTROPHILS NFR BLD AUTO: 76 % (ref 42.7–76)
PLATELET # BLD AUTO: 154 10*3/MM3 (ref 140–450)
PMV BLD AUTO: 10.2 FL (ref 6–12)
POTASSIUM SERPL-SCNC: 3.6 MMOL/L (ref 3.5–5.2)
PROT SERPL-MCNC: 7.2 G/DL (ref 6–8.5)
RBC # BLD AUTO: 2.88 10*6/MM3 (ref 4.14–5.8)
SODIUM SERPL-SCNC: 138 MMOL/L (ref 136–145)
WBC NRBC COR # BLD: 5.53 10*3/MM3 (ref 3.4–10.8)

## 2022-08-24 PROCEDURE — 83690 ASSAY OF LIPASE: CPT | Performed by: EMERGENCY MEDICINE

## 2022-08-24 PROCEDURE — 99284 EMERGENCY DEPT VISIT MOD MDM: CPT

## 2022-08-24 PROCEDURE — 80053 COMPREHEN METABOLIC PANEL: CPT | Performed by: EMERGENCY MEDICINE

## 2022-08-24 PROCEDURE — 85025 COMPLETE CBC W/AUTO DIFF WBC: CPT | Performed by: EMERGENCY MEDICINE

## 2022-08-24 PROCEDURE — 74176 CT ABD & PELVIS W/O CONTRAST: CPT

## 2022-08-24 NOTE — ED NOTES
Pt had gall bladder surgery in may and yesterday had the bag removed from his abdomen. Pt told that if it is still draining bile today to go to the ED. Pt surgeon was . Pt is dialysis pt.

## 2022-08-25 ENCOUNTER — READMISSION MANAGEMENT (OUTPATIENT)
Dept: CALL CENTER | Facility: HOSPITAL | Age: 62
End: 2022-08-25

## 2022-08-25 VITALS
TEMPERATURE: 98.3 F | RESPIRATION RATE: 18 BRPM | OXYGEN SATURATION: 96 % | SYSTOLIC BLOOD PRESSURE: 182 MMHG | DIASTOLIC BLOOD PRESSURE: 76 MMHG | HEART RATE: 80 BPM

## 2022-08-25 NOTE — DISCHARGE INSTRUCTIONS
Keep wound clean and dry, continue current medications, follow-up with PCP and surgery as discussed, continue with dialysis as scheduled.  ED return for worsening symptoms as needed.
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
## 2022-08-25 NOTE — OUTREACH NOTE
Medical Week 1 Survey    Flowsheet Row Responses   [facility] patient discharged from? [place]   Does the patient have one of the following disease processes/diagnoses(primary or secondary)? Other  [ER on 8/24/22 for wound drainage]   Week 1 attempt successful? No   Unsuccessful attempts Attempt 1   General alerts for this patient HD    Discharge diagnosis Hypertensive urgency, abd wound          REMI H - Registered Nurse

## 2022-09-06 ENCOUNTER — READMISSION MANAGEMENT (OUTPATIENT)
Dept: CALL CENTER | Facility: HOSPITAL | Age: 62
End: 2022-09-06

## 2022-09-06 NOTE — OUTREACH NOTE
Medical Week 3 Survey    Flowsheet Row Responses   [facility] patient discharged from? [place]   Does the patient have one of the following disease processes/diagnoses(primary or secondary)? Other   Week 3 attempt successful? Yes   Call start time 1519   Call end time 1520   Discharge diagnosis Hypertensive urgency, abd wound   Person spoke with today (if not patient) and relationship Samantha   Meds reviewed with patient/caregiver? Yes   Is the patient having any side effects they believe may be caused by any medication additions or changes? No   Does the patient have all medications ordered at discharge? Yes   Is the patient taking all medications as directed (includes completed medication regime)? Yes   Does the patient have a primary care provider?  Yes   Does the patient have an appointment with their PCP within 7 days of discharge? No   What is preventing the patient from scheduling follow up appointments within 7 days of discharge? Haven't had time   Nursing Interventions Advised patient to make appointment   Has the patient kept scheduled appointments due by today? N/A   Has home health visited the patient within 72 hours of discharge? N/A   Psychosocial issues? No   What is the patient's perception of their health status since discharge? Improving   Week 3 Call Completed? Yes          TIERA HERNANDEZ - Registered Nurse

## 2022-09-16 ENCOUNTER — READMISSION MANAGEMENT (OUTPATIENT)
Dept: CALL CENTER | Facility: HOSPITAL | Age: 62
End: 2022-09-16

## 2022-09-16 NOTE — OUTREACH NOTE
Medical Week 4 Survey    Flowsheet Row Responses   [facility] patient discharged from? [place]   Does the patient have one of the following disease processes/diagnoses(primary or secondary)? Other   Week 4 attempt successful? No          SENTHIL SHETH - Registered Nurse

## 2022-12-26 ENCOUNTER — HOSPITAL ENCOUNTER (INPATIENT)
Facility: HOSPITAL | Age: 62
LOS: 3 days | Discharge: HOME | DRG: 252 | End: 2022-12-29
Attending: EMERGENCY MEDICINE | Admitting: HOSPITALIST
Payer: MEDICARE

## 2022-12-26 ENCOUNTER — APPOINTMENT (OUTPATIENT)
Dept: GENERAL RADIOLOGY | Facility: HOSPITAL | Age: 62
DRG: 252 | End: 2022-12-26
Payer: MEDICARE

## 2022-12-26 DIAGNOSIS — N19 UREMIA: ICD-10-CM

## 2022-12-26 DIAGNOSIS — T82.868A: Primary | ICD-10-CM

## 2022-12-26 DIAGNOSIS — E87.5 HYPERKALEMIA: ICD-10-CM

## 2022-12-26 DIAGNOSIS — Z99.2 ESRD ON HEMODIALYSIS: ICD-10-CM

## 2022-12-26 DIAGNOSIS — N18.6 ESRD ON HEMODIALYSIS: ICD-10-CM

## 2022-12-26 DIAGNOSIS — E87.20 METABOLIC ACIDOSIS: ICD-10-CM

## 2022-12-26 LAB
ALBUMIN SERPL-MCNC: 4 G/DL (ref 3.5–5.2)
ALBUMIN/GLOB SERPL: 1.1 G/DL
ALP SERPL-CCNC: 181 U/L (ref 39–117)
ALT SERPL W P-5'-P-CCNC: 26 U/L (ref 1–41)
ANION GAP SERPL CALCULATED.3IONS-SCNC: 10 MMOL/L (ref 5–15)
ANION GAP SERPL CALCULATED.3IONS-SCNC: 13.5 MMOL/L (ref 5–15)
APTT PPP: 31.9 SECONDS (ref 22.7–35.4)
AST SERPL-CCNC: 28 U/L (ref 1–40)
BASOPHILS # BLD AUTO: 0.01 10*3/MM3 (ref 0–0.2)
BASOPHILS NFR BLD AUTO: 0.3 % (ref 0–1.5)
BILIRUB SERPL-MCNC: 0.3 MG/DL (ref 0–1.2)
BUN SERPL-MCNC: 102 MG/DL (ref 8–23)
BUN SERPL-MCNC: 106 MG/DL (ref 8–23)
BUN/CREAT SERPL: 11.5 (ref 7–25)
BUN/CREAT SERPL: 12.1 (ref 7–25)
CALCIUM SPEC-SCNC: 8.6 MG/DL (ref 8.6–10.5)
CALCIUM SPEC-SCNC: 8.8 MG/DL (ref 8.6–10.5)
CHLORIDE SERPL-SCNC: 114 MMOL/L (ref 98–107)
CHLORIDE SERPL-SCNC: 114 MMOL/L (ref 98–107)
CO2 SERPL-SCNC: 15.5 MMOL/L (ref 22–29)
CO2 SERPL-SCNC: 17 MMOL/L (ref 22–29)
CREAT SERPL-MCNC: 8.76 MG/DL (ref 0.76–1.27)
CREAT SERPL-MCNC: 8.89 MG/DL (ref 0.76–1.27)
DEPRECATED RDW RBC AUTO: 51.3 FL (ref 37–54)
EGFRCR SERPLBLD CKD-EPI 2021: 6.3 ML/MIN/1.73
EGFRCR SERPLBLD CKD-EPI 2021: 6.4 ML/MIN/1.73
EOSINOPHIL # BLD AUTO: 0.18 10*3/MM3 (ref 0–0.4)
EOSINOPHIL NFR BLD AUTO: 4.9 % (ref 0.3–6.2)
ERYTHROCYTE [DISTWIDTH] IN BLOOD BY AUTOMATED COUNT: 14.6 % (ref 12.3–15.4)
GLOBULIN UR ELPH-MCNC: 3.5 GM/DL
GLUCOSE BLDC GLUCOMTR-MCNC: 120 MG/DL (ref 70–130)
GLUCOSE BLDC GLUCOMTR-MCNC: 46 MG/DL (ref 70–130)
GLUCOSE BLDC GLUCOMTR-MCNC: 89 MG/DL (ref 70–130)
GLUCOSE SERPL-MCNC: 143 MG/DL (ref 65–99)
GLUCOSE SERPL-MCNC: 39 MG/DL (ref 65–99)
HBV SURFACE AG SERPL QL IA: NORMAL
HCT VFR BLD AUTO: 35.4 % (ref 37.5–51)
HGB BLD-MCNC: 11 G/DL (ref 13–17.7)
IMM GRANULOCYTES # BLD AUTO: 0.01 10*3/MM3 (ref 0–0.05)
IMM GRANULOCYTES NFR BLD AUTO: 0.3 % (ref 0–0.5)
INR PPP: 1.1 (ref 0.9–1.1)
LYMPHOCYTES # BLD AUTO: 0.63 10*3/MM3 (ref 0.7–3.1)
LYMPHOCYTES NFR BLD AUTO: 17 % (ref 19.6–45.3)
MCH RBC QN AUTO: 30 PG (ref 26.6–33)
MCHC RBC AUTO-ENTMCNC: 31.1 G/DL (ref 31.5–35.7)
MCV RBC AUTO: 96.5 FL (ref 79–97)
MONOCYTES # BLD AUTO: 0.31 10*3/MM3 (ref 0.1–0.9)
MONOCYTES NFR BLD AUTO: 8.4 % (ref 5–12)
NEUTROPHILS NFR BLD AUTO: 2.57 10*3/MM3 (ref 1.7–7)
NEUTROPHILS NFR BLD AUTO: 69.1 % (ref 42.7–76)
NRBC BLD AUTO-RTO: 0 /100 WBC (ref 0–0.2)
PHOSPHATE SERPL-MCNC: 5.8 MG/DL (ref 2.5–4.5)
PLATELET # BLD AUTO: 87 10*3/MM3 (ref 140–450)
PMV BLD AUTO: 10.2 FL (ref 6–12)
POTASSIUM SERPL-SCNC: 6 MMOL/L (ref 3.5–5.2)
POTASSIUM SERPL-SCNC: 6.7 MMOL/L (ref 3.5–5.2)
PROT SERPL-MCNC: 7.5 G/DL (ref 6–8.5)
PROTHROMBIN TIME: 14.3 SECONDS (ref 11.7–14.2)
QT INTERVAL: 445 MS
RBC # BLD AUTO: 3.67 10*6/MM3 (ref 4.14–5.8)
SODIUM SERPL-SCNC: 141 MMOL/L (ref 136–145)
SODIUM SERPL-SCNC: 143 MMOL/L (ref 136–145)
WBC NRBC COR # BLD: 3.71 10*3/MM3 (ref 3.4–10.8)

## 2022-12-26 PROCEDURE — 85730 THROMBOPLASTIN TIME PARTIAL: CPT | Performed by: EMERGENCY MEDICINE

## 2022-12-26 PROCEDURE — 71045 X-RAY EXAM CHEST 1 VIEW: CPT

## 2022-12-26 PROCEDURE — 63710000001 INSULIN REGULAR HUMAN PER 5 UNITS: Performed by: EMERGENCY MEDICINE

## 2022-12-26 PROCEDURE — 99285 EMERGENCY DEPT VISIT HI MDM: CPT

## 2022-12-26 PROCEDURE — 36415 COLL VENOUS BLD VENIPUNCTURE: CPT

## 2022-12-26 PROCEDURE — 87340 HEPATITIS B SURFACE AG IA: CPT | Performed by: INTERNAL MEDICINE

## 2022-12-26 PROCEDURE — 94799 UNLISTED PULMONARY SVC/PX: CPT

## 2022-12-26 PROCEDURE — 80053 COMPREHEN METABOLIC PANEL: CPT | Performed by: EMERGENCY MEDICINE

## 2022-12-26 PROCEDURE — 94640 AIRWAY INHALATION TREATMENT: CPT

## 2022-12-26 PROCEDURE — 25010000002 CALCIUM GLUCONATE-NACL 1-0.675 GM/50ML-% SOLUTION: Performed by: EMERGENCY MEDICINE

## 2022-12-26 PROCEDURE — 36556 INSERT NON-TUNNEL CV CATH: CPT

## 2022-12-26 PROCEDURE — 93010 ELECTROCARDIOGRAM REPORT: CPT | Performed by: INTERNAL MEDICINE

## 2022-12-26 PROCEDURE — 93005 ELECTROCARDIOGRAM TRACING: CPT | Performed by: EMERGENCY MEDICINE

## 2022-12-26 PROCEDURE — 85610 PROTHROMBIN TIME: CPT | Performed by: EMERGENCY MEDICINE

## 2022-12-26 PROCEDURE — 94761 N-INVAS EAR/PLS OXIMETRY MLT: CPT

## 2022-12-26 PROCEDURE — 82962 GLUCOSE BLOOD TEST: CPT

## 2022-12-26 PROCEDURE — 36415 COLL VENOUS BLD VENIPUNCTURE: CPT | Performed by: EMERGENCY MEDICINE

## 2022-12-26 PROCEDURE — 85025 COMPLETE CBC W/AUTO DIFF WBC: CPT | Performed by: EMERGENCY MEDICINE

## 2022-12-26 PROCEDURE — 84100 ASSAY OF PHOSPHORUS: CPT | Performed by: EMERGENCY MEDICINE

## 2022-12-26 RX ORDER — ACETAMINOPHEN 160 MG/5ML
650 SOLUTION ORAL EVERY 4 HOURS PRN
Status: DISCONTINUED | OUTPATIENT
Start: 2022-12-26 | End: 2022-12-29

## 2022-12-26 RX ORDER — OXYCODONE HYDROCHLORIDE AND ACETAMINOPHEN 5; 325 MG/1; MG/1
2 TABLET ORAL ONCE
Status: COMPLETED | OUTPATIENT
Start: 2022-12-26 | End: 2022-12-26

## 2022-12-26 RX ORDER — HEPARIN SODIUM 1000 [USP'U]/ML
4000 INJECTION, SOLUTION INTRAVENOUS; SUBCUTANEOUS AS NEEDED
Status: DISCONTINUED | OUTPATIENT
Start: 2022-12-26 | End: 2022-12-29

## 2022-12-26 RX ORDER — SODIUM CHLORIDE 0.9 % (FLUSH) 0.9 %
10 SYRINGE (ML) INJECTION AS NEEDED
Status: DISCONTINUED | OUTPATIENT
Start: 2022-12-26 | End: 2022-12-29 | Stop reason: HOSPADM

## 2022-12-26 RX ORDER — DEXTROSE MONOHYDRATE 25 G/50ML
50 INJECTION, SOLUTION INTRAVENOUS ONCE
Status: COMPLETED | OUTPATIENT
Start: 2022-12-26 | End: 2022-12-26

## 2022-12-26 RX ORDER — SODIUM CHLORIDE 9 MG/ML
40 INJECTION, SOLUTION INTRAVENOUS AS NEEDED
Status: DISCONTINUED | OUTPATIENT
Start: 2022-12-26 | End: 2022-12-29

## 2022-12-26 RX ORDER — SODIUM CHLORIDE 0.9 % (FLUSH) 0.9 %
10 SYRINGE (ML) INJECTION EVERY 12 HOURS SCHEDULED
Status: DISCONTINUED | OUTPATIENT
Start: 2022-12-26 | End: 2022-12-29 | Stop reason: HOSPADM

## 2022-12-26 RX ORDER — ONDANSETRON 2 MG/ML
4 INJECTION INTRAMUSCULAR; INTRAVENOUS EVERY 6 HOURS PRN
Status: DISCONTINUED | OUTPATIENT
Start: 2022-12-26 | End: 2022-12-29 | Stop reason: HOSPADM

## 2022-12-26 RX ORDER — CARVEDILOL 3.12 MG/1
3.12 TABLET ORAL 2 TIMES DAILY
Status: DISCONTINUED | OUTPATIENT
Start: 2022-12-26 | End: 2022-12-27

## 2022-12-26 RX ORDER — ACETAMINOPHEN 650 MG/1
650 SUPPOSITORY RECTAL EVERY 4 HOURS PRN
Status: DISCONTINUED | OUTPATIENT
Start: 2022-12-26 | End: 2022-12-29

## 2022-12-26 RX ORDER — ZINC SULFATE 50(220)MG
220 CAPSULE ORAL DAILY
Status: DISCONTINUED | OUTPATIENT
Start: 2022-12-27 | End: 2022-12-29 | Stop reason: HOSPADM

## 2022-12-26 RX ORDER — NITROGLYCERIN 0.4 MG/1
0.4 TABLET SUBLINGUAL
Status: DISCONTINUED | OUTPATIENT
Start: 2022-12-26 | End: 2022-12-29

## 2022-12-26 RX ORDER — OXYCODONE AND ACETAMINOPHEN 10; 325 MG/1; MG/1
1 TABLET ORAL EVERY 6 HOURS PRN
Status: DISCONTINUED | OUTPATIENT
Start: 2022-12-26 | End: 2022-12-29

## 2022-12-26 RX ORDER — CALCIUM GLUCONATE 20 MG/ML
1 INJECTION, SOLUTION INTRAVENOUS ONCE
Status: COMPLETED | OUTPATIENT
Start: 2022-12-26 | End: 2022-12-26

## 2022-12-26 RX ORDER — ALPRAZOLAM 0.25 MG/1
1 TABLET ORAL ONCE
Status: COMPLETED | OUTPATIENT
Start: 2022-12-26 | End: 2022-12-26

## 2022-12-26 RX ORDER — HYDRALAZINE HYDROCHLORIDE 50 MG/1
50 TABLET, FILM COATED ORAL ONCE
Status: COMPLETED | OUTPATIENT
Start: 2022-12-26 | End: 2022-12-26

## 2022-12-26 RX ORDER — PANTOPRAZOLE SODIUM 40 MG/1
40 TABLET, DELAYED RELEASE ORAL DAILY
Status: DISCONTINUED | OUTPATIENT
Start: 2022-12-27 | End: 2022-12-29 | Stop reason: HOSPADM

## 2022-12-26 RX ORDER — ACETAMINOPHEN 325 MG/1
650 TABLET ORAL EVERY 4 HOURS PRN
Status: DISCONTINUED | OUTPATIENT
Start: 2022-12-26 | End: 2022-12-29

## 2022-12-26 RX ORDER — ALPRAZOLAM 0.5 MG/1
1 TABLET ORAL 4 TIMES DAILY PRN
Status: DISCONTINUED | OUTPATIENT
Start: 2022-12-26 | End: 2022-12-29

## 2022-12-26 RX ORDER — SODIUM CHLORIDE 0.9 % (FLUSH) 0.9 %
10 SYRINGE (ML) INJECTION AS NEEDED
Status: DISCONTINUED | OUTPATIENT
Start: 2022-12-26 | End: 2022-12-29

## 2022-12-26 RX ORDER — PROMETHAZINE HYDROCHLORIDE 25 MG/1
25 TABLET ORAL AS NEEDED
Status: DISCONTINUED | OUTPATIENT
Start: 2022-12-26 | End: 2022-12-29

## 2022-12-26 RX ORDER — HYDRALAZINE HYDROCHLORIDE 50 MG/1
100 TABLET, FILM COATED ORAL 3 TIMES DAILY
Status: DISCONTINUED | OUTPATIENT
Start: 2022-12-26 | End: 2022-12-29 | Stop reason: HOSPADM

## 2022-12-26 RX ORDER — HYDRALAZINE HYDROCHLORIDE 100 MG/1
100 TABLET, FILM COATED ORAL 3 TIMES DAILY
COMMUNITY

## 2022-12-26 RX ORDER — SUCROFERRIC OXYHYDROXIDE 500 MG/1
500 TABLET, CHEWABLE ORAL 2 TIMES DAILY
COMMUNITY

## 2022-12-26 RX ORDER — ONDANSETRON 4 MG/1
4 TABLET, FILM COATED ORAL EVERY 6 HOURS PRN
Status: DISCONTINUED | OUTPATIENT
Start: 2022-12-26 | End: 2022-12-29

## 2022-12-26 RX ADMIN — CALCIUM GLUCONATE 1 G: 20 INJECTION, SOLUTION INTRAVENOUS at 18:29

## 2022-12-26 RX ADMIN — INSULIN HUMAN 10 UNITS: 100 INJECTION, SOLUTION PARENTERAL at 18:27

## 2022-12-26 RX ADMIN — OXYCODONE AND ACETAMINOPHEN 2 TABLET: 5; 325 TABLET ORAL at 18:29

## 2022-12-26 RX ADMIN — ALBUTEROL SULFATE 10 MG: 2.5 SOLUTION RESPIRATORY (INHALATION) at 18:59

## 2022-12-26 RX ADMIN — ALPRAZOLAM 1 MG: 0.25 TABLET ORAL at 18:28

## 2022-12-26 RX ADMIN — SODIUM BICARBONATE 50 MEQ: 84 INJECTION, SOLUTION INTRAVENOUS at 18:28

## 2022-12-26 RX ADMIN — HYDRALAZINE HYDROCHLORIDE 50 MG: 50 TABLET, FILM COATED ORAL at 19:06

## 2022-12-26 RX ADMIN — DEXTROSE MONOHYDRATE 50 ML: 25 INJECTION, SOLUTION INTRAVENOUS at 18:27

## 2022-12-26 RX ADMIN — Medication 10 ML: at 22:18

## 2022-12-26 NOTE — ED NOTES
Pt reports new bruising and swelling noticed at his shunt site. Pt wife at bedside and reports there was a small amount of bruising noted around site last Monday but it has gotten worse since his treatment today. Pt reports site is tender to the touch and is much more swollen than last week.

## 2022-12-26 NOTE — PROGRESS NOTES
Clinical Pharmacy Services: Medication History    Angelo Brown is a 62 y.o. male presenting to [facility] for   Chief Complaint   Patient presents with   • Vascular Access Problem   • Arm Pain       He  has a past medical history of Anemia, Anxiety, Arthritis, Arthritis, CAD (coronary artery disease), Cancer ([facility]), Cancer (HCC), Carpal tunnel syndrome, Carpal tunnel syndrome, CHF (congestive heart failure) ([facility]), Chronic back pain, Coronary artery disease, Depression, Diabetes mellitus ([facility]), Diabetes mellitus ([facility]), Dialysis patient ([facility]), Elevated cholesterol, ESRD (end stage renal disease) on dialysis ([facility]), Eyes sensitive to light, bilateral, GERD (gastroesophageal reflux disease), Headache, Hepatitis, Hepatitis C (2013), History of MRSA infection (2011), History of transfusion, History of transfusion, History of venomous spider bite (06/2011), Hypertension, Jaundice, Myocardial infarction ([facility]), One eye: profound vision impairment, Seizure ([facility]) (01/2022), Stroke ([facility]) (12/2017), and Stroke ([facility]).    Allergies as of 12/26/2022 - Reviewed 12/26/2022   Allergen Reaction Noted   • Lipitor [atorvastatin calcium] Shortness Of Breath 02/23/2021   • Morphine Delirium 01/26/2022   • Eggs or egg-derived products Nausea And Vomiting 01/27/2022   • Gabapentin Mental Status Change 01/26/2022   • Adhesive tape Rash 10/07/2021   • Fentanyl Unknown - Low Severity 06/29/2015   • Fentanyl Unknown - High Severity 01/26/2022   • Ibuprofen Nausea Only 01/26/2022   • Penicillins Hives 01/26/2022       Medication information was obtained from: Patient/Spouse  Pharmacy and Phone Number:     Prior to Admission Medications     Prescriptions Last Dose Informant Patient Reported? Taking?    ALPRAZolam (XANAX) 1 MG tablet 12/25/2022 Spouse/Significant Other Yes Yes    Take 1 mg by mouth 4 (Four) Times a Day As Needed.    ascorbic acid (VITAMIN C) 500 MG tablet 12/25/2022 Spouse/Significant Other Yes Yes    Take 500 mg by  mouth 2 (Two) Times a Day.    Auryxia 1  MG(Fe) tablet 12/25/2022 Spouse/Significant Other Yes Yes    Take 1 tablet by mouth 3 (Three) Times a Day.    carvedilol (COREG) 3.125 MG tablet 12/25/2022 Spouse/Significant Other Yes Yes    Take 3.125 mg by mouth 2 (Two) Times a Day.    cetirizine (zyrTEC) 10 MG tablet 12/25/2022 Spouse/Significant Other Yes Yes    Take 10 mg by mouth Daily.    Diclofenac Sodium (VOLTAREN) 1 % gel gel  Spouse/Significant Other Yes Yes    Apply 4 g topically to the appropriate area as directed 4 (Four) Times a Day As Needed.    ELDERBERRY PO 12/25/2022 Spouse/Significant Other Yes Yes    Take 1 tablet by mouth Daily.    epoetin real-epbx (RETACRIT) 30054 UNIT/ML injection  Spouse/Significant Other No Yes    Inject 1 mL under the skin into the appropriate area as directed 3 (Three) Times a Week. Indications: ESRD on Dialysis    Patient taking differently:  Inject 10,000 Units under the skin into the appropriate area as directed 3 (Three) Times a Week. Monday, Wednesday, Friday  Indications: ESRD on Dialysis    hydrALAZINE (APRESOLINE) 100 MG tablet 12/25/2022 Spouse/Significant Other Yes Yes    Take 100 mg by mouth 3 (Three) Times a Day.    insulin aspart (novoLOG) 100 UNIT/ML injection  Spouse/Significant Other Yes Yes    Inject  under the skin into the appropriate area as directed 3 (Three) Times a Day Before Meals. Sliding Scale    insulin glargine (LANTUS, SEMGLEE) 100 UNIT/ML injection 12/25/2022 Spouse/Significant Other Yes Yes    Inject 10 Units under the skin into the appropriate area as directed Every Night. Wife adjusts depending on what sugars are and if he is eating    Melatonin 1 MG capsule 12/25/2022 Spouse/Significant Other Yes Yes    Take 1 mg by mouth Every Night.    nitroglycerin (NITROSTAT) 0.4 MG SL tablet  Spouse/Significant Other Yes Yes    Place 0.4 mg under the tongue Every 5 (Five) Minutes As Needed for Chest Pain. Take no more than 3 doses in 15 minutes.     Omega-3 Fatty Acids (fish oil) 1000 MG capsule capsule 12/25/2022 Spouse/Significant Other Yes Yes    Take 1,000 mg by mouth Daily With Breakfast.    oxyCODONE-acetaminophen (PERCOCET)  MG per tablet 12/25/2022 Spouse/Significant Other Yes Yes    Take 1 tablet by mouth Every 6 (Six) Hours As Needed.    pantoprazole (PROTONIX) 40 MG EC tablet 12/25/2022 Spouse/Significant Other Yes Yes    Take 40 mg by mouth Daily.    promethazine (PHENERGAN) 25 MG tablet  Spouse/Significant Other Yes Yes    Take 25 mg by mouth As Needed.    simvastatin (ZOCOR) 40 MG tablet 12/25/2022 Spouse/Significant Other Yes Yes    Take 40 mg by mouth Every Night.    Sucroferric Oxyhydroxide (Velphoro) 500 MG chewable tablet 12/25/2022 Spouse/Significant Other Yes Yes    Chew 500 mg 2 (Two) Times a Day.    TiZANidine (ZANAFLEX) 4 MG capsule 12/25/2022 Spouse/Significant Other Yes Yes    Take 4 mg by mouth Every 8 (Eight) Hours As Needed.    vitamin B-12 (CYANOCOBALAMIN) 1000 MCG tablet 12/25/2022 Spouse/Significant Other Yes Yes    Take 1,000 mcg by mouth Daily.    Zinc Sulfate 220 (50 Zn) MG tablet 12/25/2022 Spouse/Significant Other Yes Yes    Take 1 tablet by mouth Daily.            Medication notes: Per pt wife pt has not been taking auryxia 1gm due to medication being on backorder.     This medication list is complete to the best of my knowledge as of 12/26/2022    Please call if questions.    Olegario Rooney  Medication History Technician   [phone]    12/26/2022 18:32 EST
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
## 2022-12-27 LAB
ANION GAP SERPL CALCULATED.3IONS-SCNC: 9.1 MMOL/L (ref 5–15)
BASOPHILS # BLD AUTO: 0.03 10*3/MM3 (ref 0–0.2)
BASOPHILS NFR BLD AUTO: 0.7 % (ref 0–1.5)
BUN SERPL-MCNC: 41 MG/DL (ref 8–23)
BUN/CREAT SERPL: 9 (ref 7–25)
CALCIUM SPEC-SCNC: 8.4 MG/DL (ref 8.6–10.5)
CHLORIDE SERPL-SCNC: 99 MMOL/L (ref 98–107)
CO2 SERPL-SCNC: 23.9 MMOL/L (ref 22–29)
CREAT SERPL-MCNC: 4.58 MG/DL (ref 0.76–1.27)
DEPRECATED RDW RBC AUTO: 48.6 FL (ref 37–54)
EGFRCR SERPLBLD CKD-EPI 2021: 13.9 ML/MIN/1.73
EOSINOPHIL # BLD AUTO: 0.2 10*3/MM3 (ref 0–0.4)
EOSINOPHIL NFR BLD AUTO: 4.8 % (ref 0.3–6.2)
ERYTHROCYTE [DISTWIDTH] IN BLOOD BY AUTOMATED COUNT: 14 % (ref 12.3–15.4)
GLUCOSE BLDC GLUCOMTR-MCNC: 105 MG/DL (ref 70–130)
GLUCOSE BLDC GLUCOMTR-MCNC: 113 MG/DL (ref 70–130)
GLUCOSE BLDC GLUCOMTR-MCNC: 121 MG/DL (ref 70–130)
GLUCOSE BLDC GLUCOMTR-MCNC: 130 MG/DL (ref 70–130)
GLUCOSE BLDC GLUCOMTR-MCNC: 169 MG/DL (ref 70–130)
GLUCOSE SERPL-MCNC: 168 MG/DL (ref 65–99)
HCT VFR BLD AUTO: 32.2 % (ref 37.5–51)
HGB BLD-MCNC: 10.1 G/DL (ref 13–17.7)
LYMPHOCYTES # BLD AUTO: 0.59 10*3/MM3 (ref 0.7–3.1)
LYMPHOCYTES NFR BLD AUTO: 14.3 % (ref 19.6–45.3)
MAGNESIUM SERPL-MCNC: 1.9 MG/DL (ref 1.6–2.4)
MCH RBC QN AUTO: 29.7 PG (ref 26.6–33)
MCHC RBC AUTO-ENTMCNC: 31.4 G/DL (ref 31.5–35.7)
MCV RBC AUTO: 94.7 FL (ref 79–97)
MONOCYTES # BLD AUTO: 0.41 10*3/MM3 (ref 0.1–0.9)
MONOCYTES NFR BLD AUTO: 9.9 % (ref 5–12)
NEUTROPHILS NFR BLD AUTO: 2.88 10*3/MM3 (ref 1.7–7)
NEUTROPHILS NFR BLD AUTO: 69.8 % (ref 42.7–76)
PLATELET # BLD AUTO: 77 10*3/MM3 (ref 140–450)
PMV BLD AUTO: 10.9 FL (ref 6–12)
POTASSIUM SERPL-SCNC: 4.8 MMOL/L (ref 3.5–5.2)
QT INTERVAL: 409 MS
RBC # BLD AUTO: 3.4 10*6/MM3 (ref 4.14–5.8)
SODIUM SERPL-SCNC: 132 MMOL/L (ref 136–145)
TROPONIN T SERPL-MCNC: 0.12 NG/ML (ref 0–0.03)
WBC NRBC COR # BLD: 4.13 10*3/MM3 (ref 3.4–10.8)

## 2022-12-27 PROCEDURE — 82962 GLUCOSE BLOOD TEST: CPT

## 2022-12-27 PROCEDURE — 80048 BASIC METABOLIC PNL TOTAL CA: CPT | Performed by: HOSPITALIST

## 2022-12-27 PROCEDURE — 93005 ELECTROCARDIOGRAM TRACING: CPT | Performed by: HOSPITALIST

## 2022-12-27 PROCEDURE — 85025 COMPLETE CBC W/AUTO DIFF WBC: CPT | Performed by: HOSPITALIST

## 2022-12-27 PROCEDURE — 99221 1ST HOSP IP/OBS SF/LOW 40: CPT | Performed by: INTERNAL MEDICINE

## 2022-12-27 PROCEDURE — 83735 ASSAY OF MAGNESIUM: CPT | Performed by: HOSPITALIST

## 2022-12-27 PROCEDURE — 25010000002 HEPARIN (PORCINE) PER 1000 UNITS: Performed by: INTERNAL MEDICINE

## 2022-12-27 PROCEDURE — 63710000001 INSULIN LISPRO (HUMAN) PER 5 UNITS: Performed by: HOSPITALIST

## 2022-12-27 PROCEDURE — 5A1D70Z PERFORMANCE OF URINARY FILTRATION, INTERMITTENT, LESS THAN 6 HOURS PER DAY: ICD-10-PCS | Performed by: INTERNAL MEDICINE

## 2022-12-27 PROCEDURE — 84484 ASSAY OF TROPONIN QUANT: CPT | Performed by: HOSPITALIST

## 2022-12-27 RX ORDER — INSULIN LISPRO 100 [IU]/ML
0-7 INJECTION, SOLUTION INTRAVENOUS; SUBCUTANEOUS
Status: DISCONTINUED | OUTPATIENT
Start: 2022-12-27 | End: 2022-12-29 | Stop reason: HOSPADM

## 2022-12-27 RX ORDER — CARVEDILOL 3.12 MG/1
3.12 TABLET ORAL 2 TIMES DAILY WITH MEALS
Status: DISCONTINUED | OUTPATIENT
Start: 2022-12-27 | End: 2022-12-27

## 2022-12-27 RX ORDER — ALPRAZOLAM 0.5 MG/1
1 TABLET ORAL 4 TIMES DAILY PRN
Status: DISCONTINUED | OUTPATIENT
Start: 2022-12-27 | End: 2022-12-29

## 2022-12-27 RX ORDER — CARVEDILOL 6.25 MG/1
6.25 TABLET ORAL 2 TIMES DAILY WITH MEALS
Status: DISCONTINUED | OUTPATIENT
Start: 2022-12-27 | End: 2022-12-29 | Stop reason: HOSPADM

## 2022-12-27 RX ORDER — NICOTINE POLACRILEX 4 MG
15 LOZENGE BUCCAL
Status: DISCONTINUED | OUTPATIENT
Start: 2022-12-27 | End: 2022-12-29

## 2022-12-27 RX ORDER — DEXTROSE MONOHYDRATE 25 G/50ML
25 INJECTION, SOLUTION INTRAVENOUS
Status: DISCONTINUED | OUTPATIENT
Start: 2022-12-27 | End: 2022-12-29

## 2022-12-27 RX ADMIN — ALPRAZOLAM 1 MG: 0.5 TABLET ORAL at 08:13

## 2022-12-27 RX ADMIN — HYDRALAZINE HYDROCHLORIDE 100 MG: 50 TABLET, FILM COATED ORAL at 04:19

## 2022-12-27 RX ADMIN — PANTOPRAZOLE SODIUM 40 MG: 40 TABLET, DELAYED RELEASE ORAL at 08:10

## 2022-12-27 RX ADMIN — Medication 10 ML: at 08:11

## 2022-12-27 RX ADMIN — OXYCODONE HYDROCHLORIDE AND ACETAMINOPHEN 1 TABLET: 10; 325 TABLET ORAL at 08:10

## 2022-12-27 RX ADMIN — HYDRALAZINE HYDROCHLORIDE 100 MG: 50 TABLET, FILM COATED ORAL at 21:25

## 2022-12-27 RX ADMIN — INSULIN LISPRO 2 UNITS: 100 INJECTION, SOLUTION INTRAVENOUS; SUBCUTANEOUS at 17:29

## 2022-12-27 RX ADMIN — CARVEDILOL 3.12 MG: 3.12 TABLET, FILM COATED ORAL at 04:19

## 2022-12-27 RX ADMIN — HEPARIN SODIUM 4000 UNITS: 1000 INJECTION INTRAVENOUS; SUBCUTANEOUS at 01:45

## 2022-12-27 RX ADMIN — HYDRALAZINE HYDROCHLORIDE 100 MG: 50 TABLET, FILM COATED ORAL at 13:00

## 2022-12-27 RX ADMIN — OXYCODONE HYDROCHLORIDE AND ACETAMINOPHEN 1 TABLET: 10; 325 TABLET ORAL at 18:26

## 2022-12-27 RX ADMIN — Medication 10 ML: at 21:25

## 2022-12-27 RX ADMIN — Medication 220 MG: at 08:10

## 2022-12-27 RX ADMIN — CARVEDILOL 6.25 MG: 6.25 TABLET, FILM COATED ORAL at 17:29

## 2022-12-27 RX ADMIN — INSULIN LISPRO 2 UNITS: 100 INJECTION, SOLUTION INTRAVENOUS; SUBCUTANEOUS at 13:18

## 2022-12-27 RX ADMIN — ALPRAZOLAM 1 MG: 0.5 TABLET ORAL at 18:25

## 2022-12-27 NOTE — PLAN OF CARE
Goal Outcome Evaluation:  Plan of Care Reviewed With: patient, spouse        Progress: improving  Outcome Evaluation: Patient AOx4 with some periods of confusion. Will have surgery tomorrow to correct fistula. Cardiology consult today. Medication adjusted. Dialysis orders called for tomorrow. Consent signed and in the chart.
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
## 2022-12-27 NOTE — NURSING NOTE
0430 hd without incident or c/o. tolerated fair. removed 2 l. no meds administered. franchesca mosley current, new. cdi. patient had short run of svt 150's-180's occurring briefly during rinse back. asymptomatic. per pt denies any palpitations or soa. Lasting less than 2 minutes.  Stat ekg obtained. showed sinus rhythm pac's @ .  Dr. Swift notified per primary rn. BP meds correg, hydralazine administered post hd per primary rn, for hptn. See [facility] for other orders.  stable, no c/o upon completion of hd. appears to be wore worn out and sluggish with responses. less clear on time.

## 2022-12-27 NOTE — PROGRESS NOTES
Name: Angelo Brown ADMIT: 2022   : 1960  PCP: Yadiel Baxter III, MD    MRN: [id number] LOS: 1 days   AGE/SEX: 62 y.o. male  ROOM:  [place] [id number]/     CC: L arm graft thrombosis  Interval History: Confused this morning but answering questions appropriately. Got dialysis last night.   Subjective   Subjective     Review of Systems  Objective   Objective     Vitals:   Temp:  [97 °F (36.1 °C)-98.3 °F (36.8 °C)] 98.3 °F (36.8 °C)  Heart Rate:  [63-99] 84  Resp:  [16-18] 18  BP: (153-226)/() 180/102    I/O this shift:  In: -   Out: 400 [Urine:400]    Scheduled Meds:     carvedilol, 3.125 mg, Oral, BID With Meals  hydrALAZINE, 100 mg, Oral, TID  insulin glargine, 10 Units, Subcutaneous, Nightly  pantoprazole, 40 mg, Oral, Daily  sodium chloride, 10 mL, Intravenous, Q12H  zinc sulfate, 220 mg, Oral, Daily      IV Meds:        Physical Exam   NAD  NCAT  RRR  Respirations unlabored  R neck with shiley in place with dressing intact  L arm graft with no thrill or bruit    Data Reviewed:  CBC    Results from last 7 days   Lab Units 22  1726   WBC 10*3/mm3 3.71   HEMOGLOBIN g/dL 11.0*   PLATELETS 10*3/mm3 87*     BMP   Results from last 7 days   Lab Units 22  1726   SODIUM mmol/L 143 141   POTASSIUM mmol/L 6.0* 6.7*   CHLORIDE mmol/L 114* 114*   CO2 mmol/L 15.5* 17.0*   BUN mg/dL 106* 102*   CREATININE mg/dL 8.76* 8.89*   GLUCOSE mg/dL 39* 143*   PHOSPHORUS mg/dL  --  5.8*     Coag   Results from last 7 days   Lab Units 22  1808   INR  1.10   APTT seconds 31.9     Radiology(recent) XR Chest 1 View    Result Date: 2022  Right internal jugular vein non tunneled dialysis catheter appears to terminate in satisfactory position. No pneumothorax seen.  This report was finalized on 2022 8:58 PM by Dr. Alannah Hamm M.D.        Most notable findings include: CXR with catheter in place and no complications. Hyperkalemia noted.     Active Hospital  Problems:   Active Hospital Problems    Diagnosis  POA   • **Thrombosis of dialysis shunt, initial encounter ([facility]) [T82.068A]  Yes   • Metabolic acidosis [E87.20]  Unknown   • Uremia [N19]  Unknown   • End-stage renal disease on hemodialysis ([facility]) [N18.6, Z99.2]  Not Applicable   • HTN (hypertension) [I10]  Yes   • History of CVA (cerebrovascular accident) [Z86.73]  Not Applicable   • CAD (coronary artery disease) [I25.10]  Yes   • Type 2 diabetes mellitus with hyperglycemia, without long-term current use of insulin ([facility]) [E11.65]  Yes   • GERD (gastroesophageal reflux disease) [K21.9]  Yes   • HLD (hyperlipidemia) [E78.5]  Yes   • Hyperkalemia [E87.5]  Yes      Resolved Hospital Problems   No resolved problems to display.      Assessment & Plan   [place], Subsequent Hospital Care  Assessment / Plan     62-year-old man with end-stage renal disease requiring dialysis with a thrombosed left arm arteriovenous graft and hyperkalemia now status post Shiley placement.  Will need attempted graft thrombectomy prior to discharge.  We will plan to perform this tomorrow.  N.p.o. after midnight and consent orders placed.  Discussed with patient and wife and patient is somewhat confused but both were amenable to having procedure.  We will follow-up primary team and nephrology plans.    Berto Torres II, MD  22  08:26 EST  Office Number [phone]
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
## 2022-12-27 NOTE — PROCEDURES
Date of Admission:  12/26/2022  Today's Date:  12/26/22  Berto Torres II, MD  [facility]    Procedure Note  Non-tunneled central venous catheter placement for hemodialysis    Pre-op Diagnosis:   End stage renal disease    Post-op Diagnosis:     Same     Procedure:      1) Insertion of non-tunneled central venous catheter (61198)  2) Ultrasound-guided vascular access (45585)    Surgeon: Berto Torres II, MD    Assistant:  None    Anesthesia:   lidocaine 1% without epinephrine    Estimated Blood Loss:   Minimal    Complications:  None    Findings:   Successful placement of non tunneled dialysis catheter    Indications:    The patient is an 62 y.o. male referred for acute dialysis catheter placement secondary to End stage renal disease.  He missed a week of dialysis and now his left arm arteriovenous graft has thrombosed. Written consent was obtained.  The risks, benefits, and turns were also discussed.  The patient was identified via the arm band and hospital assigned identification number.  Immediately prior to the procedure a timeout was called to verify the correct patient, procedure, equipment, support staff and the site/side was marked as required.       Procedure:  Site: Right internal jugular vein  Preparation: skin prepped with 2% chlorhexidine  Skin prep agent dried: skin prep agent completely dried prior to procedure  Sterile barriers: all five maximum sterile barriers used - cap, mask, sterile gown, sterile gloves, and large sterile sheet  Hand hygiene: hand hygiene performed prior to central venous catheter insertion  Patient position: Flat  Catheter type: double lumen with pigtail  Catheter size: 14 Fr 16 cm in the Right internal jugular vein  Ultrasound guidance: yes  Number of attempts: 1  Successful placement: yes  Post-procedure: line sutured and dressing applied  Assessment: blood return through all ports and was locked with normal saline  Patient tolerated the procedure well with no immediate  complications    Berto Torres II, MD     Date: 12/26/2022  Time: 20:08 EST    Active Hospital Problems    Diagnosis  POA   • **Thrombosis of dialysis shunt, initial encounter ([facility]) [F94.954D]  Yes      Resolved Hospital Problems   No resolved problems to display.
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
## 2022-12-27 NOTE — CASE MANAGEMENT/SOCIAL WORK
Continued Stay Note  [facility]     Patient Name: Angelo Brown  MRN: [id number]  Today's Date: 12/27/2022    Admit Date: 12/26/2022    Plan: Home via family   Discharge Plan     Row Name 12/27/22 1417       Plan    Plan Home via family    Patient/Family in Agreement with Plan yes    Plan Comments CCP met with pt and spouse Samantha [phone] at bedside; introduced self and role of CCP. Face sheet information and pharmacy verified. Pt lives with his spouse in a single story home with basement stairs that the pt does not use. Pt no longer drives and but is [employer]’s. Pt receives HD M/W/F thru [facility] [address] in [place]. Pt has a rollater, cane, shower chair and wheelchair. Pt declines having a living will. Pt would like to enroll in meds to beds and denies trouble affording or managing his medications. Pt denies HH in the past and has been to [facility]. CCP notified [facility] pt in hospital and should be able to resume outpt HD on Friday. DC plan is to return home, family to transport. Wilian BERG/CCP               Discharge Codes    No documentation.               Expected Discharge Date and Time     Expected Discharge Date Expected Discharge Time    Dec 29, 2022             Farida Kingsley RN

## 2022-12-27 NOTE — NURSING NOTE
Prior to arrival, called [facility] to get STAT dialysis for patient. They said it would  take them \"a while\"before they can get to him. Upon arrival to unit pt A&Ox4, RA, BP   225/110. Blood sugar 46, asymptomatic. Drank soda, no issues with swallowing. NSR on monitor with HR 70s. Bruising noted along fistula, no thrill or bruit. Shiley c/d/i, x-ray obtained upon arrival to unit.    BP remains high, paged Dr. Murrell. Ok to hold coreg and hydralazine until after HD. [facility] called, states they have one case to complete and will be here shortly.    At the end of HD treatment, pt appeared to be in Vtach on the monitor. Immediately went to room. Pt A&Ox4, hypertensive, lethargic. Blood sugar WNL. Appears to go in and out of sinus rhythm and sinus rhythm with PACs. STAT EKG placed. HD RN immediately began blood return. EKG showed sinus rhythm, supraventricular bigeminy. Paged Dr. Swift's service at 0430. Waiting for response. BP remains high. Paged a second time at 0530, still waiting for response. 0620: Did not receive call back from Dr. Swift's service, called Dr. Murrell and updated. Dr. Murrell to see patient at bedside this morning.
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
## 2022-12-28 ENCOUNTER — ANESTHESIA (OUTPATIENT)
Dept: PERIOP | Facility: HOSPITAL | Age: 62
DRG: 252 | End: 2022-12-28
Payer: MEDICARE

## 2022-12-28 ENCOUNTER — APPOINTMENT (OUTPATIENT)
Dept: GENERAL RADIOLOGY | Facility: HOSPITAL | Age: 62
DRG: 252 | End: 2022-12-28
Payer: MEDICARE

## 2022-12-28 ENCOUNTER — ANESTHESIA EVENT (OUTPATIENT)
Dept: PERIOP | Facility: HOSPITAL | Age: 62
DRG: 252 | End: 2022-12-28
Payer: MEDICARE

## 2022-12-28 LAB
GLUCOSE BLDC GLUCOMTR-MCNC: 107 MG/DL (ref 70–130)
GLUCOSE BLDC GLUCOMTR-MCNC: 117 MG/DL (ref 70–130)
GLUCOSE BLDC GLUCOMTR-MCNC: 122 MG/DL (ref 70–130)
GLUCOSE BLDC GLUCOMTR-MCNC: 124 MG/DL (ref 70–130)

## 2022-12-28 PROCEDURE — 0JH63XZ INSERTION OF TUNNELED VASCULAR ACCESS DEVICE INTO CHEST SUBCUTANEOUS TISSUE AND FASCIA, PERCUTANEOUS APPROACH: ICD-10-PCS | Performed by: STUDENT IN AN ORGANIZED HEALTH CARE EDUCATION/TRAINING PROGRAM

## 2022-12-28 PROCEDURE — 25010000002 PROPOFOL 10 MG/ML EMULSION: Performed by: NURSE ANESTHETIST, CERTIFIED REGISTERED

## 2022-12-28 PROCEDURE — 25010000002 HEPARIN (PORCINE) PER 1000 UNITS: Performed by: INTERNAL MEDICINE

## 2022-12-28 PROCEDURE — 0 IOPAMIDOL PER 1 ML: Performed by: HOSPITALIST

## 2022-12-28 PROCEDURE — B518ZZA FLUOROSCOPY OF SUPERIOR VENA CAVA, GUIDANCE: ICD-10-PCS | Performed by: STUDENT IN AN ORGANIZED HEALTH CARE EDUCATION/TRAINING PROGRAM

## 2022-12-28 PROCEDURE — 25010000002 HYDROMORPHONE PER 4 MG: Performed by: NURSE ANESTHETIST, CERTIFIED REGISTERED

## 2022-12-28 PROCEDURE — 82962 GLUCOSE BLOOD TEST: CPT

## 2022-12-28 PROCEDURE — C1768 GRAFT, VASCULAR: HCPCS | Performed by: STUDENT IN AN ORGANIZED HEALTH CARE EDUCATION/TRAINING PROGRAM

## 2022-12-28 PROCEDURE — C1894 INTRO/SHEATH, NON-LASER: HCPCS | Performed by: STUDENT IN AN ORGANIZED HEALTH CARE EDUCATION/TRAINING PROGRAM

## 2022-12-28 PROCEDURE — 25010000002 HEPARIN (PORCINE) PER 1000 UNITS: Performed by: NURSE ANESTHETIST, CERTIFIED REGISTERED

## 2022-12-28 PROCEDURE — 0 LIDOCAINE 1 % SOLUTION 20 ML VIAL: Performed by: STUDENT IN AN ORGANIZED HEALTH CARE EDUCATION/TRAINING PROGRAM

## 2022-12-28 PROCEDURE — C1757 CATH, THROMBECTOMY/EMBOLECT: HCPCS | Performed by: STUDENT IN AN ORGANIZED HEALTH CARE EDUCATION/TRAINING PROGRAM

## 2022-12-28 PROCEDURE — 02HV33Z INSERTION OF INFUSION DEVICE INTO SUPERIOR VENA CAVA, PERCUTANEOUS APPROACH: ICD-10-PCS | Performed by: STUDENT IN AN ORGANIZED HEALTH CARE EDUCATION/TRAINING PROGRAM

## 2022-12-28 PROCEDURE — 25010000002 HEPARIN (PORCINE) PER 1000 UNITS: Performed by: STUDENT IN AN ORGANIZED HEALTH CARE EDUCATION/TRAINING PROGRAM

## 2022-12-28 PROCEDURE — C1769 GUIDE WIRE: HCPCS | Performed by: STUDENT IN AN ORGANIZED HEALTH CARE EDUCATION/TRAINING PROGRAM

## 2022-12-28 PROCEDURE — 25010000002 PROTAMINE SULFATE PER 10 MG: Performed by: NURSE ANESTHETIST, CERTIFIED REGISTERED

## 2022-12-28 PROCEDURE — 25010000002 PROPOFOL 500 MG/50ML EMULSION: Performed by: NURSE ANESTHETIST, CERTIFIED REGISTERED

## 2022-12-28 PROCEDURE — 05CY0ZZ EXTIRPATION OF MATTER FROM UPPER VEIN, OPEN APPROACH: ICD-10-PCS | Performed by: STUDENT IN AN ORGANIZED HEALTH CARE EDUCATION/TRAINING PROGRAM

## 2022-12-28 PROCEDURE — C1750 CATH, HEMODIALYSIS,LONG-TERM: HCPCS | Performed by: STUDENT IN AN ORGANIZED HEALTH CARE EDUCATION/TRAINING PROGRAM

## 2022-12-28 DEVICE — PRECISION CHRONIC CATHETER KIT,SYMMETRICAL TIP,14.5 FR/CH (4.8 MM) X 23 CM
Type: IMPLANTABLE DEVICE | Site: CAROTID | Status: FUNCTIONAL
Brand: PALINDROME

## 2022-12-28 RX ORDER — CLINDAMYCIN PHOSPHATE 600 MG/50ML
600 INJECTION INTRAVENOUS ONCE
Status: DISCONTINUED | OUTPATIENT
Start: 2022-12-28 | End: 2022-12-28 | Stop reason: CLARIF

## 2022-12-28 RX ORDER — AMLODIPINE BESYLATE 5 MG/1
5 TABLET ORAL DAILY
Status: DISCONTINUED | OUTPATIENT
Start: 2022-12-28 | End: 2022-12-29 | Stop reason: HOSPADM

## 2022-12-28 RX ORDER — ONDANSETRON 2 MG/ML
4 INJECTION INTRAMUSCULAR; INTRAVENOUS ONCE AS NEEDED
Status: DISCONTINUED | OUTPATIENT
Start: 2022-12-28 | End: 2022-12-28 | Stop reason: HOSPADM

## 2022-12-28 RX ORDER — DIPHENHYDRAMINE HYDROCHLORIDE 50 MG/ML
12.5 INJECTION INTRAMUSCULAR; INTRAVENOUS
Status: DISCONTINUED | OUTPATIENT
Start: 2022-12-28 | End: 2022-12-28 | Stop reason: HOSPADM

## 2022-12-28 RX ORDER — PROMETHAZINE HYDROCHLORIDE 25 MG/1
25 SUPPOSITORY RECTAL ONCE AS NEEDED
Status: DISCONTINUED | OUTPATIENT
Start: 2022-12-28 | End: 2022-12-28 | Stop reason: HOSPADM

## 2022-12-28 RX ORDER — PROTAMINE SULFATE 10 MG/ML
INJECTION, SOLUTION INTRAVENOUS AS NEEDED
Status: DISCONTINUED | OUTPATIENT
Start: 2022-12-28 | End: 2022-12-28 | Stop reason: SURG

## 2022-12-28 RX ORDER — FLUMAZENIL 0.1 MG/ML
0.2 INJECTION INTRAVENOUS AS NEEDED
Status: DISCONTINUED | OUTPATIENT
Start: 2022-12-28 | End: 2022-12-28 | Stop reason: HOSPADM

## 2022-12-28 RX ORDER — SODIUM CHLORIDE 9 MG/ML
9 INJECTION, SOLUTION INTRAVENOUS CONTINUOUS PRN
Status: DISCONTINUED | OUTPATIENT
Start: 2022-12-28 | End: 2022-12-29

## 2022-12-28 RX ORDER — CLINDAMYCIN PHOSPHATE 900 MG/50ML
900 INJECTION INTRAVENOUS ONCE
Status: COMPLETED | OUTPATIENT
Start: 2022-12-28 | End: 2022-12-28

## 2022-12-28 RX ORDER — HYDROCODONE BITARTRATE AND ACETAMINOPHEN 7.5; 325 MG/1; MG/1
1 TABLET ORAL ONCE AS NEEDED
Status: DISCONTINUED | OUTPATIENT
Start: 2022-12-28 | End: 2022-12-28 | Stop reason: HOSPADM

## 2022-12-28 RX ORDER — SODIUM CHLORIDE 9 MG/ML
40 INJECTION, SOLUTION INTRAVENOUS AS NEEDED
Status: DISCONTINUED | OUTPATIENT
Start: 2022-12-28 | End: 2022-12-28 | Stop reason: HOSPADM

## 2022-12-28 RX ORDER — HEPARIN SODIUM 1000 [USP'U]/ML
INJECTION, SOLUTION INTRAVENOUS; SUBCUTANEOUS AS NEEDED
Status: DISCONTINUED | OUTPATIENT
Start: 2022-12-28 | End: 2022-12-28 | Stop reason: HOSPADM

## 2022-12-28 RX ORDER — SODIUM CHLORIDE 0.9 % (FLUSH) 0.9 %
10 SYRINGE (ML) INJECTION AS NEEDED
Status: DISCONTINUED | OUTPATIENT
Start: 2022-12-28 | End: 2022-12-28 | Stop reason: HOSPADM

## 2022-12-28 RX ORDER — PROMETHAZINE HYDROCHLORIDE 25 MG/1
25 TABLET ORAL ONCE AS NEEDED
Status: DISCONTINUED | OUTPATIENT
Start: 2022-12-28 | End: 2022-12-28 | Stop reason: HOSPADM

## 2022-12-28 RX ORDER — HYDROMORPHONE HYDROCHLORIDE 1 MG/ML
0.5 INJECTION, SOLUTION INTRAMUSCULAR; INTRAVENOUS; SUBCUTANEOUS
Status: DISCONTINUED | OUTPATIENT
Start: 2022-12-28 | End: 2022-12-28 | Stop reason: HOSPADM

## 2022-12-28 RX ORDER — SODIUM CHLORIDE 0.9 % (FLUSH) 0.9 %
10 SYRINGE (ML) INJECTION EVERY 12 HOURS SCHEDULED
Status: DISCONTINUED | OUTPATIENT
Start: 2022-12-28 | End: 2022-12-28 | Stop reason: HOSPADM

## 2022-12-28 RX ORDER — LIDOCAINE HYDROCHLORIDE 20 MG/ML
INJECTION, SOLUTION INFILTRATION; PERINEURAL AS NEEDED
Status: DISCONTINUED | OUTPATIENT
Start: 2022-12-28 | End: 2022-12-28 | Stop reason: SURG

## 2022-12-28 RX ORDER — HEPARIN SODIUM 1000 [USP'U]/ML
INJECTION, SOLUTION INTRAVENOUS; SUBCUTANEOUS AS NEEDED
Status: DISCONTINUED | OUTPATIENT
Start: 2022-12-28 | End: 2022-12-28 | Stop reason: SURG

## 2022-12-28 RX ORDER — LABETALOL HYDROCHLORIDE 5 MG/ML
5 INJECTION, SOLUTION INTRAVENOUS
Status: DISCONTINUED | OUTPATIENT
Start: 2022-12-28 | End: 2022-12-28 | Stop reason: HOSPADM

## 2022-12-28 RX ORDER — PROPOFOL 10 MG/ML
INJECTION, EMULSION INTRAVENOUS AS NEEDED
Status: DISCONTINUED | OUTPATIENT
Start: 2022-12-28 | End: 2022-12-28 | Stop reason: SURG

## 2022-12-28 RX ORDER — HYDRALAZINE HYDROCHLORIDE 20 MG/ML
5 INJECTION INTRAMUSCULAR; INTRAVENOUS
Status: DISCONTINUED | OUTPATIENT
Start: 2022-12-28 | End: 2022-12-28 | Stop reason: HOSPADM

## 2022-12-28 RX ORDER — OXYCODONE AND ACETAMINOPHEN 7.5; 325 MG/1; MG/1
1 TABLET ORAL EVERY 4 HOURS PRN
Status: DISCONTINUED | OUTPATIENT
Start: 2022-12-28 | End: 2022-12-28 | Stop reason: HOSPADM

## 2022-12-28 RX ORDER — EPHEDRINE SULFATE 50 MG/ML
5 INJECTION, SOLUTION INTRAVENOUS ONCE AS NEEDED
Status: DISCONTINUED | OUTPATIENT
Start: 2022-12-28 | End: 2022-12-28 | Stop reason: HOSPADM

## 2022-12-28 RX ORDER — DIPHENHYDRAMINE HCL 25 MG
25 CAPSULE ORAL
Status: DISCONTINUED | OUTPATIENT
Start: 2022-12-28 | End: 2022-12-28 | Stop reason: HOSPADM

## 2022-12-28 RX ORDER — NALOXONE HCL 0.4 MG/ML
0.2 VIAL (ML) INJECTION AS NEEDED
Status: DISCONTINUED | OUTPATIENT
Start: 2022-12-28 | End: 2022-12-28 | Stop reason: HOSPADM

## 2022-12-28 RX ADMIN — OXYCODONE HYDROCHLORIDE AND ACETAMINOPHEN 1 TABLET: 10; 325 TABLET ORAL at 07:48

## 2022-12-28 RX ADMIN — HYDRALAZINE HYDROCHLORIDE 100 MG: 50 TABLET, FILM COATED ORAL at 20:32

## 2022-12-28 RX ADMIN — CLINDAMYCIN PHOSPHATE 900 MG: 900 INJECTION, SOLUTION INTRAVENOUS at 11:07

## 2022-12-28 RX ADMIN — SODIUM CHLORIDE 9 ML/HR: 9 INJECTION, SOLUTION INTRAVENOUS at 10:42

## 2022-12-28 RX ADMIN — HEPARIN SODIUM 10000 UNITS: 1000 INJECTION INTRAVENOUS; SUBCUTANEOUS at 12:28

## 2022-12-28 RX ADMIN — OXYCODONE HYDROCHLORIDE AND ACETAMINOPHEN 1 TABLET: 10; 325 TABLET ORAL at 00:28

## 2022-12-28 RX ADMIN — CARVEDILOL 6.25 MG: 6.25 TABLET, FILM COATED ORAL at 07:48

## 2022-12-28 RX ADMIN — LIDOCAINE HYDROCHLORIDE 50 MG: 20 INJECTION, SOLUTION INFILTRATION; PERINEURAL at 11:21

## 2022-12-28 RX ADMIN — ALPRAZOLAM 1 MG: 0.5 TABLET ORAL at 00:27

## 2022-12-28 RX ADMIN — OXYCODONE HYDROCHLORIDE AND ACETAMINOPHEN 1 TABLET: 10; 325 TABLET ORAL at 20:44

## 2022-12-28 RX ADMIN — CARVEDILOL 6.25 MG: 6.25 TABLET, FILM COATED ORAL at 20:32

## 2022-12-28 RX ADMIN — PROPOFOL 25 MCG/KG/MIN: 10 INJECTION, EMULSION INTRAVENOUS at 11:21

## 2022-12-28 RX ADMIN — PROPOFOL 30 MG: 10 INJECTION, EMULSION INTRAVENOUS at 11:21

## 2022-12-28 RX ADMIN — Medication 10 ML: at 20:33

## 2022-12-28 RX ADMIN — HYDRALAZINE HYDROCHLORIDE 100 MG: 50 TABLET, FILM COATED ORAL at 05:14

## 2022-12-28 RX ADMIN — AMLODIPINE BESYLATE 5 MG: 5 TABLET ORAL at 00:27

## 2022-12-28 RX ADMIN — ALPRAZOLAM 1 MG: 0.5 TABLET ORAL at 07:48

## 2022-12-28 RX ADMIN — HYDROMORPHONE HYDROCHLORIDE 0.5 MG: 1 INJECTION, SOLUTION INTRAMUSCULAR; INTRAVENOUS; SUBCUTANEOUS at 14:40

## 2022-12-28 RX ADMIN — IOPAMIDOL 5 ML: 510 INJECTION, SOLUTION INTRAVASCULAR at 14:10

## 2022-12-28 RX ADMIN — HEPARIN SODIUM 4000 UNITS: 1000 INJECTION INTRAVENOUS; SUBCUTANEOUS at 16:18

## 2022-12-28 RX ADMIN — PROTAMINE SULFATE 40 MG: 10 INJECTION, SOLUTION INTRAVENOUS at 12:53

## 2022-12-28 NOTE — PROGRESS NOTES
LOS: 2 days     Chief Complaint/ Reason for encounter: ESRD, dialysis management    Subjective   12/28/22 : No complaints, seen in preop holding  Denies any shortness of breath chest pain or edema  Declot scheduled for this morning with dialysis to follow      Medical history reviewed:  History of Present Illness    Subjective    History taken from: Patient and chart    Vital Signs  Temp:  [98.1 °F (36.7 °C)-98.6 °F (37 °C)] 98.1 °F (36.7 °C)  Heart Rate:  [63-76] 68  Resp:  [16-18] 16  BP: (142-198)/() 160/86       Wt Readings from Last 1 Encounters:   12/26/22 1551 68 kg (150 lb)       Objective    Objective:  General Appearance:  Comfortable, well-appearing, in no acute distress and not in pain.  Awake, alert, oriented  HEENT: Mucous membranes moist, no injury, oropharynx clear  Lungs:  Normal effort and normal respiratory rate.  Breath sounds clear to auscultation.  No  respiratory distress.  No rales, decreased breath sounds or rhonchi.    Heart: Normal rate.  Regular rhythm.  S1, S2 normal.  No murmur.   Abdomen: Abdomen is soft.  Bowel sounds are normal, no abdominal tenderness.  There is no rebound or guarding  Extremities: No edema of bilateral lower extremities, AV graft absent thrill and bruit  Neurological: No focal motor or sensory deficits, pupils reactive  Skin:  Warm and dry.  No rash or cyanosis.       Results Review:    Intake/Output:     Intake/Output Summary (Last 24 hours) at 12/28/2022 1001  Last data filed at 12/28/2022 0900  Gross per 24 hour   Intake 240 ml   Output 300 ml   Net -60 ml         DATA:  Radiology and Labs:  The following labs independently reviewed by me. Additional labs ordered for tomorrow a.m.  Interval notes, chart personally reviewed by me.   Old records independently reviewed showing ESRD on HD Monday Wednesday Friday  The following radiologic studies independently viewed by me, findings chest x-ray with right IJ line in good position no pneumothorax, no  pulmonary edema  New problems include clotted AV graft  Discussed with dialysis RNTemi    Risk/ complexity of medical care/ medical decision making moderate, need for surgical declot in OR today    Labs:   Recent Results (from the past 24 hour(s))   Basic Metabolic Panel    Collection Time: 12/27/22 11:43 AM    Specimen: Blood   Result Value Ref Range    Glucose 168 (H) 65 - 99 mg/dL    BUN 41 (H) 8 - 23 mg/dL    Creatinine 4.58 (H) 0.76 - 1.27 mg/dL    Sodium 132 (L) 136 - 145 mmol/L    Potassium 4.8 3.5 - 5.2 mmol/L    Chloride 99 98 - 107 mmol/L    CO2 23.9 22.0 - 29.0 mmol/L    Calcium 8.4 (L) 8.6 - 10.5 mg/dL    BUN/Creatinine Ratio 9.0 7.0 - 25.0    Anion Gap 9.1 5.0 - 15.0 mmol/L    eGFR 13.9 (L) >60.0 mL/min/1.73   CBC Auto Differential    Collection Time: 12/27/22 11:43 AM    Specimen: Blood   Result Value Ref Range    WBC 4.13 3.40 - 10.80 10*3/mm3    RBC 3.40 (L) 4.14 - 5.80 10*6/mm3    Hemoglobin 10.1 (L) 13.0 - 17.7 g/dL    Hematocrit 32.2 (L) 37.5 - 51.0 %    MCV 94.7 79.0 - 97.0 fL    MCH 29.7 26.6 - 33.0 pg    MCHC 31.4 (L) 31.5 - 35.7 g/dL    RDW 14.0 12.3 - 15.4 %    RDW-SD 48.6 37.0 - 54.0 fl    MPV 10.9 6.0 - 12.0 fL    Platelets 77 (L) 140 - 450 10*3/mm3    Neutrophil % 69.8 42.7 - 76.0 %    Lymphocyte % 14.3 (L) 19.6 - 45.3 %    Monocyte % 9.9 5.0 - 12.0 %    Eosinophil % 4.8 0.3 - 6.2 %    Basophil % 0.7 0.0 - 1.5 %    Neutrophils, Absolute 2.88 1.70 - 7.00 10*3/mm3    Lymphocytes, Absolute 0.59 (L) 0.70 - 3.10 10*3/mm3    Monocytes, Absolute 0.41 0.10 - 0.90 10*3/mm3    Eosinophils, Absolute 0.20 0.00 - 0.40 10*3/mm3    Basophils, Absolute 0.03 0.00 - 0.20 10*3/mm3   Magnesium    Collection Time: 12/27/22 11:43 AM    Specimen: Blood   Result Value Ref Range    Magnesium 1.9 1.6 - 2.4 mg/dL   Troponin    Collection Time: 12/27/22 11:43 AM    Specimen: Blood   Result Value Ref Range    Troponin T 0.117 (C) 0.000 - 0.030 ng/mL   POC Glucose Once    Collection Time: 12/27/22  4:19 PM     Specimen: Blood   Result Value Ref Range    Glucose 169 (H) 70 - 130 mg/dL   POC Glucose Once    Collection Time: 12/27/22  9:44 PM    Specimen: Blood   Result Value Ref Range    Glucose 121 70 - 130 mg/dL   POC Glucose Once    Collection Time: 12/28/22  6:18 AM    Specimen: Blood   Result Value Ref Range    Glucose 117 70 - 130 mg/dL   POC Glucose Once    Collection Time: 12/28/22  8:53 AM    Specimen: Blood   Result Value Ref Range    Glucose 124 70 - 130 mg/dL       Radiology:  Pertinent radiology studies were reviewed as described above      Medications have been reviewed separately in chart overview      ASSESSMENT:  ESRD on HD, recent noncompliance with prescribed treatments  Hyperkalemia from missed HD, improved post HD    Thrombosis of dialysis shunt, initial encounter, declot today    Prior cerebrovascular accident    CAD (coronary artery disease)    Type 2 diabetes mellitus with hyperglycemia, without long-term current use of insulin (HCC)    GERD (gastroesophageal reflux disease)    HLD (hyperlipidemia)    HTN (hypertension)    End-stage renal disease on hemodialysis (HCC)    Metabolic acidosis           DISCUSSION/PLAN:   Will plan hemodialysis today using his AV graft after thrombectomy assuming that his declot is successful  Okay to remove Shiley catheter after dialysis today if no issues with his AV graft  Continue current BP regimen  Monday Wednesday Friday HD schedule this admission     Continue to monitor electrolytes and volume closely    Yohan Murrell MD   [facility]   Office phone number: [phone]  Answering service phone number: [phone]    12/28/22  10:01 EST    Dictation performed using Dragon dictation software

## 2022-12-28 NOTE — ANESTHESIA PREPROCEDURE EVALUATION
Anesthesia Evaluation     Patient summary reviewed and Nursing notes reviewed   NPO Solid Status: > 6 hours  NPO Liquid Status: > 2 hours           Airway   Mallampati: I  TM distance: >3 FB  Neck ROM: full  Dental    (+) edentulous    Pulmonary    Cardiovascular     ECG reviewed    (+) hypertension, past MI  >12 months, CAD, CABG >6 Months, hyperlipidemia,     ROS comment: TTE 3/2022 w/nl LV/RV fxn, no significant valve issues    Tachyarrhythmia early in admission, suspected due to missed HD per cardiology. NSR in preop.     Neuro/Psych  (+) CVA (prior hx CVA w/residual vision changes), syncope (hx convulsive syncope), psychiatric history Anxiety and Depression,    GI/Hepatic/Renal/Endo    (+)  GERD,  hepatitis C, liver disease, renal disease ESRD, diabetes mellitus type 2,     Musculoskeletal     Abdominal    Substance History      OB/GYN          Other   arthritis,    history of cancer (hx renal ca)                    Anesthesia Plan    ASA 4     MAC       Anesthetic plan, risks, benefits, and alternatives have been provided, discussed and informed consent has been obtained with: patient.        CODE STATUS:    Level Of Support Discussed With: Patient  Code Status (Patient has no pulse and is not breathing): CPR (Attempt to Resuscitate)  Medical Interventions (Patient has pulse or is breathing): Full Support

## 2022-12-28 NOTE — NURSING NOTE
IV from floor leaking. Removed per protocol. 3 unsuccessful attempts for a new IV. Per JEWEL Maddox to use pigtail on Hemodialysis Cath in rt neck for IV access. Flushed with blood return noted.

## 2022-12-28 NOTE — NURSING NOTE
HD WITHOUT INCIDENT OR C/O. TOLERATED WELL. REMOVED 2 L. NO MEDS ADMINISTERED. NEW R CW TUNNELLED DIALYSIS CATHETER WORKED WELL. DRSG NEW. CDI. STABLE, NO C/O UPON COMPLETION OF HD.

## 2022-12-28 NOTE — OP NOTE
Operative Note  Location: [facility]  Date of Admission:  12/26/2022  OR Date: 12/28/2022    Pre-op Diagnosis:   Thrombosed left arm arteriovenous fistula    Post-Op Diagnosis Codes:   T82.868A    Procedure:   Cutdown left arm arteriovenous fistula for thrombectomy  Central venogram  Ligation of arteriovenous fistula 85506  Tunneled dialysis catheter placment 80125  fuoroscopic guidance for tunneled catheter placement 16771      Surgeon: Berto Torrse MD    Assistant: Megan HODGEA; Rena Tilley KCSA    Anesthesia: Monitored Anesthesia Care    Staff:   Circulator: Christy Sidhu RN; Jonathan Cao RN  Scrub Person: Francesca Banuelos; Celestina Mcdowell  Assistant: Megan Palacio CSA; Rena Tilley CST  Vascular Radiology Technician: Merced Bell; Gabriel Meraz    Estimated Blood Loss: Minimal    Specimens:   None    Complications: none    Findings: Patient's fistula completely thrombosed.  Cutdown performed on the mid arm and a large pseudoaneurysm with posterior disruption of the fistula.  A second cutdown was performed more proximally where thrombectomy of inflow resulted in pulsatile bleeding.  Attempted thrombectomy of venous outflow from the more proximal cutdown site with plans for a jump graft however cephalic arch completely occluded, appears chronic, did not improve with thrombectomy.  Ultimately I think patient will need conversion to brachial axillary graft to both incisions closed and then proceeded with tunneled dialysis catheter placement    Indications:    The patient is an 62 y.o. male with a thrombosed left arm arteriovenous fistula.  Plans are made for thrombectomy today via cutdown.  Risk benefits and alternatives discussed with patient and his wife and informed consent was signed.       Procedure:    Patient taken the operating room where is placed supine on the OR table in our hybrid suite.  Monitored anesthesia care with sedation was begun by the anesthesia service and see  their notes for more details of their care.  Patient's left arm was circumferentially prepped with chlorhexidine and then draped in sterile fashion.  A timeout was performed prior to beginning the surgery.  Began the procedure by injecting local anesthesia over the mid upper arm fistula and then making a transverse incision over the mid upper arm fistula.  We then used electrocautery and Metzenbaum scissors to dissect down to the level of the fistula.  It was dissected circumferentially and a vessel loop was passed.  As we dissected more proximally it was noted to have a large posterior disruption with pseudoaneurysm extending medially down the arm.  This contained a combination of chronic and acute thrombus.  Approximately the fistula was small, 5 mm, moderate in diameter and thrombosed.  I do not think it be safe to perform a thrombectomy from this incision with the large posterior disruption of the fistula so we turned our attention closer to the anastomosis on the distal upper arm where another transverse incision was made over the fistula.  We then again used electrocautery and Metzenbaum scissors to dissect down to the fistula.  It was circumferentially dissected.  The patient was heparinized with 10,000 units of heparin.  I did transect the fistula at this area with a plan to perform a jump graft around the aneurysmal/degenerative segment.  The proximal thrombectomy was performed with a #3 Sade catheter and we were able to restore pulsatile inflow after removing thrombus.   We then turned our attention to the more proximal incision where we transected the fistula and attempted to perform thrombectomy of the venous outflow.  We were unable to pass the Sade catheter.  We were able to pass a 0.035 Glidewire under fluoroscopic guidance into the superior vena cava.  We attempted perform an over-the-wire Sade thrombectomy but the Sade catheter could only be advanced a few centimeters..  I performed a  central venogram through the Sade catheter and the cephalic arch had only a string sign of flow to the axillary vein.  On visual expection of the vein appeared to have significant fibrotic stenosis more than acute clot.  I did not think that performing a jump graft to this vein with the extremely limited outflow would provide a durable long-term dialysis access for him.  At this point we called the patient's wife and informed her I thought the patient would ultimately need either a new fistula on the other arm or conversion to a brachial axillary graft, and will need a tunneled dialysis catheter.  She provided consent to proceed with tunneled dialysis catheter placement.  We then turned our attention back to both of the incisions were made on the left arm.  Both were irrigated with normal saline and hemostasis was obtained with electrocautery.  They were both closed in 2 layers with 3-0 Vicryl and 4-0 Vicryl suture.  They were dressed with Exofin skin glue and then the left arm was wrapped with 4 x 4's Kerlix and an Ace wraps.  We then took down the sterile drapes and repositioned the patient for tunneled dialysis catheter placement.  He previously had a Shiley catheter placed in his right and jugular and so we prepped his right neck and chest and prepped the Shiley catheter into the field.  We did repeat a timeout prior to beginning the tunneled dialysis catheter placement.  Once this was done we advanced a point 035 Glidewire through the existing Shiley catheter and under fluoroscopic guidance maneuvered this into the inferior vena cava.  The Shiley catheter was then removed and a dilator was advanced over the wire.  We then advanced the peel-away sheath over the wire under fluoroscopic guidance.  We then obtained a 23 cm tunneled dialysis catheter and marked the appropriate access site after examining it under fluoroscopy.  Local anesthesia was infiltrated from the chest exit site up to the neck access site.   A stab incision was made on the chest and hemostat was used to create a tunnel.  We then used the tunneler to tunnel from the skin exit site up to the neck access site.  The wire was then removed from the peel-away sheath and the tunneled dialysis catheter was advanced through the peel-away sheath which the sheath was then removed.  This confirmed to be in appropriate position under fluoroscopy.  Both ports were flushed and aspirated and it worked very well.  The catheter was then sutured in place with nylon suture and the neck access site was closed with a 3-0 Vicryl suture.  It was locked with concentrated heparin and then a sterile dressing with a Biopatch was placed over the tunneled dialysis catheter site.  Patient was then aroused from anesthesia and transferred to PACU in good condition.  All sponge instrument needle counts were correct at the end the case and all wires catheters and sheaths were removed whole and intact prior to the conclusion of the procedure.  Patient tolerated the procedure well and there were no complications.      Active Hospital Problems    Diagnosis  POA   • **Thrombosis of dialysis shunt, initial encounter ([facility]) [T82.868A]  Yes   • Metabolic acidosis [E87.20]  Unknown   • Uremia [N19]  Unknown   • End-stage renal disease on hemodialysis ([facility]) [N18.6, Z99.2]  Not Applicable   • HTN (hypertension) [I10]  Yes   • History of CVA (cerebrovascular accident) [Z86.73]  Not Applicable   • CAD (coronary artery disease) [I25.10]  Yes   • Type 2 diabetes mellitus with hyperglycemia, without long-term current use of insulin ([facility]) [E11.65]  Yes   • GERD (gastroesophageal reflux disease) [K21.9]  Yes   • HLD (hyperlipidemia) [E78.5]  Yes   • Hyperkalemia [E87.5]  Yes      Resolved Hospital Problems   No resolved problems to display.      Berto Torres II, MD     Date: 12/28/2022  Time: 13:26 EST

## 2022-12-29 ENCOUNTER — APPOINTMENT (OUTPATIENT)
Dept: CARDIOLOGY | Facility: HOSPITAL | Age: 62
DRG: 252 | End: 2022-12-29
Payer: MEDICARE

## 2022-12-29 ENCOUNTER — READMISSION MANAGEMENT (OUTPATIENT)
Dept: CALL CENTER | Facility: HOSPITAL | Age: 62
End: 2022-12-29

## 2022-12-29 VITALS
HEART RATE: 73 BPM | TEMPERATURE: 98.5 F | OXYGEN SATURATION: 100 % | DIASTOLIC BLOOD PRESSURE: 80 MMHG | RESPIRATION RATE: 19 BRPM | SYSTOLIC BLOOD PRESSURE: 114 MMHG | HEIGHT: 69 IN | BODY MASS INDEX: 22.22 KG/M2 | WEIGHT: 150 LBS

## 2022-12-29 LAB
ALBUMIN SERPL-MCNC: 3.8 G/DL (ref 3.5–5.2)
ANION GAP SERPL CALCULATED.3IONS-SCNC: 10.6 MMOL/L (ref 5–15)
BASOPHILS # BLD AUTO: 0.02 10*3/MM3 (ref 0–0.2)
BASOPHILS NFR BLD AUTO: 0.3 % (ref 0–1.5)
BH CV UPPER VENOUS LEFT BASILIC FOREARM COMPRESS: NORMAL
BH CV UPPER VENOUS LEFT BASILIC UPPER COMPRESS: NORMAL
BH CV UPPER VENOUS LEFT CEPHALIC FOREARM COMPRESS: NORMAL
BH CV UPPER VENOUS LEFT CEPHALIC UPPER COMPRESS: NORMAL
BH CV UPPER VENOUS RIGHT BASILIC FOREARM COMPRESS: NORMAL
BH CV UPPER VENOUS RIGHT BASILIC UPPER COMPRESS: NORMAL
BH CV UPPER VENOUS RIGHT CEPHALIC FOREARM COMPRESS: NORMAL
BH CV UPPER VENOUS RIGHT CEPHALIC UPPER COMPRESS: NORMAL
BH CV VAS MEAS BASILIC ANTECUBITAL FOSSA LEFT: 0.27 CM
BH CV VAS MEAS BASILIC ANTECUBITAL FOSSA RIGHT: 0.14 CM
BH CV VAS MEAS BASILIC FOREARM LEFT - DIST: 0.11 CM
BH CV VAS MEAS BASILIC FOREARM LEFT - MID: 0.07 CM
BH CV VAS MEAS BASILIC FOREARM LEFT - PROX: 0.1 CM
BH CV VAS MEAS BASILIC FOREARM RIGHT - DIST: 0.08 CM
BH CV VAS MEAS BASILIC FOREARM RIGHT - MID: 0.08 CM
BH CV VAS MEAS BASILIC FOREARM RIGHT - PROX: 0.12 CM
BH CV VAS MEAS BASILIC UPPER ARM LEFT - DIST: 0.25 CM
BH CV VAS MEAS BASILIC UPPER ARM LEFT - MID: 0.3 CM
BH CV VAS MEAS BASILIC UPPER ARM LEFT - PROX: 0.2 CM
BH CV VAS MEAS BASILIC UPPER ARM RIGHT - DIST: 0.2 CM
BH CV VAS MEAS BASILIC UPPER ARM RIGHT - MID: 0.14 CM
BH CV VAS MEAS BASILIC UPPER ARM RIGHT - PROX: 0.15 CM
BH CV VAS MEAS CEPHALIC ANTECUBITAL FOSSA LEFT: 0.12 CM
BH CV VAS MEAS CEPHALIC ANTECUBITAL FOSSA RIGHT: 0.22 CM
BH CV VAS MEAS CEPHALIC FOREARM LEFT - DIST: 0.08 CM
BH CV VAS MEAS CEPHALIC FOREARM LEFT - MID: 0.1 CM
BH CV VAS MEAS CEPHALIC FOREARM LEFT - PROX: 0.1 CM
BH CV VAS MEAS CEPHALIC FOREARM RIGHT - DIST: 0.09 CM
BH CV VAS MEAS CEPHALIC FOREARM RIGHT - MID: 0.11 CM
BH CV VAS MEAS CEPHALIC FOREARM RIGHT - PROX: 0.17 CM
BH CV VAS MEAS CEPHALIC UPPER ARM RIGHT - DIST: 0.14 CM
BH CV VAS MEAS CEPHALIC UPPER ARM RIGHT - MID: 0.1 CM
BH CV VAS MEAS CEPHALIC UPPER ARM RIGHT - PROX: 0.1 CM
BUN SERPL-MCNC: 34 MG/DL (ref 8–23)
BUN/CREAT SERPL: 8.5 (ref 7–25)
CALCIUM SPEC-SCNC: 9.1 MG/DL (ref 8.6–10.5)
CHLORIDE SERPL-SCNC: 97 MMOL/L (ref 98–107)
CO2 SERPL-SCNC: 25.4 MMOL/L (ref 22–29)
CREAT SERPL-MCNC: 3.98 MG/DL (ref 0.76–1.27)
DEPRECATED RDW RBC AUTO: 48.3 FL (ref 37–54)
EGFRCR SERPLBLD CKD-EPI 2021: 16.4 ML/MIN/1.73
EOSINOPHIL # BLD AUTO: 0.21 10*3/MM3 (ref 0–0.4)
EOSINOPHIL NFR BLD AUTO: 3.5 % (ref 0.3–6.2)
ERYTHROCYTE [DISTWIDTH] IN BLOOD BY AUTOMATED COUNT: 13.9 % (ref 12.3–15.4)
GLUCOSE BLDC GLUCOMTR-MCNC: 118 MG/DL (ref 70–130)
GLUCOSE BLDC GLUCOMTR-MCNC: 149 MG/DL (ref 70–130)
GLUCOSE BLDC GLUCOMTR-MCNC: 265 MG/DL (ref 70–130)
GLUCOSE SERPL-MCNC: 225 MG/DL (ref 65–99)
HCT VFR BLD AUTO: 35.4 % (ref 37.5–51)
HGB BLD-MCNC: 11.6 G/DL (ref 13–17.7)
IMM GRANULOCYTES # BLD AUTO: 0.02 10*3/MM3 (ref 0–0.05)
IMM GRANULOCYTES NFR BLD AUTO: 0.3 % (ref 0–0.5)
LYMPHOCYTES # BLD AUTO: 0.77 10*3/MM3 (ref 0.7–3.1)
LYMPHOCYTES NFR BLD AUTO: 12.9 % (ref 19.6–45.3)
MAXIMAL PREDICTED HEART RATE: 160 BPM
MCH RBC QN AUTO: 30.9 PG (ref 26.6–33)
MCHC RBC AUTO-ENTMCNC: 32.8 G/DL (ref 31.5–35.7)
MCV RBC AUTO: 94.4 FL (ref 79–97)
MONOCYTES # BLD AUTO: 0.61 10*3/MM3 (ref 0.1–0.9)
MONOCYTES NFR BLD AUTO: 10.3 % (ref 5–12)
NEUTROPHILS NFR BLD AUTO: 4.32 10*3/MM3 (ref 1.7–7)
NEUTROPHILS NFR BLD AUTO: 72.7 % (ref 42.7–76)
NRBC BLD AUTO-RTO: 0 /100 WBC (ref 0–0.2)
PHOSPHATE SERPL-MCNC: 4.3 MG/DL (ref 2.5–4.5)
PLATELET # BLD AUTO: 80 10*3/MM3 (ref 140–450)
PMV BLD AUTO: 10.9 FL (ref 6–12)
POTASSIUM SERPL-SCNC: 4.9 MMOL/L (ref 3.5–5.2)
RBC # BLD AUTO: 3.75 10*6/MM3 (ref 4.14–5.8)
SODIUM SERPL-SCNC: 133 MMOL/L (ref 136–145)
STRESS TARGET HR: 136 BPM
WBC NRBC COR # BLD: 5.95 10*3/MM3 (ref 3.4–10.8)

## 2022-12-29 PROCEDURE — 93970 EXTREMITY STUDY: CPT

## 2022-12-29 PROCEDURE — 99232 SBSQ HOSP IP/OBS MODERATE 35: CPT

## 2022-12-29 PROCEDURE — 63710000001 INSULIN LISPRO (HUMAN) PER 5 UNITS: Performed by: STUDENT IN AN ORGANIZED HEALTH CARE EDUCATION/TRAINING PROGRAM

## 2022-12-29 PROCEDURE — 80069 RENAL FUNCTION PANEL: CPT | Performed by: HOSPITALIST

## 2022-12-29 PROCEDURE — 85025 COMPLETE CBC W/AUTO DIFF WBC: CPT | Performed by: HOSPITALIST

## 2022-12-29 PROCEDURE — 82962 GLUCOSE BLOOD TEST: CPT

## 2022-12-29 RX ORDER — BLOOD SUGAR DIAGNOSTIC
STRIP MISCELLANEOUS
Qty: 100 EACH | Refills: 12 | Status: SHIPPED | OUTPATIENT
Start: 2022-12-29

## 2022-12-29 RX ORDER — LANCETS 30 GAUGE
EACH MISCELLANEOUS
Qty: 100 EACH | Refills: 0 | Status: SHIPPED | OUTPATIENT
Start: 2022-12-29

## 2022-12-29 RX ORDER — BLOOD-GLUCOSE METER
KIT MISCELLANEOUS
Qty: 1 EACH | Refills: 0 | Status: SHIPPED | OUTPATIENT
Start: 2022-12-29

## 2022-12-29 RX ORDER — CARVEDILOL 6.25 MG/1
6.25 TABLET ORAL 2 TIMES DAILY WITH MEALS
Qty: 60 TABLET | Refills: 0 | Status: SHIPPED | OUTPATIENT
Start: 2022-12-29 | End: 2023-02-02

## 2022-12-29 RX ORDER — AMLODIPINE BESYLATE 5 MG/1
5 TABLET ORAL DAILY
Qty: 30 TABLET | Refills: 0 | Status: SHIPPED | OUTPATIENT
Start: 2022-12-30 | End: 2023-01-29

## 2022-12-29 RX ADMIN — HYDRALAZINE HYDROCHLORIDE 100 MG: 50 TABLET, FILM COATED ORAL at 14:52

## 2022-12-29 RX ADMIN — PANTOPRAZOLE SODIUM 40 MG: 40 TABLET, DELAYED RELEASE ORAL at 08:53

## 2022-12-29 RX ADMIN — INSULIN LISPRO 4 UNITS: 100 INJECTION, SOLUTION INTRAVENOUS; SUBCUTANEOUS at 11:27

## 2022-12-29 RX ADMIN — Medication 220 MG: at 08:53

## 2022-12-29 RX ADMIN — OXYCODONE HYDROCHLORIDE AND ACETAMINOPHEN 1 TABLET: 10; 325 TABLET ORAL at 03:40

## 2022-12-29 RX ADMIN — OXYCODONE HYDROCHLORIDE AND ACETAMINOPHEN 1 TABLET: 10; 325 TABLET ORAL at 09:46

## 2022-12-29 RX ADMIN — CARVEDILOL 6.25 MG: 6.25 TABLET, FILM COATED ORAL at 08:53

## 2022-12-29 RX ADMIN — Medication 10 ML: at 08:54

## 2022-12-29 RX ADMIN — AMLODIPINE BESYLATE 5 MG: 5 TABLET ORAL at 08:53

## 2022-12-29 RX ADMIN — ALPRAZOLAM 1 MG: 0.5 TABLET ORAL at 09:45

## 2022-12-29 RX ADMIN — HYDRALAZINE HYDROCHLORIDE 100 MG: 50 TABLET, FILM COATED ORAL at 06:48

## 2022-12-29 NOTE — ANESTHESIA POSTPROCEDURE EVALUATION
"Patient: Angelo Brown    Procedure Summary     Date: 12/28/22 [facility] / [facility]    Anesthesia Start: 1114 Anesthesia Stop: 1421    Procedures:       LEFT ARM ARTERIOVENOUS GRAFT THROMBECTOMY attempted (Left: Head)      HEMODIALYSIS CATHETER INSERTION (Right) Diagnosis:     Surgeons: Berto Torres II, MD Provider: Karthik Osborn MD    Anesthesia Type: MAC ASA Status: 4          Anesthesia Type: MAC    Vitals  Vitals Value Taken Time   /98 12/28/22 1451   Temp 36.6 °C (97.8 °F) 12/28/22 1415   Pulse 63 12/28/22 1454   Resp 16 12/28/22 1445   SpO2 100 % 12/28/22 1454   Vitals shown include unvalidated device data.        Post Anesthesia Care and Evaluation    Patient location during evaluation: bedside  Patient participation: complete - patient participated  Level of consciousness: awake and alert  Pain management: adequate    Airway patency: patent  Anesthetic complications: No anesthetic complications    Cardiovascular status: acceptable  Respiratory status: acceptable  Hydration status: acceptable    Comments: /95 (BP Location: Right arm, Patient Position: Lying)   Pulse 81   Temp 36.7 °C (98 °F) (Temporal)   Resp 17   Ht 175.3 cm (69\")   Wt 68 kg (150 lb)   SpO2 96%   BMI 22.15 kg/m²       "

## 2022-12-29 NOTE — PROGRESS NOTES
DAILY PROGRESS NOTE  [facility]    Patient Identification:  Name: Angelo Brown  Age: 62 y.o.  Sex: male  :  1960  MRN: [id number]         Primary Care Physician: Yadiel Baxter III, MD    Subjective:  Interval History: The patient had attempted salvaging shunt in left arm but was unsuccessful.  He did have tunnel catheter placed and had dialysis.  Objective:    Scheduled Meds:amLODIPine, 5 mg, Oral, Daily  carvedilol, 6.25 mg, Oral, BID With Meals  hydrALAZINE, 100 mg, Oral, TID  [MAR Hold] insulin lispro, 0-7 Units, Subcutaneous, TID AC  [MAR Hold] pantoprazole, 40 mg, Oral, Daily  [MAR Hold] sodium chloride, 10 mL, Intravenous, Q12H  [MAR Hold] zinc sulfate, 220 mg, Oral, Daily      Continuous Infusions:sodium chloride, 9 mL/hr, Last Rate: 9 mL/hr (22 1042)        Vital signs in last 24 hours:  Temp:  [97.5 °F (36.4 °C)-98.6 °F (37 °C)] 97.5 °F (36.4 °C)  Heart Rate:  [62-85] 85  Resp:  [16-18] 16  BP: (142-197)/() 194/94    Intake/Output:    Intake/Output Summary (Last 24 hours) at 2022  Last data filed at 2022 1800  Gross per 24 hour   Intake 350 ml   Output 2700 ml   Net -2350 ml       Exam:  BP (!) 194/94 (BP Location: Right arm, Patient Position: Lying)   Pulse 85   Temp 97.5 °F (36.4 °C) (Temporal)   Resp 16   Ht 175.3 cm (69\")   Wt 68 kg (150 lb)   SpO2 100%   BMI 22.15 kg/m²     General Appearance:    Alert, cooperative, no distress   Head:    Normocephalic, without obvious abnormality, atraumatic   Eyes:       Throat:   Lips, tongue, gums normal   Neck:   Supple, symmetrical, trachea midline, no JVD   Lungs:     Clear to auscultation bilaterally, respirations unlabored   Chest Wall:    No tenderness or deformity    Heart:    Regular rate and rhythm, S1 and S2 normal, no murmur,no  Rub or gallop   Abdomen:     Soft, nontender, bowel sounds active, no masses, no organomegaly    Extremities:   Extremities normal, left arm with surgical  changes, no cyanosis or edema   Pulses:      Skin:   Skin is warm and dry,  no rashes or palpable lesions   Neurologic:   no focal deficits noted      Lab Results (last 72 hours)     Procedure Component Value Units Date/Time    POC Glucose Once [[id number]]  (Normal) Collected: 12/28/22 1418    Specimen: Blood Updated: 12/28/22 1419     Glucose 107 mg/dL      Comment: Meter: [id number] : [id number] Viry PUENTE RN       POC Glucose Once [[id number]]  (Normal) Collected: 12/28/22 0853    Specimen: Blood Updated: 12/28/22 0856     Glucose 124 mg/dL      Comment: Meter: [id number] : [id number] Naeem Jackman NA       POC Glucose Once [[id number]]  (Normal) Collected: 12/28/22 0618    Specimen: Blood Updated: 12/28/22 0619     Glucose 117 mg/dL      Comment: Meter: [id number] : [id number] Don Adela NA       POC Glucose Once [[id number]]  (Normal) Collected: 12/27/22 2144    Specimen: Blood Updated: 12/27/22 2145     Glucose 121 mg/dL      Comment: Meter: [id number] : [id number] Don Adela NA       POC Glucose Once [[id number]]  (Abnormal) Collected: 12/27/22 1619    Specimen: Blood Updated: 12/27/22 1620     Glucose 169 mg/dL      Comment: Meter: [id number] : [id number] Zoltan Copeland CNA       Basic Metabolic Panel [[id number]]  (Abnormal) Collected: 12/27/22 1143    Specimen: Blood Updated: 12/27/22 1307     Glucose 168 mg/dL      BUN 41 mg/dL      Creatinine 4.58 mg/dL      Sodium 132 mmol/L      Potassium 4.8 mmol/L      Chloride 99 mmol/L      CO2 23.9 mmol/L      Calcium 8.4 mg/dL      BUN/Creatinine Ratio 9.0     Anion Gap 9.1 mmol/L      eGFR 13.9 mL/min/1.73      Comment: <15 Indicative of kidney failure       Narrative:      GFR Normal >60  Chronic Kidney Disease <60  Kidney Failure <15      Troponin [[id number]]  (Abnormal) Collected: 12/27/22 1143    Specimen: Blood Updated: 12/27/22 1248     Troponin T 0.117 ng/mL     Narrative:      Troponin T Reference Range:  <= 0.03 ng/mL-    Negative for AMI  >0.03 ng/mL-     Abnormal for myocardial necrosis.  Clinicians would have to utilize clinical acumen, EKG, Troponin and serial changes to determine if it is an Acute Myocardial Infarction or myocardial injury due to an underlying chronic condition.       Results may be falsely decreased if patient taking Biotin.      Magnesium [[id number]]  (Normal) Collected: 12/27/22 1143    Specimen: Blood Updated: 12/27/22 1246     Magnesium 1.9 mg/dL     CBC Auto Differential [[id number]]  (Abnormal) Collected: 12/27/22 1143    Specimen: Blood Updated: 12/27/22 1236     WBC 4.13 10*3/mm3      RBC 3.40 10*6/mm3      Hemoglobin 10.1 g/dL      Hematocrit 32.2 %      MCV 94.7 fL      MCH 29.7 pg      MCHC 31.4 g/dL      RDW 14.0 %      RDW-SD 48.6 fl      MPV 10.9 fL      Platelets 77 10*3/mm3      Neutrophil % 69.8 %      Lymphocyte % 14.3 %      Monocyte % 9.9 %      Eosinophil % 4.8 %      Basophil % 0.7 %      Neutrophils, Absolute 2.88 10*3/mm3      Lymphocytes, Absolute 0.59 10*3/mm3      Monocytes, Absolute 0.41 10*3/mm3      Eosinophils, Absolute 0.20 10*3/mm3      Basophils, Absolute 0.03 10*3/mm3     POC Glucose Once [[id number]]  (Normal) Collected: 12/27/22 0604    Specimen: Blood Updated: 12/27/22 0606     Glucose 113 mg/dL      Comment: Meter: [id number] : [id number] Arian KIM       POC Glucose Once [[id number]]  (Normal) Collected: 12/27/22 0421    Specimen: Blood Updated: 12/27/22 0422     Glucose 105 mg/dL      Comment: Meter: [id number] : [id number] Baron Collins RN       POC Glucose Once [[id number]]  (Normal) Collected: 12/27/22 0131    Specimen: Blood Updated: 12/27/22 0132     Glucose 130 mg/dL      Comment: Meter: [id number] : [id number] Bethanie Mccann RN       POC Glucose Once [[id number]]  (Normal) Collected: 12/26/22 2313    Specimen: Blood Updated: 12/26/22 2314     Glucose 120 mg/dL      Comment: Meter: [id number] : [id number] Arian KIM       Basic Metabolic  Panel [[id number]]  (Abnormal) Collected: 12/26/22 2031    Specimen: Blood Updated: 12/26/22 2154     Glucose 39 mg/dL       mg/dL      Creatinine 8.76 mg/dL      Sodium 143 mmol/L      Potassium 6.0 mmol/L      Chloride 114 mmol/L      CO2 15.5 mmol/L      Calcium 8.6 mg/dL      BUN/Creatinine Ratio 12.1     Anion Gap 13.5 mmol/L      eGFR 6.4 mL/min/1.73      Comment: <15 Indicative of kidney failure       Narrative:      GFR Normal >60  Chronic Kidney Disease <60  Kidney Failure <15      Hepatitis B Surface Antigen [[id number]]  (Normal) Collected: 12/26/22 2109    Specimen: Blood Updated: 12/26/22 2153     Hepatitis B Surface Ag Non-Reactive    POC Glucose Once [[id number]]  (Normal) Collected: 12/26/22 2146    Specimen: Blood Updated: 12/26/22 2147     Glucose 89 mg/dL      Comment: Meter: [id number] : [id number] Bethanie Mccann RN       POC Glucose Once [[id number]]  (Abnormal) Collected: 12/26/22 2045    Specimen: Blood Updated: 12/26/22 2048     Glucose 46 mg/dL      Comment: RN Notified R and V Meter: [id number] : [id number] Arian KIM       Protime-INR [[id number]]  (Abnormal) Collected: 12/26/22 1808    Specimen: Blood Updated: 12/26/22 1857     Protime 14.3 Seconds      INR 1.10    aPTT [[id number]]  (Normal) Collected: 12/26/22 1808    Specimen: Blood Updated: 12/26/22 1857     PTT 31.9 seconds     Comprehensive Metabolic Panel [[id number]]  (Abnormal) Collected: 12/26/22 1726    Specimen: Blood Updated: 12/26/22 1757     Glucose 143 mg/dL       mg/dL      Creatinine 8.89 mg/dL      Sodium 141 mmol/L      Potassium 6.7 mmol/L      Chloride 114 mmol/L      CO2 17.0 mmol/L      Calcium 8.8 mg/dL      Total Protein 7.5 g/dL      Albumin 4.0 g/dL      ALT (SGPT) 26 U/L      AST (SGOT) 28 U/L      Alkaline Phosphatase 181 U/L      Total Bilirubin 0.3 mg/dL      Globulin 3.5 gm/dL      A/G Ratio 1.1 g/dL      BUN/Creatinine Ratio 11.5     Anion Gap 10.0 mmol/L      eGFR 6.3 mL/min/1.73       Comment: <15 Indicative of kidney failure       Narrative:      GFR Normal >60  Chronic Kidney Disease <60  Kidney Failure <15      Phosphorus [[id number]]  (Abnormal) Collected: 12/26/22 1726    Specimen: Blood Updated: 12/26/22 1754     Phosphorus 5.8 mg/dL     CBC & Differential [[id number]]  (Abnormal) Collected: 12/26/22 1726    Specimen: Blood Updated: 12/26/22 1744    Narrative:      The following orders were created for panel order CBC & Differential.  Procedure                               Abnormality         Status                     ---------                               -----------         ------                     CBC Auto Differential[[id number]]        Abnormal            Final result                 Please view results for these tests on the individual orders.    CBC Auto Differential [[id number]]  (Abnormal) Collected: 12/26/22 1726    Specimen: Blood Updated: 12/26/22 1744     WBC 3.71 10*3/mm3      RBC 3.67 10*6/mm3      Hemoglobin 11.0 g/dL      Hematocrit 35.4 %      MCV 96.5 fL      MCH 30.0 pg      MCHC 31.1 g/dL      RDW 14.6 %      RDW-SD 51.3 fl      MPV 10.2 fL      Platelets 87 10*3/mm3      Neutrophil % 69.1 %      Lymphocyte % 17.0 %      Monocyte % 8.4 %      Eosinophil % 4.9 %      Basophil % 0.3 %      Immature Grans % 0.3 %      Neutrophils, Absolute 2.57 10*3/mm3      Lymphocytes, Absolute 0.63 10*3/mm3      Monocytes, Absolute 0.31 10*3/mm3      Eosinophils, Absolute 0.18 10*3/mm3      Basophils, Absolute 0.01 10*3/mm3      Immature Grans, Absolute 0.01 10*3/mm3      nRBC 0.0 /100 WBC         Data Review:  Results from last 7 days   Lab Units 12/27/22  1143 12/26/22 2031 12/26/22 1726   SODIUM mmol/L 132* 143 141   POTASSIUM mmol/L 4.8 6.0* 6.7*   CHLORIDE mmol/L 99 114* 114*   CO2 mmol/L 23.9 15.5* 17.0*   BUN mg/dL 41* 106* 102*   CREATININE mg/dL 4.58* 8.76* 8.89*   GLUCOSE mg/dL 168* 39* 143*   CALCIUM mg/dL 8.4* 8.6 8.8     Results from last 7 days   Lab Units  12/27/22  1143 12/26/22  1726   WBC 10*3/mm3 4.13 3.71   HEMOGLOBIN g/dL 10.1* 11.0*   HEMATOCRIT % 32.2* 35.4*   PLATELETS 10*3/mm3 77* 87*             Lab Results   Lab Value Date/Time    TROPONINT 0.117 (C) 12/27/2022 1143    TROPONINT 0.036 (C) 03/04/2022 1800    TROPONINT 0.020 07/14/2020 2028         Results from last 7 days   Lab Units 12/26/22  1726   ALK PHOS U/L 181*   BILIRUBIN mg/dL 0.3   ALT (SGPT) U/L 26   AST (SGOT) U/L 28             Glucose   Date/Time Value Ref Range Status   12/28/2022 1418 107 70 - 130 mg/dL Final     Comment:     Meter: [id number] : [id number] Viry PUENTE RN   12/28/2022 0853 124 70 - 130 mg/dL Final     Comment:     Meter: [id number] : [id number] Naeem Augustina NA   12/28/2022 0618 117 70 - 130 mg/dL Final     Comment:     Meter: [id number] : [id number] Don Adela NA   12/27/2022 2144 121 70 - 130 mg/dL Final     Comment:     Meter: [id number] : [id number] Don Adela NA   12/27/2022 1619 169 (H) 70 - 130 mg/dL Final     Comment:     Meter: [id number] : [id number] Zoltan Parkeravani FAROOQ   12/27/2022 0604 113 70 - 130 mg/dL Final     Comment:     Meter: [id number] : [id number] Don Adela NA   12/27/2022 0421 105 70 - 130 mg/dL Final     Comment:     Meter: [id number] : [id number] Baron Collins RN   12/27/2022 0131 130 70 - 130 mg/dL Final     Comment:     Meter: [id number] : [id number] Bethanie Mccann RN     Results from last 7 days   Lab Units 12/26/22  1808   INR  1.10       Past Medical History:   Diagnosis Date   • Anemia    • Anxiety    • Arthritis     HANDS   • Arthritis    • CAD (coronary artery disease)    • Cancer (HCC)     KIDNEY 7/2018 SX   • Cancer (HCC)    • Carpal tunnel syndrome     LT   • Carpal tunnel syndrome    • CHF (congestive heart failure) (HCC)    • Chronic back pain    • Coronary artery disease    • Depression    • Diabetes mellitus (HCC)     TYPE 2   • Diabetes mellitus (HCC)    • Dialysis patient (HCC)    •  Elevated cholesterol    • ESRD (end stage renal disease) on dialysis ([facility])     M-W-F   • Eyes sensitive to light, bilateral    • GERD (gastroesophageal reflux disease)    • Headache    • Hepatitis     c   • Hepatitis C 2013    after blood tranfusion/treated   • History of MRSA infection 2011    RT LEG, SPIDER BITE   • History of transfusion     2013 CABG   • History of transfusion    • History of venomous spider bite 06/2011    RT LEG   • Hypertension    • Jaundice    • Myocardial infarction ([facility])     5-6 years ago per pt   • One eye: profound vision impairment     Loss of peripheral vision in left eye   • Seizure ([facility]) 01/2022   • Stroke ([facility]) 12/2017    left sided weakness/   • Stroke ([facility])        Assessment:  Active Hospital Problems    Diagnosis  POA   • **Thrombosis of dialysis shunt, initial encounter ([facility]) [T82.868A]  Yes   • Metabolic acidosis [E87.20]  Unknown   • Uremia [N19]  Unknown   • End-stage renal disease on hemodialysis ([facility]) [N18.6, Z99.2]  Not Applicable   • HTN (hypertension) [I10]  Yes   • History of CVA (cerebrovascular accident) [Z86.73]  Not Applicable   • CAD (coronary artery disease) [I25.10]  Yes   • Type 2 diabetes mellitus with hyperglycemia, without long-term current use of insulin ([facility]) [E11.65]  Yes   • GERD (gastroesophageal reflux disease) [K21.9]  Yes   • HLD (hyperlipidemia) [E78.5]  Yes   • Hyperkalemia [E87.5]  Yes      Resolved Hospital Problems   No resolved problems to display.       Plan:  Failed attempt to salvage AV shunt left arm.  Now has tunneled catheter and did dialyze.  We will get follow-up lab studies.  Possibly could go home tomorrow if stable and okay with nephrology and vascular surgery.      Maxime Damian MD  12/28/2022  19:26 EST
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
## 2022-12-29 NOTE — PLAN OF CARE
Goal Outcome Evaluation:           Progress: improving  Outcome Evaluation: VSS, RA, a/o x4. DC orders placed

## 2022-12-29 NOTE — PROGRESS NOTES
DAILY PROGRESS NOTE  [facility]    Patient Identification:  Name: Angelo Brown  Age: 62 y.o.  Sex: male  :  1960  MRN: [id number]         Primary Care Physician: Yadiel Baxter III, MD    Subjective:  Doing same with no new complaint and wants to go home.  Patient family at bedside.    Objective:    Scheduled Meds:amLODIPine, 5 mg, Oral, Daily  carvedilol, 6.25 mg, Oral, BID With Meals  hydrALAZINE, 100 mg, Oral, TID  insulin lispro, 0-7 Units, Subcutaneous, TID AC  pantoprazole, 40 mg, Oral, Daily  sodium chloride, 10 mL, Intravenous, Q12H  zinc sulfate, 220 mg, Oral, Daily      Continuous Infusions:sodium chloride, 9 mL/hr, Last Rate: 9 mL/hr (22 1042)        Vital signs in last 24 hours:  Temp:  [97.5 °F (36.4 °C)-98.8 °F (37.1 °C)] 98.5 °F (36.9 °C)  Heart Rate:  [72-88] 73  Resp:  [16-19] 19  BP: (114-194)/() 114/80    Intake/Output:    Intake/Output Summary (Last 24 hours) at 2022 1526  Last data filed at 2022 0857  Gross per 24 hour   Intake 220 ml   Output 2800 ml   Net -2580 ml       Exam:  /80 (BP Location: Right arm, Patient Position: Lying)   Pulse 73   Temp 98.5 °F (36.9 °C) (Oral)   Resp 19   Ht 175.3 cm (69\")   Wt 68 kg (150 lb)   SpO2 100%   BMI 22.15 kg/m²     General Appearance:    Alert, cooperative, no distress   Head:    Normocephalic, without obvious abnormality, atraumatic   Eyes:       Throat:   Lips, tongue, gums normal   Neck:   Supple, symmetrical, trachea midline, no JVD   Lungs:     Clear to auscultation bilaterally, respirations unlabored   Chest Wall:    No tenderness or deformity    Heart:    Regular rate and rhythm, S1 and S2 normal, no murmur,no  Rub or gallop   Abdomen:     Soft, nontender, bowel sounds active, no masses, no organomegaly    Extremities:   Extremities normal, left arm with surgical changes, no cyanosis or edema   Pulses:      Skin:   Skin is warm and dry,  no rashes or palpable lesions    Neurologic:   no focal deficits noted     Data Review:  Results from last 7 days   Lab Units 12/29/22  0339 12/27/22  1143 12/26/22 2031   SODIUM mmol/L 133* 132* 143   POTASSIUM mmol/L 4.9 4.8 6.0*   CHLORIDE mmol/L 97* 99 114*   CO2 mmol/L 25.4 23.9 15.5*   BUN mg/dL 34* 41* 106*   CREATININE mg/dL 3.98* 4.58* 8.76*   GLUCOSE mg/dL 225* 168* 39*   CALCIUM mg/dL 9.1 8.4* 8.6     Results from last 7 days   Lab Units 12/29/22  0339 12/27/22  1143 12/26/22  1726   WBC 10*3/mm3 5.95 4.13 3.71   HEMOGLOBIN g/dL 11.6* 10.1* 11.0*   HEMATOCRIT % 35.4* 32.2* 35.4*   PLATELETS 10*3/mm3 80* 77* 87*             Lab Results   Lab Value Date/Time    TROPONINT 0.117 (C) 12/27/2022 1143    TROPONINT 0.036 (C) 03/04/2022 1800    TROPONINT 0.020 07/14/2020 2028         Results from last 7 days   Lab Units 12/26/22  1726   ALK PHOS U/L 181*   BILIRUBIN mg/dL 0.3   ALT (SGPT) U/L 26   AST (SGOT) U/L 28       ASSESSMENT  Thrombosis of dialysis shunt  End-stage renal disease on hemodialysis  History of CVA  Coronary artery disease  Diabetes mellitus  Hypertension  Hyperlipidemia  Gastroesophageal reflux disease    PLAN  Discharge home if okay with all  Discharge summary dictated    Karl Swift MD  12/29/2022  15:26 EST

## 2022-12-29 NOTE — DISCHARGE INSTRUCTIONS
[facility]  Yadiel Cash, Jason Sanchez Scherrer, Thomas, Harman, Brian  [address], [address]  [phone]    Post-Operative Instructions for AV Fistula / Graft thrombectomy  Diet: Regular Diet    Medications: Take your regularly scheduled medications on the day of your surgery, unless your doctor has directed you otherwise. You may be sent home with a prescription for pain medication, follow the directions as prescribed.    Activity Restrictions / Driving: Avoid lifting more than 15 pounds or other activities that stress or compress the access area. No driving for the remainder of the day after surgery. You may drive when you no longer are taking narcotic pain medications. If a nerve block was done to numb your arm for surgery, you will be placed in an arm sling.  This numbness and inability to move the arm can last for as little as 6 hours but as many as 18.  The sling should be used during this time but can be removed when sensation and movement of your arm is normal and does not need to be used after that. Use of the arm is encouraged after the surgery.    Incision Care: Some bruising is normal. If you have drainage from the incision please notify the office. Dressing should be removed in 48 hours. After dressing is removed, it is OK to shower. Do not submerge incision until cleared by your surgeon (bath or swimming).    Bathing and Showering: You may shower after you remove your dressing.    Follow-up Appointments: You will need to return to the office for a follow-up visit within 1-3 weeks after your surgery. Please make sure you have your appointment scheduled, call [phone].    The patient (you) should:  1. Avoid wearing tight constrictive clothing over that arm.  2. Avoid wearing jewelry that is tight, such as a watch on the access arm.  3. Avoid carrying heavy objects.  4. Avoid purse straps over the fistula.  5. Avoid sleeping on the arm or keeping it bent for extended  periods of time.  6. Each day, using your opposite hand, feel over the fistula for the \"thrill\" or vibration that is normally present.    Fistula Information / Care:   It is normal to have swelling in the surgical area. To help control this swelling, you should elevate your arm on a pillow.   Wiggle your fingers and clinch your fist 10 times every hour, while awake, for the first 5-7 days. Also, bend and straighten at the elbow to regain normal range of motion. These exercises are designed to promote circulation in the fingers and aid in draining away the excess fluid accumulation in the immediate area.  No blood pressures or needle sticks in the arm with your access.    Call the office for the followin. Fever greater than 101.0  2. Uncontrolled pain. This is on a scale of 1-10 (10 being the worst pain imaginable) your pain is a level 7 or above.  3. It is important that you notify our office if you are having numbness and significant pain in the extremity in which you have just had surgery!  4. Decreased or absent thrill.  5. Nausea, diarrhea, and/or vomiting that continue for 12-24 hours.  6. Signs of an infection: redness, increased swelling, drainage, fever and/or chills.  7. Chest pain or difficulty breathing.    If you have further questions after reading this handout, the office is open from 8:30am to 5:00pm Monday through Friday. Call [phone].

## 2022-12-29 NOTE — PROGRESS NOTES
Name: Angelo Brown ADMIT: 2022   : 1960  PCP: Yadiel Baxter III, MD    MRN: [id number] LOS: 3 days   AGE/SEX: 62 y.o. male  ROOM:  [place] [address]/     CC: thrombosed left arm arteriovenous fistula  Interval History: CHERELLE overnight. Pain controlled.   Subjective   Subjective     Review of Systems  Objective   Objective     Vitals:   Temp:  [97.5 °F (36.4 °C)-98.8 °F (37.1 °C)] 98.8 °F (37.1 °C)  Heart Rate:  [62-88] 73  Resp:  [16-18] 18  BP: (136-194)/() 141/98    I/O this shift:  In: 220 [P.O.:220]  Out: 400 [Urine:400]    Scheduled Meds:     amLODIPine, 5 mg, Oral, Daily  carvedilol, 6.25 mg, Oral, BID With Meals  hydrALAZINE, 100 mg, Oral, TID  insulin lispro, 0-7 Units, Subcutaneous, TID AC  pantoprazole, 40 mg, Oral, Daily  sodium chloride, 10 mL, Intravenous, Q12H  zinc sulfate, 220 mg, Oral, Daily      IV Meds:   sodium chloride, 9 mL/hr, Last Rate: 9 mL/hr (22 1042)        Physical Exam   NAD  NCAT  RRR  Respirations unalbored  Bruising around left upper arm, not unexpected. Incisions intact with no surrounding erythema or induration.   L TDC in place with dressing intact.     Data Reviewed:  CBC    Results from last 7 days   Lab Units 22  03322  1143 22  1726   WBC 10*3/mm3 5.95 4.13 3.71   HEMOGLOBIN g/dL 11.6* 10.1* 11.0*   PLATELETS 10*3/mm3 80* 77* 87*     BMP   Results from last 7 days   Lab Units 22  03322  1143 22  1726   SODIUM mmol/L 133* 132* 143 141   POTASSIUM mmol/L 4.9 4.8 6.0* 6.7*   CHLORIDE mmol/L 97* 99 114* 114*   CO2 mmol/L 25.4 23.9 15.5* 17.0*   BUN mg/dL 34* 41* 106* 102*   CREATININE mg/dL 3.98* 4.58* 8.76* 8.89*   GLUCOSE mg/dL 225* 168* 39* 143*   MAGNESIUM mg/dL  --  1.9  --   --    PHOSPHORUS mg/dL 4.3  --   --  5.8*     Cr Clearance Estimated Creatinine Clearance: 19 mL/min (A) (by C-G formula based on SCr of 3.98 mg/dL (H)).  Coag   Results from last 7 days   Lab Units  12/26/22  1808   INR  1.10   APTT seconds 31.9     HbA1C   Lab Results   Component Value Date    HGBA1C 6.00 (H) 08/20/2022    HGBA1C 5.4 04/26/2022    HGBA1C 6.80 (H) 03/06/2022     Blood Glucose   Glucose   Date/Time Value Ref Range Status   12/29/2022 1103 265 (H) 70 - 130 mg/dL Final     Comment:     Meter: [id number] : [id number] Nenita Campoverde NA   12/29/2022 0617 149 (H) 70 - 130 mg/dL Final     Comment:     Meter: [id number] : [id number] [place] NA   12/28/2022 2054 122 70 - 130 mg/dL Final     Comment:     Meter: [id number] : [id number] [place] NA   12/28/2022 1418 107 70 - 130 mg/dL Final     Comment:     Meter: [id number] : [id number] Viry PUENTE RN   12/28/2022 0853 124 70 - 130 mg/dL Final     Comment:     Meter: [id number] : [id number] Naeem Jackman NA   12/28/2022 0618 117 70 - 130 mg/dL Final     Comment:     Meter: [id number] : [id number] Arian Adela NA   12/27/2022 2144 121 70 - 130 mg/dL Final     Comment:     Meter: [id number] : [id number] Don Adela NA   12/27/2022 1619 169 (H) 70 - 130 mg/dL Final     Comment:     Meter: [id number] : [id number] Zoltan Copeland CNA     Infection     CMP   Results from last 7 days   Lab Units 12/29/22  0339 12/27/22  1143 12/26/22 2031 12/26/22  1726   SODIUM mmol/L 133* 132* 143 141   POTASSIUM mmol/L 4.9 4.8 6.0* 6.7*   CHLORIDE mmol/L 97* 99 114* 114*   CO2 mmol/L 25.4 23.9 15.5* 17.0*   BUN mg/dL 34* 41* 106* 102*   CREATININE mg/dL 3.98* 4.58* 8.76* 8.89*   GLUCOSE mg/dL 225* 168* 39* 143*   ALBUMIN g/dL 3.8  --   --  4.0   BILIRUBIN mg/dL  --   --   --  0.3   ALK PHOS U/L  --   --   --  181*   AST (SGOT) U/L  --   --   --  28   ALT (SGPT) U/L  --   --   --  26     ABG      UA      MARCELO  No results found for: POCMETH, POCAMPHET, POCBARBITUR, POCBENZO, POCCOCAINE, POCOPIATES, POCOXYCODO, POCPHENCYC, POCPROPOXY, POCTHC, POCTRICYC  Lysis Labs   Results from last 7 days   Lab Units 12/29/22  9237  22  1143 22  2031 22  1808 22  1726   INR   --   --   --  1.10  --    APTT seconds  --   --   --  31.9  --    HEMOGLOBIN g/dL 11.6* 10.1*  --   --  11.0*   PLATELETS 10*3/mm3 80* 77*  --   --  87*   CREATININE mg/dL 3.98* 4.58* 8.76*  --  8.89*     Radiology(recent) No radiology results for the last day        Active Hospital Problems:   Active Hospital Problems    Diagnosis  POA   • **Thrombosis of dialysis shunt, initial encounter ([facility]) [T82.868A]  Yes   • Metabolic acidosis [E87.20]  Unknown   • Uremia [N19]  Unknown   • End-stage renal disease on hemodialysis ([facility]) [N18.6, Z99.2]  Not Applicable   • HTN (hypertension) [I10]  Yes   • History of CVA (cerebrovascular accident) [Z86.73]  Not Applicable   • CAD (coronary artery disease) [I25.10]  Yes   • Type 2 diabetes mellitus with hyperglycemia, without long-term current use of insulin ([facility]) [E11.65]  Yes   • GERD (gastroesophageal reflux disease) [K21.9]  Yes   • HLD (hyperlipidemia) [E78.5]  Yes   • Hyperkalemia [E87.5]  Yes      Resolved Hospital Problems   No resolved problems to display.      Assessment & Plan   [place]illin, Post Op Global  Assessment / Plan     62 year old man with ESRD on dialysis with thrombosed left arm fistula. Unable to successfully declot secondary to severe cephalic arch stenosis.   Tunneled dialysis catheter in place.   Vein mapping pending.  OK for discharge from surgery standpoint.   Will place follow up information in chart.     Berto Torres II, MD  22  11:41 EST  Office Number [phone]

## 2022-12-29 NOTE — DISCHARGE SUMMARY
Discharge summary    Date of admission 12/26/2022  Date of discharge 12/29/2022    Final diagnosis  Thrombosed left atrial venous fistula  End-stage renal disease on hemodialysis  History of CVA  Coronary artery disease  Diabetes mellitus  Hypertension  Hyperlipidemia  Gastroesophageal reflux disease     Discharge medications    Current Facility-Administered Medications:   •  amLODIPine (NORVASC) tablet 5 mg, 5 mg, Oral, Daily, Berto Torres II, MD, 5 mg at 12/29/22 0853  •  carvedilol (COREG) tablet 6.25 mg, 6.25 mg, Oral, BID With Meals, Berto Torres II, MD, 6.25 mg at 12/29/22 0853  •  hydrALAZINE (APRESOLINE) tablet 100 mg, 100 mg, Oral, TID, Berto Torres II, MD, 100 mg at 12/29/22 1452  •  insulin lispro (ADMELOG) injection 0-7 Units, 0-7 Units, Subcutaneous, TID AC, Berto Torres II, MD, 4 Units at 12/29/22 1127  •  [DISCONTINUED] ondansetron (ZOFRAN) tablet 4 mg, 4 mg, Oral, Q6H PRN **OR** ondansetron (ZOFRAN) injection 4 mg, 4 mg, Intravenous, Q6H PRN, Berto Torres II, MD  •  pantoprazole (PROTONIX) EC tablet 40 mg, 40 mg, Oral, Daily, Berto Torres II, MD, 40 mg at 12/29/22 0853  •  [COMPLETED] Insert Peripheral IV, , , Once **AND** sodium chloride 0.9 % flush 10 mL, 10 mL, Intravenous, PRN, Berto Torres II, MD  •  sodium chloride 0.9 % flush 10 mL, 10 mL, Intravenous, Q12H, Berto Torres II, MD, 10 mL at 12/29/22 0854  •  zinc sulfate (ZINCATE) capsule 220 mg, 220 mg, Oral, Daily, Berto Torres II, MD, 220 mg at 12/29/22 0853     Consults obtained  Cardiology  Nephrology  Vascular surgery    Procedures  Cut down left arm AV fistula for thrombectomy  Central venogram  Ligation of AV fistula  Tunneled dialysis catheter placement    Hospital course  62-year-old white male who is well-known to our service with history of end-stage renal disease on hemodialysis hypertension hyperlipidemia diabetes mellitus and coronary artery disease admitted to emergency room with thrombosed left AV  fistula.  Patient admitted further evaluated by vascular surgery and attempted thrombectomy without any luck and tunneled catheter was placed for hemodialysis.  Patient has resumed hemodialysis and cleared for discharge.  Patient also followed by cardiology for ventricular tachycardia and we adjusted beta-blocker and started on Norvasc and cleared for discharge.  Patient followed by nephrology for hemodialysis and they also cleared for discharge.    Discharge diet regular    Activity as tolerated    Medication as above    Follow-up with prime doctor in 1 week follow-up with cardiology nephrology and vascular surgery per the instructions and take medication as directed.    ARIEL MCGOVERN MD

## 2022-12-29 NOTE — PROGRESS NOTES
LOS: 3 days     Chief Complaint/ Reason for encounter: ESRD, dialysis management    Subjective   12/28/22 : No complaints, seen in preop holding  Denies any shortness of breath chest pain or edema  Declot scheduled for this morning with dialysis to follow    12/29: He looks and feels well denies complaints  Unfortunately his AV graft was unable to be declotted  A tunnel cath was placed and this was used for dialysis with no problems  He tolerated his full dialysis treatment well yesterday with no issues    Medical history reviewed:  History of Present Illness    Subjective    History taken from: Patient and chart    Vital Signs  Temp:  [97.5 °F (36.4 °C)-98.8 °F (37.1 °C)] 98.8 °F (37.1 °C)  Heart Rate:  [62-88] 73  Resp:  [16-18] 18  BP: (136-194)/() 141/98       Wt Readings from Last 1 Encounters:   12/26/22 1551 68 kg (150 lb)       Objective    Objective:  General Appearance:  Comfortable, well-appearing, in no acute distress and not in pain.  Awake, alert, oriented  HEENT: Mucous membranes moist, no injury, oropharynx clear  Lungs:  Normal effort and normal respiratory rate.  Breath sounds clear to auscultation.  No  respiratory distress.  No rales, decreased breath sounds or rhonchi.    Heart: Normal rate.  Regular rhythm.  S1, S2 normal.  No murmur.   Abdomen: Abdomen is soft.  Bowel sounds are normal, no abdominal tenderness.  There is no rebound or guarding  Extremities: No edema of bilateral lower extremities, AV graft absent thrill and bruit  Neurological: No focal motor or sensory deficits, pupils reactive  Skin:  Warm and dry.  No rash or cyanosis.       Results Review:    Intake/Output:     Intake/Output Summary (Last 24 hours) at 12/29/2022 1130  Last data filed at 12/29/2022 0857  Gross per 24 hour   Intake 520 ml   Output 3200 ml   Net -2680 ml         DATA:  Radiology and Labs:  The following labs independently reviewed by me. Additional labs ordered for tomorrow a.m.  Interval notes,  chart personally reviewed by me.   Old records independently reviewed showing ESRD on HD Monday Wednesday Friday  Discussed with patient and his wife at bedside    Risk/ complexity of medical care/ medical decision making moderate, dialysis management    Labs:   Recent Results (from the past 24 hour(s))   POC Glucose Once    Collection Time: 12/28/22  2:18 PM    Specimen: Blood   Result Value Ref Range    Glucose 107 70 - 130 mg/dL   POC Glucose Once    Collection Time: 12/28/22  8:54 PM    Specimen: Blood   Result Value Ref Range    Glucose 122 70 - 130 mg/dL   Renal Function Panel    Collection Time: 12/29/22  3:39 AM    Specimen: Blood   Result Value Ref Range    Glucose 225 (H) 65 - 99 mg/dL    BUN 34 (H) 8 - 23 mg/dL    Creatinine 3.98 (H) 0.76 - 1.27 mg/dL    Sodium 133 (L) 136 - 145 mmol/L    Potassium 4.9 3.5 - 5.2 mmol/L    Chloride 97 (L) 98 - 107 mmol/L    CO2 25.4 22.0 - 29.0 mmol/L    Calcium 9.1 8.6 - 10.5 mg/dL    Albumin 3.8 3.5 - 5.2 g/dL    Phosphorus 4.3 2.5 - 4.5 mg/dL    Anion Gap 10.6 5.0 - 15.0 mmol/L    BUN/Creatinine Ratio 8.5 7.0 - 25.0    eGFR 16.4 (L) >60.0 mL/min/1.73   CBC Auto Differential    Collection Time: 12/29/22  3:39 AM    Specimen: Blood   Result Value Ref Range    WBC 5.95 3.40 - 10.80 10*3/mm3    RBC 3.75 (L) 4.14 - 5.80 10*6/mm3    Hemoglobin 11.6 (L) 13.0 - 17.7 g/dL    Hematocrit 35.4 (L) 37.5 - 51.0 %    MCV 94.4 79.0 - 97.0 fL    MCH 30.9 26.6 - 33.0 pg    MCHC 32.8 31.5 - 35.7 g/dL    RDW 13.9 12.3 - 15.4 %    RDW-SD 48.3 37.0 - 54.0 fl    MPV 10.9 6.0 - 12.0 fL    Platelets 80 (L) 140 - 450 10*3/mm3    Neutrophil % 72.7 42.7 - 76.0 %    Lymphocyte % 12.9 (L) 19.6 - 45.3 %    Monocyte % 10.3 5.0 - 12.0 %    Eosinophil % 3.5 0.3 - 6.2 %    Basophil % 0.3 0.0 - 1.5 %    Immature Grans % 0.3 0.0 - 0.5 %    Neutrophils, Absolute 4.32 1.70 - 7.00 10*3/mm3    Lymphocytes, Absolute 0.77 0.70 - 3.10 10*3/mm3    Monocytes, Absolute 0.61 0.10 - 0.90 10*3/mm3    Eosinophils,  Absolute 0.21 0.00 - 0.40 10*3/mm3    Basophils, Absolute 0.02 0.00 - 0.20 10*3/mm3    Immature Grans, Absolute 0.02 0.00 - 0.05 10*3/mm3    nRBC 0.0 0.0 - 0.2 /100 WBC   POC Glucose Once    Collection Time: 12/29/22  6:17 AM    Specimen: Blood   Result Value Ref Range    Glucose 149 (H) 70 - 130 mg/dL   POC Glucose Once    Collection Time: 12/29/22 11:03 AM    Specimen: Blood   Result Value Ref Range    Glucose 265 (H) 70 - 130 mg/dL       Radiology:  Pertinent radiology studies were reviewed as described above      Medications have been reviewed separately in chart overview      ASSESSMENT:  ESRD on HD  Hyperkalemia from missed HD, improved post HD    Thrombosis of dialysis shunt, initial encounter, declot unsuccessful    Prior cerebrovascular accident    CAD (coronary artery disease)    Type 2 diabetes mellitus with hyperglycemia, without long-term current use of insulin (HCC)    GERD (gastroesophageal reflux disease)    HLD (hyperlipidemia)    HTN (hypertension)    End-stage renal disease on hemodialysis (HCC)    Metabolic acidosis           DISCUSSION/PLAN:   Unfortunately declot of his AV fistula was unsuccessful yesterday  Tunnel catheter was placed and used for HD yesterday with no issues  Patient to resume dialysis in a.m.  Okay to discharge today and follow-up at outpatient dialysis in the morning    Continue current BP regimen  We will follow-up with him at his outpatient HD center next week      Yohan Murrell MD   [facility]   Office phone number: [phone]  Answering service phone number: [phone]    12/29/22  11:30 EST    Dictation performed using Dragon dictation software

## 2022-12-29 NOTE — CASE MANAGEMENT/SOCIAL WORK
Case Management Discharge Note      Final Note: Home via family, no additional CCP needs. Wilian RN/CCP         Selected Continued Care - Admitted Since 12/26/2022     Destination    No services have been selected for the patient.              Durable Medical Equipment    No services have been selected for the patient.              Dialysis/Infusion    No services have been selected for the patient.              Home Medical Care    No services have been selected for the patient.              Therapy    No services have been selected for the patient.              Community Resources    No services have been selected for the patient.              Community & DME    No services have been selected for the patient.                       Final Discharge Disposition Code: 01 - home or self-care

## 2022-12-30 NOTE — OUTREACH NOTE
Prep Survey    Flowsheet Row Responses   [facility] facility patient discharged from? [place]   Is LACE score < 7 ? No   Eligibility Readm Mgmt   Discharge diagnosis INSERTION HEMODIALYSIS CATHETER Thrombosis of dialysis shunt   Does the patient have one of the following disease processes/diagnoses(primary or secondary)? General Surgery   Does the patient have Home health ordered? No   Is there a DME ordered? No   Prep survey completed? Yes          DEENA KELLOGG - Registered Nurse

## 2023-01-03 ENCOUNTER — READMISSION MANAGEMENT (OUTPATIENT)
Dept: CALL CENTER | Facility: HOSPITAL | Age: 63
End: 2023-01-03
Payer: MEDICARE

## 2023-01-03 NOTE — OUTREACH NOTE
General Surgery Week 1 Survey    Flowsheet Row Responses   [facility] [place], operated by [facility] patient discharged from? [place]   Does the patient have one of the following disease processes/diagnoses(primary or secondary)? General Surgery   Week 1 attempt successful? No   Unsuccessful attempts Attempt 1          HAYLEY STEIN - Licensed Nurse

## 2023-01-06 ENCOUNTER — READMISSION MANAGEMENT (OUTPATIENT)
Dept: CALL CENTER | Facility: HOSPITAL | Age: 63
End: 2023-01-06
Payer: MEDICARE

## 2023-01-06 NOTE — OUTREACH NOTE
General Surgery Week 1 Survey    Flowsheet Row Responses   [facility] patient discharged from? [place]   Does the patient have one of the following disease processes/diagnoses(primary or secondary)? General Surgery   Week 1 attempt successful? No  [Reached spouse who requested call back tomorrow-states patient in dialysis, and she is driving.]   Call start time [id number]   Unsuccessful attempts Attempt 2          SENTHIL SHETH - Registered Nurse

## 2023-01-10 ENCOUNTER — READMISSION MANAGEMENT (OUTPATIENT)
Dept: CALL CENTER | Facility: HOSPITAL | Age: 63
End: 2023-01-10
Payer: MEDICARE

## 2023-01-10 NOTE — OUTREACH NOTE
General Surgery Week 1 Survey    Flowsheet Row Responses   [facility] patient discharged from? [place]   Does the patient have one of the following disease processes/diagnoses(primary or secondary)? General Surgery   Week 1 attempt successful? No   Unsuccessful attempts Attempt 3          MIGNON KELLOGG - Registered Nurse

## 2023-01-31 ENCOUNTER — PRE-ADMISSION TESTING (OUTPATIENT)
Dept: PREADMISSION TESTING | Facility: HOSPITAL | Age: 63
End: 2023-01-31
Payer: MEDICARE

## 2023-01-31 LAB
ALBUMIN SERPL-MCNC: 3.7 G/DL (ref 3.5–5.2)
ALBUMIN/GLOB SERPL: 0.9 G/DL
ALP SERPL-CCNC: 166 U/L (ref 39–117)
ALT SERPL W P-5'-P-CCNC: 18 U/L (ref 1–41)
ANION GAP SERPL CALCULATED.3IONS-SCNC: 15.3 MMOL/L (ref 5–15)
AST SERPL-CCNC: 23 U/L (ref 1–40)
BASOPHILS # BLD AUTO: 0.04 10*3/MM3 (ref 0–0.2)
BASOPHILS NFR BLD AUTO: 0.7 % (ref 0–1.5)
BILIRUB SERPL-MCNC: 0.4 MG/DL (ref 0–1.2)
BUN SERPL-MCNC: 55 MG/DL (ref 8–23)
BUN/CREAT SERPL: 8.5 (ref 7–25)
CALCIUM SPEC-SCNC: 8.7 MG/DL (ref 8.6–10.5)
CHLORIDE SERPL-SCNC: 102 MMOL/L (ref 98–107)
CO2 SERPL-SCNC: 21.7 MMOL/L (ref 22–29)
CREAT SERPL-MCNC: 6.48 MG/DL (ref 0.76–1.27)
DEPRECATED RDW RBC AUTO: 46.6 FL (ref 37–54)
EGFRCR SERPLBLD CKD-EPI 2021: 9.2 ML/MIN/1.73
EOSINOPHIL # BLD AUTO: 0.3 10*3/MM3 (ref 0–0.4)
EOSINOPHIL NFR BLD AUTO: 5.2 % (ref 0.3–6.2)
ERYTHROCYTE [DISTWIDTH] IN BLOOD BY AUTOMATED COUNT: 13.5 % (ref 12.3–15.4)
GLOBULIN UR ELPH-MCNC: 3.9 GM/DL
GLUCOSE SERPL-MCNC: 147 MG/DL (ref 65–99)
HCT VFR BLD AUTO: 34.6 % (ref 37.5–51)
HGB BLD-MCNC: 11.3 G/DL (ref 13–17.7)
LYMPHOCYTES # BLD AUTO: 0.96 10*3/MM3 (ref 0.7–3.1)
LYMPHOCYTES NFR BLD AUTO: 16.7 % (ref 19.6–45.3)
MCH RBC QN AUTO: 30.8 PG (ref 26.6–33)
MCHC RBC AUTO-ENTMCNC: 32.7 G/DL (ref 31.5–35.7)
MCV RBC AUTO: 94.3 FL (ref 79–97)
MONOCYTES # BLD AUTO: 0.44 10*3/MM3 (ref 0.1–0.9)
MONOCYTES NFR BLD AUTO: 7.6 % (ref 5–12)
NEUTROPHILS NFR BLD AUTO: 4 10*3/MM3 (ref 1.7–7)
NEUTROPHILS NFR BLD AUTO: 69.5 % (ref 42.7–76)
PLATELET # BLD AUTO: 97 10*3/MM3 (ref 140–450)
PMV BLD AUTO: 11.1 FL (ref 6–12)
POTASSIUM SERPL-SCNC: 5.5 MMOL/L (ref 3.5–5.2)
PROT SERPL-MCNC: 7.6 G/DL (ref 6–8.5)
QT INTERVAL: 450 MS
RBC # BLD AUTO: 3.67 10*6/MM3 (ref 4.14–5.8)
SODIUM SERPL-SCNC: 139 MMOL/L (ref 136–145)
WBC NRBC COR # BLD: 5.76 10*3/MM3 (ref 3.4–10.8)

## 2023-01-31 PROCEDURE — 93005 ELECTROCARDIOGRAM TRACING: CPT

## 2023-01-31 PROCEDURE — 80053 COMPREHEN METABOLIC PANEL: CPT

## 2023-01-31 PROCEDURE — 36415 COLL VENOUS BLD VENIPUNCTURE: CPT

## 2023-01-31 PROCEDURE — 85025 COMPLETE CBC W/AUTO DIFF WBC: CPT

## 2023-01-31 PROCEDURE — 93010 ELECTROCARDIOGRAM REPORT: CPT | Performed by: INTERNAL MEDICINE

## 2023-01-31 RX ORDER — CHLORHEXIDINE GLUCONATE 500 MG/1
CLOTH TOPICAL
COMMUNITY

## 2023-02-01 ENCOUNTER — ANESTHESIA EVENT (OUTPATIENT)
Dept: PERIOP | Facility: HOSPITAL | Age: 63
End: 2023-02-01
Payer: MEDICARE

## 2023-02-02 ENCOUNTER — HOSPITAL ENCOUNTER (OUTPATIENT)
Facility: HOSPITAL | Age: 63
Setting detail: HOSPITAL OUTPATIENT SURGERY
Discharge: HOME | End: 2023-02-02
Attending: STUDENT IN AN ORGANIZED HEALTH CARE EDUCATION/TRAINING PROGRAM | Admitting: STUDENT IN AN ORGANIZED HEALTH CARE EDUCATION/TRAINING PROGRAM
Payer: MEDICARE

## 2023-02-02 ENCOUNTER — ANESTHESIA (OUTPATIENT)
Dept: PERIOP | Facility: HOSPITAL | Age: 63
End: 2023-02-02
Payer: MEDICARE

## 2023-02-02 VITALS
RESPIRATION RATE: 18 BRPM | SYSTOLIC BLOOD PRESSURE: 110 MMHG | WEIGHT: 141.98 LBS | DIASTOLIC BLOOD PRESSURE: 98 MMHG | BODY MASS INDEX: 21.03 KG/M2 | HEART RATE: 60 BPM | OXYGEN SATURATION: 96 % | HEIGHT: 69 IN | TEMPERATURE: 97.6 F

## 2023-02-02 DIAGNOSIS — Z98.890 HISTORY OF SURGERY ON ARM: Primary | ICD-10-CM

## 2023-02-02 LAB
ANION GAP SERPL CALCULATED.3IONS-SCNC: 10.7 MMOL/L (ref 5–15)
ANION GAP SERPL CALCULATED.3IONS-SCNC: 16.3 MMOL/L (ref 5–15)
BUN SERPL-MCNC: 61 MG/DL (ref 8–23)
BUN SERPL-MCNC: 64 MG/DL (ref 8–23)
BUN/CREAT SERPL: 10.2 (ref 7–25)
BUN/CREAT SERPL: 10.4 (ref 7–25)
CALCIUM SPEC-SCNC: 9.2 MG/DL (ref 8.6–10.5)
CALCIUM SPEC-SCNC: 9.4 MG/DL (ref 8.6–10.5)
CHLORIDE SERPL-SCNC: 100 MMOL/L (ref 98–107)
CHLORIDE SERPL-SCNC: 99 MMOL/L (ref 98–107)
CO2 SERPL-SCNC: 25.7 MMOL/L (ref 22–29)
CO2 SERPL-SCNC: 27.3 MMOL/L (ref 22–29)
CREAT SERPL-MCNC: 5.98 MG/DL (ref 0.76–1.27)
CREAT SERPL-MCNC: 6.18 MG/DL (ref 0.76–1.27)
EGFRCR SERPLBLD CKD-EPI 2021: 10.1 ML/MIN/1.73
EGFRCR SERPLBLD CKD-EPI 2021: 9.7 ML/MIN/1.73
GLUCOSE BLDC GLUCOMTR-MCNC: 121 MG/DL (ref 70–130)
GLUCOSE BLDC GLUCOMTR-MCNC: 133 MG/DL (ref 70–130)
GLUCOSE SERPL-MCNC: 126 MG/DL (ref 65–99)
GLUCOSE SERPL-MCNC: 132 MG/DL (ref 65–99)
POTASSIUM SERPL-SCNC: 5.4 MMOL/L (ref 3.5–5.2)
POTASSIUM SERPL-SCNC: 6 MMOL/L (ref 3.5–5.2)
SODIUM SERPL-SCNC: 137 MMOL/L (ref 136–145)
SODIUM SERPL-SCNC: 142 MMOL/L (ref 136–145)

## 2023-02-02 PROCEDURE — 80048 BASIC METABOLIC PNL TOTAL CA: CPT | Performed by: ANESTHESIOLOGY

## 2023-02-02 PROCEDURE — 25010000002 PROPOFOL 10 MG/ML EMULSION: Performed by: NURSE ANESTHETIST, CERTIFIED REGISTERED

## 2023-02-02 PROCEDURE — 25010000002 HEPARIN (PORCINE) PER 1000 UNITS: Performed by: NURSE ANESTHETIST, CERTIFIED REGISTERED

## 2023-02-02 PROCEDURE — 25010000002 HEPARIN (PORCINE) PER 1000 UNITS: Performed by: STUDENT IN AN ORGANIZED HEALTH CARE EDUCATION/TRAINING PROGRAM

## 2023-02-02 PROCEDURE — 25010000002 VANCOMYCIN PER 500 MG: Performed by: STUDENT IN AN ORGANIZED HEALTH CARE EDUCATION/TRAINING PROGRAM

## 2023-02-02 PROCEDURE — 80048 BASIC METABOLIC PNL TOTAL CA: CPT | Performed by: STUDENT IN AN ORGANIZED HEALTH CARE EDUCATION/TRAINING PROGRAM

## 2023-02-02 PROCEDURE — C1768 GRAFT, VASCULAR: HCPCS | Performed by: STUDENT IN AN ORGANIZED HEALTH CARE EDUCATION/TRAINING PROGRAM

## 2023-02-02 PROCEDURE — 25010000002 ROPIVACAINE PER 1 MG: Performed by: ANESTHESIOLOGY

## 2023-02-02 PROCEDURE — 25010000002 HYDROMORPHONE PER 4 MG: Performed by: NURSE ANESTHETIST, CERTIFIED REGISTERED

## 2023-02-02 PROCEDURE — A4565 SLINGS: HCPCS | Performed by: STUDENT IN AN ORGANIZED HEALTH CARE EDUCATION/TRAINING PROGRAM

## 2023-02-02 PROCEDURE — 82962 GLUCOSE BLOOD TEST: CPT

## 2023-02-02 DEVICE — GRFT VASC PROPATEN STD 4/ 7MM 45CM: Type: IMPLANTABLE DEVICE | Site: ARM | Status: FUNCTIONAL

## 2023-02-02 RX ORDER — ONDANSETRON 2 MG/ML
4 INJECTION INTRAMUSCULAR; INTRAVENOUS ONCE AS NEEDED
Status: DISCONTINUED | OUTPATIENT
Start: 2023-02-02 | End: 2023-02-02 | Stop reason: HOSPADM

## 2023-02-02 RX ORDER — FAMOTIDINE 10 MG/ML
20 INJECTION, SOLUTION INTRAVENOUS ONCE
Status: COMPLETED | OUTPATIENT
Start: 2023-02-02 | End: 2023-02-02

## 2023-02-02 RX ORDER — SODIUM CHLORIDE 0.9 % (FLUSH) 0.9 %
3 SYRINGE (ML) INJECTION EVERY 12 HOURS SCHEDULED
Status: DISCONTINUED | OUTPATIENT
Start: 2023-02-02 | End: 2023-02-02 | Stop reason: HOSPADM

## 2023-02-02 RX ORDER — EPHEDRINE SULFATE 50 MG/ML
5 INJECTION, SOLUTION INTRAVENOUS ONCE AS NEEDED
Status: DISCONTINUED | OUTPATIENT
Start: 2023-02-02 | End: 2023-02-02 | Stop reason: HOSPADM

## 2023-02-02 RX ORDER — HYDRALAZINE HYDROCHLORIDE 20 MG/ML
5 INJECTION INTRAMUSCULAR; INTRAVENOUS
Status: DISCONTINUED | OUTPATIENT
Start: 2023-02-02 | End: 2023-02-02 | Stop reason: HOSPADM

## 2023-02-02 RX ORDER — OXYCODONE AND ACETAMINOPHEN 7.5; 325 MG/1; MG/1
1 TABLET ORAL ONCE AS NEEDED
Status: COMPLETED | OUTPATIENT
Start: 2023-02-02 | End: 2023-02-02

## 2023-02-02 RX ORDER — LIDOCAINE HYDROCHLORIDE 20 MG/ML
INJECTION, SOLUTION INFILTRATION; PERINEURAL AS NEEDED
Status: DISCONTINUED | OUTPATIENT
Start: 2023-02-02 | End: 2023-02-02 | Stop reason: SURG

## 2023-02-02 RX ORDER — SODIUM CHLORIDE 9 MG/ML
9 INJECTION, SOLUTION INTRAVENOUS CONTINUOUS
Status: DISCONTINUED | OUTPATIENT
Start: 2023-02-02 | End: 2023-02-02 | Stop reason: HOSPADM

## 2023-02-02 RX ORDER — HEPARIN SODIUM 1000 [USP'U]/ML
INJECTION, SOLUTION INTRAVENOUS; SUBCUTANEOUS AS NEEDED
Status: DISCONTINUED | OUTPATIENT
Start: 2023-02-02 | End: 2023-02-02 | Stop reason: SURG

## 2023-02-02 RX ORDER — SODIUM CHLORIDE, SODIUM LACTATE, POTASSIUM CHLORIDE, CALCIUM CHLORIDE 600; 310; 30; 20 MG/100ML; MG/100ML; MG/100ML; MG/100ML
9 INJECTION, SOLUTION INTRAVENOUS CONTINUOUS
Status: DISCONTINUED | OUTPATIENT
Start: 2023-02-02 | End: 2023-02-02

## 2023-02-02 RX ORDER — HYDROCODONE BITARTRATE AND ACETAMINOPHEN 5; 325 MG/1; MG/1
1 TABLET ORAL EVERY 4 HOURS PRN
Qty: 20 TABLET | Refills: 0 | Status: SHIPPED | OUTPATIENT
Start: 2023-02-02

## 2023-02-02 RX ORDER — HYDROMORPHONE HYDROCHLORIDE 1 MG/ML
0.5 INJECTION, SOLUTION INTRAMUSCULAR; INTRAVENOUS; SUBCUTANEOUS
Status: DISCONTINUED | OUTPATIENT
Start: 2023-02-02 | End: 2023-02-02 | Stop reason: HOSPADM

## 2023-02-02 RX ORDER — DIPHENHYDRAMINE HYDROCHLORIDE 50 MG/ML
12.5 INJECTION INTRAMUSCULAR; INTRAVENOUS
Status: DISCONTINUED | OUTPATIENT
Start: 2023-02-02 | End: 2023-02-02 | Stop reason: HOSPADM

## 2023-02-02 RX ORDER — MIDAZOLAM HYDROCHLORIDE 1 MG/ML
1 INJECTION INTRAMUSCULAR; INTRAVENOUS
Status: DISCONTINUED | OUTPATIENT
Start: 2023-02-02 | End: 2023-02-02 | Stop reason: HOSPADM

## 2023-02-02 RX ORDER — FLUMAZENIL 0.1 MG/ML
0.2 INJECTION INTRAVENOUS AS NEEDED
Status: DISCONTINUED | OUTPATIENT
Start: 2023-02-02 | End: 2023-02-02 | Stop reason: HOSPADM

## 2023-02-02 RX ORDER — ROPIVACAINE HYDROCHLORIDE 5 MG/ML
INJECTION, SOLUTION EPIDURAL; INFILTRATION; PERINEURAL
Status: COMPLETED | OUTPATIENT
Start: 2023-02-02 | End: 2023-02-02

## 2023-02-02 RX ORDER — LIDOCAINE HYDROCHLORIDE 10 MG/ML
0.5 INJECTION, SOLUTION EPIDURAL; INFILTRATION; INTRACAUDAL; PERINEURAL ONCE AS NEEDED
Status: DISCONTINUED | OUTPATIENT
Start: 2023-02-02 | End: 2023-02-02 | Stop reason: HOSPADM

## 2023-02-02 RX ORDER — LABETALOL HYDROCHLORIDE 5 MG/ML
5 INJECTION, SOLUTION INTRAVENOUS
Status: DISCONTINUED | OUTPATIENT
Start: 2023-02-02 | End: 2023-02-02 | Stop reason: HOSPADM

## 2023-02-02 RX ORDER — VANCOMYCIN HYDROCHLORIDE 1 G/200ML
15 INJECTION, SOLUTION INTRAVENOUS ONCE
Status: COMPLETED | OUTPATIENT
Start: 2023-02-02 | End: 2023-02-02

## 2023-02-02 RX ORDER — NALOXONE HCL 0.4 MG/ML
0.2 VIAL (ML) INJECTION AS NEEDED
Status: DISCONTINUED | OUTPATIENT
Start: 2023-02-02 | End: 2023-02-02 | Stop reason: HOSPADM

## 2023-02-02 RX ORDER — DIPHENHYDRAMINE HCL 25 MG
25 CAPSULE ORAL
Status: DISCONTINUED | OUTPATIENT
Start: 2023-02-02 | End: 2023-02-02 | Stop reason: HOSPADM

## 2023-02-02 RX ORDER — SODIUM CHLORIDE 0.9 % (FLUSH) 0.9 %
3-10 SYRINGE (ML) INJECTION AS NEEDED
Status: DISCONTINUED | OUTPATIENT
Start: 2023-02-02 | End: 2023-02-02 | Stop reason: HOSPADM

## 2023-02-02 RX ADMIN — ROPIVACAINE HYDROCHLORIDE 30 ML: 5 INJECTION, SOLUTION EPIDURAL; INFILTRATION; PERINEURAL at 11:16

## 2023-02-02 RX ADMIN — VANCOMYCIN HYDROCHLORIDE 1000 MG: 1 INJECTION, SOLUTION INTRAVENOUS at 11:36

## 2023-02-02 RX ADMIN — SODIUM CHLORIDE 9 ML/HR: 9 INJECTION, SOLUTION INTRAVENOUS at 11:08

## 2023-02-02 RX ADMIN — OXYCODONE HYDROCHLORIDE AND ACETAMINOPHEN 1 TABLET: 7.5; 325 TABLET ORAL at 16:27

## 2023-02-02 RX ADMIN — FAMOTIDINE 20 MG: 10 INJECTION INTRAVENOUS at 10:26

## 2023-02-02 RX ADMIN — HEPARIN SODIUM 5000 UNITS: 1000 INJECTION INTRAVENOUS; SUBCUTANEOUS at 13:24

## 2023-02-02 RX ADMIN — LIDOCAINE HYDROCHLORIDE 100 MG: 20 INJECTION, SOLUTION INFILTRATION; PERINEURAL at 12:25

## 2023-02-02 RX ADMIN — HYDROMORPHONE HYDROCHLORIDE 0.5 MG: 1 INJECTION, SOLUTION INTRAMUSCULAR; INTRAVENOUS; SUBCUTANEOUS at 16:08

## 2023-02-02 RX ADMIN — PROPOFOL INJECTABLE EMULSION 100 MCG/KG/MIN: 10 INJECTION, EMULSION INTRAVENOUS at 12:25

## 2023-02-02 NOTE — OP NOTE
Date of Admission:  2/2/2023  Today's Date:  02/02/23  Berto Torres II, MD  [facility]      Preoperative Diagnosis:   End Stage Renal Disease    Postoperative Diagnosis:   same as above    Procedure Performed:   Left brachial artery to brachial vein dialysis graft with tapered 4 by 7 mm Propaten graft    CPT:  52056    Surgeon:   Berto Torres II, MD    Assistant:    Rena Tilley, Provided critical assistance in exposure, retraction, and suction that overall decrease blood loss and operative time.    Anesthesia:   MAC with regional    Estimated Blood Loss:    cc    Findings:     Vein quality:  Good   Artery quality:  Poor   Post operative thrill quality:  Good   Distal hand perfusion: Multiphasic ulnar and radial signals.  Multiphasic palmar arch signal present.  All augmented appropriately with compression of graft     Implants:    Implant Name Type Inv. Item Serial No.  Lot No. LRB No. Used Action   GRFT VASC PROPATEN STD 4/ 7MM 45CM - [id number] Implant GRFT VASC PROPATEN STD 4/ 7MM 45CM  [employer] [id number] Left 1 Implanted       Staff:   Circulator: Dm Amaya RN; Vani Shi, MORENA; Gloria Sanderson, MORENA  Scrub Person: Christopher Titus; Rena Tilley, CST    Specimen:   none    Complications:   none    Dispo:   to PACU    Indication for procedure:   62 y.o. male with end-stage renal disease requiring dialysis.  He previously had a left brachial cephalic fistula that thrombosed and was unable to be salvaged.  Patient's preoperative vein mapping did not show any adequate vein for another fistula and ultrasound in OR confirmed this.  Risk benefits and alternatives to graft placement were discussed with the patient and his wife and he verbalized understanding provided informed consent.    Description of procedure:    The patient was taken to the operating room and placed in the supine position.  The arm was extended on an arm board and then prepped and draped in the  usual sterile fashion.  Preoperative antibiotics were given.  A full surgical timeout was done.      Longitudinal incision was made overlying the brachial artery.  Bovie electrocautery was used to dissect down to the investing fascia which was opened vertically.  Brachial artery was dissected for roughly 1 inch upon its length and the encircled with vessel loops.    Incision was made in the proximal left arm/axilla.  Bovie electrocautery was used to dissect down through the subcutaneous tissue.  The investing fascia was opened exposing the basilic and brachial veins.  I carried the dissection proximally up to underneath the pectoralis minor muscle but the two veins did not converge into an axillary vein in the normal anatomy. The brachial vein was the larger of the two and appeared to be more appropriate for our venous anastomosis. This vein was circumferentially dissected and encircled with Vessel loops.    Using a tunneling device a subcutaneous tunnel was created between the brachial artery exposure and the axillary vein exposure.  IV heparin was administered.  After 3 minutes the 4 x 7 tapered graft was placed through the subcutaneous track.  During tunneling one of the patient's incisions from his attempted declot opened up.  This was then closed with 3-0 Vicryl suture in a deep dermal layer, followed by interrupted 3-0 nylon suture.    Clamps were applied to the brachial artery.  The arterial end of the graft was spatulated.  An anastomosis was performed using 6-0 Prolene stitches on a BC-1 needle in a running manner.  The venous end of the graft was trimmed and spatulated and anastomosed to the axillary outflow vein utilizing a 6-0 Prolene stitch on a BV-1 needle.    Meticulous hemostasis was obtained in both incisions.  Deep tissues were closed using 3-0 Vicryl, skin was closed with interrupted nylon sutures in horizontal mattress configuration.       Patient tolerated the procedure well and was transferred  to the recovery room in stable condition.      Berto Torres II, MD  02/02/23    There are no hospital problems to display for this patient.

## 2023-02-02 NOTE — ANESTHESIA PREPROCEDURE EVALUATION
Anesthesia Evaluation     Patient summary reviewed and Nursing notes reviewed   NPO Solid Status: > 6 hours  NPO Liquid Status: > 2 hours           Airway   Mallampati: II  TM distance: >3 FB  Neck ROM: full  Dental    (+) poor dentition    Comment: Many missing and broken teeth.     Pulmonary    (-) COPD, asthma, sleep apnea, not a smoker  Cardiovascular     ECG reviewed    (+) hypertension, past MI  >12 months, CAD, CABG >6 Months, dysrhythmias Paroxysmal Atrial Fib, hyperlipidemia,       Neuro/Psych  (+) seizures (x2, >1 year ago. Not on AED. Sounds like convulsive syncope per pt description), CVA, syncope, psychiatric history Anxiety and Depression,    GI/Hepatic/Renal/Endo    (+)  GERD well controlled,  hepatitis C, liver disease, renal disease ESRD, diabetes mellitus type 2,     Musculoskeletal     Abdominal    Substance History      OB/GYN          Other   arthritis,    history of cancer (hx kidney ca )                    Anesthesia Plan    ASA 4     MAC and regional     (Will repeat BMP. BMP this am showed K 6.0 but hemolyzed.)    Anesthetic plan, risks, benefits, and alternatives have been provided, discussed and informed consent has been obtained with: patient.        CODE STATUS:

## 2023-02-02 NOTE — ANESTHESIA POSTPROCEDURE EVALUATION
"Patient: Angelo Brown    Procedure Summary     Date: 02/02/23 Room / Location: [facility] OR [address] [place] [address] MAIN OR    Anesthesia Start: 1216 Anesthesia Stop: 1453    Procedure: LEFT ARM ARTERIOVENOUS GRAFT PLACEMENT (Left: Head) Diagnosis:     Surgeons: Berto Torres II, MD Provider: Dominic Underwood MD    Anesthesia Type: MAC, regional ASA Status: 4          Anesthesia Type: MAC, regional    Vitals  Vitals Value Taken Time   /78 02/02/23 1600   Temp 36.4 °C (97.6 °F) 02/02/23 1450   Pulse 61 02/02/23 1602   Resp 18 02/02/23 1545   SpO2 98 % 02/02/23 1602   Vitals shown include unvalidated device data.        Post Anesthesia Care and Evaluation    Patient location during evaluation: bedside  Patient participation: complete - patient participated  Level of consciousness: awake and alert  Pain score: 0  Pain management: adequate    Airway patency: patent  Anesthetic complications: No anesthetic complications    Cardiovascular status: acceptable  Respiratory status: acceptable  Hydration status: acceptable    Comments: /80   Pulse 65   Temp 36.4 °C (97.6 °F) (Oral)   Resp 18   Ht 175.3 cm (69\")   Wt 64.4 kg (141 lb 15.6 oz)   SpO2 99%   BMI 20.97 kg/m²       "

## 2023-02-02 NOTE — PERIOPERATIVE NURSING NOTE
Pt declined Hydrocodone script. States that he has Oxycodone at home from his Pain Mgmt Physician. Advised to take as directed.

## 2023-02-02 NOTE — DISCHARGE INSTRUCTIONS
[facility]  Yadiel Cash, Jason Sanchez Scherrer, Thomas, Harman, Brian  [address], [address]  [phone]    Post-Operative Instructions for AV Fistula / Graft   Diet: Regular Diet    Medications: Take your regularly scheduled medications on the day of your surgery, unless your doctor has directed you otherwise. You may be sent home with a prescription for pain medication, follow the directions as prescribed.    Activity Restrictions / Driving: Avoid lifting more than 15 pounds or other activities that stress or compress the access area. No driving for the remainder of the day after surgery. You may drive when you no longer are taking narcotic pain medications. If a nerve block was done to numb your arm for surgery, you will be placed in an arm sling.  This numbness and inability to move the arm can last for as little as 6 hours but as many as 18.  The sling should be used during this time but can be removed when sensation and movement of your arm is normal and does not need to be used after that. Use of the arm is encouraged after the surgery.    Incision Care: Some bruising is normal. If you have drainage from the incision please notify the office. Dressing should be removed in 48 hours. After dressing is removed, it is OK to shower. Do not submerge incision until cleared by your surgeon (bath or swimming).    Bathing and Showering: You may shower after you remove your dressing.    Follow-up Appointments: You will need to return to the office for a follow-up visit within 1-3 weeks after your surgery. Please make sure you have your appointment scheduled, call [phone].    The patient (you) should:  1. Avoid wearing tight constrictive clothing over that arm.  2. Avoid wearing jewelry that is tight, such as a watch on the access arm.  3. Avoid carrying heavy objects.  4. Avoid purse straps over the fistula.  5. Avoid sleeping on the arm or keeping it bent for extended periods of  "time.  6. Each day, using your opposite hand, feel over the fistula for the \"thrill\" or vibration that is normally present.    Fistula Information / Care:   It is normal to have swelling in the surgical area. To help control this swelling, you should elevate your arm on a pillow.   Wiggle your fingers and clinch your fist 10 times every hour, while awake, for the first 5-7 days. Also, bend and straighten at the elbow to regain normal range of motion. These exercises are designed to promote circulation in the fingers and aid in draining away the excess fluid accumulation in the immediate area.  No blood pressures or needle sticks in the arm with your access.    Call the office for the followin. Fever greater than 101.0  2. Uncontrolled pain. This is on a scale of 1-10 (10 being the worst pain imaginable) your pain is a level 7 or above.  3. It is important that you notify our office if you are having numbness and significant pain in the extremity in which you have just had surgery!  4. Decreased or absent thrill.  5. Nausea, diarrhea, and/or vomiting that continue for 12-24 hours.  6. Signs of an infection: redness, increased swelling, drainage, fever and/or chills.  7. Chest pain or difficulty breathing.    The fistula or graft CAN NOT be used until the MD has given written approval. Generally, a graft will be ready to use in 2 weeks, and a fistula will be ready to use in 6-8 weeks.     If you have further questions after reading this handout, the office is open from 8:30am to 5:00pm Monday through Friday. Call [phone].          You may take your Oxycodone as directed by your Pain Management Physician.   You had an Oxycodone 7.5mg tablet at 4:27pm.  "

## 2023-02-02 NOTE — ANESTHESIA PROCEDURE NOTES
Peripheral Block      Patient reassessed immediately prior to procedure    Patient location during procedure: pre-op  Start time: 2/2/2023 11:11 AM  Stop time: 2/2/2023 11:16 AM  Reason for block: at surgeon's request and post-op pain management  Performed by  Anesthesiologist: Karthik Osborn MD  Preanesthetic Checklist  Completed: patient identified, IV checked, site marked, risks and benefits discussed, surgical consent, monitors and equipment checked, pre-op evaluation and timeout performed  Prep:  Pt Position: sitting  Sterile barriers:gloves, mask, cap and gown  Prep: ChloraPrep  Patient monitoring: blood pressure monitoring, continuous pulse oximetry and EKG  Procedure    Sedation: no    Guidance:ultrasound guided    ULTRASOUND INTERPRETATION.  Using ultrasound guidance a gauge needle was placed in close proximity to the brachial plexus nerve, at which point, under ultrasound guidance anesthetic was injected in the area of the nerve and spread of the anesthesia was seen on ultrasound in close proximity thereto.  There were no abnormalities seen on ultrasound; a digital image was taken; and the patient tolerated the procedure with no complications. Images:still images obtained, printed/placed on chart    Laterality:left  Block Type:supraclavicular  Injection Technique:single-shot  Needle Type:echogenic  Needle Gauge:21 G  Resistance on Injection: none    Medications Used: ropivacaine (NAROPIN) 0.5 % injection - Injection   30 mL - 2/2/2023 11:16:00 AM      Post Assessment  Injection Assessment: negative aspiration for heme, no paresthesia on injection and incremental injection  Patient Tolerance:comfortable throughout block  Complications:no

## 2023-02-23 ENCOUNTER — LAB (OUTPATIENT)
Dept: LAB | Facility: HOSPITAL | Age: 63
End: 2023-02-23
Payer: MEDICARE

## 2023-02-23 ENCOUNTER — OFFICE VISIT (OUTPATIENT)
Dept: ONCOLOGY | Facility: CLINIC | Age: 63
End: 2023-02-23
Payer: MEDICARE

## 2023-02-23 VITALS
TEMPERATURE: 97.1 F | RESPIRATION RATE: 18 BRPM | HEIGHT: 69 IN | DIASTOLIC BLOOD PRESSURE: 76 MMHG | SYSTOLIC BLOOD PRESSURE: 125 MMHG | BODY MASS INDEX: 22.53 KG/M2 | OXYGEN SATURATION: 97 % | HEART RATE: 66 BPM | WEIGHT: 152.1 LBS

## 2023-02-23 DIAGNOSIS — D63.1 ANEMIA IN CHRONIC KIDNEY DISEASE, ON CHRONIC DIALYSIS: ICD-10-CM

## 2023-02-23 DIAGNOSIS — Z99.2 ANEMIA IN CHRONIC KIDNEY DISEASE, ON CHRONIC DIALYSIS: ICD-10-CM

## 2023-02-23 DIAGNOSIS — D50.8 OTHER IRON DEFICIENCY ANEMIA: ICD-10-CM

## 2023-02-23 DIAGNOSIS — C64.1 CANCER OF KIDNEY PARENCHYMA, RIGHT: ICD-10-CM

## 2023-02-23 DIAGNOSIS — N18.6 ANEMIA IN CHRONIC KIDNEY DISEASE, ON CHRONIC DIALYSIS: Primary | ICD-10-CM

## 2023-02-23 DIAGNOSIS — T82.9XXA COMPLICATION OF VASCULAR ACCESS FOR DIALYSIS, INITIAL ENCOUNTER: ICD-10-CM

## 2023-02-23 DIAGNOSIS — N18.6 ANEMIA IN CHRONIC KIDNEY DISEASE, ON CHRONIC DIALYSIS: ICD-10-CM

## 2023-02-23 DIAGNOSIS — Z99.2 ANEMIA IN CHRONIC KIDNEY DISEASE, ON CHRONIC DIALYSIS: Primary | ICD-10-CM

## 2023-02-23 DIAGNOSIS — D63.1 ANEMIA IN CHRONIC KIDNEY DISEASE, ON CHRONIC DIALYSIS: Primary | ICD-10-CM

## 2023-02-23 DIAGNOSIS — D69.6 THROMBOCYTOPENIA: ICD-10-CM

## 2023-02-23 LAB
BASOPHILS # BLD AUTO: 0.06 10*3/MM3 (ref 0–0.2)
BASOPHILS NFR BLD AUTO: 0.9 % (ref 0–1.5)
DEPRECATED RDW RBC AUTO: 51.8 FL (ref 37–54)
EOSINOPHIL # BLD AUTO: 0.45 10*3/MM3 (ref 0–0.4)
EOSINOPHIL NFR BLD AUTO: 6.6 % (ref 0.3–6.2)
ERYTHROCYTE [DISTWIDTH] IN BLOOD BY AUTOMATED COUNT: 14.5 % (ref 12.3–15.4)
FERRITIN SERPL-MCNC: 1217.1 NG/ML (ref 30–400)
FOLATE SERPL-MCNC: 8.28 NG/ML (ref 4.78–24.2)
HCT VFR BLD AUTO: 36.3 % (ref 37.5–51)
HGB BLD-MCNC: 11.2 G/DL (ref 13–17.7)
IMM GRANULOCYTES # BLD AUTO: 0.02 10*3/MM3 (ref 0–0.05)
IMM GRANULOCYTES NFR BLD AUTO: 0.3 % (ref 0–0.5)
IRON 24H UR-MRATE: 68 MCG/DL (ref 59–158)
IRON SATN MFR SERPL: 31 % (ref 14–48)
LYMPHOCYTES # BLD AUTO: 1.09 10*3/MM3 (ref 0.7–3.1)
LYMPHOCYTES NFR BLD AUTO: 16 % (ref 19.6–45.3)
MCH RBC QN AUTO: 30.2 PG (ref 26.6–33)
MCHC RBC AUTO-ENTMCNC: 30.9 G/DL (ref 31.5–35.7)
MCV RBC AUTO: 97.8 FL (ref 79–97)
MONOCYTES # BLD AUTO: 0.53 10*3/MM3 (ref 0.1–0.9)
MONOCYTES NFR BLD AUTO: 7.8 % (ref 5–12)
NEUTROPHILS NFR BLD AUTO: 4.68 10*3/MM3 (ref 1.7–7)
NEUTROPHILS NFR BLD AUTO: 68.4 % (ref 42.7–76)
NRBC BLD AUTO-RTO: 0 /100 WBC (ref 0–0.2)
PLATELET # BLD AUTO: 110 10*3/MM3 (ref 140–450)
PMV BLD AUTO: 10.1 FL (ref 6–12)
RBC # BLD AUTO: 3.71 10*6/MM3 (ref 4.14–5.8)
TIBC SERPL-MCNC: 217 MCG/DL (ref 249–505)
TRANSFERRIN SERPL-MCNC: 155 MG/DL (ref 200–360)
VIT B12 BLD-MCNC: >2000 PG/ML (ref 211–946)
WBC NRBC COR # BLD: 6.83 10*3/MM3 (ref 3.4–10.8)

## 2023-02-23 PROCEDURE — 82607 VITAMIN B-12: CPT | Performed by: INTERNAL MEDICINE

## 2023-02-23 PROCEDURE — 82728 ASSAY OF FERRITIN: CPT

## 2023-02-23 PROCEDURE — 84466 ASSAY OF TRANSFERRIN: CPT

## 2023-02-23 PROCEDURE — 85025 COMPLETE CBC W/AUTO DIFF WBC: CPT

## 2023-02-23 PROCEDURE — 99213 OFFICE O/P EST LOW 20 MIN: CPT | Performed by: INTERNAL MEDICINE

## 2023-02-23 PROCEDURE — 83540 ASSAY OF IRON: CPT

## 2023-02-23 PROCEDURE — 82746 ASSAY OF FOLIC ACID SERUM: CPT | Performed by: INTERNAL MEDICINE

## 2023-02-23 PROCEDURE — 36415 COLL VENOUS BLD VENIPUNCTURE: CPT
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
## 2023-08-09 ENCOUNTER — OFFICE VISIT (OUTPATIENT)
Dept: ONCOLOGY | Facility: CLINIC | Age: 63
End: 2023-08-09
Payer: MEDICARE

## 2023-08-09 ENCOUNTER — LAB (OUTPATIENT)
Dept: LAB | Facility: HOSPITAL | Age: 63
End: 2023-08-09
Payer: MEDICARE

## 2023-08-09 VITALS
HEIGHT: 69 IN | SYSTOLIC BLOOD PRESSURE: 158 MMHG | TEMPERATURE: 98.2 F | WEIGHT: 147.8 LBS | DIASTOLIC BLOOD PRESSURE: 93 MMHG | BODY MASS INDEX: 21.89 KG/M2 | OXYGEN SATURATION: 98 % | RESPIRATION RATE: 16 BRPM | HEART RATE: 80 BPM

## 2023-08-09 DIAGNOSIS — E53.8 B12 DEFICIENCY: ICD-10-CM

## 2023-08-09 DIAGNOSIS — Z99.2 ANEMIA IN CHRONIC KIDNEY DISEASE, ON CHRONIC DIALYSIS: Primary | ICD-10-CM

## 2023-08-09 DIAGNOSIS — D69.6 THROMBOCYTOPENIA: ICD-10-CM

## 2023-08-09 DIAGNOSIS — D50.8 OTHER IRON DEFICIENCY ANEMIA: ICD-10-CM

## 2023-08-09 DIAGNOSIS — C64.1 CANCER OF KIDNEY PARENCHYMA, RIGHT: ICD-10-CM

## 2023-08-09 DIAGNOSIS — D63.1 ANEMIA IN CHRONIC KIDNEY DISEASE, ON CHRONIC DIALYSIS: Primary | ICD-10-CM

## 2023-08-09 DIAGNOSIS — D63.1 ANEMIA IN CHRONIC KIDNEY DISEASE, ON CHRONIC DIALYSIS: ICD-10-CM

## 2023-08-09 DIAGNOSIS — Z99.2 ANEMIA IN CHRONIC KIDNEY DISEASE, ON CHRONIC DIALYSIS: ICD-10-CM

## 2023-08-09 DIAGNOSIS — N18.6 ANEMIA IN CHRONIC KIDNEY DISEASE, ON CHRONIC DIALYSIS: Primary | ICD-10-CM

## 2023-08-09 DIAGNOSIS — N18.6 ANEMIA IN CHRONIC KIDNEY DISEASE, ON CHRONIC DIALYSIS: ICD-10-CM

## 2023-08-09 LAB
BASOPHILS # BLD AUTO: 0.06 10*3/MM3 (ref 0–0.2)
BASOPHILS NFR BLD AUTO: 0.9 % (ref 0–1.5)
DEPRECATED RDW RBC AUTO: 50.9 FL (ref 37–54)
EOSINOPHIL # BLD AUTO: 0.31 10*3/MM3 (ref 0–0.4)
EOSINOPHIL NFR BLD AUTO: 4.7 % (ref 0.3–6.2)
ERYTHROCYTE [DISTWIDTH] IN BLOOD BY AUTOMATED COUNT: 14 % (ref 12.3–15.4)
HCT VFR BLD AUTO: 44.1 % (ref 37.5–51)
HGB BLD-MCNC: 14.1 G/DL (ref 13–17.7)
IMM GRANULOCYTES # BLD AUTO: 0.06 10*3/MM3 (ref 0–0.05)
IMM GRANULOCYTES NFR BLD AUTO: 0.9 % (ref 0–0.5)
LYMPHOCYTES # BLD AUTO: 1.26 10*3/MM3 (ref 0.7–3.1)
LYMPHOCYTES NFR BLD AUTO: 19.1 % (ref 19.6–45.3)
MCH RBC QN AUTO: 31.5 PG (ref 26.6–33)
MCHC RBC AUTO-ENTMCNC: 32 G/DL (ref 31.5–35.7)
MCV RBC AUTO: 98.4 FL (ref 79–97)
MONOCYTES # BLD AUTO: 0.45 10*3/MM3 (ref 0.1–0.9)
MONOCYTES NFR BLD AUTO: 6.8 % (ref 5–12)
NEUTROPHILS NFR BLD AUTO: 4.44 10*3/MM3 (ref 1.7–7)
NEUTROPHILS NFR BLD AUTO: 67.6 % (ref 42.7–76)
NRBC BLD AUTO-RTO: 0 /100 WBC (ref 0–0.2)
PLATELET # BLD AUTO: 112 10*3/MM3 (ref 140–450)
PMV BLD AUTO: 10.7 FL (ref 6–12)
RBC # BLD AUTO: 4.48 10*6/MM3 (ref 4.14–5.8)
WBC NRBC COR # BLD: 6.58 10*3/MM3 (ref 3.4–10.8)

## 2023-08-09 PROCEDURE — 36415 COLL VENOUS BLD VENIPUNCTURE: CPT

## 2023-08-09 PROCEDURE — 85025 COMPLETE CBC W/AUTO DIFF WBC: CPT
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
## 2024-01-04 ENCOUNTER — TELEPHONE (OUTPATIENT)
Dept: ONCOLOGY | Facility: CLINIC | Age: 64
End: 2024-01-04
Payer: MEDICARE

## 2024-01-04 NOTE — TELEPHONE ENCOUNTER
Caller: Samantha Brown    Relationship to patient: Emergency Contact    Best call back number: [phone]     Chief complaint: PT NEEDS TO HAVE APPT DONE ON A TUESDAY OR A THRUSDAY DUE TO HIS DIALYSIS IS ON M/W/F    Type of visit: LAB AND FOLLOW UP    Requested date: TUESDAY OR THURSDAY - WOULD PREFER AFTER 10 AM    If rescheduling, when is the original appointment: 02/07/24    Additional notes: PLEASE CALL WITH NEW APPT DATE/TIME

## 2024-02-08 ENCOUNTER — TELEPHONE (OUTPATIENT)
Dept: ONCOLOGY | Facility: CLINIC | Age: 64
End: 2024-02-08
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
## 2024-02-19 NOTE — ED NOTES
Nursing report ED to floor  Angelo Brown  62 y.o.  male    HPI :   Chief Complaint   Patient presents with    Vascular Access Problem    Arm Pain       Admitting doctor:   Karl Swift MD    Admitting diagnosis:   The primary encounter diagnosis was Thrombosis of dialysis shunt, initial encounter (HCC). Diagnoses of ESRD on hemodialysis ([facility]), Hyperkalemia, Metabolic acidosis, and Uremia were also pertinent to this visit.    Code status:   Current Code Status       Date Active Code Status Order ID Comments User Context       Prior            Allergies:   Lipitor [atorvastatin calcium], Morphine, Eggs or egg-derived products, Gabapentin, Adhesive tape, Fentanyl, Fentanyl, Ibuprofen, and Penicillins    Isolation:   No active isolations    Intake and Output  No intake or output data in the 24 hours ending 12/26/22 1950    Weight:       12/26/22  1551   Weight: 68 kg (150 lb)       Most recent vitals:   Vitals:    12/26/22 1901 12/26/22 1905 12/26/22 1911 12/26/22 1931   BP: (!) 204/110   162/92   BP Location:       Patient Position:       Pulse:  68 73 68   Resp:    16   Temp:       TempSrc:       SpO2:  97% 97% 97%   Weight:       Height:           Active LDAs/IV Access:   Lines, Drains & Airways       Active LDAs       Name Placement date Placement time Site Days    Peripheral IV 12/26/22 1729 Right Antecubital 12/26/22  1729  Antecubital  less than 1                    Labs (abnormal labs have a star):   Labs Reviewed   COMPREHENSIVE METABOLIC PANEL - Abnormal; Notable for the following components:       Result Value    Glucose 143 (*)      (*)     Creatinine 8.89 (*)     Potassium 6.7 (*)     Chloride 114 (*)     CO2 17.0 (*)     Alkaline Phosphatase 181 (*)     eGFR 6.3 (*)     All other components within normal limits    Narrative:     GFR Normal >60  Chronic Kidney Disease <60  Kidney Failure <15     PHOSPHORUS - Abnormal; Notable for the following components:    Phosphorus 5.8 (*)     All other  components within normal limits   CBC WITH AUTO DIFFERENTIAL - Abnormal; Notable for the following components:    RBC 3.67 (*)     Hemoglobin 11.0 (*)     Hematocrit 35.4 (*)     MCHC 31.1 (*)     Platelets 87 (*)     Lymphocyte % 17.0 (*)     Lymphocytes, Absolute 0.63 (*)     All other components within normal limits   PROTIME-INR - Abnormal; Notable for the following components:    Protime 14.3 (*)     All other components within normal limits   APTT - Normal   BASIC METABOLIC PANEL   CBC AND DIFFERENTIAL    Narrative:     The following orders were created for panel order CBC & Differential.  Procedure                               Abnormality         Status                     ---------                               -----------         ------                     CBC Auto Differential[[id number]]        Abnormal            Final result                 Please view results for these tests on the individual orders.       EKG:   ECG 12 Lead Electrolyte Imbalance   Preliminary Result   HEART RATE= 68  bpm   RR Interval= 882  ms   [place] Interval= 197  ms   P Horizontal Axis= 7  deg   P Front Axis= 65  deg   QRSD Interval= 116  ms   QT Interval= 445  ms   QRS Axis= 53  deg   T Wave Axis= 0  deg   - ABNORMAL ECG -   Sinus rhythm   Probable left ventricular hypertrophy   Electronically Signed By:    Date and Time of Study: 2022-12-26 17:07:35          Meds given in ED:   Medications   sodium chloride 0.9 % flush 10 mL (has no administration in time range)   dextrose (D50W) (25 g/50 mL) IV injection 50 mL (50 mL Intravenous Given 12/26/22 1827)   insulin regular (humuLIN R,novoLIN R) injection 10 Units (10 Units Intravenous Given 12/26/22 1827)   sodium bicarbonate injection 8.4% 50 mEq (50 mEq Intravenous Given 12/26/22 1828)   calcium gluconate 1g/50ml 0.675% NaCl IV SOLN (1 g Intravenous New Bag 12/26/22 1829)   albuterol (PROVENTIL) nebulizer solution 0.5% 2.5 mg/0.5mL (10 mg Nebulization Given 12/26/22 1859)   ALPRAZolam  (XANAX) tablet 1 mg (1 mg Oral Given 12/26/22 1828)   oxyCODONE-acetaminophen (PERCOCET) 5-325 MG per tablet 2 tablet (2 tablets Oral Given 12/26/22 1829)   hydrALAZINE (APRESOLINE) tablet 50 mg (50 mg Oral Given 12/26/22 1906)       Imaging results:  No radiology results for the last day    Ambulatory status:   - up ad valente    Social issues:   Social History     Socioeconomic History    Marital status:      Spouse name: Samantha    Years of education: High school   Tobacco Use    Smoking status: Never    Smokeless tobacco: Never   Vaping Use    Vaping Use: Never used   Substance and Sexual Activity    Alcohol use: Not Currently     Comment: NONE SINCE 1997 (quit)    Drug use: Never     Comment: HAD A DEPENDENCY ON FENTANYL; HASN'T USED SINCE 2011    Sexual activity: Yes     Partners: Female       NIH Stroke Scale:         La Nena Coyne RN  12/26/22 19:50 EST        No

## 2024-03-12 ENCOUNTER — OFFICE VISIT (OUTPATIENT)
Dept: ONCOLOGY | Facility: CLINIC | Age: 64
End: 2024-03-12
Payer: MEDICARE

## 2024-03-12 ENCOUNTER — LAB (OUTPATIENT)
Dept: LAB | Facility: HOSPITAL | Age: 64
End: 2024-03-12
Payer: MEDICARE

## 2024-03-12 VITALS
OXYGEN SATURATION: 99 % | HEART RATE: 60 BPM | HEIGHT: 69 IN | SYSTOLIC BLOOD PRESSURE: 98 MMHG | WEIGHT: 154.3 LBS | RESPIRATION RATE: 16 BRPM | BODY MASS INDEX: 22.85 KG/M2 | TEMPERATURE: 97.3 F | DIASTOLIC BLOOD PRESSURE: 52 MMHG

## 2024-03-12 DIAGNOSIS — D63.1 ANEMIA IN CHRONIC KIDNEY DISEASE, ON CHRONIC DIALYSIS: ICD-10-CM

## 2024-03-12 DIAGNOSIS — D63.1 ANEMIA OF CHRONIC RENAL FAILURE, STAGE 3B: ICD-10-CM

## 2024-03-12 DIAGNOSIS — D61.818 PANCYTOPENIA, ACQUIRED: Primary | ICD-10-CM

## 2024-03-12 DIAGNOSIS — E53.8 B12 DEFICIENCY: ICD-10-CM

## 2024-03-12 DIAGNOSIS — C64.1 CANCER OF KIDNEY PARENCHYMA, RIGHT: ICD-10-CM

## 2024-03-12 DIAGNOSIS — N18.6 ANEMIA IN CHRONIC KIDNEY DISEASE, ON CHRONIC DIALYSIS: ICD-10-CM

## 2024-03-12 DIAGNOSIS — D69.6 THROMBOCYTOPENIA: ICD-10-CM

## 2024-03-12 DIAGNOSIS — N18.32 ANEMIA OF CHRONIC RENAL FAILURE, STAGE 3B: ICD-10-CM

## 2024-03-12 DIAGNOSIS — Z99.2 ANEMIA IN CHRONIC KIDNEY DISEASE, ON CHRONIC DIALYSIS: ICD-10-CM

## 2024-03-12 LAB
BASOPHILS # BLD AUTO: 0.03 10*3/MM3 (ref 0–0.2)
BASOPHILS NFR BLD AUTO: 0.6 % (ref 0–1.5)
DEPRECATED RDW RBC AUTO: 50.4 FL (ref 37–54)
EOSINOPHIL # BLD AUTO: 0.22 10*3/MM3 (ref 0–0.4)
EOSINOPHIL NFR BLD AUTO: 4.6 % (ref 0.3–6.2)
ERYTHROCYTE [DISTWIDTH] IN BLOOD BY AUTOMATED COUNT: 13.7 % (ref 12.3–15.4)
FERRITIN SERPL-MCNC: 1953 NG/ML (ref 30–400)
FOLATE SERPL-MCNC: 9.2 NG/ML (ref 4.78–24.2)
HCT VFR BLD AUTO: 36.3 % (ref 37.5–51)
HGB BLD-MCNC: 11.5 G/DL (ref 13–17.7)
HGB RETIC QN AUTO: 31.6 PG (ref 29.8–36.1)
IMM GRANULOCYTES # BLD AUTO: 0 10*3/MM3 (ref 0–0.05)
IMM GRANULOCYTES NFR BLD AUTO: 0 % (ref 0–0.5)
IMM RETICS NFR: 13.6 % (ref 3–15.8)
IRON 24H UR-MRATE: 133 MCG/DL (ref 59–158)
IRON SATN MFR SERPL: 62 % (ref 20–50)
LYMPHOCYTES # BLD AUTO: 0.8 10*3/MM3 (ref 0.7–3.1)
LYMPHOCYTES NFR BLD AUTO: 16.8 % (ref 19.6–45.3)
MCH RBC QN AUTO: 32 PG (ref 26.6–33)
MCHC RBC AUTO-ENTMCNC: 31.7 G/DL (ref 31.5–35.7)
MCV RBC AUTO: 101.1 FL (ref 79–97)
MONOCYTES # BLD AUTO: 0.17 10*3/MM3 (ref 0.1–0.9)
MONOCYTES NFR BLD AUTO: 3.6 % (ref 5–12)
NEUTROPHILS NFR BLD AUTO: 3.53 10*3/MM3 (ref 1.7–7)
NEUTROPHILS NFR BLD AUTO: 74.4 % (ref 42.7–76)
NRBC BLD AUTO-RTO: 0 /100 WBC (ref 0–0.2)
PLATELET # BLD AUTO: 98 10*3/MM3 (ref 140–450)
PMV BLD AUTO: 10.5 FL (ref 6–12)
RBC # BLD AUTO: 3.59 10*6/MM3 (ref 4.14–5.8)
RETICS # AUTO: 0.07 10*6/MM3 (ref 0.02–0.13)
RETICS/RBC NFR AUTO: 1.91 % (ref 0.7–1.9)
TIBC SERPL-MCNC: 216 MCG/DL (ref 298–536)
TRANSFERRIN SERPL-MCNC: 154 MG/DL (ref 200–360)
VIT B12 BLD-MCNC: >2000 PG/ML (ref 211–946)
WBC NRBC COR # BLD AUTO: 4.75 10*3/MM3 (ref 3.4–10.8)

## 2024-03-12 PROCEDURE — 85025 COMPLETE CBC W/AUTO DIFF WBC: CPT

## 2024-03-12 PROCEDURE — 82607 VITAMIN B-12: CPT | Performed by: NURSE PRACTITIONER

## 2024-03-12 PROCEDURE — 36415 COLL VENOUS BLD VENIPUNCTURE: CPT | Performed by: INTERNAL MEDICINE

## 2024-03-12 PROCEDURE — 82728 ASSAY OF FERRITIN: CPT

## 2024-03-12 PROCEDURE — 83540 ASSAY OF IRON: CPT

## 2024-03-12 PROCEDURE — 85046 RETICYTE/HGB CONCENTRATE: CPT | Performed by: INTERNAL MEDICINE

## 2024-03-12 PROCEDURE — 84466 ASSAY OF TRANSFERRIN: CPT

## 2024-03-12 PROCEDURE — 82746 ASSAY OF FOLIC ACID SERUM: CPT | Performed by: NURSE PRACTITIONER

## 2024-03-12 RX ORDER — CALCIUM ACETATE 667 MG/1
CAPSULE ORAL
COMMUNITY
Start: 2024-03-05
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
## 2024-03-26 ENCOUNTER — OFFICE VISIT (OUTPATIENT)
Age: 64
End: 2024-03-26
Payer: MEDICARE

## 2024-03-26 ENCOUNTER — HOSPITAL ENCOUNTER (OUTPATIENT)
Facility: HOSPITAL | Age: 64
Discharge: HOME | End: 2024-03-26
Admitting: STUDENT IN AN ORGANIZED HEALTH CARE EDUCATION/TRAINING PROGRAM
Payer: MEDICARE

## 2024-03-26 VITALS
BODY MASS INDEX: 22.86 KG/M2 | SYSTOLIC BLOOD PRESSURE: 195 MMHG | DIASTOLIC BLOOD PRESSURE: 106 MMHG | HEIGHT: 69 IN | HEART RATE: 76 BPM | WEIGHT: 154.32 LBS

## 2024-03-26 DIAGNOSIS — T82.9XXD COMPLICATION OF VASCULAR ACCESS FOR DIALYSIS, SUBSEQUENT ENCOUNTER: ICD-10-CM

## 2024-03-26 DIAGNOSIS — Z99.2 END-STAGE RENAL DISEASE ON HEMODIALYSIS: Primary | ICD-10-CM

## 2024-03-26 DIAGNOSIS — N18.6 END-STAGE RENAL DISEASE ON HEMODIALYSIS: Primary | ICD-10-CM

## 2024-03-26 DIAGNOSIS — Z99.2 DEPENDENCE ON HEMODIALYSIS: ICD-10-CM

## 2024-03-26 PROBLEM — N18.4 ACUTE RENAL FAILURE WITH ACUTE TUBULAR NECROSIS SUPERIMPOSED ON STAGE 4 CHRONIC KIDNEY DISEASE: Status: RESOLVED | Noted: 2021-04-08 | Resolved: 2024-03-26

## 2024-03-26 PROBLEM — N17.0 ACUTE RENAL FAILURE WITH ACUTE TUBULAR NECROSIS SUPERIMPOSED ON STAGE 4 CHRONIC KIDNEY DISEASE: Status: RESOLVED | Noted: 2021-04-08 | Resolved: 2024-03-26

## 2024-03-26 LAB
BH CV VAS DIALYSIS ARTERIAL ANASTOMOSIS DIAMETER: 0.45 CM
BH CV VAS DIALYSIS ARTERIAL ANASTOMOSIS EDV: 183 CM/SEC
BH CV VAS DIALYSIS ARTERIAL ANASTOMOSIS PSV: 356 CM/SEC
BH CV VAS DIALYSIS CONDUIT DIST DEPTH: 0.27 CM
BH CV VAS DIALYSIS CONDUIT DIST DIAMETER: 0.52 CM
BH CV VAS DIALYSIS CONDUIT DIST EDV: 115 CM/SEC
BH CV VAS DIALYSIS CONDUIT DIST PSV: 212 CM/SEC
BH CV VAS DIALYSIS CONDUIT MID DEPTH: 0.33 CM
BH CV VAS DIALYSIS CONDUIT MID DIAMETER: 0.66 CM
BH CV VAS DIALYSIS CONDUIT MID EDV: 141 CM/SEC
BH CV VAS DIALYSIS CONDUIT MID PSV: 241 CM/SEC
BH CV VAS DIALYSIS CONDUIT MID/DIST DEPTH: 0.4 CM
BH CV VAS DIALYSIS CONDUIT MID/DIST DIAMETER: 0.53 CM
BH CV VAS DIALYSIS CONDUIT MID/DIST EDV: 110 CM/SEC
BH CV VAS DIALYSIS CONDUIT MID/DIST FLOW VOL: 1056 ML/MIN
BH CV VAS DIALYSIS CONDUIT MID/DIST PSV: 225 CM/SEC
BH CV VAS DIALYSIS CONDUIT PROX DIAMETER: 0.28 CM
BH CV VAS DIALYSIS CONDUIT PROX EDV: 407 CM/SEC
BH CV VAS DIALYSIS CONDUIT PROX PSV: 739 CM/SEC
BH CV VAS DIALYSIS CONDUIT PROX/MID DEPTH: 0.52 CM
BH CV VAS DIALYSIS CONDUIT PROX/MID DIAMETER: 0.47 CM
BH CV VAS DIALYSIS CONDUIT PROX/MID EDV: 168 CM/SEC
BH CV VAS DIALYSIS CONDUIT PROX/MID FLOW VOL: 940 ML/MIN
BH CV VAS DIALYSIS CONDUIT PROX/MID PSV: 272 CM/SEC
BH CV VAS DIALYSIS PRE-INFLOW BRACHIAL DIAMETER: 0.55 CM
BH CV VAS DIALYSIS PRE-INFLOW BRACHIAL EDV: 118 CM/SEC
BH CV VAS DIALYSIS PRE-INFLOW BRACHIAL FLOW VOL: 984 ML/MIN
BH CV VAS DIALYSIS PRE-INFLOW BRACHIAL PSV: 275 CM/SEC
BH CV VAS DIALYSIS VENOUS ANASTOMOSIS DIAMETER: 0.62 CM
BH CV VAS DIALYSIS VENOUS ANASTOMOSIS EDV: 105 CM/SEC
BH CV VAS DIALYSIS VENOUS ANASTOMOSIS PSV: 238 CM/SEC
BH CV VAS DIALYSIS VENOUS OUTFLOW AXILLARY DIAMETER: 0.75 CM
BH CV VAS DIALYSIS VENOUS OUTFLOW AXILLARY EDV: 100 CM/SEC
BH CV VAS DIALYSIS VENOUS OUTFLOW AXILLARY PSV: 176 CM/SEC
BH CV VAS DIALYSIS VENOUS OUTFLOW SUBCLAVIAN DIAMETER: 1.09 CM

## 2024-03-26 PROCEDURE — 1159F MED LIST DOCD IN RCRD: CPT | Performed by: NURSE PRACTITIONER

## 2024-03-26 PROCEDURE — G2211 COMPLEX E/M VISIT ADD ON: HCPCS | Performed by: NURSE PRACTITIONER

## 2024-03-26 PROCEDURE — 93990 DOPPLER FLOW TESTING: CPT

## 2024-03-26 PROCEDURE — 1160F RVW MEDS BY RX/DR IN RCRD: CPT | Performed by: NURSE PRACTITIONER

## 2024-03-26 PROCEDURE — 99213 OFFICE O/P EST LOW 20 MIN: CPT | Performed by: NURSE PRACTITIONER

## 2024-03-26 PROCEDURE — 93990 DOPPLER FLOW TESTING: CPT | Performed by: STUDENT IN AN ORGANIZED HEALTH CARE EDUCATION/TRAINING PROGRAM

## 2024-03-26 PROCEDURE — 3077F SYST BP >= 140 MM HG: CPT | Performed by: NURSE PRACTITIONER

## 2024-03-26 PROCEDURE — 3080F DIAST BP >= 90 MM HG: CPT | Performed by: NURSE PRACTITIONER

## 2024-03-26 RX ORDER — CALCIUM ACETATE 667 MG/1
667 CAPSULE ORAL
COMMUNITY
Start: 2024-03-05

## 2024-03-26 NOTE — PROGRESS NOTES
Chief Complaint  Hemodialysis Access    Subjective        Angelo Brown presents to [facility] VASCULAR SURGERY  HPI   Angelo Brown is a 63 y.o. male that has been followed in our office for hemodialysis access. He has the following access in place: Left brachial artery to brachial vein graft placement. He last intervention was 2/1/2024 with angioplasty and a stent placed to the axillary vein. He returns today in follow up along with an AV duplex. He denies any issues with their access including difficulty with cannulation, prolonged bleeding, decreased clearances, or flow rates.     Review of Systems   Constitutional:  Negative for fever.   Eyes:  Negative for visual disturbance.   Cardiovascular:  Negative for leg swelling.   Gastrointestinal:  Negative for abdominal pain.   Musculoskeletal:  Negative for back pain.   Skin:  Negative for color change, pallor and wound.   Neurological:  Negative for dizziness, facial asymmetry, speech difficulty and weakness.        Angelo Brown  reports that he has never smoked. He has never used smokeless tobacco..        Objective   Vital Signs:  Vitals:    03/26/24 1528   BP: (!) 195/106   Pulse: 76      Body mass index is 22.78 kg/m².       Physical Exam  Vitals reviewed.   Constitutional:       Appearance: Normal appearance.   HENT:      Head: Normocephalic.   Cardiovascular:      Rate and Rhythm: Normal rate and regular rhythm.      Pulses: Normal pulses.           Dorsalis pedis pulses are 3+ on the right side and 3+ on the left side.        Posterior tibial pulses are 3+ on the right side and 3+ on the left side.      Arteriovenous access: Left arteriovenous access is present.  Pulmonary:      Effort: Pulmonary effort is normal.   Skin:     General: Skin is warm.   Neurological:      General: No focal deficit present.      Mental Status: He is alert and oriented to person, place, and time.   Psychiatric:         Mood and Affect: Mood normal.           Result Review :  Duplex Hemodialysis Access CAR (03/26/2024 15:27)       Duplex from today: arterial inflow is 984 cc/min.  Volume flow is 1056 cc/min.  Elevated velocities with a stenosis of the proximal segment with a diameter of 0.28 cm.                 Assessment and Plan     Diagnoses and all orders for this visit:    1. End-stage renal disease on hemodialysis (Primary)  -     Duplex Hemodialysis Access CAR; Future    2. Complication of vascular access for dialysis, subsequent encounter    Patient is doing well.  His dialysis access is working about issue.  He will follow-up in 3 months along with repeat AV graft duplex.  Should he have any issues prior to this, he is contact her office.         Follow Up     Return in about 3 months (around 6/26/2024) for Recheck avf .  Patient was given instructions and counseling regarding his condition or for health maintenance advice. Please see specific information pulled into the AVS if appropriate.     TRANG Shipley

## 2024-06-19 PROBLEM — Z98.890 STATUS POST REPAIR OF ARTERIOVENOUS FISTULA: Status: ACTIVE | Noted: 2024-06-19

## 2024-06-19 PROBLEM — Z86.79 STATUS POST REPAIR OF ARTERIOVENOUS FISTULA: Status: ACTIVE | Noted: 2024-06-19

## 2024-06-24 ENCOUNTER — HOSPITAL ENCOUNTER (OUTPATIENT)
Facility: HOSPITAL | Age: 64
Discharge: HOME | End: 2024-06-24
Admitting: NURSE PRACTITIONER
Payer: MEDICARE

## 2024-06-24 ENCOUNTER — OFFICE VISIT (OUTPATIENT)
Age: 64
End: 2024-06-24
Payer: MEDICARE

## 2024-06-24 VITALS
DIASTOLIC BLOOD PRESSURE: 75 MMHG | HEART RATE: 66 BPM | HEIGHT: 69 IN | BODY MASS INDEX: 22.22 KG/M2 | WEIGHT: 150 LBS | SYSTOLIC BLOOD PRESSURE: 142 MMHG

## 2024-06-24 DIAGNOSIS — N18.6 ESRD (END STAGE RENAL DISEASE): Primary | ICD-10-CM

## 2024-06-24 DIAGNOSIS — N18.6 END-STAGE RENAL DISEASE ON HEMODIALYSIS: ICD-10-CM

## 2024-06-24 DIAGNOSIS — Z99.2 END-STAGE RENAL DISEASE ON HEMODIALYSIS: ICD-10-CM

## 2024-06-24 LAB
BH CV VAS DIALYSIS ARTERIAL ANASTOMOSIS DIAMETER: 0.36 CM
BH CV VAS DIALYSIS ARTERIAL ANASTOMOSIS EDV: 184 CM/SEC
BH CV VAS DIALYSIS ARTERIAL ANASTOMOSIS PSV: 366 CM/SEC
BH CV VAS DIALYSIS CONDUIT DIST DEPTH: 0.62 CM
BH CV VAS DIALYSIS CONDUIT DIST DIAMETER: 0.61 CM
BH CV VAS DIALYSIS CONDUIT DIST EDV: 107 CM/SEC
BH CV VAS DIALYSIS CONDUIT DIST PSV: 225 CM/SEC
BH CV VAS DIALYSIS CONDUIT MID DEPTH: 0.38 CM
BH CV VAS DIALYSIS CONDUIT MID DIAMETER: 0.52 CM
BH CV VAS DIALYSIS CONDUIT MID EDV: 138 CM/SEC
BH CV VAS DIALYSIS CONDUIT MID FLOW VOL: 1223 ML/MIN
BH CV VAS DIALYSIS CONDUIT MID PSV: 244 CM/SEC
BH CV VAS DIALYSIS CONDUIT MID/DIST DEPTH: 0.22 CM
BH CV VAS DIALYSIS CONDUIT MID/DIST DIAMETER: 0.44 CM
BH CV VAS DIALYSIS CONDUIT MID/DIST EDV: 85 CM/SEC
BH CV VAS DIALYSIS CONDUIT MID/DIST PSV: 151 CM/SEC
BH CV VAS DIALYSIS CONDUIT PROX DEPTH: 0.3 CM
BH CV VAS DIALYSIS CONDUIT PROX DIAMETER: 0.24 CM
BH CV VAS DIALYSIS CONDUIT PROX EDV: 284 CM/SEC
BH CV VAS DIALYSIS CONDUIT PROX PSV: 527 CM/SEC
BH CV VAS DIALYSIS CONDUIT PROX/MID DEPTH: 0.46 CM
BH CV VAS DIALYSIS CONDUIT PROX/MID DIAMETER: 0.38 CM
BH CV VAS DIALYSIS CONDUIT PROX/MID EDV: 145 CM/SEC
BH CV VAS DIALYSIS CONDUIT PROX/MID PSV: 267 CM/SEC
BH CV VAS DIALYSIS PRE-INFLOW BRACHIAL DIAMETER: 0.53 CM
BH CV VAS DIALYSIS PRE-INFLOW BRACHIAL EDV: 92 CM/SEC
BH CV VAS DIALYSIS PRE-INFLOW BRACHIAL FLOW VOL: 1057 ML/MIN
BH CV VAS DIALYSIS PRE-INFLOW BRACHIAL PSV: 191 CM/SEC
BH CV VAS DIALYSIS VENOUS OUTFLOW AXILLARY DIAMETER: 0.98 CM
BH CV VAS DIALYSIS VENOUS OUTFLOW AXILLARY EDV: 190 CM/SEC
BH CV VAS DIALYSIS VENOUS OUTFLOW AXILLARY PSV: 337 CM/SEC
BH CV VAS DIALYSIS VENOUS OUTFLOW SUBCLAVIAN DIAMETER: 0.96 CM
BH CV VAS DIALYSIS VENOUS OUTFLOW SUBCLAVIAN EDV: 99 CM/SEC
BH CV VAS DIALYSIS VENOUS OUTFLOW SUBCLAVIAN PSV: 196 CM/SEC

## 2024-06-24 PROCEDURE — 93990 DOPPLER FLOW TESTING: CPT | Performed by: STUDENT IN AN ORGANIZED HEALTH CARE EDUCATION/TRAINING PROGRAM

## 2024-06-24 PROCEDURE — 93990 DOPPLER FLOW TESTING: CPT

## 2024-06-24 PROCEDURE — 3078F DIAST BP <80 MM HG: CPT | Performed by: NURSE PRACTITIONER

## 2024-06-24 PROCEDURE — 1159F MED LIST DOCD IN RCRD: CPT | Performed by: NURSE PRACTITIONER

## 2024-06-24 PROCEDURE — 99213 OFFICE O/P EST LOW 20 MIN: CPT | Performed by: NURSE PRACTITIONER

## 2024-06-24 PROCEDURE — 3077F SYST BP >= 140 MM HG: CPT | Performed by: NURSE PRACTITIONER

## 2024-06-24 PROCEDURE — 1160F RVW MEDS BY RX/DR IN RCRD: CPT | Performed by: NURSE PRACTITIONER

## 2024-06-24 NOTE — PROGRESS NOTES
Chief Complaint  Hemodialysis Access    Subjective        Angeol Brown presents to [facility] VASCULAR SURGERY  HPI   Angelo Brown is a 63 y.o. male that has been followed in our office for hemodialysis access. He has the following access in place: Left brachial artery to brachial vein graft placement. He last intervention was 2/1/2024 with angioplasty and a stent placed to the axillary vein. He returns today in follow up along with an AV duplex. He denies any issues with his access including difficulty with cannulation, prolonged bleeding, decreased clearances, or flow rates.     Review of Systems   Constitutional:  Negative for fever.   Eyes:  Negative for visual disturbance.   Cardiovascular:  Negative for leg swelling.   Gastrointestinal:  Negative for abdominal pain.   Musculoskeletal:  Negative for back pain.   Skin:  Negative for color change, pallor and wound.   Neurological:  Negative for dizziness, facial asymmetry, speech difficulty and weakness.        Angelo Brown  reports that he has never smoked. He has never used smokeless tobacco..        Objective   Vital Signs:  Vitals:    06/24/24 1429   BP: 142/75   Pulse: 66        Body mass index is 22.14 kg/m².       Physical Exam  Vitals reviewed.   Constitutional:       Appearance: Normal appearance.   HENT:      Head: Normocephalic.   Cardiovascular:      Rate and Rhythm: Normal rate and regular rhythm.      Pulses: Normal pulses.           Dorsalis pedis pulses are 3+ on the right side and 3+ on the left side.        Posterior tibial pulses are 3+ on the right side and 3+ on the left side.      Arteriovenous access: Left arteriovenous access is present.  Pulmonary:      Effort: Pulmonary effort is normal.   Skin:     General: Skin is warm.   Neurological:      General: No focal deficit present.      Mental Status: He is alert and oriented to person, place, and time.   Psychiatric:         Mood and Affect: Mood normal.           Result Review :  Duplex Hemodialysis Access CAR (03/26/2024 15:27)             Duplex Hemodialysis Access CAR (06/24/2024 14:26)            Assessment and Plan     Diagnoses and all orders for this visit:    1. ESRD (end stage renal disease) (Primary)  -     Duplex Hemodialysis Access CAR; Future      Patient is doing well.  His dialysis access is working about issue. He does have proximal stenosis, but volume flows are adequate.  He will follow-up in 3 months along with repeat AV graft duplex.  Should he have any issues prior to this, he is contact her office.         Follow Up     Return in about 3 months (around 9/24/2024).  Patient was given instructions and counseling regarding his condition or for health maintenance advice. Please see specific information pulled into the AVS if appropriate.     TRANG Shipley

## 2024-07-23 ENCOUNTER — TELEPHONE (OUTPATIENT)
Dept: CARDIOLOGY | Facility: CLINIC | Age: 64
End: 2024-07-23
Payer: MEDICARE

## 2024-07-23 ENCOUNTER — OFFICE VISIT (OUTPATIENT)
Dept: CARDIOLOGY | Facility: CLINIC | Age: 64
End: 2024-07-23
Payer: MEDICARE

## 2024-07-23 ENCOUNTER — TELEPHONE (OUTPATIENT)
Dept: CARDIOLOGY | Facility: CLINIC | Age: 64
End: 2024-07-23

## 2024-07-23 VITALS
HEART RATE: 60 BPM | WEIGHT: 150 LBS | BODY MASS INDEX: 22.22 KG/M2 | HEIGHT: 69 IN | OXYGEN SATURATION: 97 % | SYSTOLIC BLOOD PRESSURE: 144 MMHG | DIASTOLIC BLOOD PRESSURE: 70 MMHG

## 2024-07-23 DIAGNOSIS — E78.5 HYPERLIPIDEMIA, UNSPECIFIED HYPERLIPIDEMIA TYPE: ICD-10-CM

## 2024-07-23 DIAGNOSIS — I10 HYPERTENSION, UNSPECIFIED TYPE: ICD-10-CM

## 2024-07-23 DIAGNOSIS — E11.65 TYPE 2 DIABETES MELLITUS WITH HYPERGLYCEMIA, WITHOUT LONG-TERM CURRENT USE OF INSULIN: ICD-10-CM

## 2024-07-23 DIAGNOSIS — N18.6 END-STAGE RENAL DISEASE ON HEMODIALYSIS: ICD-10-CM

## 2024-07-23 DIAGNOSIS — Z99.2 END-STAGE RENAL DISEASE ON HEMODIALYSIS: ICD-10-CM

## 2024-07-23 DIAGNOSIS — I25.10 CORONARY ARTERY DISEASE INVOLVING NATIVE CORONARY ARTERY OF NATIVE HEART WITHOUT ANGINA PECTORIS: Primary | ICD-10-CM

## 2024-07-23 PROCEDURE — 3077F SYST BP >= 140 MM HG: CPT | Performed by: INTERNAL MEDICINE

## 2024-07-23 PROCEDURE — 3078F DIAST BP <80 MM HG: CPT | Performed by: INTERNAL MEDICINE

## 2024-07-23 PROCEDURE — 1159F MED LIST DOCD IN RCRD: CPT | Performed by: INTERNAL MEDICINE

## 2024-07-23 PROCEDURE — 93000 ELECTROCARDIOGRAM COMPLETE: CPT | Performed by: INTERNAL MEDICINE

## 2024-07-23 PROCEDURE — 1160F RVW MEDS BY RX/DR IN RCRD: CPT | Performed by: INTERNAL MEDICINE

## 2024-07-23 PROCEDURE — 99204 OFFICE O/P NEW MOD 45 MIN: CPT | Performed by: INTERNAL MEDICINE

## 2024-07-23 RX ORDER — ROSUVASTATIN CALCIUM 20 MG/1
20 TABLET, COATED ORAL NIGHTLY
Qty: 90 TABLET | Refills: 3 | Status: SHIPPED | OUTPATIENT
Start: 2024-07-23 | End: 2024-07-23

## 2024-07-23 RX ORDER — SIMVASTATIN 40 MG
40 TABLET ORAL NIGHTLY
Qty: 90 TABLET | Refills: 3 | Status: SHIPPED | OUTPATIENT
Start: 2024-07-23

## 2024-07-23 NOTE — TELEPHONE ENCOUNTER
Caller: Samantah Brown    Relationship: Emergency Contact    Best call back number: [phone]    What is the best time to reach you: ANY    Who are you requesting to speak with (clinical staff, provider,  specific staff member): CLINICAL    Do you know the name of the person who called:     What was the call regarding: PATIENT'S WIFE SAMANTHA  STATED THAT PATIENT DOES NOT FEEL COMFORTABLE CHANGING TO A DIFFERENT MEDICATION. PATIENT WANTS TO CONTINUE TAKING SIMVASTATIN 40 MG MEDIATION. PATIENT WOULD LIKE SIMVASTATIN 40 MG PRESCRIPTION FOR 90 DAY SUPPLY SENT TO [facility] ON [address] [place] [address] IN [place]. PATIENT HAS 2 WEEKS OF SIMVASTATIN MEDICATION. PLEASE CONTACT SAMANTHA TO ADVISE. THANK YOU.     Is it okay if the provider responds through MyChart: CALL

## 2024-07-23 NOTE — TELEPHONE ENCOUNTER
----- Message from Juana Quinonez sent at 7/23/2024 10:33 AM EDT -----  Regarding: Rosuvastatin  Please call patient's wife and let them know that atorvastatin is listed as an allergy due to short windedness.  I would rather have him use rosuvastatin 20 mg nightly instead of simvastatin.  I sent in rosuvastatin.  Please have her keep us posted if he is having any issues.
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
## 2024-07-23 NOTE — TELEPHONE ENCOUNTER
Notified patient's wife of recommendations. She verbalized understanding.    Jackie Aguilar RN  Triage [facility] [place]

## 2024-08-01 ENCOUNTER — OFFICE VISIT (OUTPATIENT)
Dept: ENDOCRINOLOGY | Age: 64
End: 2024-08-01
Payer: MEDICARE

## 2024-08-01 VITALS
HEIGHT: 69 IN | HEART RATE: 82 BPM | WEIGHT: 152.4 LBS | SYSTOLIC BLOOD PRESSURE: 146 MMHG | DIASTOLIC BLOOD PRESSURE: 82 MMHG | OXYGEN SATURATION: 96 % | BODY MASS INDEX: 22.57 KG/M2

## 2024-08-01 DIAGNOSIS — E11.9 TYPE 2 DIABETES MELLITUS WITHOUT COMPLICATION, WITH LONG-TERM CURRENT USE OF INSULIN: Primary | ICD-10-CM

## 2024-08-01 DIAGNOSIS — Z79.4 TYPE 2 DIABETES MELLITUS WITHOUT COMPLICATION, WITH LONG-TERM CURRENT USE OF INSULIN: Primary | ICD-10-CM

## 2024-08-01 RX ORDER — INSULIN ASPART 100 [IU]/ML
10 INJECTION, SOLUTION INTRAVENOUS; SUBCUTANEOUS
Qty: 15 ML | Refills: 2 | Status: SHIPPED | OUTPATIENT
Start: 2024-08-01 | End: 2024-08-05 | Stop reason: SDUPTHER

## 2024-08-01 RX ORDER — KETOROLAC TROMETHAMINE 30 MG/ML
1 INJECTION, SOLUTION INTRAMUSCULAR; INTRAVENOUS ONCE
Qty: 1 EACH | Refills: 0 | Status: SHIPPED | OUTPATIENT
Start: 2024-08-01 | End: 2024-08-01

## 2024-08-01 RX ORDER — INSULIN GLARGINE 100 [IU]/ML
10 INJECTION, SOLUTION SUBCUTANEOUS NIGHTLY
Qty: 15 ML | Refills: 2 | Status: SHIPPED | OUTPATIENT
Start: 2024-08-01

## 2024-08-01 RX ORDER — BLOOD-GLUCOSE SENSOR
1 EACH MISCELLANEOUS
Qty: 2 EACH | Refills: 2 | Status: SHIPPED | OUTPATIENT
Start: 2024-08-01
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
## 2024-08-02 ENCOUNTER — PRIOR AUTHORIZATION (OUTPATIENT)
Dept: ENDOCRINOLOGY | Age: 64
End: 2024-08-02
Payer: MEDICARE

## 2024-08-05 ENCOUNTER — PRIOR AUTHORIZATION (OUTPATIENT)
Dept: ENDOCRINOLOGY | Age: 64
End: 2024-08-05
Payer: MEDICARE

## 2024-08-05 RX ORDER — INSULIN ASPART 100 [IU]/ML
10 INJECTION, SOLUTION INTRAVENOUS; SUBCUTANEOUS
Qty: 15 ML | Refills: 2 | Status: SHIPPED | OUTPATIENT
Start: 2024-08-05

## 2024-08-05 NOTE — TELEPHONE ENCOUNTER
A PA has been started for FreeStyle Triston 3 [place] device.   (Key: [id number])    Additional Information Required  Available without authorization. The member is able to fill the requested drug at the pharmacy. If coverage is still needed or requesting prior to the expiration of a current authorization, a request can be made by sending a fax or calling the number on the back of the member's ID card.

## 2024-09-04 ENCOUNTER — APPOINTMENT (OUTPATIENT)
Dept: CT IMAGING | Facility: HOSPITAL | Age: 64
End: 2024-09-04
Payer: MEDICARE

## 2024-09-04 ENCOUNTER — HOSPITAL ENCOUNTER (EMERGENCY)
Facility: HOSPITAL | Age: 64
Discharge: HOME | End: 2024-09-04
Attending: STUDENT IN AN ORGANIZED HEALTH CARE EDUCATION/TRAINING PROGRAM
Payer: MEDICARE

## 2024-09-04 ENCOUNTER — APPOINTMENT (OUTPATIENT)
Dept: GENERAL RADIOLOGY | Facility: HOSPITAL | Age: 64
End: 2024-09-04
Payer: MEDICARE

## 2024-09-04 VITALS
WEIGHT: 158 LBS | HEART RATE: 58 BPM | OXYGEN SATURATION: 98 % | DIASTOLIC BLOOD PRESSURE: 79 MMHG | HEIGHT: 69 IN | RESPIRATION RATE: 16 BRPM | BODY MASS INDEX: 23.4 KG/M2 | TEMPERATURE: 97.7 F | SYSTOLIC BLOOD PRESSURE: 144 MMHG

## 2024-09-04 DIAGNOSIS — E87.8 DIALYSIS DISEQUILIBRIUM SYNDROME: Primary | ICD-10-CM

## 2024-09-04 LAB
ALBUMIN SERPL-MCNC: 4.2 G/DL (ref 3.5–5.2)
ALBUMIN/GLOB SERPL: 1.3 G/DL
ALP SERPL-CCNC: 119 U/L (ref 39–117)
ALT SERPL W P-5'-P-CCNC: 20 U/L (ref 1–41)
AMMONIA BLD-SCNC: 24 UMOL/L (ref 16–60)
AMPHET+METHAMPHET UR QL: NEGATIVE
ANION GAP SERPL CALCULATED.3IONS-SCNC: 10.3 MMOL/L (ref 5–15)
AST SERPL-CCNC: 19 U/L (ref 1–40)
BARBITURATES UR QL SCN: NEGATIVE
BASOPHILS # BLD AUTO: 0.01 10*3/MM3 (ref 0–0.2)
BASOPHILS NFR BLD AUTO: 0.2 % (ref 0–1.5)
BENZODIAZ UR QL SCN: POSITIVE
BILIRUB SERPL-MCNC: 0.3 MG/DL (ref 0–1.2)
BUN SERPL-MCNC: 45 MG/DL (ref 8–23)
BUN/CREAT SERPL: 8.7 (ref 7–25)
CALCIUM SPEC-SCNC: 8.7 MG/DL (ref 8.6–10.5)
CANNABINOIDS SERPL QL: NEGATIVE
CHLORIDE SERPL-SCNC: 94 MMOL/L (ref 98–107)
CO2 SERPL-SCNC: 25.7 MMOL/L (ref 22–29)
COCAINE UR QL: NEGATIVE
CREAT SERPL-MCNC: 5.17 MG/DL (ref 0.76–1.27)
DEPRECATED RDW RBC AUTO: 48.5 FL (ref 37–54)
EGFRCR SERPLBLD CKD-EPI 2021: 11.9 ML/MIN/1.73
EOSINOPHIL # BLD AUTO: 0.06 10*3/MM3 (ref 0–0.4)
EOSINOPHIL NFR BLD AUTO: 1.1 % (ref 0.3–6.2)
ERYTHROCYTE [DISTWIDTH] IN BLOOD BY AUTOMATED COUNT: 13.8 % (ref 12.3–15.4)
ETHANOL BLD-MCNC: <10 MG/DL (ref 0–10)
ETHANOL UR QL: <0.01 %
FENTANYL UR-MCNC: NEGATIVE NG/ML
GLOBULIN UR ELPH-MCNC: 3.2 GM/DL
GLUCOSE SERPL-MCNC: 239 MG/DL (ref 65–99)
HCT VFR BLD AUTO: 31.1 % (ref 37.5–51)
HGB BLD-MCNC: 10.2 G/DL (ref 13–17.7)
IMM GRANULOCYTES # BLD AUTO: 0.01 10*3/MM3 (ref 0–0.05)
IMM GRANULOCYTES NFR BLD AUTO: 0.2 % (ref 0–0.5)
LYMPHOCYTES # BLD AUTO: 0.34 10*3/MM3 (ref 0.7–3.1)
LYMPHOCYTES NFR BLD AUTO: 6.3 % (ref 19.6–45.3)
MCH RBC QN AUTO: 31.5 PG (ref 26.6–33)
MCHC RBC AUTO-ENTMCNC: 32.8 G/DL (ref 31.5–35.7)
MCV RBC AUTO: 96 FL (ref 79–97)
METHADONE UR QL SCN: NEGATIVE
MONOCYTES # BLD AUTO: 0.31 10*3/MM3 (ref 0.1–0.9)
MONOCYTES NFR BLD AUTO: 5.7 % (ref 5–12)
NEUTROPHILS NFR BLD AUTO: 4.68 10*3/MM3 (ref 1.7–7)
NEUTROPHILS NFR BLD AUTO: 86.5 % (ref 42.7–76)
NRBC BLD AUTO-RTO: 0 /100 WBC (ref 0–0.2)
OPIATES UR QL: NEGATIVE
OXYCODONE UR QL SCN: POSITIVE
PLATELET # BLD AUTO: 85 10*3/MM3 (ref 140–450)
PMV BLD AUTO: 10.2 FL (ref 6–12)
POTASSIUM SERPL-SCNC: 4.6 MMOL/L (ref 3.5–5.2)
PROT SERPL-MCNC: 7.4 G/DL (ref 6–8.5)
RBC # BLD AUTO: 3.24 10*6/MM3 (ref 4.14–5.8)
SODIUM SERPL-SCNC: 130 MMOL/L (ref 136–145)
WBC NRBC COR # BLD AUTO: 5.41 10*3/MM3 (ref 3.4–10.8)

## 2024-09-04 PROCEDURE — 80307 DRUG TEST PRSMV CHEM ANLYZR: CPT | Performed by: STUDENT IN AN ORGANIZED HEALTH CARE EDUCATION/TRAINING PROGRAM

## 2024-09-04 PROCEDURE — 82140 ASSAY OF AMMONIA: CPT | Performed by: STUDENT IN AN ORGANIZED HEALTH CARE EDUCATION/TRAINING PROGRAM

## 2024-09-04 PROCEDURE — 80053 COMPREHEN METABOLIC PANEL: CPT | Performed by: STUDENT IN AN ORGANIZED HEALTH CARE EDUCATION/TRAINING PROGRAM

## 2024-09-04 PROCEDURE — 93005 ELECTROCARDIOGRAM TRACING: CPT | Performed by: STUDENT IN AN ORGANIZED HEALTH CARE EDUCATION/TRAINING PROGRAM

## 2024-09-04 PROCEDURE — 82077 ASSAY SPEC XCP UR&BREATH IA: CPT | Performed by: STUDENT IN AN ORGANIZED HEALTH CARE EDUCATION/TRAINING PROGRAM

## 2024-09-04 PROCEDURE — 70450 CT HEAD/BRAIN W/O DYE: CPT

## 2024-09-04 PROCEDURE — 85025 COMPLETE CBC W/AUTO DIFF WBC: CPT | Performed by: STUDENT IN AN ORGANIZED HEALTH CARE EDUCATION/TRAINING PROGRAM

## 2024-09-04 PROCEDURE — 99284 EMERGENCY DEPT VISIT MOD MDM: CPT

## 2024-09-04 PROCEDURE — 93010 ELECTROCARDIOGRAM REPORT: CPT | Performed by: INTERNAL MEDICINE

## 2024-09-04 PROCEDURE — 71045 X-RAY EXAM CHEST 1 VIEW: CPT

## 2024-09-04 RX ORDER — RISPERIDONE 0.5 MG/1
2 TABLET, ORALLY DISINTEGRATING ORAL ONCE
Status: COMPLETED | OUTPATIENT
Start: 2024-09-04 | End: 2024-09-04

## 2024-09-04 RX ADMIN — RISPERIDONE 2 MG: 0.5 TABLET, ORALLY DISINTEGRATING ORAL at 14:18

## 2024-09-04 NOTE — ED NOTES
Pt arrives via EMS from the dialysis center. Staff stated that after the pt's treatment he started to have visual hallucinations and his BP was elevated. Pt is A/Ox4 but is reaching for objects that are not there.
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
## 2024-09-05 LAB
QT INTERVAL: 436 MS
QTC INTERVAL: 471 MS

## 2024-09-16 ENCOUNTER — HOSPITAL ENCOUNTER (OUTPATIENT)
Facility: HOSPITAL | Age: 64
Discharge: HOME | End: 2024-09-16
Admitting: NURSE PRACTITIONER
Payer: MEDICARE

## 2024-09-16 ENCOUNTER — OFFICE VISIT (OUTPATIENT)
Age: 64
End: 2024-09-16
Payer: MEDICARE

## 2024-09-16 VITALS
BODY MASS INDEX: 23.4 KG/M2 | HEART RATE: 65 BPM | HEIGHT: 69 IN | DIASTOLIC BLOOD PRESSURE: 76 MMHG | SYSTOLIC BLOOD PRESSURE: 128 MMHG | WEIGHT: 158 LBS

## 2024-09-16 DIAGNOSIS — N18.6 ESRD (END STAGE RENAL DISEASE): ICD-10-CM

## 2024-09-16 DIAGNOSIS — N18.6 ESRD (END STAGE RENAL DISEASE): Primary | ICD-10-CM

## 2024-09-16 LAB
BH CV VAS DIALYSIS ARTERIAL ANASTOMOSIS DIAMETER: 0.6 CM
BH CV VAS DIALYSIS ARTERIAL ANASTOMOSIS EDV: 173 CM/SEC
BH CV VAS DIALYSIS ARTERIAL ANASTOMOSIS PSV: 328 CM/SEC
BH CV VAS DIALYSIS CONDUIT DIST DEPTH: 0.2 CM
BH CV VAS DIALYSIS CONDUIT DIST DIAMETER: 0.5 CM
BH CV VAS DIALYSIS CONDUIT DIST EDV: 85 CM/SEC
BH CV VAS DIALYSIS CONDUIT DIST FLOW VOL: 622 ML/MIN
BH CV VAS DIALYSIS CONDUIT DIST PSV: 142 CM/SEC
BH CV VAS DIALYSIS CONDUIT MID DEPTH: 0.3 CM
BH CV VAS DIALYSIS CONDUIT MID DIAMETER: 0.8 CM
BH CV VAS DIALYSIS CONDUIT MID EDV: 115 CM/SEC
BH CV VAS DIALYSIS CONDUIT MID PSV: 203 CM/SEC
BH CV VAS DIALYSIS CONDUIT MID/DIST DEPTH: 0.2 CM
BH CV VAS DIALYSIS CONDUIT MID/DIST DIAMETER: 0.5 CM
BH CV VAS DIALYSIS CONDUIT PROX DEPTH: 0.2 CM
BH CV VAS DIALYSIS CONDUIT PROX DIAMETER: 0.6 CM
BH CV VAS DIALYSIS CONDUIT PROX EDV: 249 CM/SEC
BH CV VAS DIALYSIS CONDUIT PROX PSV: 456 CM/SEC
BH CV VAS DIALYSIS CONDUIT PROX/MID DEPTH: 0.3 CM
BH CV VAS DIALYSIS CONDUIT PROX/MID DIAMETER: 0.8 CM
BH CV VAS DIALYSIS CONDUIT PROX/MID EDV: 104 CM/SEC
BH CV VAS DIALYSIS CONDUIT PROX/MID PSV: 197 CM/SEC
BH CV VAS DIALYSIS PRE-INFLOW BRACHIAL DIAMETER: 0.6 CM
BH CV VAS DIALYSIS PRE-INFLOW BRACHIAL EDV: 76 CM/SEC
BH CV VAS DIALYSIS PRE-INFLOW BRACHIAL FLOW VOL: 558 ML/MIN
BH CV VAS DIALYSIS PRE-INFLOW BRACHIAL PSV: 166 CM/SEC
BH CV VAS DIALYSIS VENOUS OUTFLOW AXILLARY DIAMETER: 0.7 CM
BH CV VAS DIALYSIS VENOUS OUTFLOW AXILLARY EDV: 61 CM/SEC
BH CV VAS DIALYSIS VENOUS OUTFLOW AXILLARY PSV: 115 CM/SEC
BH CV VAS DIALYSIS VENOUS OUTFLOW SUBCLAVIAN DIAMETER: 0.8 CM
BH CV VAS DIALYSIS VENOUS OUTFLOW SUBCLAVIAN EDV: 110 CM/SEC
BH CV VAS DIALYSIS VENOUS OUTFLOW SUBCLAVIAN PSV: 267 CM/SEC

## 2024-09-16 PROCEDURE — 99214 OFFICE O/P EST MOD 30 MIN: CPT | Performed by: NURSE PRACTITIONER

## 2024-09-16 PROCEDURE — 93990 DOPPLER FLOW TESTING: CPT

## 2024-09-16 PROCEDURE — 3078F DIAST BP <80 MM HG: CPT | Performed by: NURSE PRACTITIONER

## 2024-09-16 PROCEDURE — 93990 DOPPLER FLOW TESTING: CPT | Performed by: STUDENT IN AN ORGANIZED HEALTH CARE EDUCATION/TRAINING PROGRAM

## 2024-09-16 PROCEDURE — 3074F SYST BP LT 130 MM HG: CPT | Performed by: NURSE PRACTITIONER

## 2024-09-16 RX ORDER — CLINDAMYCIN HCL 300 MG
CAPSULE ORAL
COMMUNITY
Start: 2024-09-12

## 2024-09-17 ENCOUNTER — TRANSCRIBE ORDERS (OUTPATIENT)
Dept: ADMINISTRATIVE | Facility: HOSPITAL | Age: 64
End: 2024-09-17
Payer: MEDICARE

## 2024-09-17 DIAGNOSIS — I63.9 IMPENDING CEREBROVASCULAR ACCIDENT: Primary | ICD-10-CM

## 2024-09-26 DIAGNOSIS — N18.6 ESRD (END STAGE RENAL DISEASE): Primary | ICD-10-CM

## 2024-10-24 ENCOUNTER — OFFICE VISIT (OUTPATIENT)
Dept: CARDIOLOGY | Facility: CLINIC | Age: 64
End: 2024-10-24
Payer: MEDICARE

## 2024-10-24 VITALS
BODY MASS INDEX: 22.66 KG/M2 | DIASTOLIC BLOOD PRESSURE: 60 MMHG | HEIGHT: 69 IN | HEART RATE: 62 BPM | SYSTOLIC BLOOD PRESSURE: 140 MMHG | WEIGHT: 153 LBS | OXYGEN SATURATION: 95 %

## 2024-10-24 DIAGNOSIS — E78.5 HYPERLIPIDEMIA, UNSPECIFIED HYPERLIPIDEMIA TYPE: ICD-10-CM

## 2024-10-24 DIAGNOSIS — R94.31 ABNORMAL EKG: ICD-10-CM

## 2024-10-24 DIAGNOSIS — I25.10 CORONARY ARTERY DISEASE INVOLVING NATIVE HEART WITHOUT ANGINA PECTORIS, UNSPECIFIED VESSEL OR LESION TYPE: Primary | ICD-10-CM

## 2024-10-24 DIAGNOSIS — I10 HYPERTENSION, UNSPECIFIED TYPE: ICD-10-CM

## 2024-10-24 PROCEDURE — 1160F RVW MEDS BY RX/DR IN RCRD: CPT | Performed by: FAMILY MEDICINE

## 2024-10-24 PROCEDURE — 3077F SYST BP >= 140 MM HG: CPT | Performed by: FAMILY MEDICINE

## 2024-10-24 PROCEDURE — 93000 ELECTROCARDIOGRAM COMPLETE: CPT | Performed by: FAMILY MEDICINE

## 2024-10-24 PROCEDURE — 3078F DIAST BP <80 MM HG: CPT | Performed by: FAMILY MEDICINE

## 2024-10-24 PROCEDURE — 1159F MED LIST DOCD IN RCRD: CPT | Performed by: FAMILY MEDICINE

## 2024-10-24 PROCEDURE — 99214 OFFICE O/P EST MOD 30 MIN: CPT | Performed by: FAMILY MEDICINE

## 2024-10-24 RX ORDER — ONDANSETRON 4 MG/1
4 TABLET, FILM COATED ORAL EVERY 8 HOURS PRN
COMMUNITY

## 2024-10-24 NOTE — Clinical Note
Please let patient know I discussed our visit today with Dr. Quinonez.  An echocardiogram is recommended and a 3-month appointment with either Dr. Quinonez or myself.  Can we forward this note to transplant team at [facility]  Thanks

## 2024-10-24 NOTE — PROGRESS NOTES
Date of Office Visit: 10/24/2024  Encounter Provider: TRANG Michele  Place of Service: [facility] CARDIOLOGY  Established cardiologist: Juana Quinonez MD  Patient Name: Angelo Brown  :1960      Patient ID:  Angelo Brown is a 64 y.o. male is here for  followup    With a pertinent medical history of hypertension, hyperlipidemia, GERD, ESRD on HD, anemia, history of stroke, end-stage liver disease, chronic hepatitis C, history of lumbar osteomyelitis, history of right partial nephrectomy for renal cell carcinoma, CAD-s/p CABG .  He sees Pau Vega from neurology, he sees Dr. Murrell for nephrology and sees Dr. Head for vascular.   Parents both had hypertension and all of his siblings and parents have diabetes. He does not use drugs, he is a former smoker     History of Present Illness  Status post CABG      CT chest with IV contrast done 2024 shows no acute or suspicious findings.  CT abdomen and pelvis with IV contrast done 2024 showed no suspicious liver lesions, cirrhosis noted, possible thrombus in the left intrahepatic portal vein, splenomegaly, atherosclerotic disease of bilateral external iliac arteries.       Left lower extremity arterial duplex studies on 2024 were normal.    Carotid duplex study done 2024 showed antegrade vertebral artery flow, less than 50% bilateral internal carotid artery stenosis     echocardiogram done 2024 shows ejection fraction 59% with normal saline study, grade 2 diastolic dysfunction, normal valvular structure and function, mild left atrial enlargement.     He had nonischemic stress nuclear perfusion study on 2024.      He saw Dr. Kapoor at  24. He is being evaluated for liver transplant with combined kidney transplant.  Cardiac evaluation so far has been stable and further cardiac testing has not been not indicated.  His transplant physician is Dr. Cheney.      He saw   Devi 7/23/2024 this was for transplant workup.  No cardiac testing was indicated.  He was switched to rosuvastatin instead of simvastatin.    Today Abram is here with a friend.  He has no acute complaints or concerns.  He has been going to dialysis 3 times a week he does get hypotensive sometimes during dialysis and is given midodrine.  He does not take that routinely.  He has had absolutely no chest pain or pressure, shortness of breath, ROBLERO, PND, presyncope/syncope, palpitations, heart racing, or leg swelling.  He reports being diagnosed legally blind in August of this year.  He is still undergoing different clearances for potential transplant with  tells me he has to obtain dental clearance next week.    Current Outpatient Medications on File Prior to Visit   Medication Sig Dispense Refill    Alcohol Swabs (Alcohol Pads) 70 % pads Apply one alcohol swab to injection site of skin immediately prior to insulin injection. Formulary Compliance Approval. 100 each 12    ALPRAZolam (XANAX) 1 MG tablet Take 1 tablet by mouth 4 (Four) Times a Day As Needed.      ascorbic acid (VITAMIN C) 500 MG tablet Take 1 tablet by mouth 2 (Two) Times a Day.      calcium acetate (PHOS BINDER,) 667 MG capsule capsule Take 1 capsule by mouth.      cetirizine (zyrTEC) 10 MG tablet Take 1 tablet by mouth Daily.      clindamycin (CLEOCIN) 300 MG capsule       Continuous Glucose Sensor (FreeStyle Triston 3 Sensor) misc Use 1 Units Every 14 (Fourteen) Days. 2 each 2    epoetin real-epbx (RETACRIT) 43266 UNIT/ML injection Inject 1 mL under the skin into the appropriate area as directed 3 (Three) Times a Week. Indications: ESRD on Dialysis 6.6 mL     Ferric Citrate (Auryxia) 1  MG(Fe) tablet Take  by mouth Take As Directed.      glucose blood test strip Use to test blood glucose up to four times daily as needed. Formulary Compliance Approval. Diagnosis: Type 2 Diabetes - Insulin Dependent 100 each 0    glucose monitor monitoring kit  Use to test blood glucose up to four times daily as needed. Formulary Compliance Approval. Diagnosis: Type 2 Diabetes - Insulin Dependent 1 each 0    insulin aspart (NovoLOG FlexPen) 100 UNIT/ML solution pen-injector sc pen Inject 10 Units under the skin into the appropriate area as directed 3 (Three) Times a Day With Meals. 15 mL 2    insulin glargine (LANTUS, SEMGLEE) 100 UNIT/ML injection Inject 10 Units under the skin into the appropriate area as directed Every Night. Wife adjusts depending on what sugars are and if he is eating 15 mL 2    Insulin Lispro, 0.5 Unit Dial, (HUMALOG KWIKPEN ROMEL) 100 UNIT/ML solution pen-injector Inject  under the skin into the appropriate area as directed Take As Directed. Insulin Lispro 100 UNIT/ML Solution Pen-injector      Lancets misc Use to test blood glucose up to four times daily as needed. Formulary Compliance Approval. Diagnosis: Type 2 Diabetes - Insulin Dependent 100 each 0    Melatonin 1 MG capsule Take 1 mg by mouth Every Night.      nitroglycerin (NITROSTAT) 0.4 MG SL tablet Place 1 tablet under the tongue Every 5 (Five) Minutes As Needed for Chest Pain. Take no more than 3 doses in 15 minutes.      Omega-3 Fatty Acids (fish oil) 1000 MG capsule capsule Take 1 capsule by mouth Daily With Breakfast.      ondansetron (ZOFRAN) 4 MG tablet Take 1 tablet by mouth Every 8 (Eight) Hours As Needed for Nausea or Vomiting.      oxyCODONE-acetaminophen (PERCOCET)  MG per tablet Take 1 tablet by mouth Every 6 (Six) Hours As Needed.      pantoprazole (PROTONIX) 40 MG EC tablet Take 1 tablet by mouth Daily.      promethazine (PHENERGAN) 25 MG tablet Take 1 tablet by mouth As Needed.      simvastatin (ZOCOR) 40 MG tablet Take 1 tablet by mouth Every Night. 90 tablet 3    TiZANidine (ZANAFLEX) 4 MG capsule Take 1 capsule by mouth Every 8 (Eight) Hours As Needed.      vitamin B-12 (CYANOCOBALAMIN) 1000 MCG tablet Take 1 tablet by mouth Daily.      VITAMIN D PO Take  by mouth  "Take As Directed.       No current facility-administered medications on file prior to visit.         Procedures    ECG 12 Lead    Date/Time: 10/24/2024 11:49 AM  Performed by: Farida Villagomez APRN    Authorized by: Farida Villagomez APRN  Comparison: compared with previous ECG from 9/4/2024  Comparison to previous ECG: There are new ST-T segment changes in the lateral leads, T wave inversion inferior leads  Rhythm: sinus rhythm  BPM: 62              Objective:      Vitals:    10/24/24 1110   BP: 140/60   Pulse: 62   SpO2: 95%   Weight: 69.4 kg (153 lb)   Height: 175.3 cm (69\")     Body mass index is 22.59 kg/m².  Wt Readings from Last 3 Encounters:   10/24/24 69.4 kg (153 lb)   09/16/24 71.7 kg (158 lb)   09/04/24 71.7 kg (158 lb)         Constitutional:       Comments: Abram is a 64-year-old  male who is slender, in no acute distress today   Pulmonary:      Effort: Pulmonary effort is normal.      Breath sounds: Normal breath sounds.   Cardiovascular:      Normal rate. Regular rhythm.      Murmurs: There is no murmur.   Pulses:     Radial: 2+ bilaterally.     Dorsalis pedis: 2+ bilaterally.     Posterior tibial: 2+ bilaterally.  Edema:     Peripheral edema absent.   Skin:     General: Skin is warm and dry.   Neurological:      Mental Status: Alert and oriented to person, place and time.         Lab Review:      Lab Results   Component Value Date    TSH 1.490 03/06/2022       Lab Results   Component Value Date    CHOL 69 03/06/2022    CHOL 132 04/09/2021    CHOL 128 07/16/2020    CHLPL 163 07/20/2020    CHLPL 159 09/06/2018     Lab Results   Component Value Date    TRIG 247 (H) 03/06/2022    TRIG 226 (H) 01/20/2022    TRIG 78 04/09/2021     Lab Results   Component Value Date    HDL 7 (L) 03/06/2022    HDL 41 04/09/2021    HDL 37 (L) 07/20/2020     Lab Results   Component Value Date    LDL 24 03/06/2022    LDL 76 04/09/2021    LDL 97 07/20/2020       Lab Results   Component Value Date    WBC 5.41 " 09/04/2024    HGB 10.2 (L) 09/04/2024    HCT 31.1 (L) 09/04/2024    MCV 96.0 09/04/2024    PLT 85 (L) 09/04/2024       Lab Results   Component Value Date    GLUCOSE 239 (H) 09/04/2024    BUN 45 (H) 09/04/2024    CREATININE 5.17 (H) 09/04/2024    EGFR 11.9 (L) 09/04/2024    BCR 8.7 09/04/2024    K 4.6 09/04/2024    CO2 25.7 09/04/2024    CALCIUM 8.7 09/04/2024    PROTENTOTREF 7.1 11/02/2021    ALBUMIN 4.2 09/04/2024    BILITOT 0.3 09/04/2024    AST 19 09/04/2024    ALT 20 09/04/2024       Lab Results   Component Value Date    HGBA1C 6.7 (H) 05/21/2024       Assessment:     1. Coronary artery disease involving native heart without angina pectoris, unspecified vessel or lesion type    2. Hypertension, unspecified type    3. Hyperlipidemia, unspecified hyperlipidemia type    CAD status post CABG 2015.  Nonischemic stress test May 2024.  He has no angina or HF.  His EKG has changed.  Hypertension.  BP is above goal today however no medicines will be adjusted as he has hypotension with dialysis 3 times a week and is given midodrine.  Hyperlipidemia on simvastatin it was recommended to change to rosuvastatin he did not want to do that at this time.  ESLD, being evaluated for transplantation at  with Dr. Cheney.   ESRD on HD, sees Dr. Murrell.   Diabetes mellitus type 2  History of renal cell carcinoma with partial nephrectomy  History of CVA.    Plan:   No medication changes were made  Reviewed EKG changes with Dr. Quinonez.  An echocardiogram is recommended.  Return in about 3 months (around 1/24/2025) for Farida Linder.        Thank you for allowing me to participate in this patient's care. Please call with any questions or concerns.          Dragon dictation device was utilized in this note.

## 2024-11-04 DIAGNOSIS — E11.9 TYPE 2 DIABETES MELLITUS WITHOUT COMPLICATION, WITH LONG-TERM CURRENT USE OF INSULIN: Primary | ICD-10-CM

## 2024-11-04 DIAGNOSIS — Z79.4 TYPE 2 DIABETES MELLITUS WITHOUT COMPLICATION, WITH LONG-TERM CURRENT USE OF INSULIN: Primary | ICD-10-CM

## 2024-11-04 NOTE — TELEPHONE ENCOUNTER
Rx Refill Note  Requested Prescriptions     Pending Prescriptions Disp Refills    NovoLOG FlexPen 100 UNIT/ML solution pen-injector sc pen 15 mL 2     Sig: Inject 10 Units under the skin into the appropriate area as directed 3 (Three) Times a Day With Meals.      Last office visit with prescribing clinician: 8/1/2024   Last telemedicine visit with prescribing clinician: Visit date not found   Next office visit with prescribing clinician: 11/7/2024                         Would you like a call back once the refill request has been completed: [] Yes [] No    If the office needs to give you a call back, can they leave a voicemail: [] Yes [] No    lAine Laidr MA  11/04/24, 16:05 EST

## 2024-11-05 ENCOUNTER — TELEPHONE (OUTPATIENT)
Dept: ONCOLOGY | Facility: CLINIC | Age: 64
End: 2024-11-05

## 2024-11-05 RX ORDER — INSULIN ASPART 100 [IU]/ML
10 INJECTION, SOLUTION INTRAVENOUS; SUBCUTANEOUS
Qty: 15 ML | Refills: 0 | Status: SHIPPED | OUTPATIENT
Start: 2024-11-05

## 2024-11-05 NOTE — TELEPHONE ENCOUNTER
Caller: Samantah Brown    Relationship to patient: Emergency Contact    Best call back number: [phone]    Chief complaint: RESCHEDULE    Type of visit: LAB AND FOLLOW UP     Requested date: NOT MON, WED OR FRIDAY      If rescheduling, when is the original appointment: 3-26     Additional notes:PLEASE ADVISE

## 2024-11-07 ENCOUNTER — OFFICE VISIT (OUTPATIENT)
Dept: ENDOCRINOLOGY | Age: 64
End: 2024-11-07
Payer: MEDICARE

## 2024-11-07 VITALS
SYSTOLIC BLOOD PRESSURE: 124 MMHG | BODY MASS INDEX: 22.78 KG/M2 | HEIGHT: 69 IN | WEIGHT: 153.8 LBS | OXYGEN SATURATION: 97 % | HEART RATE: 66 BPM | DIASTOLIC BLOOD PRESSURE: 82 MMHG

## 2024-11-07 DIAGNOSIS — Z79.4 TYPE 2 DIABETES MELLITUS WITH OTHER DIABETIC KIDNEY COMPLICATION, WITH LONG-TERM CURRENT USE OF INSULIN: Primary | ICD-10-CM

## 2024-11-07 DIAGNOSIS — E11.29 TYPE 2 DIABETES MELLITUS WITH OTHER DIABETIC KIDNEY COMPLICATION, WITH LONG-TERM CURRENT USE OF INSULIN: Primary | ICD-10-CM

## 2024-11-07 PROBLEM — E11.9 DIABETES MELLITUS: Status: ACTIVE | Noted: 2024-11-07

## 2024-11-07 PROBLEM — E11.9 TYPE 2 DIABETES MELLITUS WITHOUT COMPLICATION, WITH LONG-TERM CURRENT USE OF INSULIN: Status: ACTIVE | Noted: 2024-11-07

## 2024-11-07 LAB — HBA1C MFR BLD: 5.8 % (ref 4.8–5.6)

## 2024-11-07 RX ORDER — BLOOD-GLUCOSE SENSOR
EACH MISCELLANEOUS
Qty: 2 EACH | Refills: 2 | Status: SHIPPED | OUTPATIENT
Start: 2024-11-07
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
## 2024-11-18 ENCOUNTER — TELEPHONE (OUTPATIENT)
Dept: CARDIOLOGY | Facility: CLINIC | Age: 64
End: 2024-11-18
Payer: MEDICARE

## 2024-12-11 DIAGNOSIS — Z79.4 TYPE 2 DIABETES MELLITUS WITHOUT COMPLICATION, WITH LONG-TERM CURRENT USE OF INSULIN: ICD-10-CM

## 2024-12-11 DIAGNOSIS — E11.9 TYPE 2 DIABETES MELLITUS WITHOUT COMPLICATION, WITH LONG-TERM CURRENT USE OF INSULIN: ICD-10-CM

## 2024-12-11 RX ORDER — KETOROLAC TROMETHAMINE 30 MG/ML
1 INJECTION, SOLUTION INTRAMUSCULAR; INTRAVENOUS ONCE
Qty: 1 EACH | Refills: 0 | Status: SHIPPED | OUTPATIENT
Start: 2024-12-11 | End: 2024-12-11

## 2024-12-11 NOTE — TELEPHONE ENCOUNTER
Rx Refill Note  Requested Prescriptions     Pending Prescriptions Disp Refills    Continuous Glucose  (FreeStyle Triston 3 [place]) device [Pharmacy Med Name: FREESTYLE TRISTON 3 READER] 1 each 0     Sig: USE 1 UNITS 1 (ONE) TIME FOR 1 DOSE.      Last office visit with prescribing clinician: 11/7/2024   Last telemedicine visit with prescribing clinician: Visit date not found   Next office visit with prescribing clinician: 2/6/2025                         Would you like a call back once the refill request has been completed: [] Yes [] No    If the office needs to give you a call back, can they leave a voicemail: [] Yes [] No    Glenys Le MA  12/11/24, 09:58 EST

## 2025-01-31 ENCOUNTER — TELEPHONE (OUTPATIENT)
Dept: ENDOCRINOLOGY | Age: 65
End: 2025-01-31
Payer: MEDICARE

## 2025-01-31 NOTE — TELEPHONE ENCOUNTER
Insulin Aspart 100unit/ml pen is not covered by insurance.     ALTERNATIVE DRUG OPTIONS: NOVOLOG U-100 OR NOVOLOG FLEXPEN U-100

## 2025-02-06 ENCOUNTER — OFFICE VISIT (OUTPATIENT)
Dept: ENDOCRINOLOGY | Age: 65
End: 2025-02-06
Payer: MEDICARE

## 2025-02-06 VITALS
BODY MASS INDEX: 23.52 KG/M2 | OXYGEN SATURATION: 99 % | TEMPERATURE: 98.3 F | SYSTOLIC BLOOD PRESSURE: 128 MMHG | HEIGHT: 69 IN | WEIGHT: 158.8 LBS | HEART RATE: 90 BPM | DIASTOLIC BLOOD PRESSURE: 70 MMHG

## 2025-02-06 DIAGNOSIS — Z79.4 TYPE 2 DIABETES MELLITUS WITH OTHER DIABETIC KIDNEY COMPLICATION, WITH LONG-TERM CURRENT USE OF INSULIN: Primary | ICD-10-CM

## 2025-02-06 DIAGNOSIS — E11.29 TYPE 2 DIABETES MELLITUS WITH OTHER DIABETIC KIDNEY COMPLICATION, WITH LONG-TERM CURRENT USE OF INSULIN: Primary | ICD-10-CM

## 2025-02-06 LAB — HBA1C MFR BLD: 6.7 % (ref 4.8–5.6)

## 2025-02-06 RX ORDER — INSULIN ASPART 100 [IU]/ML
5 INJECTION, SOLUTION INTRAVENOUS; SUBCUTANEOUS
Qty: 15 ML | Refills: 2 | Status: SHIPPED | OUTPATIENT
Start: 2025-02-06
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
## 2025-03-11 ENCOUNTER — TELEPHONE (OUTPATIENT)
Dept: ENDOCRINOLOGY | Age: 65
End: 2025-03-11
Payer: MEDICARE

## 2025-03-11 NOTE — TELEPHONE ENCOUNTER
Letter received from [employer]iving alternatives to the insulin they will cover. Full letter under media tab.  ALTERNATIVE DRUG OPTIONS for insulin ASPART: Novolog U-100, Novolog Flexpen U-100

## 2025-04-04 ENCOUNTER — HOSPITAL ENCOUNTER (EMERGENCY)
Facility: HOSPITAL | Age: 65
Discharge: HOME | End: 2025-04-04
Attending: EMERGENCY MEDICINE
Payer: MEDICARE

## 2025-04-04 VITALS
HEART RATE: 69 BPM | OXYGEN SATURATION: 99 % | WEIGHT: 165 LBS | BODY MASS INDEX: 24.44 KG/M2 | SYSTOLIC BLOOD PRESSURE: 160 MMHG | TEMPERATURE: 96.8 F | DIASTOLIC BLOOD PRESSURE: 86 MMHG | RESPIRATION RATE: 18 BRPM | HEIGHT: 69 IN

## 2025-04-04 DIAGNOSIS — T82.898A PROBLEM WITH DIALYSIS ACCESS, INITIAL ENCOUNTER: ICD-10-CM

## 2025-04-04 DIAGNOSIS — Z99.2 END STAGE RENAL DISEASE ON DIALYSIS: Primary | ICD-10-CM

## 2025-04-04 DIAGNOSIS — N18.6 END STAGE RENAL DISEASE ON DIALYSIS: Primary | ICD-10-CM

## 2025-04-04 LAB
ALBUMIN SERPL-MCNC: 4.3 G/DL (ref 3.5–5.2)
ALBUMIN/GLOB SERPL: 1.5 G/DL
ALP SERPL-CCNC: 122 U/L (ref 39–117)
ALT SERPL W P-5'-P-CCNC: 13 U/L (ref 1–41)
ANION GAP SERPL CALCULATED.3IONS-SCNC: 14.3 MMOL/L (ref 5–15)
APTT PPP: 29.2 SECONDS (ref 22.7–35.4)
AST SERPL-CCNC: 13 U/L (ref 1–40)
BASOPHILS # BLD AUTO: 0.02 10*3/MM3 (ref 0–0.2)
BASOPHILS NFR BLD AUTO: 0.3 % (ref 0–1.5)
BILIRUB SERPL-MCNC: 0.4 MG/DL (ref 0–1.2)
BUN SERPL-MCNC: 65 MG/DL (ref 8–23)
BUN/CREAT SERPL: 8.1 (ref 7–25)
CALCIUM SPEC-SCNC: 10.3 MG/DL (ref 8.6–10.5)
CHLORIDE SERPL-SCNC: 102 MMOL/L (ref 98–107)
CO2 SERPL-SCNC: 26.7 MMOL/L (ref 22–29)
CREAT SERPL-MCNC: 8 MG/DL (ref 0.76–1.27)
DEPRECATED RDW RBC AUTO: 50.1 FL (ref 37–54)
EGFRCR SERPLBLD CKD-EPI 2021: 7 ML/MIN/1.73
EOSINOPHIL # BLD AUTO: 0.23 10*3/MM3 (ref 0–0.4)
EOSINOPHIL NFR BLD AUTO: 3.5 % (ref 0.3–6.2)
ERYTHROCYTE [DISTWIDTH] IN BLOOD BY AUTOMATED COUNT: 14.2 % (ref 12.3–15.4)
GLOBULIN UR ELPH-MCNC: 2.8 GM/DL
GLUCOSE SERPL-MCNC: 115 MG/DL (ref 65–99)
HCT VFR BLD AUTO: 32.9 % (ref 37.5–51)
HGB BLD-MCNC: 10.9 G/DL (ref 13–17.7)
IMM GRANULOCYTES # BLD AUTO: 0.03 10*3/MM3 (ref 0–0.05)
IMM GRANULOCYTES NFR BLD AUTO: 0.5 % (ref 0–0.5)
INR PPP: 1.11 (ref 0.9–1.1)
LYMPHOCYTES # BLD AUTO: 0.6 10*3/MM3 (ref 0.7–3.1)
LYMPHOCYTES NFR BLD AUTO: 9 % (ref 19.6–45.3)
MCH RBC QN AUTO: 32.2 PG (ref 26.6–33)
MCHC RBC AUTO-ENTMCNC: 33.1 G/DL (ref 31.5–35.7)
MCV RBC AUTO: 97.3 FL (ref 79–97)
MONOCYTES # BLD AUTO: 0.4 10*3/MM3 (ref 0.1–0.9)
MONOCYTES NFR BLD AUTO: 6 % (ref 5–12)
NEUTROPHILS NFR BLD AUTO: 5.36 10*3/MM3 (ref 1.7–7)
NEUTROPHILS NFR BLD AUTO: 80.7 % (ref 42.7–76)
PLATELET # BLD AUTO: 103 10*3/MM3 (ref 140–450)
PMV BLD AUTO: 10.4 FL (ref 6–12)
POTASSIUM SERPL-SCNC: 4.8 MMOL/L (ref 3.5–5.2)
PROT SERPL-MCNC: 7.1 G/DL (ref 6–8.5)
PROTHROMBIN TIME: 14.2 SECONDS (ref 11.7–14.2)
RBC # BLD AUTO: 3.38 10*6/MM3 (ref 4.14–5.8)
SODIUM SERPL-SCNC: 143 MMOL/L (ref 136–145)
WBC NRBC COR # BLD AUTO: 6.64 10*3/MM3 (ref 3.4–10.8)

## 2025-04-04 PROCEDURE — 99284 EMERGENCY DEPT VISIT MOD MDM: CPT | Performed by: SURGERY

## 2025-04-04 PROCEDURE — 80053 COMPREHEN METABOLIC PANEL: CPT | Performed by: EMERGENCY MEDICINE

## 2025-04-04 PROCEDURE — 99283 EMERGENCY DEPT VISIT LOW MDM: CPT

## 2025-04-04 PROCEDURE — 85730 THROMBOPLASTIN TIME PARTIAL: CPT | Performed by: EMERGENCY MEDICINE

## 2025-04-04 PROCEDURE — 85610 PROTHROMBIN TIME: CPT | Performed by: EMERGENCY MEDICINE

## 2025-04-04 PROCEDURE — 85025 COMPLETE CBC W/AUTO DIFF WBC: CPT | Performed by: EMERGENCY MEDICINE

## 2025-04-04 RX ORDER — SODIUM CHLORIDE 0.9 % (FLUSH) 0.9 %
10 SYRINGE (ML) INJECTION AS NEEDED
Status: DISCONTINUED | OUTPATIENT
Start: 2025-04-04 | End: 2025-04-04 | Stop reason: HOSPADM
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
## 2025-04-04 NOTE — CONSULTS
Name: Angelo Brown ADMIT: 2025   : 1960  PCP: Yadiel Baxter III, MD    MRN: [id number] LOS: 0 days   AGE/SEX: 64 y.o. male  ROOM: [facility] 36/36     Consults  CC: Bleeding  Subjective     History of Present Illness  64 y.o. male with end-stage renal disease who has a left arm access in place that was revised by my partner Dr. Torres sometime ago.  Today, the patient was less than 1 hour into his dialysis session when he describes the dialysis team holding pressure on his arm and him having a significant amount of bleeding.  This was not at the time of decannulation.  They held pressure and applied dressings but because of the amount of blood loss referred him to the emergency department for evaluation.      Review of Systems    History Review Reviewed Comments   Past Medical History:  [x]  ESRD   Past Surgical History: [x]     Family History: [x]     Social History: [x]       Scheduled Meds:        IV Meds:        Allergies: Fentanyl, Lipitor [atorvastatin calcium], Morphine, Clonidine derivatives, Egg-derived products, Gabapentin, Adhesive tape, Ibuprofen, Penicillins, and Wound dressing adhesive    Objective   Temp:  [96.8 °F (36 °C)] 96.8 °F (36 °C)  Heart Rate:  [65-92] 65  Resp:  [18-20] 18  BP: (148-153)/(81-88) 153/81    No intake/output data recorded.    Physical Exam  Thin body habitus.  Palpable thrill over the access.  All of the dressings were removed.  Skin is intact without any evidence of bleeding.  Neither site is bleeding.  No hematoma.    Data Reviewed:  CBC    Results from last 7 days   Lab Units 25  1258   WBC 10*3/mm3 6.64   HEMOGLOBIN g/dL 10.9*   PLATELETS 10*3/mm3 103*     BMP   Results from last 7 days   Lab Units 25  1258   SODIUM mmol/L 143   POTASSIUM mmol/L 4.8   CHLORIDE mmol/L 102   CO2 mmol/L 26.7   BUN mg/dL 65*   CREATININE mg/dL 8.00*   GLUCOSE mg/dL 115*       Labs significant for: Hemoglobin 10.9.  BUN 65.  Creatinine 8    Imaging  Studies:  I reviewed a fistulogram from September 2024 that showed a left arm brachial to axillary vein graft with a Filipe venous anastomotic angioplasty for severe outflow stenosis   Active Hospital Problems    Diagnosis  POA    ESRD (end stage renal disease) on dialysis [N18.6, Z99.2]  Not Applicable      Resolved Hospital Problems   No resolved problems to display.       Data Points:  During this visit the following were done:  Labs Reviewed [x]  []  []  Labs Ordered []  []  []  Radiology Reports Reviewed [x]    Radiology Ordered []    EKG, echo, and/or stress test reviewed []    Vascular lab results reviewed  []    Vascular lab images reviewed and interpreted per myself   []    Referring Provider Records Reviewed []    ER Records Reviewed []    Hospital Records Reviewed/Summarized [x]    History Obtained From Family []    Radiological images view and Interpreted per myself []    Case Discussed with referring provider []     Decision to obtain and request outside records  []        Active Hospital Problems:   Active Hospital Problems    Diagnosis  POA    ESRD (end stage renal disease) on dialysis [N18.6, Z99.2]  Not Applicable      Resolved Hospital Problems   No resolved problems to display.      Assessment & Plan   Assessment / Plan     64 y.o. male gentleman who we have seen in the past for dialysis access.  When combining the patient's story and the nursing story from the dialysis center I suspect that a needle became dislodged and the dialysis team held pressure on this quickly in order to control the bleeding.  The patient likely became aware woke up and was confused and described as combative for this reason by the team.  I went ahead and took down all the dressings.  There is no evidence of bleeding.  No hematoma.  Everything on his arm looks fine other than you can tell that he did lose a significant amount of blood before they were able to apply pressure because his shirt is saturated.  Hemoglobin in  the emergency department is over 10.  Skin is nice and intact without any evidence of scabs, ulcers, infections, and no evidence of a pseudoaneurysm.    I think he had a inadvertent decannulation episode of bleeding externally but without evidence of pseudoaneurysm or hematoma.  I think his access can be used immediately.  I do not think further imaging is required at this time.  Discussed with Dr. Driver in the emergency department.    I discussed the patients findings and my recommendations with patient, nursing staff, and consulting provider.  Please call my office with any question: [phone]
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
## 2025-04-04 NOTE — DISCHARGE INSTRUCTIONS
Go to your next dialysis session on Monday as directed at the new facility.  Please return to the emergency room for any worsening symptoms or concerns.

## 2025-04-10 ENCOUNTER — HOSPITAL ENCOUNTER (OUTPATIENT)
Facility: HOSPITAL | Age: 65
Setting detail: OBSERVATION
Discharge: HOME | End: 2025-04-12
Attending: EMERGENCY MEDICINE | Admitting: HOSPITALIST
Payer: MEDICARE

## 2025-04-10 ENCOUNTER — APPOINTMENT (OUTPATIENT)
Dept: GENERAL RADIOLOGY | Facility: HOSPITAL | Age: 65
End: 2025-04-10
Payer: MEDICARE

## 2025-04-10 DIAGNOSIS — T82.898A OCCLUSION OF ARTERIOVENOUS DIALYSIS GRAFT: Primary | ICD-10-CM

## 2025-04-10 DIAGNOSIS — T82.868A: ICD-10-CM

## 2025-04-10 DIAGNOSIS — N18.6 ESRD (END STAGE RENAL DISEASE) ON DIALYSIS: ICD-10-CM

## 2025-04-10 DIAGNOSIS — E87.5 HYPERKALEMIA: ICD-10-CM

## 2025-04-10 DIAGNOSIS — Z99.2 END STAGE RENAL DISEASE ON DIALYSIS: ICD-10-CM

## 2025-04-10 DIAGNOSIS — Z99.2 ESRD (END STAGE RENAL DISEASE) ON DIALYSIS: ICD-10-CM

## 2025-04-10 DIAGNOSIS — N18.6 END STAGE RENAL DISEASE ON DIALYSIS: ICD-10-CM

## 2025-04-10 LAB
ALBUMIN SERPL-MCNC: 4.4 G/DL (ref 3.5–5.2)
ALBUMIN/GLOB SERPL: 1.4 G/DL
ALP SERPL-CCNC: 132 U/L (ref 39–117)
ALT SERPL W P-5'-P-CCNC: 17 U/L (ref 1–41)
ANION GAP SERPL CALCULATED.3IONS-SCNC: 14.2 MMOL/L (ref 5–15)
ANION GAP SERPL CALCULATED.3IONS-SCNC: 16 MMOL/L (ref 5–15)
AST SERPL-CCNC: 14 U/L (ref 1–40)
BASOPHILS # BLD MANUAL: 0.05 10*3/MM3 (ref 0–0.2)
BASOPHILS NFR BLD MANUAL: 1 % (ref 0–1.5)
BILIRUB SERPL-MCNC: 0.4 MG/DL (ref 0–1.2)
BUN SERPL-MCNC: 63 MG/DL (ref 8–23)
BUN SERPL-MCNC: 70 MG/DL (ref 8–23)
BUN/CREAT SERPL: 7.9 (ref 7–25)
BUN/CREAT SERPL: 8.5 (ref 7–25)
CALCIUM SPEC-SCNC: 10.1 MG/DL (ref 8.6–10.5)
CALCIUM SPEC-SCNC: 10.3 MG/DL (ref 8.6–10.5)
CHLORIDE SERPL-SCNC: 97 MMOL/L (ref 98–107)
CHLORIDE SERPL-SCNC: 97 MMOL/L (ref 98–107)
CO2 SERPL-SCNC: 24.8 MMOL/L (ref 22–29)
CO2 SERPL-SCNC: 25 MMOL/L (ref 22–29)
CREAT SERPL-MCNC: 8.01 MG/DL (ref 0.76–1.27)
CREAT SERPL-MCNC: 8.23 MG/DL (ref 0.76–1.27)
DEPRECATED RDW RBC AUTO: 49.1 FL (ref 37–54)
EGFRCR SERPLBLD CKD-EPI 2021: 6.8 ML/MIN/1.73
EGFRCR SERPLBLD CKD-EPI 2021: 7 ML/MIN/1.73
EOSINOPHIL # BLD MANUAL: 0.15 10*3/MM3 (ref 0–0.4)
EOSINOPHIL NFR BLD MANUAL: 3.1 % (ref 0.3–6.2)
ERYTHROCYTE [DISTWIDTH] IN BLOOD BY AUTOMATED COUNT: 13.8 % (ref 12.3–15.4)
GLOBULIN UR ELPH-MCNC: 3.1 GM/DL
GLUCOSE BLDC GLUCOMTR-MCNC: 210 MG/DL (ref 70–130)
GLUCOSE BLDC GLUCOMTR-MCNC: 260 MG/DL (ref 70–130)
GLUCOSE BLDC GLUCOMTR-MCNC: 292 MG/DL (ref 70–130)
GLUCOSE SERPL-MCNC: 164 MG/DL (ref 65–99)
GLUCOSE SERPL-MCNC: 175 MG/DL (ref 65–99)
HCT VFR BLD AUTO: 32.3 % (ref 37.5–51)
HGB BLD-MCNC: 10.6 G/DL (ref 13–17.7)
HYPOCHROMIA BLD QL: ABNORMAL
INR PPP: 1.1 (ref 0.9–1.1)
LYMPHOCYTES # BLD MANUAL: 0.76 10*3/MM3 (ref 0.7–3.1)
LYMPHOCYTES NFR BLD MANUAL: 9.2 % (ref 5–12)
MCH RBC QN AUTO: 31.9 PG (ref 26.6–33)
MCHC RBC AUTO-ENTMCNC: 32.8 G/DL (ref 31.5–35.7)
MCV RBC AUTO: 97.3 FL (ref 79–97)
MONOCYTES # BLD: 0.46 10*3/MM3 (ref 0.1–0.9)
NEUTROPHILS # BLD AUTO: 3.54 10*3/MM3 (ref 1.7–7)
NEUTROPHILS NFR BLD MANUAL: 71.4 % (ref 42.7–76)
PLAT MORPH BLD: NORMAL
PLATELET # BLD AUTO: 93 10*3/MM3 (ref 140–450)
PMV BLD AUTO: 11.2 FL (ref 6–12)
POTASSIUM SERPL-SCNC: 5.6 MMOL/L (ref 3.5–5.2)
POTASSIUM SERPL-SCNC: 5.8 MMOL/L (ref 3.5–5.2)
PROT SERPL-MCNC: 7.5 G/DL (ref 6–8.5)
PROTHROMBIN TIME: 14.1 SECONDS (ref 11.7–14.2)
QT INTERVAL: 447 MS
QTC INTERVAL: 438 MS
RBC # BLD AUTO: 3.32 10*6/MM3 (ref 4.14–5.8)
SODIUM SERPL-SCNC: 136 MMOL/L (ref 136–145)
SODIUM SERPL-SCNC: 138 MMOL/L (ref 136–145)
VARIANT LYMPHS NFR BLD MANUAL: 15.3 % (ref 19.6–45.3)
WBC MORPH BLD: NORMAL
WBC NRBC COR # BLD AUTO: 4.96 10*3/MM3 (ref 3.4–10.8)

## 2025-04-10 PROCEDURE — 76937 US GUIDE VASCULAR ACCESS: CPT

## 2025-04-10 PROCEDURE — 36556 INSERT NON-TUNNEL CV CATH: CPT | Performed by: STUDENT IN AN ORGANIZED HEALTH CARE EDUCATION/TRAINING PROGRAM

## 2025-04-10 PROCEDURE — 77001 FLUOROGUIDE FOR VEIN DEVICE: CPT

## 2025-04-10 PROCEDURE — 77001 FLUOROGUIDE FOR VEIN DEVICE: CPT | Performed by: STUDENT IN AN ORGANIZED HEALTH CARE EDUCATION/TRAINING PROGRAM

## 2025-04-10 PROCEDURE — 85610 PROTHROMBIN TIME: CPT | Performed by: NURSE PRACTITIONER

## 2025-04-10 PROCEDURE — 99214 OFFICE O/P EST MOD 30 MIN: CPT | Performed by: STUDENT IN AN ORGANIZED HEALTH CARE EDUCATION/TRAINING PROGRAM

## 2025-04-10 PROCEDURE — 93010 ELECTROCARDIOGRAM REPORT: CPT | Performed by: INTERNAL MEDICINE

## 2025-04-10 PROCEDURE — G0378 HOSPITAL OBSERVATION PER HR: HCPCS

## 2025-04-10 PROCEDURE — 63710000001 INSULIN REGULAR HUMAN PER 5 UNITS: Performed by: HOSPITALIST

## 2025-04-10 PROCEDURE — 85007 BL SMEAR W/DIFF WBC COUNT: CPT | Performed by: NURSE PRACTITIONER

## 2025-04-10 PROCEDURE — 93005 ELECTROCARDIOGRAM TRACING: CPT | Performed by: NURSE PRACTITIONER

## 2025-04-10 PROCEDURE — 99285 EMERGENCY DEPT VISIT HI MDM: CPT

## 2025-04-10 PROCEDURE — 25010000002 LIDOCAINE 1 % SOLUTION: Performed by: STUDENT IN AN ORGANIZED HEALTH CARE EDUCATION/TRAINING PROGRAM

## 2025-04-10 PROCEDURE — 85025 COMPLETE CBC W/AUTO DIFF WBC: CPT | Performed by: NURSE PRACTITIONER

## 2025-04-10 PROCEDURE — 63710000001 INSULIN GLARGINE PER 5 UNITS: Performed by: HOSPITALIST

## 2025-04-10 PROCEDURE — G0257 UNSCHED DIALYSIS ESRD PT HOS: HCPCS

## 2025-04-10 PROCEDURE — 25010000002 HEPARIN (PORCINE) PER 1000 UNITS: Performed by: STUDENT IN AN ORGANIZED HEALTH CARE EDUCATION/TRAINING PROGRAM

## 2025-04-10 PROCEDURE — 82948 REAGENT STRIP/BLOOD GLUCOSE: CPT

## 2025-04-10 PROCEDURE — 76937 US GUIDE VASCULAR ACCESS: CPT | Performed by: STUDENT IN AN ORGANIZED HEALTH CARE EDUCATION/TRAINING PROGRAM

## 2025-04-10 PROCEDURE — 80053 COMPREHEN METABOLIC PANEL: CPT | Performed by: NURSE PRACTITIONER

## 2025-04-10 PROCEDURE — 71045 X-RAY EXAM CHEST 1 VIEW: CPT

## 2025-04-10 PROCEDURE — 36415 COLL VENOUS BLD VENIPUNCTURE: CPT | Performed by: NURSE PRACTITIONER

## 2025-04-10 PROCEDURE — 99291 CRITICAL CARE FIRST HOUR: CPT

## 2025-04-10 RX ORDER — HEPARIN SODIUM 1000 [USP'U]/ML
5000 INJECTION, SOLUTION INTRAVENOUS; SUBCUTANEOUS ONCE
Status: COMPLETED | OUTPATIENT
Start: 2025-04-10 | End: 2025-04-10

## 2025-04-10 RX ORDER — NICOTINE POLACRILEX 4 MG
15 LOZENGE BUCCAL
Status: DISCONTINUED | OUTPATIENT
Start: 2025-04-10 | End: 2025-04-12

## 2025-04-10 RX ORDER — PANTOPRAZOLE SODIUM 40 MG/1
40 TABLET, DELAYED RELEASE ORAL
Status: DISCONTINUED | OUTPATIENT
Start: 2025-04-10 | End: 2025-04-12 | Stop reason: HOSPADM

## 2025-04-10 RX ORDER — CALCIUM GLUCONATE 20 MG/ML
1000 INJECTION, SOLUTION INTRAVENOUS ONCE
Status: DISCONTINUED | OUTPATIENT
Start: 2025-04-10 | End: 2025-04-10

## 2025-04-10 RX ORDER — CHOLECALCIFEROL (VITAMIN D3) 25 MCG
1000 TABLET ORAL DAILY
Status: DISCONTINUED | OUTPATIENT
Start: 2025-04-10 | End: 2025-04-12 | Stop reason: HOSPADM

## 2025-04-10 RX ORDER — CALCIUM ACETATE 667 MG/1
667 CAPSULE ORAL DAILY
Status: DISCONTINUED | OUTPATIENT
Start: 2025-04-10 | End: 2025-04-12 | Stop reason: HOSPADM

## 2025-04-10 RX ORDER — ASCORBIC ACID 500 MG
500 TABLET ORAL DAILY
Status: DISCONTINUED | OUTPATIENT
Start: 2025-04-10 | End: 2025-04-12 | Stop reason: HOSPADM

## 2025-04-10 RX ORDER — ATORVASTATIN CALCIUM 20 MG/1
20 TABLET, FILM COATED ORAL NIGHTLY
Status: DISCONTINUED | OUTPATIENT
Start: 2025-04-11 | End: 2025-04-12 | Stop reason: HOSPADM

## 2025-04-10 RX ORDER — LIDOCAINE HYDROCHLORIDE 10 MG/ML
20 INJECTION, SOLUTION INFILTRATION; PERINEURAL ONCE
Status: DISCONTINUED | OUTPATIENT
Start: 2025-04-10 | End: 2025-04-12 | Stop reason: HOSPADM

## 2025-04-10 RX ORDER — LIDOCAINE HYDROCHLORIDE 10 MG/ML
10 INJECTION, SOLUTION INFILTRATION; PERINEURAL ONCE
Status: COMPLETED | OUTPATIENT
Start: 2025-04-10 | End: 2025-04-10

## 2025-04-10 RX ORDER — HEPARIN SODIUM 1000 [USP'U]/ML
5000 INJECTION, SOLUTION INTRAVENOUS; SUBCUTANEOUS ONCE
Status: DISCONTINUED | OUTPATIENT
Start: 2025-04-10 | End: 2025-04-10

## 2025-04-10 RX ORDER — OXYCODONE AND ACETAMINOPHEN 5; 325 MG/1; MG/1
1 TABLET ORAL EVERY 4 HOURS PRN
Refills: 0 | Status: DISCONTINUED | OUTPATIENT
Start: 2025-04-10 | End: 2025-04-11

## 2025-04-10 RX ORDER — SODIUM CHLORIDE 0.9 % (FLUSH) 0.9 %
10 SYRINGE (ML) INJECTION AS NEEDED
Status: DISCONTINUED | OUTPATIENT
Start: 2025-04-10 | End: 2025-04-12 | Stop reason: HOSPADM

## 2025-04-10 RX ORDER — FUROSEMIDE 10 MG/ML
20 INJECTION INTRAMUSCULAR; INTRAVENOUS ONCE
Status: DISCONTINUED | OUTPATIENT
Start: 2025-04-10 | End: 2025-04-10

## 2025-04-10 RX ORDER — INSULIN GLARGINE 100 [IU]/ML
10 INJECTION, SOLUTION SUBCUTANEOUS NIGHTLY
Status: DISCONTINUED | OUTPATIENT
Start: 2025-04-10 | End: 2025-04-12 | Stop reason: HOSPADM

## 2025-04-10 RX ORDER — HEPARIN SODIUM 1000 [USP'U]/ML
4000 INJECTION, SOLUTION INTRAVENOUS; SUBCUTANEOUS AS NEEDED
Status: DISCONTINUED | OUTPATIENT
Start: 2025-04-10 | End: 2025-04-12

## 2025-04-10 RX ORDER — HEPARIN SODIUM 5000 [USP'U]/ML
5000 INJECTION, SOLUTION INTRAVENOUS; SUBCUTANEOUS ONCE
Status: DISCONTINUED | OUTPATIENT
Start: 2025-04-10 | End: 2025-04-10

## 2025-04-10 RX ORDER — ATORVASTATIN CALCIUM 20 MG/1
20 TABLET, FILM COATED ORAL DAILY
Status: DISCONTINUED | OUTPATIENT
Start: 2025-04-10 | End: 2025-04-10

## 2025-04-10 RX ORDER — LIDOCAINE HYDROCHLORIDE 10 MG/ML
10 INJECTION, SOLUTION INFILTRATION; PERINEURAL ONCE
Status: DISCONTINUED | OUTPATIENT
Start: 2025-04-10 | End: 2025-04-10

## 2025-04-10 RX ORDER — MULTIVITAMIN WITH IRON
1000 TABLET ORAL DAILY
Status: DISCONTINUED | OUTPATIENT
Start: 2025-04-10 | End: 2025-04-12 | Stop reason: HOSPADM

## 2025-04-10 RX ORDER — IBUPROFEN 600 MG/1
1 TABLET ORAL
Status: DISCONTINUED | OUTPATIENT
Start: 2025-04-10 | End: 2025-04-12

## 2025-04-10 RX ORDER — DEXTROSE MONOHYDRATE 25 G/50ML
25 INJECTION, SOLUTION INTRAVENOUS
Status: DISCONTINUED | OUTPATIENT
Start: 2025-04-10 | End: 2025-04-12

## 2025-04-10 RX ORDER — CETIRIZINE HYDROCHLORIDE 10 MG/1
10 TABLET ORAL DAILY
Status: DISCONTINUED | OUTPATIENT
Start: 2025-04-10 | End: 2025-04-12 | Stop reason: HOSPADM

## 2025-04-10 RX ORDER — DEXTROSE MONOHYDRATE 25 G/50ML
25 INJECTION, SOLUTION INTRAVENOUS ONCE
Status: DISCONTINUED | OUTPATIENT
Start: 2025-04-10 | End: 2025-04-10

## 2025-04-10 RX ORDER — ALPRAZOLAM 0.25 MG
0.25 TABLET ORAL 4 TIMES DAILY PRN
Status: DISCONTINUED | OUTPATIENT
Start: 2025-04-10 | End: 2025-04-12

## 2025-04-10 RX ADMIN — INSULIN HUMAN 4 UNITS: 100 INJECTION, SOLUTION PARENTERAL at 21:44

## 2025-04-10 RX ADMIN — CETIRIZINE HYDROCHLORIDE 10 MG: 10 TABLET ORAL at 14:06

## 2025-04-10 RX ADMIN — OXYCODONE AND ACETAMINOPHEN 1 TABLET: 5; 325 TABLET ORAL at 14:06

## 2025-04-10 RX ADMIN — INSULIN HUMAN 3 UNITS: 100 INJECTION, SOLUTION PARENTERAL at 13:37

## 2025-04-10 RX ADMIN — INSULIN GLARGINE 10 UNITS: 100 INJECTION, SOLUTION SUBCUTANEOUS at 21:44

## 2025-04-10 RX ADMIN — ALPRAZOLAM 0.25 MG: 0.25 TABLET ORAL at 20:30

## 2025-04-10 RX ADMIN — Medication 1000 MCG: at 14:06

## 2025-04-10 RX ADMIN — HEPARIN SODIUM 5000 UNITS: 1000 INJECTION, SOLUTION INTRAVENOUS; SUBCUTANEOUS at 12:20

## 2025-04-10 RX ADMIN — LIDOCAINE HYDROCHLORIDE 8 ML: 10 INJECTION, SOLUTION INFILTRATION; PERINEURAL at 12:02

## 2025-04-10 RX ADMIN — Medication 1000 UNITS: at 14:08

## 2025-04-10 RX ADMIN — OXYCODONE AND ACETAMINOPHEN 1 TABLET: 5; 325 TABLET ORAL at 21:44

## 2025-04-10 RX ADMIN — OXYCODONE HYDROCHLORIDE AND ACETAMINOPHEN 500 MG: 500 TABLET ORAL at 14:06

## 2025-04-10 NOTE — PROGRESS NOTES
Clinical Pharmacy Services: Medication History    Angelo Brown is a 64 y.o. male presenting to [facility] for   Chief Complaint   Patient presents with    Vascular Access Problem              He  has a past medical history of Acute CVA (cerebrovascular accident) (12/20/2017), Acute kidney failure with tubular necrosis (07/27/2021), Acute kidney failure, unspecified, Acute metabolic encephalopathy (07/14/2020), Acute renal failure superimposed on chronic kidney disease (10/21/2019), Acute renal failure with acute tubular necrosis superimposed on stage 4 chronic kidney disease (04/08/2021), Adverse effect of iron (02/18/2021), Anemia, Anemia in chronic kidney disease (03/05/2018), Anemia of chronic renal failure, stage 3 (moderate) (03/05/2019), Anxiety, Arthritis, Atherosclerotic heart disease of native coronary artery without angina pectoris, B12 deficiency (03/14/2018), Breakdown (mechanical) of vascular dialysis catheter, initial encounter, CAD (coronary artery disease), CAD (coronary artery disease) (04/28/2021), Cancer, Cancer of kidney parenchyma, right (07/31/2018), Carpal tunnel syndrome, Cerebral infarction, unspecified, CHF (congestive heart failure), Chronic back pain, Chronic kidney disease, stage 4 (severe), Chronic kidney disease, stage 5 (07/27/2021), Chronic kidney disease, stage III (moderate) (03/05/2019), Chronic kidney disease, stage III (moderate), Chronic kidney disease, unspecified, Complication of vascular access for dialysis (04/28/2021), Compulsive skin picking, Coronary artery disease, Dependence on renal dialysis, Depression, Diabetes mellitus, Dialysis patient, Elevated cholesterol, End stage renal disease, ESRD (end stage renal disease) (04/28/2021), ESRD (end stage renal disease) on dialysis, Essential (primary) hypertension, Eyes sensitive to light, bilateral, GERD (gastroesophageal reflux disease), GERD (gastroesophageal reflux disease) (04/28/2021), Headache,  Hemodialysis catheter dysfunction (04/28/2021), Hepatitis C (2013), History of COVID-19, History of CVA (cerebrovascular accident) (04/28/2021), History of hepatitis C virus infection (03/05/2018), History of MRSA infection (2011), History of transfusion, History of venomous spider bite (06/2011), HLD (hyperlipidemia) (04/28/2021), HTN (hypertension) (04/28/2021), Hyperkalemia, Hypertension, Iron deficiency anemia (02/18/2021), Jaundice, Long term (current) use of insulin, Malignant neoplasm of right kidney, except renal pelvis, Metabolic encephalopathy, Myocardial infarction, One eye: profound vision impairment, Pancytopenia, acquired (03/05/2018), Paroxysmal A-fib, Personal history of other infectious and parasitic diseases, Proteinuria (12/24/2017), Renal agenesis, unilateral, Seizure (01/2022), Solitary kidney, acquired (10/22/2019), Stroke (12/2017), Thrombocytopenia (03/05/2018), Thrombocytopenia, unspecified, Toxic metabolic encephalopathy (10/22/2019), Type 2 diabetes mellitus with hyperglycemia, Type 2 diabetes mellitus with hyperglycemia, without long-term current use of insulin (04/28/2021), Umbilical hernia without obstruction and without gangrene (03/05/2019), and Unspecified complication of cardiac and vascular prosthetic device, implant and graft, initial encounter.    Allergies as of 04/10/2025 - Reviewed 04/10/2025   Allergen Reaction Noted    Fentanyl Hallucinations and Unknown - High Severity 01/26/2022    Lipitor [atorvastatin calcium] Shortness Of Breath 02/23/2021    Morphine Delirium 01/26/2022    Clonidine derivatives Hives and Swelling 01/31/2023    Egg-derived products Nausea And Vomiting 01/27/2022    Gabapentin Mental Status Change 01/26/2022    Adhesive tape Rash 10/07/2021    Ibuprofen Nausea Only 01/26/2022    Penicillins Hives 01/26/2022    Wound dressing adhesive Other (See Comments) 03/20/2024       Medication information was obtained from: Patient   Pharmacy and Phone Number:      Prior to Admission Medications       Prescriptions Last Dose Informant Patient Reported? Taking?    ALPRAZolam (XANAX) 1 MG tablet 4/9/2025 Spouse/Significant Other, Pharmacy Yes Yes    Take 1 tablet by mouth 4 (Four) Times a Day As Needed.    ascorbic acid (VITAMIN C) 500 MG tablet 4/9/2025 Spouse/Significant Other Yes Yes    Take 1 tablet by mouth 2 (Two) Times a Day.    calcium acetate (PHOS BINDER,) 667 MG capsule capsule 4/9/2025 Self Yes Yes    Take 1 capsule by mouth Daily.    cetirizine (zyrTEC) 10 MG tablet  Spouse/Significant Other Yes Yes    Take 1 tablet by mouth Daily.    epoetin real-epbx (RETACRIT) 17790 UNIT/ML injection  Spouse/Significant Other No Yes    Inject 1 mL under the skin into the appropriate area as directed 3 (Three) Times a Week. Indications: ESRD on Dialysis    Patient taking differently:  Inject 1 mL under the skin into the appropriate area as directed 3 (Three) Times a Week.    Ferric Citrate (Auryxia) 1  MG(Fe) tablet  Self Yes Yes    Take 1 tablet by mouth Take As Directed.    insulin aspart (NovoLOG FlexPen) 100 UNIT/ML solution pen-injector sc pen 4/9/2025 Pharmacy, Self No Yes    Inject 5 Units under the skin into the appropriate area as directed 3 (Three) Times a Day With Meals.    Patient taking differently:  Inject 5 Units under the skin into the appropriate area as directed 3 (Three) Times a Day With Meals. With Sliding Scale    insulin glargine (LANTUS, SEMGLEE) 100 UNIT/ML injection 4/9/2025 Pharmacy No Yes    Inject 10 Units under the skin into the appropriate area as directed Every Night. Wife adjusts depending on what sugars are and if he is eating    Melatonin 1 MG capsule  Spouse/Significant Other, Self Yes Yes    Take 1 mg by mouth Every Night.    oxyCODONE-acetaminophen (PERCOCET)  MG per tablet 4/9/2025 Spouse/Significant Other, Pharmacy Yes Yes    Take 1 tablet by mouth Every 6 (Six) Hours As Needed.    pantoprazole (PROTONIX) 40 MG EC tablet   Spouse/Significant Other, Pharmacy Yes Yes    Take 1 tablet by mouth Daily.    simvastatin (ZOCOR) 40 MG tablet 4/9/2025 Pharmacy, Self No Yes    Take 1 tablet by mouth Every Night.    sodium zirconium cyclosilicate (LOKELMA) 5 g packet Past Week Pharmacy, Self Yes Yes    Take 5 g by mouth As Needed.    TiZANidine (ZANAFLEX) 4 MG capsule 4/9/2025 Spouse/Significant Other, Pharmacy Yes Yes    Take 1 capsule by mouth Every 8 (Eight) Hours As Needed.    vitamin B-12 (CYANOCOBALAMIN) 1000 MCG tablet 4/9/2025 Spouse/Significant Other Yes Yes    Take 1 tablet by mouth Daily.    VITAMIN D PO Past Month Self Yes Yes    Take 1 dose by mouth Daily.    nitroglycerin (NITROSTAT) 0.4 MG SL tablet  Spouse/Significant Other Yes No    Place 1 tablet under the tongue Every 5 (Five) Minutes As Needed for Chest Pain. Take no more than 3 doses in 15 minutes.              Medication notes:     This medication list is complete to the best of my knowledge as of 4/10/2025    Please call if questions.    Carlos Mahoney  Medication History Technician  [phone]    4/10/2025 10:28 EDT

## 2025-04-10 NOTE — NURSING NOTE
hd without incident or c/o. tolerated well. removed 2.5 L no meds administered. franchesca ceron, haley. stable post completion of hd.

## 2025-04-10 NOTE — OP NOTE
Date of Admission:  4/10/2025  Today's Date:  04/10/25  Berto Torres II, MD  [facility]    Procedure Note  Non-tunneled central venous catheter placement for hemodialysis    Pre-op Diagnosis:   End stage renal disease    Post-op Diagnosis:     Same     Procedure:    1) Insertion of non-tunneled central venous catheter   2) Ultrasound-guided vascular access  3) fluoroscopic guidance for placement of non-tunneled central venous catheter.     Surgeon: Berto Torres II, MD    Assistant:  None    Anesthesia:   lidocaine 1% without epinephrine    Estimated Blood Loss:   Minimal    Complications:  None    Findings:   Successful placement of non tunneled dialysis catheter    Indications:    The patient is an 64 y.o. male referred for acute dialysis catheter placement secondary to End stage renal disease.  Written consent was obtained.  The risks, benefits, and turns were also discussed.  The patient was identified via the arm band and hospital assigned identification number.  Immediately prior to the procedure a timeout was called to verify the correct patient, procedure, equipment, support staff and the site/side was marked as required.       Procedure:  Site: Right internal jugular vein  Preparation: skin prepped with 2% chlorhexidine  Skin prep agent dried: skin prep agent completely dried prior to procedure  Sterile barriers: all five maximum sterile barriers used - cap, mask, sterile gown, sterile gloves, and large sterile sheet  Hand hygiene: hand hygiene performed prior to central venous catheter insertion  Patient position: Flat  Catheter type: double lumen with pigtail  Catheter size: 14 Fr 16 cm in the Right internal jugular vein  Ultrasound guidance: yes, with photographic documentation recorded in the chart  Fluoroscopic guidance: Yes, of wire placement and central line placement.   Number of attempts: 1  Successful placement: yes  Post-procedure: line sutured and dressing applied  Assessment: blood return  through all ports and was locked with concentrated heparin (1000units/cc)  Patient tolerated the procedure well with no immediate complications    Berto Torres II, MD     Date: 4/10/2025  Time: 13:13 EDT    Active Hospital Problems    Diagnosis  POA    **Occlusion of arteriovenous dialysis graft [T82.898A]  Yes      Resolved Hospital Problems   No resolved problems to display.
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
## 2025-04-10 NOTE — ED NOTES
"AOX4  ambulates without assistance  dialysis tu-th-sat  Left upper arm dialysis shunt   20 RAC  Nursing report ED to floor  Angelo Brown  64 y.o.  male    HPI :  HPI  Stated Reason for Visit: clotted off shunt    Chief Complaint  Chief Complaint   Patient presents with    Vascular Access Problem              Admitting doctor:   Karl Swift MD    Admitting diagnosis:   The primary encounter diagnosis was Occlusion of arteriovenous dialysis graft. Diagnoses of End stage renal disease on dialysis and Hyperkalemia were also pertinent to this visit.    Code status:   Current Code Status       Date Active Code Status Order ID Comments User Context       Prior            Allergies:   Fentanyl, Lipitor [atorvastatin calcium], Morphine, Clonidine derivatives, Egg-derived products, Gabapentin, Adhesive tape, Ibuprofen, Penicillins, and Wound dressing adhesive    Isolation:   No active isolations    Intake and Output  No intake or output data in the 24 hours ending 04/10/25 1024    Weight:       04/10/25  0833   Weight: 74.8 kg (165 lb)       Most recent vitals:   Vitals:    04/10/25 0827 04/10/25 0829 04/10/25 0833   BP: 102/65     Patient Position: Lying     Pulse: 65     Resp: 16     Temp:  96.9 °F (36.1 °C)    TempSrc:  Tympanic    SpO2: 97%     Weight:   74.8 kg (165 lb)   Height:   175.3 cm (69\")       Active LDAs/IV Access:   Lines, Drains & Airways       Active LDAs       Name Placement date Placement time Site Days    Peripheral IV 04/10/25 0841 Right Antecubital 04/10/25  0841  Antecubital  less than 1                    Labs (abnormal labs have a star):   Labs Reviewed   COMPREHENSIVE METABOLIC PANEL - Abnormal; Notable for the following components:       Result Value    Glucose 164 (*)     BUN 63 (*)     Creatinine 8.01 (*)     Potassium 5.6 (*)     Chloride 97 (*)     Alkaline Phosphatase 132 (*)     eGFR 7.0 (*)     All other components within normal limits    Narrative:     GFR Categories in Chronic Kidney " Disease (CKD)      GFR Category          GFR (mL/min/1.73)    Interpretation  G1                     90 or greater         Normal or high (1)  G2                      60-89                Mild decrease (1)  G3a                   45-59                Mild to moderate decrease  G3b                   30-44                Moderate to severe decrease  G4                    15-29                Severe decrease  G5                    14 or less           Kidney failure          (1)In the absence of evidence of kidney disease, neither GFR category G1 or G2 fulfill the criteria for CKD.    eGFR calculation 2021 CKD-EPI creatinine equation, which does not include race as a factor   CBC WITH AUTO DIFFERENTIAL - Abnormal; Notable for the following components:    RBC 3.32 (*)     Hemoglobin 10.6 (*)     Hematocrit 32.3 (*)     MCV 97.3 (*)     Platelets 93 (*)     All other components within normal limits   PROTIME-INR - Normal   MANUAL DIFFERENTIAL   BASIC METABOLIC PANEL   POCT GLUCOSE FINGERSTICK   POCT GLUCOSE FINGERSTICK   POCT GLUCOSE FINGERSTICK   POCT GLUCOSE FINGERSTICK   CBC AND DIFFERENTIAL    Narrative:     The following orders were created for panel order CBC & Differential.  Procedure                               Abnormality         Status                     ---------                               -----------         ------                     CBC Auto Differential[[id number]]        Abnormal            Final result                 Please view results for these tests on the individual orders.       EKG:   ECG 12 Lead Pre-Op / Pre-Procedure   Preliminary Result   HEART RATE=58  bpm   RR Qrwljrfs=6812  ms   RI Idfejomk=948  ms   P Horizontal Axis=28  deg   P Front Axis=55  deg   QRSD Wxkgozbk=136  ms   QT Jjvrzbxj=685  ms   UBcJ=045  ms   QRS Axis=40  deg   T Wave Axis=20  deg   - BORDERLINE ECG -   Sinus rhythm   Borderline prolonged RI interval   Date and Time of Study:2025-04-10 09:13:01          Meds given in  ED:   Medications   sodium chloride 0.9 % flush 10 mL (has no administration in time range)   insulin regular (humuLIN R,novoLIN R) injection 5 Units (has no administration in time range)   dextrose (D50W) (25 g/50 mL) IV injection 25 g (has no administration in time range)   calcium gluconate 1000 Mg/50ml 0.675% NaCl IV SOLN (has no administration in time range)   furosemide (LASIX) injection 20 mg (has no administration in time range)       Imaging results:  XR Chest 1 View  Result Date: 4/10/2025  Linear likely atelectasis or scarring in the right lower lung. Follow-up as clinical indications persist.  This report was finalized on 4/10/2025 9:26 AM by Dr. Yadiel Espinal M.D on Workstation: "[employer]        Ambulatory status:   -ambulates without assistance      Social issues:   Social History     Socioeconomic History    Marital status:      Spouse name: Samantha    Years of education: High school   Tobacco Use    Smoking status: Never    Smokeless tobacco: Never    Tobacco comments:     Patient does not smoke   Vaping Use    Vaping status: Never Used   Substance and Sexual Activity    Alcohol use: Not Currently     Comment: NONE SINCE 1997 (quit); Patient is non drinker    Drug use: Not Currently     Comment: HAD A DEPENDENCY ON FENTANYL; HASN'T USED SINCE 2011; Drug Abuse: none    Sexual activity: Yes     Partners: Female       Peripheral Neurovascular  Peripheral Neurovascular (Adult)  Peripheral Neurovascular WDL: WDL    Neuro Cognitive  Neuro Cognitive (Adult)  Cognitive/Neuro/Behavioral WDL: WDL    Learning  Learning Assessment  Learning Readiness and Ability: no barriers identified    Respiratory  Respiratory WDL  Respiratory WDL: WDL    Abdominal Pain       Pain Assessments  Pain (Adult)  (0-10) Pain Rating: Rest: 0  (0-10) Pain Rating: Activity: 0    NIH Stroke Scale       Beronica Callejas RN  04/10/25 10:24 EDT

## 2025-04-10 NOTE — PLAN OF CARE
Goal Outcome Evaluation:  Plan of Care Reviewed With: patient        Progress: improving       Vital signs stable. Alert and oriented x 4. Up with assistance of one out of bed. On room air. Normal sinus cardiac rhythm. Tolerated consistent carbohydrate diet well. Blood glucose monitored AC/HS. Prn oral percocet 5 mg given for complaint of discomfort. Right IJ shiley double lumen catheter with pigtail placed today in radiology dept for dialysis access. Dressing clean, dry and intact. Left arm AV graft occluded. Thrombectomy and fistulagram ordered for tomorrow. NPO after MN. Having dialysis this afternoon as ordered. Resting quietly in no distress. Bed alarm on. Call light within patient's reach.

## 2025-04-10 NOTE — ED NOTES
Pt arrives via EMS from dialysis center. States that he went this morning and his shunt was clotted and they could not access it. Pt last had dialysis Tuesday.

## 2025-04-10 NOTE — ED PROVIDER NOTES
EMERGENCY DEPARTMENT MD ATTESTATION NOTE    Room Number:  02/02  PCP: Yadiel Baxter III, MD  Independent Historians: Patient and EMS    HPI:  A complete HPI/ROS/PMH/PSH/SH/FH are unobtainable due to: None    Chronic or social conditions impacting patient care (Social Determinants of Health): None      Context: Angelo Brown is a 64 y.o. male with a medical history of end-stage renal disease on dialysis, hypertension, hyperlipidemia who presents to the ED c/o acute problems with his dialysis fistula.  The patient reports that he last had dialysis on Tuesday.  He states that he went to dialysis today and his dialysis access was clotted.  He denies any chest pain or shortness of breath.  He was sent here for further evaluation.        Review of prior external notes (non-ED) -and- Review of prior external test results outside of this encounter:  Laboratory evaluation 4/4/2025 shows a CMP with a creatinine of 8 and a BUN of 65    Prescription drug monitoring program review:           PHYSICAL EXAM    I have reviewed the triage vital signs and nursing notes.    ED Triage Vitals   Temp Heart Rate Resp BP SpO2   04/10/25 0829 04/10/25 0827 04/10/25 0827 04/10/25 0827 04/10/25 0827   96.9 °F (36.1 °C) 65 16 102/65 97 %      Temp src Heart Rate Source Patient Position BP Location FiO2 (%)   04/10/25 0829 04/10/25 0827 04/10/25 0827 -- --   Tympanic Monitor Lying         Physical Exam  GENERAL: Awake, alert, no acute distress  SKIN: Warm, dry  HENT: Normocephalic, atraumatic  EYES: no scleral icterus  CV: regular rhythm, regular rate  RESPIRATORY: normal effort, lungs clear  ABDOMEN: soft, nontender, nondistended  MUSCULOSKELETAL: no deformity.  Left upper extremity dialysis access with no thrill.  No signs of ischemia in the left upper extremity  NEURO: alert, moves all extremities, follows commands            MEDICATIONS GIVEN IN ER  Medications   sodium chloride 0.9 % flush 10 mL (has no administration in time  range)   insulin regular (humuLIN R,novoLIN R) injection 5 Units (has no administration in time range)   dextrose (D50W) (25 g/50 mL) IV injection 25 g (has no administration in time range)   calcium gluconate 1000 Mg/50ml 0.675% NaCl IV SOLN (has no administration in time range)   furosemide (LASIX) injection 20 mg (has no administration in time range)   pantoprazole (PROTONIX) EC tablet 40 mg (has no administration in time range)   atorvastatin (LIPITOR) tablet 20 mg (has no administration in time range)   dextrose (GLUTOSE) oral gel 15 g (has no administration in time range)   dextrose (D50W) (25 g/50 mL) IV injection 25 g (has no administration in time range)   glucagon (GLUCAGEN) injection 1 mg (has no administration in time range)   insulin regular (humuLIN R,novoLIN R) injection 2-7 Units (has no administration in time range)   oxyCODONE-acetaminophen (PERCOCET) 5-325 MG per tablet 1 tablet (has no administration in time range)   ALPRAZolam (XANAX) tablet 0.25 mg (has no administration in time range)         ORDERS PLACED DURING THIS VISIT:  Orders Placed This Encounter   Procedures    Critical Care    XR Chest 1 View    Comprehensive Metabolic Panel    Protime-INR    CBC Auto Differential    Manual Differential    Basic Metabolic Panel    Comprehensive Metabolic Panel    NPO Diet NPO Type: Sips with Meds    Vascular Surgery Consult    Inpatient Nephrology Consult    POC Glucose Q1H    POC Glucose 4x Daily Before Meals & at Bedtime    ECG 12 Lead Pre-Op / Pre-Procedure    Insert Peripheral IV    Initiate Observation Status    CBC & Differential    CBC & Differential         PROCEDURES  Procedures            PROGRESS, DATA ANALYSIS, CONSULTS, AND MEDICAL DECISION MAKING  All labs have been independently interpreted by me.  All radiology studies have been reviewed by me. All EKG's have been independently viewed and interpreted by me.  Discussion below represents my analysis of pertinent findings related to  patient's condition, differential diagnosis, treatment plan and final disposition.    Differential diagnosis includes but is not limited to hyperkalemia, volume overload, dialysis access problem.    Clinical Scores:                                     ED Course as of 04/10/25 1112   Thu Apr 10, 2025   0955 Bedside discussion with dr. bettencourt.  Discussed the patient, relevant history, exam, diagnostics, ED findings/progress, and concerns.  They agree to consult, advises will try to get pt on for OR today, requests medicine admit   []   1002 Potassium(!): 5.6 []   1009 Phone call with dr. gutiérrez.  Discussed the patient, relevant history, exam, diagnostics, ED findings/progress, and concerns.  They agree to admit to a tele bed   []   1111 XR Chest 1 View  My independent interpretation of the imaging study is no dense consolidation [TR]   1111 EKG PROCEDURE    EKG time: 913  Rhythm/Rate: Normal sinus, rate 58  P waves and OH: Normal P, normal OH  QRS, axis: Narrow QRS, normal axis  ST and T waves: No acute    Independently Interpreted by me  Not significantly changed compared to prior 9/4/2024   [TR]      ED Course User Index  [AH] Lamar Monique APRN  [TR] Murali Ch MD       MDM: Plan to obtain chemistries and blood counts as well as EKG and chest x-ray.  We will evaluate for hyperkalemia and acidosis.  We will discuss with vascular surgery.      COMPLEXITY OF CARE  The patient requires admission.    Please note that portions of this document were completed with a voice recognition program.    Note Disclaimer: At [facility], we believe that sharing information builds trust and better relationships. You are receiving this note because you recently visited [facility]. It is possible you will see health information before a provider has talked with you about it. This kind of information can be easy to misunderstand. To help you fully understand what it means for your health, we urge you to discuss this  note with your provider.         Murali Ch MD  04/10/25 1394
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
## 2025-04-10 NOTE — ED PROVIDER NOTES
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 misunderstand. To help you fully understand what it means for your health, we urge you to discuss this note with your provider.         Lamar Monique, TRANG  04/10/25 1200

## 2025-04-11 ENCOUNTER — ANESTHESIA (OUTPATIENT)
Dept: PERIOP | Facility: HOSPITAL | Age: 65
End: 2025-04-11
Payer: MEDICARE

## 2025-04-11 ENCOUNTER — APPOINTMENT (OUTPATIENT)
Dept: GENERAL RADIOLOGY | Facility: HOSPITAL | Age: 65
End: 2025-04-11
Payer: MEDICARE

## 2025-04-11 ENCOUNTER — ANESTHESIA EVENT (OUTPATIENT)
Dept: PERIOP | Facility: HOSPITAL | Age: 65
End: 2025-04-11
Payer: MEDICARE

## 2025-04-11 LAB
ALBUMIN SERPL-MCNC: 4.3 G/DL (ref 3.5–5.2)
ALBUMIN/GLOB SERPL: 1.5 G/DL
ALP SERPL-CCNC: 126 U/L (ref 39–117)
ALT SERPL W P-5'-P-CCNC: 16 U/L (ref 1–41)
ANION GAP SERPL CALCULATED.3IONS-SCNC: 12.5 MMOL/L (ref 5–15)
AST SERPL-CCNC: 13 U/L (ref 1–40)
BASOPHILS # BLD AUTO: 0.05 10*3/MM3 (ref 0–0.2)
BASOPHILS NFR BLD AUTO: 1.1 % (ref 0–1.5)
BILIRUB SERPL-MCNC: 0.3 MG/DL (ref 0–1.2)
BUN SERPL-MCNC: 40 MG/DL (ref 8–23)
BUN/CREAT SERPL: 7.1 (ref 7–25)
CALCIUM SPEC-SCNC: 10 MG/DL (ref 8.6–10.5)
CHLORIDE SERPL-SCNC: 101 MMOL/L (ref 98–107)
CHOLEST SERPL-MCNC: 128 MG/DL (ref 0–200)
CO2 SERPL-SCNC: 24.5 MMOL/L (ref 22–29)
CREAT SERPL-MCNC: 5.63 MG/DL (ref 0.76–1.27)
DEPRECATED RDW RBC AUTO: 48.9 FL (ref 37–54)
EGFRCR SERPLBLD CKD-EPI 2021: 10.7 ML/MIN/1.73
EOSINOPHIL # BLD AUTO: 0.23 10*3/MM3 (ref 0–0.4)
EOSINOPHIL NFR BLD AUTO: 5.2 % (ref 0.3–6.2)
ERYTHROCYTE [DISTWIDTH] IN BLOOD BY AUTOMATED COUNT: 13.8 % (ref 12.3–15.4)
GLOBULIN UR ELPH-MCNC: 2.9 GM/DL
GLUCOSE BLDC GLUCOMTR-MCNC: 125 MG/DL (ref 70–130)
GLUCOSE BLDC GLUCOMTR-MCNC: 143 MG/DL (ref 70–130)
GLUCOSE BLDC GLUCOMTR-MCNC: 145 MG/DL (ref 70–130)
GLUCOSE BLDC GLUCOMTR-MCNC: 232 MG/DL (ref 70–130)
GLUCOSE SERPL-MCNC: 139 MG/DL (ref 65–99)
HBA1C MFR BLD: 5.6 % (ref 4.8–5.6)
HBV SURFACE AG SERPL QL IA: NORMAL
HCT VFR BLD AUTO: 33.5 % (ref 37.5–51)
HDLC SERPL-MCNC: 56 MG/DL (ref 40–60)
HGB BLD-MCNC: 11.1 G/DL (ref 13–17.7)
IMM GRANULOCYTES # BLD AUTO: 0.02 10*3/MM3 (ref 0–0.05)
IMM GRANULOCYTES NFR BLD AUTO: 0.5 % (ref 0–0.5)
LDLC SERPL CALC-MCNC: 60 MG/DL (ref 0–100)
LDLC/HDLC SERPL: 1.09 {RATIO}
LYMPHOCYTES # BLD AUTO: 0.95 10*3/MM3 (ref 0.7–3.1)
LYMPHOCYTES NFR BLD AUTO: 21.4 % (ref 19.6–45.3)
MCH RBC QN AUTO: 32.4 PG (ref 26.6–33)
MCHC RBC AUTO-ENTMCNC: 33.1 G/DL (ref 31.5–35.7)
MCV RBC AUTO: 97.7 FL (ref 79–97)
MONOCYTES # BLD AUTO: 0.49 10*3/MM3 (ref 0.1–0.9)
MONOCYTES NFR BLD AUTO: 11 % (ref 5–12)
NEUTROPHILS NFR BLD AUTO: 2.7 10*3/MM3 (ref 1.7–7)
NEUTROPHILS NFR BLD AUTO: 60.8 % (ref 42.7–76)
PHOSPHATE SERPL-MCNC: 6.1 MG/DL (ref 2.5–4.5)
PLATELET # BLD AUTO: 86 10*3/MM3 (ref 140–450)
PMV BLD AUTO: 10.8 FL (ref 6–12)
POTASSIUM SERPL-SCNC: 5.3 MMOL/L (ref 3.5–5.2)
PROT SERPL-MCNC: 7.2 G/DL (ref 6–8.5)
RBC # BLD AUTO: 3.43 10*6/MM3 (ref 4.14–5.8)
SODIUM SERPL-SCNC: 138 MMOL/L (ref 136–145)
TRIGL SERPL-MCNC: 54 MG/DL (ref 0–150)
TSH SERPL DL<=0.05 MIU/L-ACNC: 2.38 UIU/ML (ref 0.27–4.2)
VLDLC SERPL-MCNC: 12 MG/DL (ref 5–40)
WBC NRBC COR # BLD AUTO: 4.44 10*3/MM3 (ref 3.4–10.8)

## 2025-04-11 PROCEDURE — 63710000001 INSULIN GLARGINE PER 5 UNITS: Performed by: STUDENT IN AN ORGANIZED HEALTH CARE EDUCATION/TRAINING PROGRAM

## 2025-04-11 PROCEDURE — C1894 INTRO/SHEATH, NON-LASER: HCPCS | Performed by: STUDENT IN AN ORGANIZED HEALTH CARE EDUCATION/TRAINING PROGRAM

## 2025-04-11 PROCEDURE — 25810000003 SODIUM CHLORIDE 0.9 % SOLUTION 250 ML FLEX CONT: Performed by: NURSE ANESTHETIST, CERTIFIED REGISTERED

## 2025-04-11 PROCEDURE — 80053 COMPREHEN METABOLIC PANEL: CPT | Performed by: HOSPITALIST

## 2025-04-11 PROCEDURE — C1769 GUIDE WIRE: HCPCS | Performed by: STUDENT IN AN ORGANIZED HEALTH CARE EDUCATION/TRAINING PROGRAM

## 2025-04-11 PROCEDURE — 80061 LIPID PANEL: CPT | Performed by: HOSPITALIST

## 2025-04-11 PROCEDURE — 85025 COMPLETE CBC W/AUTO DIFF WBC: CPT | Performed by: HOSPITALIST

## 2025-04-11 PROCEDURE — 63710000001 INSULIN REGULAR HUMAN PER 5 UNITS: Performed by: STUDENT IN AN ORGANIZED HEALTH CARE EDUCATION/TRAINING PROGRAM

## 2025-04-11 PROCEDURE — 25010000002 HEPARIN (PORCINE) PER 1000 UNITS: Performed by: STUDENT IN AN ORGANIZED HEALTH CARE EDUCATION/TRAINING PROGRAM

## 2025-04-11 PROCEDURE — G0378 HOSPITAL OBSERVATION PER HR: HCPCS

## 2025-04-11 PROCEDURE — 83036 HEMOGLOBIN GLYCOSYLATED A1C: CPT | Performed by: HOSPITALIST

## 2025-04-11 PROCEDURE — C1725 CATH, TRANSLUMIN NON-LASER: HCPCS | Performed by: STUDENT IN AN ORGANIZED HEALTH CARE EDUCATION/TRAINING PROGRAM

## 2025-04-11 PROCEDURE — C1757 CATH, THROMBECTOMY/EMBOLECT: HCPCS | Performed by: STUDENT IN AN ORGANIZED HEALTH CARE EDUCATION/TRAINING PROGRAM

## 2025-04-11 PROCEDURE — 25010000002 PHENYLEPHRINE 10 MG/ML SOLUTION 5 ML VIAL: Performed by: NURSE ANESTHETIST, CERTIFIED REGISTERED

## 2025-04-11 PROCEDURE — 25010000002 CEFAZOLIN PER 500 MG: Performed by: STUDENT IN AN ORGANIZED HEALTH CARE EDUCATION/TRAINING PROGRAM

## 2025-04-11 PROCEDURE — 25010000002 HEPARIN (PORCINE) PER 1000 UNITS: Performed by: NURSE ANESTHETIST, CERTIFIED REGISTERED

## 2025-04-11 PROCEDURE — 84100 ASSAY OF PHOSPHORUS: CPT | Performed by: INTERNAL MEDICINE

## 2025-04-11 PROCEDURE — 25010000002 LIDOCAINE 2% SOLUTION: Performed by: NURSE ANESTHETIST, CERTIFIED REGISTERED

## 2025-04-11 PROCEDURE — 25010000002 PROPOFOL 200 MG/20ML EMULSION: Performed by: NURSE ANESTHETIST, CERTIFIED REGISTERED

## 2025-04-11 PROCEDURE — 87340 HEPATITIS B SURFACE AG IA: CPT | Performed by: INTERNAL MEDICINE

## 2025-04-11 PROCEDURE — G0257 UNSCHED DIALYSIS ESRD PT HOS: HCPCS

## 2025-04-11 PROCEDURE — 25010000002 ONDANSETRON PER 1 MG: Performed by: NURSE ANESTHETIST, CERTIFIED REGISTERED

## 2025-04-11 PROCEDURE — 36833 AV FISTULA REVISION: CPT | Performed by: STUDENT IN AN ORGANIZED HEALTH CARE EDUCATION/TRAINING PROGRAM

## 2025-04-11 PROCEDURE — 82948 REAGENT STRIP/BLOOD GLUCOSE: CPT

## 2025-04-11 PROCEDURE — 25510000001 IOPAMIDOL PER 1 ML: Performed by: STUDENT IN AN ORGANIZED HEALTH CARE EDUCATION/TRAINING PROGRAM

## 2025-04-11 PROCEDURE — 25010000002 PROTAMINE SULFATE PER 10 MG: Performed by: NURSE ANESTHETIST, CERTIFIED REGISTERED

## 2025-04-11 PROCEDURE — 84443 ASSAY THYROID STIM HORMONE: CPT | Performed by: HOSPITALIST

## 2025-04-11 PROCEDURE — C1874 STENT, COATED/COV W/DEL SYS: HCPCS | Performed by: STUDENT IN AN ORGANIZED HEALTH CARE EDUCATION/TRAINING PROGRAM

## 2025-04-11 PROCEDURE — 25010000002 PHENYLEPHRINE 10 MG/ML SOLUTION: Performed by: NURSE ANESTHETIST, CERTIFIED REGISTERED

## 2025-04-11 DEVICE — GORE VIABAHN ENDOPROSTHESIS WITH HEPARIN 10MMX5CM 8FR 120CMCATH
Type: IMPLANTABLE DEVICE | Site: ARM | Status: FUNCTIONAL
Brand: GORE VIABAHN ENDOPROSTHESIS WITH HEPARIN

## 2025-04-11 RX ORDER — EPHEDRINE SULFATE 50 MG/ML
5 INJECTION, SOLUTION INTRAVENOUS ONCE AS NEEDED
Status: DISCONTINUED | OUTPATIENT
Start: 2025-04-11 | End: 2025-04-11 | Stop reason: HOSPADM

## 2025-04-11 RX ORDER — ONDANSETRON 2 MG/ML
4 INJECTION INTRAMUSCULAR; INTRAVENOUS ONCE AS NEEDED
Status: DISCONTINUED | OUTPATIENT
Start: 2025-04-11 | End: 2025-04-11 | Stop reason: HOSPADM

## 2025-04-11 RX ORDER — DROPERIDOL 2.5 MG/ML
0.62 INJECTION, SOLUTION INTRAMUSCULAR; INTRAVENOUS
Status: DISCONTINUED | OUTPATIENT
Start: 2025-04-11 | End: 2025-04-11 | Stop reason: HOSPADM

## 2025-04-11 RX ORDER — PROTAMINE SULFATE 10 MG/ML
INJECTION, SOLUTION INTRAVENOUS AS NEEDED
Status: DISCONTINUED | OUTPATIENT
Start: 2025-04-11 | End: 2025-04-11 | Stop reason: SURG

## 2025-04-11 RX ORDER — SODIUM CHLORIDE 0.9 % (FLUSH) 0.9 %
3 SYRINGE (ML) INJECTION EVERY 12 HOURS SCHEDULED
Status: DISCONTINUED | OUTPATIENT
Start: 2025-04-11 | End: 2025-04-11 | Stop reason: HOSPADM

## 2025-04-11 RX ORDER — MIDAZOLAM HYDROCHLORIDE 1 MG/ML
1 INJECTION, SOLUTION INTRAMUSCULAR; INTRAVENOUS
Status: DISCONTINUED | OUTPATIENT
Start: 2025-04-11 | End: 2025-04-11 | Stop reason: HOSPADM

## 2025-04-11 RX ORDER — SODIUM CHLORIDE 0.9 % (FLUSH) 0.9 %
3-10 SYRINGE (ML) INJECTION AS NEEDED
Status: DISCONTINUED | OUTPATIENT
Start: 2025-04-11 | End: 2025-04-11 | Stop reason: HOSPADM

## 2025-04-11 RX ORDER — SODIUM CHLORIDE, SODIUM LACTATE, POTASSIUM CHLORIDE, CALCIUM CHLORIDE 600; 310; 30; 20 MG/100ML; MG/100ML; MG/100ML; MG/100ML
9 INJECTION, SOLUTION INTRAVENOUS CONTINUOUS
Status: DISCONTINUED | OUTPATIENT
Start: 2025-04-11 | End: 2025-04-11

## 2025-04-11 RX ORDER — LABETALOL HYDROCHLORIDE 5 MG/ML
5 INJECTION, SOLUTION INTRAVENOUS
Status: DISCONTINUED | OUTPATIENT
Start: 2025-04-11 | End: 2025-04-11 | Stop reason: HOSPADM

## 2025-04-11 RX ORDER — OXYCODONE HYDROCHLORIDE 10 MG/1
10 TABLET ORAL EVERY 4 HOURS PRN
Status: DISCONTINUED | OUTPATIENT
Start: 2025-04-11 | End: 2025-04-12

## 2025-04-11 RX ORDER — ONDANSETRON 2 MG/ML
INJECTION INTRAMUSCULAR; INTRAVENOUS AS NEEDED
Status: DISCONTINUED | OUTPATIENT
Start: 2025-04-11 | End: 2025-04-11 | Stop reason: SURG

## 2025-04-11 RX ORDER — HEPARIN SODIUM 1000 [USP'U]/ML
INJECTION, SOLUTION INTRAVENOUS; SUBCUTANEOUS AS NEEDED
Status: DISCONTINUED | OUTPATIENT
Start: 2025-04-11 | End: 2025-04-11 | Stop reason: SURG

## 2025-04-11 RX ORDER — HEPARIN SODIUM 1000 [USP'U]/ML
2000 INJECTION, SOLUTION INTRAVENOUS; SUBCUTANEOUS ONCE
Status: DISCONTINUED | OUTPATIENT
Start: 2025-04-11 | End: 2025-04-12 | Stop reason: HOSPADM

## 2025-04-11 RX ORDER — NALOXONE HCL 0.4 MG/ML
0.2 VIAL (ML) INJECTION AS NEEDED
Status: DISCONTINUED | OUTPATIENT
Start: 2025-04-11 | End: 2025-04-11 | Stop reason: HOSPADM

## 2025-04-11 RX ORDER — IPRATROPIUM BROMIDE AND ALBUTEROL SULFATE 2.5; .5 MG/3ML; MG/3ML
3 SOLUTION RESPIRATORY (INHALATION) ONCE AS NEEDED
Status: DISCONTINUED | OUTPATIENT
Start: 2025-04-11 | End: 2025-04-11 | Stop reason: HOSPADM

## 2025-04-11 RX ORDER — HYDROMORPHONE HYDROCHLORIDE 1 MG/ML
0.25 INJECTION, SOLUTION INTRAMUSCULAR; INTRAVENOUS; SUBCUTANEOUS
Status: DISCONTINUED | OUTPATIENT
Start: 2025-04-11 | End: 2025-04-11 | Stop reason: HOSPADM

## 2025-04-11 RX ORDER — PROPOFOL 10 MG/ML
INJECTION, EMULSION INTRAVENOUS AS NEEDED
Status: DISCONTINUED | OUTPATIENT
Start: 2025-04-11 | End: 2025-04-11 | Stop reason: SURG

## 2025-04-11 RX ORDER — HYDRALAZINE HYDROCHLORIDE 20 MG/ML
5 INJECTION INTRAMUSCULAR; INTRAVENOUS
Status: DISCONTINUED | OUTPATIENT
Start: 2025-04-11 | End: 2025-04-11 | Stop reason: HOSPADM

## 2025-04-11 RX ORDER — ALBUMIN (HUMAN) 12.5 G/50ML
12.5 SOLUTION INTRAVENOUS AS NEEDED
Status: DISCONTINUED | OUTPATIENT
Start: 2025-04-11 | End: 2025-04-12

## 2025-04-11 RX ORDER — OXYCODONE HYDROCHLORIDE 5 MG/1
5 TABLET ORAL EVERY 4 HOURS PRN
Status: DISCONTINUED | OUTPATIENT
Start: 2025-04-11 | End: 2025-04-12

## 2025-04-11 RX ORDER — HYDROCODONE BITARTRATE AND ACETAMINOPHEN 7.5; 325 MG/1; MG/1
1 TABLET ORAL EVERY 4 HOURS PRN
Status: DISCONTINUED | OUTPATIENT
Start: 2025-04-11 | End: 2025-04-11 | Stop reason: HOSPADM

## 2025-04-11 RX ORDER — IOPAMIDOL 510 MG/ML
100 INJECTION, SOLUTION INTRAVASCULAR
Status: COMPLETED | OUTPATIENT
Start: 2025-04-11 | End: 2025-04-11

## 2025-04-11 RX ORDER — LIDOCAINE HYDROCHLORIDE 20 MG/ML
INJECTION, SOLUTION INFILTRATION; PERINEURAL AS NEEDED
Status: DISCONTINUED | OUTPATIENT
Start: 2025-04-11 | End: 2025-04-11 | Stop reason: SURG

## 2025-04-11 RX ORDER — ATROPINE SULFATE 0.4 MG/ML
0.4 INJECTION, SOLUTION INTRAMUSCULAR; INTRAVENOUS; SUBCUTANEOUS ONCE AS NEEDED
Status: DISCONTINUED | OUTPATIENT
Start: 2025-04-11 | End: 2025-04-11 | Stop reason: HOSPADM

## 2025-04-11 RX ORDER — SODIUM CHLORIDE 9 MG/ML
INJECTION, SOLUTION INTRAVENOUS CONTINUOUS PRN
Status: DISCONTINUED | OUTPATIENT
Start: 2025-04-11 | End: 2025-04-11 | Stop reason: SURG

## 2025-04-11 RX ORDER — HYDROCODONE BITARTRATE AND ACETAMINOPHEN 5; 325 MG/1; MG/1
1 TABLET ORAL ONCE AS NEEDED
Status: DISCONTINUED | OUTPATIENT
Start: 2025-04-11 | End: 2025-04-11 | Stop reason: HOSPADM

## 2025-04-11 RX ORDER — PHENYLEPHRINE HYDROCHLORIDE 10 MG/ML
INJECTION INTRAVENOUS AS NEEDED
Status: DISCONTINUED | OUTPATIENT
Start: 2025-04-11 | End: 2025-04-11 | Stop reason: SURG

## 2025-04-11 RX ORDER — LIDOCAINE HYDROCHLORIDE 10 MG/ML
0.5 INJECTION, SOLUTION INFILTRATION; PERINEURAL ONCE AS NEEDED
Status: DISCONTINUED | OUTPATIENT
Start: 2025-04-11 | End: 2025-04-11 | Stop reason: HOSPADM

## 2025-04-11 RX ORDER — HYDROMORPHONE HYDROCHLORIDE 1 MG/ML
0.5 INJECTION, SOLUTION INTRAMUSCULAR; INTRAVENOUS; SUBCUTANEOUS
Status: DISCONTINUED | OUTPATIENT
Start: 2025-04-11 | End: 2025-04-11 | Stop reason: HOSPADM

## 2025-04-11 RX ORDER — DIPHENHYDRAMINE HYDROCHLORIDE 50 MG/ML
12.5 INJECTION, SOLUTION INTRAMUSCULAR; INTRAVENOUS
Status: DISCONTINUED | OUTPATIENT
Start: 2025-04-11 | End: 2025-04-11 | Stop reason: HOSPADM

## 2025-04-11 RX ORDER — PROMETHAZINE HYDROCHLORIDE 25 MG/1
25 SUPPOSITORY RECTAL ONCE AS NEEDED
Status: DISCONTINUED | OUTPATIENT
Start: 2025-04-11 | End: 2025-04-11 | Stop reason: HOSPADM

## 2025-04-11 RX ORDER — PROMETHAZINE HYDROCHLORIDE 25 MG/1
25 TABLET ORAL ONCE AS NEEDED
Status: DISCONTINUED | OUTPATIENT
Start: 2025-04-11 | End: 2025-04-11 | Stop reason: HOSPADM

## 2025-04-11 RX ORDER — FLUMAZENIL 0.1 MG/ML
0.2 INJECTION INTRAVENOUS AS NEEDED
Status: DISCONTINUED | OUTPATIENT
Start: 2025-04-11 | End: 2025-04-11 | Stop reason: HOSPADM

## 2025-04-11 RX ORDER — ACETAMINOPHEN 500 MG
1000 TABLET ORAL EVERY 8 HOURS SCHEDULED
Status: DISCONTINUED | OUTPATIENT
Start: 2025-04-11 | End: 2025-04-12

## 2025-04-11 RX ADMIN — HEPARIN SODIUM 10000 UNITS: 1000 INJECTION, SOLUTION INTRAVENOUS; SUBCUTANEOUS at 08:30

## 2025-04-11 RX ADMIN — ATORVASTATIN CALCIUM 20 MG: 20 TABLET, FILM COATED ORAL at 21:28

## 2025-04-11 RX ADMIN — OXYCODONE HYDROCHLORIDE AND ACETAMINOPHEN 500 MG: 500 TABLET ORAL at 13:08

## 2025-04-11 RX ADMIN — SODIUM CHLORIDE: 9 INJECTION, SOLUTION INTRAVENOUS at 07:58

## 2025-04-11 RX ADMIN — PHENYLEPHRINE HYDROCHLORIDE 100 MCG: 10 INJECTION INTRAVENOUS at 08:18

## 2025-04-11 RX ADMIN — PANTOPRAZOLE SODIUM 40 MG: 40 TABLET, DELAYED RELEASE ORAL at 05:25

## 2025-04-11 RX ADMIN — LIDOCAINE HYDROCHLORIDE 80 MG: 20 INJECTION, SOLUTION INFILTRATION; PERINEURAL at 08:11

## 2025-04-11 RX ADMIN — ONDANSETRON 4 MG: 2 INJECTION, SOLUTION INTRAMUSCULAR; INTRAVENOUS at 09:13

## 2025-04-11 RX ADMIN — PHENYLEPHRINE HYDROCHLORIDE 0.3 MCG/KG/MIN: 10 INJECTION, SOLUTION INTRAVENOUS at 08:24

## 2025-04-11 RX ADMIN — PROPOFOL 120 MG: 10 INJECTION, EMULSION INTRAVENOUS at 08:11

## 2025-04-11 RX ADMIN — OXYCODONE HYDROCHLORIDE 10 MG: 10 TABLET ORAL at 19:05

## 2025-04-11 RX ADMIN — OXYCODONE AND ACETAMINOPHEN 1 TABLET: 5; 325 TABLET ORAL at 05:25

## 2025-04-11 RX ADMIN — IOPAMIDOL 16 ML: 510 INJECTION, SOLUTION INTRAVASCULAR at 09:27

## 2025-04-11 RX ADMIN — CETIRIZINE HYDROCHLORIDE 10 MG: 10 TABLET ORAL at 13:08

## 2025-04-11 RX ADMIN — ALPRAZOLAM 0.25 MG: 0.25 TABLET ORAL at 13:09

## 2025-04-11 RX ADMIN — ACETAMINOPHEN 1000 MG: 500 TABLET, FILM COATED ORAL at 13:08

## 2025-04-11 RX ADMIN — CALCIUM ACETATE 667 MG: 667 CAPSULE ORAL at 13:08

## 2025-04-11 RX ADMIN — Medication 1000 UNITS: at 13:08

## 2025-04-11 RX ADMIN — Medication 1000 MCG: at 13:12

## 2025-04-11 RX ADMIN — CEFAZOLIN 2000 MG: 2 INJECTION, POWDER, FOR SOLUTION INTRAMUSCULAR; INTRAVENOUS at 07:54

## 2025-04-11 RX ADMIN — OXYCODONE HYDROCHLORIDE 5 MG: 5 TABLET ORAL at 10:23

## 2025-04-11 RX ADMIN — PHENYLEPHRINE HYDROCHLORIDE 100 MCG: 10 INJECTION INTRAVENOUS at 08:35

## 2025-04-11 RX ADMIN — INSULIN HUMAN 2 UNITS: 100 INJECTION, SOLUTION PARENTERAL at 21:27

## 2025-04-11 RX ADMIN — INSULIN GLARGINE 10 UNITS: 100 INJECTION, SOLUTION SUBCUTANEOUS at 21:28

## 2025-04-11 RX ADMIN — PROTAMINE SULFATE 30 MG: 10 INJECTION, SOLUTION INTRAVENOUS at 09:15

## 2025-04-11 RX ADMIN — Medication 10 ML: at 21:28

## 2025-04-11 RX ADMIN — ACETAMINOPHEN 1000 MG: 500 TABLET, FILM COATED ORAL at 21:28

## 2025-04-11 RX ADMIN — ALPRAZOLAM 0.25 MG: 0.25 TABLET ORAL at 21:28

## 2025-04-11 NOTE — PROGRESS NOTES
"Daily progress note    Primary care physician       Subjective  Status post thrombectomy and awake and alert and feeling same like yesterday with no new complaints     History of present illness  64-year-old white male with history of end-stage renal disease on hemodialysis CVA coronary artery disease diabetes hypertension hyperlipidemia and gastroesophageal disease who is well-known to our service brought to the emergency room with nonfunctioning left arm AVG secondary to thrombosis.  Patient admitted to the hospital and vascular surgery consult obtained for thrombectomy and I am asked to admit patient and follow-up medical problems.  At the time of examination he has no specific complaints.  Patient denies any fever chills chest pain shortness of breath palpitation nausea vomiting diarrhea.     REVIEW OF SYSTEMS  All systems reviewed and negative except for those discussed in HPI.     PHYSICAL EXAM   Blood pressure 163/85, pulse 66, temperature 97.4 °F (36.3 °C), temperature source Oral, resp. rate 16, height 175 cm (68.9\"), weight 74.8 kg (164 lb 14.5 oz), SpO2 99%.    GENERAL: alert, no acute distress, chroncially illappearing  SKIN: Warm, dry, mild generalized pallor  HENT: Normocephalic, atraumatic  EYES: no scleral icterus  CV: regular rhythm, regular rate, dialysis fistla LUE with palpable thrill to inferior portion but otherwise no palpable flow in graft.   RESPIRATORY: normal effort, moving air bilaterally  ABDOMEN: soft, nontender, nondistended bowel sounds positive  MUSCULOSKELETAL: no deformity,atraumatic exam, active ROM to all 4 extremities  NEURO: alert, moves all extremities, follows commands     LAB RESULTS  Lab Results (last 24 hours)       Procedure Component Value Units Date/Time    POC Glucose Once [[id number]]  (Abnormal) Collected: 04/11/25 1111    Specimen: Blood Updated: 04/11/25 1113     Glucose 143 mg/dL     POC Glucose Once [[id number]]  (Normal) Collected: 04/11/25 0940    " Specimen: Blood Updated: 04/11/25 0940     Glucose 125 mg/dL     TSH [[id number]]  (Normal) Collected: 04/11/25 0555    Specimen: Blood Updated: 04/11/25 0728     TSH 2.380 uIU/mL     Hepatitis B Surface Antigen [[id number]]  (Normal) Collected: 04/11/25 0555    Specimen: Blood Updated: 04/11/25 0727     Hepatitis B Surface Ag Non-Reactive    Phosphorus [[id number]]  (Abnormal) Collected: 04/11/25 0555    Specimen: Blood Updated: 04/11/25 0722     Phosphorus 6.1 mg/dL     Comprehensive Metabolic Panel [[id number]]  (Abnormal) Collected: 04/11/25 0555    Specimen: Blood Updated: 04/11/25 0722     Glucose 139 mg/dL      BUN 40 mg/dL      Creatinine 5.63 mg/dL      Sodium 138 mmol/L      Potassium 5.3 mmol/L      Chloride 101 mmol/L      CO2 24.5 mmol/L      Calcium 10.0 mg/dL      Total Protein 7.2 g/dL      Albumin 4.3 g/dL      ALT (SGPT) 16 U/L      AST (SGOT) 13 U/L      Alkaline Phosphatase 126 U/L      Total Bilirubin 0.3 mg/dL      Globulin 2.9 gm/dL      A/G Ratio 1.5 g/dL      BUN/Creatinine Ratio 7.1     Anion Gap 12.5 mmol/L      eGFR 10.7 mL/min/1.73     Narrative:      GFR Categories in Chronic Kidney Disease (CKD)      GFR Category          GFR (mL/min/1.73)    Interpretation  G1                     90 or greater         Normal or high (1)  G2                      60-89                Mild decrease (1)  G3a                   45-59                Mild to moderate decrease  G3b                   30-44                Moderate to severe decrease  G4                    15-29                Severe decrease  G5                    14 or less           Kidney failure          (1)In the absence of evidence of kidney disease, neither GFR category G1 or G2 fulfill the criteria for CKD.    eGFR calculation 2021 CKD-EPI creatinine equation, which does not include race as a factor    Lipid Panel [[id number]] Collected: 04/11/25 0555    Specimen: Blood Updated: 04/11/25 0722     Total Cholesterol 128 mg/dL      Triglycerides  54 mg/dL      HDL Cholesterol 56 mg/dL      LDL Cholesterol  60 mg/dL      VLDL Cholesterol 12 mg/dL      LDL/HDL Ratio 1.09    Narrative:      Cholesterol Reference Ranges  (U.S. Department of Health and Human Services ATP III Classifications)    Desirable          <200 mg/dL  Borderline High    200-239 mg/dL  High Risk          >240 mg/dL      Triglyceride Reference Ranges  (U.S. Department of Health and Human Services ATP III Classifications)    Normal           <150 mg/dL  Borderline High  150-199 mg/dL  High             200-499 mg/dL  Very High        >500 mg/dL    HDL Reference Ranges  (U.S. Department of Health and Human Services ATP III Classifications)    Low     <40 mg/dl (major risk factor for CHD)  High    >60 mg/dl ('negative' risk factor for CHD)        LDL Reference Ranges  (U.S. Department of Health and Human Services ATP III Classifications)    Optimal          <100 mg/dL  Near Optimal     100-129 mg/dL  Borderline High  130-159 mg/dL  High             160-189 mg/dL  Very High        >189 mg/dL    LDL is calculated using the NIH LDL-C calculation.      Hemoglobin A1c [[id number]]  (Normal) Collected: 04/11/25 0555    Specimen: Blood Updated: 04/11/25 0711     Hemoglobin A1C 5.60 %     Narrative:      Hemoglobin A1C Ranges:    Increased Risk for Diabetes  5.7% to 6.4%  Diabetes                     >= 6.5%  Diabetic Goal                < 7.0%    CBC & Differential [[id number]]  (Abnormal) Collected: 04/11/25 0555    Specimen: Blood Updated: 04/11/25 0702    Narrative:      The following orders were created for panel order CBC & Differential.  Procedure                               Abnormality         Status                     ---------                               -----------         ------                     CBC Auto Differential[[id number]]        Abnormal            Final result                 Please view results for these tests on the individual orders.    CBC Auto Differential [[id number]]   (Abnormal) Collected: 04/11/25 0555    Specimen: Blood Updated: 04/11/25 0702     WBC 4.44 10*3/mm3      RBC 3.43 10*6/mm3      Hemoglobin 11.1 g/dL      Hematocrit 33.5 %      MCV 97.7 fL      MCH 32.4 pg      MCHC 33.1 g/dL      RDW 13.8 %      RDW-SD 48.9 fl      MPV 10.8 fL      Platelets 86 10*3/mm3      Neutrophil % 60.8 %      Lymphocyte % 21.4 %      Monocyte % 11.0 %      Eosinophil % 5.2 %      Basophil % 1.1 %      Immature Grans % 0.5 %      Neutrophils, Absolute 2.70 10*3/mm3      Lymphocytes, Absolute 0.95 10*3/mm3      Monocytes, Absolute 0.49 10*3/mm3      Eosinophils, Absolute 0.23 10*3/mm3      Basophils, Absolute 0.05 10*3/mm3      Immature Grans, Absolute 0.02 10*3/mm3     Narrative:      No clot detected.      POC Glucose Once [[id number]]  (Abnormal) Collected: 04/11/25 0539    Specimen: Blood Updated: 04/11/25 0541     Glucose 145 mg/dL     POC Glucose Once [[id number]]  (Abnormal) Collected: 04/10/25 2031    Specimen: Blood Updated: 04/10/25 2032     Glucose 292 mg/dL     POC Glucose Once [[id number]]  (Abnormal) Collected: 04/10/25 1853    Specimen: Blood Updated: 04/10/25 1855     Glucose 260 mg/dL           Imaging Results (Last 24 Hours)       Procedure Component Value Units Date/Time    Arteriogram (Autofinalize) [[id number]] Resulted: 04/11/25 0928     Updated: 04/11/25 0928    Narrative:      This procedure was auto-finalized with no dictation required.          [facility]20[facility] 0914 [facility]         Author: -- Service: -- Author Type: --   Filed: Date of Service: Creation Time:   Status: (Other)   HEART RATE=58  bpm  RR Gotwwfou=8513  ms  VT Igygpzqz=275  ms  P Horizontal Axis=28  deg  P Front Axis=55  deg  QRSD Jhfwqnwt=701  ms  QT Xzoclrml=481  ms  AChU=358  ms  QRS Axis=40  deg  T Wave Axis=20  deg  - BORDERLINE ECG -  Sinus rhythm  Borderline prolonged VT interval          Current Facility-Administered Medications:     acetaminophen (TYLENOL) tablet 1,000  mg, 1,000 mg, Oral, Q8H, Abeba Hammer MD, 1,000 mg at 04/11/25 1308    albumin human 25 % IV SOLN 12.5 g, 12.5 g, Intravenous, PRN, Yohan Murrell MD    ALPRAZolam (XANAX) tablet 0.25 mg, 0.25 mg, Oral, 4x Daily PRN, Abeba Hammer MD, 0.25 mg at 04/11/25 1309    ascorbic acid (VITAMIN C) tablet 500 mg, 500 mg, Oral, Daily, Abeba Hammer MD, 500 mg at 04/11/25 1308    atorvastatin (LIPITOR) tablet 20 mg, 20 mg, Oral, Nightly, Abeba Hammer MD    calcium acetate (PHOS BINDER)) capsule 667 mg, 667 mg, Oral, Daily, Abeba Hammer MD, 667 mg at 04/11/25 1308    cetirizine (zyrTEC) tablet 10 mg, 10 mg, Oral, Daily, Abeba Hammer MD, 10 mg at 04/11/25 1308    cholecalciferol (VITAMIN D3) tablet 1,000 Units, 1,000 Units, Oral, Daily, Abeba Hammer MD, 1,000 Units at 04/11/25 1308    dextrose (D50W) (25 g/50 mL) IV injection 25 g, 25 g, Intravenous, Q15 Min PRN, Abeba Hammer MD    dextrose (GLUTOSE) oral gel 15 g, 15 g, Oral, Q15 Min PRN, Abeba Hammer MD    glucagon (GLUCAGEN) injection 1 mg, 1 mg, Intramuscular, Q15 Min PRN, Abeba Hammer MD    heparin (porcine) injection 2,000 Units, 2,000 Units, Intravenous, Once, Yohan Murrell MD    heparin (porcine) injection 4,000 Units, 4,000 Units, Intracatheter, PRN, Abeba Hammer MD    insulin glargine (LANTUS, SEMGLEE) injection 10 Units, 10 Units, Subcutaneous, Nightly, Abeba Hammer MD, 10 Units at 04/10/25 2144    insulin regular (humuLIN R,novoLIN R) injection 2-7 Units, 2-7 Units, Subcutaneous, 4x Daily AC & at Bedtime, Abeba Hammer MD, 4 Units at 04/10/25 2144    lidocaine (XYLOCAINE) 1 % injection 20 mL, 20 mL, Infiltration, Once, Abeba Hammer MD    melatonin tablet 5 mg, 5 mg, Oral, Nightly PRN, Abeab Hammer MD    oxyCODONE (ROXICODONE) immediate release tablet 5 mg, 5 mg, Oral, Q4H PRN, 5 mg at 04/11/25 1023 **OR** oxyCODONE (ROXICODONE) immediate  release tablet 10 mg, 10 mg, Oral, Q4H PRN, Abeba Hammer MD    pantoprazole (PROTONIX) EC tablet 40 mg, 40 mg, Oral, Q AM, Abeba Hammer MD, 40 mg at 04/11/25 0525    [COMPLETED] Insert Peripheral IV, , , Once **AND** sodium chloride 0.9 % flush 10 mL, 10 mL, Intravenous, PRN, Abeba Hammer MD    vitamin B-12 (CYANOCOBALAMIN) tablet 1,000 mcg, 1,000 mcg, Oral, Daily, Abeba Hammer MD, 1,000 mcg at 04/11/25 1312     ASSESSMENT  Thrombosis left arteriovenous dialysis graft  End-stage renal disease  Diabetes mellitus  Hypertension  Hyperlipidemia  Coronary artery disease  History of CVA  Chronic anemia  Gastroesophageal reflux disease    PLAN  CPM  Postop care  Hemodialysis today  Vascular surgery consult appreciated  Nephrology to follow patient   Continue home medications  Stress ulcer DVT prophylaxis  Supportive care  Discussed with nursing staff  Follow closely further recommendation current hospital course    ARIEL MCGOVERN MD    Copied text in this note has been reviewed and is accurate as of 04/11/25

## 2025-04-11 NOTE — PROGRESS NOTES
"   LOS: 0 days     Chief Complaint/ Reason for encounter: ESRD, dialysis management    Subjective   04/11/25 : Patient seen in PACU, status post declot of his left arm AV graft  Good thrill and bruit in his left arm  He denies any shortness of breath chest pain fevers chills nausea vomiting or edema      Medical history reviewed:  History of Present Illness    Subjective    History taken from: Chart and patient/family as able    Vital Signs  Temp:  [97.4 °F (36.3 °C)-98.2 °F (36.8 °C)] 97.4 °F (36.3 °C)  Heart Rate:  [63-84] 66  Resp:  [10-18] 10  BP: (107-190)/(69-93) 163/85       Wt Readings from Last 1 Encounters:   04/11/25 0649 74.8 kg (164 lb 14.5 oz)   04/10/25 0833 74.8 kg (165 lb)       Objective:  Vital signs: (most recent): Blood pressure 163/85, pulse 66, temperature 97.4 °F (36.3 °C), temperature source Oral, resp. rate 10, height 175 cm (68.9\"), weight 74.8 kg (164 lb 14.5 oz), SpO2 100%.                Objective:  General Appearance:  Comfortable, well-appearing, in no acute distress and not in pain.  HEENT: Mucous membranes moist  Lungs:  Normal effort and normal respiratory rate.  Breath sounds clear to auscultation: No rhonchi/Rales.  No  respiratory distress.   Heart:  S1, S2 normal.  No murmur.   Abdomen: Abdomen is soft, nontender/nondistended, + bowel sounds  Extremities: No edema of bilateral lower extremities  Left arm AV graft good thrill and bruit  Skin:  Warm and dry with no rashes      Results Review:    Intake/Output:     Intake/Output Summary (Last 24 hours) at 4/11/2025 1135  Last data filed at 4/11/2025 0931  Gross per 24 hour   Intake 580 ml   Output 2550 ml   Net -1970 ml         DATA:  Radiology and Labs:  The following labs independently reviewed by me. Additional labs ordered for tomorrow a.m.  Interval notes, chart personally reviewed by me.   Old records independently reviewed showing ESRD on HD  The following radiologic studies independently viewed by me, findings chest x-ray " atelectasis nothing acute  New problems include thrombosed AV graft  Discussed with patient, Dr. Jimenez and his PACU RN    Risk/ complexity of medical care/ medical decision making moderate complexity, dialysis management    Labs:   Recent Results (from the past 24 hours)   Basic Metabolic Panel    Collection Time: 04/10/25 12:46 PM    Specimen: Blood   Result Value Ref Range    Glucose 175 (H) 65 - 99 mg/dL    BUN 70 (H) 8 - 23 mg/dL    Creatinine 8.23 (H) 0.76 - 1.27 mg/dL    Sodium 138 136 - 145 mmol/L    Potassium 5.8 (H) 3.5 - 5.2 mmol/L    Chloride 97 (L) 98 - 107 mmol/L    CO2 25.0 22.0 - 29.0 mmol/L    Calcium 10.1 8.6 - 10.5 mg/dL    BUN/Creatinine Ratio 8.5 7.0 - 25.0    Anion Gap 16.0 (H) 5.0 - 15.0 mmol/L    eGFR 6.8 (L) >60.0 mL/min/1.73   POC Glucose Once    Collection Time: 04/10/25  1:37 PM    Specimen: Blood   Result Value Ref Range    Glucose 210 (H) 70 - 130 mg/dL   POC Glucose Once    Collection Time: 04/10/25  6:53 PM    Specimen: Blood   Result Value Ref Range    Glucose 260 (H) 70 - 130 mg/dL   POC Glucose Once    Collection Time: 04/10/25  8:31 PM    Specimen: Blood   Result Value Ref Range    Glucose 292 (H) 70 - 130 mg/dL   POC Glucose Once    Collection Time: 04/11/25  5:39 AM    Specimen: Blood   Result Value Ref Range    Glucose 145 (H) 70 - 130 mg/dL   Phosphorus    Collection Time: 04/11/25  5:55 AM    Specimen: Blood   Result Value Ref Range    Phosphorus 6.1 (H) 2.5 - 4.5 mg/dL   Comprehensive Metabolic Panel    Collection Time: 04/11/25  5:55 AM    Specimen: Blood   Result Value Ref Range    Glucose 139 (H) 65 - 99 mg/dL    BUN 40 (H) 8 - 23 mg/dL    Creatinine 5.63 (H) 0.76 - 1.27 mg/dL    Sodium 138 136 - 145 mmol/L    Potassium 5.3 (H) 3.5 - 5.2 mmol/L    Chloride 101 98 - 107 mmol/L    CO2 24.5 22.0 - 29.0 mmol/L    Calcium 10.0 8.6 - 10.5 mg/dL    Total Protein 7.2 6.0 - 8.5 g/dL    Albumin 4.3 3.5 - 5.2 g/dL    ALT (SGPT) 16 1 - 41 U/L    AST (SGOT) 13 1 - 40 U/L    Alkaline  Phosphatase 126 (H) 39 - 117 U/L    Total Bilirubin 0.3 0.0 - 1.2 mg/dL    Globulin 2.9 gm/dL    A/G Ratio 1.5 g/dL    BUN/Creatinine Ratio 7.1 7.0 - 25.0    Anion Gap 12.5 5.0 - 15.0 mmol/L    eGFR 10.7 (L) >60.0 mL/min/1.73   TSH    Collection Time: 04/11/25  5:55 AM    Specimen: Blood   Result Value Ref Range    TSH 2.380 0.270 - 4.200 uIU/mL   Lipid Panel    Collection Time: 04/11/25  5:55 AM    Specimen: Blood   Result Value Ref Range    Total Cholesterol 128 0 - 200 mg/dL    Triglycerides 54 0 - 150 mg/dL    HDL Cholesterol 56 40 - 60 mg/dL    LDL Cholesterol  60 0 - 100 mg/dL    VLDL Cholesterol 12 5 - 40 mg/dL    LDL/HDL Ratio 1.09    Hemoglobin A1c    Collection Time: 04/11/25  5:55 AM    Specimen: Blood   Result Value Ref Range    Hemoglobin A1C 5.60 4.80 - 5.60 %   Hepatitis B Surface Antigen    Collection Time: 04/11/25  5:55 AM    Specimen: Blood   Result Value Ref Range    Hepatitis B Surface Ag Non-Reactive Non-Reactive   CBC Auto Differential    Collection Time: 04/11/25  5:55 AM    Specimen: Blood   Result Value Ref Range    WBC 4.44 3.40 - 10.80 10*3/mm3    RBC 3.43 (L) 4.14 - 5.80 10*6/mm3    Hemoglobin 11.1 (L) 13.0 - 17.7 g/dL    Hematocrit 33.5 (L) 37.5 - 51.0 %    MCV 97.7 (H) 79.0 - 97.0 fL    MCH 32.4 26.6 - 33.0 pg    MCHC 33.1 31.5 - 35.7 g/dL    RDW 13.8 12.3 - 15.4 %    RDW-SD 48.9 37.0 - 54.0 fl    MPV 10.8 6.0 - 12.0 fL    Platelets 86 (L) 140 - 450 10*3/mm3    Neutrophil % 60.8 42.7 - 76.0 %    Lymphocyte % 21.4 19.6 - 45.3 %    Monocyte % 11.0 5.0 - 12.0 %    Eosinophil % 5.2 0.3 - 6.2 %    Basophil % 1.1 0.0 - 1.5 %    Immature Grans % 0.5 0.0 - 0.5 %    Neutrophils, Absolute 2.70 1.70 - 7.00 10*3/mm3    Lymphocytes, Absolute 0.95 0.70 - 3.10 10*3/mm3    Monocytes, Absolute 0.49 0.10 - 0.90 10*3/mm3    Eosinophils, Absolute 0.23 0.00 - 0.40 10*3/mm3    Basophils, Absolute 0.05 0.00 - 0.20 10*3/mm3    Immature Grans, Absolute 0.02 0.00 - 0.05 10*3/mm3   POC Glucose Once    Collection  Time: 04/11/25  9:40 AM    Specimen: Blood   Result Value Ref Range    Glucose 125 70 - 130 mg/dL   POC Glucose Once    Collection Time: 04/11/25 11:11 AM    Specimen: Blood   Result Value Ref Range    Glucose 143 (H) 70 - 130 mg/dL       Radiology:  Pertinent radiology studies were reviewed as described above      Medications have been reviewed separately in chart review medication tab      ASSESSMENT:  ESRD on dialysis Tuesday Thursday Saturday  Hyperkalemia, better but still 5.3  AV graft thrombosis status post declot/11  diabetes mellitus type 2  Hyperphosphatemia on binders    Occlusion of arteriovenous dialysis graft  Anemia of chronic kidney disease           DISCUSSION/PLAN:   Discussed with Dr. Jimenez  Status post successful declot this morning in the OR  Will plan additional dialysis this afternoon to make sure there is no issue with his graft before discharge  If graft functions well, Dr. Jimenez plans to discontinue his temporary dialysis catheter and possible discharge tomorrow    Hemoglobin 11 so will hold Epogen for now   Continue phosphorus binders with meals and monitor levels  Continue to monitor electrolytes and volume closely    If he gets another full treatment today he should be okay going until next Tuesday before he gets additional dialysis but will need to be counseled on watching his fluid intake and limiting potassium in his diet over that time since he will have an extra day between treatments      Yohan Murrell MD  [facility]   Office phone number: [phone]  Answering service phone number: [phone]    04/11/25  11:35 EDT    Dictation performed using Dragon dictation software

## 2025-04-11 NOTE — ADDENDUM NOTE
Addendum  created 04/11/25 1507 by Karthik Osborn MD    Attestation recorded in Intraprocedure, Intraprocedure Attestations filed

## 2025-04-11 NOTE — ANESTHESIA PREPROCEDURE EVALUATION
Anesthesia Evaluation     Patient summary reviewed and Nursing notes reviewed   NPO Solid Status: > 6 hours  NPO Liquid Status: > 2 hours           Airway   Mallampati: II  TM distance: >3 FB  Neck ROM: full  Dental    (+) poor dentition    Pulmonary    (-) COPD, asthma, not a smoker  Cardiovascular   Exercise tolerance: good (4-7 METS)    ECG reviewed    (+) hypertension, past MI  >12 months, CAD, CABG >6 Months, CHF Diastolic >=55%, hyperlipidemia  (-) dysrhythmias, angina    ROS comment: Recent echo nl LV/RV systolic fxn, gr 2 diastolic dysfxn, no major valve issues    Neuro/Psych  (+) CVA, psychiatric history Anxiety and Depression  (-) seizures  GI/Hepatic/Renal/Endo    (+) GERD well controlled, hepatitis C, renal disease- ESRD and dialysis, diabetes mellitus type 2 using insulin  (-) liver disease, no thyroid disorder    Musculoskeletal     Abdominal    Substance History      OB/GYN          Other   arthritis,   history of cancer (hx RCC)                      Anesthesia Plan    ASA 3     general       Anesthetic plan, risks, benefits, and alternatives have been provided, discussed and informed consent has been obtained with: patient.        CODE STATUS:    Code Status (Patient has no pulse and is not breathing): CPR (Attempt to Resuscitate)  Medical Interventions (Patient has pulse or is breathing): Full Support  Level Of Support Discussed With: Patient

## 2025-04-11 NOTE — ANESTHESIA PROCEDURE NOTES
Airway  Reason: elective    Date/Time: 4/11/2025 8:13 AM  Airway not difficult    General Information and Staff    Patient location during procedure: OR  Anesthesiologist: Karthik Osborn MD  CRNA/CAA: Cira Ndiaye CRNA    Indications and Patient Condition  Indications for airway management: airway protection    Preoxygenated: yes  MILS not maintained throughout    Mask difficulty assessment: 0 - not attempted    Final Airway Details    Final airway type: supraglottic airway      Successful airway: LMA and unique  Size: 5   Number of attempts at approach: 1  Assessment: lips, teeth, and gum same as pre-op    Additional Comments  Airway exam prior to airway device insertion. Preoxygenated with 100% O2; sniffing position, IV induction, eyes taped. Atraumatic device insertion. Airway device connected to anesthesia machine. Confirmed EBBS, +EtCO2. No change in dentition from pre-induction assessment.

## 2025-04-11 NOTE — OP NOTE
Operative Note  Location: [facility]  Date of Admission:  4/10/2025  OR Date: 4/11/2025      Pre-op Diagnosis:  AV graft thrombosis    Post-op Diagnosis: AV graft thrombosis, recurrent venous outflow stenosis    Procedure:   1) Open left arm brachial to axillary AV graft thrombectomy  2) fistulagram with left axillary vein balloon angioplasty with 8 x 40 and 9 x 40 high pressure balloons and stent angioplasty with a 10 x 5 viabahn stent    Surgeon: Abeba Hammer MD    Assistant: Kimberlee Levine  RN, Provided critical assistance in exposure, retraction, and suction that overall decrease blood loss and operative time.    Anesthesia: General    Staff:   Circulator: Mila Dang RN; Marylin Lebron RN  Radiology Technologist: Gabriel Meraz  Scrub Person: Eugenia Kate  Assistant: Kimberlee Levine CSFA    Estimated Blood Loss: 50 mL    Specimen: * No orders in the log *    Complications: none    Findings: severe recurrent in stent stenosis in the venous outflow stents.  There was active extravasation from the axillary vein (stented) after the thrombectomy.  This was treated with angioplasty with a prolonged inflation of an 8 x 40 mm high pressure balloon with persistent extravasation, so a covered stent (10 x 5 viabahn) was deployed over this area and post dilated with a 9 x 40 mm balloon.  The extravasation resolved and there was good stent apposition with no residual stenosis    Implants:   Implant Name Type Inv. Item Serial No.  Lot No. LRB No. Used Action   STENTGR ENDOPROSTH VIABAHN W/BIOACT/HEP 8F 10MM [id number] - [id number] - [id number] Implant STENTGR ENDOPROSTH VIABAHN W/BIOACT/HEP 8F 10MM [id number] [employer]  . Left 1 Implanted       Indications:    The patient is a 64 year old male who presented with a thrombosed left arm AV graft.  He had a non tunneled catheter placed for dialysis for hyperkalemia and now presents for a left AVG thrombectomy and  fistulagram with possible intervention.         Procedure:    Patient brought to the operating room and positioned supine on the operating table.  Appropriate perioperative antibiotics administered prior to skin incision.  Timeout performed with all OR staff present and in agreement.  General anesthesia introduced without event.  Left arm was prepped and draped in standard sterile fashion. A transverse incision was made over the proximal graft and the graft was dissected free circumferentially from the surrounding tissues.  This was encircled with an umbilical tape.  The patient was systemically heparinized with 96112 units of IV heparin.  A transverse graftotomy was performed with an 11 blade and porras scissors, then a #3 and #4 marleny catheters were used to perform a thrombectomy of the venous limb of the graft, which returned a large amount of acute thrombus.  The marleny catheters were not passing easily through the venous outflow, but this improved when I placed the wire in the #4 marleny, which then passed easily.  There was brisk venous inflow.  Then, I used the #3 marleny to perform a proximal graft and arterial limb thrombectomy with return of acute thrombus and restoration of pulsatile inflow.  The graft was clamped proximally and distally then the graftotomy was closed with a running 5-0 prolene.  Then, I accessed the more proximal graft with a micropuncture needle and wire.  A small skin nick was made then a micropuncture catheter was placed and a diagnostic fistulagram was obtained that showed a patent AV graft with severe, recurrent in stent stenosis of the venous outflow stents.  The subclavian vein, inominate vein, and SVC were all widely patent with no stenosis.  There was extravasation from the more proximal stent post thrombectomy.  An 8 Fr sheath was placed then a glidewire was placed into the SVC under fluoroscopy.  I used an 8 x 40 mm high pressure balloon for a prolonged inflation.  However,  there was persistent extravasation from this area so I placed a 10 x 5 viabahn stent over this area and post dilated with a 9 x 40 mm balloon with good apposition and resolution of the extravasation.  Then, the venous outflow was occluded and a retrograde angiogram was obtained that showed a patent juxta anastomotic graft with no stenosis and a patent brachial artery.  There was resolution of the pulsatility of the graft and a good thrill in the graft now.  There was a palpable brachial and radial pulse.  The incision was closed in layers with running 3-0 and 4-0 vicryl sutures and skin glue.  The sheath was removed.  A bolster stitch was placed at the puncture site.  Dressings were placed.     All counts were reported as correct at the conclusion of the procedure.  Patient tolerated procedure well without any apparent complications.  Anesthesia was reversed and the patient was transferred to the PACU in stable condition.        Active Hospital Problems    Diagnosis  POA    **Occlusion of arteriovenous dialysis graft [T82.898A]  Yes         Abeba Hammer MD  Vascular Surgery  O: [phone]  F: [phone]

## 2025-04-11 NOTE — PLAN OF CARE
Problem: Adult Inpatient Plan of Care  Goal: Plan of Care Review  Outcome: Progressing  Flowsheets (Taken 4/11/2025 0612)  Progress: improving  Plan of Care Reviewed With: patient     Problem: Adult Inpatient Plan of Care  Goal: Absence of Hospital-Acquired Illness or Injury  Intervention: Prevent Skin Injury  Recent Flowsheet Documentation  Taken 4/11/2025 0600 by Georgiana Santana RN  Body Position:   position changed independently   sitting up in bed  Taken 4/11/2025 0400 by Georgiana Santana RN  Body Position: position changed independently  Taken 4/11/2025 0000 by Georgiana Santana RN  Body Position: position changed independently  Taken 4/10/2025 2214 by Georgiana Santana RN  Body Position: position changed independently  Taken 4/10/2025 2030 by Georgiana Santana RN  Body Position: position changed independently  Skin Protection: incontinence pads utilized     Problem: Adult Inpatient Plan of Care  Goal: Absence of Hospital-Acquired Illness or Injury  Intervention: Prevent and Manage VTE (Venous Thromboembolism) Risk  Recent Flowsheet Documentation  Taken 4/10/2025 2030 by Georgiana Santana RN  VTE Prevention/Management: (educated about need for scds, patient refusing at this time)   SCDs (sequential compression devices) off   patient refused intervention     Problem: Adult Inpatient Plan of Care  Goal: Absence of Hospital-Acquired Illness or Injury  Intervention: Prevent Infection  Recent Flowsheet Documentation  Taken 4/11/2025 0600 by Georgiana Santana RN  Infection Prevention: environmental surveillance performed  Taken 4/11/2025 0400 by Georgiana Santana RN  Infection Prevention: environmental surveillance performed  Taken 4/11/2025 0000 by Georgiana Santana RN  Infection Prevention: environmental surveillance performed  Taken 4/10/2025 2214 by Georgiana Santana RN  Infection Prevention: environmental surveillance performed  Taken 4/10/2025 2030 by  Georgiana Santana, RN  Infection Prevention:   environmental surveillance performed   rest/sleep promoted   single patient room provided   Goal Outcome Evaluation:  Plan of Care Reviewed With: patient        Progress: improving

## 2025-04-11 NOTE — ANESTHESIA POSTPROCEDURE EVALUATION
Patient: Angelo Brown    Procedure Summary       Date: 04/11/25 Room / Location: [facility] OR [address] [place] [address] HYBRID OR    Anesthesia Start: 0758 Anesthesia Stop: 0937    Procedure: ARTERIOVENOUS FISTULA THROMBECTOMY, FISTULOGRAM, AND STENT PLACEMENT (Left) Diagnosis:       Occlusion of arteriovenous dialysis graft      (Occlusion of arteriovenous dialysis graft [T82.898A])    Surgeons: Abeba Hammer MD Provider: Karthik Osborn MD    Anesthesia Type: general ASA Status: 3            Anesthesia Type: general    Vitals  Vitals Value Taken Time   /76 04/11/25 10:15   Temp 36.7 °C (98.1 °F) 04/11/25 09:35   Pulse 65 04/11/25 10:26   Resp 10 04/11/25 10:00   SpO2 100 % 04/11/25 10:26   Vitals shown include unfiled device data.        Post Anesthesia Care and Evaluation    Anesthetic complications: No anesthetic complications

## 2025-04-12 ENCOUNTER — READMISSION MANAGEMENT (OUTPATIENT)
Dept: CALL CENTER | Facility: HOSPITAL | Age: 65
End: 2025-04-12
Payer: MEDICARE

## 2025-04-12 VITALS
WEIGHT: 164.9 LBS | TEMPERATURE: 98.3 F | HEART RATE: 80 BPM | SYSTOLIC BLOOD PRESSURE: 172 MMHG | RESPIRATION RATE: 18 BRPM | OXYGEN SATURATION: 99 % | DIASTOLIC BLOOD PRESSURE: 102 MMHG | BODY MASS INDEX: 24.42 KG/M2 | HEIGHT: 69 IN

## 2025-04-12 LAB
ALBUMIN SERPL-MCNC: 4.5 G/DL (ref 3.5–5.2)
ANION GAP SERPL CALCULATED.3IONS-SCNC: 16 MMOL/L (ref 5–15)
BASOPHILS # BLD AUTO: 0.03 10*3/MM3 (ref 0–0.2)
BASOPHILS # BLD MANUAL: 0 10*3/MM3 (ref 0–0.2)
BASOPHILS NFR BLD AUTO: 0.4 % (ref 0–1.5)
BASOPHILS NFR BLD MANUAL: 0 % (ref 0–1.5)
BUN SERPL-MCNC: 31 MG/DL (ref 8–23)
BUN/CREAT SERPL: 6.3 (ref 7–25)
CALCIUM SPEC-SCNC: 10.4 MG/DL (ref 8.6–10.5)
CHLORIDE SERPL-SCNC: 94 MMOL/L (ref 98–107)
CO2 SERPL-SCNC: 25 MMOL/L (ref 22–29)
CREAT SERPL-MCNC: 4.95 MG/DL (ref 0.76–1.27)
DEPRECATED RDW RBC AUTO: 49.5 FL (ref 37–54)
EGFRCR SERPLBLD CKD-EPI 2021: 12.5 ML/MIN/1.73
EOSINOPHIL # BLD AUTO: 0.23 10*3/MM3 (ref 0–0.4)
EOSINOPHIL # BLD MANUAL: 0.22 10*3/MM3 (ref 0–0.4)
EOSINOPHIL NFR BLD AUTO: 3.4 % (ref 0.3–6.2)
EOSINOPHIL NFR BLD MANUAL: 3.2 % (ref 0.3–6.2)
ERYTHROCYTE [DISTWIDTH] IN BLOOD BY AUTOMATED COUNT: 13.7 % (ref 12.3–15.4)
GLUCOSE BLDC GLUCOMTR-MCNC: 165 MG/DL (ref 70–130)
GLUCOSE BLDC GLUCOMTR-MCNC: 221 MG/DL (ref 70–130)
GLUCOSE BLDC GLUCOMTR-MCNC: 271 MG/DL (ref 70–130)
GLUCOSE SERPL-MCNC: 252 MG/DL (ref 65–99)
HCT VFR BLD AUTO: 34.6 % (ref 37.5–51)
HGB BLD-MCNC: 11.1 G/DL (ref 13–17.7)
IMM GRANULOCYTES # BLD AUTO: 0.03 10*3/MM3 (ref 0–0.05)
IMM GRANULOCYTES NFR BLD AUTO: 0.4 % (ref 0–0.5)
LYMPHOCYTES # BLD AUTO: 1.15 10*3/MM3 (ref 0.7–3.1)
LYMPHOCYTES # BLD MANUAL: 0.56 10*3/MM3 (ref 0.7–3.1)
LYMPHOCYTES NFR BLD AUTO: 17.1 % (ref 19.6–45.3)
LYMPHOCYTES NFR BLD MANUAL: 7.4 % (ref 5–12)
MCH RBC QN AUTO: 31.8 PG (ref 26.6–33)
MCHC RBC AUTO-ENTMCNC: 32.1 G/DL (ref 31.5–35.7)
MCV RBC AUTO: 99.1 FL (ref 79–97)
METAMYELOCYTES NFR BLD MANUAL: 1.1 % (ref 0–0)
MONOCYTES # BLD AUTO: 0.74 10*3/MM3 (ref 0.1–0.9)
MONOCYTES # BLD: 0.5 10*3/MM3 (ref 0.1–0.9)
MONOCYTES NFR BLD AUTO: 11 % (ref 5–12)
NEUTROPHILS # BLD AUTO: 5.38 10*3/MM3 (ref 1.7–7)
NEUTROPHILS NFR BLD AUTO: 4.54 10*3/MM3 (ref 1.7–7)
NEUTROPHILS NFR BLD AUTO: 67.7 % (ref 42.7–76)
NEUTROPHILS NFR BLD MANUAL: 80 % (ref 42.7–76)
PHOSPHATE SERPL-MCNC: 7.2 MG/DL (ref 2.5–4.5)
PLAT MORPH BLD: NORMAL
PLATELET # BLD AUTO: 101 10*3/MM3 (ref 140–450)
PMV BLD AUTO: 11.2 FL (ref 6–12)
POTASSIUM SERPL-SCNC: 5.1 MMOL/L (ref 3.5–5.2)
RBC # BLD AUTO: 3.49 10*6/MM3 (ref 4.14–5.8)
RBC MORPH BLD: NORMAL
SODIUM SERPL-SCNC: 135 MMOL/L (ref 136–145)
VARIANT LYMPHS NFR BLD MANUAL: 8.4 % (ref 19.6–45.3)
WBC MORPH BLD: NORMAL
WBC NRBC COR # BLD AUTO: 6.72 10*3/MM3 (ref 3.4–10.8)

## 2025-04-12 PROCEDURE — 82948 REAGENT STRIP/BLOOD GLUCOSE: CPT

## 2025-04-12 PROCEDURE — G0378 HOSPITAL OBSERVATION PER HR: HCPCS

## 2025-04-12 PROCEDURE — 85025 COMPLETE CBC W/AUTO DIFF WBC: CPT | Performed by: HOSPITALIST

## 2025-04-12 PROCEDURE — 63710000001 INSULIN REGULAR HUMAN PER 5 UNITS: Performed by: STUDENT IN AN ORGANIZED HEALTH CARE EDUCATION/TRAINING PROGRAM

## 2025-04-12 PROCEDURE — 80069 RENAL FUNCTION PANEL: CPT | Performed by: INTERNAL MEDICINE

## 2025-04-12 PROCEDURE — 85007 BL SMEAR W/DIFF WBC COUNT: CPT | Performed by: HOSPITALIST

## 2025-04-12 PROCEDURE — 97161 PT EVAL LOW COMPLEX 20 MIN: CPT

## 2025-04-12 PROCEDURE — 97530 THERAPEUTIC ACTIVITIES: CPT

## 2025-04-12 PROCEDURE — 99213 OFFICE O/P EST LOW 20 MIN: CPT | Performed by: SURGERY

## 2025-04-12 RX ADMIN — SODIUM ZIRCONIUM CYCLOSILICATE 10 G: 10 POWDER, FOR SUSPENSION ORAL at 10:52

## 2025-04-12 RX ADMIN — ALPRAZOLAM 0.25 MG: 0.25 TABLET ORAL at 08:21

## 2025-04-12 RX ADMIN — INSULIN HUMAN 3 UNITS: 100 INJECTION, SOLUTION PARENTERAL at 08:22

## 2025-04-12 RX ADMIN — CETIRIZINE HYDROCHLORIDE 10 MG: 10 TABLET ORAL at 08:22

## 2025-04-12 RX ADMIN — OXYCODONE HYDROCHLORIDE 10 MG: 10 TABLET ORAL at 08:21

## 2025-04-12 RX ADMIN — PANTOPRAZOLE SODIUM 40 MG: 40 TABLET, DELAYED RELEASE ORAL at 05:20

## 2025-04-12 RX ADMIN — INSULIN HUMAN 2 UNITS: 100 INJECTION, SOLUTION PARENTERAL at 17:00

## 2025-04-12 RX ADMIN — CALCIUM ACETATE 667 MG: 667 CAPSULE ORAL at 08:22

## 2025-04-12 RX ADMIN — Medication 1000 UNITS: at 08:22

## 2025-04-12 RX ADMIN — OXYCODONE HYDROCHLORIDE 10 MG: 10 TABLET ORAL at 02:04

## 2025-04-12 RX ADMIN — OXYCODONE HYDROCHLORIDE AND ACETAMINOPHEN 500 MG: 500 TABLET ORAL at 08:21

## 2025-04-12 RX ADMIN — Medication 1000 MCG: at 08:21

## 2025-04-12 RX ADMIN — ACETAMINOPHEN 1000 MG: 500 TABLET, FILM COATED ORAL at 05:20

## 2025-04-12 RX ADMIN — INSULIN HUMAN 4 UNITS: 100 INJECTION, SOLUTION PARENTERAL at 12:00

## 2025-04-12 NOTE — PROGRESS NOTES
[facility]   Surgical Progress Note    Patient Name: Angelo Brown  : 1960  MRN: [id number]  Date of admission: 4/10/2025  Surgical Procedures Since Admission:  Procedure(s):  ARTERIOVENOUS FISTULA THROMBECTOMY, FISTULOGRAM, AND STENT PLACEMENT  Surgeon:  Abeba Hammer MD  Status:  1 Day Post-Op  -------------------    Subjective   Subjective     Chief Complaint: Postop day 1 status post left open thrombectomy of AV graft    History of Present Illness   Thrombectomy yesterday for 64-year-old gentleman doing well.    Review of Systems    Objective   Objective     Vitals:   Temp:  [97.3 °F (36.3 °C)-98.3 °F (36.8 °C)] 97.5 °F (36.4 °C)  Heart Rate:  [] 75  Resp:  [10-18] 18  BP: (127-190)/(69-91) 168/77  Flow (L/min) (Oxygen Therapy):  [2] 2    Physical Exam    Good thrill in shunt.  Result Review    Result Review:  I have personally reviewed the results from the time of this admission to 2025 09:36 EDT and agree with these findings:  [x]  Laboratory list / accordion  []  Microbiology  []  Radiology  []  EKG/Telemetry   []  Cardiology/Vascular   []  Pathology  []  Old records  []  Other:  Most notable findings include: Labs      Results from last 7 days   Lab Units 25  0520 25  0555 04/10/25  0855   WBC 10*3/mm3 6.72 4.44 4.96   HEMOGLOBIN g/dL 11.1* 11.1* 10.6*   PLATELETS 10*3/mm3 101* 86* 93*     Results from last 7 days   Lab Units 25  0520 25  0555 04/10/25  1246 04/10/25  0855   SODIUM mmol/L 135* 138 138 136   POTASSIUM mmol/L 5.1 5.3* 5.8* 5.6*   CHLORIDE mmol/L 94* 101 97* 97*   CO2 mmol/L 25.0 24.5 25.0 24.8   BUN mg/dL 31* 40* 70* 63*   CREATININE mg/dL 4.95* 5.63* 8.23* 8.01*   GLUCOSE mg/dL 252* 139* 175* 164*   PHOSPHORUS mg/dL 7.2* 6.1*  --   --    Estimated Creatinine Clearance: 16.4 mL/min (A) (by C-G formula based on SCr of 4.95 mg/dL (H)).  Results from last 7 days   Lab Units 04/10/25  0855   PROTIME Seconds 14.1   INR  1.10     Lab Results    Component Value Date    HGBA1C 5.60 04/11/2025    HGBA1C 6.70 (H) 02/06/2025    HGBA1C CANCELED 02/03/2025     Glucose   Date/Time Value Ref Range Status   04/12/2025 0638 221 (H) 70 - 130 mg/dL Final   04/11/2025 2108 232 (H) 70 - 130 mg/dL Final   04/11/2025 1111 143 (H) 70 - 130 mg/dL Final   04/11/2025 0940 125 70 - 130 mg/dL Final   04/11/2025 0539 145 (H) 70 - 130 mg/dL Final   04/10/2025 2031 292 (H) 70 - 130 mg/dL Final   04/10/2025 1853 260 (H) 70 - 130 mg/dL Final   04/10/2025 1337 210 (H) 70 - 130 mg/dL Final       Assessment & Plan   Assessment / Plan     Brief Patient Summary:  Angelo Brown is a 64 y.o. male who is status post left thrombectomy and doing well.  Stable for discharge from vascular standpoint.  Will follow-up in 2 to 3 months in the office with duplex scan of the graft and appointment    Active Hospital Problems:   Active Hospital Problems    Diagnosis     **Occlusion of arteriovenous dialysis graft      Plan:   Doing well after thrombectomy.  Stable for discharge from vascular standpoint.    VTE Prophylaxis:  Pharmacologic & mechanical VTE prophylaxis orders are present.        Kyle Sanchez MD

## 2025-04-12 NOTE — OUTREACH NOTE
Prep Survey      Flowsheet Row Responses   [facility] patient discharged from? [place]   Is LACE score < 7 ? No   Eligibility Readm Mgmt   Discharge diagnosis Thrombosis left arteriovenous dialysis graft status post thrombectomy   Does the patient have one of the following disease processes/diagnoses(primary or secondary)? Other   Does the patient have Home health ordered? No   Is there a DME ordered? No   Prep survey completed? Yes            VENU DAVIS - Registered Nurse

## 2025-04-12 NOTE — PLAN OF CARE
Goal Outcome Evaluation:  Plan of Care Reviewed With: patient        Progress: improving  Outcome Evaluation: Pt is a 64 y.o. male admitted to [facility] due to inability to perform dialysis due to AV graft thrombosis on 4/10/2025. On 4/11/2025 pt underwent arteriovenous fistula thrombectomy, fistulogram, and stent placement. Prior to hospital admission, pt reports residing at home w/ his wife and 1STE. Prior to hospital stay, pt was IADLs and utilized no AD at baseline. Pt required SBA for bed mobility, SBA for STS transfers, and CGA with progression to SBA for ambulation with no AD for 300ft ft. Pt able to navigate 9 steps w/ step over step pattern during ascent and step-to-step pattern during descent. Pt demonstrated good stability and appears at his baseline function. He does not require skilled therapeutic services at this time. Plan is to return home w/ his wife at D/C.    Anticipated Discharge Disposition (PT): home, home with assist

## 2025-04-12 NOTE — NURSING NOTE
Order to discharge patient. IV's and lines removed per orders. All belongings sent with patient and daughter. Patient belongings obtained from security. Discharge education provided. Patient discharged.

## 2025-04-12 NOTE — PROGRESS NOTES
"Daily progress note    Primary care physician       Subjective  Awake and alert and feeling better with no new complaint and wants to go home.    History of present illness  64-year-old white male with history of end-stage renal disease on hemodialysis CVA coronary artery disease diabetes hypertension hyperlipidemia and gastroesophageal disease who is well-known to our service brought to the emergency room with nonfunctioning left arm AVG secondary to thrombosis.  Patient admitted to the hospital and vascular surgery consult obtained for thrombectomy and I am asked to admit patient and follow-up medical problems.  At the time of examination he has no specific complaints.  Patient denies any fever chills chest pain shortness of breath palpitation nausea vomiting diarrhea.     REVIEW OF SYSTEMS  Unremarkable     PHYSICAL EXAM   Blood pressure 111/80, pulse 81, temperature 97.8 °F (36.6 °C), temperature source Oral, resp. rate 18, height 175 cm (68.9\"), weight 74.8 kg (164 lb 14.5 oz), SpO2 99%.    GENERAL: alert, no acute distress, chroncially illappearing  SKIN: Warm, dry, mild generalized pallor  HENT: Normocephalic, atraumatic  EYES: no scleral icterus  CV: regular rhythm, regular rate, dialysis fistla LUE with palpable thrill to inferior portion but otherwise no palpable flow in graft.   RESPIRATORY: normal effort, moving air bilaterally  ABDOMEN: soft, nontender, nondistended bowel sounds positive  MUSCULOSKELETAL: no deformity,atraumatic exam, active ROM to all 4 extremities  NEURO: alert, moves all extremities, follows commands     LAB RESULTS  Lab Results (last 24 hours)       Procedure Component Value Units Date/Time    POC Glucose Once [[id number]]  (Abnormal) Collected: 04/12/25 1129    Specimen: Blood Updated: 04/12/25 1131     Glucose 271 mg/dL     Manual Differential [[id number]]  (Abnormal) Collected: 04/12/25 0520    Specimen: Blood Updated: 04/12/25 0852     Neutrophil % 80.0 %      Lymphocyte % " 8.4 %      Monocyte % 7.4 %      Eosinophil % 3.2 %      Basophil % 0.0 %      Metamyelocyte % 1.1 %      Neutrophils Absolute 5.38 10*3/mm3      Lymphocytes Absolute 0.56 10*3/mm3      Monocytes Absolute 0.50 10*3/mm3      Eosinophils Absolute 0.22 10*3/mm3      Basophils Absolute 0.00 10*3/mm3      RBC Morphology Normal     WBC Morphology Normal     Platelet Morphology Normal    Renal Function Panel [[id number]]  (Abnormal) Collected: 04/12/25 0520    Specimen: Blood Updated: 04/12/25 0728     Glucose 252 mg/dL      BUN 31 mg/dL      Creatinine 4.95 mg/dL      Sodium 135 mmol/L      Potassium 5.1 mmol/L      Chloride 94 mmol/L      CO2 25.0 mmol/L      Calcium 10.4 mg/dL      Albumin 4.5 g/dL      Phosphorus 7.2 mg/dL      Anion Gap 16.0 mmol/L      BUN/Creatinine Ratio 6.3     eGFR 12.5 mL/min/1.73     Narrative:      GFR Categories in Chronic Kidney Disease (CKD)      GFR Category          GFR (mL/min/1.73)    Interpretation  G1                     90 or greater         Normal or high (1)  G2                      60-89                Mild decrease (1)  G3a                   45-59                Mild to moderate decrease  G3b                   30-44                Moderate to severe decrease  G4                    15-29                Severe decrease  G5                    14 or less           Kidney failure          (1)In the absence of evidence of kidney disease, neither GFR category G1 or G2 fulfill the criteria for CKD.    eGFR calculation 2021 CKD-EPI creatinine equation, which does not include race as a factor    CBC & Differential [[id number]]  (Abnormal) Collected: 04/12/25 0520    Specimen: Blood Updated: 04/12/25 0724    Narrative:      The following orders were created for panel order CBC & Differential.  Procedure                               Abnormality         Status                     ---------                               -----------         ------                     CBC Auto  Differential[[id number]]        Abnormal            Final result                 Please view results for these tests on the individual orders.    CBC Auto Differential [[id number]]  (Abnormal) Collected: 04/12/25 0520    Specimen: Blood Updated: 04/12/25 0724     WBC 6.72 10*3/mm3      RBC 3.49 10*6/mm3      Hemoglobin 11.1 g/dL      Hematocrit 34.6 %      MCV 99.1 fL      MCH 31.8 pg      MCHC 32.1 g/dL      RDW 13.7 %      RDW-SD 49.5 fl      MPV 11.2 fL      Platelets 101 10*3/mm3      Neutrophil % 67.7 %      Lymphocyte % 17.1 %      Monocyte % 11.0 %      Eosinophil % 3.4 %      Basophil % 0.4 %      Immature Grans % 0.4 %      Neutrophils, Absolute 4.54 10*3/mm3      Lymphocytes, Absolute 1.15 10*3/mm3      Monocytes, Absolute 0.74 10*3/mm3      Eosinophils, Absolute 0.23 10*3/mm3      Basophils, Absolute 0.03 10*3/mm3      Immature Grans, Absolute 0.03 10*3/mm3     POC Glucose Once [[id number]]  (Abnormal) Collected: 04/12/25 0638    Specimen: Blood Updated: 04/12/25 0639     Glucose 221 mg/dL     POC Glucose Once [[id number]]  (Abnormal) Collected: 04/11/25 2108    Specimen: Blood Updated: 04/11/25 2111     Glucose 232 mg/dL           Imaging Results (Last 24 Hours)       ** No results found for the last 24 hours. **          Scan on 4/10/2025 0914 by [facility]: ECG 12-LEAD         Author: -- Service: -- Author Type: --   Filed: Date of Service: Creation Time:   Status: (Other)   HEART RATE=58  bpm  RR Ncmlwknp=7373  ms  IL Zkerqwdv=088  ms  P Horizontal Axis=28  deg  P Front Axis=55  deg  QRSD Nqvvgpzg=892  ms  QT Fyztbjhh=569  ms  DHlA=556  ms  QRS Axis=40  deg  T Wave Axis=20  deg  - BORDERLINE ECG -  Sinus rhythm  Borderline prolonged IL interval          Current Facility-Administered Medications:     ascorbic acid (VITAMIN C) tablet 500 mg, 500 mg, Oral, Daily, Abeba Hammer MD, 500 mg at 04/12/25 0821    atorvastatin (LIPITOR) tablet 20 mg, 20 mg, Oral, Nightly, Abeba Hammer MD, 20  mg at 04/11/25 2128    calcium acetate (PHOS BINDER)) capsule 667 mg, 667 mg, Oral, Daily, Abeba Hammer MD, 667 mg at 04/12/25 0822    cetirizine (zyrTEC) tablet 10 mg, 10 mg, Oral, Daily, Abeba Hammer MD, 10 mg at 04/12/25 0822    cholecalciferol (VITAMIN D3) tablet 1,000 Units, 1,000 Units, Oral, Daily, Abeba Hammer MD, 1,000 Units at 04/12/25 0822    heparin (porcine) injection 2,000 Units, 2,000 Units, Intravenous, Once, Yohan Murrell MD    insulin glargine (LANTUS, SEMGLEE) injection 10 Units, 10 Units, Subcutaneous, Nightly, Abeba Hammer MD, 10 Units at 04/11/25 2128    insulin regular (humuLIN R,novoLIN R) injection 2-7 Units, 2-7 Units, Subcutaneous, 4x Daily AC & at Bedtime, Abeba Hammer MD, 4 Units at 04/12/25 1200    lidocaine (XYLOCAINE) 1 % injection 20 mL, 20 mL, Infiltration, Once, Abeba Hammer MD    pantoprazole (PROTONIX) EC tablet 40 mg, 40 mg, Oral, Q AM, Abeba Hammer MD, 40 mg at 04/12/25 0520    [COMPLETED] Insert Peripheral IV, , , Once **AND** sodium chloride 0.9 % flush 10 mL, 10 mL, Intravenous, PRN, Abeba Hammer MD, 10 mL at 04/11/25 2128    vitamin B-12 (CYANOCOBALAMIN) tablet 1,000 mcg, 1,000 mcg, Oral, Daily, Abeba Hammer MD, 1,000 mcg at 04/12/25 0821     ASSESSMENT  Thrombosis left arteriovenous dialysis graft status post thrombectomy  End-stage renal disease  Diabetes mellitus  Hypertension  Hyperlipidemia  Coronary artery disease  History of CVA  Chronic anemia  Gastroesophageal reflux disease    PLAN  Discontinue nontunneled central venous catheter prior to discharge  Discharge home  Discharge summary dictated    ARIEL MCGOVERN MD    Copied text in this note has been reviewed and is accurate as of 04/12/25

## 2025-04-12 NOTE — PROGRESS NOTES
"   LOS: 0 days     Chief Complaint/ Reason for encounter: ESRD, dialysis management    Subjective   04/11/25 : Patient seen in PACU, status post declot of his left arm AV graft  Good thrill and bruit in his left arm  He denies any shortness of breath chest pain fevers chills nausea vomiting or edema    April 12: He looks and feels well denies complaints  Tolerated dialysis well yesterday, no issues with his AV graft on dialysis, UF 2 L, euvolemic no dyspnea.  Good appetite no nausea or vomiting.  Potassium today 5.1    Medical history reviewed:  History of Present Illness    Subjective    History taken from: Chart and patient/family as able    Vital Signs  Temp:  [97.3 °F (36.3 °C)-98.3 °F (36.8 °C)] 97.5 °F (36.4 °C)  Heart Rate:  [] 75  Resp:  [10-18] 18  BP: (127-182)/(69-91) 168/77       Wt Readings from Last 1 Encounters:   04/11/25 0649 74.8 kg (164 lb 14.5 oz)   04/10/25 0833 74.8 kg (165 lb)       Objective:  Vital signs: (most recent): Blood pressure 168/77, pulse 75, temperature 97.5 °F (36.4 °C), temperature source Oral, resp. rate 18, height 175 cm (68.9\"), weight 74.8 kg (164 lb 14.5 oz), SpO2 99%.                Objective:  General Appearance:  Comfortable, well-appearing, in no acute distress and not in pain.  HEENT: Mucous membranes moist  Lungs:  Normal effort and normal respiratory rate.  Breath sounds clear to auscultation: No rhonchi/Rales.  No  respiratory distress.   Heart:  S1, S2 normal.  No murmur.   Abdomen: Abdomen is soft, nontender/nondistended, + bowel sounds  Extremities: No edema of bilateral lower extremities  Left arm AV graft good thrill and bruit  Skin:  Warm and dry with no rashes      Results Review:    Intake/Output:     Intake/Output Summary (Last 24 hours) at 4/12/2025 0952  Last data filed at 4/12/2025 0900  Gross per 24 hour   Intake 720 ml   Output 2000 ml   Net -1280 ml         DATA:  Radiology and Labs:  The following labs independently reviewed by me. Additional " labs ordered for tomorrow a.m.  Interval notes, chart personally reviewed by me.   Old records independently reviewed showing ESRD on HD  Discussed with patient    Risk/ complexity of medical care/ medical decision making moderate complexity, dialysis management      Labs:   Recent Results (from the past 24 hours)   POC Glucose Once    Collection Time: 04/11/25 11:11 AM    Specimen: Blood   Result Value Ref Range    Glucose 143 (H) 70 - 130 mg/dL   POC Glucose Once    Collection Time: 04/11/25  9:08 PM    Specimen: Blood   Result Value Ref Range    Glucose 232 (H) 70 - 130 mg/dL   Renal Function Panel    Collection Time: 04/12/25  5:20 AM    Specimen: Blood   Result Value Ref Range    Glucose 252 (H) 65 - 99 mg/dL    BUN 31 (H) 8 - 23 mg/dL    Creatinine 4.95 (H) 0.76 - 1.27 mg/dL    Sodium 135 (L) 136 - 145 mmol/L    Potassium 5.1 3.5 - 5.2 mmol/L    Chloride 94 (L) 98 - 107 mmol/L    CO2 25.0 22.0 - 29.0 mmol/L    Calcium 10.4 8.6 - 10.5 mg/dL    Albumin 4.5 3.5 - 5.2 g/dL    Phosphorus 7.2 (H) 2.5 - 4.5 mg/dL    Anion Gap 16.0 (H) 5.0 - 15.0 mmol/L    BUN/Creatinine Ratio 6.3 (L) 7.0 - 25.0    eGFR 12.5 (L) >60.0 mL/min/1.73   CBC Auto Differential    Collection Time: 04/12/25  5:20 AM    Specimen: Blood   Result Value Ref Range    WBC 6.72 3.40 - 10.80 10*3/mm3    RBC 3.49 (L) 4.14 - 5.80 10*6/mm3    Hemoglobin 11.1 (L) 13.0 - 17.7 g/dL    Hematocrit 34.6 (L) 37.5 - 51.0 %    MCV 99.1 (H) 79.0 - 97.0 fL    MCH 31.8 26.6 - 33.0 pg    MCHC 32.1 31.5 - 35.7 g/dL    RDW 13.7 12.3 - 15.4 %    RDW-SD 49.5 37.0 - 54.0 fl    MPV 11.2 6.0 - 12.0 fL    Platelets 101 (L) 140 - 450 10*3/mm3    Neutrophil % 67.7 42.7 - 76.0 %    Lymphocyte % 17.1 (L) 19.6 - 45.3 %    Monocyte % 11.0 5.0 - 12.0 %    Eosinophil % 3.4 0.3 - 6.2 %    Basophil % 0.4 0.0 - 1.5 %    Immature Grans % 0.4 0.0 - 0.5 %    Neutrophils, Absolute 4.54 1.70 - 7.00 10*3/mm3    Lymphocytes, Absolute 1.15 0.70 - 3.10 10*3/mm3    Monocytes, Absolute 0.74 0.10  - 0.90 10*3/mm3    Eosinophils, Absolute 0.23 0.00 - 0.40 10*3/mm3    Basophils, Absolute 0.03 0.00 - 0.20 10*3/mm3    Immature Grans, Absolute 0.03 0.00 - 0.05 10*3/mm3   Manual Differential    Collection Time: 04/12/25  5:20 AM    Specimen: Blood   Result Value Ref Range    Neutrophil % 80.0 (H) 42.7 - 76.0 %    Lymphocyte % 8.4 (L) 19.6 - 45.3 %    Monocyte % 7.4 5.0 - 12.0 %    Eosinophil % 3.2 0.3 - 6.2 %    Basophil % 0.0 0.0 - 1.5 %    Metamyelocyte % 1.1 (H) 0.0 - 0.0 %    Neutrophils Absolute 5.38 1.70 - 7.00 10*3/mm3    Lymphocytes Absolute 0.56 (L) 0.70 - 3.10 10*3/mm3    Monocytes Absolute 0.50 0.10 - 0.90 10*3/mm3    Eosinophils Absolute 0.22 0.00 - 0.40 10*3/mm3    Basophils Absolute 0.00 0.00 - 0.20 10*3/mm3    RBC Morphology Normal Normal    WBC Morphology Normal Normal    Platelet Morphology Normal Normal   POC Glucose Once    Collection Time: 04/12/25  6:38 AM    Specimen: Blood   Result Value Ref Range    Glucose 221 (H) 70 - 130 mg/dL       Radiology:  Pertinent radiology studies were reviewed as described above      Medications have been reviewed separately in chart review medication tab    ASSESSMENT:  ESRD on dialysis Tuesday Thursday Saturday  Hyperkalemia, better, 5.1  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  Access functioned well 2 L fluid removed  Potassium stable today at 5.1  Stable for discharge home today from renal standpoint  Okay to discontinue temporary dialysis catheter  No need for additional dialysis today.  I think it be fine waiting until Tuesday for his neck scheduled dialysis  We discussed a tight fluid restriction over the next several days and discussed renal, low potassium diet  Will give Lokelma x 1 today prior to  discharge    Follow-up at outpatient dialysis on Tuesday      Yohan Murrell MD  [facility]   Office phone number: [phone]  Answering service phone number: [phone]    04/12/25  09:52 EDT    Dictation performed using Dragon dictation software
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
## 2025-04-12 NOTE — PLAN OF CARE
Goal Outcome Evaluation:  Plan of Care Reviewed With: patient        Progress: improving     Vital signs stable. Alert and oriented x 4. On room air. Normal sinus cardiac rhythm. Right IJ dual lumen shiley dialysis catheter with pigtail patent. Dressing clean / dry / intact. Left upper arm AV fistula incision noted with liquid skin adhesive. Thrill and bruit noted. Radial pulse palpable. Prn oxycodone 10 mg given for complaint of left arm discomfort. Prn xanax 0.25 mg given once for complaint of anxiety. Left arm Thrombectomy with fistulagram completed this morning. Dialysis completed this afternoon. Tolerating consistent carbohydrate diet well. Blood sugar monitoring done AC/HS. Resting quietly in bed watching television. Bed alarm on. Call light within patient's reach. Possible discharge home in 1 to 2 days.

## 2025-04-12 NOTE — DISCHARGE SUMMARY
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  End-stage renal disease  Diabetes mellitus  Hypertension  Hyperlipidemia  Coronary artery disease  History of CVA  Chronic anemia  Gastroesophageal reflux disease    Discharge medications    Current Facility-Administered Medications:     ascorbic acid (VITAMIN C) tablet 500 mg, 500 mg, Oral, Daily, Abeba Hammer MD, 500 mg at 04/12/25 0821    atorvastatin (LIPITOR) tablet 20 mg, 20 mg, Oral, Nightly, Abeba Hammer MD, 20 mg at 04/11/25 2128    calcium acetate (PHOS BINDER)) capsule 667 mg, 667 mg, Oral, Daily, Abeba Hammer MD, 667 mg at 04/12/25 0822    cetirizine (zyrTEC) tablet 10 mg, 10 mg, Oral, Daily, Abeba Hammer MD, 10 mg at 04/12/25 0822    cholecalciferol (VITAMIN D3) tablet 1,000 Units, 1,000 Units, Oral, Daily, Abeba Hammer MD, 1,000 Units at 04/12/25 0822    heparin (porcine) injection 2,000 Units, 2,000 Units, Intravenous, Once, Yohan Murrell MD    insulin glargine (LANTUS, SEMGLEE) injection 10 Units, 10 Units, Subcutaneous, Nightly, Abeba Hammer MD, 10 Units at 04/11/25 2128    insulin regular (humuLIN R,novoLIN R) injection 2-7 Units, 2-7 Units, Subcutaneous, 4x Daily AC & at Bedtime, Abeba Hammer MD, 4 Units at 04/12/25 1200    lidocaine (XYLOCAINE) 1 % injection 20 mL, 20 mL, Infiltration, Once, Abeba Hammer MD    pantoprazole (PROTONIX) EC tablet 40 mg, 40 mg, Oral, Q AM, Abeba Hammer MD, 40 mg at 04/12/25 0520    [COMPLETED] Insert Peripheral IV, , , Once **AND** sodium chloride 0.9 % flush 10 mL, 10 mL, Intravenous, PRN, Abeba Hammer MD, 10 mL at 04/11/25 2122    vitamin B-12 (CYANOCOBALAMIN) tablet 1,000 mcg, 1,000 mcg, Oral, Daily, Abeba Hammer MD, 1,000 mcg at 04/12/25 0821     Consults obtained  Vascular  surgery  Nephrology    Procedures  Nontunneled central venous catheter placement for hemodialysis  Arteriovenous fistula thrombectomy fistulogram and stent placement    Hospital course  64-year-old white male with history of end-stage renal disease on hemodialysis admitted to emergency room with malfunctioning vascular access.  Patient admitted for management.  Patient admitted treated with supportive care and temporary hemodialysis catheter placed and further evaluated by vascular surgery and recommend arteriovenous fistula thrombectomy fistulogram and stent placed without any complication.  Patient has another hemodialysis with AV fistula without any problem.  Patient temporary hemodialysis catheter will be removed prior to discharge.  Patient followed by nephrology for hemodialysis.    Discharge diet regular    Activity as tolerated    Medication as above    Follow-up with primary doctor in 1 week and follow-up with vascular surgery and nephrology per the instruction and take medication as directed and keep the appointment for hemodialysis Tuesday Thursday and Saturday.    ARIEL MCGOVERN MD

## 2025-04-12 NOTE — PLAN OF CARE
Problem: Adult Inpatient Plan of Care  Goal: Plan of Care Review  Outcome: Progressing  Flowsheets (Taken 4/12/2025 0555)  Progress: improving  Plan of Care Reviewed With: patient     Problem: Adult Inpatient Plan of Care  Goal: Absence of Hospital-Acquired Illness or Injury  Intervention: Identify and Manage Fall Risk  Recent Flowsheet Documentation  Taken 4/12/2025 0400 by Georgiana Santana RN  Safety Promotion/Fall Prevention:   fall prevention program maintained   room organization consistent   safety round/check completed  Taken 4/12/2025 0204 by Georgiana Santana RN  Safety Promotion/Fall Prevention:   fall prevention program maintained   room organization consistent   safety round/check completed  Taken 4/12/2025 0000 by Georgiana Santana RN  Safety Promotion/Fall Prevention:   fall prevention program maintained   room organization consistent   safety round/check completed  Taken 4/11/2025 2200 by Georgiana Santana RN  Safety Promotion/Fall Prevention:   fall prevention program maintained   safety round/check completed   room organization consistent  Taken 4/11/2025 2000 by Georgiana Santana RN  Safety Promotion/Fall Prevention:   fall prevention program maintained   room organization consistent   safety round/check completed     Problem: Adult Inpatient Plan of Care  Goal: Absence of Hospital-Acquired Illness or Injury  Intervention: Prevent Skin Injury  Recent Flowsheet Documentation  Taken 4/12/2025 0400 by Georgiana Santana RN  Body Position: position changed independently  Taken 4/12/2025 0204 by Georgiana Santana RN  Body Position: position changed independently  Taken 4/12/2025 0000 by Georgiana Santana RN  Body Position: position changed independently  Taken 4/11/2025 2200 by Georgiana Santana RN  Body Position:   position changed independently   sitting up in bed  Taken 4/11/2025 2000 by Georgiana Santana RN  Body Position: sitting up in bed  Skin  Protection: incontinence pads utilized   Goal Outcome Evaluation:  Plan of Care Reviewed With: patient        Progress: improving

## 2025-04-17 ENCOUNTER — ANESTHESIA (OUTPATIENT)
Dept: PERIOP | Facility: HOSPITAL | Age: 65
End: 2025-04-17
Payer: MEDICARE

## 2025-04-17 ENCOUNTER — APPOINTMENT (OUTPATIENT)
Dept: GENERAL RADIOLOGY | Facility: HOSPITAL | Age: 65
End: 2025-04-17
Payer: MEDICARE

## 2025-04-17 ENCOUNTER — HOSPITAL ENCOUNTER (OUTPATIENT)
Facility: HOSPITAL | Age: 65
Discharge: HOME | End: 2025-04-18
Attending: EMERGENCY MEDICINE | Admitting: HOSPITALIST
Payer: MEDICARE

## 2025-04-17 ENCOUNTER — READMISSION MANAGEMENT (OUTPATIENT)
Dept: CALL CENTER | Facility: HOSPITAL | Age: 65
End: 2025-04-17
Payer: MEDICARE

## 2025-04-17 ENCOUNTER — ANESTHESIA EVENT (OUTPATIENT)
Dept: PERIOP | Facility: HOSPITAL | Age: 65
End: 2025-04-17
Payer: MEDICARE

## 2025-04-17 DIAGNOSIS — T82.868A THROMBOSIS OF KIDNEY DIALYSIS ARTERIOVENOUS GRAFT, INITIAL ENCOUNTER: ICD-10-CM

## 2025-04-17 DIAGNOSIS — T82.898A OCCLUSION OF ARTERIOVENOUS DIALYSIS GRAFT: Primary | ICD-10-CM

## 2025-04-17 LAB
ANION GAP SERPL CALCULATED.3IONS-SCNC: 20.3 MMOL/L (ref 5–15)
BASOPHILS # BLD AUTO: 0.02 10*3/MM3 (ref 0–0.2)
BASOPHILS NFR BLD AUTO: 0.5 % (ref 0–1.5)
BUN SERPL-MCNC: 84 MG/DL (ref 8–23)
BUN/CREAT SERPL: 9.3 (ref 7–25)
CALCIUM SPEC-SCNC: 9.4 MG/DL (ref 8.6–10.5)
CHLORIDE SERPL-SCNC: 95 MMOL/L (ref 98–107)
CO2 SERPL-SCNC: 22.7 MMOL/L (ref 22–29)
CREAT SERPL-MCNC: 9.03 MG/DL (ref 0.76–1.27)
DEPRECATED RDW RBC AUTO: 51.5 FL (ref 37–54)
EGFRCR SERPLBLD CKD-EPI 2021: 6.1 ML/MIN/1.73
EOSINOPHIL # BLD AUTO: 0.17 10*3/MM3 (ref 0–0.4)
EOSINOPHIL NFR BLD AUTO: 4.2 % (ref 0.3–6.2)
ERYTHROCYTE [DISTWIDTH] IN BLOOD BY AUTOMATED COUNT: 14.3 % (ref 12.3–15.4)
GLUCOSE BLDC GLUCOMTR-MCNC: 173 MG/DL (ref 70–130)
GLUCOSE BLDC GLUCOMTR-MCNC: 204 MG/DL (ref 70–130)
GLUCOSE BLDC GLUCOMTR-MCNC: 228 MG/DL (ref 70–130)
GLUCOSE SERPL-MCNC: 228 MG/DL (ref 65–99)
HCT VFR BLD AUTO: 29.3 % (ref 37.5–51)
HGB BLD-MCNC: 9.3 G/DL (ref 13–17.7)
IMM GRANULOCYTES # BLD AUTO: 0.02 10*3/MM3 (ref 0–0.05)
IMM GRANULOCYTES NFR BLD AUTO: 0.5 % (ref 0–0.5)
LYMPHOCYTES # BLD AUTO: 0.66 10*3/MM3 (ref 0.7–3.1)
LYMPHOCYTES NFR BLD AUTO: 16.1 % (ref 19.6–45.3)
MCH RBC QN AUTO: 31.2 PG (ref 26.6–33)
MCHC RBC AUTO-ENTMCNC: 31.7 G/DL (ref 31.5–35.7)
MCV RBC AUTO: 98.3 FL (ref 79–97)
MONOCYTES # BLD AUTO: 0.32 10*3/MM3 (ref 0.1–0.9)
MONOCYTES NFR BLD AUTO: 7.8 % (ref 5–12)
NEUTROPHILS NFR BLD AUTO: 2.9 10*3/MM3 (ref 1.7–7)
NEUTROPHILS NFR BLD AUTO: 70.9 % (ref 42.7–76)
NRBC BLD AUTO-RTO: 0 /100 WBC (ref 0–0.2)
PLATELET # BLD AUTO: 86 10*3/MM3 (ref 140–450)
PMV BLD AUTO: 10.1 FL (ref 6–12)
POTASSIUM SERPL-SCNC: 4.7 MMOL/L (ref 3.5–5.2)
RBC # BLD AUTO: 2.98 10*6/MM3 (ref 4.14–5.8)
SODIUM SERPL-SCNC: 138 MMOL/L (ref 136–145)
WBC NRBC COR # BLD AUTO: 4.09 10*3/MM3 (ref 3.4–10.8)

## 2025-04-17 PROCEDURE — 36833 AV FISTULA REVISION: CPT | Performed by: SURGERY

## 2025-04-17 PROCEDURE — G0378 HOSPITAL OBSERVATION PER HR: HCPCS

## 2025-04-17 PROCEDURE — 25010000002 ONDANSETRON PER 1 MG: Performed by: ANESTHESIOLOGY

## 2025-04-17 PROCEDURE — 25010000002 HYDROMORPHONE PER 4 MG: Performed by: ANESTHESIOLOGY

## 2025-04-17 PROCEDURE — 36558 INSERT TUNNELED CV CATH: CPT | Performed by: SURGERY

## 2025-04-17 PROCEDURE — 25010000002 PROPOFOL 10 MG/ML EMULSION: Performed by: ANESTHESIOLOGY

## 2025-04-17 PROCEDURE — 99285 EMERGENCY DEPT VISIT HI MDM: CPT

## 2025-04-17 PROCEDURE — 25010000002 LIDOCAINE 2% SOLUTION: Performed by: ANESTHESIOLOGY

## 2025-04-17 PROCEDURE — 25010000002 CEFAZOLIN PER 500 MG: Performed by: SURGERY

## 2025-04-17 PROCEDURE — 25010000002 DEXAMETHASONE SODIUM PHOSPHATE 20 MG/5ML SOLUTION: Performed by: ANESTHESIOLOGY

## 2025-04-17 PROCEDURE — 25510000001 IOPAMIDOL PER 1 ML: Performed by: SURGERY

## 2025-04-17 PROCEDURE — 99223 1ST HOSP IP/OBS HIGH 75: CPT | Performed by: SURGERY

## 2025-04-17 PROCEDURE — 76937 US GUIDE VASCULAR ACCESS: CPT | Performed by: SURGERY

## 2025-04-17 PROCEDURE — 82948 REAGENT STRIP/BLOOD GLUCOSE: CPT

## 2025-04-17 PROCEDURE — C1725 CATH, TRANSLUMIN NON-LASER: HCPCS | Performed by: SURGERY

## 2025-04-17 PROCEDURE — 63710000001 INSULIN REGULAR HUMAN PER 5 UNITS: Performed by: HOSPITALIST

## 2025-04-17 PROCEDURE — 63710000001 INSULIN REGULAR HUMAN PER 5 UNITS: Performed by: SURGERY

## 2025-04-17 PROCEDURE — 36415 COLL VENOUS BLD VENIPUNCTURE: CPT

## 2025-04-17 PROCEDURE — 77001 FLUOROGUIDE FOR VEIN DEVICE: CPT | Performed by: SURGERY

## 2025-04-17 PROCEDURE — 25010000002 SUGAMMADEX 200 MG/2ML SOLUTION: Performed by: ANESTHESIOLOGY

## 2025-04-17 PROCEDURE — C1757 CATH, THROMBECTOMY/EMBOLECT: HCPCS | Performed by: SURGERY

## 2025-04-17 PROCEDURE — 25810000003 LACTATED RINGERS PER 1000 ML: Performed by: ANESTHESIOLOGY

## 2025-04-17 PROCEDURE — 36833 AV FISTULA REVISION: CPT | Performed by: SPECIALIST/TECHNOLOGIST, OTHER

## 2025-04-17 PROCEDURE — 36558 INSERT TUNNELED CV CATH: CPT | Performed by: SPECIALIST/TECHNOLOGIST, OTHER

## 2025-04-17 PROCEDURE — C1769 GUIDE WIRE: HCPCS | Performed by: SURGERY

## 2025-04-17 PROCEDURE — 80048 BASIC METABOLIC PNL TOTAL CA: CPT | Performed by: EMERGENCY MEDICINE

## 2025-04-17 PROCEDURE — C1874 STENT, COATED/COV W/DEL SYS: HCPCS | Performed by: SURGERY

## 2025-04-17 PROCEDURE — C1894 INTRO/SHEATH, NON-LASER: HCPCS | Performed by: SURGERY

## 2025-04-17 PROCEDURE — 77001 FLUOROGUIDE FOR VEIN DEVICE: CPT

## 2025-04-17 PROCEDURE — C1750 CATH, HEMODIALYSIS,LONG-TERM: HCPCS | Performed by: SURGERY

## 2025-04-17 PROCEDURE — 25010000002 HEPARIN (PORCINE) PER 1000 UNITS: Performed by: ANESTHESIOLOGY

## 2025-04-17 PROCEDURE — 85025 COMPLETE CBC W/AUTO DIFF WBC: CPT | Performed by: EMERGENCY MEDICINE

## 2025-04-17 PROCEDURE — 25010000002 HEPARIN (PORCINE) PER 1000 UNITS: Performed by: SURGERY

## 2025-04-17 DEVICE — IMPLANTABLE DEVICE: Type: IMPLANTABLE DEVICE | Site: ARM | Status: FUNCTIONAL

## 2025-04-17 RX ORDER — HYDROCODONE BITARTRATE AND ACETAMINOPHEN 5; 325 MG/1; MG/1
1 TABLET ORAL EVERY 6 HOURS PRN
Status: DISCONTINUED | OUTPATIENT
Start: 2025-04-17 | End: 2025-04-18

## 2025-04-17 RX ORDER — SODIUM CHLORIDE 9 MG/ML
9 INJECTION, SOLUTION INTRAVENOUS CONTINUOUS PRN
Status: DISCONTINUED | OUTPATIENT
Start: 2025-04-17 | End: 2025-04-17

## 2025-04-17 RX ORDER — ALPRAZOLAM 1 MG/1
1 TABLET ORAL 4 TIMES DAILY PRN
Status: DISCONTINUED | OUTPATIENT
Start: 2025-04-17 | End: 2025-04-18

## 2025-04-17 RX ORDER — OXYCODONE AND ACETAMINOPHEN 10; 325 MG/1; MG/1
1 TABLET ORAL EVERY 6 HOURS PRN
Refills: 0 | Status: DISCONTINUED | OUTPATIENT
Start: 2025-04-17 | End: 2025-04-18 | Stop reason: HOSPADM

## 2025-04-17 RX ORDER — DROPERIDOL 2.5 MG/ML
0.62 INJECTION, SOLUTION INTRAMUSCULAR; INTRAVENOUS
Status: DISCONTINUED | OUTPATIENT
Start: 2025-04-17 | End: 2025-04-17 | Stop reason: HOSPADM

## 2025-04-17 RX ORDER — HEPARIN SODIUM 1000 [USP'U]/ML
2000 INJECTION, SOLUTION INTRAVENOUS; SUBCUTANEOUS AS NEEDED
Status: CANCELLED | OUTPATIENT
Start: 2025-04-17

## 2025-04-17 RX ORDER — HEPARIN SODIUM 1000 [USP'U]/ML
INJECTION, SOLUTION INTRAVENOUS; SUBCUTANEOUS AS NEEDED
Status: DISCONTINUED | OUTPATIENT
Start: 2025-04-17 | End: 2025-04-17 | Stop reason: SURG

## 2025-04-17 RX ORDER — SODIUM CHLORIDE 0.9 % (FLUSH) 0.9 %
10 SYRINGE (ML) INJECTION EVERY 12 HOURS SCHEDULED
Status: DISCONTINUED | OUTPATIENT
Start: 2025-04-17 | End: 2025-04-17 | Stop reason: HOSPADM

## 2025-04-17 RX ORDER — ATROPINE SULFATE 0.4 MG/ML
0.4 INJECTION, SOLUTION INTRAMUSCULAR; INTRAVENOUS; SUBCUTANEOUS ONCE AS NEEDED
Status: DISCONTINUED | OUTPATIENT
Start: 2025-04-17 | End: 2025-04-17 | Stop reason: HOSPADM

## 2025-04-17 RX ORDER — PROMETHAZINE HYDROCHLORIDE 25 MG/1
25 SUPPOSITORY RECTAL ONCE AS NEEDED
Status: DISCONTINUED | OUTPATIENT
Start: 2025-04-17 | End: 2025-04-17 | Stop reason: HOSPADM

## 2025-04-17 RX ORDER — ONDANSETRON 2 MG/ML
INJECTION INTRAMUSCULAR; INTRAVENOUS AS NEEDED
Status: DISCONTINUED | OUTPATIENT
Start: 2025-04-17 | End: 2025-04-17 | Stop reason: SURG

## 2025-04-17 RX ORDER — IBUPROFEN 600 MG/1
1 TABLET ORAL
Status: DISCONTINUED | OUTPATIENT
Start: 2025-04-17 | End: 2025-04-18

## 2025-04-17 RX ORDER — CHOLECALCIFEROL (VITAMIN D3) 25 MCG
1000 TABLET ORAL DAILY
Status: DISCONTINUED | OUTPATIENT
Start: 2025-04-17 | End: 2025-04-18 | Stop reason: HOSPADM

## 2025-04-17 RX ORDER — SODIUM CHLORIDE 9 MG/ML
40 INJECTION, SOLUTION INTRAVENOUS AS NEEDED
Status: DISCONTINUED | OUTPATIENT
Start: 2025-04-17 | End: 2025-04-17 | Stop reason: HOSPADM

## 2025-04-17 RX ORDER — LABETALOL HYDROCHLORIDE 5 MG/ML
5 INJECTION, SOLUTION INTRAVENOUS
Status: DISCONTINUED | OUTPATIENT
Start: 2025-04-17 | End: 2025-04-17 | Stop reason: HOSPADM

## 2025-04-17 RX ORDER — FLUMAZENIL 0.1 MG/ML
0.2 INJECTION INTRAVENOUS AS NEEDED
Status: DISCONTINUED | OUTPATIENT
Start: 2025-04-17 | End: 2025-04-17 | Stop reason: HOSPADM

## 2025-04-17 RX ORDER — SODIUM CHLORIDE 0.9 % (FLUSH) 0.9 %
10 SYRINGE (ML) INJECTION AS NEEDED
Status: DISCONTINUED | OUTPATIENT
Start: 2025-04-17 | End: 2025-04-18 | Stop reason: HOSPADM

## 2025-04-17 RX ORDER — DEXTROSE MONOHYDRATE 25 G/50ML
25 INJECTION, SOLUTION INTRAVENOUS
Status: DISCONTINUED | OUTPATIENT
Start: 2025-04-17 | End: 2025-04-18

## 2025-04-17 RX ORDER — CALCIUM ACETATE 667 MG/1
667 CAPSULE ORAL DAILY
Status: DISCONTINUED | OUTPATIENT
Start: 2025-04-17 | End: 2025-04-18 | Stop reason: HOSPADM

## 2025-04-17 RX ORDER — EPHEDRINE SULFATE 50 MG/ML
5 INJECTION, SOLUTION INTRAVENOUS ONCE AS NEEDED
Status: DISCONTINUED | OUTPATIENT
Start: 2025-04-17 | End: 2025-04-17 | Stop reason: HOSPADM

## 2025-04-17 RX ORDER — HYDROCODONE BITARTRATE AND ACETAMINOPHEN 7.5; 325 MG/1; MG/1
1 TABLET ORAL EVERY 4 HOURS PRN
Status: DISCONTINUED | OUTPATIENT
Start: 2025-04-17 | End: 2025-04-17 | Stop reason: HOSPADM

## 2025-04-17 RX ORDER — CETIRIZINE HYDROCHLORIDE 10 MG/1
5 TABLET ORAL DAILY
Status: DISCONTINUED | OUTPATIENT
Start: 2025-04-17 | End: 2025-04-18 | Stop reason: HOSPADM

## 2025-04-17 RX ORDER — PROPOFOL 10 MG/ML
VIAL (ML) INTRAVENOUS AS NEEDED
Status: DISCONTINUED | OUTPATIENT
Start: 2025-04-17 | End: 2025-04-17 | Stop reason: SURG

## 2025-04-17 RX ORDER — IOPAMIDOL 510 MG/ML
100 INJECTION, SOLUTION INTRAVASCULAR
Status: COMPLETED | OUTPATIENT
Start: 2025-04-17 | End: 2025-04-17

## 2025-04-17 RX ORDER — ONDANSETRON 2 MG/ML
4 INJECTION INTRAMUSCULAR; INTRAVENOUS ONCE AS NEEDED
Status: DISCONTINUED | OUTPATIENT
Start: 2025-04-17 | End: 2025-04-17 | Stop reason: HOSPADM

## 2025-04-17 RX ORDER — HYDRALAZINE HYDROCHLORIDE 20 MG/ML
5 INJECTION INTRAMUSCULAR; INTRAVENOUS
Status: DISCONTINUED | OUTPATIENT
Start: 2025-04-17 | End: 2025-04-17 | Stop reason: HOSPADM

## 2025-04-17 RX ORDER — MULTIVITAMIN WITH IRON
1000 TABLET ORAL DAILY
Status: DISCONTINUED | OUTPATIENT
Start: 2025-04-17 | End: 2025-04-18 | Stop reason: HOSPADM

## 2025-04-17 RX ORDER — MIDAZOLAM HYDROCHLORIDE 1 MG/ML
1 INJECTION, SOLUTION INTRAMUSCULAR; INTRAVENOUS
Status: DISCONTINUED | OUTPATIENT
Start: 2025-04-17 | End: 2025-04-17 | Stop reason: HOSPADM

## 2025-04-17 RX ORDER — EPHEDRINE SULFATE 50 MG/ML
INJECTION INTRAVENOUS AS NEEDED
Status: DISCONTINUED | OUTPATIENT
Start: 2025-04-17 | End: 2025-04-17 | Stop reason: SURG

## 2025-04-17 RX ORDER — HYDROMORPHONE HYDROCHLORIDE 1 MG/ML
0.25 INJECTION, SOLUTION INTRAMUSCULAR; INTRAVENOUS; SUBCUTANEOUS
Status: DISCONTINUED | OUTPATIENT
Start: 2025-04-17 | End: 2025-04-17 | Stop reason: HOSPADM

## 2025-04-17 RX ORDER — ASCORBIC ACID 500 MG
250 TABLET ORAL DAILY
Status: DISCONTINUED | OUTPATIENT
Start: 2025-04-17 | End: 2025-04-18 | Stop reason: HOSPADM

## 2025-04-17 RX ORDER — LIDOCAINE HYDROCHLORIDE 10 MG/ML
0.5 INJECTION, SOLUTION INFILTRATION; PERINEURAL ONCE AS NEEDED
Status: DISCONTINUED | OUTPATIENT
Start: 2025-04-17 | End: 2025-04-17 | Stop reason: HOSPADM

## 2025-04-17 RX ORDER — SODIUM CHLORIDE 0.9 % (FLUSH) 0.9 %
3 SYRINGE (ML) INJECTION EVERY 12 HOURS SCHEDULED
Status: DISCONTINUED | OUTPATIENT
Start: 2025-04-17 | End: 2025-04-17 | Stop reason: HOSPADM

## 2025-04-17 RX ORDER — LIDOCAINE HYDROCHLORIDE 20 MG/ML
INJECTION, SOLUTION INFILTRATION; PERINEURAL AS NEEDED
Status: DISCONTINUED | OUTPATIENT
Start: 2025-04-17 | End: 2025-04-17 | Stop reason: SURG

## 2025-04-17 RX ORDER — DIPHENHYDRAMINE HYDROCHLORIDE 50 MG/ML
12.5 INJECTION, SOLUTION INTRAMUSCULAR; INTRAVENOUS
Status: DISCONTINUED | OUTPATIENT
Start: 2025-04-17 | End: 2025-04-17 | Stop reason: HOSPADM

## 2025-04-17 RX ORDER — NICOTINE POLACRILEX 4 MG
15 LOZENGE BUCCAL
Status: DISCONTINUED | OUTPATIENT
Start: 2025-04-17 | End: 2025-04-18

## 2025-04-17 RX ORDER — HYDROCODONE BITARTRATE AND ACETAMINOPHEN 5; 325 MG/1; MG/1
1 TABLET ORAL ONCE AS NEEDED
Status: DISCONTINUED | OUTPATIENT
Start: 2025-04-17 | End: 2025-04-17 | Stop reason: HOSPADM

## 2025-04-17 RX ORDER — NALOXONE HCL 0.4 MG/ML
0.2 VIAL (ML) INJECTION AS NEEDED
Status: DISCONTINUED | OUTPATIENT
Start: 2025-04-17 | End: 2025-04-17 | Stop reason: HOSPADM

## 2025-04-17 RX ORDER — SODIUM CHLORIDE 0.9 % (FLUSH) 0.9 %
10 SYRINGE (ML) INJECTION AS NEEDED
Status: DISCONTINUED | OUTPATIENT
Start: 2025-04-17 | End: 2025-04-17 | Stop reason: HOSPADM

## 2025-04-17 RX ORDER — ROCURONIUM BROMIDE 10 MG/ML
INJECTION, SOLUTION INTRAVENOUS AS NEEDED
Status: DISCONTINUED | OUTPATIENT
Start: 2025-04-17 | End: 2025-04-17 | Stop reason: SURG

## 2025-04-17 RX ORDER — SODIUM CHLORIDE, SODIUM LACTATE, POTASSIUM CHLORIDE, CALCIUM CHLORIDE 600; 310; 30; 20 MG/100ML; MG/100ML; MG/100ML; MG/100ML
9 INJECTION, SOLUTION INTRAVENOUS CONTINUOUS PRN
Status: DISCONTINUED | OUTPATIENT
Start: 2025-04-17 | End: 2025-04-17

## 2025-04-17 RX ORDER — SODIUM CHLORIDE 0.9 % (FLUSH) 0.9 %
3-10 SYRINGE (ML) INJECTION AS NEEDED
Status: DISCONTINUED | OUTPATIENT
Start: 2025-04-17 | End: 2025-04-17 | Stop reason: HOSPADM

## 2025-04-17 RX ORDER — DEXAMETHASONE SODIUM PHOSPHATE 4 MG/ML
INJECTION, SOLUTION INTRA-ARTICULAR; INTRALESIONAL; INTRAMUSCULAR; INTRAVENOUS; SOFT TISSUE AS NEEDED
Status: DISCONTINUED | OUTPATIENT
Start: 2025-04-17 | End: 2025-04-17 | Stop reason: SURG

## 2025-04-17 RX ORDER — PROMETHAZINE HYDROCHLORIDE 25 MG/1
25 TABLET ORAL ONCE AS NEEDED
Status: DISCONTINUED | OUTPATIENT
Start: 2025-04-17 | End: 2025-04-17 | Stop reason: HOSPADM

## 2025-04-17 RX ORDER — ATORVASTATIN CALCIUM 10 MG/1
10 TABLET, FILM COATED ORAL NIGHTLY
Status: DISCONTINUED | OUTPATIENT
Start: 2025-04-17 | End: 2025-04-18 | Stop reason: HOSPADM

## 2025-04-17 RX ORDER — PANTOPRAZOLE SODIUM 40 MG/1
40 TABLET, DELAYED RELEASE ORAL
Status: DISCONTINUED | OUTPATIENT
Start: 2025-04-17 | End: 2025-04-18 | Stop reason: HOSPADM

## 2025-04-17 RX ORDER — IPRATROPIUM BROMIDE AND ALBUTEROL SULFATE 2.5; .5 MG/3ML; MG/3ML
3 SOLUTION RESPIRATORY (INHALATION) ONCE AS NEEDED
Status: DISCONTINUED | OUTPATIENT
Start: 2025-04-17 | End: 2025-04-17 | Stop reason: HOSPADM

## 2025-04-17 RX ADMIN — DEXAMETHASONE SODIUM PHOSPHATE 4 MG: 4 INJECTION, SOLUTION INTRAMUSCULAR; INTRAVENOUS at 18:22

## 2025-04-17 RX ADMIN — PANTOPRAZOLE SODIUM 40 MG: 40 TABLET, DELAYED RELEASE ORAL at 20:55

## 2025-04-17 RX ADMIN — SODIUM CHLORIDE, POTASSIUM CHLORIDE, SODIUM LACTATE AND CALCIUM CHLORIDE: 600; 310; 30; 20 INJECTION, SOLUTION INTRAVENOUS at 18:06

## 2025-04-17 RX ADMIN — ALPRAZOLAM 1 MG: 1 TABLET ORAL at 21:34

## 2025-04-17 RX ADMIN — LIDOCAINE HYDROCHLORIDE 40 MG: 20 INJECTION, SOLUTION INFILTRATION; PERINEURAL at 19:16

## 2025-04-17 RX ADMIN — LIDOCAINE HYDROCHLORIDE 80 MG: 20 INJECTION, SOLUTION INFILTRATION; PERINEURAL at 18:12

## 2025-04-17 RX ADMIN — ATORVASTATIN CALCIUM 10 MG: 10 TABLET ORAL at 20:56

## 2025-04-17 RX ADMIN — PROPOFOL 100 MG: 10 INJECTION, EMULSION INTRAVENOUS at 18:12

## 2025-04-17 RX ADMIN — OXYCODONE AND ACETAMINOPHEN 1 TABLET: 10; 325 TABLET ORAL at 12:38

## 2025-04-17 RX ADMIN — OXYCODONE HYDROCHLORIDE AND ACETAMINOPHEN 250 MG: 500 TABLET ORAL at 20:55

## 2025-04-17 RX ADMIN — OXYCODONE AND ACETAMINOPHEN 1 TABLET: 10; 325 TABLET ORAL at 20:57

## 2025-04-17 RX ADMIN — HYDROMORPHONE HYDROCHLORIDE 0.25 MG: 1 INJECTION, SOLUTION INTRAMUSCULAR; INTRAVENOUS; SUBCUTANEOUS at 19:38

## 2025-04-17 RX ADMIN — INSULIN HUMAN 2 UNITS: 100 INJECTION, SOLUTION PARENTERAL at 21:34

## 2025-04-17 RX ADMIN — HEPARIN SODIUM 5000 UNITS: 1000 INJECTION, SOLUTION INTRAVENOUS; SUBCUTANEOUS at 18:42

## 2025-04-17 RX ADMIN — EPHEDRINE SULFATE 10 MG: 50 INJECTION INTRAVENOUS at 18:37

## 2025-04-17 RX ADMIN — ROCURONIUM BROMIDE 50 MG: 10 INJECTION INTRAVENOUS at 18:13

## 2025-04-17 RX ADMIN — SUGAMMADEX 200 MG: 100 INJECTION, SOLUTION INTRAVENOUS at 19:16

## 2025-04-17 RX ADMIN — IOPAMIDOL 20 ML: 510 INJECTION, SOLUTION INTRAVASCULAR at 19:25

## 2025-04-17 RX ADMIN — ONDANSETRON 4 MG: 2 INJECTION, SOLUTION INTRAMUSCULAR; INTRAVENOUS at 19:13

## 2025-04-17 RX ADMIN — Medication 1000 MCG: at 20:55

## 2025-04-17 RX ADMIN — CALCIUM ACETATE 667 MG: 667 CAPSULE ORAL at 20:57

## 2025-04-17 RX ADMIN — CEFAZOLIN 2000 MG: 2 INJECTION, POWDER, FOR SOLUTION INTRAMUSCULAR; INTRAVENOUS at 17:59

## 2025-04-17 RX ADMIN — CETIRIZINE HYDROCHLORIDE 5 MG: 10 TABLET, FILM COATED ORAL at 20:56

## 2025-04-17 RX ADMIN — HYDROMORPHONE HYDROCHLORIDE 0.25 MG: 1 INJECTION, SOLUTION INTRAMUSCULAR; INTRAVENOUS; SUBCUTANEOUS at 19:48

## 2025-04-17 RX ADMIN — INSULIN HUMAN 3 UNITS: 100 INJECTION, SOLUTION PARENTERAL at 12:38

## 2025-04-17 RX ADMIN — Medication 1000 UNITS: at 20:56

## 2025-04-17 NOTE — ANESTHESIA PREPROCEDURE EVALUATION
Anesthesia Evaluation     Patient summary reviewed and Nursing notes reviewed   NPO Solid Status: > 8 hours  NPO Liquid Status: > 2 hours           Airway   Mallampati: II  TM distance: >3 FB  Neck ROM: full  No difficulty expected  Dental - normal exam   (+) poor dentition    Pulmonary - normal exam   (+) a smoker Former,  Cardiovascular - normal exam    ECG reviewed  Patient on routine beta blocker and Beta blocker given within 24 hours of surgery    (+) hypertension, past MI  >12 months, CABG >6 Months, dysrhythmias Atrial Fib, CHF , hyperlipidemia    ROS comment: 2024 Stress Test    Stress ECG: No ischemic ST segment changes occurred with stress.   ·  Perfusion: SPECT images demonstrate normal myocardial perfusion.   ·  Function: Normal left ventricular cavity size. Gated SPECT images   demonstrate normal systolic function. Regional wall motion is normal. LV   wall thickening appears concordantly normal.   ·  LVEF: >=70%.   · Combined ECG/SPECT: This is a normal nuclear stress test. There is no   previous examination/report available for comparison or correlation.       Neuro/Psych  (+) seizures, CVA, headaches, syncope, numbness, psychiatric history  GI/Hepatic/Renal/Endo    (+) GERD, hepatitis, renal disease- dialysis, diabetes mellitus type 2 poorly controlled    Musculoskeletal     Abdominal    Substance History - negative use     OB/GYN negative ob/gyn ROS         Other   arthritis,   history of cancer                  Anesthesia Plan    ASA 4     general     intravenous induction     Anesthetic plan, risks, benefits, and alternatives have been provided, discussed and informed consent has been obtained with: patient.    CODE STATUS:    Code Status (Patient has no pulse and is not breathing): CPR (Attempt to Resuscitate)  Medical Interventions (Patient has pulse or is breathing): Full Support  Level Of Support Discussed With: Patient

## 2025-04-17 NOTE — OP NOTE
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  End-stage renal disease.    Postoperative Diagnosis:   Same    Procedure Performed:   Ultrasound-guided right jugular vein percutaneous venous access.  Placement of 23 cm palindrome tunneled catheter.  Fluoroscopic catheter tip placement at the cavoatrial junction.    Left arm dialysis shunt thrombectomy with revision (axillary vein stent placement)    Surgeon:   Yanick Painter MD    Assistant:    Megan ARAIZA, Provided critical assistance in exposure, retraction, and suction that overall decrease blood loss and operative time.    Anesthesia:   General    Estimated Blood Loss:    cc    Findings:    Successful ultrasound-guided right jugular central venous access with 23 cm palindrome catheter with catheter tip placement at the cavoatrial junction.  Both lumens abelardo and flushed without resistance.    Successful left arm dialysis shunt thrombectomy.  Acute and subacute thrombus removed.  Recurrent stenosis on the trailing edge of an axillary vein stent thought to be the culprit lesion.  Stented with a 9 x 10 Viabahn stent graft.  Postangioplasty with a 9 mm balloon with complete resolution.    I would not recommend recurrent thrombectomy if dialysis shunt thrombosis in the short-term.    Implants:    Implant Name Type Inv. Item Serial No.  Lot No. LRB No. Used Action   STENTGR ENDOPROSTH VIABAHN RO HEP 8F [id number] - [id number] Implant STENTGR ENDOPROSTH VIABAHN RO HEP 8F 9MM [id number]  [employer] N/A Left 1 Implanted       Staff:   Circulator: Mila Dang RN  Radiology Technologist: Gabriel Meraz  Scrub Person: Sukhjinder Johansen Liliana G  Assistant: Megan Palacio CSA    Specimen:   none    Complications:   none    Dispo:   to PACU    Indication for procedure:   64 y.o. male with end-stage  renal disease with recurrent thrombosis of left arm dialysis shunt.  He submits today for tunnel catheter placement and left arm attempted shunt thrombectomy.  Risk benefits alternatives discussed    Description of procedure:   The patient was taken to the operating room and anesthesia was induced without difficulty. Surgical sites were prepped and draped in the usual sterile manner. A full surgical timeout was performed. Ultrasound-guided right jugular central venous access was performed. Micro wire was placed without difficulty. Placement confirmed with fluoroscopy. Ultimately Micro sheath was placed. Wire was traversed down to the superior vena cava. The wire for the Palindrome catheter kit was placed down into the inferior vena cava. Serial dilatation was performed. Peel-away sheath was placed. Exit site was planned. Small incision was made. Preclosing of the exit incision with 3-0 Vicryl was performed. Tunnel was created and the Palindrome catheter was pulled through the tunnel and placed through the peel-away sheath. Fluoroscopic guidance was used to pull the catheter tip back until the tip was at the cavoatrial junction. Both lumens abelardo and flushed without resistance. Catheter was secured and access site in the neck was closed with Vicryl sutures. Sterile dressings were applied. Strong heparin was used to lock the catheter.     Left arm was prepped and draped in the usual sterile manner. A small incision was made overlying the proximal portion of the dialysis shunt. Circumferential control was had. Then 5000 units of heparin was administered. Graftotomy was made. Venous thrombectomy performed first. Large amount of acute and subacute thrombus were removed. A 10 [place] sheath was placed. Angiogram confirmed stenosis on the trailing edge of the axillary vein stent. This was treated with a 9 x 10 Viabahn stent graft postangioplastied with a 9 balloon with resolution. Arterial limb thrombectomy was performed,  once again removing a lot of acute and subacute thrombus. Pulsatile normal bleeding was obtained. Graftotomy was oversewn in a running to-and-fro manner with a 5-0 Prolene. Appropriate flushing maneuvers were taken prior to restoration of flow. Good hemostasis confirmed. Deep tissues closed with 3-0 Vicryl. Skin running closed with Monocryl in a running manner. Overall the patient tolerated the procedure well.       At case completion all instrument count, needle count, and sponge counts were correct x2.    Yanick Painter MD  04/17/25     Active Hospital Problems    Diagnosis  POA    **Occlusion of arteriovenous dialysis graft [T82.898A]  Unknown    Thrombosis of kidney dialysis arteriovenous graft [T82.868A]  Yes      Resolved Hospital Problems   No resolved problems to display.

## 2025-04-17 NOTE — CASE MANAGEMENT/SOCIAL WORK
Continued Stay Note  [facility]     Patient Name: Angelo Brown  MRN: [id number]  Today's Date: 4/17/2025    Admit Date: 4/17/2025    Plan: Home with wife   Discharge Plan       Row Name 04/17/25 1734       Plan    Plan Home with wife    Plan Comments Pt screened, plan home with wife, formal note to follow - Elen COLIN                   Discharge Codes    No documentation.                 Expected Discharge Date and Time       Expected Discharge Date Expected Discharge Time    Apr 18, 2025               Elen Morgan RN

## 2025-04-17 NOTE — OUTREACH NOTE
Medical Week 1 Survey      Flowsheet Row Responses   [facility] patient discharged from? [facility]   Does the patient have one of the following disease processes/diagnoses(primary or secondary)? Other   Week 1 attempt successful? No   Unsuccessful attempts Attempt 1   Revoke Readmitted            REMI NOBLE - Registered Nurse
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
## 2025-04-17 NOTE — ANESTHESIA PROCEDURE NOTES
Airway  Reason: elective    Date/Time: 4/17/2025 6:15 PM  End Time:4/17/2025 6:16 PM  Airway not difficult    General Information and Staff    Patient location during procedure: OR  Anesthesiologist: Vik Amato DO    Indications and Patient Condition  Indications for airway management: airway protection    Preoxygenated: yes  MILS maintained throughout    Mask difficulty assessment: 2 - vent by mask + OA or adjuvant +/- NMBA    Final Airway Details    Final airway type: endotracheal airway      Successful airway: ETT  Cuffed: yes   Successful intubation technique: direct laryngoscopy  Endotracheal tube insertion site: oral  Blade: Sal  Blade size: 3  ETT size (mm): 8.0  Cormack-Lehane Classification: grade IIa - partial view of glottis  Placement verified by: capnometry   Cuff volume (mL): 8  Measured from: lips  ETT/EBT  to lips (cm): 23  Number of attempts at approach: 1  Assessment: lips, teeth, and gum same as pre-op and atraumatic intubation

## 2025-04-17 NOTE — PLAN OF CARE
Problem: Adult Inpatient Plan of Care  Goal: Absence of Hospital-Acquired Illness or Injury  Intervention: Identify and Manage Fall Risk  Recent Flowsheet Documentation  Taken 4/17/2025 1759 by Kimberlee Horton RN  Safety Promotion/Fall Prevention: patient off unit  Taken 4/17/2025 1600 by Kimberlee Horton RN  Safety Promotion/Fall Prevention: patient off unit  Taken 4/17/2025 1400 by Kimberlee Horton RN  Safety Promotion/Fall Prevention: safety round/check completed  Taken 4/17/2025 1238 by Kimberlee Horton RN  Safety Promotion/Fall Prevention: safety round/check completed  Intervention: Prevent Skin Injury  Recent Flowsheet Documentation  Taken 4/17/2025 1400 by Kimberlee Horton RN  Body Position: position changed independently  Taken 4/17/2025 1238 by Kimberlee Horton RN  Body Position: position changed independently  Skin Protection: transparent dressing maintained  Intervention: Prevent and Manage VTE (Venous Thromboembolism) Risk  Recent Flowsheet Documentation  Taken 4/17/2025 1238 by Kimberlee Horton RN  VTE Prevention/Management:   bilateral   SCDs (sequential compression devices) on  Goal: Optimal Comfort and Wellbeing  Intervention: Monitor Pain and Promote Comfort  Recent Flowsheet Documentation  Taken 4/17/2025 1238 by Kimberlee Horton RN  Pain Management Interventions: pain medication given  Intervention: Provide Person-Centered Care  Recent Flowsheet Documentation  Taken 4/17/2025 1238 by Kimberlee Horton RN  Trust Relationship/Rapport: care explained  Goal: Readiness for Transition of Care  Intervention: Mutually Develop Transition Plan  Recent Flowsheet Documentation  Taken 4/17/2025 1137 by Kimberlee Horton RN  Transportation Anticipated: family or friend will provide  Patient/Family Anticipated Services at Transition: none  Patient/Family Anticipates Transition to: home with family  Taken 4/17/2025 1136 by Kimberlee Horton RN  Equipment Currently Used at Home: none   Goal Outcome Evaluation:   Patient  admitted from ER this shift for occluded AVF; VSS on room air; up ad valente; A&Ox4; patient down for AVF thrombectomy around 3pm this shift and is still off the floor; plan for HD tomorrow - HD orders called in.

## 2025-04-18 ENCOUNTER — READMISSION MANAGEMENT (OUTPATIENT)
Dept: CALL CENTER | Facility: HOSPITAL | Age: 65
End: 2025-04-18
Payer: MEDICARE

## 2025-04-18 VITALS
WEIGHT: 160.9 LBS | DIASTOLIC BLOOD PRESSURE: 47 MMHG | OXYGEN SATURATION: 95 % | BODY MASS INDEX: 23.83 KG/M2 | RESPIRATION RATE: 16 BRPM | HEART RATE: 86 BPM | HEIGHT: 69 IN | SYSTOLIC BLOOD PRESSURE: 120 MMHG | TEMPERATURE: 97.9 F

## 2025-04-18 LAB
ALBUMIN SERPL-MCNC: 3.8 G/DL (ref 3.5–5.2)
ANION GAP SERPL CALCULATED.3IONS-SCNC: 20.6 MMOL/L (ref 5–15)
BASOPHILS # BLD AUTO: 0.01 10*3/MM3 (ref 0–0.2)
BASOPHILS NFR BLD AUTO: 0.3 % (ref 0–1.5)
BUN SERPL-MCNC: 98 MG/DL (ref 8–23)
BUN/CREAT SERPL: 10.3 (ref 7–25)
CALCIUM SPEC-SCNC: 9.4 MG/DL (ref 8.6–10.5)
CHLORIDE SERPL-SCNC: 97 MMOL/L (ref 98–107)
CO2 SERPL-SCNC: 20.4 MMOL/L (ref 22–29)
CREAT SERPL-MCNC: 9.47 MG/DL (ref 0.76–1.27)
DEPRECATED RDW RBC AUTO: 48.8 FL (ref 37–54)
EGFRCR SERPLBLD CKD-EPI 2021: 5.7 ML/MIN/1.73
EOSINOPHIL # BLD AUTO: 0.01 10*3/MM3 (ref 0–0.4)
EOSINOPHIL NFR BLD AUTO: 0.3 % (ref 0.3–6.2)
ERYTHROCYTE [DISTWIDTH] IN BLOOD BY AUTOMATED COUNT: 13.6 % (ref 12.3–15.4)
GLUCOSE BLDC GLUCOMTR-MCNC: 148 MG/DL (ref 70–130)
GLUCOSE BLDC GLUCOMTR-MCNC: 367 MG/DL (ref 70–130)
GLUCOSE SERPL-MCNC: 350 MG/DL (ref 65–99)
HCT VFR BLD AUTO: 30.3 % (ref 37.5–51)
HGB BLD-MCNC: 9.7 G/DL (ref 13–17.7)
IMM GRANULOCYTES # BLD AUTO: 0.01 10*3/MM3 (ref 0–0.05)
IMM GRANULOCYTES NFR BLD AUTO: 0.3 % (ref 0–0.5)
LYMPHOCYTES # BLD AUTO: 0.18 10*3/MM3 (ref 0.7–3.1)
LYMPHOCYTES NFR BLD AUTO: 5.3 % (ref 19.6–45.3)
MCH RBC QN AUTO: 31.6 PG (ref 26.6–33)
MCHC RBC AUTO-ENTMCNC: 32 G/DL (ref 31.5–35.7)
MCV RBC AUTO: 98.7 FL (ref 79–97)
MONOCYTES # BLD AUTO: 0.13 10*3/MM3 (ref 0.1–0.9)
MONOCYTES NFR BLD AUTO: 3.8 % (ref 5–12)
NEUTROPHILS NFR BLD AUTO: 3.07 10*3/MM3 (ref 1.7–7)
NEUTROPHILS NFR BLD AUTO: 90 % (ref 42.7–76)
NRBC BLD AUTO-RTO: 0 /100 WBC (ref 0–0.2)
PHOSPHATE SERPL-MCNC: 9.2 MG/DL (ref 2.5–4.5)
PLATELET # BLD AUTO: 98 10*3/MM3 (ref 140–450)
PMV BLD AUTO: 11.3 FL (ref 6–12)
POTASSIUM SERPL-SCNC: 6.3 MMOL/L (ref 3.5–5.2)
RBC # BLD AUTO: 3.07 10*6/MM3 (ref 4.14–5.8)
SODIUM SERPL-SCNC: 138 MMOL/L (ref 136–145)
WBC NRBC COR # BLD AUTO: 3.41 10*3/MM3 (ref 3.4–10.8)

## 2025-04-18 PROCEDURE — 63710000001 INSULIN REGULAR HUMAN PER 5 UNITS: Performed by: SURGERY

## 2025-04-18 PROCEDURE — G0378 HOSPITAL OBSERVATION PER HR: HCPCS

## 2025-04-18 PROCEDURE — 80069 RENAL FUNCTION PANEL: CPT | Performed by: SURGERY

## 2025-04-18 PROCEDURE — 25010000002 EPOETIN ALFA-EPBX 3000 UNIT/ML SOLUTION: Performed by: SURGERY

## 2025-04-18 PROCEDURE — 82948 REAGENT STRIP/BLOOD GLUCOSE: CPT

## 2025-04-18 PROCEDURE — G0257 UNSCHED DIALYSIS ESRD PT HOS: HCPCS

## 2025-04-18 PROCEDURE — 85025 COMPLETE CBC W/AUTO DIFF WBC: CPT | Performed by: SURGERY

## 2025-04-18 PROCEDURE — 99024 POSTOP FOLLOW-UP VISIT: CPT | Performed by: SURGERY

## 2025-04-18 RX ORDER — SODIUM CHLORIDE 0.9 % (FLUSH) 0.9 %
10 SYRINGE (ML) INJECTION AS NEEDED
Status: DISCONTINUED | OUTPATIENT
Start: 2025-04-18 | End: 2025-04-18

## 2025-04-18 RX ORDER — SODIUM CHLORIDE 0.9 % (FLUSH) 0.9 %
10 SYRINGE (ML) INJECTION EVERY 12 HOURS SCHEDULED
Status: DISCONTINUED | OUTPATIENT
Start: 2025-04-18 | End: 2025-04-18 | Stop reason: HOSPADM

## 2025-04-18 RX ORDER — HEPARIN SODIUM (PORCINE) LOCK FLUSH IV SOLN 100 UNIT/ML 100 UNIT/ML
3 SOLUTION INTRAVENOUS DAILY PRN
Status: DISCONTINUED | OUTPATIENT
Start: 2025-04-18 | End: 2025-04-18

## 2025-04-18 RX ORDER — SODIUM CHLORIDE 9 MG/ML
40 INJECTION, SOLUTION INTRAVENOUS AS NEEDED
Status: DISCONTINUED | OUTPATIENT
Start: 2025-04-18 | End: 2025-04-18

## 2025-04-18 RX ORDER — SODIUM CHLORIDE 0.9 % (FLUSH) 0.9 %
20 SYRINGE (ML) INJECTION AS NEEDED
Status: DISCONTINUED | OUTPATIENT
Start: 2025-04-18 | End: 2025-04-18

## 2025-04-18 RX ADMIN — OXYCODONE AND ACETAMINOPHEN 1 TABLET: 10; 325 TABLET ORAL at 04:36

## 2025-04-18 RX ADMIN — PANTOPRAZOLE SODIUM 40 MG: 40 TABLET, DELAYED RELEASE ORAL at 04:36

## 2025-04-18 RX ADMIN — Medication 10 ML: at 14:01

## 2025-04-18 RX ADMIN — CETIRIZINE HYDROCHLORIDE 5 MG: 10 TABLET, FILM COATED ORAL at 13:58

## 2025-04-18 RX ADMIN — Medication 1000 UNITS: at 13:58

## 2025-04-18 RX ADMIN — EPOETIN ALFA-EPBX 5000 UNITS: 3000 INJECTION, SOLUTION INTRAVENOUS; SUBCUTANEOUS at 13:59

## 2025-04-18 RX ADMIN — Medication 1000 MCG: at 13:58

## 2025-04-18 RX ADMIN — INSULIN HUMAN 6 UNITS: 100 INJECTION, SOLUTION PARENTERAL at 08:06

## 2025-04-18 RX ADMIN — OXYCODONE HYDROCHLORIDE AND ACETAMINOPHEN 250 MG: 500 TABLET ORAL at 09:38

## 2025-04-18 RX ADMIN — ALPRAZOLAM 1 MG: 1 TABLET ORAL at 03:06

## 2025-04-18 RX ADMIN — CALCIUM ACETATE 667 MG: 667 CAPSULE ORAL at 13:58

## 2025-04-18 NOTE — CASE MANAGEMENT/SOCIAL WORK
Case Management Discharge Note      Final Note: home no needs         Selected Continued Care - Admitted Since 4/17/2025       Destination    No services have been selected for the patient.                Durable Medical Equipment    No services have been selected for the patient.                Dialysis/Infusion    No services have been selected for the patient.                Home Medical Care    No services have been selected for the patient.                Therapy    No services have been selected for the patient.                Community Resources    No services have been selected for the patient.                Community & DME    No services have been selected for the patient.                    Transportation Services  Private: Car    Final Discharge Disposition Code: 01 - home or self-care

## 2025-04-18 NOTE — CASE MANAGEMENT/SOCIAL WORK
Discharge Planning Assessment  [facility]     Patient Name: Angelo Brown  MRN: [id number]  Today's Date: 4/18/2025    Admit Date: 4/17/2025    Plan: Home with wife   Discharge Needs Assessment       Row Name 04/18/25 0834       Living Environment    People in Home spouse    Current Living Arrangements home    Potentially Unsafe Housing Conditions none    Primary Care Provided by self    Family Caregiver if Needed spouse    Quality of Family Relationships involved;helpful    Able to Return to Prior Arrangements yes       Resource/Environmental Concerns    Resource/Environmental Concerns none    Transportation Concerns none       Transition Planning    Patient/Family Anticipates Transition to home with family    Patient/Family Anticipated Services at Transition none    Transportation Anticipated family or friend will provide       Discharge Needs Assessment    Readmission Within the Last 30 Days no previous admission in last 30 days    Equipment Currently Used at Home none    Concerns to be Addressed no discharge needs identified    Anticipated Changes Related to Illness none    Equipment Needed After Discharge none                   Discharge Plan       Row Name 04/18/25 0833       Plan    Plan Home with wife    Patient/Family in Agreement with Plan yes    Plan Comments Spoke to pt at bedside, introduced self and explained CCP role, verified face sheet and pharmacy information. Pt lives with Samantha in a 1 level home with no INA. He is IADL's, uses no medical equipment, and has no HH or SNF history. He attends HD T,TH,Sat at [facility] [facility]. He plans home with wife to transport, denies dc needs. CCP will follow - Elen COLIN                    Expected Discharge Date and Time       Expected Discharge Date Expected Discharge Time    Apr 18, 2025            Demographic Summary       Row Name 04/18/25 0834       General Information    Admission Type observation                   Functional Status       Row Name  04/18/25 0834       Functional Status    Usual Activity Tolerance excellent    Current Activity Tolerance good       Assessment of Health Literacy    Health Literacy Good       Functional Status, IADL    Medications independent    Meal Preparation independent    Housekeeping independent    Laundry independent    Shopping independent       Mental Status    General Appearance WDL WDL       Mental Status Summary    Recent Changes in Mental Status/Cognitive Functioning no changes                   Psychosocial    No documentation.                  Abuse/Neglect    No documentation.                  Legal       Row Name 04/18/25 0834       Financial/Legal    Who Manages Finances if Patient Unable wife                   Substance Abuse    No documentation.                  Patient Forms    No documentation.                     Elen Morgan RN

## 2025-04-18 NOTE — ANESTHESIA POSTPROCEDURE EVALUATION
"Patient: Angelo Brown    Procedure Summary       Date: 04/17/25 Room / Location: [facility]    Anesthesia Start: 1803 Anesthesia Stop: 1934    Procedure: Left arm dialysis shunt thrombectomy with tunnel catheter placement. (Left) Diagnosis:       Occlusion of arteriovenous dialysis graft      (Occlusion of arteriovenous dialysis graft [T82.898A])    Surgeons: Yanick Painter MD Provider: Vik Amato DO    Anesthesia Type: general ASA Status: 4            Anesthesia Type: general    Vitals  Vitals Value Taken Time   /61 04/17/25 20:00   Temp 36.4 °C (97.6 °F) 04/17/25 19:30   Pulse 77 04/17/25 20:09   Resp 16 04/17/25 20:00   SpO2 97 % 04/17/25 20:09   Vitals shown include unfiled device data.        Post Anesthesia Care and Evaluation    Patient location during evaluation: bedside  Patient participation: complete - patient participated  Level of consciousness: awake  Pain management: adequate    Airway patency: patent  Anesthetic complications: No anesthetic complications    Cardiovascular status: acceptable  Respiratory status: acceptable  Hydration status: acceptable    Comments: /52 (BP Location: Right leg)   Pulse 88   Temp 36.4 °C (97.5 °F) (Oral)   Resp 17   Ht 175 cm (68.9\")   Wt 73 kg (160 lb 14.4 oz)   SpO2 95%   BMI 23.83 kg/m²     "

## 2025-04-18 NOTE — PROGRESS NOTES
"Daily progress note    Primary care physician      Subjective  Status post left arm dialysis and thrombectomy with revision and axillary vein stent placement.  Patient getting dialysis and wants to go home.  Patient has no new complaints.    History of present illness  64-year-old white male with history of end-stage renal disease on hemodialysis diabetes hypertension hyperlipidemia coronary artery disease CVA chronic anemia who was recently admitted and treated for thrombosis left AV graft and underwent thrombectomy followed by stent placement and discharged home in stable condition.  Patient presented again this morning as his AV fistula not working.  Patient workup in ER revealed thrombosis of AV graft admitted for management.  Patient denies any fever chills chest pain shortness of breath abdominal pain nausea vomiting diarrhea.     REVIEW OF SYSTEMS  Per history of present illness     PHYSICAL EXAM  Blood pressure 154/72, pulse 72, temperature 97.5 °F (36.4 °C), temperature source Temporal, resp. rate 16, height 175 cm (68.9\"), weight 73 kg (160 lb 14.4 oz), SpO2 98%.    GENERAL: Awake and alert no acute distress  HENT: nares patent  EYES: no scleral icterus  CV: regular rhythm, normal rate  RESPIRATORY: normal effort moving air bilaterally  ABDOMEN: soft nontender nondistended bowel sounds positive  MUSCULOSKELETAL: no deformity.  Left arm graft with no palpable pulse or thrill  NEURO: alert, moves all extremities, follows commands  PSYCH:  calm, cooperative  SKIN: warm, dry     LAB RESULTS  Lab Results (last 24 hours)       Procedure Component Value Units Date/Time    CBC & Differential [[id number]]  (Abnormal) Collected: 04/18/25 0517    Specimen: Blood Updated: 04/18/25 0769    Narrative:      The following orders were created for panel order CBC & Differential.  Procedure                               Abnormality         Status                     ---------                               -----------  "        ------                     CBC Auto Differential[[id number]]        Abnormal            Final result                 Please view results for these tests on the individual orders.    CBC Auto Differential [[id number]]  (Abnormal) Collected: 04/18/25 0517    Specimen: Blood Updated: 04/18/25 0737     WBC 3.41 10*3/mm3      RBC 3.07 10*6/mm3      Hemoglobin 9.7 g/dL      Hematocrit 30.3 %      MCV 98.7 fL      MCH 31.6 pg      MCHC 32.0 g/dL      RDW 13.6 %      RDW-SD 48.8 fl      MPV 11.3 fL      Platelets 98 10*3/mm3      Neutrophil % 90.0 %      Lymphocyte % 5.3 %      Monocyte % 3.8 %      Eosinophil % 0.3 %      Basophil % 0.3 %      Immature Grans % 0.3 %      Neutrophils, Absolute 3.07 10*3/mm3      Lymphocytes, Absolute 0.18 10*3/mm3      Monocytes, Absolute 0.13 10*3/mm3      Eosinophils, Absolute 0.01 10*3/mm3      Basophils, Absolute 0.01 10*3/mm3      Immature Grans, Absolute 0.01 10*3/mm3      nRBC 0.0 /100 WBC     Renal Function Panel [[id number]]  (Abnormal) Collected: 04/18/25 0517    Specimen: Blood Updated: 04/18/25 0736     Glucose 350 mg/dL      BUN 98 mg/dL      Creatinine 9.47 mg/dL      Sodium 138 mmol/L      Potassium 6.3 mmol/L      Chloride 97 mmol/L      CO2 20.4 mmol/L      Calcium 9.4 mg/dL      Albumin 3.8 g/dL      Phosphorus 9.2 mg/dL      Anion Gap 20.6 mmol/L      BUN/Creatinine Ratio 10.3     eGFR 5.7 mL/min/1.73     Narrative:      GFR Categories in Chronic Kidney Disease (CKD)      GFR Category          GFR (mL/min/1.73)    Interpretation  G1                     90 or greater         Normal or high (1)  G2                      60-89                Mild decrease (1)  G3a                   45-59                Mild to moderate decrease  G3b                   30-44                Moderate to severe decrease  G4                    15-29                Severe decrease  G5                    14 or less           Kidney failure          (1)In the absence of evidence of kidney disease,  neither GFR category G1 or G2 fulfill the criteria for CKD.    eGFR calculation 2021 CKD-EPI creatinine equation, which does not include race as a factor    POC Glucose Once [[id number]]  (Abnormal) Collected: 04/18/25 0612    Specimen: Blood Updated: 04/18/25 0613     Glucose 367 mg/dL     POC Glucose Once [[id number]]  (Abnormal) Collected: 04/17/25 2115    Specimen: Blood Updated: 04/17/25 2116     Glucose 173 mg/dL     POC Glucose Once [[id number]]  (Abnormal) Collected: 04/17/25 1550    Specimen: Blood Updated: 04/17/25 1551     Glucose 204 mg/dL           Imaging Results (Last 24 Hours)       Procedure Component Value Units Date/Time    XR Chest Post CVA Port [[id number]] Collected: 04/17/25 1953     Updated: 04/17/25 1957    Narrative:      XR CHEST POST CVA PORT-     Clinical: Tunneled catheter placement, effusion, pneumothorax     COMPARISON examination dated 4/10/2025     FINDINGS: Central venous catheter tip superimposes the projected area of  the right atrium, no pneumothorax seen. The cardiomediastinal silhouette  is stable. No pleural effusion, vascular congestion or acute airspace  disease. Band of discoid atelectasis at the right lung base is no longer  present. Remainder is unremarkable.     This report was finalized on 4/17/2025 7:53 PM by Dr. Dharmesh Lerma M.D  on Workstation: [id number]       Arteriogram (Autofinalize) [[id number]] Resulted: 04/17/25 1926     Updated: 04/17/25 1926    Narrative:      This procedure was auto-finalized with no dictation required.            Current Facility-Administered Medications:     ALPRAZolam (XANAX) tablet 1 mg, 1 mg, Oral, 4x Daily PRN, Yanick Painter MD, 1 mg at 04/18/25 0306    ascorbic acid (VITAMIN C) tablet 250 mg, 250 mg, Oral, Daily, Yanick Painter MD, 250 mg at 04/18/25 0938    atorvastatin (LIPITOR) tablet 10 mg, 10 mg, Oral, Nightly, Yanick Painter MD, 10 mg at 04/17/25 2056    calcium acetate (PHOS BINDER)) capsule 667 mg, 667 mg, Oral,  Daily, Yanick Painter MD, 667 mg at 04/17/25 2057    cetirizine (zyrTEC) tablet 5 mg, 5 mg, Oral, Daily, Yanick Painter MD, 5 mg at 04/17/25 2056    cholecalciferol (VITAMIN D3) tablet 1,000 Units, 1,000 Units, Oral, Daily, Yanick Painter MD, 1,000 Units at 04/17/25 2056    dextrose (D50W) (25 g/50 mL) IV injection 25 g, 25 g, Intravenous, Q15 Min PRN, Yanick Painter MD    dextrose (GLUTOSE) oral gel 15 g, 15 g, Oral, Q15 Min PRN, Yanick Painter MD    epoetin real-epbx (RETACRIT) injection 5,000 Units, 5,000 Units, Intravenous, Once per day on Monday Wednesday Friday, Yanick Painter MD    glucagon (GLUCAGEN) injection 1 mg, 1 mg, Intramuscular, Q15 Min PRN, Yanick Painter MD    heparin injection 300 Units, 3 mL, Intravenous, Daily PRN, Karl Swift MD    HYDROcodone-acetaminophen (NORCO) 5-325 MG per tablet 1 tablet, 1 tablet, Oral, Q6H PRN, Yanick Painter MD    insulin regular (humuLIN R,novoLIN R) injection 2-7 Units, 2-7 Units, Subcutaneous, Q6H, Yanick Painter MD, 6 Units at 04/18/25 0806    melatonin tablet 2.5 mg, 2.5 mg, Oral, Nightly PRN, Yanick Painter MD    oxyCODONE-acetaminophen (PERCOCET)  MG per tablet 1 tablet, 1 tablet, Oral, Q6H PRN, Yanick Painter MD, 1 tablet at 04/18/25 0436    pantoprazole (PROTONIX) EC tablet 40 mg, 40 mg, Oral, Q AM, Yanick Painter MD, 40 mg at 04/18/25 0436    [COMPLETED] Insert Peripheral IV, , , Once **AND** sodium chloride 0.9 % flush 10 mL, 10 mL, Intravenous, PRN, Yanick Painter MD    sodium chloride 0.9 % flush 10 mL, 10 mL, Intravenous, Q12H, Dacia Swifttab, MD    sodium chloride 0.9 % flush 10 mL, 10 mL, Intravenous, Q12H, Ahmed, Karl, MD    sodium chloride 0.9 % flush 10 mL, 10 mL, Intravenous, PRN, Dacia Swifttab, MD    sodium chloride 0.9 % flush 20 mL, 20 mL, Intravenous, PRN, Ahmed, Karl, MD    sodium chloride 0.9 % infusion 40 mL, 40 mL, Intravenous, PRN, Adoremed, Karl, MD    tiZANidine (ZANAFLEX) tablet 4 mg, 4 mg,  Oral, Q6H PRN, Yanick Painter MD    vitamin B-12 (CYANOCOBALAMIN) tablet 1,000 mcg, 1,000 mcg, Oral, Daily, Yanick Painter MD, 1,000 mcg at 04/17/25 2055     ASSESSMENT  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

## 2025-04-18 NOTE — NURSING NOTE
Hd completed as ordered. Isaak well. Stable run. No issues . No c/o 2 liters net uf. Post vss 127/84 hr 76

## 2025-04-18 NOTE — DISCHARGE SUMMARY
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 and axillary vein stent placement  Status post hemodialysis tunneled catheter placement  End-stage renal disease   Diabetes mellitus  Hypertension   Hyperlipidemia  History of CVA  Coronary artery disease  Chronic anemia  Gastroesophageal reflux disease    Discharge medications    Current Facility-Administered Medications:     ascorbic acid (VITAMIN C) tablet 250 mg, 250 mg, Oral, Daily, Yanick Painter MD, 250 mg at 04/18/25 0938    atorvastatin (LIPITOR) tablet 10 mg, 10 mg, Oral, Nightly, Yanick Painter MD, 10 mg at 04/17/25 2056    calcium acetate (PHOS BINDER)) capsule 667 mg, 667 mg, Oral, Daily, Yanick Painter MD, 667 mg at 04/17/25 2057    cetirizine (zyrTEC) tablet 5 mg, 5 mg, Oral, Daily, Yanick Painter MD, 5 mg at 04/17/25 2056    cholecalciferol (VITAMIN D3) tablet 1,000 Units, 1,000 Units, Oral, Daily, Yanick Painter MD, 1,000 Units at 04/17/25 2056    epoetin real-epbx (RETACRIT) injection 5,000 Units, 5,000 Units, Intravenous, Once per day on Monday Wednesday Friday, Yanick Painter MD    insulin regular (humuLIN R,novoLIN R) injection 2-7 Units, 2-7 Units, Subcutaneous, Q6H, Yanick Painter MD, 6 Units at 04/18/25 0806    oxyCODONE-acetaminophen (PERCOCET)  MG per tablet 1 tablet, 1 tablet, Oral, Q6H PRN, Yanick Painter MD, 1 tablet at 04/18/25 0436    pantoprazole (PROTONIX) EC tablet 40 mg, 40 mg, Oral, Q AM, Yanick Painter MD, 40 mg at 04/18/25 0436    [COMPLETED] Insert Peripheral IV, , , Once **AND** sodium chloride 0.9 % flush 10 mL, 10 mL, Intravenous, PRN, Yanick Painter MD    sodium chloride 0.9 % flush 10 mL, 10 mL, Intravenous, Q12H, Karl Swift MD    sodium chloride 0.9 % flush 10 mL, 10 mL, Intravenous, Q12H, Karl Swift MD    vitamin B-12 (CYANOCOBALAMIN) tablet 1,000 mcg, 1,000 mcg, Oral,  Daily, Yanick Painter MD, 1,000 mcg at 04/17/25 2055     Consults obtained  Vascular surgery  Nephrology    Procedures  Left arm dialysis shunt thrombectomy with revision and axillary vein stent placement and placement of hemodialysis tunneled catheter    Hospital course  64-year-old male with history of end-stage renal disease on hemodialysis admitted to the emergency room with AV graft not working.  Patient workup in ER revealed recurrent thrombosis left arm AV graft admitted for management.  Patient admitted and further eval by vascular surgery and recommend thrombectomy and axillary vein stent placement which was done this morning.  Patient also have hemodialysis tunneled catheter placed and getting hemodialysis at this time and cleared for discharge.    Discharge diet regular    Activity as tolerated    Medication as above    Follow-up with primary doctor in 1 week and follow-up with vascular surgery and nephrology per the instruction and take medication as directed and keep the appointment for hemodialysis 3 times a week Tuesday Thursday Saturday.    ARIEL MCGOVERN MD

## 2025-04-18 NOTE — PROGRESS NOTES
[facility]   Surgical Progress Note    Patient Name: Angelo Brown  : 1960  MRN: [id number]  Date of admission: 2025  Surgical Procedures Since Admission:  Procedure(s):  Left arm dialysis shunt thrombectomy with tunnel catheter placement.  Surgeon:  Yanick Painter MD  Status:  1 Day Post-Op  -------------------    Subjective   Subjective     Chief Complaint: Clotted left arm dialysis shunt follow-up.    Vascular Access Problem     Patient resting comfortably this morning.  Denies any incisional or tunnel catheter pain.  Denies chest pain or shortness of breath.    Review of Systems    Objective   Objective     Vitals:   Temp:  [97.3 °F (36.3 °C)-97.7 °F (36.5 °C)] 97.5 °F (36.4 °C)  Heart Rate:  [55-90] 72  Resp:  [16-18] 16  BP: (117-177)/(52-80) 154/72    Physical Exam  Constitutional:       Appearance: He is well-developed.   Pulmonary:      Effort: Pulmonary effort is normal. No respiratory distress.   Abdominal:      General: There is no distension.      Palpations: Abdomen is soft.   Neurological:      Mental Status: He is alert and oriented to person, place, and time.     Noncomplicated.  Newly placed right jugular tunneled catheter.    Left arm dialysis shunt has thrill and bruit.  No incisional concerns.     Result Review    Result Review:  I have personally reviewed the results from the time of this admission to 2025 10:24 EDT and agree with these findings:  [x]  Laboratory list / accordion  []  Microbiology  []  Radiology  []  EKG/Telemetry   []  Cardiology/Vascular   []  Pathology  []  Old records  []  Other:  Most notable findings include:       Results from last 7 days   Lab Units 25  0517 25  0859 25  0520   WBC 10*3/mm3 3.41 4.09 6.72   HEMOGLOBIN g/dL 9.7* 9.3* 11.1*   PLATELETS 10*3/mm3 98* 86* 101*     Results from last 7 days   Lab Units 25  0517 25  0859 25  0520   SODIUM mmol/L 138 138 135*   POTASSIUM mmol/L 6.3* 4.7 5.1    CHLORIDE mmol/L 97* 95* 94*   CO2 mmol/L 20.4* 22.7 25.0   BUN mg/dL 98* 84* 31*   CREATININE mg/dL 9.47* 9.03* 4.95*   GLUCOSE mg/dL 350* 228* 252*   PHOSPHORUS mg/dL 9.2*  --  7.2*   Estimated Creatinine Clearance: 8.4 mL/min (A) (by C-G formula based on SCr of 9.47 mg/dL (H)).    Lab Results   Component Value Date    HGBA1C 5.60 04/11/2025    HGBA1C 6.70 (H) 02/06/2025    HGBA1C CANCELED 02/03/2025     Glucose   Date/Time Value Ref Range Status   04/18/2025 0612 367 (H) 70 - 130 mg/dL Final   04/17/2025 2115 173 (H) 70 - 130 mg/dL Final   04/17/2025 1550 204 (H) 70 - 130 mg/dL Final   04/17/2025 1157 228 (H) 70 - 130 mg/dL Final       Assessment & Plan   Assessment / Plan     Brief Patient Summary:  Angelo Brown is a 64 y.o. male who recurrent thrombosis of the left arm dialysis shunt.    Active Hospital Problems:   Active Hospital Problems    Diagnosis     **Occlusion of arteriovenous dialysis graft     Thrombosis of kidney dialysis arteriovenous graft      Plan:   4/17/2025: Left arm dialysis shunt thrombectomy with axillary vein stenting and tunneled catheter placement (NTS)    4/18/2025: Okay to use either the tunneled catheter or the dialysis shunt.  After dialysis okay for discharge with short interval follow-up with Dr. Torres or myself in the office.  Will leave the tunneled catheter in place for 2 to 4 weeks to ensure adequate dialysis through his left arm.  Will likely require fistulogram prior to tunneled catheter placement to ensure left arm shunt is expected to work long-term.    VTE Prophylaxis:  Pharmacologic VTE prophylaxis orders are signed & held. Mechanical VTE prophylaxis orders are present.        Yanick Painter MD

## 2025-04-18 NOTE — PLAN OF CARE
Problem: Adult Inpatient Plan of Care  Goal: Plan of Care Review  Outcome: Progressing  Flowsheets (Taken 4/18/2025 0403)  Progress: improving  Outcome Evaluation: VSS, tunnel cath intact no issues, no c/o of pain this morning. Pt is complaining of anxiety and given PRN xanax. Pt is resting in bed comfortably at this time.  Plan of Care Reviewed With: patient  Goal: Patient-Specific Goal (Individualized)  Outcome: Progressing  Goal: Absence of Hospital-Acquired Illness or Injury  Outcome: Progressing  Intervention: Identify and Manage Fall Risk  Description: Perform standard risk assessment on admission using a validated tool or comprehensive approach appropriate to the patient; reassess fall risk frequently, with change in status or transfer to another level of care.Communicate risk to interprofessional healthcare team; ensure fall risk visible cue.Determine need for increased observation, equipment and environmental modification, as well as use of supportive, nonskid footwear.Adjust safety measures to individual needs and identified risk factors.Reinforce the importance of active participation with fall risk prevention, safety, and physical activity with the patient and family.Perform regular intentional rounding to assess need for position change, pain assessment and personal needs, including assistance with toileting.  Recent Flowsheet Documentation  Taken 4/18/2025 0305 by Shruthi Alvarez, RN  Safety Promotion/Fall Prevention:   activity supervised   clutter free environment maintained   fall prevention program maintained   nonskid shoes/slippers when out of bed   safety round/check completed  Taken 4/18/2025 0015 by Shruthi Alvarez, RN  Safety Promotion/Fall Prevention:   activity supervised   clutter free environment maintained   fall prevention program maintained   nonskid shoes/slippers when out of bed   safety round/check completed  Taken 4/17/2025 2245 by Shruthi Alvarez, RN  Safety Promotion/Fall  Prevention:   activity supervised   clutter free environment maintained   fall prevention program maintained   nonskid shoes/slippers when out of bed   safety round/check completed  Taken 4/17/2025 2050 by Shruthi Alvarez RN  Safety Promotion/Fall Prevention:   activity supervised   clutter free environment maintained   fall prevention program maintained   nonskid shoes/slippers when out of bed   safety round/check completed  Intervention: Prevent Skin Injury  Description: Perform a screening for skin injury risk, such as pressure or moisture-associated skin damage on admission and at regular intervals throughout hospital stay.Keep all areas of skin (especially folds) clean and dry.Maintain adequate skin hydration.Relieve and redistribute pressure and protect bony prominences and skin at risk for injury; implement measures based on patient-specific risk factors.Match turning and repositioning schedule to clinical condition.Encourage weight shift frequently; assist with reposition if unable to complete independently.Float heels off bed; avoid pressure on the Achilles tendon.Keep skin free from extended contact with medical devices.Optimize nutrition and hydration.Encourage functional activity and mobility, as early as tolerated.Use aids (e.g., slide boards, mechanical lift) during transfer.  Recent Flowsheet Documentation  Taken 4/18/2025 0305 by Shruthi Alvarez RN  Body Position: position changed independently  Taken 4/18/2025 0015 by Shruthi Alvarez RN  Body Position: sitting up in bed  Taken 4/17/2025 2245 by Shruthi Alvarez RN  Body Position: position changed independently  Taken 4/17/2025 2050 by Shruthi Alvarez RN  Body Position: position changed independently  Skin Protection: transparent dressing maintained  Intervention: Prevent and Manage VTE (Venous Thromboembolism) Risk  Description: Assess for VTE (venous thromboembolism) risk.Promote early mobilization; encourage both active and passive leg  exercises, if unable to ambulate.Initiate and maintain compression or other therapy, as indicated, based on identified risk in accordance with organizational protocol and provider order.Recognize the patient's individual risk for bleeding before initiating pharmacologic thromboprophylaxis.  Recent Flowsheet Documentation  Taken 4/17/2025 2050 by Shruthi Alvarez RN  VTE Prevention/Management:   bilateral   SCDs (sequential compression devices) on  Intervention: Prevent Infection  Description: Maintain skin and mucous membrane integrity; promote hand, oral and pulmonary hygiene.Optimize fluid balance, nutrition, sleep and glycemic control to maximize infection resistance.Identify potential sources of infection early to prevent or mitigate progression of infection (e.g., wound, lines, devices).Evaluate ongoing need for invasive devices; remove promptly when no longer indicated.Review vaccination status.  Recent Flowsheet Documentation  Taken 4/18/2025 0305 by Shruthi Alvarez RN  Infection Prevention:   environmental surveillance performed   rest/sleep promoted   single patient room provided  Taken 4/18/2025 0015 by Shruthi Alvarez RN  Infection Prevention:   environmental surveillance performed   rest/sleep promoted   single patient room provided  Taken 4/17/2025 2245 by Shruthi Alvarez RN  Infection Prevention:   environmental surveillance performed   rest/sleep promoted   single patient room provided  Taken 4/17/2025 2050 by Shruthi Alvarez RN  Infection Prevention:   environmental surveillance performed   rest/sleep promoted   single patient room provided  Goal: Optimal Comfort and Wellbeing  Outcome: Progressing  Intervention: Monitor Pain and Promote Comfort  Description: Assess pain level, treatment efficacy and patient response at regular intervals using a consistent pain scale.Consider the presence and impact of preexisting chronic pain.Encourage patient and caregiver involvement in pain assessment,  interventions and safety measures.Promote activity; balance with sleep and rest to enhance healing.  Recent Flowsheet Documentation  Taken 4/17/2025 2050 by Shruthi Alvarez RN  Pain Management Interventions:   pillow support provided   position adjusted  Intervention: Provide Person-Centered Care  Description: Use a family-focused approach to care; encourage support system presence and participation.Develop trust and rapport by proactively providing information, encouraging questions, addressing concerns and offering reassurance.Acknowledge emotional response to hospitalization.Recognize and utilize personal coping strategies and strengths; develop goals via shared decision-making.Honor spiritual and cultural preferences.  Recent Flowsheet Documentation  Taken 4/17/2025 2050 by Shruthi Alvarez RN  Trust Relationship/Rapport:   choices provided   care explained  Goal: Readiness for Transition of Care  Outcome: Progressing   Goal Outcome Evaluation:  Plan of Care Reviewed With: patient        Progress: improving  Outcome Evaluation: VSS, tunnel cath intact no issues, no c/o of pain this morning. Pt is complaining of anxiety and given PRN xanax. Pt is resting in bed comfortably at this time.

## 2025-04-18 NOTE — PROGRESS NOTES
"   LOS: 0 days     Chief Complaint/ Reason for encounter: ESRD, hyperkalemia management    Subjective   04/18/25 : Patient seen on dialysis, able to cannulate left arm AV graft without difficulty, dialysis underway through the graft, acceptable venous and arterial pressures  Goal UF 2 L  He denies complaints      Medical history reviewed:  History of Present Illness    Subjective    History taken from: Chart and patient/family as able    Vital Signs  Temp:  [97.3 °F (36.3 °C)-97.7 °F (36.5 °C)] 97.5 °F (36.4 °C)  Heart Rate:  [55-90] 72  Resp:  [16-18] 16  BP: (117-177)/(52-80) 154/72       Wt Readings from Last 1 Encounters:   04/17/25 1146 73 kg (160 lb 14.4 oz)   04/17/25 0852 74.8 kg (164 lb 14.5 oz)       Objective:  Vital signs: (most recent): Blood pressure 154/72, pulse 72, temperature 97.5 °F (36.4 °C), temperature source Temporal, resp. rate 16, height 175 cm (68.9\"), weight 73 kg (160 lb 14.4 oz), SpO2 98%.                Objective:  General Appearance:  Comfortable, chronically ill-appearing, no acute distress   HEENT: Mucous membranes moist, atraumatic, right chest CVC  Lungs:  Normal effort and normal respiratory rate.  Breath sounds clear to auscultation: No rhonchi/Rales.  No  respiratory distress.   Heart:  S1, S2 normal.   Abdomen: Abdomen is soft, nontender/nondistended  Extremities: No significant edema of bilateral lower extremities, left arm AV graft in use for HD  Skin:  Warm and dry       Results Review:    Intake/Output:     Intake/Output Summary (Last 24 hours) at 4/18/2025 1028  Last data filed at 4/18/2025 0810  Gross per 24 hour   Intake 700 ml   Output 575 ml   Net 125 ml         DATA:  Radiology and Labs:  The following labs independently reviewed by me. Additional labs ordered for tomorrow a.m.  Interval notes, chart personally reviewed by me.   Old records independently reviewed showing recent admission for graft thrombosis post thrombectomy  The following radiologic studies " independently viewed by me, findings chest x-ray post cath shows catheter tip in appropriate position stable heart and lungs  New problems include hyperkalemia  Discussed with patient himself at bedside    Risk/ complexity of medical care/ medical decision making high complexity, dialysis management    Labs:   Recent Results (from the past 24 hours)   POC Glucose Once    Collection Time: 04/17/25 11:57 AM    Specimen: Blood   Result Value Ref Range    Glucose 228 (H) 70 - 130 mg/dL   POC Glucose Once    Collection Time: 04/17/25  3:50 PM    Specimen: Blood   Result Value Ref Range    Glucose 204 (H) 70 - 130 mg/dL   POC Glucose Once    Collection Time: 04/17/25  9:15 PM    Specimen: Blood   Result Value Ref Range    Glucose 173 (H) 70 - 130 mg/dL   Renal Function Panel    Collection Time: 04/18/25  5:17 AM    Specimen: Blood   Result Value Ref Range    Glucose 350 (H) 65 - 99 mg/dL    BUN 98 (H) 8 - 23 mg/dL    Creatinine 9.47 (H) 0.76 - 1.27 mg/dL    Sodium 138 136 - 145 mmol/L    Potassium 6.3 (C) 3.5 - 5.2 mmol/L    Chloride 97 (L) 98 - 107 mmol/L    CO2 20.4 (L) 22.0 - 29.0 mmol/L    Calcium 9.4 8.6 - 10.5 mg/dL    Albumin 3.8 3.5 - 5.2 g/dL    Phosphorus 9.2 (H) 2.5 - 4.5 mg/dL    Anion Gap 20.6 (H) 5.0 - 15.0 mmol/L    BUN/Creatinine Ratio 10.3 7.0 - 25.0    eGFR 5.7 (L) >60.0 mL/min/1.73   CBC Auto Differential    Collection Time: 04/18/25  5:17 AM    Specimen: Blood   Result Value Ref Range    WBC 3.41 3.40 - 10.80 10*3/mm3    RBC 3.07 (L) 4.14 - 5.80 10*6/mm3    Hemoglobin 9.7 (L) 13.0 - 17.7 g/dL    Hematocrit 30.3 (L) 37.5 - 51.0 %    MCV 98.7 (H) 79.0 - 97.0 fL    MCH 31.6 26.6 - 33.0 pg    MCHC 32.0 31.5 - 35.7 g/dL    RDW 13.6 12.3 - 15.4 %    RDW-SD 48.8 37.0 - 54.0 fl    MPV 11.3 6.0 - 12.0 fL    Platelets 98 (L) 140 - 450 10*3/mm3    Neutrophil % 90.0 (H) 42.7 - 76.0 %    Lymphocyte % 5.3 (L) 19.6 - 45.3 %    Monocyte % 3.8 (L) 5.0 - 12.0 %    Eosinophil % 0.3 0.3 - 6.2 %    Basophil % 0.3 0.0 - 1.5  %    Immature Grans % 0.3 0.0 - 0.5 %    Neutrophils, Absolute 3.07 1.70 - 7.00 10*3/mm3    Lymphocytes, Absolute 0.18 (L) 0.70 - 3.10 10*3/mm3    Monocytes, Absolute 0.13 0.10 - 0.90 10*3/mm3    Eosinophils, Absolute 0.01 0.00 - 0.40 10*3/mm3    Basophils, Absolute 0.01 0.00 - 0.20 10*3/mm3    Immature Grans, Absolute 0.01 0.00 - 0.05 10*3/mm3    nRBC 0.0 0.0 - 0.2 /100 WBC   POC Glucose Once    Collection Time: 04/18/25  6:12 AM    Specimen: Blood   Result Value Ref Range    Glucose 367 (H) 70 - 130 mg/dL       Radiology:  Pertinent radiology studies were reviewed as described above      Medications have been reviewed separately in chart review medication tab      ASSESSMENT:  ESRD on dialysis Tuesday Thursday Saturday    Occlusion of arteriovenous dialysis graft, recurrent, post thrombectomy    Thrombosis of kidney dialysis arteriovenous graft  Hyperkalemia, worse today, dialysis in progress  Diabetes mellitus type 2  Hyperphosphatemia on binders, improved  Anemia of CKD        DISCUSSION/PLAN:   Patient seen on dialysis, status post thrombectomy  Graft was able to be cannulated for dialysis without any difficulty and seems to be running well  Hemodialysis this morning to correct hyperkalemia  Patient to resume Lokelma at home on nondialysis days  Phosphorus binders with meals  Epogen with HD for anemia  BP and volume at goal    Stable for discharge after dialysis today from a renal standpoint.  Patient instructed to have dialysis again tomorrow at his outpatient unit and continue Tuesday Thursday Saturday schedule      Yohan Murrell MD  [facility]   Office phone number: [phone]  Answering service phone number: [phone]    04/18/25  10:28 EDT    Dictation performed using Dragon dictation software

## 2025-04-19 NOTE — OUTREACH NOTE
Prep Survey      Flowsheet Row Responses   [facility] patient discharged from? [place]   Is LACE score < 7 ? No   Eligibility Readm Mgmt   Discharge diagnosis Occlusion of arteriovenous dialysis graft   Does the patient have one of the following disease processes/diagnoses(primary or secondary)? Other   Does the patient have Home health ordered? No   Is there a DME ordered? No   Prep survey completed? Yes            VENU DAVIS - Registered Nurse

## 2025-04-23 ENCOUNTER — READMISSION MANAGEMENT (OUTPATIENT)
Dept: CALL CENTER | Facility: HOSPITAL | Age: 65
End: 2025-04-23
Payer: MEDICARE

## 2025-04-23 NOTE — OUTREACH NOTE
Medical Week 1 Survey      Flowsheet Row Responses   [facility] patient discharged from? [place]   Does the patient have one of the following disease processes/diagnoses(primary or secondary)? Other   Week 1 attempt successful? Yes   Call start time 1032   Call end time 1039   Discharge diagnosis Occlusion of arteriovenous dialysis graft   Person spoke with today (if not patient) and relationship Samantha Velez reviewed with patient/caregiver? Yes   Is the patient having any side effects they believe may be caused by any medication additions or changes? No   Does the patient have all medications ordered at discharge? Yes   Is the patient taking all medications as directed (includes completed medication regime)? Yes   Does the patient have a primary care provider?  Yes   Does the patient have an appointment with their PCP within 7 days of discharge? Greater than 7 days   What is preventing the patient from scheduling follow up appointments within 7 days of discharge? Earlier appointment not available   Has the patient kept scheduled appointments due by today? Yes   Psychosocial issues? No   Did the patient receive a copy of their discharge instructions? Yes   Nursing interventions Reviewed instructions with patient   What is the patient's perception of their health status since discharge? Same   Is the patient/caregiver able to teach back signs and symptoms related to disease process for when to call PCP? Yes   Is the patient/caregiver able to teach back signs and symptoms related to disease process for when to call 911? Yes   Is the patient/caregiver able to teach back the hierarchy of who to call/visit for symptoms/problems? PCP, Specialist, Home health nurse, Urgent Care, ED, 911 Yes   If the patient is a current smoker, are they able to teach back resources for cessation? Not a smoker   Week 1 call completed? Yes   Would this patient benefit from a Referral to Amb Social Work? No   Is the patient interested  in additional calls from an ambulatory ? No   Wrap up additional comments pt doing ok, saw dentist in [place] on monday kidney transplanthopefully first week of June   Call end time 1030            VENU DAVIS - Registered Nurse

## 2025-05-02 ENCOUNTER — TELEPHONE (OUTPATIENT)
Age: 65
End: 2025-05-02
Payer: MEDICARE

## 2025-05-02 ENCOUNTER — READMISSION MANAGEMENT (OUTPATIENT)
Dept: CALL CENTER | Facility: HOSPITAL | Age: 65
End: 2025-05-02
Payer: MEDICARE

## 2025-05-02 NOTE — TELEPHONE ENCOUNTER
Marylin from [facility] called requesting an appt for pt to get his graft declotted, pt was admitted to hospital on 4/10 and had thrombectomy, fistulagram and stent placement , returned to hospital 4/17 and also had thrombectomy with TDC placement, pt is using TDC for dialysis, due to hx of recent hospital admissions, will reach out to  for POC moving forward and will call Marylin back after we hear from .

## 2025-05-02 NOTE — OUTREACH NOTE
Medical Week 2 Survey      Flowsheet Row Responses   [facility] patient discharged from? [place]   Does the patient have one of the following disease processes/diagnoses(primary or secondary)? Other   Week 2 attempt successful? No   Unsuccessful attempts Attempt 1            Ada DAVIS - Registered Nurse

## 2025-05-06 ENCOUNTER — READMISSION MANAGEMENT (OUTPATIENT)
Dept: CALL CENTER | Facility: HOSPITAL | Age: 65
End: 2025-05-06
Payer: MEDICARE

## 2025-05-06 NOTE — OUTREACH NOTE
Medical Week 2 Survey      Flowsheet Row Responses   [facility] patient discharged from? [place]   Does the patient have one of the following disease processes/diagnoses(primary or secondary)? Other   Week 2 attempt successful? Yes   Call start time 1659   Discharge diagnosis Occlusion of arteriovenous dialysis graft   Call end time 1703   Is the patient taking all medications as directed (includes completed medication regime)? Yes   Medication comments No changes   Does the patient have a primary care provider?  Yes   Has the patient kept scheduled appointments due by today? Yes   Psychosocial issues? No   What is the patient's perception of their health status since discharge? New symptoms unrelated to diagnosis   Is the patient/caregiver able to teach back signs and symptoms related to disease process for when to call PCP? Yes   Is the patient/caregiver able to teach back signs and symptoms related to disease process for when to call 911? Yes   Is the patient/caregiver able to teach back the hierarchy of who to call/visit for symptoms/problems? PCP, Specialist, Home health nurse, Urgent Care, ED, 911 Yes   If the patient is a current smoker, are they able to teach back resources for cessation? Not a smoker   Additional teach back comments States port is not working again.  He is waiting to hear from nephrology regarding what the next steps are.  Advised if he starts having issues and hasn't heard from them to go to ED.   Week 2 Call Completed? Yes   Wrap up additional comments Pt is having issue with port and waiting to hear from nephrology regarding this.   Call end time 1703            Megan STEIN - Licensed Nurse

## 2025-05-07 ENCOUNTER — TELEPHONE (OUTPATIENT)
Dept: ENDOCRINOLOGY | Age: 65
End: 2025-05-07
Payer: MEDICARE

## 2025-05-13 DIAGNOSIS — T82.868D THROMBOSIS OF ARTERIOVENOUS GRAFT, SUBSEQUENT ENCOUNTER: Primary | ICD-10-CM

## 2025-05-13 DIAGNOSIS — N18.6 ESRD (END STAGE RENAL DISEASE) ON DIALYSIS: ICD-10-CM

## 2025-05-13 DIAGNOSIS — Z99.2 ESRD (END STAGE RENAL DISEASE) ON DIALYSIS: ICD-10-CM

## 2025-05-16 ENCOUNTER — READMISSION MANAGEMENT (OUTPATIENT)
Dept: CALL CENTER | Facility: HOSPITAL | Age: 65
End: 2025-05-16
Payer: MEDICARE

## 2025-05-16 NOTE — OUTREACH NOTE
Medical Week 3 Survey      Flowsheet Row Responses   [facility] patient discharged from? [place]   Does the patient have one of the following disease processes/diagnoses(primary or secondary)? Other   Week 3 attempt successful? No   Unsuccessful attempts Attempt 1   Revoke Decline to participate  [declined call.]            VENU DAVIS - Registered Nurse

## 2025-08-08 ENCOUNTER — TELEPHONE (OUTPATIENT)
Age: 65
End: 2025-08-08
Payer: MEDICARE

## 2025-08-13 ENCOUNTER — TELEPHONE (OUTPATIENT)
Age: 65
End: 2025-08-13
Payer: MEDICARE

## (undated) DEVICE — VESSEL LOOPS X-RAY DETECTABLE: Brand: DEROYAL

## (undated) DEVICE — PATIENT RETURN ELECTRODE, SINGLE-USE, CONTACT QUALITY MONITORING, ADULT, WITH 9FT CORD, FOR PATIENTS WEIGING OVER 33LBS. (15KG): Brand: MEGADYNE

## (undated) DEVICE — SUT SILK 3/0 TIES 18IN A184H

## (undated) DEVICE — SUT PDS O CT1 CR/8 18IN Z740D

## (undated) DEVICE — SOL NS 500ML

## (undated) DEVICE — PINNACLE R/O II INTRODUCER SHEATH WITH RADIOPAQUE MARKER: Brand: PINNACLE

## (undated) DEVICE — Device

## (undated) DEVICE — PK ANGIO CERBRL RAD 40

## (undated) DEVICE — ANTIBACTERIAL UNDYED BRAIDED (POLYGLACTIN 910), SYNTHETIC ABSORBABLE SUTURE: Brand: COATED VICRYL

## (undated) DEVICE — BIOPATCH™ ANTIMICROBIAL DRESSING WITH CHLORHEXIDINE GLUCONATE IS A HYDROPHILLIC POLYURETHANE ABSORPTIVE FOAM WITH CHLORHEXIDINE GLUCONATE (CHG) WHICH INHIBITS BACTERIAL GROWTH UNDER THE DRESSING. THE DRESSING IS INTENDED TO BE USED TO ABSORB EXUDATE, COVER A WOUND CAUSED BY VASCULAR AND NONVASCULAR PERCUTANEOUS MEDICAL DEVICES DURING SURGERY, AS WELL AS REDUCE LOCAL INFECTION AND COLONIZATION OF MICROORGANISMS.: Brand: BIOPATCH

## (undated) DEVICE — GOWN,NON-REINFORCED,SIRUS,SET IN SLV,XL: Brand: MEDLINE

## (undated) DEVICE — RADIFOCUS TORQUE DEVICE MULTI-TORQUE VISE: Brand: RADIFOCUS TORQUE DEVICE

## (undated) DEVICE — 3M™ STERI-STRIP™ REINFORCED ADHESIVE SKIN CLOSURES, R1547, 1/2 IN X 4 IN (12 MM X 100 MM), 6 STRIPS/ENVELOPE: Brand: 3M™ STERI-STRIP™

## (undated) DEVICE — LOU MINOR PROCEDURE: Brand: MEDLINE INDUSTRIES, INC.

## (undated) DEVICE — GLV SURG SENSICARE PI LF PF 7.5 GRN STRL

## (undated) DEVICE — GLV SURG BIOGEL LTX PF 7

## (undated) DEVICE — SYR LUERLOK 5CC

## (undated) DEVICE — SUT ETHLN 5/0 PS2 18IN 1666H

## (undated) DEVICE — SYR LUERLOK 20CC BX/50

## (undated) DEVICE — 3F 80 CM LEMAITRE EMBOLECTOMY CATHETER, EIFU: Brand: LEMAITRE EMBOLECTOMY CATHETER

## (undated) DEVICE — BNDG ELAS ELITE V/CLOSE 4IN 5YD LF STRL

## (undated) DEVICE — DRSNG SURESITE WNDW 4X4.5

## (undated) DEVICE — SUT PROLN 6/0 BV1 D/A 30IN 8709H

## (undated) DEVICE — KT CATH TAL PALINDROME SAPPHIRE 14.5F23CM

## (undated) DEVICE — CONQUEST® PTA BALLOON DILATATION CATHETER 9 MM X 40 MM, 75 CM CATHETER: Brand: CONQUEST®

## (undated) DEVICE — IMPLANTABLE DEVICE: Type: IMPLANTABLE DEVICE | Site: ARM | Status: NON-FUNCTIONAL

## (undated) DEVICE — TRY CATH DIAL MAHUKA HPPASS 3L 11.5F 24

## (undated) DEVICE — SUT PROLN 3/0 SH D/A 36IN 8522H

## (undated) DEVICE — GLV SURG BIOGEL LTX PF 7 1/2

## (undated) DEVICE — ADHS SKIN SURG TISS VISC PREMIERPRO EXOFIN HI/VISC FAST/DRY

## (undated) DEVICE — INTENDED FOR TISSUE SEPARATION, AND OTHER PROCEDURES THAT REQUIRE A SHARP SURGICAL BLADE TO PUNCTURE OR CUT.: Brand: BARD-PARKER ® CARBON RIB-BACK BLADES

## (undated) DEVICE — SUT SILK 2/0 TIES 18IN A185H

## (undated) DEVICE — SYR LL TP 10ML STRL

## (undated) DEVICE — ST ACC MICROPUNCTURE STFF .018 ECHO/PLDM/TP 4F/10CM 21G/7CM

## (undated) DEVICE — ST. SORBAVIEW ULTIMATE IJ SYSTEM A,C: Brand: CENTURION

## (undated) DEVICE — SUT PROLN 5/0 RB1 D/A 36IN 8556H

## (undated) DEVICE — SUT VIC 4/0 RB1 27IN J214H

## (undated) DEVICE — COVER,MAYO STAND,STERILE: Brand: MEDLINE

## (undated) DEVICE — PINNACLE INTRODUCER SHEATH: Brand: PINNACLE

## (undated) DEVICE — NDL HYPO PRECISIONGLIDE REG 25G 1 1/2

## (undated) DEVICE — SUT SILK 3/0 SH CR5 18IN C0135

## (undated) DEVICE — PK AV FISTL/SHNT 40

## (undated) DEVICE — 3M™ STERI-STRIP™ COMPOUND BENZOIN TINCTURE 40 BAGS/CARTON 4 CARTONS/CASE C1544: Brand: 3M™ STERI-STRIP™

## (undated) DEVICE — DRAPE,REIN 53X77,STERILE: Brand: MEDLINE

## (undated) DEVICE — PREP SOL POVIDONE/IODINE BT 4OZ

## (undated) DEVICE — ENCORE® LATEX ORTHO SIZE 7.5, STERILE LATEX POWDER-FREE SURGICAL GLOVE: Brand: ENCORE

## (undated) DEVICE — PCH INST SURG INVISISHIELD 2PCKT

## (undated) DEVICE — 4F 40 CM LEMAITRE EMBOLECTOMY CATHETER, EIFU: Brand: LEMAITRE EMBOLECTOMY CATHETER

## (undated) DEVICE — 5F PLUS 80CM OVER-THE-WIRE EMBOLECTOMY CATHETER: Brand: LEMAITRE EMBOLECTOMY CATHETER

## (undated) DEVICE — 3M™ STERI-DRAPE™ INSTRUMENT POUCH 1018: Brand: STERI-DRAPE™

## (undated) DEVICE — PENCL SMOKE/EVAC MEGADYNE TELESCP 10FT

## (undated) DEVICE — UNDERGLV SURG BIOGEL INDICAT PI SZ8.5 BLU

## (undated) DEVICE — BANDAGE,GAUZE,BULKEE II,4.5"X4.1YD,STRL: Brand: MEDLINE

## (undated) DEVICE — TBG PENCL TELESCP MEGADYNE SMOKE EVAC 10FT

## (undated) DEVICE — STERILE ULTRASOUND GEL, SAFEWRAP: Brand: ECOVUE

## (undated) DEVICE — GLV SURG BIOGEL M LTX PF 6 1/2

## (undated) DEVICE — TRAP FLD MINIVAC MEGADYNE 100ML

## (undated) DEVICE — PENCL E/S ULTRAVAC TELESCP NOSE HOLSTR 10FT

## (undated) DEVICE — SYR LUERLOK 30CC

## (undated) DEVICE — INFLATION DEVICE: Brand: ENCORE™ 26

## (undated) DEVICE — CVR PROB 96IN LF STRL

## (undated) DEVICE — GOWN,SIRUS,POLYRNF,BRTHSLV,XLN/XXL,18/CS: Brand: MEDLINE

## (undated) DEVICE — 1 ML TUBERCULIN SYRINGE REGULAR TIP: Brand: MONOJECT

## (undated) DEVICE — SPNG GZ WOVN 4X4IN 12PLY 10/BX STRL

## (undated) DEVICE — SOL NACL 0.9PCT 1000ML

## (undated) DEVICE — APPL CHLORAPREP W/TINT 26ML ORNG

## (undated) DEVICE — Device: Brand: FABCO

## (undated) DEVICE — GOWN,SIRUS,NONRNF,SETINSLV,2XL,18/CS: Brand: MEDLINE

## (undated) DEVICE — COVER,LIGHT HANDLE,FLX,2/PK: Brand: MEDLINE INDUSTRIES, INC.

## (undated) DEVICE — DRAPE,FEM ANGIO,W/POUCHES, HD: Brand: MEDLINE

## (undated) DEVICE — SUT ETHLN 2/0 PS 18IN 585H

## (undated) DEVICE — DECANT BG O JET

## (undated) DEVICE — ST ACC MICROPUNCTURE STFF .018 ECHO/PLAT/TP 4F/10CM 21G/7CM

## (undated) DEVICE — CONQUEST® PTA BALLOON DILATATION CATHETER 8 MM X 40 MM, 75 CM CATHETER: Brand: CONQUEST®

## (undated) DEVICE — 4F 80 CM LEMAITRE EMBOLECTOMY CATHETER, EIFU: Brand: LEMAITRE EMBOLECTOMY CATHETER

## (undated) DEVICE — SUT SILK 3/0 SH CR8 18IN C013D

## (undated) DEVICE — SUT MNCRYL PLS ANTIB UD 4/0 PS2 18IN

## (undated) DEVICE — SUT PROLN SURGILENE 5.0 24IN BLU 9702H

## (undated) DEVICE — PTA BALLOON DILATATION CATHETER: Brand: MUSTANG™

## (undated) DEVICE — GLV SURG BIOGEL LTX PF 8 1/2

## (undated) DEVICE — EXOFIN PRECISION PEN HIGH VISCOSITY TOPICAL SKIN ADHESIVE: Brand: EXOFIN PRECISION PEN, 1G

## (undated) DEVICE — APPL CHLORAPREP HI/LITE 26ML ORNG

## (undated) DEVICE — RADIFOCUS GLIDEWIRE: Brand: GLIDEWIRE

## (undated) DEVICE — SHLD ANGIO 2LAYR CIR FEN

## (undated) DEVICE — EQUIPMENT COVER BAG TYPE 48” X 36” (122CM X 91CM): Brand: EQUIPMENT COVER BAG TYPE

## (undated) DEVICE — SUT VIC 3/0 TIES 18IN J110T